# Patient Record
Sex: FEMALE | Race: WHITE | NOT HISPANIC OR LATINO | Employment: OTHER | URBAN - METROPOLITAN AREA
[De-identification: names, ages, dates, MRNs, and addresses within clinical notes are randomized per-mention and may not be internally consistent; named-entity substitution may affect disease eponyms.]

---

## 2019-08-30 ENCOUNTER — CLINICAL SUPPORT (OUTPATIENT)
Dept: FAMILY MEDICINE CLINIC | Facility: CLINIC | Age: 69
End: 2019-08-30

## 2019-08-30 DIAGNOSIS — E53.8 B12 DEFICIENCY: Primary | ICD-10-CM

## 2019-08-30 PROCEDURE — 96372 THER/PROPH/DIAG INJ SC/IM: CPT | Performed by: FAMILY MEDICINE

## 2019-08-30 RX ORDER — CYANOCOBALAMIN 1000 UG/ML
1000 INJECTION, SOLUTION INTRAMUSCULAR; SUBCUTANEOUS
Status: DISCONTINUED | OUTPATIENT
Start: 2019-08-30 | End: 2020-04-27

## 2019-08-30 RX ADMIN — CYANOCOBALAMIN 1000 MCG: 1000 INJECTION, SOLUTION INTRAMUSCULAR; SUBCUTANEOUS at 10:24

## 2019-09-03 ENCOUNTER — TELEPHONE (OUTPATIENT)
Dept: FAMILY MEDICINE CLINIC | Facility: CLINIC | Age: 69
End: 2019-09-03

## 2019-09-03 NOTE — TELEPHONE ENCOUNTER
"I wasn't sure how to go about this. Patient called stated she is ready to go do \"lung testing\" she wasn't sure of the exact name of the test but that she was ready for you to set it up for her. I didn't know if you wanted to wait until her appt on 10/7 to discuss with patient or not please advise.   "

## 2019-09-03 NOTE — TELEPHONE ENCOUNTER
I would be glad to do it but I do not have her records available.  If they do not come in before that visit we can discuss it then.  Please make sure she signed a release.

## 2019-09-09 ENCOUNTER — OFFICE VISIT (OUTPATIENT)
Dept: FAMILY MEDICINE CLINIC | Facility: CLINIC | Age: 69
End: 2019-09-09

## 2019-09-09 VITALS
HEIGHT: 61 IN | TEMPERATURE: 98.1 F | HEART RATE: 80 BPM | OXYGEN SATURATION: 96 % | DIASTOLIC BLOOD PRESSURE: 76 MMHG | BODY MASS INDEX: 37.19 KG/M2 | WEIGHT: 197 LBS | SYSTOLIC BLOOD PRESSURE: 112 MMHG

## 2019-09-09 DIAGNOSIS — R06.00 DYSPNEA, UNSPECIFIED TYPE: ICD-10-CM

## 2019-09-09 DIAGNOSIS — J20.9 ACUTE BRONCHITIS, UNSPECIFIED ORGANISM: Primary | ICD-10-CM

## 2019-09-09 PROCEDURE — 99214 OFFICE O/P EST MOD 30 MIN: CPT | Performed by: FAMILY MEDICINE

## 2019-09-09 RX ORDER — BENZONATATE 200 MG/1
200 CAPSULE ORAL 3 TIMES DAILY PRN
Qty: 30 CAPSULE | Refills: 0 | Status: SHIPPED | OUTPATIENT
Start: 2019-09-09 | End: 2019-12-19 | Stop reason: SDDI

## 2019-09-09 RX ORDER — PREDNISONE 20 MG/1
20 TABLET ORAL 2 TIMES DAILY
Qty: 10 TABLET | Refills: 0 | Status: SHIPPED | OUTPATIENT
Start: 2019-09-09 | End: 2019-09-14

## 2019-09-09 RX ORDER — IPRATROPIUM BROMIDE AND ALBUTEROL SULFATE 2.5; .5 MG/3ML; MG/3ML
3 SOLUTION RESPIRATORY (INHALATION) ONCE
Status: DISCONTINUED | OUTPATIENT
Start: 2019-09-09 | End: 2020-01-17

## 2019-09-09 RX ORDER — ALBUTEROL SULFATE 90 UG/1
2 AEROSOL, METERED RESPIRATORY (INHALATION) EVERY 4 HOURS PRN
COMMUNITY

## 2019-09-09 RX ORDER — AMOXICILLIN 500 MG/1
1000 CAPSULE ORAL 2 TIMES DAILY
Qty: 28 CAPSULE | Refills: 0 | Status: SHIPPED | OUTPATIENT
Start: 2019-09-09 | End: 2019-09-16

## 2019-09-09 NOTE — PATIENT INSTRUCTIONS
Go to ER if worsens.   Make sure to use inhaler every 4 hours while awake for the next 2 days.  Push fluids and rest.

## 2019-09-09 NOTE — PROGRESS NOTES
Subjective   Jemima Garcia is a 69 y.o. female.     Chief Complaint   Patient presents with   • Breathing Problem        Patient presents with a couple day history of shortness of breath.  She has had some cold symptoms with runny nose but is concerned because of her history of pulmonary embolus.  She denies any palpitations.  She denies any chest pain.  Her granddaughter was sick last week but she has not seen her in a bout a week.  She denies any fever or chills.  She does have a history of a pulmonary embolus in May 2018 after having a knee surgery.  She completed 6 months of Eliquis in November 2018.  I do not have her old records from my previous office available.  She denies any recent leg pain or swelling.           The following portions of the patient's history were reviewed and updated as appropriate: allergies, current medications, past family history, past medical history, past social history, past surgical history and problem list.    Past Medical History:   Diagnosis Date   • Depression    • GERD (gastroesophageal reflux disease)        History reviewed. No pertinent surgical history.    History reviewed. No pertinent family history.    Social History     Socioeconomic History   • Marital status: Single     Spouse name: Not on file   • Number of children: Not on file   • Years of education: Not on file   • Highest education level: Not on file   Tobacco Use   • Smoking status: Never Smoker         Current Outpatient Medications:   •  albuterol sulfate  (90 Base) MCG/ACT inhaler, Inhale 2 puffs Every 4 (Four) Hours As Needed., Disp: , Rfl:   •  Cholecalciferol (VITAMIN D3) 5000 UNITS capsule capsule, Take 5,000 Units by mouth Daily., Disp: , Rfl:   •  NABUMETONE PO, Take  by mouth., Disp: , Rfl:   •  omeprazole (PriLOSEC) 40 MG capsule, Take 40 mg by mouth Daily., Disp: , Rfl:   •  promethazine (PHENERGAN) 25 MG suppository, Insert 1 suppository into the rectum Every 4 (Four) Hours As Needed  "for nausea or vomiting., Disp: 12 suppository, Rfl: 0    Current Facility-Administered Medications:   •  cyanocobalamin injection 1,000 mcg, 1,000 mcg, Intramuscular, Q28 Days, Ajith Latricecrow Paz DO, 1,000 mcg at 08/30/19 1024  •  ipratropium-albuterol (DUO-NEB) nebulizer solution 3 mL, 3 mL, Nebulization, Once, Kehrer, Meredith Lea, MD    Review of Systems   Constitutional: Positive for fever. Negative for chills and fatigue.   HENT: Positive for congestion. Negative for rhinorrhea and sore throat.    Respiratory: Positive for chest tightness and wheezing. Negative for cough and shortness of breath.    Cardiovascular: Negative for chest pain and leg swelling.   Gastrointestinal: Negative for abdominal pain.   Endocrine: Negative for polydipsia and polyuria.   Genitourinary: Negative for dysuria.   Musculoskeletal: Negative for arthralgias and myalgias.   Skin: Negative for rash.   Neurological: Positive for headaches. Negative for dizziness.   Hematological: Does not bruise/bleed easily.   Psychiatric/Behavioral: Negative for sleep disturbance.     After nebulizer patient had increased breath sounds with few rhonchi and wheezes.  No crackles and no distress.    Objective   Vitals:    09/09/19 1606   BP: 112/76   Pulse: 80   Temp: 98.1 °F (36.7 °C)   SpO2: 96%   Weight: 89.4 kg (197 lb)   Height: 154.9 cm (61\")     Body mass index is 37.22 kg/m².  Physical Exam   Constitutional: She is oriented to person, place, and time. She appears well-developed and well-nourished.   HENT:   Head: Normocephalic and atraumatic.   Eyes: Conjunctivae and EOM are normal. Pupils are equal, round, and reactive to light.   Neck: Neck supple. No thyromegaly present.   Cardiovascular: Normal rate and regular rhythm.   No murmur heard.  Pulmonary/Chest: Effort normal. No tachypnea. She has decreased breath sounds. She has no wheezes. She has no rales. She exhibits no tenderness.   Abdominal: Soft.   Musculoskeletal: Normal range of motion. "   Neurological: She is alert and oriented to person, place, and time. No cranial nerve deficit.   Skin: Skin is warm and dry.   Psychiatric: She has a normal mood and affect.         Assessment/Plan   Jemima was seen today for breathing problem.    Diagnoses and all orders for this visit:    Dyspnea, unspecified type  -     ipratropium-albuterol (DUO-NEB) nebulizer solution 3 mL               There are no Patient Instructions on file for this visit.

## 2019-09-16 ENCOUNTER — OFFICE VISIT (OUTPATIENT)
Dept: FAMILY MEDICINE CLINIC | Facility: CLINIC | Age: 69
End: 2019-09-16

## 2019-09-16 VITALS
HEART RATE: 93 BPM | OXYGEN SATURATION: 98 % | WEIGHT: 197.2 LBS | TEMPERATURE: 98 F | SYSTOLIC BLOOD PRESSURE: 118 MMHG | BODY MASS INDEX: 37.23 KG/M2 | HEIGHT: 61 IN | DIASTOLIC BLOOD PRESSURE: 78 MMHG

## 2019-09-16 DIAGNOSIS — J20.9 ACUTE BRONCHITIS, UNSPECIFIED ORGANISM: ICD-10-CM

## 2019-09-16 DIAGNOSIS — M15.9 OSTEOARTHRITIS OF MULTIPLE JOINTS, UNSPECIFIED OSTEOARTHRITIS TYPE: ICD-10-CM

## 2019-09-16 DIAGNOSIS — R06.2 WHEEZING: Primary | ICD-10-CM

## 2019-09-16 PROBLEM — M19.90 OSTEOARTHRITIS: Status: ACTIVE | Noted: 2019-09-16

## 2019-09-16 PROCEDURE — 99213 OFFICE O/P EST LOW 20 MIN: CPT | Performed by: FAMILY MEDICINE

## 2019-09-16 RX ORDER — ACETAMINOPHEN 325 MG/1
650 TABLET ORAL
COMMUNITY

## 2019-09-16 RX ORDER — LEVOTHYROXINE SODIUM 88 UG/1
TABLET ORAL DAILY
Refills: 1 | COMMUNITY
Start: 2019-07-18 | End: 2020-02-06 | Stop reason: SDUPTHER

## 2019-09-16 RX ORDER — ONDANSETRON 4 MG/1
4 TABLET, FILM COATED ORAL
COMMUNITY
Start: 2018-03-23 | End: 2019-10-31

## 2019-09-16 RX ORDER — TRAMADOL HYDROCHLORIDE 50 MG/1
50-100 TABLET ORAL
COMMUNITY
Start: 2016-04-07 | End: 2020-04-07

## 2019-09-16 RX ORDER — FLUTICASONE PROPIONATE 50 MCG
SPRAY, SUSPENSION (ML) NASAL
COMMUNITY
Start: 2016-04-07 | End: 2021-10-14 | Stop reason: SDUPTHER

## 2019-09-16 RX ORDER — VENLAFAXINE HYDROCHLORIDE 75 MG/1
CAPSULE, EXTENDED RELEASE ORAL
Refills: 0 | COMMUNITY
Start: 2019-08-22 | End: 2019-11-18

## 2019-09-16 RX ORDER — MELOXICAM 15 MG/1
TABLET ORAL DAILY
Refills: 1 | COMMUNITY
Start: 2019-08-07 | End: 2019-09-16 | Stop reason: SDUPTHER

## 2019-09-16 RX ORDER — MELOXICAM 15 MG/1
15 TABLET ORAL DAILY
Qty: 90 TABLET | Refills: 1 | Status: SHIPPED | OUTPATIENT
Start: 2019-09-16 | End: 2020-04-27 | Stop reason: SDUPTHER

## 2019-09-16 RX ORDER — BUSPIRONE HYDROCHLORIDE 10 MG/1
10 TABLET ORAL 2 TIMES DAILY
Refills: 0 | COMMUNITY
Start: 2019-07-18 | End: 2019-12-09 | Stop reason: SDUPTHER

## 2019-09-16 NOTE — PROGRESS NOTES
Subjective   Jemima Garcia is a 69 y.o. female.     Chief Complaint   Patient presents with   • Breathing Problem     follow up from last week        Here to follow-up.  She states she is feeling much better after the treatment from last week.  She is using her inhaler still several times a day.  She still has a lot of wheezing and cough but denies any productive cough and denies any chest pain.  She is also not had any fever or chills.  She has routine follow-up scheduled for next month.           The following portions of the patient's history were reviewed and updated as appropriate: allergies, current medications, past family history, past medical history, past social history, past surgical history and problem list.    Past Medical History:   Diagnosis Date   • Acute bronchitis    • Acute sinusitis    • Allergic rhinitis    • Anxiety    • Arthritis    • Back pain    • BMI 34.0-34.9,adult    • Cervicalgia    • Chills    • Deep vein thrombosis (DVT) of lower extremity (CMS/HCC)    • Depression    • Dermatitis    • Dyspnea    • Dysuria    • Esophageal reflux    • Fatigue    • Gastric ulcer    • GERD (gastroesophageal reflux disease)    • Headache    • Hypothyroidism    • Irritable bowel syndrome    • Lumbago    • Migraine    • Nausea    • Neuritis    • Osteoarthritis    • Osteoarthritis of knee    • Plantar fasciitis    • Pulled muscle    • Pulmonary embolism (CMS/HCC)    • Pyelonephritis    • Rheumatic fever    • Sore throat    • Torticollis    • Trapezius strain    • Urinary incontinence    • Urinary pain    • Urinary tract infection    • UTI symptoms    • Vitamin B1 deficiency    • Vitamin B12 deficiency    • Vitamin D deficiency    • Weakness    • Wheezing        Past Surgical History:   Procedure Laterality Date   • APPENDECTOMY     • GALLBLADDER SURGERY     • HYSTERECTOMY     • INGUINAL HERNIA REPAIR     • KNEE SURGERY     • LAPAROTOMY OOPHERECTOMY     • TONSILLECTOMY     • TOTAL KNEE ARTHROPLASTY Left         Family History   Problem Relation Age of Onset   • Arthritis Mother    • Depression Mother    • Hyperlipidemia Mother    • Hypertension Mother    • Hypertension Father    • Alcohol abuse Maternal Grandfather    • Depression Maternal Grandfather    • Heart disease Other         IN FEMALES BEFORE AGE 65       Social History     Socioeconomic History   • Marital status: Single     Spouse name: Not on file   • Number of children: Not on file   • Years of education: Not on file   • Highest education level: Not on file   Tobacco Use   • Smoking status: Never Smoker         Current Outpatient Medications:   •  acetaminophen (TYLENOL) 325 MG tablet, Take 1,000 mg by mouth., Disp: , Rfl:   •  albuterol sulfate  (90 Base) MCG/ACT inhaler, Inhale 2 puffs Every 4 (Four) Hours As Needed., Disp: , Rfl:   •  benzonatate (TESSALON) 200 MG capsule, Take 1 capsule by mouth 3 (Three) Times a Day As Needed for Cough., Disp: 30 capsule, Rfl: 0  •  busPIRone (BUSPAR) 10 MG tablet, Take 10 mg by mouth 2 (Two) Times a Day., Disp: , Rfl: 0  •  Cholecalciferol (VITAMIN D3) 5000 UNITS capsule capsule, Take 5,000 Units by mouth Daily., Disp: , Rfl:   •  fluticasone (FLONASE) 50 MCG/ACT nasal spray, Use 2 Sprays in each nostril once daily. prn, Disp: , Rfl:   •  levothyroxine (SYNTHROID, LEVOTHROID) 88 MCG tablet, Take  by mouth Daily., Disp: , Rfl: 1  •  meloxicam (MOBIC) 15 MG tablet, Take 1 tablet by mouth Daily., Disp: 90 tablet, Rfl: 1  •  omeprazole (PriLOSEC) 40 MG capsule, Take 40 mg by mouth Daily., Disp: , Rfl:   •  ondansetron (ZOFRAN) 4 MG tablet, Take 4 mg by mouth., Disp: , Rfl:   •  promethazine (PHENERGAN) 25 MG suppository, Insert 1 suppository into the rectum Every 4 (Four) Hours As Needed for nausea or vomiting., Disp: 12 suppository, Rfl: 0  •  traMADol (ULTRAM) 50 MG tablet, Take  mg by mouth., Disp: , Rfl:   •  venlafaxine XR (EFFEXOR-XR) 75 MG 24 hr capsule, TK 1 C PO QAM, Disp: , Rfl: 0    Current  "Facility-Administered Medications:   •  cyanocobalamin injection 1,000 mcg, 1,000 mcg, Intramuscular, Q28 Days, Ajith Latricecrow Paz DO, 1,000 mcg at 08/30/19 1024  •  ipratropium-albuterol (DUO-NEB) nebulizer solution 3 mL, 3 mL, Nebulization, Once, Kehrer, Meredith Lea, MD    Review of Systems   Constitutional: Positive for fatigue. Negative for chills and fever.   HENT: Negative for congestion, rhinorrhea and sore throat.    Respiratory: Positive for cough and wheezing. Negative for shortness of breath.    Cardiovascular: Negative for chest pain and leg swelling.   Gastrointestinal: Negative for abdominal pain.   Endocrine: Negative for polydipsia and polyuria.   Genitourinary: Negative for dysuria.   Musculoskeletal: Negative for arthralgias and myalgias.   Skin: Negative for rash.   Neurological: Negative for dizziness.   Hematological: Does not bruise/bleed easily.   Psychiatric/Behavioral: Negative for sleep disturbance.       Objective   Vitals:    09/16/19 1420   BP: 118/78   Pulse: 93   Temp: 98 °F (36.7 °C)   SpO2: 98%   Weight: 89.4 kg (197 lb 3.2 oz)   Height: 154.9 cm (61\")     Body mass index is 37.26 kg/m².  Physical Exam   Constitutional: She is oriented to person, place, and time. She appears well-developed and well-nourished.   HENT:   Head: Normocephalic and atraumatic.   Eyes: Conjunctivae and EOM are normal. Pupils are equal, round, and reactive to light.   Neck: Neck supple. No thyromegaly present.   Cardiovascular: Normal rate and regular rhythm.   No murmur heard.  Pulmonary/Chest: Effort normal and breath sounds normal. She has no wheezes.   Abdominal: Soft.   Musculoskeletal: Normal range of motion.   Neurological: She is alert and oriented to person, place, and time. No cranial nerve deficit.   Skin: Skin is warm and dry.   Psychiatric: She has a normal mood and affect.         Assessment/Plan   Jemima was seen today for breathing problem.    Diagnoses and all orders for this " visit:    Wheezing    Acute bronchitis, unspecified organism    Osteoarthritis of multiple joints, unspecified osteoarthritis type  -     meloxicam (MOBIC) 15 MG tablet; Take 1 tablet by mouth Daily.               Patient Instructions   Keep routine follow up.

## 2019-10-07 ENCOUNTER — OFFICE VISIT (OUTPATIENT)
Dept: FAMILY MEDICINE CLINIC | Facility: CLINIC | Age: 69
End: 2019-10-07

## 2019-10-07 VITALS
TEMPERATURE: 97.8 F | OXYGEN SATURATION: 95 % | WEIGHT: 201.6 LBS | DIASTOLIC BLOOD PRESSURE: 80 MMHG | HEART RATE: 99 BPM | SYSTOLIC BLOOD PRESSURE: 122 MMHG | BODY MASS INDEX: 38.06 KG/M2 | HEIGHT: 61 IN

## 2019-10-07 DIAGNOSIS — R06.2 WHEEZING: ICD-10-CM

## 2019-10-07 DIAGNOSIS — E03.9 HYPOTHYROIDISM, UNSPECIFIED TYPE: ICD-10-CM

## 2019-10-07 DIAGNOSIS — R06.02 SHORTNESS OF BREATH: Primary | ICD-10-CM

## 2019-10-07 PROBLEM — R06.00 DYSPNEA: Status: ACTIVE | Noted: 2019-10-07

## 2019-10-07 PROCEDURE — G0439 PPPS, SUBSEQ VISIT: HCPCS | Performed by: FAMILY MEDICINE

## 2019-10-07 NOTE — PROGRESS NOTES
The ABCs of the Annual Wellness Visit  Subsequent Medicare Wellness Visit    Chief Complaint   Patient presents with   • Annual Exam       Subjective   History of Present Illness:  Jemima Garcia is a 69 y.o. female who presents for a Subsequent Medicare Wellness Visit.  Has been doing well in general.  She still continues to be short of breath.  The Brio really does help but she still taking the albuterol twice a day.    HEALTH RISK ASSESSMENT    Recent Hospitalizations:  No hospitalization(s) within the last year.    Current Medical Providers:  Patient Care Team:  Kehrer, Meredith Lea, MD as PCP - General (Family Medicine)    Smoking Status:  Social History     Tobacco Use   Smoking Status Never Smoker       Alcohol Consumption:  Social History     Substance and Sexual Activity   Alcohol Use Not on file       Depression Screen:   No flowsheet data found.    Fall Risk Screen:  PATTIADI Fall Risk Assessment has not been completed.    Health Habits and Functional and Cognitive Screening:  Functional & Cognitive Status 10/7/2019   Do you have difficulty preparing food and eating? No   Do you have difficulty bathing yourself, getting dressed or grooming yourself? No   Do you have difficulty using the toilet? No   Do you have difficulty moving around from place to place? No   Do you have trouble with steps or getting out of a bed or a chair? No   Current Diet Limited Junk Food   Dental Exam Up to date   Eye Exam Up to date   Exercise (times per week) 0 times per week   Current Exercise Activities Include None   Do you need help using the phone?  No   Are you deaf or do you have serious difficulty hearing?  No   Do you need help with transportation? No   Do you need help shopping? No   Do you need help preparing meals?  No   Do you need help with housework?  No   Do you need help with laundry? No   Do you need help taking your medications? No   Do you need help managing money? No   Do you ever drive or ride in a car  without wearing a seat belt? No   Have you felt unusual stress, anger or loneliness in the last month? No   Who do you live with? Spouse   If you need help, do you have trouble finding someone available to you? No   Have you been bothered in the last four weeks by sexual problems? No   Do you have difficulty concentrating, remembering or making decisions? No         Does the patient have evidence of cognitive impairment? No    Asprin use counseling:Does not need ASA (and currently is not on it)    Age-appropriate Screening Schedule:  Refer to the list below for future screening recommendations based on patient's age, sex and/or medical conditions. Orders for these recommended tests are listed in the plan section. The patient has been provided with a written plan.    Health Maintenance   Topic Date Due   • MAMMOGRAM  1950   • TDAP/TD VACCINES (1 - Tdap) 02/02/1968   • ZOSTER VACCINE (1 of 2) 02/02/1999   • PNEUMOCOCCAL VACCINES (65+ LOW/MEDIUM RISK) (1 of 2 - PCV13) 02/02/2014   • INFLUENZA VACCINE  08/01/2019   • COLONOSCOPY  08/30/2019          The following portions of the patient's history were reviewed and updated as appropriate: allergies, current medications, past family history, past medical history, past social history, past surgical history and problem list.    Outpatient Medications Prior to Visit   Medication Sig Dispense Refill   • acetaminophen (TYLENOL) 325 MG tablet Take 1,000 mg by mouth.     • albuterol sulfate  (90 Base) MCG/ACT inhaler Inhale 2 puffs Every 4 (Four) Hours As Needed.     • benzonatate (TESSALON) 200 MG capsule Take 1 capsule by mouth 3 (Three) Times a Day As Needed for Cough. 30 capsule 0   • busPIRone (BUSPAR) 10 MG tablet Take 10 mg by mouth 2 (Two) Times a Day.  0   • Cholecalciferol (VITAMIN D3) 5000 UNITS capsule capsule Take 5,000 Units by mouth Daily.     • fluticasone (FLONASE) 50 MCG/ACT nasal spray Use 2 Sprays in each nostril once daily. prn     • Fluticasone  Furoate-Vilanterol (BREO ELLIPTA) 100-25 MCG/INH inhaler Inhale 1 puff Daily. 1 each 0   • levothyroxine (SYNTHROID, LEVOTHROID) 88 MCG tablet Take  by mouth Daily.  1   • meloxicam (MOBIC) 15 MG tablet Take 1 tablet by mouth Daily. 90 tablet 1   • omeprazole (PriLOSEC) 40 MG capsule Take 40 mg by mouth Daily.     • ondansetron (ZOFRAN) 4 MG tablet Take 4 mg by mouth.     • promethazine (PHENERGAN) 25 MG suppository Insert 1 suppository into the rectum Every 4 (Four) Hours As Needed for nausea or vomiting. 12 suppository 0   • traMADol (ULTRAM) 50 MG tablet Take  mg by mouth.     • venlafaxine XR (EFFEXOR-XR) 75 MG 24 hr capsule TK 1 C PO QAM  0     Facility-Administered Medications Prior to Visit   Medication Dose Route Frequency Provider Last Rate Last Dose   • cyanocobalamin injection 1,000 mcg  1,000 mcg Intramuscular Q28 Days Latrice Canseco DO   1,000 mcg at 08/30/19 1024   • ipratropium-albuterol (DUO-NEB) nebulizer solution 3 mL  3 mL Nebulization Once Kehrer, Meredith Lea, MD           Patient Active Problem List   Diagnosis   • Wheezing   • Osteoarthritis   • Acute bronchitis       Advanced Care Planning:  Patient does not have an advance directive - information provided to the patient today    Review of Systems   Constitutional: Negative for chills, fatigue and fever.   HENT: Negative for congestion, rhinorrhea and sore throat.    Respiratory: Positive for shortness of breath. Negative for cough.    Cardiovascular: Negative for chest pain and leg swelling.   Gastrointestinal: Negative for abdominal pain.   Endocrine: Negative for polydipsia and polyuria.   Genitourinary: Negative for dysuria.   Musculoskeletal: Negative for arthralgias and myalgias.   Skin: Negative for rash.   Neurological: Negative for dizziness.   Hematological: Does not bruise/bleed easily.   Psychiatric/Behavioral: Negative for dysphoric mood and sleep disturbance. The patient is not nervous/anxious.        Compared to one  "year ago, the patient feels her physical health is better.  Compared to one year ago, the patient feels her mental health is better.    Reviewed chart for potential of high risk medication in the elderly: yes  Reviewed chart for potential of harmful drug interactions in the elderly:yes    Objective         Vitals:    10/07/19 1338   BP: 122/80   Pulse: 99   Temp: 97.8 °F (36.6 °C)   SpO2: 95%   Weight: 91.4 kg (201 lb 9.6 oz)   Height: 154.9 cm (61\")       Body mass index is 38.09 kg/m².  Discussed the patient's BMI with her. The BMI is above average; BMI management plan is completed.    Physical Exam   Constitutional: She is oriented to person, place, and time. She appears well-developed and well-nourished.   HENT:   Head: Normocephalic and atraumatic.   Mouth/Throat: Oropharynx is clear and moist.   Eyes: EOM are normal. Pupils are equal, round, and reactive to light.   Neck: Neck supple. No thyromegaly present.   Cardiovascular: Normal rate and regular rhythm.   No murmur heard.  Pulmonary/Chest: Effort normal and breath sounds normal. She has no wheezes.   Abdominal: Soft. Bowel sounds are normal. There is no tenderness.   Musculoskeletal: Normal range of motion. She exhibits no edema.   Neurological: She is alert and oriented to person, place, and time.   Skin: Skin is warm and dry.   Psychiatric: She has a normal mood and affect.   Nursing note and vitals reviewed.            Assessment/Plan   Medicare Risks and Personalized Health Plan  CMS Preventative Services Quick Reference  Inactivity/Sedentary    The above risks/problems have been discussed with the patient.  Pertinent information has been shared with the patient in the After Visit Summary.  Follow up plans and orders are seen below in the Assessment/Plan Section.    There are no diagnoses linked to this encounter.  Follow Up:  No Follow-up on file.     An After Visit Summary and PPPS were given to the patient.             "

## 2019-10-15 ENCOUNTER — HOSPITAL ENCOUNTER (OUTPATIENT)
Dept: RESPIRATORY THERAPY | Facility: HOSPITAL | Age: 69
Discharge: HOME | End: 2019-10-15
Admitting: FAMILY MEDICINE

## 2019-10-15 DIAGNOSIS — R06.02 SHORTNESS OF BREATH: ICD-10-CM

## 2019-10-15 DIAGNOSIS — R06.2 WHEEZING: ICD-10-CM

## 2019-10-15 LAB
BDY SITE: NORMAL
HGB BLDA-MCNC: 13 G/DL

## 2019-10-15 PROCEDURE — 94729 DIFFUSING CAPACITY: CPT

## 2019-10-15 PROCEDURE — 94726 PLETHYSMOGRAPHY LUNG VOLUMES: CPT

## 2019-10-15 PROCEDURE — 82820 HEMOGLOBIN-OXYGEN AFFINITY: CPT | Performed by: FAMILY MEDICINE

## 2019-10-15 PROCEDURE — 94640 AIRWAY INHALATION TREATMENT: CPT

## 2019-10-15 PROCEDURE — 94060 EVALUATION OF WHEEZING: CPT

## 2019-10-15 RX ORDER — ALBUTEROL SULFATE 2.5 MG/3ML
2.5 SOLUTION RESPIRATORY (INHALATION) ONCE
Status: COMPLETED | OUTPATIENT
Start: 2019-10-15 | End: 2019-10-15

## 2019-10-15 RX ADMIN — ALBUTEROL SULFATE 2.5 MG: 2.5 SOLUTION RESPIRATORY (INHALATION) at 10:00

## 2019-10-17 DIAGNOSIS — R06.02 SHORTNESS OF BREATH: Primary | ICD-10-CM

## 2019-10-17 RX ORDER — OMEPRAZOLE 40 MG/1
40 CAPSULE, DELAYED RELEASE ORAL DAILY
Qty: 90 CAPSULE | Refills: 3 | Status: SHIPPED | OUTPATIENT
Start: 2019-10-17 | End: 2020-11-04

## 2019-10-29 DIAGNOSIS — R06.02 SHORTNESS OF BREATH: ICD-10-CM

## 2019-10-30 DIAGNOSIS — R06.02 SHORTNESS OF BREATH: Primary | ICD-10-CM

## 2019-10-31 ENCOUNTER — OFFICE VISIT (OUTPATIENT)
Dept: FAMILY MEDICINE CLINIC | Facility: CLINIC | Age: 69
End: 2019-10-31

## 2019-10-31 VITALS
DIASTOLIC BLOOD PRESSURE: 68 MMHG | SYSTOLIC BLOOD PRESSURE: 112 MMHG | WEIGHT: 201.6 LBS | TEMPERATURE: 98.1 F | OXYGEN SATURATION: 96 % | HEART RATE: 74 BPM | HEIGHT: 61 IN | BODY MASS INDEX: 38.06 KG/M2

## 2019-10-31 DIAGNOSIS — I71.019 DISSECTION OF THORACIC AORTA (HCC): Primary | ICD-10-CM

## 2019-10-31 DIAGNOSIS — R01.1 HEART MURMUR: ICD-10-CM

## 2019-10-31 PROCEDURE — 99214 OFFICE O/P EST MOD 30 MIN: CPT | Performed by: FAMILY MEDICINE

## 2019-10-31 RX ORDER — ONDANSETRON 4 MG/1
4 TABLET, FILM COATED ORAL EVERY 8 HOURS PRN
COMMUNITY
End: 2021-01-14

## 2019-10-31 NOTE — PROGRESS NOTES
Subjective   Jemima Garcia is a 69 y.o. female.     Chief Complaint   Patient presents with   • Shortness of Breath        Patient here to discuss her shortness of breath.  She feels that she is getting worse and can hardly do anything.  She denies any chest pain.  Of note since I last saw her she had a pulmonary function test which revealed a diffusion abnormality that was likely due to vascular problem.  I then got a CT of the chest which showed where her old PEs were but nothing acute.  There was mention made of a stable focal aortic dissection that was stable from April 2018.  When I looked at the CT scan of April 2018 it said that the aorta was normal.            The following portions of the patient's history were reviewed and updated as appropriate: allergies, current medications, past family history, past medical history, past social history, past surgical history and problem list.    Past Medical History:   Diagnosis Date   • Acute bronchitis    • Acute sinusitis    • Allergic rhinitis    • Anxiety    • Arthritis    • Back pain    • BMI 34.0-34.9,adult    • Cervicalgia    • Chills    • Deep vein thrombosis (DVT) of lower extremity (CMS/HCC)    • Depression    • Dermatitis    • Dyspnea    • Dysuria    • Esophageal reflux    • Fatigue    • Gastric ulcer    • GERD (gastroesophageal reflux disease)    • Headache    • Hypothyroidism    • Irritable bowel syndrome    • Lumbago    • Migraine    • Nausea    • Neuritis    • Osteoarthritis    • Osteoarthritis of knee    • Plantar fasciitis    • Pulled muscle    • Pulmonary embolism (CMS/HCC)    • Pyelonephritis    • Rheumatic fever    • Sore throat    • Torticollis    • Trapezius strain    • Urinary incontinence    • Urinary pain    • Urinary tract infection    • UTI symptoms    • Vitamin B1 deficiency    • Vitamin B12 deficiency    • Vitamin D deficiency    • Weakness    • Wheezing        Past Surgical History:   Procedure Laterality Date   • APPENDECTOMY     •  GALLBLADDER SURGERY     • HYSTERECTOMY     • INGUINAL HERNIA REPAIR     • KNEE SURGERY     • LAPAROTOMY OOPHERECTOMY     • TONSILLECTOMY     • TOTAL KNEE ARTHROPLASTY Left        Family History   Problem Relation Age of Onset   • Arthritis Mother    • Depression Mother    • Hyperlipidemia Mother    • Hypertension Mother    • Hypertension Father    • Alcohol abuse Maternal Grandfather    • Depression Maternal Grandfather    • Heart disease Other         IN FEMALES BEFORE AGE 65       Social History     Socioeconomic History   • Marital status: Single     Spouse name: Not on file   • Number of children: Not on file   • Years of education: Not on file   • Highest education level: Not on file   Tobacco Use   • Smoking status: Never Smoker         Current Outpatient Medications:   •  acetaminophen (TYLENOL) 325 MG tablet, Take 1,000 mg by mouth., Disp: , Rfl:   •  albuterol sulfate  (90 Base) MCG/ACT inhaler, Inhale 2 puffs Every 4 (Four) Hours As Needed., Disp: , Rfl:   •  benzonatate (TESSALON) 200 MG capsule, Take 1 capsule by mouth 3 (Three) Times a Day As Needed for Cough., Disp: 30 capsule, Rfl: 0  •  busPIRone (BUSPAR) 10 MG tablet, Take 10 mg by mouth 2 (Two) Times a Day., Disp: , Rfl: 0  •  Cholecalciferol (VITAMIN D3) 5000 UNITS capsule capsule, Take 5,000 Units by mouth Daily., Disp: , Rfl:   •  fluticasone (FLONASE) 50 MCG/ACT nasal spray, Use 2 Sprays in each nostril once daily. prn, Disp: , Rfl:   •  Fluticasone Furoate-Vilanterol (BREO ELLIPTA) 100-25 MCG/INH inhaler, Inhale 1 puff Daily., Disp: 1 each, Rfl: 0  •  levothyroxine (SYNTHROID, LEVOTHROID) 88 MCG tablet, Take  by mouth Daily., Disp: , Rfl: 1  •  meloxicam (MOBIC) 15 MG tablet, Take 1 tablet by mouth Daily., Disp: 90 tablet, Rfl: 1  •  omeprazole (priLOSEC) 40 MG capsule, Take 1 capsule by mouth Daily., Disp: 90 capsule, Rfl: 3  •  ondansetron (ZOFRAN) 4 MG tablet, Take 4 mg by mouth Every 8 (Eight) Hours As Needed for Nausea or  "Vomiting., Disp: , Rfl:   •  promethazine (PHENERGAN) 25 MG suppository, Insert 1 suppository into the rectum Every 4 (Four) Hours As Needed for nausea or vomiting., Disp: 12 suppository, Rfl: 0  •  traMADol (ULTRAM) 50 MG tablet, Take  mg by mouth., Disp: , Rfl:   •  venlafaxine XR (EFFEXOR-XR) 75 MG 24 hr capsule, TK 1 C PO QAM, Disp: , Rfl: 0    Current Facility-Administered Medications:   •  cyanocobalamin injection 1,000 mcg, 1,000 mcg, Intramuscular, Q28 Days, Latrice Canseco DO, 1,000 mcg at 08/30/19 1024  •  ipratropium-albuterol (DUO-NEB) nebulizer solution 3 mL, 3 mL, Nebulization, Once, Kehrer, Meredith Lea, MD    Review of Systems   Constitutional: Negative for chills, fatigue and fever.   HENT: Negative for congestion, rhinorrhea and sore throat.    Respiratory: Positive for cough and shortness of breath.    Cardiovascular: Negative for leg swelling.   Gastrointestinal: Negative for abdominal pain.   Endocrine: Negative for polydipsia and polyuria.   Genitourinary: Negative for dysuria.   Musculoskeletal: Negative for arthralgias and myalgias.   Skin: Negative for rash.   Neurological: Negative for dizziness.   Hematological: Does not bruise/bleed easily.   Psychiatric/Behavioral: Negative for sleep disturbance.       Objective   Vitals:    10/31/19 1438   BP: 112/68   Pulse: 74   Temp: 98.1 °F (36.7 °C)   SpO2: 96%   Weight: 91.4 kg (201 lb 9.6 oz)   Height: 154.9 cm (61\")     Body mass index is 38.09 kg/m².  Physical Exam   Constitutional: She appears well-developed and well-nourished. No distress.   HENT:   Head: Normocephalic and atraumatic.   Mouth/Throat: Oropharynx is clear and moist.   Eyes: Pupils are equal, round, and reactive to light.   Neck: Normal range of motion. Neck supple.   Cardiovascular: Normal rate and regular rhythm.   Murmur heard.   Systolic murmur is present with a grade of 3/6.  Murmur at right upper sternal border   Pulmonary/Chest: Effort normal and breath sounds " normal.   Musculoskeletal: Normal range of motion. She exhibits no edema.   Skin: Skin is warm and dry. Capillary refill takes less than 2 seconds.         Assessment/Plan   Jemima was seen today for shortness of breath.    Diagnoses and all orders for this visit:    Dissection of thoracic aorta (CMS/HCC)  -     CT Angiogram Chest With Contrast  -     Adult Transthoracic Echo Complete W/ Cont if Necessary Per Protocol  -     Ambulatory Referral to Cardiothoracic Surgery    Heart murmur  -     CT Angiogram Chest With Contrast  -     Adult Transthoracic Echo Complete W/ Cont if Necessary Per Protocol  -     Ambulatory Referral to Cardiothoracic Surgery      I had a discussion with Dr. Thakur who agreed to see patient as an outpatient with further work-up.  He states these are often false positive because of the different contrast and technique.         Patient Instructions   Please go to ER with worsening symptoms.

## 2019-11-01 ENCOUNTER — HOSPITAL ENCOUNTER (OUTPATIENT)
Dept: CARDIOLOGY | Facility: HOSPITAL | Age: 69
Discharge: HOME | End: 2019-11-01

## 2019-11-01 ENCOUNTER — HOSPITAL ENCOUNTER (OUTPATIENT)
Dept: CT IMAGING | Facility: HOSPITAL | Age: 69
Discharge: HOME | End: 2019-11-01
Admitting: FAMILY MEDICINE

## 2019-11-01 VITALS
BODY MASS INDEX: 37.95 KG/M2 | HEART RATE: 65 BPM | HEIGHT: 61 IN | DIASTOLIC BLOOD PRESSURE: 76 MMHG | SYSTOLIC BLOOD PRESSURE: 120 MMHG | WEIGHT: 201 LBS

## 2019-11-01 LAB
AORTIC ARCH: 2.1 CM
AORTIC ROOT ANNULUS: 2 CM
ASCENDING AORTA: 3.2 CM
BH CV ECHO MEAS - ACS: 1.6 CM
BH CV ECHO MEAS - AO MAX PG (FULL): 8.7 MMHG
BH CV ECHO MEAS - AO MAX PG: 12.3 MMHG
BH CV ECHO MEAS - AO MEAN PG (FULL): 4.4 MMHG
BH CV ECHO MEAS - AO MEAN PG: 6.2 MMHG
BH CV ECHO MEAS - AO V2 MAX: 175.1 CM/SEC
BH CV ECHO MEAS - AO V2 MEAN: 117.8 CM/SEC
BH CV ECHO MEAS - AO V2 VTI: 33.2 CM
BH CV ECHO MEAS - ASC AORTA: 3.2 CM
BH CV ECHO MEAS - AVA(I,A): 1.9 CM^2
BH CV ECHO MEAS - AVA(I,D): 1.9 CM^2
BH CV ECHO MEAS - AVA(V,A): 1.7 CM^2
BH CV ECHO MEAS - AVA(V,D): 1.7 CM^2
BH CV ECHO MEAS - BSA(HAYCOCK): 2 M^2
BH CV ECHO MEAS - BSA: 1.9 M^2
BH CV ECHO MEAS - BZI_BMI: 38 KILOGRAMS/M^2
BH CV ECHO MEAS - BZI_METRIC_HEIGHT: 154.9 CM
BH CV ECHO MEAS - BZI_METRIC_WEIGHT: 91.2 KG
BH CV ECHO MEAS - EDV(MOD-SP2): 67 ML
BH CV ECHO MEAS - EDV(MOD-SP4): 63 ML
BH CV ECHO MEAS - EDV(TEICH): 59.1 ML
BH CV ECHO MEAS - EF(CUBED): 76.3 %
BH CV ECHO MEAS - EF(MOD-BP): 70 %
BH CV ECHO MEAS - EF(MOD-SP2): 67.2 %
BH CV ECHO MEAS - EF(MOD-SP4): 71.4 %
BH CV ECHO MEAS - EF(TEICH): 69.2 %
BH CV ECHO MEAS - ESV(MOD-SP2): 22 ML
BH CV ECHO MEAS - ESV(MOD-SP4): 18 ML
BH CV ECHO MEAS - ESV(TEICH): 18.2 ML
BH CV ECHO MEAS - FS: 38.1 %
BH CV ECHO MEAS - IVS/LVPW: 1
BH CV ECHO MEAS - IVSD: 1.1 CM
BH CV ECHO MEAS - LAT PEAK E' VEL: 8 CM/SEC
BH CV ECHO MEAS - LV DIASTOLIC VOL/BSA (35-75): 33.3 ML/M^2
BH CV ECHO MEAS - LV MASS(C)D: 133.2 GRAMS
BH CV ECHO MEAS - LV MASS(C)DI: 70.3 GRAMS/M^2
BH CV ECHO MEAS - LV MAX PG: 3.6 MMHG
BH CV ECHO MEAS - LV MEAN PG: 1.8 MMHG
BH CV ECHO MEAS - LV SYSTOLIC VOL/BSA (12-30): 9.5 ML/M^2
BH CV ECHO MEAS - LV V1 MAX: 94.6 CM/SEC
BH CV ECHO MEAS - LV V1 MEAN: 62.1 CM/SEC
BH CV ECHO MEAS - LV V1 VTI: 19.5 CM
BH CV ECHO MEAS - LVIDD: 3.7 CM
BH CV ECHO MEAS - LVIDS: 2.3 CM
BH CV ECHO MEAS - LVLD AP2: 7.2 CM
BH CV ECHO MEAS - LVLD AP4: 6.6 CM
BH CV ECHO MEAS - LVLS AP2: 6.1 CM
BH CV ECHO MEAS - LVLS AP4: 5.5 CM
BH CV ECHO MEAS - LVOT AREA (M): 3.1 CM^2
BH CV ECHO MEAS - LVOT AREA: 3.2 CM^2
BH CV ECHO MEAS - LVOT DIAM: 2 CM
BH CV ECHO MEAS - LVPWD: 1.1 CM
BH CV ECHO MEAS - MED PEAK E' VEL: 6 CM/SEC
BH CV ECHO MEAS - MV A DUR: 0.11 SEC
BH CV ECHO MEAS - MV A MAX VEL: 81 CM/SEC
BH CV ECHO MEAS - MV DEC SLOPE: 204.8 CM/SEC^2
BH CV ECHO MEAS - MV DEC TIME: 0.25 SEC
BH CV ECHO MEAS - MV E MAX VEL: 55.3 CM/SEC
BH CV ECHO MEAS - MV E/A: 0.68
BH CV ECHO MEAS - MV MAX PG: 4.3 MMHG
BH CV ECHO MEAS - MV MEAN PG: 1.2 MMHG
BH CV ECHO MEAS - MV P1/2T MAX VEL: 53.8 CM/SEC
BH CV ECHO MEAS - MV P1/2T: 77 MSEC
BH CV ECHO MEAS - MV V2 MAX: 103.2 CM/SEC
BH CV ECHO MEAS - MV V2 MEAN: 50.8 CM/SEC
BH CV ECHO MEAS - MV V2 VTI: 25.6 CM
BH CV ECHO MEAS - MVA P1/2T LCG: 4.1 CM^2
BH CV ECHO MEAS - MVA(P1/2T): 2.9 CM^2
BH CV ECHO MEAS - MVA(VTI): 2.4 CM^2
BH CV ECHO MEAS - PA ACC TIME: 0.13 SEC
BH CV ECHO MEAS - PA MAX PG (FULL): 4.5 MMHG
BH CV ECHO MEAS - PA MAX PG: 5.8 MMHG
BH CV ECHO MEAS - PA PR(ACCEL): 20.4 MMHG
BH CV ECHO MEAS - PA V2 MAX: 120.8 CM/SEC
BH CV ECHO MEAS - PULM A REVS DUR: 0.11 SEC
BH CV ECHO MEAS - PULM A REVS VEL: 19.3 CM/SEC
BH CV ECHO MEAS - PULM DIAS VEL: 40.5 CM/SEC
BH CV ECHO MEAS - PULM S/D: 1
BH CV ECHO MEAS - PULM SYS VEL: 41.5 CM/SEC
BH CV ECHO MEAS - PVA(V,A): 1.4 CM^2
BH CV ECHO MEAS - PVA(V,D): 1.4 CM^2
BH CV ECHO MEAS - QP/QS: 0.67
BH CV ECHO MEAS - RAP SYSTOLE: 3 MMHG
BH CV ECHO MEAS - RV MAX PG: 1.3 MMHG
BH CV ECHO MEAS - RV MEAN PG: 0.86 MMHG
BH CV ECHO MEAS - RV V1 MAX: 57.2 CM/SEC
BH CV ECHO MEAS - RV V1 MEAN: 44.2 CM/SEC
BH CV ECHO MEAS - RV V1 VTI: 14.2 CM
BH CV ECHO MEAS - RVOT AREA: 2.9 CM^2
BH CV ECHO MEAS - RVOT DIAM: 1.9 CM
BH CV ECHO MEAS - SI(CUBED): 20.9 ML/M^2
BH CV ECHO MEAS - SI(LVOT): 32.7 ML/M^2
BH CV ECHO MEAS - SI(MOD-SP2): 23.8 ML/M^2
BH CV ECHO MEAS - SI(MOD-SP4): 23.8 ML/M^2
BH CV ECHO MEAS - SI(TEICH): 21.6 ML/M^2
BH CV ECHO MEAS - SUP REN AO DIAM: 1.5 CM
BH CV ECHO MEAS - SV(CUBED): 39.5 ML
BH CV ECHO MEAS - SV(LVOT): 61.9 ML
BH CV ECHO MEAS - SV(MOD-SP2): 45 ML
BH CV ECHO MEAS - SV(MOD-SP4): 45 ML
BH CV ECHO MEAS - SV(RVOT): 41.4 ML
BH CV ECHO MEAS - SV(TEICH): 40.9 ML
BH CV ECHO MEAS - TAPSE (>1.6): 2.3 CM2
BH CV ECHO MEASUREMENTS AVERAGE E/E' RATIO: 7.9
BH CV XLRA - RV BASE: 2.4 CM
BH CV XLRA - RV LENGTH: 6.2 CM
BH CV XLRA - RV MID: 2.7 CM
BH CV XLRA - TDI S': 16 CM/SEC
CREAT BLDA-MCNC: 0.7 MG/DL (ref 0.6–1.3)
LEFT ATRIUM VOLUME INDEX: 12 ML/M2
LV EF 2D ECHO EST: 70 %
SINUS: 2.1 CM
STJ: 2.2 CM

## 2019-11-01 PROCEDURE — 93306 TTE W/DOPPLER COMPLETE: CPT

## 2019-11-01 PROCEDURE — 0 IOPAMIDOL PER 1 ML: Performed by: FAMILY MEDICINE

## 2019-11-01 PROCEDURE — 71275 CT ANGIOGRAPHY CHEST: CPT

## 2019-11-01 PROCEDURE — 93306 TTE W/DOPPLER COMPLETE: CPT | Performed by: INTERNAL MEDICINE

## 2019-11-01 PROCEDURE — 82565 ASSAY OF CREATININE: CPT

## 2019-11-01 RX ADMIN — IOPAMIDOL 100 ML: 755 INJECTION, SOLUTION INTRAVENOUS at 10:36

## 2019-11-01 NOTE — NURSING NOTE
Dr. Bird called and stated pts exam was negative.  Pt had a normal CTA, no dissection noted, no PE noted.  Results called to Dr. Meredith Kehrer

## 2019-11-01 NOTE — NURSING NOTE
Dr. Kehrer called patient on her cell phone and gave her the results. IV removed and ambulated to car with family.

## 2019-11-04 ENCOUNTER — TELEPHONE (OUTPATIENT)
Dept: FAMILY MEDICINE CLINIC | Facility: CLINIC | Age: 69
End: 2019-11-04

## 2019-11-04 NOTE — TELEPHONE ENCOUNTER
Ye Cope,     I wanted to follow up with you about this patient. I had spoke with you last week and Dr. Kehrer talked to Dr. Thakur last week about the patient. She did her CT angiogram and it was negative, she also had her do an ECHO done which showed mitral regrugitation and some calcification however the aortic arch was not well visualized. Dr. Kehrer question is does the patient need to still follow up with Dr. Thakur on 11/12, or wait until after her pulmonary consultation? Please advise.

## 2019-11-04 NOTE — TELEPHONE ENCOUNTER
You can call his office and see if they still want to see her or wait till after the pulmonary consultation.

## 2019-11-04 NOTE — TELEPHONE ENCOUNTER
Just wanted to be clear before I called Dr. Thakur office, since the ECHO and CT were negative we need to find out of the patient still needs to see Dr. Thakur is this correct?

## 2019-11-05 NOTE — TELEPHONE ENCOUNTER
I was told by Dr. Thakur's RN Tarun Cohen to schedule her to see him in the office at that time. He had discussed her with Dr. Thakur.

## 2019-11-06 ENCOUNTER — CLINICAL SUPPORT (OUTPATIENT)
Dept: FAMILY MEDICINE CLINIC | Facility: CLINIC | Age: 69
End: 2019-11-06

## 2019-11-06 DIAGNOSIS — E53.8 B12 DEFICIENCY: Primary | ICD-10-CM

## 2019-11-06 PROCEDURE — 96372 THER/PROPH/DIAG INJ SC/IM: CPT | Performed by: FAMILY MEDICINE

## 2019-11-06 RX ORDER — CYANOCOBALAMIN 1000 UG/ML
1000 INJECTION, SOLUTION INTRAMUSCULAR; SUBCUTANEOUS
Status: DISCONTINUED | OUTPATIENT
Start: 2019-11-06 | End: 2020-04-27

## 2019-11-06 RX ADMIN — CYANOCOBALAMIN 1000 MCG: 1000 INJECTION, SOLUTION INTRAMUSCULAR; SUBCUTANEOUS at 15:43

## 2019-11-12 ENCOUNTER — OFFICE VISIT (OUTPATIENT)
Dept: CARDIAC SURGERY | Facility: CLINIC | Age: 69
End: 2019-11-12

## 2019-11-12 VITALS
TEMPERATURE: 98.1 F | WEIGHT: 200 LBS | OXYGEN SATURATION: 95 % | DIASTOLIC BLOOD PRESSURE: 85 MMHG | HEIGHT: 62 IN | RESPIRATION RATE: 20 BRPM | HEART RATE: 67 BPM | BODY MASS INDEX: 36.8 KG/M2 | SYSTOLIC BLOOD PRESSURE: 132 MMHG

## 2019-11-12 DIAGNOSIS — R06.2 WHEEZING: ICD-10-CM

## 2019-11-12 DIAGNOSIS — J20.9 ACUTE BRONCHITIS, UNSPECIFIED ORGANISM: ICD-10-CM

## 2019-11-12 DIAGNOSIS — R06.02 SHORTNESS OF BREATH: Primary | ICD-10-CM

## 2019-11-12 PROCEDURE — 99203 OFFICE O/P NEW LOW 30 MIN: CPT | Performed by: THORACIC SURGERY (CARDIOTHORACIC VASCULAR SURGERY)

## 2019-11-13 ENCOUNTER — TELEPHONE (OUTPATIENT)
Dept: FAMILY MEDICINE CLINIC | Facility: CLINIC | Age: 69
End: 2019-11-13

## 2019-11-13 NOTE — TELEPHONE ENCOUNTER
Pt called stated she saw Dr. Thakur who told her there was nothing going on with her heart. And her current symptoms are not cardiac related. Patient wanted to know what she should do now whether it is wait for a pulmonary consult or come back in. Please adivse.

## 2019-11-18 ENCOUNTER — OFFICE VISIT (OUTPATIENT)
Dept: FAMILY MEDICINE CLINIC | Facility: CLINIC | Age: 69
End: 2019-11-18

## 2019-11-18 VITALS
HEART RATE: 89 BPM | BODY MASS INDEX: 38.18 KG/M2 | DIASTOLIC BLOOD PRESSURE: 76 MMHG | SYSTOLIC BLOOD PRESSURE: 130 MMHG | HEIGHT: 61 IN | TEMPERATURE: 99 F | WEIGHT: 202.2 LBS | OXYGEN SATURATION: 95 %

## 2019-11-18 DIAGNOSIS — F41.8 DEPRESSION WITH ANXIETY: Primary | ICD-10-CM

## 2019-11-18 PROCEDURE — 99213 OFFICE O/P EST LOW 20 MIN: CPT | Performed by: FAMILY MEDICINE

## 2019-11-18 RX ORDER — VENLAFAXINE HYDROCHLORIDE 75 MG/1
75 CAPSULE, EXTENDED RELEASE ORAL DAILY
Qty: 30 CAPSULE | Refills: 1 | Status: SHIPPED | OUTPATIENT
Start: 2019-11-18 | End: 2020-01-07 | Stop reason: SINTOL

## 2019-11-18 NOTE — PROGRESS NOTES
Subjective   Jemima Garcia is a 69 y.o. female.     Chief Complaint   Patient presents with   • Depression        Patient presents complaining of worsening depression.  She denies any suicidal or homicidal ideation has absolutely no motivation.  She is been taking her Effexor every day.  She is failed other medications including Trintellix in the past.  She did well with the Effexor at first but then was shaky at the higher dose so went back to 75.      Depression Patient is not experiencing: confusion, decreased concentration, nervousness/anxiety and suicidal ideas.           The following portions of the patient's history were reviewed and updated as appropriate: allergies, current medications, past family history, past medical history, past social history, past surgical history and problem list.    Past Medical History:   Diagnosis Date   • Acute bronchitis    • Acute sinusitis    • Allergic rhinitis    • Anxiety    • Arthritis    • Back pain    • BMI 34.0-34.9,adult    • Cervicalgia    • Chills    • Deep vein thrombosis (DVT) of lower extremity (CMS/HCC)    • Depression    • Dermatitis    • Dyspnea    • Dysuria    • Esophageal reflux    • Fatigue    • Gastric ulcer    • GERD (gastroesophageal reflux disease)    • Headache    • Hypothyroidism    • Irritable bowel syndrome    • Lumbago    • Migraine    • Nausea    • Neuritis    • Osteoarthritis    • Osteoarthritis of knee    • Plantar fasciitis    • Pulled muscle    • Pulmonary embolism (CMS/HCC)    • Pyelonephritis    • Rheumatic fever    • Sore throat    • Torticollis    • Trapezius strain    • Urinary incontinence    • Urinary pain    • Urinary tract infection    • UTI symptoms    • Vitamin B1 deficiency    • Vitamin B12 deficiency    • Vitamin D deficiency    • Weakness    • Wheezing        Past Surgical History:   Procedure Laterality Date   • APPENDECTOMY     • GALLBLADDER SURGERY     • HYSTERECTOMY     • INGUINAL HERNIA REPAIR     • KNEE SURGERY     •  LAPAROTOMY OOPHERECTOMY     • TONSILLECTOMY     • TOTAL KNEE ARTHROPLASTY Left        Family History   Problem Relation Age of Onset   • Arthritis Mother    • Depression Mother    • Hyperlipidemia Mother    • Hypertension Mother    • Hypertension Father    • Alcohol abuse Maternal Grandfather    • Depression Maternal Grandfather    • Heart disease Other         IN FEMALES BEFORE AGE 65       Social History     Socioeconomic History   • Marital status:      Spouse name: Not on file   • Number of children: Not on file   • Years of education: Not on file   • Highest education level: Not on file   Tobacco Use   • Smoking status: Never Smoker         Current Outpatient Medications:   •  acetaminophen (TYLENOL) 325 MG tablet, Take 1,000 mg by mouth., Disp: , Rfl:   •  albuterol sulfate  (90 Base) MCG/ACT inhaler, Inhale 2 puffs Every 4 (Four) Hours As Needed., Disp: , Rfl:   •  benzonatate (TESSALON) 200 MG capsule, Take 1 capsule by mouth 3 (Three) Times a Day As Needed for Cough., Disp: 30 capsule, Rfl: 0  •  busPIRone (BUSPAR) 10 MG tablet, Take 10 mg by mouth 2 (Two) Times a Day., Disp: , Rfl: 0  •  Cholecalciferol (VITAMIN D3) 5000 UNITS capsule capsule, Take 5,000 Units by mouth Daily., Disp: , Rfl:   •  fluticasone (FLONASE) 50 MCG/ACT nasal spray, Use 2 Sprays in each nostril once daily. prn, Disp: , Rfl:   •  Fluticasone Furoate-Vilanterol (BREO ELLIPTA) 100-25 MCG/INH inhaler, Inhale 1 puff Daily., Disp: 1 each, Rfl: 0  •  levothyroxine (SYNTHROID, LEVOTHROID) 88 MCG tablet, Take  by mouth Daily., Disp: , Rfl: 1  •  meloxicam (MOBIC) 15 MG tablet, Take 1 tablet by mouth Daily., Disp: 90 tablet, Rfl: 1  •  omeprazole (priLOSEC) 40 MG capsule, Take 1 capsule by mouth Daily., Disp: 90 capsule, Rfl: 3  •  ondansetron (ZOFRAN) 4 MG tablet, Take 4 mg by mouth Every 8 (Eight) Hours As Needed for Nausea or Vomiting., Disp: , Rfl:   •  promethazine (PHENERGAN) 25 MG suppository, Insert 1 suppository into  "the rectum Every 4 (Four) Hours As Needed for nausea or vomiting., Disp: 12 suppository, Rfl: 0  •  traMADol (ULTRAM) 50 MG tablet, Take  mg by mouth., Disp: , Rfl:   •  venlafaxine XR (EFFEXOR XR) 75 MG 24 hr capsule, Take 1 capsule by mouth Daily., Disp: 30 capsule, Rfl: 1    Current Facility-Administered Medications:   •  cyanocobalamin injection 1,000 mcg, 1,000 mcg, Intramuscular, Q28 Days, Latrice Canseco DO, 1,000 mcg at 08/30/19 1024  •  cyanocobalamin injection 1,000 mcg, 1,000 mcg, Intramuscular, Q28 Days, Kehrer, Meredith Lea, MD, 1,000 mcg at 11/06/19 1543  •  ipratropium-albuterol (DUO-NEB) nebulizer solution 3 mL, 3 mL, Nebulization, Once, Kehrer, Meredith Lea, MD    Review of Systems   Constitutional: Negative.    Neurological: Negative.    Psychiatric/Behavioral: Positive for dysphoric mood. Negative for agitation, behavioral problems, confusion, decreased concentration, hallucinations, self-injury, sleep disturbance and suicidal ideas. The patient is not nervous/anxious and is not hyperactive.        Objective   Vitals:    11/18/19 1404   BP: 130/76   Pulse: 89   Temp: 99 °F (37.2 °C)   SpO2: 95%   Weight: 91.7 kg (202 lb 3.2 oz)   Height: 154.9 cm (61\")     Body mass index is 38.21 kg/m².  Physical Exam   Constitutional: She is oriented to person, place, and time. She appears well-developed and well-nourished. No distress.   HENT:   Head: Normocephalic and atraumatic.   Mouth/Throat: Oropharynx is clear and moist.   Eyes: Pupils are equal, round, and reactive to light.   Neurological: She is alert and oriented to person, place, and time.   Psychiatric: She has a normal mood and affect. Her behavior is normal. Judgment and thought content normal.         Assessment/Plan   Jemima was seen today for depression.    Diagnoses and all orders for this visit:    Depression with anxiety  -     venlafaxine XR (EFFEXOR XR) 75 MG 24 hr capsule; Take 1 capsule by mouth Daily.               Patient " Instructions   Get high dose flu vaccine at pharmacy soon.  Look into seeing therapist.

## 2019-11-19 PROBLEM — R06.02 SHORTNESS OF BREATH: Status: ACTIVE | Noted: 2019-11-19

## 2019-11-19 NOTE — PROGRESS NOTES
11/19/2019 11/12/19    Chief Complaint     Evaluation for presumed aortic dissection    History of Present Illness:       Dear Dr. Kehrer, Nory Skinner MD and Colleagues,  It was nice to see Jemima Garcia in consultation at your request. She is a 69 y.o. female with a history of dyspnea, bronchitis, wheezing, and hypothyroidism who has developed progressive shortness of breath and had a chest CT as an outpatient that showed questionable dissection of the aorta. She denies chest pain, orthopnea, PND, pleuritic pain or lower extremity edema.  She complains of swelling in hands and significant fatigue.  The chest CT showed aorta diameter 3.2 cm without dissection or ulceration and show a normal PE but nothing acute.  It was mention in the report there was a stable focal aortic dissection stable from April 2018.  I reviewed the studies and I did not see any dissection, that was not my interpretation.  She had an echocardiogram that showed an ejection fraction of 70%, and trace mitral regurgitation and no other valve disease.  Mild diastolic dysfunction noticed.  She is here in my aortic clinic for further evaluation..      Patient Active Problem List   Diagnosis   • Wheezing   • Osteoarthritis   • Acute bronchitis   • Dyspnea   • Hypothyroidism   • Shortness of breath       Past Medical History:   Diagnosis Date   • Acute bronchitis    • Acute sinusitis    • Allergic rhinitis    • Anxiety    • Arthritis    • Back pain    • BMI 34.0-34.9,adult    • Cervicalgia    • Chills    • Deep vein thrombosis (DVT) of lower extremity (CMS/HCC)    • Depression    • Dermatitis    • Dyspnea    • Dysuria    • Esophageal reflux    • Fatigue    • Gastric ulcer    • GERD (gastroesophageal reflux disease)    • Headache    • Hypothyroidism    • Irritable bowel syndrome    • Lumbago    • Migraine    • Nausea    • Neuritis    • Osteoarthritis    • Osteoarthritis of knee    • Plantar fasciitis    • Pulled muscle    • Pulmonary  "embolism (CMS/HCC)    • Pyelonephritis    • Rheumatic fever    • Sore throat    • Torticollis    • Trapezius strain    • Urinary incontinence    • Urinary pain    • Urinary tract infection    • UTI symptoms    • Vitamin B1 deficiency    • Vitamin B12 deficiency    • Vitamin D deficiency    • Weakness    • Wheezing        Past Surgical History:   Procedure Laterality Date   • APPENDECTOMY     • GALLBLADDER SURGERY     • HYSTERECTOMY     • INGUINAL HERNIA REPAIR     • KNEE SURGERY     • LAPAROTOMY OOPHERECTOMY     • TONSILLECTOMY     • TOTAL KNEE ARTHROPLASTY Left        Allergies   Allergen Reactions   • Salicylates GI Intolerance     Other reaction(s): GI Upset, gi bleed      • Biaxin [Clarithromycin]    • Sulfa Antibiotics    • Adhesive Tape Rash   • Sulfamethoxazole-Trimethoprim GI Intolerance and Nausea And Vomiting     \"makes me sick as a dog\"           Current Outpatient Medications:   •  acetaminophen (TYLENOL) 325 MG tablet, Take 1,000 mg by mouth., Disp: , Rfl:   •  albuterol sulfate  (90 Base) MCG/ACT inhaler, Inhale 2 puffs Every 4 (Four) Hours As Needed., Disp: , Rfl:   •  benzonatate (TESSALON) 200 MG capsule, Take 1 capsule by mouth 3 (Three) Times a Day As Needed for Cough., Disp: 30 capsule, Rfl: 0  •  busPIRone (BUSPAR) 10 MG tablet, Take 10 mg by mouth 2 (Two) Times a Day., Disp: , Rfl: 0  •  Cholecalciferol (VITAMIN D3) 5000 UNITS capsule capsule, Take 5,000 Units by mouth Daily., Disp: , Rfl:   •  fluticasone (FLONASE) 50 MCG/ACT nasal spray, Use 2 Sprays in each nostril once daily. prn, Disp: , Rfl:   •  Fluticasone Furoate-Vilanterol (BREO ELLIPTA) 100-25 MCG/INH inhaler, Inhale 1 puff Daily., Disp: 1 each, Rfl: 0  •  levothyroxine (SYNTHROID, LEVOTHROID) 88 MCG tablet, Take  by mouth Daily., Disp: , Rfl: 1  •  meloxicam (MOBIC) 15 MG tablet, Take 1 tablet by mouth Daily., Disp: 90 tablet, Rfl: 1  •  omeprazole (priLOSEC) 40 MG capsule, Take 1 capsule by mouth Daily., Disp: 90 capsule, " Rfl: 3  •  ondansetron (ZOFRAN) 4 MG tablet, Take 4 mg by mouth Every 8 (Eight) Hours As Needed for Nausea or Vomiting., Disp: , Rfl:   •  promethazine (PHENERGAN) 25 MG suppository, Insert 1 suppository into the rectum Every 4 (Four) Hours As Needed for nausea or vomiting., Disp: 12 suppository, Rfl: 0  •  traMADol (ULTRAM) 50 MG tablet, Take  mg by mouth., Disp: , Rfl:   •  venlafaxine XR (EFFEXOR XR) 75 MG 24 hr capsule, Take 1 capsule by mouth Daily., Disp: 30 capsule, Rfl: 1    Current Facility-Administered Medications:   •  cyanocobalamin injection 1,000 mcg, 1,000 mcg, Intramuscular, Q28 Days, Latrice Canseco DO, 1,000 mcg at 08/30/19 1024  •  cyanocobalamin injection 1,000 mcg, 1,000 mcg, Intramuscular, Q28 Days, Kehrer, Meredith Lea, MD, 1,000 mcg at 11/06/19 1543  •  ipratropium-albuterol (DUO-NEB) nebulizer solution 3 mL, 3 mL, Nebulization, Once, Kehrer, Meredith Lea, MD    Social History     Socioeconomic History   • Marital status:      Spouse name: Not on file   • Number of children: Not on file   • Years of education: Not on file   • Highest education level: Not on file   Tobacco Use   • Smoking status: Never Smoker       Family History   Problem Relation Age of Onset   • Arthritis Mother    • Depression Mother    • Hyperlipidemia Mother    • Hypertension Mother    • Hypertension Father    • Alcohol abuse Maternal Grandfather    • Depression Maternal Grandfather    • Heart disease Other         IN FEMALES BEFORE AGE 65       Review of Systems   Constitutional: Positive for fatigue.   HENT: Positive for ear pain and rhinorrhea. Negative for sore throat.    Respiratory: Positive for shortness of breath and wheezing.    Cardiovascular: Positive for leg swelling.   Gastrointestinal: Positive for abdominal pain. Negative for vomiting.   Musculoskeletal: Negative for neck pain.   Skin: Negative for rash.   Neurological: Positive for headaches.   All other systems reviewed and are  negative.      Physical Exam:    Vital Signs:  Weight: 90.7 kg (200 lb)   Body mass index is 36.58 kg/m².  Temp: 98.1 °F (36.7 °C)   Heart Rate: 67   BP: 132/85     Physical Exam   Constitutional: She is oriented to person, place, and time. She appears well-developed and well-nourished.   HENT:   Head: Normocephalic and atraumatic.   Nose: Nose normal.   Mouth/Throat: Oropharynx is clear and moist.   Eyes: Conjunctivae are normal. Pupils are equal, round, and reactive to light.   Neck: Normal range of motion. Neck supple. No JVD present. No thyromegaly present.   Cardiovascular: Normal rate, regular rhythm, normal heart sounds and intact distal pulses. Exam reveals no gallop and no friction rub.   No murmur heard.  Pulses:       Radial pulses are 2+ on the right side, and 2+ on the left side.        Posterior tibial pulses are 2+ on the right side, and 2+ on the left side.   Pulmonary/Chest: Effort normal and breath sounds normal. She has no rales.   Abdominal: Soft. Bowel sounds are normal. She exhibits no distension and no mass. There is no tenderness.   Musculoskeletal: Normal range of motion. She exhibits no tenderness or deformity.   Lymphadenopathy:     She has no cervical adenopathy.   Neurological: She is alert and oriented to person, place, and time. She has normal reflexes.   Skin: Skin is warm and dry. No rash noted. No erythema.   Psychiatric: She has a normal mood and affect. Her behavior is normal.   No groin or neck organomegaly     Assessment:     Problem List Items Addressed This Visit        Respiratory    Wheezing    Acute bronchitis    Dyspnea - Primary            1.  She has dyspnea with exertion and he had an episode most likely of acute bronchitis.  He is recovering from that  2.  Presumed aortic dissection, I do not see significant changes in the CT scan.  Doubt there is one  3.  Pulmonary embolism.  There is evidence of an old one but no new one.      Recommendation/Plan:     I do not see  any aortic or cardiac pathology.  I think she will benefit from a pulmonary evaluation and also recovery from the recent bronchitis.  We will see her prn as needed if needed    Thank you for allowing me to participate in her care.    Regards,    Patrick Thakur M.D.  Answers for HPI/ROS submitted by the patient on 11/5/2019   Shortness of breath  Chronicity: recurrent  Onset: more than 1 month ago  Frequency: every few minutes  Progression since onset: waxing and waning  claudication: Yes  coryza: Yes  hemoptysis: No  leg pain: Yes  orthopnea: Yes  PND: Yes  sputum production: Yes  swollen glands: No  syncope: No  Aggravating factors: exercise, any activity, lying flat

## 2019-12-09 RX ORDER — BUSPIRONE HYDROCHLORIDE 10 MG/1
10 TABLET ORAL 2 TIMES DAILY
Qty: 60 TABLET | Refills: 1 | Status: SHIPPED | OUTPATIENT
Start: 2019-12-09 | End: 2020-04-07 | Stop reason: SDUPTHER

## 2019-12-19 ENCOUNTER — OFFICE VISIT (OUTPATIENT)
Dept: FAMILY MEDICINE CLINIC | Facility: CLINIC | Age: 69
End: 2019-12-19

## 2019-12-19 VITALS
BODY MASS INDEX: 38.33 KG/M2 | SYSTOLIC BLOOD PRESSURE: 110 MMHG | WEIGHT: 203 LBS | HEART RATE: 72 BPM | OXYGEN SATURATION: 96 % | TEMPERATURE: 98.4 F | DIASTOLIC BLOOD PRESSURE: 80 MMHG | HEIGHT: 61 IN

## 2019-12-19 DIAGNOSIS — H10.533 CONTACT BLEPHAROCONJUNCTIVITIS OF BOTH EYES: Primary | ICD-10-CM

## 2019-12-19 PROCEDURE — 99213 OFFICE O/P EST LOW 20 MIN: CPT | Performed by: FAMILY MEDICINE

## 2019-12-19 RX ORDER — TOBRAMYCIN AND DEXAMETHASONE 3; 1 MG/ML; MG/ML
1 SUSPENSION/ DROPS OPHTHALMIC
Qty: 10 ML | Refills: 0 | Status: SHIPPED | OUTPATIENT
Start: 2019-12-19 | End: 2019-12-20 | Stop reason: SDUPTHER

## 2019-12-19 NOTE — PATIENT INSTRUCTIONS
Get some lid washes over the counter.      Blepharitis  Blepharitis is inflammation of the eyelids. Blepharitis may happen with:  · Reddish, scaly skin around the scalp and eyebrows.  · Burning or itching of the eyelids.  · Eye discharge at night that causes the eyelashes to stick together in the morning.  · Eyelashes that fall out.  · Sensitivity to light.  Follow these instructions at home:  Pay attention to any changes in how your eyes look or feel. Tell your health care provider about any changes. Follow these instructions to help with your condition.  Keeping Clean    · Wash your hands often.  · Wash your eyelids with warm water or with warm water that is mixed with a small amount of baby shampoo. Do this two times per day or as often as needed.  · Wash your face and eyebrows at least once a day.  · Use a clean towel each time you dry your eyelids. Do not use this towel to clean or dry other areas of your body. Do not share your towel with anyone.  General instructions  · Avoid wearing makeup until you get better. Do not share makeup with anyone.  · Avoid rubbing your eyes.  · Apply warm compresses to your eyes 2 times per day for 10 minutes at a time, or as told by your health care provider.  · If you were prescribed an antibiotic ointment or steroid drops, apply or use the medicine as told by your health care provider. Do not stop using the medicine even if you feel better.  · Keep all follow-up visits as told by your health care provider. This is important.  Contact a health care provider if:  · Your eyelids feel hot.  · You have blisters or a rash on your eyelids.  · The condition does not go away in 2-4 days.  · The inflammation gets worse.  Get help right away if:  · You have pain or redness that gets worse or spreads to other parts of your face.  · Your vision changes.  · You have pain when looking at lights or moving objects.  · You have a fever.  Summary  · Blepharitis is inflammation of the  eyelids.  · Pay attention to any changes in how your eyes look or feel. Tell your health care provider about any changes.  · Follow home care instructions as told by your health care provider. Wash your hands often. Avoid wearing makeup. Do not rub your eyes.  · To treat this condition, use warm compresses and prescription ointments or eye drops.  · Let your health care provider know if you have vision changes, blisters or rash on eyelids, pain that spreads to your face, or warmth on your eyelids.  This information is not intended to replace advice given to you by your health care provider. Make sure you discuss any questions you have with your health care provider.  Document Released: 12/15/2001 Document Revised: 06/10/2019 Document Reviewed: 06/10/2019  [employer] Interactive Patient Education © 2019 [employer]

## 2019-12-19 NOTE — PROGRESS NOTES
Subjective   Jemima Garcia is a 69 y.o. female.     Chief Complaint   Patient presents with   • Eye Problem     both are swollen due to new makeup irritant        Patient presents with complaints of itchy red eyes and eyelids for the past couple days.  She has a little bit of blurry vision as well.  She tried a new eye cream that was for wrinkles and noticed that it was bothering her.         The following portions of the patient's history were reviewed and updated as appropriate: allergies, current medications, past family history, past medical history, past social history, past surgical history and problem list.    Past Medical History:   Diagnosis Date   • Acute bronchitis    • Acute sinusitis    • Allergic rhinitis    • Anxiety    • Arthritis    • Back pain    • BMI 34.0-34.9,adult    • Cervicalgia    • Chills    • Deep vein thrombosis (DVT) of lower extremity (CMS/HCC)    • Depression    • Dermatitis    • Dyspnea    • Dysuria    • Esophageal reflux    • Fatigue    • Gastric ulcer    • GERD (gastroesophageal reflux disease)    • Headache    • Hypothyroidism    • Irritable bowel syndrome    • Lumbago    • Migraine    • Nausea    • Neuritis    • Osteoarthritis    • Osteoarthritis of knee    • Plantar fasciitis    • Pulled muscle    • Pulmonary embolism (CMS/HCC)    • Pyelonephritis    • Rheumatic fever    • Sore throat    • Torticollis    • Trapezius strain    • Urinary incontinence    • Urinary pain    • Urinary tract infection    • UTI symptoms    • Vitamin B1 deficiency    • Vitamin B12 deficiency    • Vitamin D deficiency    • Weakness    • Wheezing        Past Surgical History:   Procedure Laterality Date   • APPENDECTOMY     • GALLBLADDER SURGERY     • HYSTERECTOMY     • INGUINAL HERNIA REPAIR     • KNEE SURGERY     • LAPAROTOMY OOPHERECTOMY     • TONSILLECTOMY     • TOTAL KNEE ARTHROPLASTY Left        Family History   Problem Relation Age of Onset   • Arthritis Mother    • Depression Mother    •  Hyperlipidemia Mother    • Hypertension Mother    • Hypertension Father    • Alcohol abuse Maternal Grandfather    • Depression Maternal Grandfather    • Heart disease Other         IN FEMALES BEFORE AGE 65       Social History     Socioeconomic History   • Marital status:      Spouse name: Not on file   • Number of children: Not on file   • Years of education: Not on file   • Highest education level: Not on file   Tobacco Use   • Smoking status: Never Smoker         Current Outpatient Medications:   •  acetaminophen (TYLENOL) 325 MG tablet, Take 1,000 mg by mouth., Disp: , Rfl:   •  albuterol sulfate  (90 Base) MCG/ACT inhaler, Inhale 2 puffs Every 4 (Four) Hours As Needed., Disp: , Rfl:   •  busPIRone (BUSPAR) 10 MG tablet, Take 1 tablet by mouth 2 (Two) Times a Day., Disp: 60 tablet, Rfl: 1  •  Cholecalciferol (VITAMIN D3) 5000 UNITS capsule capsule, Take 5,000 Units by mouth Daily., Disp: , Rfl:   •  fluticasone (FLONASE) 50 MCG/ACT nasal spray, Use 2 Sprays in each nostril once daily. prn, Disp: , Rfl:   •  Fluticasone Furoate-Vilanterol (BREO ELLIPTA) 100-25 MCG/INH inhaler, Inhale 1 puff Daily., Disp: 1 each, Rfl: 0  •  levothyroxine (SYNTHROID, LEVOTHROID) 88 MCG tablet, Take  by mouth Daily., Disp: , Rfl: 1  •  meloxicam (MOBIC) 15 MG tablet, Take 1 tablet by mouth Daily., Disp: 90 tablet, Rfl: 1  •  omeprazole (priLOSEC) 40 MG capsule, Take 1 capsule by mouth Daily., Disp: 90 capsule, Rfl: 3  •  ondansetron (ZOFRAN) 4 MG tablet, Take 4 mg by mouth Every 8 (Eight) Hours As Needed for Nausea or Vomiting., Disp: , Rfl:   •  promethazine (PHENERGAN) 25 MG suppository, Insert 1 suppository into the rectum Every 4 (Four) Hours As Needed for nausea or vomiting., Disp: 12 suppository, Rfl: 0  •  traMADol (ULTRAM) 50 MG tablet, Take  mg by mouth., Disp: , Rfl:   •  venlafaxine XR (EFFEXOR XR) 75 MG 24 hr capsule, Take 1 capsule by mouth Daily., Disp: 30 capsule, Rfl: 1  •   "tobramycin-dexamethasone (TOBRADEX) 0.3-0.1 % ophthalmic suspension, Administer 1 drop to both eyes Every 4 (Four) Hours While Awake for 5 days., Disp: 10 mL, Rfl: 0    Current Facility-Administered Medications:   •  cyanocobalamin injection 1,000 mcg, 1,000 mcg, Intramuscular, Q28 Days, Latrice Canseco DO, 1,000 mcg at 08/30/19 1024  •  cyanocobalamin injection 1,000 mcg, 1,000 mcg, Intramuscular, Q28 Days, Kehrer, Meredith Lea, MD, 1,000 mcg at 11/06/19 1543  •  ipratropium-albuterol (DUO-NEB) nebulizer solution 3 mL, 3 mL, Nebulization, Once, Kehrer, Meredith Lea, MD    Review of Systems   Constitutional: Negative.    HENT: Negative.    Eyes: Positive for redness, itching and visual disturbance. Negative for pain.   Psychiatric/Behavioral: Negative.        Objective   Vitals:    12/19/19 1345   BP: 110/80   Pulse: 72   Temp: 98.4 °F (36.9 °C)   SpO2: 96%   Weight: 92.1 kg (203 lb)   Height: 154.9 cm (61\")     Body mass index is 38.36 kg/m².  Physical Exam   Constitutional: She appears well-developed and well-nourished. No distress.   HENT:   Head: Normocephalic and atraumatic.   Eyes: Pupils are equal, round, and reactive to light. EOM are normal. Right conjunctiva is injected. Right conjunctiva has no hemorrhage. Left conjunctiva is injected. Left conjunctiva has no hemorrhage.       Nursing note and vitals reviewed.        Assessment/Plan   Jemima was seen today for eye problem.    Diagnoses and all orders for this visit:    Contact blepharoconjunctivitis of both eyes  -     tobramycin-dexamethasone (TOBRADEX) 0.3-0.1 % ophthalmic suspension; Administer 1 drop to both eyes Every 4 (Four) Hours While Awake for 5 days.               Patient Instructions   Get some lid washes over the counter.      Blepharitis  Blepharitis is inflammation of the eyelids. Blepharitis may happen with:  · Reddish, scaly skin around the scalp and eyebrows.  · Burning or itching of the eyelids.  · Eye discharge at night that " causes the eyelashes to stick together in the morning.  · Eyelashes that fall out.  · Sensitivity to light.  Follow these instructions at home:  Pay attention to any changes in how your eyes look or feel. Tell your health care provider about any changes. Follow these instructions to help with your condition.  Keeping Clean    · Wash your hands often.  · Wash your eyelids with warm water or with warm water that is mixed with a small amount of baby shampoo. Do this two times per day or as often as needed.  · Wash your face and eyebrows at least once a day.  · Use a clean towel each time you dry your eyelids. Do not use this towel to clean or dry other areas of your body. Do not share your towel with anyone.  General instructions  · Avoid wearing makeup until you get better. Do not share makeup with anyone.  · Avoid rubbing your eyes.  · Apply warm compresses to your eyes 2 times per day for 10 minutes at a time, or as told by your health care provider.  · If you were prescribed an antibiotic ointment or steroid drops, apply or use the medicine as told by your health care provider. Do not stop using the medicine even if you feel better.  · Keep all follow-up visits as told by your health care provider. This is important.  Contact a health care provider if:  · Your eyelids feel hot.  · You have blisters or a rash on your eyelids.  · The condition does not go away in 2-4 days.  · The inflammation gets worse.  Get help right away if:  · You have pain or redness that gets worse or spreads to other parts of your face.  · Your vision changes.  · You have pain when looking at lights or moving objects.  · You have a fever.  Summary  · Blepharitis is inflammation of the eyelids.  · Pay attention to any changes in how your eyes look or feel. Tell your health care provider about any changes.  · Follow home care instructions as told by your health care provider. Wash your hands often. Avoid wearing makeup. Do not rub your  eyes.  · To treat this condition, use warm compresses and prescription ointments or eye drops.  · Let your health care provider know if you have vision changes, blisters or rash on eyelids, pain that spreads to your face, or warmth on your eyelids.  This information is not intended to replace advice given to you by your health care provider. Make sure you discuss any questions you have with your health care provider.  Document Released: 12/15/2001 Document Revised: 06/10/2019 Document Reviewed: 06/10/2019  [employer] Interactive Patient Education © 2019 [employer] Inc.

## 2019-12-20 ENCOUNTER — TELEPHONE (OUTPATIENT)
Dept: FAMILY MEDICINE CLINIC | Facility: CLINIC | Age: 69
End: 2019-12-20

## 2019-12-20 DIAGNOSIS — H10.533 CONTACT BLEPHAROCONJUNCTIVITIS OF BOTH EYES: ICD-10-CM

## 2019-12-20 RX ORDER — TOBRAMYCIN AND DEXAMETHASONE 3; 1 MG/ML; MG/ML
1 SUSPENSION/ DROPS OPHTHALMIC
Qty: 10 ML | Refills: 0 | Status: SHIPPED | OUTPATIENT
Start: 2019-12-20 | End: 2019-12-25

## 2019-12-20 NOTE — TELEPHONE ENCOUNTER
----- Message from Georgiana Danielson sent at 12/20/2019 11:09 AM EST -----  Regarding: RX  RAZA TOLD MS MATOS THE TOBRADEX WAS NOT THERE.  PLEASE SEND AGAIN.

## 2019-12-30 ENCOUNTER — CLINICAL SUPPORT (OUTPATIENT)
Dept: FAMILY MEDICINE CLINIC | Facility: CLINIC | Age: 69
End: 2019-12-30

## 2019-12-30 PROCEDURE — 96372 THER/PROPH/DIAG INJ SC/IM: CPT | Performed by: FAMILY MEDICINE

## 2019-12-30 RX ADMIN — CYANOCOBALAMIN 1000 MCG: 1000 INJECTION, SOLUTION INTRAMUSCULAR; SUBCUTANEOUS at 13:11

## 2020-01-07 ENCOUNTER — OFFICE VISIT (OUTPATIENT)
Dept: FAMILY MEDICINE CLINIC | Facility: CLINIC | Age: 70
End: 2020-01-07

## 2020-01-07 VITALS
OXYGEN SATURATION: 96 % | BODY MASS INDEX: 38.67 KG/M2 | SYSTOLIC BLOOD PRESSURE: 118 MMHG | HEIGHT: 61 IN | TEMPERATURE: 98 F | HEART RATE: 76 BPM | WEIGHT: 204.8 LBS | DIASTOLIC BLOOD PRESSURE: 80 MMHG

## 2020-01-07 DIAGNOSIS — F41.8 DEPRESSION WITH ANXIETY: ICD-10-CM

## 2020-01-07 DIAGNOSIS — E03.9 HYPOTHYROIDISM, UNSPECIFIED TYPE: ICD-10-CM

## 2020-01-07 DIAGNOSIS — R06.2 WHEEZING: ICD-10-CM

## 2020-01-07 DIAGNOSIS — R25.2 MUSCLE CRAMP: ICD-10-CM

## 2020-01-07 DIAGNOSIS — J45.909 MILD ASTHMA, UNSPECIFIED WHETHER COMPLICATED, UNSPECIFIED WHETHER PERSISTENT: ICD-10-CM

## 2020-01-07 DIAGNOSIS — E55.9 VITAMIN D DEFICIENCY: Primary | ICD-10-CM

## 2020-01-07 PROCEDURE — 99214 OFFICE O/P EST MOD 30 MIN: CPT | Performed by: FAMILY MEDICINE

## 2020-01-07 RX ORDER — CITALOPRAM 10 MG/1
10 TABLET ORAL DAILY
Qty: 90 TABLET | Refills: 0 | Status: SHIPPED | OUTPATIENT
Start: 2020-01-07 | End: 2020-03-03

## 2020-01-07 NOTE — PROGRESS NOTES
Subjective   Jemima Bell is a 69 y.o. female.     Chief Complaint   Patient presents with   • Anxiety   • Depression        Patient here for routine follow-up on her depression anxiety and hypothyroidism.  She has been doing well in general.  She just got  a couple months ago.  She is compliant with her medications as listed.  She has had some increased muscle cramps lately.  She also has doing better with her breathing.  She saw the pulmonologist and was just started on a inhaler for mild asthma.         The following portions of the patient's history were reviewed and updated as appropriate: allergies, current medications, past family history, past medical history, past social history, past surgical history and problem list.    Past Medical History:   Diagnosis Date   • Acute bronchitis    • Acute sinusitis    • Allergic rhinitis    • Anxiety    • Arthritis    • Back pain    • BMI 34.0-34.9,adult    • Cervicalgia    • Chills    • Deep vein thrombosis (DVT) of lower extremity (CMS/HCC)    • Depression    • Dermatitis    • Dyspnea    • Dysuria    • Esophageal reflux    • Fatigue    • Gastric ulcer    • GERD (gastroesophageal reflux disease)    • Headache    • Hypothyroidism    • Irritable bowel syndrome    • Lumbago    • Migraine    • Nausea    • Neuritis    • Osteoarthritis    • Osteoarthritis of knee    • Plantar fasciitis    • Pulled muscle    • Pulmonary embolism (CMS/HCC)    • Pyelonephritis    • Rheumatic fever    • Sore throat    • Torticollis    • Trapezius strain    • Urinary incontinence    • Urinary pain    • Urinary tract infection    • UTI symptoms    • Vitamin B1 deficiency    • Vitamin B12 deficiency    • Vitamin D deficiency    • Weakness    • Wheezing        Past Surgical History:   Procedure Laterality Date   • APPENDECTOMY     • GALLBLADDER SURGERY     • HYSTERECTOMY     • INGUINAL HERNIA REPAIR     • KNEE SURGERY     • LAPAROTOMY OOPHERECTOMY     • TONSILLECTOMY     • TOTAL KNEE  ARTHROPLASTY Left        Family History   Problem Relation Age of Onset   • Arthritis Mother    • Depression Mother    • Hyperlipidemia Mother    • Hypertension Mother    • Hypertension Father    • Alcohol abuse Maternal Grandfather    • Depression Maternal Grandfather    • Heart disease Other         IN FEMALES BEFORE AGE 65       Social History     Socioeconomic History   • Marital status:      Spouse name: Not on file   • Number of children: Not on file   • Years of education: Not on file   • Highest education level: Not on file   Tobacco Use   • Smoking status: Never Smoker         Current Outpatient Medications:   •  acetaminophen (TYLENOL) 325 MG tablet, Take 1,000 mg by mouth., Disp: , Rfl:   •  albuterol sulfate  (90 Base) MCG/ACT inhaler, Inhale 2 puffs Every 4 (Four) Hours As Needed., Disp: , Rfl:   •  busPIRone (BUSPAR) 10 MG tablet, Take 1 tablet by mouth 2 (Two) Times a Day., Disp: 60 tablet, Rfl: 1  •  Cholecalciferol (VITAMIN D3) 5000 UNITS capsule capsule, Take 5,000 Units by mouth Daily., Disp: , Rfl:   •  fluticasone (FLONASE) 50 MCG/ACT nasal spray, Use 2 Sprays in each nostril once daily. prn, Disp: , Rfl:   •  Fluticasone Furoate-Vilanterol (BREO ELLIPTA) 100-25 MCG/INH inhaler, Inhale 1 puff Daily., Disp: 1 each, Rfl: 2  •  levothyroxine (SYNTHROID, LEVOTHROID) 88 MCG tablet, Take  by mouth Daily., Disp: , Rfl: 1  •  meloxicam (MOBIC) 15 MG tablet, Take 1 tablet by mouth Daily., Disp: 90 tablet, Rfl: 1  •  omeprazole (priLOSEC) 40 MG capsule, Take 1 capsule by mouth Daily., Disp: 90 capsule, Rfl: 3  •  ondansetron (ZOFRAN) 4 MG tablet, Take 4 mg by mouth Every 8 (Eight) Hours As Needed for Nausea or Vomiting., Disp: , Rfl:   •  promethazine (PHENERGAN) 25 MG suppository, Insert 1 suppository into the rectum Every 4 (Four) Hours As Needed for nausea or vomiting., Disp: 12 suppository, Rfl: 0  •  traMADol (ULTRAM) 50 MG tablet, Take  mg by mouth., Disp: , Rfl:   •  citalopram  "(CELEXA) 10 MG tablet, Take 1 tablet by mouth Daily., Disp: 90 tablet, Rfl: 0    Current Facility-Administered Medications:   •  cyanocobalamin injection 1,000 mcg, 1,000 mcg, Intramuscular, Q28 Days, Latrice Canseco DO, 1,000 mcg at 12/30/19 1311  •  cyanocobalamin injection 1,000 mcg, 1,000 mcg, Intramuscular, Q28 Days, Kehrer, Meredith Lea, MD, 1,000 mcg at 11/06/19 1543  •  ipratropium-albuterol (DUO-NEB) nebulizer solution 3 mL, 3 mL, Nebulization, Once, Kehrer, Meredith Lea, MD    Review of Systems   Constitutional: Negative for chills, fatigue and fever.   HENT: Negative for congestion, rhinorrhea and sore throat.    Respiratory: Negative for cough and shortness of breath.    Cardiovascular: Negative for chest pain and leg swelling.   Gastrointestinal: Negative for abdominal pain.   Endocrine: Negative for polydipsia and polyuria.   Genitourinary: Negative for dysuria.   Musculoskeletal: Positive for myalgias. Negative for arthralgias.   Skin: Negative for rash.   Neurological: Negative for dizziness.   Hematological: Does not bruise/bleed easily.   Psychiatric/Behavioral: Negative for sleep disturbance.       Objective   Vitals:    01/07/20 1344   BP: 118/80   Pulse: 76   Temp: 98 °F (36.7 °C)   SpO2: 96%   Weight: 92.9 kg (204 lb 12.8 oz)   Height: 154.9 cm (61\")     Body mass index is 38.7 kg/m².  Physical Exam   Constitutional: She is oriented to person, place, and time. She appears well-developed and well-nourished.   HENT:   Head: Normocephalic and atraumatic.   Eyes: Pupils are equal, round, and reactive to light. Conjunctivae and EOM are normal.   Neck: Neck supple. No thyromegaly present.   Cardiovascular: Normal rate and regular rhythm.   No murmur heard.  Pulmonary/Chest: Effort normal and breath sounds normal. She has no wheezes.   Abdominal: Soft.   Musculoskeletal: Normal range of motion.   Neurological: She is alert and oriented to person, place, and time. No cranial nerve deficit.   Skin: " Skin is warm and dry.   Psychiatric: She has a normal mood and affect.   Nursing note and vitals reviewed.        Assessment/Plan   Jemima was seen today for anxiety and depression.    Diagnoses and all orders for this visit:    Vitamin D deficiency  -     Vitamin D 25 hydroxy    Hypothyroidism, unspecified type  -     CBC & Differential  -     Comprehensive Metabolic Panel  -     TSH    Mild asthma, unspecified whether complicated, unspecified whether persistent    Wheezing  -     Fluticasone Furoate-Vilanterol (BREO ELLIPTA) 100-25 MCG/INH inhaler; Inhale 1 puff Daily.    Depression with anxiety  -     citalopram (CELEXA) 10 MG tablet; Take 1 tablet by mouth Daily.    Muscle cramp               There are no Patient Instructions on file for this visit.

## 2020-01-08 DIAGNOSIS — E55.9 VITAMIN D DEFICIENCY: Primary | ICD-10-CM

## 2020-01-08 LAB
25(OH)D3+25(OH)D2 SERPL-MCNC: 14.4 NG/ML (ref 30–100)
ALBUMIN SERPL-MCNC: 4.6 G/DL (ref 3.5–4.8)
ALBUMIN/GLOB SERPL: 1.8 {RATIO} (ref 1.2–2.2)
ALP SERPL-CCNC: 78 IU/L (ref 39–117)
ALT SERPL-CCNC: 19 IU/L (ref 0–32)
AST SERPL-CCNC: 17 IU/L (ref 0–40)
BASOPHILS # BLD AUTO: 0.1 X10E3/UL (ref 0–0.2)
BASOPHILS NFR BLD AUTO: 2 %
BILIRUB SERPL-MCNC: 0.3 MG/DL (ref 0–1.2)
BUN SERPL-MCNC: 17 MG/DL (ref 8–27)
BUN/CREAT SERPL: 20 (ref 12–28)
CALCIUM SERPL-MCNC: 9.8 MG/DL (ref 8.7–10.3)
CHLORIDE SERPL-SCNC: 103 MMOL/L (ref 96–106)
CO2 SERPL-SCNC: 23 MMOL/L (ref 20–29)
CREAT SERPL-MCNC: 0.85 MG/DL (ref 0.57–1)
EOSINOPHIL # BLD AUTO: 0.3 X10E3/UL (ref 0–0.4)
EOSINOPHIL NFR BLD AUTO: 6 %
ERYTHROCYTE [DISTWIDTH] IN BLOOD BY AUTOMATED COUNT: 13.1 % (ref 11.7–15.4)
GLOBULIN SER CALC-MCNC: 2.6 G/DL (ref 1.5–4.5)
GLUCOSE SERPL-MCNC: 111 MG/DL (ref 65–99)
HCT VFR BLD AUTO: 41 % (ref 34–46.6)
HGB BLD-MCNC: 13.5 G/DL (ref 11.1–15.9)
IMM GRANULOCYTES # BLD AUTO: 0 X10E3/UL (ref 0–0.1)
IMM GRANULOCYTES NFR BLD AUTO: 0 %
LYMPHOCYTES # BLD AUTO: 1.6 X10E3/UL (ref 0.7–3.1)
LYMPHOCYTES NFR BLD AUTO: 29 %
MCH RBC QN AUTO: 30.3 PG (ref 26.6–33)
MCHC RBC AUTO-ENTMCNC: 32.9 G/DL (ref 31.5–35.7)
MCV RBC AUTO: 92 FL (ref 79–97)
MONOCYTES # BLD AUTO: 0.5 X10E3/UL (ref 0.1–0.9)
MONOCYTES NFR BLD AUTO: 9 %
NEUTROPHILS # BLD AUTO: 3 X10E3/UL (ref 1.4–7)
NEUTROPHILS NFR BLD AUTO: 54 %
PLATELET # BLD AUTO: 355 X10E3/UL (ref 150–450)
POTASSIUM SERPL-SCNC: 4.8 MMOL/L (ref 3.5–5.2)
PROT SERPL-MCNC: 7.2 G/DL (ref 6–8.5)
RBC # BLD AUTO: 4.45 X10E6/UL (ref 3.77–5.28)
SODIUM SERPL-SCNC: 142 MMOL/L (ref 134–144)
TSH SERPL DL<=0.005 MIU/L-ACNC: 2.4 UIU/ML (ref 0.45–4.5)
WBC # BLD AUTO: 5.5 X10E3/UL (ref 3.4–10.8)

## 2020-01-08 RX ORDER — ERGOCALCIFEROL 1.25 MG/1
50000 CAPSULE ORAL WEEKLY
Qty: 12 CAPSULE | Refills: 0 | Status: SHIPPED | OUTPATIENT
Start: 2020-01-08 | End: 2020-09-17 | Stop reason: SDUPTHER

## 2020-01-17 ENCOUNTER — OFFICE VISIT (OUTPATIENT)
Dept: FAMILY MEDICINE CLINIC | Facility: CLINIC | Age: 70
End: 2020-01-17

## 2020-01-17 VITALS
WEIGHT: 204 LBS | SYSTOLIC BLOOD PRESSURE: 102 MMHG | BODY MASS INDEX: 38.51 KG/M2 | OXYGEN SATURATION: 98 % | DIASTOLIC BLOOD PRESSURE: 64 MMHG | HEIGHT: 61 IN | HEART RATE: 64 BPM

## 2020-01-17 DIAGNOSIS — M54.50 CHRONIC RIGHT-SIDED LOW BACK PAIN WITHOUT SCIATICA: Primary | ICD-10-CM

## 2020-01-17 DIAGNOSIS — G89.29 CHRONIC RIGHT-SIDED LOW BACK PAIN WITHOUT SCIATICA: Primary | ICD-10-CM

## 2020-01-17 PROBLEM — M54.9 BACK PAIN: Status: ACTIVE | Noted: 2020-01-17

## 2020-01-17 PROCEDURE — 96372 THER/PROPH/DIAG INJ SC/IM: CPT | Performed by: FAMILY MEDICINE

## 2020-01-17 PROCEDURE — 99213 OFFICE O/P EST LOW 20 MIN: CPT | Performed by: FAMILY MEDICINE

## 2020-01-17 RX ORDER — KETOROLAC TROMETHAMINE 30 MG/ML
60 INJECTION, SOLUTION INTRAMUSCULAR; INTRAVENOUS ONCE
Status: COMPLETED | OUTPATIENT
Start: 2020-01-17 | End: 2020-01-17

## 2020-01-17 RX ADMIN — KETOROLAC TROMETHAMINE 60 MG: 30 INJECTION, SOLUTION INTRAMUSCULAR; INTRAVENOUS at 16:19

## 2020-01-17 NOTE — PATIENT INSTRUCTIONS
Chronic Back Pain  When back pain lasts longer than 3 months, it is called chronic back pain. The cause of your back pain may not be known. Some common causes include:  · Wear and tear (degenerative disease) of the bones, ligaments, or disks in your back.  · Inflammation and stiffness in your back (arthritis).  People who have chronic back pain often go through certain periods in which the pain is more intense (flare-ups). Many people can learn to manage the pain with home care.  Follow these instructions at home:  Pay attention to any changes in your symptoms. Take these actions to help with your pain:  Activity    · Avoid bending and other activities that make the problem worse.  · Maintain a proper position when standing or sitting:  ? When standing, keep your upper back and neck straight, with your shoulders pulled back. Avoid slouching.  ? When sitting, keep your back straight and relax your shoulders. Do not round your shoulders or pull them backward.  · Do not sit or  one place for long periods of time.  · Take brief periods of rest throughout the day. This will reduce your pain. Resting in a lying or standing position is usually better than sitting to rest.  · When you are resting for longer periods, mix in some mild activity or stretching between periods of rest. This will help to prevent stiffness and pain.  · Get regular exercise. Ask your health care provider what activities are safe for you.  · Do not lift anything that is heavier than 10 lb (4.5 kg). Always use proper lifting technique, which includes:  ? Bending your knees.  ? Keeping the load close to your body.  ? Avoiding twisting.  · Sleep on a firm mattress in a comfortable position. Try lying on your side with your knees slightly bent. If you lie on your back, put a pillow under your knees.  Managing pain  · If directed, apply ice to the painful area. Your health care provider may recommend applying ice during the first 24-48 hours after  a flare-up begins.  ? Put ice in a plastic bag.  ? Place a towel between your skin and the bag.  ? Leave the ice on for 20 minutes, 2-3 times per day.  · If directed, apply heat to the affected area as often as told by your health care provider. Use the heat source that your health care provider recommends, such as a moist heat pack or a heating pad.  ? Place a towel between your skin and the heat source.  ? Leave the heat on for 20-30 minutes.  ? Remove the heat if your skin turns bright red. This is especially important if you are unable to feel pain, heat, or cold. You may have a greater risk of getting burned.  · Try soaking in a warm tub.  · Take over-the-counter and prescription medicines only as told by your health care provider.  · Keep all follow-up visits as told by your health care provider. This is important.  Contact a health care provider if:  · You have pain that is not relieved with rest or medicine.  Get help right away if:  · You have weakness or numbness in one or both of your legs or feet.  · You have trouble controlling your bladder or your bowels.  · You have nausea or vomiting.  · You have pain in your abdomen.  · You have shortness of breath or you faint.  This information is not intended to replace advice given to you by your health care provider. Make sure you discuss any questions you have with your health care provider.  Document Released: 01/25/2006 Document Revised: 07/25/2019 Document Reviewed: 06/27/2018  [employer] Interactive Patient Education © 2019 [employer]

## 2020-01-17 NOTE — PROGRESS NOTES
Subjective   Jemima Bell is a 69 y.o. female.     Chief Complaint   Patient presents with   • Back Pain     Muscle spasm on the right side. States she is fine if she lays still, but is unable to raise her hand so high       Patient is here with a new problem today.  She is a patient of Dr. Kehrer's.  She started with her typical right-sided back pain about 2 weeks ago.  She has suffered with that intermittently since that time and is here today because she has had a Toradol shot in the past which is really helped her.  She denies any new or different problems about this pain.  She denies any incontinence, or urinary symptoms.  She did not take meloxicam today.       Review of Systems   Constitutional: Negative for activity change, chills, fatigue and fever.   HENT: Negative for hearing loss, swollen glands, tinnitus and trouble swallowing.    Eyes: Negative for pain and visual disturbance.   Respiratory: Negative for cough and shortness of breath.    Cardiovascular: Negative for chest pain, palpitations and leg swelling.   Gastrointestinal: Negative for diarrhea and nausea.   Endocrine: Negative for polydipsia and polyuria.   Genitourinary: Negative for difficulty urinating and urinary incontinence.   Musculoskeletal: Positive for back pain. Negative for arthralgias, gait problem and joint swelling.   Skin: Negative for rash.   Allergic/Immunologic: Negative for immunocompromised state.   Neurological: Negative for dizziness, light-headedness and headache.   Hematological: Negative for adenopathy. Does not bruise/bleed easily.   Psychiatric/Behavioral: Negative for dysphoric mood and sleep disturbance.       The following portions of the patient's history were reviewed and updated as appropriate: allergies, current medications, past family history, past medical history, past social history, past surgical history and problem list.    Past Medical History:   Diagnosis Date   • Acute bronchitis    • Acute  sinusitis    • Allergic rhinitis    • Anxiety    • Arthritis    • Back pain    • BMI 34.0-34.9,adult    • Cervicalgia    • Chills    • Deep vein thrombosis (DVT) of lower extremity (CMS/HCC)    • Depression    • Dermatitis    • Dyspnea    • Dysuria    • Esophageal reflux    • Fatigue    • Gastric ulcer    • GERD (gastroesophageal reflux disease)    • Headache    • Hypothyroidism    • Irritable bowel syndrome    • Lumbago    • Migraine    • Nausea    • Neuritis    • Osteoarthritis    • Osteoarthritis of knee    • Plantar fasciitis    • Pulled muscle    • Pulmonary embolism (CMS/HCC)    • Pyelonephritis    • Rheumatic fever    • Sore throat    • Torticollis    • Trapezius strain    • Urinary incontinence    • Urinary pain    • Urinary tract infection    • UTI symptoms    • Vitamin B1 deficiency    • Vitamin B12 deficiency    • Vitamin D deficiency    • Weakness    • Wheezing        Past Surgical History:   Procedure Laterality Date   • APPENDECTOMY     • GALLBLADDER SURGERY     • HYSTERECTOMY     • INGUINAL HERNIA REPAIR     • KNEE SURGERY     • LAPAROTOMY OOPHERECTOMY     • TONSILLECTOMY     • TOTAL KNEE ARTHROPLASTY Left        Family History   Problem Relation Age of Onset   • Arthritis Mother    • Depression Mother    • Hyperlipidemia Mother    • Hypertension Mother    • Hypertension Father    • Alcohol abuse Maternal Grandfather    • Depression Maternal Grandfather    • Heart disease Other         IN FEMALES BEFORE AGE 65       Social History     Socioeconomic History   • Marital status:      Spouse name: Not on file   • Number of children: Not on file   • Years of education: Not on file   • Highest education level: Not on file   Tobacco Use   • Smoking status: Never Smoker         Current Outpatient Medications:   •  acetaminophen (TYLENOL) 325 MG tablet, Take 1,000 mg by mouth., Disp: , Rfl:   •  albuterol sulfate  (90 Base) MCG/ACT inhaler, Inhale 2 puffs Every 4 (Four) Hours As Needed., Disp: ,  "Rfl:   •  busPIRone (BUSPAR) 10 MG tablet, Take 1 tablet by mouth 2 (Two) Times a Day., Disp: 60 tablet, Rfl: 1  •  Cholecalciferol (VITAMIN D3) 5000 UNITS capsule capsule, Take 5,000 Units by mouth Daily., Disp: , Rfl:   •  citalopram (CELEXA) 10 MG tablet, Take 1 tablet by mouth Daily., Disp: 90 tablet, Rfl: 0  •  fluticasone (FLONASE) 50 MCG/ACT nasal spray, Use 2 Sprays in each nostril once daily. prn, Disp: , Rfl:   •  Fluticasone Furoate-Vilanterol (BREO ELLIPTA) 100-25 MCG/INH inhaler, Inhale 1 puff Daily., Disp: 1 each, Rfl: 2  •  levothyroxine (SYNTHROID, LEVOTHROID) 88 MCG tablet, Take  by mouth Daily., Disp: , Rfl: 1  •  meloxicam (MOBIC) 15 MG tablet, Take 1 tablet by mouth Daily., Disp: 90 tablet, Rfl: 1  •  omeprazole (priLOSEC) 40 MG capsule, Take 1 capsule by mouth Daily., Disp: 90 capsule, Rfl: 3  •  ondansetron (ZOFRAN) 4 MG tablet, Take 4 mg by mouth Every 8 (Eight) Hours As Needed for Nausea or Vomiting., Disp: , Rfl:   •  promethazine (PHENERGAN) 25 MG suppository, Insert 1 suppository into the rectum Every 4 (Four) Hours As Needed for nausea or vomiting., Disp: 12 suppository, Rfl: 0  •  traMADol (ULTRAM) 50 MG tablet, Take  mg by mouth., Disp: , Rfl:   •  vitamin D (ERGOCALCIFEROL) 1.25 MG (42121 UT) capsule capsule, Take 1 capsule by mouth 1 (One) Time Per Week., Disp: 12 capsule, Rfl: 0    Current Facility-Administered Medications:   •  cyanocobalamin injection 1,000 mcg, 1,000 mcg, Intramuscular, Q28 Days, Latrice Canseco DO, 1,000 mcg at 12/30/19 1311  •  cyanocobalamin injection 1,000 mcg, 1,000 mcg, Intramuscular, Q28 Days, Kehrer, Meredith Lea, MD, 1,000 mcg at 11/06/19 1543  •  ketorolac (TORADOL) injection 60 mg, 60 mg, Intramuscular, Once, Latrice Canseco,     Objective     Vitals:    01/17/20 1543   BP: 102/64   Pulse: 64   SpO2: 98%   Weight: 92.5 kg (204 lb)   Height: 154.9 cm (61\")       Body mass index is 38.55 kg/m².    No components found for: 2D    Physical Exam "   Constitutional: She is oriented to person, place, and time. She appears well-developed and well-nourished.   HENT:   Head: Normocephalic and atraumatic.   Eyes: Conjunctivae are normal.   Neck: Normal range of motion. Neck supple.   Cardiovascular: Normal rate, regular rhythm, normal heart sounds and intact distal pulses.   Pulmonary/Chest: Effort normal and breath sounds normal.   Abdominal: Soft. Bowel sounds are normal.   Musculoskeletal: Normal range of motion. She exhibits tenderness (Right-sided low back). She exhibits no edema.   Neurological: She is alert and oriented to person, place, and time. She displays normal reflexes. No cranial nerve deficit or sensory deficit. She exhibits normal muscle tone. Coordination normal.   Skin: Skin is warm and dry. Capillary refill takes less than 2 seconds. No rash noted.   Psychiatric: She has a normal mood and affect. Her behavior is normal. Judgment and thought content normal.   Nursing note and vitals reviewed.      Procedures    Assessment/Plan   Jemima was seen today for back pain.    Diagnoses and all orders for this visit:    Chronic right-sided low back pain without sciatica  -     ketorolac (TORADOL) injection 60 mg        Patient Instructions   Chronic Back Pain  When back pain lasts longer than 3 months, it is called chronic back pain. The cause of your back pain may not be known. Some common causes include:  · Wear and tear (degenerative disease) of the bones, ligaments, or disks in your back.  · Inflammation and stiffness in your back (arthritis).  People who have chronic back pain often go through certain periods in which the pain is more intense (flare-ups). Many people can learn to manage the pain with home care.  Follow these instructions at home:  Pay attention to any changes in your symptoms. Take these actions to help with your pain:  Activity    · Avoid bending and other activities that make the problem worse.  · Maintain a proper position  when standing or sitting:  ? When standing, keep your upper back and neck straight, with your shoulders pulled back. Avoid slouching.  ? When sitting, keep your back straight and relax your shoulders. Do not round your shoulders or pull them backward.  · Do not sit or  one place for long periods of time.  · Take brief periods of rest throughout the day. This will reduce your pain. Resting in a lying or standing position is usually better than sitting to rest.  · When you are resting for longer periods, mix in some mild activity or stretching between periods of rest. This will help to prevent stiffness and pain.  · Get regular exercise. Ask your health care provider what activities are safe for you.  · Do not lift anything that is heavier than 10 lb (4.5 kg). Always use proper lifting technique, which includes:  ? Bending your knees.  ? Keeping the load close to your body.  ? Avoiding twisting.  · Sleep on a firm mattress in a comfortable position. Try lying on your side with your knees slightly bent. If you lie on your back, put a pillow under your knees.  Managing pain  · If directed, apply ice to the painful area. Your health care provider may recommend applying ice during the first 24-48 hours after a flare-up begins.  ? Put ice in a plastic bag.  ? Place a towel between your skin and the bag.  ? Leave the ice on for 20 minutes, 2-3 times per day.  · If directed, apply heat to the affected area as often as told by your health care provider. Use the heat source that your health care provider recommends, such as a moist heat pack or a heating pad.  ? Place a towel between your skin and the heat source.  ? Leave the heat on for 20-30 minutes.  ? Remove the heat if your skin turns bright red. This is especially important if you are unable to feel pain, heat, or cold. You may have a greater risk of getting burned.  · Try soaking in a warm tub.  · Take over-the-counter and prescription medicines only as told by  your health care provider.  · Keep all follow-up visits as told by your health care provider. This is important.  Contact a health care provider if:  · You have pain that is not relieved with rest or medicine.  Get help right away if:  · You have weakness or numbness in one or both of your legs or feet.  · You have trouble controlling your bladder or your bowels.  · You have nausea or vomiting.  · You have pain in your abdomen.  · You have shortness of breath or you faint.  This information is not intended to replace advice given to you by your health care provider. Make sure you discuss any questions you have with your health care provider.  Document Released: 01/25/2006 Document Revised: 07/25/2019 Document Reviewed: 06/27/2018  [employer] Interactive Patient Education © 2019 [employer]

## 2020-01-28 ENCOUNTER — OFFICE VISIT (OUTPATIENT)
Dept: FAMILY MEDICINE CLINIC | Facility: CLINIC | Age: 70
End: 2020-01-28

## 2020-01-28 VITALS
HEIGHT: 61 IN | BODY MASS INDEX: 38.33 KG/M2 | TEMPERATURE: 98.8 F | WEIGHT: 203 LBS | DIASTOLIC BLOOD PRESSURE: 56 MMHG | OXYGEN SATURATION: 94 % | SYSTOLIC BLOOD PRESSURE: 106 MMHG | HEART RATE: 112 BPM

## 2020-01-28 DIAGNOSIS — A08.4 VIRAL GASTROENTERITIS: Primary | ICD-10-CM

## 2020-01-28 DIAGNOSIS — R11.2 NAUSEA AND VOMITING, INTRACTABILITY OF VOMITING NOT SPECIFIED, UNSPECIFIED VOMITING TYPE: ICD-10-CM

## 2020-01-28 LAB
EXPIRATION DATE: NORMAL
FLUAV AG NPH QL: NEGATIVE
FLUBV AG NPH QL: NEGATIVE
INTERNAL CONTROL: NORMAL
Lab: NORMAL

## 2020-01-28 PROCEDURE — 96372 THER/PROPH/DIAG INJ SC/IM: CPT | Performed by: FAMILY MEDICINE

## 2020-01-28 PROCEDURE — 87804 INFLUENZA ASSAY W/OPTIC: CPT | Performed by: FAMILY MEDICINE

## 2020-01-28 PROCEDURE — 99213 OFFICE O/P EST LOW 20 MIN: CPT | Performed by: FAMILY MEDICINE

## 2020-01-28 RX ORDER — PROMETHAZINE HYDROCHLORIDE 25 MG/1
25 TABLET ORAL EVERY 6 HOURS PRN
COMMUNITY
End: 2021-01-14

## 2020-01-28 RX ORDER — PROMETHAZINE HYDROCHLORIDE 25 MG/ML
25 INJECTION, SOLUTION INTRAMUSCULAR; INTRAVENOUS ONCE
Status: COMPLETED | OUTPATIENT
Start: 2020-01-28 | End: 2020-01-28

## 2020-01-28 RX ADMIN — PROMETHAZINE HYDROCHLORIDE 25 MG: 25 INJECTION, SOLUTION INTRAMUSCULAR; INTRAVENOUS at 16:19

## 2020-01-28 NOTE — PROGRESS NOTES
Subjective   Jemima Bell is a 69 y.o. female.     Chief Complaint   Patient presents with   • Vomiting   • Nausea        Patient presents with sudden onset of vomiting in the middle the night which happened about 4 times.  She is only been sipping liquids and has no appetite.  She right after that started with watery stools which have happened 8 times now.  She hurts all over and is exhausted.  She has not had any fever or ill contacts that she is aware of.  Her  feels healthy.  She has not had any cold or congestion symptoms but has a bad headache.         The following portions of the patient's history were reviewed and updated as appropriate: allergies, current medications, past family history, past medical history, past social history, past surgical history and problem list.    Past Medical History:   Diagnosis Date   • Acute bronchitis    • Acute sinusitis    • Allergic rhinitis    • Anxiety    • Arthritis    • Back pain    • BMI 34.0-34.9,adult    • Cervicalgia    • Chills    • Deep vein thrombosis (DVT) of lower extremity (CMS/HCC)    • Depression    • Dermatitis    • Dyspnea    • Dysuria    • Esophageal reflux    • Fatigue    • Gastric ulcer    • GERD (gastroesophageal reflux disease)    • Headache    • Hypothyroidism    • Irritable bowel syndrome    • Lumbago    • Migraine    • Nausea    • Neuritis    • Osteoarthritis    • Osteoarthritis of knee    • Plantar fasciitis    • Pulled muscle    • Pulmonary embolism (CMS/HCC)    • Pyelonephritis    • Rheumatic fever    • Sore throat    • Torticollis    • Trapezius strain    • Urinary incontinence    • Urinary pain    • Urinary tract infection    • UTI symptoms    • Vitamin B1 deficiency    • Vitamin B12 deficiency    • Vitamin D deficiency    • Weakness    • Wheezing        Past Surgical History:   Procedure Laterality Date   • APPENDECTOMY     • GALLBLADDER SURGERY     • HYSTERECTOMY     • INGUINAL HERNIA REPAIR     • KNEE SURGERY     •  LAPAROTOMY OOPHERECTOMY     • TONSILLECTOMY     • TOTAL KNEE ARTHROPLASTY Left        Family History   Problem Relation Age of Onset   • Arthritis Mother    • Depression Mother    • Hyperlipidemia Mother    • Hypertension Mother    • Hypertension Father    • Alcohol abuse Maternal Grandfather    • Depression Maternal Grandfather    • Heart disease Other         IN FEMALES BEFORE AGE 65       Social History     Socioeconomic History   • Marital status:      Spouse name: Not on file   • Number of children: Not on file   • Years of education: Not on file   • Highest education level: Not on file   Tobacco Use   • Smoking status: Never Smoker         Current Outpatient Medications:   •  acetaminophen (TYLENOL) 325 MG tablet, Take 1,000 mg by mouth., Disp: , Rfl:   •  albuterol sulfate  (90 Base) MCG/ACT inhaler, Inhale 2 puffs Every 4 (Four) Hours As Needed., Disp: , Rfl:   •  busPIRone (BUSPAR) 10 MG tablet, Take 1 tablet by mouth 2 (Two) Times a Day., Disp: 60 tablet, Rfl: 1  •  Cholecalciferol (VITAMIN D3) 5000 UNITS capsule capsule, Take 5,000 Units by mouth Daily., Disp: , Rfl:   •  citalopram (CELEXA) 10 MG tablet, Take 1 tablet by mouth Daily., Disp: 90 tablet, Rfl: 0  •  fluticasone (FLONASE) 50 MCG/ACT nasal spray, Use 2 Sprays in each nostril once daily. prn, Disp: , Rfl:   •  Fluticasone Furoate-Vilanterol (BREO ELLIPTA) 100-25 MCG/INH inhaler, Inhale 1 puff Daily., Disp: 1 each, Rfl: 2  •  levothyroxine (SYNTHROID, LEVOTHROID) 88 MCG tablet, Take  by mouth Daily., Disp: , Rfl: 1  •  meloxicam (MOBIC) 15 MG tablet, Take 1 tablet by mouth Daily., Disp: 90 tablet, Rfl: 1  •  omeprazole (priLOSEC) 40 MG capsule, Take 1 capsule by mouth Daily., Disp: 90 capsule, Rfl: 3  •  ondansetron (ZOFRAN) 4 MG tablet, Take 4 mg by mouth Every 8 (Eight) Hours As Needed for Nausea or Vomiting., Disp: , Rfl:   •  promethazine (PHENERGAN) 25 MG tablet, Take 25 mg by mouth Every 6 (Six) Hours As Needed for Nausea or  "Vomiting., Disp: , Rfl:   •  traMADol (ULTRAM) 50 MG tablet, Take  mg by mouth., Disp: , Rfl:   •  vitamin D (ERGOCALCIFEROL) 1.25 MG (05536 UT) capsule capsule, Take 1 capsule by mouth 1 (One) Time Per Week., Disp: 12 capsule, Rfl: 0    Current Facility-Administered Medications:   •  cyanocobalamin injection 1,000 mcg, 1,000 mcg, Intramuscular, Q28 Days, Latrice Canseco DO, 1,000 mcg at 12/30/19 1311  •  cyanocobalamin injection 1,000 mcg, 1,000 mcg, Intramuscular, Q28 Days, Kehrer, Meredith Lea, MD, 1,000 mcg at 11/06/19 1543  •  promethazine (PHENERGAN) injection 25 mg, 25 mg, Intramuscular, Once, Kehrer, Meredith Lea, MD    Review of Systems   Constitutional: Positive for chills and fever. Negative for fatigue.   HENT: Negative for congestion, ear discharge, ear pain, postnasal drip, rhinorrhea and sore throat.    Respiratory: Positive for cough.    Gastrointestinal: Positive for diarrhea and vomiting.   Musculoskeletal: Positive for arthralgias and myalgias.   Neurological: Positive for headaches.       Objective   Vitals:    01/28/20 1540   BP: 106/56   Pulse: 112   Temp: 98.8 °F (37.1 °C)   SpO2: 94%   Weight: 92.1 kg (203 lb)   Height: 154.9 cm (61\")     Body mass index is 38.36 kg/m².  Physical Exam   Constitutional: She is oriented to person, place, and time. She appears well-developed and well-nourished.   Disheveled in her [facility]   HENT:   Head: Normocephalic and atraumatic.   Nose: Nose normal.   Mouth/Throat:       Eyes: Pupils are equal, round, and reactive to light. Conjunctivae and EOM are normal.   Neck: Neck supple. No thyromegaly present.   Cardiovascular: Normal rate and regular rhythm.   No murmur heard.  Pulmonary/Chest: Effort normal and breath sounds normal. She has no wheezes.   Abdominal: Soft. Bowel sounds are increased. There is no hepatosplenomegaly. There is no tenderness.   Musculoskeletal: Normal range of motion.   Neurological: She is alert and oriented to person, place, " and time. No cranial nerve deficit.   Skin: Skin is warm and dry. Capillary refill takes less than 2 seconds.   Psychiatric: She has a normal mood and affect.   Nursing note and vitals reviewed.      Office Visit on 01/28/2020   Component Date Value Ref Range Status   • Rapid Influenza A Ag 01/28/2020 Negative  Negative Final   • Rapid Influenza B Ag 01/28/2020 Negative  Negative Final   • Internal Control 01/28/2020 Passed  Passed Final   • Lot Number 01/28/2020 [id number]   Final   • Expiration Date 01/28/2020 [id number]   Final          Assessment/Plan   Jemima was seen today for vomiting and nausea.    Diagnoses and all orders for this visit:    Viral gastroenteritis  -     promethazine (PHENERGAN) injection 25 mg    Nausea and vomiting, intractability of vomiting not specified, unspecified vomiting type  -     POC Influenza A / B               Patient Instructions   Push fluids and rest.       Viral Gastroenteritis, Adult    Viral gastroenteritis is also known as the stomach flu. This condition is caused by various viruses. These viruses can be passed from person to person very easily (are very contagious). This condition may affect your stomach, small intestine, and large intestine. It can cause sudden watery diarrhea, fever, and vomiting.  Diarrhea and vomiting can make you feel weak and cause you to become dehydrated. You may not be able to keep fluids down. Dehydration can make you tired and thirsty, cause you to have a dry mouth, and decrease how often you urinate. Older adults and people with other diseases or a weak immune system are at higher risk for dehydration.  It is important to replace the fluids that you lose from diarrhea and vomiting. If you become severely dehydrated, you may need to get fluids through an IV tube.  What are the causes?  Gastroenteritis is caused by various viruses, including rotavirus and norovirus. Norovirus is the most common cause in adults.  You can get sick by eating  food, drinking water, or touching a surface contaminated with one of these viruses. You can also get sick from sharing utensils or other personal items with an infected person.  What increases the risk?  This condition is more likely to develop in people:  · Who have a weak defense system (immune system).  · Who live with one or more children who are younger than 2 years old.  · Who live in a nursing home.  · Who go on cruise ships.  What are the signs or symptoms?  Symptoms of this condition start suddenly 1-2 days after exposure to a virus. Symptoms may last a few days or as long as a week. The most common symptoms are watery diarrhea and vomiting. Other symptoms include:  · Fever.  · Headache.  · Fatigue.  · Pain in the abdomen.  · Chills.  · Weakness.  · Nausea.  · Muscle aches.  · Loss of appetite.  How is this diagnosed?  This condition is diagnosed with a medical history and physical exam. You may also have a stool test to check for viruses or other infections.  How is this treated?  This condition typically goes away on its own. The focus of treatment is to restore lost fluids (rehydration). Your health care provider may recommend that you take an oral rehydration solution (ORS) to replace important salts and minerals (electrolytes) in your body. Severe cases of this condition may require giving fluids through an IV tube.  Treatment may also include medicine to help with your symptoms.  Follow these instructions at home:    Follow instructions from your health care provider about how to care for yourself at home.  Follow these recommendations as told by your health care provider:  · Take an ORS. This is a drink that is sold at pharmacies and retail stores.  · Drink clear fluids in small amounts as you are able. Clear fluids include water, ice chips, diluted fruit juice, and low-calorie sports drinks.  · Eat bland, easy-to-digest foods in small amounts as you are able. These foods include bananas, applesauce,  rice, lean meats, toast, and crackers.  · Avoid fluids that contain a lot of sugar or caffeine, such as energy drinks, sports drinks, and soda.  · Avoid alcohol.  · Avoid spicy or fatty foods.  General instructions  · Drink enough fluid to keep your urine clear or pale yellow.  · Wash your hands often. If soap and water are not available, use hand .  · Make sure that all people in your household wash their hands well and often.  · Take over-the-counter and prescription medicines only as told by your health care provider.  · Rest at home while you recover.  · Watch your condition for any changes.  · Take a warm bath to relieve any burning or pain from frequent diarrhea episodes.  · Keep all follow-up visits as told by your health care provider. This is important.  Contact a health care provider if:  · You cannot keep fluids down.  · Your symptoms get worse.  · You have new symptoms.  · You feel light-headed or dizzy.  · You have muscle cramps.  Get help right away if:  · You have chest pain.  · You feel extremely weak or you faint.  · You see blood in your vomit.  · Your vomit looks like coffee grounds.  · You have bloody or black stools or stools that look like tar.  · You have a severe headache, a stiff neck, or both.  · You have a rash.  · You have severe pain, cramping, or bloating in your abdomen.  · You have trouble breathing or you are breathing very quickly.  · Your heart is beating very quickly.  · Your skin feels cold and clammy.  · You feel confused.  · You have pain when you urinate.  · You have signs of dehydration, such as:  ? Dark urine, very little urine, or no urine.  ? Cracked lips.  ? Dry mouth.  ? Sunken eyes.  ? Sleepiness.  ? Weakness.  This information is not intended to replace advice given to you by your health care provider. Make sure you discuss any questions you have with your health care provider.  Document Released: 12/18/2006 Document Revised: 08/02/2018 Document Reviewed:  08/23/2016  [employer] Interactive Patient Education © 2019 [employer]

## 2020-01-28 NOTE — PATIENT INSTRUCTIONS
Push fluids and rest.       Viral Gastroenteritis, Adult    Viral gastroenteritis is also known as the stomach flu. This condition is caused by various viruses. These viruses can be passed from person to person very easily (are very contagious). This condition may affect your stomach, small intestine, and large intestine. It can cause sudden watery diarrhea, fever, and vomiting.  Diarrhea and vomiting can make you feel weak and cause you to become dehydrated. You may not be able to keep fluids down. Dehydration can make you tired and thirsty, cause you to have a dry mouth, and decrease how often you urinate. Older adults and people with other diseases or a weak immune system are at higher risk for dehydration.  It is important to replace the fluids that you lose from diarrhea and vomiting. If you become severely dehydrated, you may need to get fluids through an IV tube.  What are the causes?  Gastroenteritis is caused by various viruses, including rotavirus and norovirus. Norovirus is the most common cause in adults.  You can get sick by eating food, drinking water, or touching a surface contaminated with one of these viruses. You can also get sick from sharing utensils or other personal items with an infected person.  What increases the risk?  This condition is more likely to develop in people:  · Who have a weak defense system (immune system).  · Who live with one or more children who are younger than 2 years old.  · Who live in a nursing home.  · Who go on cruise ships.  What are the signs or symptoms?  Symptoms of this condition start suddenly 1-2 days after exposure to a virus. Symptoms may last a few days or as long as a week. The most common symptoms are watery diarrhea and vomiting. Other symptoms include:  · Fever.  · Headache.  · Fatigue.  · Pain in the abdomen.  · Chills.  · Weakness.  · Nausea.  · Muscle aches.  · Loss of appetite.  How is this diagnosed?  This condition is diagnosed with a medical  history and physical exam. You may also have a stool test to check for viruses or other infections.  How is this treated?  This condition typically goes away on its own. The focus of treatment is to restore lost fluids (rehydration). Your health care provider may recommend that you take an oral rehydration solution (ORS) to replace important salts and minerals (electrolytes) in your body. Severe cases of this condition may require giving fluids through an IV tube.  Treatment may also include medicine to help with your symptoms.  Follow these instructions at home:    Follow instructions from your health care provider about how to care for yourself at home.  Follow these recommendations as told by your health care provider:  · Take an ORS. This is a drink that is sold at pharmacies and retail stores.  · Drink clear fluids in small amounts as you are able. Clear fluids include water, ice chips, diluted fruit juice, and low-calorie sports drinks.  · Eat bland, easy-to-digest foods in small amounts as you are able. These foods include bananas, applesauce, rice, lean meats, toast, and crackers.  · Avoid fluids that contain a lot of sugar or caffeine, such as energy drinks, sports drinks, and soda.  · Avoid alcohol.  · Avoid spicy or fatty foods.  General instructions  · Drink enough fluid to keep your urine clear or pale yellow.  · Wash your hands often. If soap and water are not available, use hand .  · Make sure that all people in your household wash their hands well and often.  · Take over-the-counter and prescription medicines only as told by your health care provider.  · Rest at home while you recover.  · Watch your condition for any changes.  · Take a warm bath to relieve any burning or pain from frequent diarrhea episodes.  · Keep all follow-up visits as told by your health care provider. This is important.  Contact a health care provider if:  · You cannot keep fluids down.  · Your symptoms get  worse.  · You have new symptoms.  · You feel light-headed or dizzy.  · You have muscle cramps.  Get help right away if:  · You have chest pain.  · You feel extremely weak or you faint.  · You see blood in your vomit.  · Your vomit looks like coffee grounds.  · You have bloody or black stools or stools that look like tar.  · You have a severe headache, a stiff neck, or both.  · You have a rash.  · You have severe pain, cramping, or bloating in your abdomen.  · You have trouble breathing or you are breathing very quickly.  · Your heart is beating very quickly.  · Your skin feels cold and clammy.  · You feel confused.  · You have pain when you urinate.  · You have signs of dehydration, such as:  ? Dark urine, very little urine, or no urine.  ? Cracked lips.  ? Dry mouth.  ? Sunken eyes.  ? Sleepiness.  ? Weakness.  This information is not intended to replace advice given to you by your health care provider. Make sure you discuss any questions you have with your health care provider.  Document Released: 12/18/2006 Document Revised: 08/02/2018 Document Reviewed: 08/23/2016  [employer] Patient Education © 2019 [employer]

## 2020-02-05 ENCOUNTER — OFFICE VISIT (OUTPATIENT)
Dept: FAMILY MEDICINE CLINIC | Facility: CLINIC | Age: 70
End: 2020-02-05

## 2020-02-05 VITALS
DIASTOLIC BLOOD PRESSURE: 66 MMHG | OXYGEN SATURATION: 96 % | SYSTOLIC BLOOD PRESSURE: 108 MMHG | HEART RATE: 77 BPM | WEIGHT: 203.2 LBS | HEIGHT: 61 IN | TEMPERATURE: 97.9 F | BODY MASS INDEX: 38.36 KG/M2

## 2020-02-05 DIAGNOSIS — R82.2 BILIRUBINURIA: ICD-10-CM

## 2020-02-05 DIAGNOSIS — R11.0 NAUSEA: Primary | ICD-10-CM

## 2020-02-05 DIAGNOSIS — R10.84 GENERALIZED ABDOMINAL PAIN: ICD-10-CM

## 2020-02-05 DIAGNOSIS — R82.998 LEUKOCYTES IN URINE: ICD-10-CM

## 2020-02-05 LAB
BILIRUB BLD-MCNC: ABNORMAL MG/DL
GLUCOSE UR STRIP-MCNC: NEGATIVE MG/DL
KETONES UR QL: NEGATIVE
LEUKOCYTE EST, POC: ABNORMAL
NITRITE UR-MCNC: NEGATIVE MG/ML
PH UR: 6 [PH] (ref 5–8)
PROT UR STRIP-MCNC: NEGATIVE MG/DL
RBC # UR STRIP: NEGATIVE /UL
SP GR UR: 1.02 (ref 1–1.03)
UROBILINOGEN UR QL: NORMAL

## 2020-02-05 PROCEDURE — 99213 OFFICE O/P EST LOW 20 MIN: CPT | Performed by: FAMILY MEDICINE

## 2020-02-05 PROCEDURE — 81003 URINALYSIS AUTO W/O SCOPE: CPT | Performed by: FAMILY MEDICINE

## 2020-02-05 PROCEDURE — 96372 THER/PROPH/DIAG INJ SC/IM: CPT | Performed by: FAMILY MEDICINE

## 2020-02-05 RX ADMIN — CYANOCOBALAMIN 1000 MCG: 1000 INJECTION, SOLUTION INTRAMUSCULAR; SUBCUTANEOUS at 12:15

## 2020-02-05 NOTE — PROGRESS NOTES
Subjective   Jemima Bell is a 70 y.o. female.     Chief Complaint   Patient presents with   • Nausea   • Diarrhea        Patient presents to follow-up.  After her last severe viral gastroenteritis she continues to have nausea.  She has mild diffuse abdominal pain.  She has chronic diarrhea but the acute diarrhea has resolved.  She feels that her bowels are back to baseline and denies any black or tarry stools or blood in her stool.  She has not had any more fever or chills.  She has no appetite.  She is wondering if her new medication could have caused this but it started a couple weeks after she started the medication.  She has not vomited for 2 full days now which is the longest she is gone.         The following portions of the patient's history were reviewed and updated as appropriate: allergies, current medications, past family history, past medical history, past social history, past surgical history and problem list.    Past Medical History:   Diagnosis Date   • Acute bronchitis    • Acute sinusitis    • Allergic rhinitis    • Anxiety    • Arthritis    • Back pain    • BMI 34.0-34.9,adult    • Cervicalgia    • Chills    • Deep vein thrombosis (DVT) of lower extremity (CMS/HCC)    • Depression    • Dermatitis    • Dyspnea    • Dysuria    • Esophageal reflux    • Fatigue    • Gastric ulcer    • GERD (gastroesophageal reflux disease)    • Headache    • Hypothyroidism    • Irritable bowel syndrome    • Lumbago    • Migraine    • Nausea    • Neuritis    • Osteoarthritis    • Osteoarthritis of knee    • Plantar fasciitis    • Pulled muscle    • Pulmonary embolism (CMS/HCC)    • Pyelonephritis    • Rheumatic fever    • Sore throat    • Torticollis    • Trapezius strain    • Urinary incontinence    • Urinary pain    • Urinary tract infection    • UTI symptoms    • Vitamin B1 deficiency    • Vitamin B12 deficiency    • Vitamin D deficiency    • Weakness    • Wheezing        Past Surgical History:   Procedure  Laterality Date   • APPENDECTOMY     • GALLBLADDER SURGERY     • HYSTERECTOMY     • INGUINAL HERNIA REPAIR     • KNEE SURGERY     • LAPAROTOMY OOPHERECTOMY     • TONSILLECTOMY     • TOTAL KNEE ARTHROPLASTY Left        Family History   Problem Relation Age of Onset   • Arthritis Mother    • Depression Mother    • Hyperlipidemia Mother    • Hypertension Mother    • Hypertension Father    • Alcohol abuse Maternal Grandfather    • Depression Maternal Grandfather    • Heart disease Other         IN FEMALES BEFORE AGE 65       Social History     Socioeconomic History   • Marital status:      Spouse name: Not on file   • Number of children: Not on file   • Years of education: Not on file   • Highest education level: Not on file   Tobacco Use   • Smoking status: Never Smoker         Current Outpatient Medications:   •  acetaminophen (TYLENOL) 325 MG tablet, Take 1,000 mg by mouth., Disp: , Rfl:   •  albuterol sulfate  (90 Base) MCG/ACT inhaler, Inhale 2 puffs Every 4 (Four) Hours As Needed., Disp: , Rfl:   •  busPIRone (BUSPAR) 10 MG tablet, Take 1 tablet by mouth 2 (Two) Times a Day., Disp: 60 tablet, Rfl: 1  •  Cholecalciferol (VITAMIN D3) 5000 UNITS capsule capsule, Take 5,000 Units by mouth Daily., Disp: , Rfl:   •  citalopram (CELEXA) 10 MG tablet, Take 1 tablet by mouth Daily., Disp: 90 tablet, Rfl: 0  •  fluticasone (FLONASE) 50 MCG/ACT nasal spray, Use 2 Sprays in each nostril once daily. prn, Disp: , Rfl:   •  Fluticasone Furoate-Vilanterol (BREO ELLIPTA) 100-25 MCG/INH inhaler, Inhale 1 puff Daily., Disp: 1 each, Rfl: 2  •  levothyroxine (SYNTHROID, LEVOTHROID) 88 MCG tablet, Take  by mouth Daily., Disp: , Rfl: 1  •  meloxicam (MOBIC) 15 MG tablet, Take 1 tablet by mouth Daily., Disp: 90 tablet, Rfl: 1  •  omeprazole (priLOSEC) 40 MG capsule, Take 1 capsule by mouth Daily., Disp: 90 capsule, Rfl: 3  •  ondansetron (ZOFRAN) 4 MG tablet, Take 4 mg by mouth Every 8 (Eight) Hours As Needed for Nausea or  "Vomiting., Disp: , Rfl:   •  promethazine (PHENERGAN) 25 MG tablet, Take 25 mg by mouth Every 6 (Six) Hours As Needed for Nausea or Vomiting., Disp: , Rfl:   •  traMADol (ULTRAM) 50 MG tablet, Take  mg by mouth., Disp: , Rfl:   •  vitamin D (ERGOCALCIFEROL) 1.25 MG (77552 UT) capsule capsule, Take 1 capsule by mouth 1 (One) Time Per Week., Disp: 12 capsule, Rfl: 0    Current Facility-Administered Medications:   •  cyanocobalamin injection 1,000 mcg, 1,000 mcg, Intramuscular, Q28 Days, Latrice Canseco DO, 1,000 mcg at 12/30/19 1311  •  cyanocobalamin injection 1,000 mcg, 1,000 mcg, Intramuscular, Q28 Days, Kehrer, Meredith Lea, MD, 1,000 mcg at 11/06/19 1543    Review of Systems   Constitutional: Negative for chills, fatigue and fever.   HENT: Negative for congestion, rhinorrhea and sore throat.    Respiratory: Negative for cough and shortness of breath.    Cardiovascular: Negative for chest pain and leg swelling.   Gastrointestinal: Positive for abdominal pain, diarrhea and nausea. Negative for blood in stool and constipation.   Endocrine: Negative for polydipsia and polyuria.   Genitourinary: Negative for dysuria.   Musculoskeletal: Negative for arthralgias and myalgias.   Skin: Negative for rash.   Neurological: Positive for weakness. Negative for dizziness.   Hematological: Does not bruise/bleed easily.   Psychiatric/Behavioral: Negative for sleep disturbance.       Objective   Vitals:    02/05/20 1103   BP: 108/66   Pulse: 77   Temp: 97.9 °F (36.6 °C)   SpO2: 96%   Weight: 92.2 kg (203 lb 3.2 oz)   Height: 154.9 cm (61\")     Body mass index is 38.39 kg/m².  Physical Exam   Constitutional: She is oriented to person, place, and time. She appears well-developed and well-nourished.   HENT:   Head: Normocephalic and atraumatic.   Eyes: Pupils are equal, round, and reactive to light. Conjunctivae and EOM are normal.   Neck: Neck supple. No thyromegaly present.   Cardiovascular: Normal rate and regular rhythm. "   No murmur heard.  Pulmonary/Chest: Effort normal and breath sounds normal. She has no wheezes.   Abdominal: Soft. Bowel sounds are normal. There is tenderness in the epigastric area. There is no rebound and no guarding.   Musculoskeletal: Normal range of motion.   Neurological: She is alert and oriented to person, place, and time. No cranial nerve deficit.   Skin: Skin is warm and dry.   Psychiatric: She has a normal mood and affect.   Nursing note and vitals reviewed.        Assessment/Plan   Jemima was seen today for nausea and diarrhea.    Diagnoses and all orders for this visit:    Nausea  -     POC Urinalysis Dipstick, Automated  -     Cancel: Urine Culture - Urine, Urine, Clean Catch  -     CBC & Differential  -     Comprehensive Metabolic Panel  -     Amylase  -     Lipase    Generalized abdominal pain  -     Cancel: Urine Culture - Urine, Urine, Clean Catch  -     CBC & Differential  -     Comprehensive Metabolic Panel  -     Amylase  -     Lipase    Bilirubinuria  -     Comprehensive Metabolic Panel    Leukocytes in urine  -     Urine Culture - Urine, Urine, Clean Catch               There are no Patient Instructions on file for this visit.

## 2020-02-06 LAB
ALBUMIN SERPL-MCNC: 4.5 G/DL (ref 3.7–4.7)
ALBUMIN/GLOB SERPL: 2 {RATIO} (ref 1.2–2.2)
ALP SERPL-CCNC: 65 IU/L (ref 39–117)
ALT SERPL-CCNC: 42 IU/L (ref 0–32)
AMYLASE SERPL-CCNC: 45 U/L (ref 31–110)
AST SERPL-CCNC: 27 IU/L (ref 0–40)
BASOPHILS # BLD AUTO: 0.1 X10E3/UL (ref 0–0.2)
BASOPHILS NFR BLD AUTO: 1 %
BILIRUB SERPL-MCNC: 0.3 MG/DL (ref 0–1.2)
BUN SERPL-MCNC: 14 MG/DL (ref 8–27)
BUN/CREAT SERPL: 17 (ref 12–28)
CALCIUM SERPL-MCNC: 9.2 MG/DL (ref 8.7–10.3)
CHLORIDE SERPL-SCNC: 102 MMOL/L (ref 96–106)
CO2 SERPL-SCNC: 24 MMOL/L (ref 20–29)
CREAT SERPL-MCNC: 0.82 MG/DL (ref 0.57–1)
EOSINOPHIL # BLD AUTO: 0.4 X10E3/UL (ref 0–0.4)
EOSINOPHIL NFR BLD AUTO: 7 %
ERYTHROCYTE [DISTWIDTH] IN BLOOD BY AUTOMATED COUNT: 12.1 % (ref 11.7–15.4)
GLOBULIN SER CALC-MCNC: 2.3 G/DL (ref 1.5–4.5)
GLUCOSE SERPL-MCNC: 115 MG/DL (ref 65–99)
HCT VFR BLD AUTO: 42 % (ref 34–46.6)
HGB BLD-MCNC: 13.3 G/DL (ref 11.1–15.9)
IMM GRANULOCYTES # BLD AUTO: 0 X10E3/UL (ref 0–0.1)
IMM GRANULOCYTES NFR BLD AUTO: 0 %
LIPASE SERPL-CCNC: 23 U/L (ref 14–85)
LYMPHOCYTES # BLD AUTO: 1.9 X10E3/UL (ref 0.7–3.1)
LYMPHOCYTES NFR BLD AUTO: 30 %
MCH RBC QN AUTO: 29.5 PG (ref 26.6–33)
MCHC RBC AUTO-ENTMCNC: 31.7 G/DL (ref 31.5–35.7)
MCV RBC AUTO: 93 FL (ref 79–97)
MONOCYTES # BLD AUTO: 0.5 X10E3/UL (ref 0.1–0.9)
MONOCYTES NFR BLD AUTO: 8 %
NEUTROPHILS # BLD AUTO: 3.4 X10E3/UL (ref 1.4–7)
NEUTROPHILS NFR BLD AUTO: 54 %
PLATELET # BLD AUTO: 382 X10E3/UL (ref 150–450)
POTASSIUM SERPL-SCNC: 4.7 MMOL/L (ref 3.5–5.2)
PROT SERPL-MCNC: 6.8 G/DL (ref 6–8.5)
RBC # BLD AUTO: 4.51 X10E6/UL (ref 3.77–5.28)
SODIUM SERPL-SCNC: 141 MMOL/L (ref 134–144)
WBC # BLD AUTO: 6.3 X10E3/UL (ref 3.4–10.8)

## 2020-02-06 RX ORDER — LEVOTHYROXINE SODIUM 88 UG/1
88 TABLET ORAL DAILY
Qty: 90 TABLET | Refills: 0 | Status: SHIPPED | OUTPATIENT
Start: 2020-02-06 | End: 2020-05-05

## 2020-02-07 LAB
BACTERIA UR CULT: NO GROWTH
BACTERIA UR CULT: NORMAL

## 2020-02-11 ENCOUNTER — TELEPHONE (OUTPATIENT)
Dept: FAMILY MEDICINE CLINIC | Facility: CLINIC | Age: 70
End: 2020-02-11

## 2020-02-11 NOTE — TELEPHONE ENCOUNTER
Pt states she had stopped the citalopram on 2/6 as instructed, then on Friday night the nausea went away. She hasn't taken the citalopram anymore, and would prefer not to take it since the nausea has went away. She stated she is feeling better.

## 2020-03-02 ENCOUNTER — TELEPHONE (OUTPATIENT)
Dept: FAMILY MEDICINE CLINIC | Facility: CLINIC | Age: 70
End: 2020-03-02

## 2020-03-02 DIAGNOSIS — F41.8 DEPRESSION WITH ANXIETY: ICD-10-CM

## 2020-03-02 NOTE — TELEPHONE ENCOUNTER
If she has some left at home.  Have her start at half a tab daily of what she was taking.  Add that to her medicine list and follow-up with me soon.

## 2020-03-02 NOTE — TELEPHONE ENCOUNTER
Patient called stated that she has was wanting to know if she should go back to the celexa or maybe try something else for her mood. She is still taking the Buspar but feels like she needs something else for her mood. Your next opening isn't until Friday for a regular appointment. We had stopped the celexa 2/6 because we was thinking the celexa was causing her severe nausea and abdominal pain however the patient thinks that the celexa wasn't causing that now and it was a virus.

## 2020-03-03 RX ORDER — CITALOPRAM 10 MG/1
5 TABLET ORAL DAILY
Qty: 90 TABLET | Refills: 0
Start: 2020-03-03 | End: 2020-04-02

## 2020-03-20 ENCOUNTER — TELEPHONE (OUTPATIENT)
Dept: FAMILY MEDICINE CLINIC | Facility: CLINIC | Age: 70
End: 2020-03-20

## 2020-03-20 NOTE — TELEPHONE ENCOUNTER
Have her stop the citalopram again.  We stopped it because of that previously.  I have her call us back on Monday with an update in her symptoms.  We can discuss options on possible medication then.

## 2020-03-20 NOTE — TELEPHONE ENCOUNTER
You had pt start citalapram 5 mg, and is having upset stomach, diarrhea, leteragic, she said it is controlling her mood but she just doesn't feel physically good. She didn't take todays  dose. Pt has appt 03/24/20. Please advise

## 2020-03-23 NOTE — TELEPHONE ENCOUNTER
SPOKE WITH PATIENT. SHE STOPPED THE MEDICATION Friday. STARTED ON Friday WITH FEVER .6F ACROSS FOREHEAD. STATES SHE DIDN'T HAVE A FEVER YESTERDAY OR THIS MORNING. STATES SHE JUST FEELS WEAK NOW. SHE HAS AN APPT TOMORROW. STATES SHE HAD ALL OF THE SYMPTOMS OF COVID 19 EXCEPT FOR THE DYSPNEA. PLEASE ADVISE.

## 2020-03-23 NOTE — TELEPHONE ENCOUNTER
She didn't know if it was part of the medication or not since she had some similar symptoms with it before.

## 2020-03-23 NOTE — TELEPHONE ENCOUNTER
Patient states the bad part has passed , and she hasn't had a fever since Saturday. We have r/s her appt.

## 2020-03-23 NOTE — TELEPHONE ENCOUNTER
Do you want her to just stay off of the citalopram until she is seen? But to call us if she needs something else sent in before her appt.

## 2020-04-02 ENCOUNTER — OFFICE VISIT (OUTPATIENT)
Dept: FAMILY MEDICINE CLINIC | Facility: CLINIC | Age: 70
End: 2020-04-02

## 2020-04-02 DIAGNOSIS — F41.8 DEPRESSION WITH ANXIETY: Primary | ICD-10-CM

## 2020-04-02 PROCEDURE — 99213 OFFICE O/P EST LOW 20 MIN: CPT | Performed by: FAMILY MEDICINE

## 2020-04-02 RX ORDER — BUPROPION HYDROCHLORIDE 150 MG/1
150 TABLET ORAL DAILY
Qty: 30 TABLET | Refills: 0 | Status: SHIPPED | OUTPATIENT
Start: 2020-04-02 | End: 2020-04-27 | Stop reason: DRUGHIGH

## 2020-04-02 NOTE — PROGRESS NOTES
You have chosen to receive care through a telephone visit today. Do you consent to use a telephone visit for your medical care today? Yes  This visit has been rescheduled as a phone visit to comply with patient safety concerns in accordance with CDC recommendations. Total time of discussion was 20 minutes.    Subjective   Jemima Bell is a 70 y.o. female.     Chief Complaint   Patient presents with   • Anxiety     pt  in the hospital with COVID-19 since 03/27/2020        Patient requested telephone visit during the global pandemic.  She is very tearful on the phone.  She has been sick for couple weeks herself with a high fever some congestion and diarrhea.  Her fever has broken and she is starting to feel better but her  was admitted to the hospital with Covid- 19 pneumonia last week.  He has not been intubated.  She is just depressed tearful and worried.  She denies suicidal or homicidal ideation.  The buspirone does help her anxiety some.  The Celexa just made her stomach worse and gave her diarrhea.  She is tried other medications in the past but feels that Wellbutrin helped her the most previously.  She would like to try that again and contracts for safety.           The following portions of the patient's history were reviewed and updated as appropriate: allergies, current medications, past family history, past medical history, past social history, past surgical history and problem list.    Past Medical History:   Diagnosis Date   • Acute bronchitis    • Acute sinusitis    • Allergic rhinitis    • Anxiety    • Arthritis    • Back pain    • BMI 34.0-34.9,adult    • Cervicalgia    • Chills    • Deep vein thrombosis (DVT) of lower extremity (CMS/[facility])    • Depression    • Dermatitis    • Dyspnea    • Dysuria    • Esophageal reflux    • Fatigue    • Gastric ulcer    • GERD (gastroesophageal reflux disease)    • Headache    • Hypothyroidism    • Irritable bowel syndrome    • Lumbago    •  Migraine    • Nausea    • Neuritis    • Osteoarthritis    • Osteoarthritis of knee    • Plantar fasciitis    • Pulled muscle    • Pulmonary embolism (CMS/HCC)    • Pyelonephritis    • Rheumatic fever    • Sore throat    • Torticollis    • Trapezius strain    • Urinary incontinence    • Urinary pain    • Urinary tract infection    • UTI symptoms    • Vitamin B1 deficiency    • Vitamin B12 deficiency    • Vitamin D deficiency    • Weakness    • Wheezing        Past Surgical History:   Procedure Laterality Date   • APPENDECTOMY     • GALLBLADDER SURGERY     • HYSTERECTOMY     • INGUINAL HERNIA REPAIR     • KNEE SURGERY     • LAPAROTOMY OOPHERECTOMY     • TONSILLECTOMY     • TOTAL KNEE ARTHROPLASTY Left        Family History   Problem Relation Age of Onset   • Arthritis Mother    • Depression Mother    • Hyperlipidemia Mother    • Hypertension Mother    • Hypertension Father    • Alcohol abuse Maternal Grandfather    • Depression Maternal Grandfather    • Heart disease Other         IN FEMALES BEFORE AGE 65       Social History     Socioeconomic History   • Marital status:      Spouse name: Not on file   • Number of children: Not on file   • Years of education: Not on file   • Highest education level: Not on file   Tobacco Use   • Smoking status: Never Smoker         Current Outpatient Medications:   •  acetaminophen (TYLENOL) 325 MG tablet, Take 650 mg by mouth., Disp: , Rfl:   •  busPIRone (BUSPAR) 10 MG tablet, Take 1 tablet by mouth 2 (Two) Times a Day., Disp: 60 tablet, Rfl: 1  •  Cholecalciferol (VITAMIN D3) 5000 UNITS capsule capsule, Take 5,000 Units by mouth Daily., Disp: , Rfl:   •  doxycycline (MONODOX) 100 MG capsule, Take 1 capsule by mouth 2 (Two) Times a Day., Disp: 20 capsule, Rfl: 0  •  fluticasone (FLONASE) 50 MCG/ACT nasal spray, Use 2 Sprays in each nostril once daily. prn, Disp: , Rfl:   •  levothyroxine (SYNTHROID, LEVOTHROID) 88 MCG tablet, Take 1 tablet by mouth Daily., Disp: 90 tablet,  Rfl: 0  •  omeprazole (priLOSEC) 40 MG capsule, Take 1 capsule by mouth Daily., Disp: 90 capsule, Rfl: 3  •  ondansetron (ZOFRAN) 4 MG tablet, Take 4 mg by mouth Every 8 (Eight) Hours As Needed for Nausea or Vomiting., Disp: , Rfl:   •  promethazine (PHENERGAN) 25 MG tablet, Take 25 mg by mouth Every 6 (Six) Hours As Needed for Nausea or Vomiting., Disp: , Rfl:   •  vitamin D (ERGOCALCIFEROL) 1.25 MG (66185 UT) capsule capsule, Take 1 capsule by mouth 1 (One) Time Per Week., Disp: 12 capsule, Rfl: 0  •  albuterol sulfate  (90 Base) MCG/ACT inhaler, Inhale 2 puffs Every 4 (Four) Hours As Needed., Disp: , Rfl:   •  benzonatate (TESSALON) 200 MG capsule, Take 1 capsule by mouth 3 (Three) Times a Day As Needed for Cough., Disp: 30 capsule, Rfl: 0  •  Fluticasone Furoate-Vilanterol (BREO ELLIPTA) 100-25 MCG/INH inhaler, Inhale 1 puff Daily., Disp: 1 each, Rfl: 2  •  meloxicam (MOBIC) 15 MG tablet, Take 1 tablet by mouth Daily., Disp: 90 tablet, Rfl: 1  •  traMADol (ULTRAM) 50 MG tablet, Take  mg by mouth., Disp: , Rfl:     Current Facility-Administered Medications:   •  cyanocobalamin injection 1,000 mcg, 1,000 mcg, Intramuscular, Q28 Days, Latrice Canseco DO, 1,000 mcg at 02/05/20 1215  •  cyanocobalamin injection 1,000 mcg, 1,000 mcg, Intramuscular, Q28 Days, Kehrer, Meredith Lea, MD, 1,000 mcg at 11/06/19 1543    Review of Systems   Constitutional: Positive for fatigue.   Gastrointestinal: Positive for diarrhea.   Neurological: Negative.    Psychiatric/Behavioral: Positive for dysphoric mood and sleep disturbance. Negative for agitation, behavioral problems, confusion, decreased concentration, hallucinations, self-injury and suicidal ideas. The patient is nervous/anxious. The patient is not hyperactive.        Objective   There were no vitals filed for this visit.  There is no height or weight on file to calculate BMI.  Physical Exam   televisit      Assessment/Plan   Jemima was seen today for  anxiety.    Diagnoses and all orders for this visit:    Depression with anxiety               Patient Instructions   Video visit follow in 4 weeks.  Call with an update on mood in a week or so.

## 2020-04-07 ENCOUNTER — OFFICE VISIT (OUTPATIENT)
Dept: FAMILY MEDICINE CLINIC | Facility: CLINIC | Age: 70
End: 2020-04-07

## 2020-04-07 ENCOUNTER — TELEPHONE (OUTPATIENT)
Dept: FAMILY MEDICINE CLINIC | Facility: CLINIC | Age: 70
End: 2020-04-07

## 2020-04-07 DIAGNOSIS — F41.8 DEPRESSION WITH ANXIETY: ICD-10-CM

## 2020-04-07 DIAGNOSIS — M54.41 ACUTE RIGHT-SIDED LOW BACK PAIN WITH RIGHT-SIDED SCIATICA: Primary | ICD-10-CM

## 2020-04-07 PROCEDURE — 99441 PR PHYS/QHP TELEPHONE EVALUATION 5-10 MIN: CPT | Performed by: FAMILY MEDICINE

## 2020-04-07 RX ORDER — BUSPIRONE HYDROCHLORIDE 10 MG/1
10 TABLET ORAL 2 TIMES DAILY
Qty: 180 TABLET | Refills: 1 | Status: SHIPPED | OUTPATIENT
Start: 2020-04-07 | End: 2020-06-04 | Stop reason: SDUPTHER

## 2020-04-07 RX ORDER — TIZANIDINE 4 MG/1
4 TABLET ORAL EVERY 8 HOURS PRN
Qty: 30 TABLET | Refills: 0 | Status: SHIPPED | OUTPATIENT
Start: 2020-04-07 | End: 2021-01-14

## 2020-04-07 RX ORDER — ACETAMINOPHEN AND CODEINE PHOSPHATE 300; 30 MG/1; MG/1
1-2 TABLET ORAL EVERY 6 HOURS PRN
Qty: 20 TABLET | Refills: 0 | Status: SHIPPED | OUTPATIENT
Start: 2020-04-07 | End: 2021-01-14

## 2020-04-07 NOTE — TELEPHONE ENCOUNTER
Pt called stated that she has pulled a muscle in her back lifting dog food bag. Pt stated she would like a Toradol shot, if possible. Stated she has a mask.  Please advise

## 2020-04-07 NOTE — PATIENT INSTRUCTIONS
Acute Back Pain, Adult  Acute back pain is sudden and usually short-lived. It is often caused by an injury to the muscles and tissues in the back. The injury may result from:  · A muscle or ligament getting overstretched or torn (strained). Ligaments are tissues that connect bones to each other. Lifting something improperly can cause a back strain.  · Wear and tear (degeneration) of the spinal disks. Spinal disks are circular tissue that provides cushioning between the bones of the spine (vertebrae).  · Twisting motions, such as while playing sports or doing yard work.  · A hit to the back.  · Arthritis.  You may have a physical exam, lab tests, and imaging tests to find the cause of your pain. Acute back pain usually goes away with rest and home care.  Follow these instructions at home:  Managing pain, stiffness, and swelling  · Take over-the-counter and prescription medicines only as told by your health care provider.  · Your health care provider may recommend applying ice during the first 24-48 hours after your pain starts. To do this:  ? Put ice in a plastic bag.  ? Place a towel between your skin and the bag.  ? Leave the ice on for 20 minutes, 2-3 times a day.  · If directed, apply heat to the affected area as often as told by your health care provider. Use the heat source that your health care provider recommends, such as a moist heat pack or a heating pad.  ? Place a towel between your skin and the heat source.  ? Leave the heat on for 20-30 minutes.  ? Remove the heat if your skin turns bright red. This is especially important if you are unable to feel pain, heat, or cold. You have a greater risk of getting burned.  Activity    · Do not stay in bed. Staying in bed for more than 1-2 days can delay your recovery.  · Sit up and stand up straight. Avoid leaning forward when you sit, or hunching over when you stand.  ? If you work at a desk, sit close to it so you do not need to lean over. Keep your chin tucked  "in. Keep your neck drawn back, and keep your elbows bent at a right angle. Your arms should look like the letter \"L.\"  ? Sit high and close to the steering wheel when you drive. Add lower back (lumbar) support to your car seat, if needed.  · Take short walks on even surfaces as soon as you are able. Try to increase the length of time you walk each day.  · Do not sit, drive, or  one place for more than 30 minutes at a time. Sitting or standing for long periods of time can put stress on your back.  · Do not drive or use heavy machinery while taking prescription pain medicine.  · Use proper lifting techniques. When you bend and lift, use positions that put less stress on your back:  ? Bend your knees.  ? Keep the load close to your body.  ? Avoid twisting.  · Exercise regularly as told by your health care provider. Exercising helps your back heal faster and helps prevent back injuries by keeping muscles strong and flexible.  · Work with a physical therapist to make a safe exercise program, as recommended by your health care provider. Do any exercises as told by your physical therapist.  Lifestyle  · Maintain a healthy weight. Extra weight puts stress on your back and makes it difficult to have good posture.  · Avoid activities or situations that make you feel anxious or stressed. Stress and anxiety increase muscle tension and can make back pain worse. Learn ways to manage anxiety and stress, such as through exercise.  General instructions  · Sleep on a firm mattress in a comfortable position. Try lying on your side with your knees slightly bent. If you lie on your back, put a pillow under your knees.  · Follow your treatment plan as told by your health care provider. This may include:  ? Cognitive or behavioral therapy.  ? Acupuncture or massage therapy.  ? Meditation or yoga.  Contact a health care provider if:  · You have pain that is not relieved with rest or medicine.  · You have increasing pain going down " into your legs or buttocks.  · Your pain does not improve after 2 weeks.  · You have pain at night.  · You lose weight without trying.  · You have a fever or chills.  Get help right away if:  · You develop new bowel or bladder control problems.  · You have unusual weakness or numbness in your arms or legs.  · You develop nausea or vomiting.  · You develop abdominal pain.  · You feel faint.  Summary  · Acute back pain is sudden and usually short-lived.  · Use proper lifting techniques. When you bend and lift, use positions that put less stress on your back.  · Take over-the-counter and prescription medicines and apply heat or ice as directed by your health care provider.  This information is not intended to replace advice given to you by your health care provider. Make sure you discuss any questions you have with your health care provider.  Document Released: 12/18/2006 Document Revised: 07/25/2019 Document Reviewed: 08/01/2018  [employer] Interactive Patient Education © 2020 [employer]

## 2020-04-07 NOTE — TELEPHONE ENCOUNTER
Pt aware, stated her mychart is active but she doesn't know how to do the video visit.  Forwarded her call up front to have them walk her through it.

## 2020-04-07 NOTE — PROGRESS NOTES
You have chosen to receive care through a telephone visit today. Do you consent to use a telephone visit for your medical care today? Yes  This visit has been rescheduled as a phone visit to comply with patient safety concerns in accordance with CDC recommendations. Total time of discussion was 12 minutes.    Subjective   Jemima Bell is a 70 y.o. female.     Chief Complaint   Patient presents with   • Back Pain     pulled muscle lifing dog food bag        Patient request a telephone visit for acute right-sided low back pain.  She was lifting a bag of dog food to refill a container 2 days ago and her back hurt a little bit the time but woke up with severe pain on that right side going down his buttock and leg the next day.  She has not had any numbness or tingling going down the leg.  She has not had any loss of control of her bowel or bladder.  She has had this happen before.  She has tried ice and stretches.  She has not been able to sleep well at night because of the pain.  Patient sounds like her stress is getting a whole lot better.  Her  has been improving from his pneumonia and she is been able to talk to him on the phone now.         The following portions of the patient's history were reviewed and updated as appropriate: allergies, current medications, past family history, past medical history, past social history, past surgical history and problem list.    Past Medical History:   Diagnosis Date   • Acute bronchitis    • Acute sinusitis    • Allergic rhinitis    • Anxiety    • Arthritis    • Back pain    • BMI 34.0-34.9,adult    • Cervicalgia    • Chills    • Deep vein thrombosis (DVT) of lower extremity (CMS/[facility])    • Depression    • Dermatitis    • Dyspnea    • Dysuria    • Esophageal reflux    • Fatigue    • Gastric ulcer    • GERD (gastroesophageal reflux disease)    • Headache    • Hypothyroidism    • Irritable bowel syndrome    • Lumbago    • Migraine    • Nausea    • Neuritis    •  Osteoarthritis    • Osteoarthritis of knee    • Plantar fasciitis    • Pulled muscle    • Pulmonary embolism (CMS/HCC)    • Pyelonephritis    • Rheumatic fever    • Sore throat    • Torticollis    • Trapezius strain    • Urinary incontinence    • Urinary pain    • Urinary tract infection    • UTI symptoms    • Vitamin B1 deficiency    • Vitamin B12 deficiency    • Vitamin D deficiency    • Weakness    • Wheezing        Past Surgical History:   Procedure Laterality Date   • APPENDECTOMY     • GALLBLADDER SURGERY     • HYSTERECTOMY     • INGUINAL HERNIA REPAIR     • KNEE SURGERY     • LAPAROTOMY OOPHERECTOMY     • TONSILLECTOMY     • TOTAL KNEE ARTHROPLASTY Left        Family History   Problem Relation Age of Onset   • Arthritis Mother    • Depression Mother    • Hyperlipidemia Mother    • Hypertension Mother    • Hypertension Father    • Alcohol abuse Maternal Grandfather    • Depression Maternal Grandfather    • Heart disease Other         IN FEMALES BEFORE AGE 65       Social History     Socioeconomic History   • Marital status:      Spouse name: Not on file   • Number of children: Not on file   • Years of education: Not on file   • Highest education level: Not on file   Tobacco Use   • Smoking status: Never Smoker         Current Outpatient Medications:   •  acetaminophen (TYLENOL) 325 MG tablet, Take 650 mg by mouth., Disp: , Rfl:   •  buPROPion XL (Wellbutrin XL) 150 MG 24 hr tablet, Take 1 tablet by mouth Daily., Disp: 30 tablet, Rfl: 0  •  busPIRone (BUSPAR) 10 MG tablet, Take 1 tablet by mouth 2 (Two) Times a Day., Disp: 180 tablet, Rfl: 1  •  Cholecalciferol (VITAMIN D3) 5000 UNITS capsule capsule, Take 5,000 Units by mouth Daily., Disp: , Rfl:   •  doxycycline (MONODOX) 100 MG capsule, Take 1 capsule by mouth 2 (Two) Times a Day., Disp: 20 capsule, Rfl: 0  •  fluticasone (FLONASE) 50 MCG/ACT nasal spray, Use 2 Sprays in each nostril once daily. prn, Disp: , Rfl:   •  levothyroxine (SYNTHROID,  LEVOTHROID) 88 MCG tablet, Take 1 tablet by mouth Daily., Disp: 90 tablet, Rfl: 0  •  omeprazole (priLOSEC) 40 MG capsule, Take 1 capsule by mouth Daily., Disp: 90 capsule, Rfl: 3  •  vitamin D (ERGOCALCIFEROL) 1.25 MG (69123 UT) capsule capsule, Take 1 capsule by mouth 1 (One) Time Per Week., Disp: 12 capsule, Rfl: 0  •  acetaminophen-codeine (TYLENOL #3) 300-30 MG per tablet, Take 1-2 tablets by mouth Every 6 (Six) Hours As Needed for Moderate Pain ., Disp: 20 tablet, Rfl: 0  •  albuterol sulfate  (90 Base) MCG/ACT inhaler, Inhale 2 puffs Every 4 (Four) Hours As Needed., Disp: , Rfl:   •  benzonatate (TESSALON) 200 MG capsule, Take 1 capsule by mouth 3 (Three) Times a Day As Needed for Cough., Disp: 30 capsule, Rfl: 0  •  Fluticasone Furoate-Vilanterol (BREO ELLIPTA) 100-25 MCG/INH inhaler, Inhale 1 puff Daily., Disp: 1 each, Rfl: 2  •  meloxicam (MOBIC) 15 MG tablet, Take 1 tablet by mouth Daily., Disp: 90 tablet, Rfl: 1  •  ondansetron (ZOFRAN) 4 MG tablet, Take 4 mg by mouth Every 8 (Eight) Hours As Needed for Nausea or Vomiting., Disp: , Rfl:   •  promethazine (PHENERGAN) 25 MG tablet, Take 25 mg by mouth Every 6 (Six) Hours As Needed for Nausea or Vomiting., Disp: , Rfl:   •  tiZANidine (ZANAFLEX) 4 MG tablet, Take 1 tablet by mouth Every 8 (Eight) Hours As Needed for Muscle Spasms., Disp: 30 tablet, Rfl: 0    Current Facility-Administered Medications:   •  cyanocobalamin injection 1,000 mcg, 1,000 mcg, Intramuscular, Q28 Days, Latrice Canseco DO, 1,000 mcg at 02/05/20 1215  •  cyanocobalamin injection 1,000 mcg, 1,000 mcg, Intramuscular, Q28 Days, Kehrer, Meredith Lea, MD, 1,000 mcg at 11/06/19 1543    Review of Systems   Constitutional: Negative.    Respiratory: Negative.    Cardiovascular: Negative.    Gastrointestinal: Negative.    Genitourinary: Negative.    Musculoskeletal: Positive for back pain and myalgias. Negative for gait problem.   Neurological: Negative for weakness and numbness.        Objective   There were no vitals filed for this visit.  There is no height or weight on file to calculate BMI.  Physical Exam   Telephone visit      Assessment/Plan   Jemima was seen today for back pain.    Diagnoses and all orders for this visit:    Acute right-sided low back pain with right-sided sciatica  -     tiZANidine (ZANAFLEX) 4 MG tablet; Take 1 tablet by mouth Every 8 (Eight) Hours As Needed for Muscle Spasms.  -     acetaminophen-codeine (TYLENOL #3) 300-30 MG per tablet; Take 1-2 tablets by mouth Every 6 (Six) Hours As Needed for Moderate Pain .    Depression with anxiety  -     busPIRone (BUSPAR) 10 MG tablet; Take 1 tablet by mouth 2 (Two) Times a Day.               Patient Instructions   Acute Back Pain, Adult  Acute back pain is sudden and usually short-lived. It is often caused by an injury to the muscles and tissues in the back. The injury may result from:  · A muscle or ligament getting overstretched or torn (strained). Ligaments are tissues that connect bones to each other. Lifting something improperly can cause a back strain.  · Wear and tear (degeneration) of the spinal disks. Spinal disks are circular tissue that provides cushioning between the bones of the spine (vertebrae).  · Twisting motions, such as while playing sports or doing yard work.  · A hit to the back.  · Arthritis.  You may have a physical exam, lab tests, and imaging tests to find the cause of your pain. Acute back pain usually goes away with rest and home care.  Follow these instructions at home:  Managing pain, stiffness, and swelling  · Take over-the-counter and prescription medicines only as told by your health care provider.  · Your health care provider may recommend applying ice during the first 24-48 hours after your pain starts. To do this:  ? Put ice in a plastic bag.  ? Place a towel between your skin and the bag.  ? Leave the ice on for 20 minutes, 2-3 times a day.  · If directed, apply heat to the  "affected area as often as told by your health care provider. Use the heat source that your health care provider recommends, such as a moist heat pack or a heating pad.  ? Place a towel between your skin and the heat source.  ? Leave the heat on for 20-30 minutes.  ? Remove the heat if your skin turns bright red. This is especially important if you are unable to feel pain, heat, or cold. You have a greater risk of getting burned.  Activity    · Do not stay in bed. Staying in bed for more than 1-2 days can delay your recovery.  · Sit up and stand up straight. Avoid leaning forward when you sit, or hunching over when you stand.  ? If you work at a desk, sit close to it so you do not need to lean over. Keep your chin tucked in. Keep your neck drawn back, and keep your elbows bent at a right angle. Your arms should look like the letter \"L.\"  ? Sit high and close to the steering wheel when you drive. Add lower back (lumbar) support to your car seat, if needed.  · Take short walks on even surfaces as soon as you are able. Try to increase the length of time you walk each day.  · Do not sit, drive, or  one place for more than 30 minutes at a time. Sitting or standing for long periods of time can put stress on your back.  · Do not drive or use heavy machinery while taking prescription pain medicine.  · Use proper lifting techniques. When you bend and lift, use positions that put less stress on your back:  ? Bend your knees.  ? Keep the load close to your body.  ? Avoid twisting.  · Exercise regularly as told by your health care provider. Exercising helps your back heal faster and helps prevent back injuries by keeping muscles strong and flexible.  · Work with a physical therapist to make a safe exercise program, as recommended by your health care provider. Do any exercises as told by your physical therapist.  Lifestyle  · Maintain a healthy weight. Extra weight puts stress on your back and makes it difficult to have " good posture.  · Avoid activities or situations that make you feel anxious or stressed. Stress and anxiety increase muscle tension and can make back pain worse. Learn ways to manage anxiety and stress, such as through exercise.  General instructions  · Sleep on a firm mattress in a comfortable position. Try lying on your side with your knees slightly bent. If you lie on your back, put a pillow under your knees.  · Follow your treatment plan as told by your health care provider. This may include:  ? Cognitive or behavioral therapy.  ? Acupuncture or massage therapy.  ? Meditation or yoga.  Contact a health care provider if:  · You have pain that is not relieved with rest or medicine.  · You have increasing pain going down into your legs or buttocks.  · Your pain does not improve after 2 weeks.  · You have pain at night.  · You lose weight without trying.  · You have a fever or chills.  Get help right away if:  · You develop new bowel or bladder control problems.  · You have unusual weakness or numbness in your arms or legs.  · You develop nausea or vomiting.  · You develop abdominal pain.  · You feel faint.  Summary  · Acute back pain is sudden and usually short-lived.  · Use proper lifting techniques. When you bend and lift, use positions that put less stress on your back.  · Take over-the-counter and prescription medicines and apply heat or ice as directed by your health care provider.  This information is not intended to replace advice given to you by your health care provider. Make sure you discuss any questions you have with your health care provider.  Document Released: 12/18/2006 Document Revised: 07/25/2019 Document Reviewed: 08/01/2018  [employer] Interactive Patient Education © 2020 [employer]

## 2020-04-07 NOTE — TELEPHONE ENCOUNTER
I usually do not use those in people over 65 in general.  I would be glad to consider a prescription for short-term muscle relaxer with some pain medicine if appropriate.  She really should do a video visit for that.  I know the other day we did a phone visit but her MyChart is active, so we should be able to do a video visit.

## 2020-04-24 ENCOUNTER — TELEPHONE (OUTPATIENT)
Dept: FAMILY MEDICINE CLINIC | Facility: CLINIC | Age: 70
End: 2020-04-24

## 2020-04-24 NOTE — TELEPHONE ENCOUNTER
Offer her an appointment on Monday. Advise her to go to ER over the weekend if needed for mental health emergency since she would not see one of the other providers today.

## 2020-04-24 NOTE — TELEPHONE ENCOUNTER
I called and spoke with the patient and explained it was your Friday off but she could do a video or telephone visit with one of the other providers. She stated no thank you they don't know what I am going through. She still wanted me to send the message to you.

## 2020-04-24 NOTE — TELEPHONE ENCOUNTER
"Patient called in for Dr.Kehrer,  Patients  is home from the hospital,,  She is \"coming unglued\"  Said Dr.Kehrer would know what she is talking About   "

## 2020-04-27 ENCOUNTER — OFFICE VISIT (OUTPATIENT)
Dept: FAMILY MEDICINE CLINIC | Facility: CLINIC | Age: 70
End: 2020-04-27

## 2020-04-27 DIAGNOSIS — F41.8 DEPRESSION WITH ANXIETY: Primary | ICD-10-CM

## 2020-04-27 DIAGNOSIS — M15.9 OSTEOARTHRITIS OF MULTIPLE JOINTS, UNSPECIFIED OSTEOARTHRITIS TYPE: ICD-10-CM

## 2020-04-27 PROCEDURE — 99442 PR PHYS/QHP TELEPHONE EVALUATION 11-20 MIN: CPT | Performed by: FAMILY MEDICINE

## 2020-04-27 RX ORDER — BUPROPION HYDROCHLORIDE 300 MG/1
300 TABLET ORAL DAILY
Qty: 30 TABLET | Refills: 1 | Status: SHIPPED | OUTPATIENT
Start: 2020-04-27 | End: 2020-06-30 | Stop reason: DRUGHIGH

## 2020-04-27 RX ORDER — MELOXICAM 15 MG/1
15 TABLET ORAL DAILY
Qty: 90 TABLET | Refills: 1 | Status: SHIPPED | OUTPATIENT
Start: 2020-04-27 | End: 2021-02-16

## 2020-04-27 NOTE — PROGRESS NOTES
You have chosen to receive care through a telephone visit. Do you consent to use a telephone visit for your medical care today? YES  This visit has been rescheduled as a phone visit to comply with patient safety concerns in accordance with CDC recommendations. Total time of discussion was 20 minutes.    Subjective   Jemima Bell is a 70 y.o. female.     Chief Complaint   Patient presents with   • Anxiety     PT HAS BEEN HAVING A LOT GOING ON LATELY WITH HER         Patient requests a telephone visit during the global pandemic for her depression.  She is tearful and exhausted on the phone.  She got over COVID infection without ever getting tested while her  was in the hospital for 2 weeks for COVID pneumonia.  He came home barely even able to take a step.  That was 2 weeks ago and now he can finally use a walker to help transfer.  She has been very frustrated try to get any help.  She finally did get home health out to come and help her learn how to do his Accu-Cheks and insulin.  She has had multiple different depression medicines in the past and feels like the Wellbutrin has not given her any side effect so she is willing to go up on it since we started a few weeks ago.  Patient seemed to sound better while I was talking with her so I recommended she get a hold of a therapist for some video or phone counseling.         The following portions of the patient's history were reviewed and updated as appropriate: allergies, current medications, past family history, past medical history, past social history, past surgical history and problem list.    Past Medical History:   Diagnosis Date   • Acute bronchitis    • Acute sinusitis    • Allergic rhinitis    • Anxiety    • Arthritis    • Back pain    • BMI 34.0-34.9,adult    • Cervicalgia    • Chills    • Deep vein thrombosis (DVT) of lower extremity (CMS/[facility])    • Depression    • Dermatitis    • Dyspnea    • Dysuria    • Esophageal reflux    •  Fatigue    • Gastric ulcer    • GERD (gastroesophageal reflux disease)    • Headache    • Hypothyroidism    • Irritable bowel syndrome    • Lumbago    • Migraine    • Nausea    • Neuritis    • Osteoarthritis    • Osteoarthritis of knee    • Plantar fasciitis    • Pulled muscle    • Pulmonary embolism (CMS/HCC)    • Pyelonephritis    • Rheumatic fever    • Sore throat    • Torticollis    • Trapezius strain    • Urinary incontinence    • Urinary pain    • Urinary tract infection    • UTI symptoms    • Vitamin B1 deficiency    • Vitamin B12 deficiency    • Vitamin D deficiency    • Weakness    • Wheezing        Past Surgical History:   Procedure Laterality Date   • APPENDECTOMY     • GALLBLADDER SURGERY     • HYSTERECTOMY     • INGUINAL HERNIA REPAIR     • KNEE SURGERY     • LAPAROTOMY OOPHERECTOMY     • TONSILLECTOMY     • TOTAL KNEE ARTHROPLASTY Left        Family History   Problem Relation Age of Onset   • Arthritis Mother    • Depression Mother    • Hyperlipidemia Mother    • Hypertension Mother    • Hypertension Father    • Alcohol abuse Maternal Grandfather    • Depression Maternal Grandfather    • Heart disease Other         IN FEMALES BEFORE AGE 65       Social History     Socioeconomic History   • Marital status:      Spouse name: Not on file   • Number of children: Not on file   • Years of education: Not on file   • Highest education level: Not on file   Tobacco Use   • Smoking status: Never Smoker         Current Outpatient Medications:   •  acetaminophen (TYLENOL) 325 MG tablet, Take 650 mg by mouth., Disp: , Rfl:   •  acetaminophen-codeine (TYLENOL #3) 300-30 MG per tablet, Take 1-2 tablets by mouth Every 6 (Six) Hours As Needed for Moderate Pain ., Disp: 20 tablet, Rfl: 0  •  albuterol sulfate  (90 Base) MCG/ACT inhaler, Inhale 2 puffs Every 4 (Four) Hours As Needed., Disp: , Rfl:   •  benzonatate (TESSALON) 200 MG capsule, Take 1 capsule by mouth 3 (Three) Times a Day As Needed for Cough.,  Disp: 30 capsule, Rfl: 0  •  busPIRone (BUSPAR) 10 MG tablet, Take 1 tablet by mouth 2 (Two) Times a Day., Disp: 180 tablet, Rfl: 1  •  Cholecalciferol (VITAMIN D3) 5000 UNITS capsule capsule, Take 5,000 Units by mouth Daily., Disp: , Rfl:   •  fluticasone (FLONASE) 50 MCG/ACT nasal spray, Use 2 Sprays in each nostril once daily. prn, Disp: , Rfl:   •  levothyroxine (SYNTHROID, LEVOTHROID) 88 MCG tablet, Take 1 tablet by mouth Daily., Disp: 90 tablet, Rfl: 0  •  meloxicam (MOBIC) 15 MG tablet, Take 1 tablet by mouth Daily., Disp: 90 tablet, Rfl: 1  •  omeprazole (priLOSEC) 40 MG capsule, Take 1 capsule by mouth Daily., Disp: 90 capsule, Rfl: 3  •  ondansetron (ZOFRAN) 4 MG tablet, Take 4 mg by mouth Every 8 (Eight) Hours As Needed for Nausea or Vomiting., Disp: , Rfl:   •  promethazine (PHENERGAN) 25 MG tablet, Take 25 mg by mouth Every 6 (Six) Hours As Needed for Nausea or Vomiting., Disp: , Rfl:   •  tiZANidine (ZANAFLEX) 4 MG tablet, Take 1 tablet by mouth Every 8 (Eight) Hours As Needed for Muscle Spasms., Disp: 30 tablet, Rfl: 0  •  buPROPion XL (Wellbutrin XL) 300 MG 24 hr tablet, Take 1 tablet by mouth Daily., Disp: 30 tablet, Rfl: 1  •  Fluticasone Furoate-Vilanterol (BREO ELLIPTA) 100-25 MCG/INH inhaler, Inhale 1 puff Daily., Disp: 1 each, Rfl: 2  •  vitamin D (ERGOCALCIFEROL) 1.25 MG (69553 UT) capsule capsule, Take 1 capsule by mouth 1 (One) Time Per Week., Disp: 12 capsule, Rfl: 0  No current facility-administered medications for this visit.     Review of Systems   Constitutional: Negative.    Neurological: Negative.    Psychiatric/Behavioral: Positive for dysphoric mood. Negative for agitation, behavioral problems, confusion, decreased concentration, hallucinations, self-injury, sleep disturbance and suicidal ideas. The patient is nervous/anxious. The patient is not hyperactive.        Objective   There were no vitals filed for this visit.  There is no height or weight on file to calculate  BMI.  Physical Exam  Telephone encounter, tearful     Assessment/Plan   Jemima was seen today for anxiety.    Diagnoses and all orders for this visit:    Depression with anxiety    Osteoarthritis of multiple joints, unspecified osteoarthritis type  -     meloxicam (MOBIC) 15 MG tablet; Take 1 tablet by mouth Daily.    Other orders  -     buPROPion XL (Wellbutrin XL) 300 MG 24 hr tablet; Take 1 tablet by mouth Daily.               Patient Instructions   Follow-up with a phone visit in 1 week.  Make sure to call me if mood getting worse.

## 2020-05-04 ENCOUNTER — OFFICE VISIT (OUTPATIENT)
Dept: FAMILY MEDICINE CLINIC | Facility: CLINIC | Age: 70
End: 2020-05-04

## 2020-05-04 ENCOUNTER — CLINICAL SUPPORT (OUTPATIENT)
Dept: FAMILY MEDICINE CLINIC | Facility: CLINIC | Age: 70
End: 2020-05-04

## 2020-05-04 VITALS — TEMPERATURE: 98.2 F

## 2020-05-04 DIAGNOSIS — F41.8 DEPRESSION WITH ANXIETY: Primary | ICD-10-CM

## 2020-05-04 DIAGNOSIS — E53.8 VITAMIN B12 DEFICIENCY: Primary | ICD-10-CM

## 2020-05-04 DIAGNOSIS — E53.8 VITAMIN B12 DEFICIENCY: ICD-10-CM

## 2020-05-04 PROCEDURE — 96372 THER/PROPH/DIAG INJ SC/IM: CPT | Performed by: FAMILY MEDICINE

## 2020-05-04 PROCEDURE — 99442 PR PHYS/QHP TELEPHONE EVALUATION 11-20 MIN: CPT | Performed by: FAMILY MEDICINE

## 2020-05-04 RX ORDER — CYANOCOBALAMIN 1000 UG/ML
1000 INJECTION, SOLUTION INTRAMUSCULAR; SUBCUTANEOUS
Status: DISCONTINUED | OUTPATIENT
Start: 2020-05-04 | End: 2020-10-13

## 2020-05-04 RX ADMIN — CYANOCOBALAMIN 1000 MCG: 1000 INJECTION, SOLUTION INTRAMUSCULAR; SUBCUTANEOUS at 13:08

## 2020-05-04 NOTE — PROGRESS NOTES
You have chosen to receive care through a telephone visit. Do you consent to use a telephone visit for your medical care today? YES  Length of call 15 minutes.    Subjective   Jemima Bell is a 70 y.o. female.     Chief Complaint   Patient presents with   • Depression     FOLLOW UP FROM INCREASING HER WELLBUTRIN, STATES SHE DOES FEEL BETTER        Telephone visit for follow up during pandemic.   Patient presents for follow-up feeling much better.  She still is a little tearful but nothing like last week.  She denies any suicidal or homicidal ideation.  She denies hopelessness.  She states her  is getting little better.    She wonders if some of the way she is feeling is the fact that she has not had her B12 shot over the past few months.  Last time she tried to go without undue oral she did drop below 400 quickly.           The following portions of the patient's history were reviewed and updated as appropriate: allergies, current medications, past family history, past medical history, past social history, past surgical history and problem list.    Past Medical History:   Diagnosis Date   • Acute bronchitis    • Acute sinusitis    • Allergic rhinitis    • Anxiety    • Arthritis    • Back pain    • BMI 34.0-34.9,adult    • Cervicalgia    • Chills    • Deep vein thrombosis (DVT) of lower extremity (CMS/HCC)    • Depression    • Dermatitis    • Dyspnea    • Dysuria    • Esophageal reflux    • Fatigue    • Gastric ulcer    • GERD (gastroesophageal reflux disease)    • Headache    • Hypothyroidism    • Irritable bowel syndrome    • Lumbago    • Migraine    • Nausea    • Neuritis    • Osteoarthritis    • Osteoarthritis of knee    • Plantar fasciitis    • Pulled muscle    • Pulmonary embolism (CMS/HCC)    • Pyelonephritis    • Rheumatic fever    • Sore throat    • Torticollis    • Trapezius strain    • Urinary incontinence    • Urinary pain    • Urinary tract infection    • UTI symptoms    • Vitamin B1  deficiency    • Vitamin B12 deficiency    • Vitamin D deficiency    • Weakness    • Wheezing        Past Surgical History:   Procedure Laterality Date   • APPENDECTOMY     • GALLBLADDER SURGERY     • HYSTERECTOMY     • INGUINAL HERNIA REPAIR     • KNEE SURGERY     • LAPAROTOMY OOPHERECTOMY     • TONSILLECTOMY     • TOTAL KNEE ARTHROPLASTY Left        Family History   Problem Relation Age of Onset   • Arthritis Mother    • Depression Mother    • Hyperlipidemia Mother    • Hypertension Mother    • Hypertension Father    • Alcohol abuse Maternal Grandfather    • Depression Maternal Grandfather    • Heart disease Other         IN FEMALES BEFORE AGE 65       Social History     Socioeconomic History   • Marital status:      Spouse name: Not on file   • Number of children: Not on file   • Years of education: Not on file   • Highest education level: Not on file   Tobacco Use   • Smoking status: Never Smoker         Current Outpatient Medications:   •  acetaminophen (TYLENOL) 325 MG tablet, Take 650 mg by mouth., Disp: , Rfl:   •  acetaminophen-codeine (TYLENOL #3) 300-30 MG per tablet, Take 1-2 tablets by mouth Every 6 (Six) Hours As Needed for Moderate Pain ., Disp: 20 tablet, Rfl: 0  •  albuterol sulfate  (90 Base) MCG/ACT inhaler, Inhale 2 puffs Every 4 (Four) Hours As Needed., Disp: , Rfl:   •  benzonatate (TESSALON) 200 MG capsule, Take 1 capsule by mouth 3 (Three) Times a Day As Needed for Cough., Disp: 30 capsule, Rfl: 0  •  buPROPion XL (Wellbutrin XL) 300 MG 24 hr tablet, Take 1 tablet by mouth Daily., Disp: 30 tablet, Rfl: 1  •  busPIRone (BUSPAR) 10 MG tablet, Take 1 tablet by mouth 2 (Two) Times a Day., Disp: 180 tablet, Rfl: 1  •  Cholecalciferol (VITAMIN D3) 5000 UNITS capsule capsule, Take 5,000 Units by mouth Daily., Disp: , Rfl:   •  fluticasone (FLONASE) 50 MCG/ACT nasal spray, Use 2 Sprays in each nostril once daily. prn, Disp: , Rfl:   •  Fluticasone Furoate-Vilanterol (BREO ELLIPTA) 100-25  MCG/INH inhaler, Inhale 1 puff Daily., Disp: 1 each, Rfl: 2  •  levothyroxine (SYNTHROID, LEVOTHROID) 88 MCG tablet, Take 1 tablet by mouth Daily., Disp: 90 tablet, Rfl: 0  •  meloxicam (MOBIC) 15 MG tablet, Take 1 tablet by mouth Daily., Disp: 90 tablet, Rfl: 1  •  omeprazole (priLOSEC) 40 MG capsule, Take 1 capsule by mouth Daily., Disp: 90 capsule, Rfl: 3  •  ondansetron (ZOFRAN) 4 MG tablet, Take 4 mg by mouth Every 8 (Eight) Hours As Needed for Nausea or Vomiting., Disp: , Rfl:   •  promethazine (PHENERGAN) 25 MG tablet, Take 25 mg by mouth Every 6 (Six) Hours As Needed for Nausea or Vomiting., Disp: , Rfl:   •  tiZANidine (ZANAFLEX) 4 MG tablet, Take 1 tablet by mouth Every 8 (Eight) Hours As Needed for Muscle Spasms., Disp: 30 tablet, Rfl: 0  •  vitamin D (ERGOCALCIFEROL) 1.25 MG (03928 UT) capsule capsule, Take 1 capsule by mouth 1 (One) Time Per Week., Disp: 12 capsule, Rfl: 0    Review of Systems   Constitutional: Negative.    Neurological: Negative.    Psychiatric/Behavioral: Positive for dysphoric mood. Negative for agitation, behavioral problems, confusion, decreased concentration, hallucinations, self-injury, sleep disturbance and suicidal ideas. The patient is not nervous/anxious and is not hyperactive.        Objective   There were no vitals filed for this visit.  There is no height or weight on file to calculate BMI.  Physical Exam   Telephone visit  Much less tearful      Assessment/Plan   Jemima was seen today for depression.    Diagnoses and all orders for this visit:    Depression with anxiety    Vitamin B12 deficiency               Patient Instructions   Make sure to call if mood gets worse before follow up.

## 2020-05-05 RX ORDER — LEVOTHYROXINE SODIUM 88 UG/1
TABLET ORAL
Qty: 90 TABLET | Refills: 0 | Status: SHIPPED | OUTPATIENT
Start: 2020-05-05 | End: 2020-08-03

## 2020-06-04 ENCOUNTER — OFFICE VISIT (OUTPATIENT)
Dept: FAMILY MEDICINE CLINIC | Facility: CLINIC | Age: 70
End: 2020-06-04

## 2020-06-04 VITALS
DIASTOLIC BLOOD PRESSURE: 88 MMHG | TEMPERATURE: 97.1 F | OXYGEN SATURATION: 97 % | HEIGHT: 61 IN | WEIGHT: 190.2 LBS | SYSTOLIC BLOOD PRESSURE: 132 MMHG | BODY MASS INDEX: 35.91 KG/M2 | HEART RATE: 72 BPM

## 2020-06-04 DIAGNOSIS — F41.8 DEPRESSION WITH ANXIETY: Primary | ICD-10-CM

## 2020-06-04 DIAGNOSIS — R53.83 FATIGUE, UNSPECIFIED TYPE: ICD-10-CM

## 2020-06-04 DIAGNOSIS — E55.9 VITAMIN D DEFICIENCY: ICD-10-CM

## 2020-06-04 DIAGNOSIS — E03.9 HYPOTHYROIDISM, UNSPECIFIED TYPE: ICD-10-CM

## 2020-06-04 DIAGNOSIS — E53.8 VITAMIN B12 DEFICIENCY: ICD-10-CM

## 2020-06-04 PROCEDURE — 96372 THER/PROPH/DIAG INJ SC/IM: CPT | Performed by: FAMILY MEDICINE

## 2020-06-04 PROCEDURE — 99213 OFFICE O/P EST LOW 20 MIN: CPT | Performed by: FAMILY MEDICINE

## 2020-06-04 RX ORDER — BUSPIRONE HYDROCHLORIDE 10 MG/1
5 TABLET ORAL 2 TIMES DAILY
Qty: 180 TABLET | Refills: 1
Start: 2020-06-04 | End: 2021-01-14

## 2020-06-04 RX ORDER — KETOROLAC TROMETHAMINE 4 MG/ML
SOLUTION/ DROPS OPHTHALMIC
COMMUNITY
Start: 2020-05-21 | End: 2020-07-20

## 2020-06-04 RX ORDER — OFLOXACIN 3 MG/ML
SOLUTION/ DROPS OPHTHALMIC
COMMUNITY
Start: 2020-05-21 | End: 2020-07-20

## 2020-06-04 RX ORDER — PREDNISOLONE ACETATE 10 MG/ML
SUSPENSION/ DROPS OPHTHALMIC
COMMUNITY
Start: 2020-05-21 | End: 2020-07-20

## 2020-06-04 RX ADMIN — CYANOCOBALAMIN 1000 MCG: 1000 INJECTION, SOLUTION INTRAMUSCULAR; SUBCUTANEOUS at 14:17

## 2020-06-04 NOTE — PROGRESS NOTES
Subjective   Jemima Bell is a 70 y.o. female.     Chief Complaint   Patient presents with   • Hypothyroidism        Patient presents for routine follow-up on hypothyroidism.  She has been feeling a little more fatigued and is due for recheck on her B12 as well.  In the past couple months of been referred the patient as her  was admitted with pneumonia.  He was never intubated but he had a prolonged hospitalization and his recovery is been very slow.  Her test was negative but she was sick for couple of weeks during that time as well.  She did not get nearly as sick as he did.  She had a lot of trouble with her depression anxiety during that time and we had to change her medications around a couple times per telephone visits.  She is doing fairly well on the Wellbutrin and buspirone combination for her depression and anxiety but noticed she has been more irritable and eating lately.  She does have a lot of problems with diarrhea only due to the medications that work more on the serotonin.  She denies any suicidal or homicidal ideation or hopelessness.    Hypothyroidism   Associated symptoms include fatigue. Pertinent negatives include no abdominal pain, arthralgias, chest pain, chills, congestion, coughing, fever, myalgias, rash or sore throat.          The following portions of the patient's history were reviewed and updated as appropriate: allergies, current medications, past family history, past medical history, past social history, past surgical history and problem list.    Past Medical History:   Diagnosis Date   • Acute bronchitis    • Acute sinusitis    • Allergic rhinitis    • Anxiety    • Arthritis    • Back pain    • BMI 34.0-34.9,adult    • Cervicalgia    • Chills    • Deep vein thrombosis (DVT) of lower extremity (CMS/[facility])    • Depression    • Dermatitis    • Dyspnea    • Dysuria    • Esophageal reflux    • Fatigue    • Gastric ulcer    • GERD (gastroesophageal reflux disease)    •  Headache    • Hypothyroidism    • Irritable bowel syndrome    • Lumbago    • Migraine    • Nausea    • Neuritis    • Osteoarthritis    • Osteoarthritis of knee    • Plantar fasciitis    • Pulled muscle    • Pulmonary embolism (CMS/HCC)    • Pyelonephritis    • Rheumatic fever    • Sore throat    • Torticollis    • Trapezius strain    • Urinary incontinence    • Urinary pain    • Urinary tract infection    • UTI symptoms    • Vitamin B1 deficiency    • Vitamin B12 deficiency    • Vitamin D deficiency    • Weakness    • Wheezing        Past Surgical History:   Procedure Laterality Date   • APPENDECTOMY     • CATARACT EXTRACTION     • GALLBLADDER SURGERY     • HYSTERECTOMY     • INGUINAL HERNIA REPAIR     • KNEE SURGERY     • LAPAROTOMY OOPHERECTOMY     • TONSILLECTOMY     • TOTAL KNEE ARTHROPLASTY Left        Family History   Problem Relation Age of Onset   • Arthritis Mother    • Depression Mother    • Hyperlipidemia Mother    • Hypertension Mother    • Hypertension Father    • Alcohol abuse Maternal Grandfather    • Depression Maternal Grandfather    • Heart disease Other         IN FEMALES BEFORE AGE 65       Social History     Socioeconomic History   • Marital status:      Spouse name: Not on file   • Number of children: Not on file   • Years of education: Not on file   • Highest education level: Not on file   Tobacco Use   • Smoking status: Never Smoker         Current Outpatient Medications:   •  acetaminophen (TYLENOL) 325 MG tablet, Take 650 mg by mouth., Disp: , Rfl:   •  buPROPion XL (Wellbutrin XL) 300 MG 24 hr tablet, Take 1 tablet by mouth Daily., Disp: 30 tablet, Rfl: 1  •  busPIRone (BUSPAR) 10 MG tablet, Take 0.5 tablets by mouth 2 (Two) Times a Day., Disp: 180 tablet, Rfl: 1  •  fluticasone (FLONASE) 50 MCG/ACT nasal spray, Use 2 Sprays in each nostril once daily. prn, Disp: , Rfl:   •  ketorolac (ACULAR) 0.4 % solution, , Disp: , Rfl:   •  levothyroxine (SYNTHROID, LEVOTHROID) 88 MCG tablet,  TAKE ONE TABLET BY MOUTH DAILY, Disp: 90 tablet, Rfl: 0  •  meloxicam (MOBIC) 15 MG tablet, Take 1 tablet by mouth Daily., Disp: 90 tablet, Rfl: 1  •  ofloxacin (OCUFLOX) 0.3 % ophthalmic solution, , Disp: , Rfl:   •  omeprazole (priLOSEC) 40 MG capsule, Take 1 capsule by mouth Daily., Disp: 90 capsule, Rfl: 3  •  ondansetron (ZOFRAN) 4 MG tablet, Take 4 mg by mouth Every 8 (Eight) Hours As Needed for Nausea or Vomiting., Disp: , Rfl:   •  prednisoLONE acetate (PRED FORTE) 1 % ophthalmic suspension, , Disp: , Rfl:   •  promethazine (PHENERGAN) 25 MG tablet, Take 25 mg by mouth Every 6 (Six) Hours As Needed for Nausea or Vomiting., Disp: , Rfl:   •  acetaminophen-codeine (TYLENOL #3) 300-30 MG per tablet, Take 1-2 tablets by mouth Every 6 (Six) Hours As Needed for Moderate Pain ., Disp: 20 tablet, Rfl: 0  •  albuterol sulfate  (90 Base) MCG/ACT inhaler, Inhale 2 puffs Every 4 (Four) Hours As Needed., Disp: , Rfl:   •  benzonatate (TESSALON) 200 MG capsule, Take 1 capsule by mouth 3 (Three) Times a Day As Needed for Cough., Disp: 30 capsule, Rfl: 0  •  Cholecalciferol (VITAMIN D3) 5000 UNITS capsule capsule, Take 5,000 Units by mouth Daily., Disp: , Rfl:   •  Fluticasone Furoate-Vilanterol (BREO ELLIPTA) 100-25 MCG/INH inhaler, Inhale 1 puff Daily., Disp: 1 each, Rfl: 2  •  tiZANidine (ZANAFLEX) 4 MG tablet, Take 1 tablet by mouth Every 8 (Eight) Hours As Needed for Muscle Spasms., Disp: 30 tablet, Rfl: 0  •  vitamin D (ERGOCALCIFEROL) 1.25 MG (61057 UT) capsule capsule, Take 1 capsule by mouth 1 (One) Time Per Week., Disp: 12 capsule, Rfl: 0    Current Facility-Administered Medications:   •  cyanocobalamin injection 1,000 mcg, 1,000 mcg, Intramuscular, Q28 Days, Kehrer, Meredith Lea, MD, 1,000 mcg at 05/04/20 1308    Review of Systems   Constitutional: Positive for fatigue. Negative for chills and fever.   HENT: Negative for congestion, rhinorrhea and sore throat.    Respiratory: Negative for cough and  "shortness of breath.    Cardiovascular: Negative for chest pain and leg swelling.   Gastrointestinal: Negative for abdominal pain.   Endocrine: Negative for polydipsia and polyuria.   Genitourinary: Negative for dysuria.   Musculoskeletal: Negative for arthralgias and myalgias.   Skin: Negative for rash.   Neurological: Negative for dizziness.   Hematological: Does not bruise/bleed easily.   Psychiatric/Behavioral: Positive for agitation and dysphoric mood. Negative for confusion, self-injury, sleep disturbance and suicidal ideas. The patient is nervous/anxious.        Objective   Vitals:    06/04/20 1317   BP: 132/88   Pulse: 72   Temp: 97.1 °F (36.2 °C)   SpO2: 97%   Weight: 86.3 kg (190 lb 3.2 oz)   Height: 154.9 cm (61\")     Body mass index is 35.94 kg/m².  Physical Exam   Constitutional: She is oriented to person, place, and time. She appears well-developed and well-nourished.   HENT:   Head: Normocephalic and atraumatic.   Eyes: Pupils are equal, round, and reactive to light. Conjunctivae and EOM are normal.   Neck: Neck supple. No thyromegaly present.   Cardiovascular: Normal rate and regular rhythm.   No murmur heard.  Pulmonary/Chest: Effort normal and breath sounds normal. She has no wheezes.   Abdominal: Soft.   Musculoskeletal: Normal range of motion.   Neurological: She is alert and oriented to person, place, and time. No cranial nerve deficit.   Skin: Skin is warm and dry.   Psychiatric: She has a normal mood and affect.         Assessment/Plan   Jemima was seen today for hypothyroidism.    Diagnoses and all orders for this visit:    Depression with anxiety  -     busPIRone (BUSPAR) 10 MG tablet; Take 0.5 tablets by mouth 2 (Two) Times a Day.    Hypothyroidism, unspecified type  -     TSH    Vitamin D deficiency  -     Vitamin D 25 Hydroxy    Vitamin B12 deficiency  -     Vitamin B12    Fatigue, unspecified type  -     CBC & Differential  -     Comprehensive Metabolic Panel               Patient " Instructions   Let us know in a couple weeks if mood is better with lower dose buspirone.

## 2020-06-05 ENCOUNTER — TELEPHONE (OUTPATIENT)
Dept: FAMILY MEDICINE CLINIC | Facility: CLINIC | Age: 70
End: 2020-06-05

## 2020-06-05 LAB
25(OH)D3+25(OH)D2 SERPL-MCNC: 31.2 NG/ML (ref 30–100)
ALBUMIN SERPL-MCNC: 4.7 G/DL (ref 3.7–4.7)
ALBUMIN/GLOB SERPL: 1.9 {RATIO} (ref 1.2–2.2)
ALP SERPL-CCNC: 72 IU/L (ref 39–117)
ALT SERPL-CCNC: 28 IU/L (ref 0–32)
AST SERPL-CCNC: 23 IU/L (ref 0–40)
BASOPHILS # BLD AUTO: 0.1 X10E3/UL (ref 0–0.2)
BASOPHILS NFR BLD AUTO: 1 %
BILIRUB SERPL-MCNC: 0.5 MG/DL (ref 0–1.2)
BUN SERPL-MCNC: 13 MG/DL (ref 8–27)
BUN/CREAT SERPL: 13 (ref 12–28)
CALCIUM SERPL-MCNC: 9.6 MG/DL (ref 8.7–10.3)
CHLORIDE SERPL-SCNC: 104 MMOL/L (ref 96–106)
CO2 SERPL-SCNC: 23 MMOL/L (ref 20–29)
CREAT SERPL-MCNC: 1.03 MG/DL (ref 0.57–1)
EOSINOPHIL # BLD AUTO: 0.3 X10E3/UL (ref 0–0.4)
EOSINOPHIL NFR BLD AUTO: 5 %
ERYTHROCYTE [DISTWIDTH] IN BLOOD BY AUTOMATED COUNT: 12.5 % (ref 11.7–15.4)
GLOBULIN SER CALC-MCNC: 2.5 G/DL (ref 1.5–4.5)
GLUCOSE SERPL-MCNC: 107 MG/DL (ref 65–99)
HCT VFR BLD AUTO: 40.4 % (ref 34–46.6)
HGB BLD-MCNC: 13.7 G/DL (ref 11.1–15.9)
IMM GRANULOCYTES # BLD AUTO: 0 X10E3/UL (ref 0–0.1)
IMM GRANULOCYTES NFR BLD AUTO: 0 %
LYMPHOCYTES # BLD AUTO: 1.5 X10E3/UL (ref 0.7–3.1)
LYMPHOCYTES NFR BLD AUTO: 26 %
MCH RBC QN AUTO: 32 PG (ref 26.6–33)
MCHC RBC AUTO-ENTMCNC: 33.9 G/DL (ref 31.5–35.7)
MCV RBC AUTO: 94 FL (ref 79–97)
MONOCYTES # BLD AUTO: 0.5 X10E3/UL (ref 0.1–0.9)
MONOCYTES NFR BLD AUTO: 9 %
NEUTROPHILS # BLD AUTO: 3.3 X10E3/UL (ref 1.4–7)
NEUTROPHILS NFR BLD AUTO: 59 %
PLATELET # BLD AUTO: 331 X10E3/UL (ref 150–450)
POTASSIUM SERPL-SCNC: 4.6 MMOL/L (ref 3.5–5.2)
PROT SERPL-MCNC: 7.2 G/DL (ref 6–8.5)
RBC # BLD AUTO: 4.28 X10E6/UL (ref 3.77–5.28)
SODIUM SERPL-SCNC: 143 MMOL/L (ref 134–144)
TSH SERPL DL<=0.005 MIU/L-ACNC: 1.08 UIU/ML (ref 0.45–4.5)
VIT B12 SERPL-MCNC: 429 PG/ML (ref 232–1245)
WBC # BLD AUTO: 5.6 X10E3/UL (ref 3.4–10.8)

## 2020-06-05 NOTE — TELEPHONE ENCOUNTER
Patient had an appointment with the doctor yesterday and wanted to clarify what the suggested. The patient couldn't remember if she was told to drink more water or fluids in general.     Please call back to mile @ [phone]

## 2020-06-29 ENCOUNTER — TELEPHONE (OUTPATIENT)
Dept: FAMILY MEDICINE CLINIC | Facility: CLINIC | Age: 70
End: 2020-06-29

## 2020-06-29 NOTE — TELEPHONE ENCOUNTER
PT ADVISED THAT SHE WAS HAVING SIDE EFFECTS FROM buPROPion XL (Wellbutrin XL) 300 MG 24 hr tablet    PT IS EXPERIENCING UTI AND SEVERE SWEATING.     PLEASE ADVISE.

## 2020-06-29 NOTE — TELEPHONE ENCOUNTER
Spoke with pt, she stated that she has been having side effects from the Wellbutrin 300 mg. She said she has a UTI and sweating to the point she's ready to pass out.  Pt stated she started taking some left over Cephalexin 500 mg that she had at home. The Cephalexin has eased the UTI symptoms.  Please advise

## 2020-06-29 NOTE — TELEPHONE ENCOUNTER
It sounds like she needs to be seen.  If she has some of the lower dose ones she can switch back to those to taper down.

## 2020-06-30 ENCOUNTER — TELEPHONE (OUTPATIENT)
Dept: FAMILY MEDICINE CLINIC | Facility: CLINIC | Age: 70
End: 2020-06-30

## 2020-06-30 DIAGNOSIS — F41.8 DEPRESSION WITH ANXIETY: Primary | ICD-10-CM

## 2020-06-30 RX ORDER — BUPROPION HYDROCHLORIDE 150 MG/1
150 TABLET ORAL DAILY
Qty: 30 TABLET | Refills: 0 | Status: SHIPPED | OUTPATIENT
Start: 2020-06-30 | End: 2020-07-11

## 2020-06-30 NOTE — TELEPHONE ENCOUNTER
Patient called and stated that if the result is for her to take a lower dosage of the buPROPion XL (Wellbutrin XL) 300 MG 24 hr tablet then she needs for the new prescription to be called into the pharmacy. Please advise     Send to [facility] [address] [place] [address] - [address] AT [facility] - [phone] [facility] [phone] FX     Please advise at [phone]

## 2020-07-02 ENCOUNTER — OFFICE VISIT (OUTPATIENT)
Dept: FAMILY MEDICINE CLINIC | Facility: CLINIC | Age: 70
End: 2020-07-02

## 2020-07-02 VITALS
HEART RATE: 66 BPM | OXYGEN SATURATION: 97 % | HEIGHT: 61 IN | DIASTOLIC BLOOD PRESSURE: 68 MMHG | SYSTOLIC BLOOD PRESSURE: 126 MMHG | TEMPERATURE: 97.7 F | WEIGHT: 187 LBS | BODY MASS INDEX: 35.3 KG/M2

## 2020-07-02 DIAGNOSIS — N30.01 ACUTE CYSTITIS WITH HEMATURIA: Primary | ICD-10-CM

## 2020-07-02 LAB
BILIRUB BLD-MCNC: ABNORMAL MG/DL
GLUCOSE UR STRIP-MCNC: NEGATIVE MG/DL
KETONES UR QL: NEGATIVE
LEUKOCYTE EST, POC: ABNORMAL
NITRITE UR-MCNC: NEGATIVE MG/ML
PH UR: 6 [PH] (ref 5–8)
PROT UR STRIP-MCNC: ABNORMAL MG/DL
RBC # UR STRIP: ABNORMAL /UL
SP GR UR: 1.03 (ref 1–1.03)
UROBILINOGEN UR QL: NORMAL

## 2020-07-02 PROCEDURE — 99213 OFFICE O/P EST LOW 20 MIN: CPT | Performed by: FAMILY MEDICINE

## 2020-07-02 PROCEDURE — 96372 THER/PROPH/DIAG INJ SC/IM: CPT | Performed by: FAMILY MEDICINE

## 2020-07-02 PROCEDURE — 81003 URINALYSIS AUTO W/O SCOPE: CPT | Performed by: FAMILY MEDICINE

## 2020-07-02 RX ORDER — CEPHALEXIN 500 MG/1
500 CAPSULE ORAL 2 TIMES DAILY
Qty: 10 CAPSULE | Refills: 0 | Status: SHIPPED | OUTPATIENT
Start: 2020-07-02 | End: 2020-07-11

## 2020-07-02 RX ADMIN — CYANOCOBALAMIN 1000 MCG: 1000 INJECTION, SOLUTION INTRAMUSCULAR; SUBCUTANEOUS at 12:47

## 2020-07-02 NOTE — PROGRESS NOTES
Subjective   Jemima Bell is a 70 y.o. female.     Chief Complaint   Patient presents with   • Difficulty Urinating     STATES IT STARTED A COUPLE DAYS AGO WITH BURNING WITH URINATION, URINARY FREQUENCY.        Patient presents with burning with urination since yesterday.  She denies any fever chills or vomiting but does have a little nausea.  She denies any vaginal discharge.  She has not has noticed any blood in her urine.         The following portions of the patient's history were reviewed and updated as appropriate: allergies, current medications, past family history, past medical history, past social history, past surgical history and problem list.    Past Medical History:   Diagnosis Date   • Acute bronchitis    • Acute sinusitis    • Allergic rhinitis    • Anxiety    • Arthritis    • Back pain    • BMI 34.0-34.9,adult    • Cervicalgia    • Chills    • Deep vein thrombosis (DVT) of lower extremity (CMS/HCC)    • Depression    • Dermatitis    • Dyspnea    • Dysuria    • Esophageal reflux    • Fatigue    • Gastric ulcer    • GERD (gastroesophageal reflux disease)    • Headache    • Hypothyroidism    • Irritable bowel syndrome    • Lumbago    • Migraine    • Nausea    • Neuritis    • Osteoarthritis    • Osteoarthritis of knee    • Plantar fasciitis    • Pulled muscle    • Pulmonary embolism (CMS/HCC)    • Pyelonephritis    • Rheumatic fever    • Sore throat    • Torticollis    • Trapezius strain    • Urinary incontinence    • Urinary pain    • Urinary tract infection    • UTI symptoms    • Vitamin B1 deficiency    • Vitamin B12 deficiency    • Vitamin D deficiency    • Weakness    • Wheezing        Past Surgical History:   Procedure Laterality Date   • APPENDECTOMY     • CATARACT EXTRACTION     • CATARACT EXTRACTION      bilateral eyes   • GALLBLADDER SURGERY     • HYSTERECTOMY     • INGUINAL HERNIA REPAIR     • KNEE SURGERY     • LAPAROTOMY OOPHERECTOMY     • TONSILLECTOMY     • TOTAL KNEE  ARTHROPLASTY Left        Family History   Problem Relation Age of Onset   • Arthritis Mother    • Depression Mother    • Hyperlipidemia Mother    • Hypertension Mother    • Hypertension Father    • Alcohol abuse Maternal Grandfather    • Depression Maternal Grandfather    • Heart disease Other         IN FEMALES BEFORE AGE 65       Social History     Socioeconomic History   • Marital status:      Spouse name: Not on file   • Number of children: Not on file   • Years of education: Not on file   • Highest education level: Not on file   Tobacco Use   • Smoking status: Never Smoker         Current Outpatient Medications:   •  acetaminophen (TYLENOL) 325 MG tablet, Take 650 mg by mouth., Disp: , Rfl:   •  buPROPion XL (Wellbutrin XL) 150 MG 24 hr tablet, Take 1 tablet by mouth Daily., Disp: 30 tablet, Rfl: 0  •  busPIRone (BUSPAR) 10 MG tablet, Take 0.5 tablets by mouth 2 (Two) Times a Day., Disp: 180 tablet, Rfl: 1  •  fluticasone (FLONASE) 50 MCG/ACT nasal spray, Use 2 Sprays in each nostril once daily. prn, Disp: , Rfl:   •  Fluticasone Furoate-Vilanterol (BREO ELLIPTA) 100-25 MCG/INH inhaler, Inhale 1 puff Daily., Disp: 1 each, Rfl: 2  •  ketorolac (ACULAR) 0.4 % solution, , Disp: , Rfl:   •  levothyroxine (SYNTHROID, LEVOTHROID) 88 MCG tablet, TAKE ONE TABLET BY MOUTH DAILY, Disp: 90 tablet, Rfl: 0  •  meloxicam (MOBIC) 15 MG tablet, Take 1 tablet by mouth Daily., Disp: 90 tablet, Rfl: 1  •  ofloxacin (OCUFLOX) 0.3 % ophthalmic solution, , Disp: , Rfl:   •  omeprazole (priLOSEC) 40 MG capsule, Take 1 capsule by mouth Daily., Disp: 90 capsule, Rfl: 3  •  ondansetron (ZOFRAN) 4 MG tablet, Take 4 mg by mouth Every 8 (Eight) Hours As Needed for Nausea or Vomiting., Disp: , Rfl:   •  promethazine (PHENERGAN) 25 MG tablet, Take 25 mg by mouth Every 6 (Six) Hours As Needed for Nausea or Vomiting., Disp: , Rfl:   •  tiZANidine (ZANAFLEX) 4 MG tablet, Take 1 tablet by mouth Every 8 (Eight) Hours As Needed for Muscle  "Spasms., Disp: 30 tablet, Rfl: 0  •  vitamin D (ERGOCALCIFEROL) 1.25 MG (13905 UT) capsule capsule, Take 1 capsule by mouth 1 (One) Time Per Week., Disp: 12 capsule, Rfl: 0  •  acetaminophen-codeine (TYLENOL #3) 300-30 MG per tablet, Take 1-2 tablets by mouth Every 6 (Six) Hours As Needed for Moderate Pain ., Disp: 20 tablet, Rfl: 0  •  albuterol sulfate  (90 Base) MCG/ACT inhaler, Inhale 2 puffs Every 4 (Four) Hours As Needed., Disp: , Rfl:   •  benzonatate (TESSALON) 200 MG capsule, Take 1 capsule by mouth 3 (Three) Times a Day As Needed for Cough., Disp: 30 capsule, Rfl: 0  •  cephalexin (Keflex) 500 MG capsule, Take 1 capsule by mouth 2 (Two) Times a Day., Disp: 10 capsule, Rfl: 0  •  Cholecalciferol (VITAMIN D3) 5000 UNITS capsule capsule, Take 5,000 Units by mouth Daily., Disp: , Rfl:   •  prednisoLONE acetate (PRED FORTE) 1 % ophthalmic suspension, , Disp: , Rfl:     Current Facility-Administered Medications:   •  cyanocobalamin injection 1,000 mcg, 1,000 mcg, Intramuscular, Q28 Days, Kehrer, Meredith Lea, MD, 1,000 mcg at 06/04/20 1417    Review of Systems   Constitutional: Negative for chills, fatigue and fever.   HENT: Negative for congestion, rhinorrhea and sore throat.    Respiratory: Negative for cough and shortness of breath.    Cardiovascular: Negative for chest pain and leg swelling.   Gastrointestinal: Positive for nausea. Negative for abdominal pain.   Endocrine: Negative for polydipsia and polyuria.   Genitourinary: Positive for dysuria, frequency and urgency. Negative for hematuria and vaginal discharge.   Musculoskeletal: Negative for arthralgias and myalgias.   Skin: Negative for rash.   Neurological: Negative for dizziness.   Hematological: Does not bruise/bleed easily.   Psychiatric/Behavioral: Negative for sleep disturbance.       Objective   Vitals:    07/02/20 1136   BP: 126/68   Pulse: 66   Temp: 97.7 °F (36.5 °C)   SpO2: 97%   Weight: 84.8 kg (187 lb)   Height: 154.9 cm (61\") "     Body mass index is 35.33 kg/m².  Physical Exam   Constitutional: She is oriented to person, place, and time. She appears well-developed and well-nourished.   HENT:   Head: Normocephalic and atraumatic.   Eyes: Pupils are equal, round, and reactive to light. Conjunctivae and EOM are normal.   Neck: Neck supple. No thyromegaly present.   Cardiovascular: Normal rate and regular rhythm.   No murmur heard.  Pulmonary/Chest: Effort normal and breath sounds normal. She has no wheezes.   Abdominal: Soft.   Musculoskeletal: Normal range of motion.   Neurological: She is alert and oriented to person, place, and time. No cranial nerve deficit.   Skin: Skin is warm and dry.   Psychiatric: She has a normal mood and affect.       Office Visit on 07/02/2020   Component Date Value Ref Range Status   • Specific Gravity  07/02/2020 1.030  1.005 - 1.030 Final   • pH, Urine 07/02/2020 6.0  5.0 - 8.0 Final   • Leukocytes 07/02/2020 Large (3+)* Negative Final   • Nitrite, UA 07/02/2020 Negative  Negative Final   • Protein, POC 07/02/2020 1+* Negative mg/dL Final   • Glucose, UA 07/02/2020 Negative  Negative, 1000 mg/dL (3+) mg/dL Final   • Ketones, UA 07/02/2020 Negative  Negative Final   • Urobilinogen, UA 07/02/2020 Normal  Normal Final   • Bilirubin 07/02/2020 Small (1+)* Negative Final   • Blood, UA 07/02/2020 3+* Negative Final          Assessment/Plan   Jemima was seen today for difficulty urinating.    Diagnoses and all orders for this visit:    Acute cystitis with hematuria  -     POC Urinalysis Dipstick, Automated  -     Urine Culture - Urine, Urine, Clean Catch  -     cephalexin (Keflex) 500 MG capsule; Take 1 capsule by mouth 2 (Two) Times a Day.               Patient Instructions   Recheck UA in 2-3 weeks only to check for blood in urine.

## 2020-07-04 LAB
BACTERIA UR CULT: ABNORMAL
BACTERIA UR CULT: ABNORMAL
OTHER ANTIBIOTIC SUSC ISLT: ABNORMAL

## 2020-07-10 ENCOUNTER — TELEPHONE (OUTPATIENT)
Dept: FAMILY MEDICINE CLINIC | Facility: CLINIC | Age: 70
End: 2020-07-10

## 2020-07-10 NOTE — TELEPHONE ENCOUNTER
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  Perhaps Ric could talk to her or I could call her back sometime this evening.  If she can give you some more information to clarify I could advise you over the cell later.  If she is okay with me calling her back later I can do so because I will logon when I get home to do my notes.

## 2020-07-10 NOTE — TELEPHONE ENCOUNTER
PATIENT STATES SHE WAS WONDERING IF THE COMBO OF THE WELLBUTRIN AND BUSPIRONE. SHE STATED SHE CAN HARDLY MOVE FROM THE NAUSEA. SHE IS OK WITH A CALL THIS EVENING.

## 2020-07-10 NOTE — TELEPHONE ENCOUNTER
PATIENT IS REQUESTING A CALL BACK FROM YOU. SHE WOULD LIKE TO TALK TO YOU ABOUT HER MEDICATION. SHE STATED SHE IS STILL VERY NAUSEA AND SWEATY. SHE IS REALLY WANTING TO TALK TO YOU ABOUT HER MEDICATIONS. CALL BACK NUMBER [phone].

## 2020-07-11 DIAGNOSIS — N30.01 ACUTE CYSTITIS WITH HEMATURIA: Primary | ICD-10-CM

## 2020-07-11 RX ORDER — CEFDINIR 300 MG/1
300 CAPSULE ORAL 2 TIMES DAILY
Qty: 14 CAPSULE | Refills: 0 | Status: SHIPPED | OUTPATIENT
Start: 2020-07-11 | End: 2020-07-18

## 2020-07-11 NOTE — TELEPHONE ENCOUNTER
I called patient to discuss her symptoms.  Last evening she began burning with urination again and states that she believes that her nausea is worsened by incomplete resolution of her urinary tract infection.  I sent in Omnicef as an update from Keflex based on her last urine culture and her allergies.  Please call the patient on Monday and reschedule her follow-up with me about a little bit until she is done with Omnicef.  I also had her stop the Wellbutrin until she follows up in case that is worsening her urinary symptoms.

## 2020-07-13 NOTE — TELEPHONE ENCOUNTER
Left message for patient to call and reschedule appointment with Dr. Kehrer for when the patient finishes the Omnicef.

## 2020-07-20 ENCOUNTER — OFFICE VISIT (OUTPATIENT)
Dept: FAMILY MEDICINE CLINIC | Facility: CLINIC | Age: 70
End: 2020-07-20

## 2020-07-20 VITALS
WEIGHT: 189 LBS | DIASTOLIC BLOOD PRESSURE: 62 MMHG | HEART RATE: 71 BPM | TEMPERATURE: 98.2 F | OXYGEN SATURATION: 94 % | HEIGHT: 61 IN | SYSTOLIC BLOOD PRESSURE: 108 MMHG | BODY MASS INDEX: 35.68 KG/M2

## 2020-07-20 DIAGNOSIS — R30.0 DYSURIA: Primary | ICD-10-CM

## 2020-07-20 DIAGNOSIS — F41.8 DEPRESSION WITH ANXIETY: ICD-10-CM

## 2020-07-20 DIAGNOSIS — R79.89 ELEVATED SERUM CREATININE: ICD-10-CM

## 2020-07-20 LAB
BILIRUB BLD-MCNC: ABNORMAL MG/DL
GLUCOSE UR STRIP-MCNC: NEGATIVE MG/DL
KETONES UR QL: NEGATIVE
LEUKOCYTE EST, POC: NEGATIVE
NITRITE UR-MCNC: NEGATIVE MG/ML
PH UR: 5.5 [PH] (ref 5–8)
PROT UR STRIP-MCNC: NEGATIVE MG/DL
RBC # UR STRIP: NEGATIVE /UL
SP GR UR: 1.03 (ref 1–1.03)
UROBILINOGEN UR QL: NORMAL

## 2020-07-20 PROCEDURE — 99213 OFFICE O/P EST LOW 20 MIN: CPT | Performed by: FAMILY MEDICINE

## 2020-07-20 PROCEDURE — 81003 URINALYSIS AUTO W/O SCOPE: CPT | Performed by: FAMILY MEDICINE

## 2020-07-20 RX ORDER — PHENAZOPYRIDINE HYDROCHLORIDE 200 MG/1
200 TABLET, FILM COATED ORAL 3 TIMES DAILY PRN
Qty: 6 TABLET | Refills: 0 | Status: SHIPPED | OUTPATIENT
Start: 2020-07-20 | End: 2020-08-20

## 2020-07-20 RX ORDER — MIRTAZAPINE 15 MG/1
15 TABLET, FILM COATED ORAL NIGHTLY
Qty: 30 TABLET | Refills: 1 | Status: SHIPPED | OUTPATIENT
Start: 2020-07-20 | End: 2020-08-20 | Stop reason: SINTOL

## 2020-07-20 NOTE — PATIENT INSTRUCTIONS
If urine culture does not come back with growth proving infection, we will consider doing a trial of topical vaginal hormones to see if that can resolve symptoms.

## 2020-07-20 NOTE — PROGRESS NOTES
Subjective   Jemima Bell is a 70 y.o. female.     Chief Complaint   Patient presents with   • Depression   • Urinary Tract Infection     odor and burning with urination, finish abt on friday        Patient presents to follow-up her depression.  She does need something for her mood.  The Wellbutrin helped but she thinks going off of it might have helped the urinary symptoms.  She does continue with odor and burning in urination.  She finished her last round of antibiotics on Friday.  She just keeps having trouble.  As far as her depression her anxiety is under good control with the buspirone.  She denies any suicidal homicidal ideation.         The following portions of the patient's history were reviewed and updated as appropriate: allergies, current medications, past family history, past medical history, past social history, past surgical history and problem list.    Past Medical History:   Diagnosis Date   • Acute bronchitis    • Acute sinusitis    • Allergic rhinitis    • Anxiety    • Arthritis    • Back pain    • BMI 34.0-34.9,adult    • Cervicalgia    • Chills    • Deep vein thrombosis (DVT) of lower extremity (CMS/HCC)    • Depression    • Dermatitis    • Dyspnea    • Dysuria    • Esophageal reflux    • Fatigue    • Gastric ulcer    • GERD (gastroesophageal reflux disease)    • Headache    • Hypothyroidism    • Irritable bowel syndrome    • Lumbago    • Migraine    • Nausea    • Neuritis    • Osteoarthritis    • Osteoarthritis of knee    • Plantar fasciitis    • Pulled muscle    • Pulmonary embolism (CMS/HCC)    • Pyelonephritis    • Rheumatic fever    • Sore throat    • Torticollis    • Trapezius strain    • Urinary incontinence    • Urinary pain    • Urinary tract infection    • UTI symptoms    • Vitamin B1 deficiency    • Vitamin B12 deficiency    • Vitamin D deficiency    • Weakness    • Wheezing        Past Surgical History:   Procedure Laterality Date   • APPENDECTOMY     • CATARACT EXTRACTION      • CATARACT EXTRACTION      bilateral eyes   • GALLBLADDER SURGERY     • HYSTERECTOMY     • INGUINAL HERNIA REPAIR     • KNEE SURGERY     • LAPAROTOMY OOPHERECTOMY     • TONSILLECTOMY     • TOTAL KNEE ARTHROPLASTY Left        Family History   Problem Relation Age of Onset   • Arthritis Mother    • Depression Mother    • Hyperlipidemia Mother    • Hypertension Mother    • Hypertension Father    • Alcohol abuse Maternal Grandfather    • Depression Maternal Grandfather    • Heart disease Other         IN FEMALES BEFORE AGE 65       Social History     Socioeconomic History   • Marital status:      Spouse name: Not on file   • Number of children: Not on file   • Years of education: Not on file   • Highest education level: Not on file   Tobacco Use   • Smoking status: Never Smoker         Current Outpatient Medications:   •  acetaminophen (TYLENOL) 325 MG tablet, Take 650 mg by mouth., Disp: , Rfl:   •  acetaminophen-codeine (TYLENOL #3) 300-30 MG per tablet, Take 1-2 tablets by mouth Every 6 (Six) Hours As Needed for Moderate Pain ., Disp: 20 tablet, Rfl: 0  •  albuterol sulfate  (90 Base) MCG/ACT inhaler, Inhale 2 puffs Every 4 (Four) Hours As Needed., Disp: , Rfl:   •  benzonatate (TESSALON) 200 MG capsule, Take 1 capsule by mouth 3 (Three) Times a Day As Needed for Cough., Disp: 30 capsule, Rfl: 0  •  busPIRone (BUSPAR) 10 MG tablet, Take 0.5 tablets by mouth 2 (Two) Times a Day., Disp: 180 tablet, Rfl: 1  •  fluticasone (FLONASE) 50 MCG/ACT nasal spray, Use 2 Sprays in each nostril once daily. prn, Disp: , Rfl:   •  Fluticasone Furoate-Vilanterol (BREO ELLIPTA) 100-25 MCG/INH inhaler, Inhale 1 puff Daily., Disp: 1 each, Rfl: 2  •  levothyroxine (SYNTHROID, LEVOTHROID) 88 MCG tablet, TAKE ONE TABLET BY MOUTH DAILY, Disp: 90 tablet, Rfl: 0  •  meloxicam (MOBIC) 15 MG tablet, Take 1 tablet by mouth Daily., Disp: 90 tablet, Rfl: 1  •  omeprazole (priLOSEC) 40 MG capsule, Take 1 capsule by mouth Daily.,  "Disp: 90 capsule, Rfl: 3  •  ondansetron (ZOFRAN) 4 MG tablet, Take 4 mg by mouth Every 8 (Eight) Hours As Needed for Nausea or Vomiting., Disp: , Rfl:   •  promethazine (PHENERGAN) 25 MG tablet, Take 25 mg by mouth Every 6 (Six) Hours As Needed for Nausea or Vomiting., Disp: , Rfl:   •  tiZANidine (ZANAFLEX) 4 MG tablet, Take 1 tablet by mouth Every 8 (Eight) Hours As Needed for Muscle Spasms., Disp: 30 tablet, Rfl: 0  •  vitamin D (ERGOCALCIFEROL) 1.25 MG (47777 UT) capsule capsule, Take 1 capsule by mouth 1 (One) Time Per Week., Disp: 12 capsule, Rfl: 0  •  Cholecalciferol (VITAMIN D3) 5000 UNITS capsule capsule, Take 5,000 Units by mouth Daily., Disp: , Rfl:   •  mirtazapine (Remeron) 15 MG tablet, Take 1 tablet by mouth Every Night., Disp: 30 tablet, Rfl: 1  •  phenazopyridine (Pyridium) 200 MG tablet, Take 1 tablet by mouth 3 (Three) Times a Day As Needed for Bladder Spasms., Disp: 6 tablet, Rfl: 0    Current Facility-Administered Medications:   •  cyanocobalamin injection 1,000 mcg, 1,000 mcg, Intramuscular, Q28 Days, Kehrer, Meredith Lea, MD, 1,000 mcg at 07/02/20 1247    Review of Systems   Constitutional: Negative for chills, fatigue and fever.   HENT: Negative for congestion, rhinorrhea and sore throat.    Respiratory: Negative for cough and shortness of breath.    Cardiovascular: Negative for chest pain and leg swelling.   Gastrointestinal: Negative for abdominal pain.   Endocrine: Negative for polydipsia and polyuria.   Genitourinary: Negative for dysuria.   Musculoskeletal: Negative for arthralgias and myalgias.   Skin: Negative for rash.   Neurological: Negative for dizziness.   Hematological: Does not bruise/bleed easily.   Psychiatric/Behavioral: Negative for sleep disturbance.       Objective   Vitals:    07/20/20 1428   BP: 108/62   Pulse: 71   Temp: 98.2 °F (36.8 °C)   SpO2: 94%   Weight: 85.7 kg (189 lb)   Height: 154.9 cm (61\")     Body mass index is 35.71 kg/m².  Physical Exam "   Constitutional: She is oriented to person, place, and time. She appears well-developed and well-nourished.   HENT:   Head: Normocephalic and atraumatic.   Eyes: Pupils are equal, round, and reactive to light. Conjunctivae and EOM are normal.   Neck: Neck supple. No thyromegaly present.   Cardiovascular: Normal rate and regular rhythm.   No murmur heard.  Pulmonary/Chest: Effort normal and breath sounds normal. She has no wheezes.   Abdominal: Soft.   Musculoskeletal: Normal range of motion.   Neurological: She is alert and oriented to person, place, and time. No cranial nerve deficit.   Skin: Skin is warm and dry.   Psychiatric: She has a normal mood and affect.       Office Visit on 07/20/2020   Component Date Value Ref Range Status   • Specific Gravity  07/20/2020 1.030  1.005 - 1.030 Final   • pH, Urine 07/20/2020 5.5  5.0 - 8.0 Final   • Leukocytes 07/20/2020 Negative  Negative Final   • Nitrite, UA 07/20/2020 Negative  Negative Final   • Protein, POC 07/20/2020 Negative  Negative mg/dL Final   • Glucose, UA 07/20/2020 Negative  Negative, 1000 mg/dL (3+) mg/dL Final   • Ketones, UA 07/20/2020 Negative  Negative Final   • Urobilinogen, UA 07/20/2020 Normal  Normal Final   • Bilirubin 07/20/2020 1 mg/dL* Negative Final   • Blood, UA 07/20/2020 Negative  Negative Final          Assessment/Plan   Jemima was seen today for depression and urinary tract infection.    Diagnoses and all orders for this visit:    Dysuria  -     POC Urinalysis Dipstick, Automated  -     Urine Culture - Urine, Urine, Clean Catch  -     phenazopyridine (Pyridium) 200 MG tablet; Take 1 tablet by mouth 3 (Three) Times a Day As Needed for Bladder Spasms.    Depression with anxiety  -     mirtazapine (Remeron) 15 MG tablet; Take 1 tablet by mouth Every Night.    Elevated serum creatinine  -     Basic Metabolic Panel               Patient Instructions   If urine culture does not come back with growth proving infection, we will consider  doing a trial of topical vaginal hormones to see if that can resolve symptoms.

## 2020-07-21 LAB
BUN SERPL-MCNC: 16 MG/DL (ref 8–27)
BUN/CREAT SERPL: 19 (ref 12–28)
CALCIUM SERPL-MCNC: 9.3 MG/DL (ref 8.7–10.3)
CHLORIDE SERPL-SCNC: 110 MMOL/L (ref 96–106)
CO2 SERPL-SCNC: 24 MMOL/L (ref 20–29)
CREAT SERPL-MCNC: 0.85 MG/DL (ref 0.57–1)
GLUCOSE SERPL-MCNC: 90 MG/DL (ref 65–99)
POTASSIUM SERPL-SCNC: 4.1 MMOL/L (ref 3.5–5.2)
SODIUM SERPL-SCNC: 144 MMOL/L (ref 134–144)

## 2020-07-23 ENCOUNTER — TELEPHONE (OUTPATIENT)
Dept: FAMILY MEDICINE CLINIC | Facility: CLINIC | Age: 70
End: 2020-07-23

## 2020-07-23 DIAGNOSIS — N30.01 ACUTE CYSTITIS WITH HEMATURIA: Primary | ICD-10-CM

## 2020-07-23 LAB
BACTERIA UR CULT: ABNORMAL
BACTERIA UR CULT: ABNORMAL
OTHER ANTIBIOTIC SUSC ISLT: ABNORMAL

## 2020-07-23 RX ORDER — CEFDINIR 300 MG/1
300 CAPSULE ORAL 2 TIMES DAILY
Qty: 10 CAPSULE | Refills: 0 | Status: SHIPPED | OUTPATIENT
Start: 2020-07-23 | End: 2020-08-20

## 2020-07-23 NOTE — TELEPHONE ENCOUNTER
The 2 medications can be used together.  The combination increases the serotonin effect of the Remeron and we just use the lower doses.  She can try giving a half a tablet a few nights to see how she does with that.  After being too tired with a whole pill the first night, trying half tablet this for 1 night is not a very long trial.  It will take her a few days to not feel the side effects as much.  There is an adjustment time.  Unfortunately we have gone through a lot of other options for her.  Most mood medications work on the serotonin system so you will usually see contraindications for them to be used together but it is done all the time and lower doses.  See urine culture results.

## 2020-07-23 NOTE — TELEPHONE ENCOUNTER
"PATIENT STATES SHE IS STILL HAVING UTI SYMPTOMS, HER URINE CULTURE JUST CAME BACK. PLEASE ADVISE.     ALSO PATIENT STATED SHE TOOK A WHOLE TABLET THE OTHER NIGHT OF THE REMERON AND IT \"WIPED HER OUT\", SO SHE TOOK HALF A TABLET LAST NIGHT AND THEN WAS UP OFF AND ON THROUGH THE NIGHT, AND THEN THIS MORNING SHE IS BACK TO BEING A \"ZOMBIE\" FEELING. SHE READ THE INTERACTIONS OF HE REMERON AND ONE OF THEM STATED FOR HER TO NOT TAKE IT WITH BUSPAR WHICH IS ONE OF HER MEDICATIONS. PLEASE ADVISE.   "

## 2020-07-28 ENCOUNTER — TELEPHONE (OUTPATIENT)
Dept: FAMILY MEDICINE CLINIC | Facility: CLINIC | Age: 70
End: 2020-07-28

## 2020-07-28 NOTE — TELEPHONE ENCOUNTER
PATIENT CALLED STATED THAT SHE JUST FINISHED HER ABT FOR HER UTI, BUT IS STILL HAVING SOME SYMPTOMS. INSTRUCTED PATIENT TO GIVE IT TILL Thursday, BECAUSE IT STILL TAKES TIME AFTER COMPLETING AN ABT TO NOTICE A DIFFERENCE. INSTRUCTED HER TO INCREASE HER WATER INTAKE AND LET US KNOW ON Thursday IF SHE IS STILL HAVING SYMPTOMS AND THEN WE WOULD HAVE HER SEE SOMEONE.  PATIENT STATED UNDERSTANDING.

## 2020-08-03 RX ORDER — LEVOTHYROXINE SODIUM 88 UG/1
TABLET ORAL
Qty: 90 TABLET | Refills: 1 | Status: SHIPPED | OUTPATIENT
Start: 2020-08-03 | End: 2021-02-11

## 2020-08-11 ENCOUNTER — CLINICAL SUPPORT (OUTPATIENT)
Dept: FAMILY MEDICINE CLINIC | Facility: CLINIC | Age: 70
End: 2020-08-11

## 2020-08-11 VITALS — TEMPERATURE: 97.1 F

## 2020-08-11 PROCEDURE — 96372 THER/PROPH/DIAG INJ SC/IM: CPT | Performed by: FAMILY MEDICINE

## 2020-08-11 RX ADMIN — CYANOCOBALAMIN 1000 MCG: 1000 INJECTION, SOLUTION INTRAMUSCULAR; SUBCUTANEOUS at 13:00

## 2020-08-12 ENCOUNTER — TELEPHONE (OUTPATIENT)
Dept: FAMILY MEDICINE CLINIC | Facility: CLINIC | Age: 70
End: 2020-08-12

## 2020-08-12 NOTE — TELEPHONE ENCOUNTER
"Patient called stated that it has been three weeks since she has been on the new depression medication. It feels like her \"brain is in a fog, and her brain just won't go.\" She didn't know if she needed to give the medication another week or what to do. It seems like she has had a lot of reactions with previous depression/anxiety medication so I didn't know if you have ordered Genesight for her before or if that's even an option.   "

## 2020-08-13 NOTE — TELEPHONE ENCOUNTER
She would like to speak with you regarding the medication and if it has any side effects on the kidneys.

## 2020-08-13 NOTE — TELEPHONE ENCOUNTER
It can cause some urinary frequency but have not seen renal damage listed.  If she wants to talk to me about this medication I can maybe call her sometime next week.

## 2020-08-13 NOTE — TELEPHONE ENCOUNTER
Spoke with patient she did the [employer] test in 2017, I am going to attempt to get a copy of that report for you to review. For now should the patient give the medication another week or so?

## 2020-08-18 NOTE — TELEPHONE ENCOUNTER
Upon reviewing her gene site testing from a few years ago, it shows that she may actually need higher doses of Remeron to see benefit.  We can do a trial of sending in the 30 mg dose and see how she handles it.

## 2020-08-20 ENCOUNTER — OFFICE VISIT (OUTPATIENT)
Dept: FAMILY MEDICINE CLINIC | Facility: CLINIC | Age: 70
End: 2020-08-20

## 2020-08-20 VITALS
OXYGEN SATURATION: 98 % | HEIGHT: 61 IN | DIASTOLIC BLOOD PRESSURE: 74 MMHG | HEART RATE: 84 BPM | BODY MASS INDEX: 35.53 KG/M2 | WEIGHT: 188.2 LBS | TEMPERATURE: 97.3 F | SYSTOLIC BLOOD PRESSURE: 122 MMHG

## 2020-08-20 DIAGNOSIS — F41.8 DEPRESSION WITH ANXIETY: Primary | ICD-10-CM

## 2020-08-20 PROCEDURE — 99213 OFFICE O/P EST LOW 20 MIN: CPT | Performed by: FAMILY MEDICINE

## 2020-08-20 RX ORDER — FLUOXETINE HYDROCHLORIDE 20 MG/1
20 CAPSULE ORAL DAILY
Qty: 30 CAPSULE | Refills: 1 | Status: SHIPPED | OUTPATIENT
Start: 2020-08-20 | End: 2020-09-16 | Stop reason: SINTOL

## 2020-08-20 NOTE — PROGRESS NOTES
Subjective   Jemima Bell is a 70 y.o. female.     Chief Complaint   Patient presents with   • Medication Problem     has stopped taking remeron        Patient presents to discuss her depression anxiety.  She became so sedated the Remeron that she had to stop it.  She has done well the buspirone so wants to keep it for anxiety.  She is tried many antidepressants in the past.  She did not do well recently on Wellbutrin.  She was willing to try fluoxetine since her daughter responds well to that.  We reviewed her gene site testing today.  This was from just a few years ago.         The following portions of the patient's history were reviewed and updated as appropriate: allergies, current medications, past family history, past medical history, past social history, past surgical history and problem list.    Past Medical History:   Diagnosis Date   • Acute bronchitis    • Acute sinusitis    • Allergic rhinitis    • Anxiety    • Arthritis    • Back pain    • BMI 34.0-34.9,adult    • Cervicalgia    • Chills    • Deep vein thrombosis (DVT) of lower extremity (CMS/HCC)    • Depression    • Dermatitis    • Dyspnea    • Dysuria    • Esophageal reflux    • Fatigue    • Gastric ulcer    • GERD (gastroesophageal reflux disease)    • Headache    • Hypothyroidism    • Irritable bowel syndrome    • Lumbago    • Migraine    • Nausea    • Neuritis    • Osteoarthritis    • Osteoarthritis of knee    • Plantar fasciitis    • Pulled muscle    • Pulmonary embolism (CMS/HCC)    • Pyelonephritis    • Rheumatic fever    • Sore throat    • Torticollis    • Trapezius strain    • Urinary incontinence    • Urinary pain    • Urinary tract infection    • UTI symptoms    • Vitamin B1 deficiency    • Vitamin B12 deficiency    • Vitamin D deficiency    • Weakness    • Wheezing        Past Surgical History:   Procedure Laterality Date   • APPENDECTOMY     • CATARACT EXTRACTION     • CATARACT EXTRACTION      bilateral eyes   • CATARACT  EXTRACTION     • GALLBLADDER SURGERY     • HYSTERECTOMY     • INGUINAL HERNIA REPAIR     • KNEE SURGERY     • LAPAROTOMY OOPHERECTOMY     • TONSILLECTOMY     • TOTAL KNEE ARTHROPLASTY Left        Family History   Problem Relation Age of Onset   • Arthritis Mother    • Depression Mother    • Hyperlipidemia Mother    • Hypertension Mother    • Hypertension Father    • Alcohol abuse Maternal Grandfather    • Depression Maternal Grandfather    • Heart disease Other         IN FEMALES BEFORE AGE 65       Social History     Socioeconomic History   • Marital status:      Spouse name: Not on file   • Number of children: Not on file   • Years of education: Not on file   • Highest education level: Not on file   Tobacco Use   • Smoking status: Never Smoker   • Smokeless tobacco: Never Used         Current Outpatient Medications:   •  acetaminophen-codeine (TYLENOL #3) 300-30 MG per tablet, Take 1-2 tablets by mouth Every 6 (Six) Hours As Needed for Moderate Pain ., Disp: 20 tablet, Rfl: 0  •  albuterol sulfate  (90 Base) MCG/ACT inhaler, Inhale 2 puffs Every 4 (Four) Hours As Needed., Disp: , Rfl:   •  busPIRone (BUSPAR) 10 MG tablet, Take 0.5 tablets by mouth 2 (Two) Times a Day., Disp: 180 tablet, Rfl: 1  •  Fluticasone Furoate-Vilanterol (BREO ELLIPTA) 100-25 MCG/INH inhaler, Inhale 1 puff Daily., Disp: 1 each, Rfl: 2  •  levothyroxine (SYNTHROID, LEVOTHROID) 88 MCG tablet, TAKE ONE TABLET BY MOUTH DAILY, Disp: 90 tablet, Rfl: 1  •  meloxicam (MOBIC) 15 MG tablet, Take 1 tablet by mouth Daily., Disp: 90 tablet, Rfl: 1  •  omeprazole (priLOSEC) 40 MG capsule, Take 1 capsule by mouth Daily., Disp: 90 capsule, Rfl: 3  •  ondansetron (ZOFRAN) 4 MG tablet, Take 4 mg by mouth Every 8 (Eight) Hours As Needed for Nausea or Vomiting., Disp: , Rfl:   •  promethazine (PHENERGAN) 25 MG tablet, Take 25 mg by mouth Every 6 (Six) Hours As Needed for Nausea or Vomiting., Disp: , Rfl:   •  acetaminophen (TYLENOL) 325 MG  "tablet, Take 650 mg by mouth., Disp: , Rfl:   •  Cholecalciferol (VITAMIN D3) 5000 UNITS capsule capsule, Take 5,000 Units by mouth Daily., Disp: , Rfl:   •  FLUoxetine (PROzac) 20 MG capsule, Take 1 capsule by mouth Daily., Disp: 30 capsule, Rfl: 1  •  fluticasone (FLONASE) 50 MCG/ACT nasal spray, Use 2 Sprays in each nostril once daily. prn, Disp: , Rfl:   •  tiZANidine (ZANAFLEX) 4 MG tablet, Take 1 tablet by mouth Every 8 (Eight) Hours As Needed for Muscle Spasms., Disp: 30 tablet, Rfl: 0  •  vitamin D (ERGOCALCIFEROL) 1.25 MG (52024 UT) capsule capsule, Take 1 capsule by mouth 1 (One) Time Per Week., Disp: 12 capsule, Rfl: 0    Current Facility-Administered Medications:   •  cyanocobalamin injection 1,000 mcg, 1,000 mcg, Intramuscular, Q28 Days, Kehrer, Meredith Lea, MD, 1,000 mcg at 08/11/20 1300    Review of Systems   Constitutional: Positive for fatigue.   Neurological: Negative.    Psychiatric/Behavioral: Positive for dysphoric mood. Negative for agitation, behavioral problems, confusion, decreased concentration, hallucinations, self-injury, sleep disturbance and suicidal ideas. The patient is not nervous/anxious and is not hyperactive.        Objective   Vitals:    08/20/20 1350   BP: 122/74   Pulse: 84   Temp: 97.3 °F (36.3 °C)   SpO2: 98%   Weight: 85.4 kg (188 lb 3.2 oz)   Height: 154.9 cm (61\")     Body mass index is 35.56 kg/m².  Physical Exam   Constitutional: She is oriented to person, place, and time. She appears well-developed and well-nourished. No distress.   HENT:   Head: Normocephalic and atraumatic.   Eyes: Pupils are equal, round, and reactive to light. Conjunctivae are normal.   Neurological: She is alert and oriented to person, place, and time.   Psychiatric: She has a normal mood and affect.   Nursing note and vitals reviewed.        Assessment/Plan   Jemima was seen today for medication problem.    Diagnoses and all orders for this visit:    Depression with anxiety  -     FLUoxetine " (PROzac) 20 MG capsule; Take 1 capsule by mouth Daily.               Patient Instructions   Call me if you do not tolerate medication.

## 2020-09-09 ENCOUNTER — CLINICAL SUPPORT (OUTPATIENT)
Dept: FAMILY MEDICINE CLINIC | Facility: CLINIC | Age: 70
End: 2020-09-09

## 2020-09-09 DIAGNOSIS — E53.8 VITAMIN B12 DEFICIENCY: Primary | ICD-10-CM

## 2020-09-09 PROCEDURE — 96372 THER/PROPH/DIAG INJ SC/IM: CPT | Performed by: FAMILY MEDICINE

## 2020-09-09 RX ORDER — CYANOCOBALAMIN 1000 UG/ML
1000 INJECTION, SOLUTION INTRAMUSCULAR; SUBCUTANEOUS
Status: DISCONTINUED | OUTPATIENT
Start: 2020-09-09 | End: 2022-09-13

## 2020-09-09 RX ADMIN — CYANOCOBALAMIN 1000 MCG: 1000 INJECTION, SOLUTION INTRAMUSCULAR; SUBCUTANEOUS at 15:27

## 2020-09-16 ENCOUNTER — OFFICE VISIT (OUTPATIENT)
Dept: FAMILY MEDICINE CLINIC | Facility: CLINIC | Age: 70
End: 2020-09-16

## 2020-09-16 ENCOUNTER — TELEPHONE (OUTPATIENT)
Dept: FAMILY MEDICINE CLINIC | Facility: CLINIC | Age: 70
End: 2020-09-16

## 2020-09-16 VITALS
WEIGHT: 184.8 LBS | BODY MASS INDEX: 34.89 KG/M2 | TEMPERATURE: 97.1 F | DIASTOLIC BLOOD PRESSURE: 80 MMHG | HEIGHT: 61 IN | HEART RATE: 61 BPM | OXYGEN SATURATION: 99 % | SYSTOLIC BLOOD PRESSURE: 148 MMHG

## 2020-09-16 DIAGNOSIS — Z23 NEED FOR INFLUENZA VACCINATION: ICD-10-CM

## 2020-09-16 DIAGNOSIS — E53.8 VITAMIN B12 DEFICIENCY: ICD-10-CM

## 2020-09-16 DIAGNOSIS — E03.9 HYPOTHYROIDISM, UNSPECIFIED TYPE: ICD-10-CM

## 2020-09-16 DIAGNOSIS — R53.83 FATIGUE, UNSPECIFIED TYPE: ICD-10-CM

## 2020-09-16 DIAGNOSIS — E55.9 VITAMIN D DEFICIENCY: ICD-10-CM

## 2020-09-16 DIAGNOSIS — F41.8 DEPRESSION WITH ANXIETY: Primary | ICD-10-CM

## 2020-09-16 PROCEDURE — 99213 OFFICE O/P EST LOW 20 MIN: CPT | Performed by: FAMILY MEDICINE

## 2020-09-16 PROCEDURE — 90694 VACC AIIV4 NO PRSRV 0.5ML IM: CPT | Performed by: FAMILY MEDICINE

## 2020-09-16 PROCEDURE — G0008 ADMIN INFLUENZA VIRUS VAC: HCPCS | Performed by: FAMILY MEDICINE

## 2020-09-16 RX ORDER — HYDROXYZINE HYDROCHLORIDE 25 MG/1
25 TABLET, FILM COATED ORAL EVERY 8 HOURS PRN
Qty: 30 TABLET | Refills: 0 | Status: SHIPPED | OUTPATIENT
Start: 2020-09-16 | End: 2022-02-22 | Stop reason: DRUGHIGH

## 2020-09-16 NOTE — TELEPHONE ENCOUNTER
Pt called and stated she is having panic attack from prozac.  she is crying, shaking, stated she calmed some since this morning.
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
## 2020-09-16 NOTE — PATIENT INSTRUCTIONS
Stop fluoxetine.  No need to taper.   Use the hydroxyzine if needed.  Make sure to get into see a therapist or counselor.  Call if any worsening.    We will follow-up pending labs and keep appointment in October for wellness exam.  Other medications to consider would be duloxetine since it is close to the Effexor or an atypical antipsychotic like a low-dose of Abilify.      Managing Anxiety, Adult  After being diagnosed with an anxiety disorder, you may be relieved to know why you have felt or behaved a certain way. You may also feel overwhelmed about the treatment ahead and what it will mean for your life. With care and support, you can manage this condition and recover from it.  How to manage lifestyle changes  Managing stress and anxiety    Stress is your body's reaction to life changes and events, both good and bad. Most stress will last just a few hours, but stress can be ongoing and can lead to more than just stress. Although stress can play a major role in anxiety, it is not the same as anxiety. Stress is usually caused by something external, such as a deadline, test, or competition. Stress normally passes after the triggering event has ended.   Anxiety is caused by something internal, such as imagining a terrible outcome or worrying that something will go wrong that will devastate you. Anxiety often does not go away even after the triggering event is over, and it can become long-term (chronic) worry. It is important to understand the differences between stress and anxiety and to manage your stress effectively so that it does not lead to an anxious response.  Talk with your health care provider or a counselor to learn more about reducing anxiety and stress. He or she may suggest tension reduction techniques, such as:  · Music therapy. This can include creating or listening to music that you enjoy and that inspires you.  · Mindfulness-based meditation. This involves being aware of your normal breaths while not  trying to control your breathing. It can be done while sitting or walking.  · Centering prayer. This involves focusing on a word, phrase, or sacred image that means something to you and brings you peace.  · Deep breathing. To do this, expand your stomach and inhale slowly through your nose. Hold your breath for 3-5 seconds. Then exhale slowly, letting your stomach muscles relax.  · Self-talk. This involves identifying thought patterns that lead to anxiety reactions and changing those patterns.  · Muscle relaxation. This involves tensing muscles and then relaxing them.  Choose a tension reduction technique that suits your lifestyle and personality. These techniques take time and practice. Set aside 5-15 minutes a day to do them. Therapists can offer counseling and training in these techniques. The training to help with anxiety may be covered by some insurance plans. Other things you can do to manage stress and anxiety include:  · Keeping a stress/anxiety diary. This can help you learn what triggers your reaction and then learn ways to manage your response.  · Thinking about how you react to certain situations. You may not be able to control everything, but you can control your response.  · Making time for activities that help you relax and not feeling guilty about spending your time in this way.  · Visual imagery and yoga can help you stay calm and relax.    Medicines  Medicines can help ease symptoms. Medicines for anxiety include:  · Anti-anxiety drugs.  · Antidepressants.  Medicines are often used as a primary treatment for anxiety disorder. Medicines will be prescribed by a health care provider. When used together, medicines, psychotherapy, and tension reduction techniques may be the most effective treatment.  Relationships  Relationships can play a big part in helping you recover. Try to spend more time connecting with trusted friends and family members. Consider going to couples counseling, taking family  education classes, or going to family therapy. Therapy can help you and others better understand your condition.  How to recognize changes in your anxiety  Everyone responds differently to treatment for anxiety. Recovery from anxiety happens when symptoms decrease and stop interfering with your daily activities at home or work. This may mean that you will start to:  · Have better concentration and focus. Worry will interfere less in your daily thinking.  · Sleep better.  · Be less irritable.  · Have more energy.  · Have improved memory.  It is important to recognize when your condition is getting worse. Contact your health care provider if your symptoms interfere with home or work and you feel like your condition is not improving.  Follow these instructions at home:  Activity  · Exercise. Most adults should do the following:  ? Exercise for at least 150 minutes each week. The exercise should increase your heart rate and make you sweat (moderate-intensity exercise).  ? Strengthening exercises at least twice a week.  · Get the right amount and quality of sleep. Most adults need 7-9 hours of sleep each night.  Lifestyle    · Eat a healthy diet that includes plenty of vegetables, fruits, whole grains, low-fat dairy products, and lean protein. Do not eat a lot of foods that are high in solid fats, added sugars, or salt.  · Make choices that simplify your life.  · Do not use any products that contain nicotine or tobacco, such as cigarettes, e-cigarettes, and chewing tobacco. If you need help quitting, ask your health care provider.  · Avoid caffeine, alcohol, and certain over-the-counter cold medicines. These may make you feel worse. Ask your pharmacist which medicines to avoid.  General instructions  · Take over-the-counter and prescription medicines only as told by your health care provider.  · Keep all follow-up visits as told by your health care provider. This is important.  Where to find support  You can get help and  support from these sources:  · Self-help groups.  · Online and community organizations.  · A trusted spiritual leader.  · Couples counseling.  · Family education classes.  · Family therapy.  Where to find more information  You may find that joining a support group helps you deal with your anxiety. The following sources can help you locate counselors or support groups near you:  · Mental Health [place]: www.mentalhealth[place].net  · Anxiety and Depression Association of [place] (ADAA): www.adaa.org  · National [place] on Mental Illness (ACOSTA): www.acosta.org  Contact a health care provider if you:  · Have a hard time staying focused or finishing daily tasks.  · Spend many hours a day feeling worried about everyday life.  · Become exhausted by worry.  · Start to have headaches, feel tense, or have nausea.  · Urinate more than normal.  · Have diarrhea.  Get help right away if you have:  · A racing heart and shortness of breath.  · Thoughts of hurting yourself or others.  If you ever feel like you may hurt yourself or others, or have thoughts about taking your own life, get help right away. You can go to your nearest emergency department or call:  · Your local emergency services (911 in the [place]  · A suicide crisis helpline, such as the National Suicide Prevention Lifeline at 1-782.264.3101. This is open 24 hours a day.  Summary  · Taking steps to learn and use tension reduction techniques can help calm you and help prevent triggering an anxiety reaction.  · When used together, medicines, psychotherapy, and tension reduction techniques may be the most effective treatment.  · Family, friends, and partners can play a big part in helping you recover from an anxiety disorder.  This information is not intended to replace advice given to you by your health care provider. Make sure you discuss any questions you have with your health care provider.  Document Released: 12/12/2017 Document Revised: 05/19/2020 Document Reviewed:  05/19/2020  Elsevier Patient Education © 2020 [employer]

## 2020-09-17 DIAGNOSIS — E55.9 VITAMIN D DEFICIENCY: ICD-10-CM

## 2020-09-17 LAB
25(OH)D3+25(OH)D2 SERPL-MCNC: 20 NG/ML (ref 30–100)
ALBUMIN SERPL-MCNC: 4.3 G/DL (ref 3.7–4.7)
ALBUMIN/GLOB SERPL: 1.6 {RATIO} (ref 1.2–2.2)
ALP SERPL-CCNC: 59 IU/L (ref 39–117)
ALT SERPL-CCNC: 13 IU/L (ref 0–32)
AST SERPL-CCNC: 14 IU/L (ref 0–40)
BASOPHILS # BLD AUTO: 0.1 X10E3/UL (ref 0–0.2)
BASOPHILS NFR BLD AUTO: 1 %
BILIRUB SERPL-MCNC: 0.3 MG/DL (ref 0–1.2)
BUN SERPL-MCNC: 17 MG/DL (ref 8–27)
BUN/CREAT SERPL: 20 (ref 12–28)
CALCIUM SERPL-MCNC: 9.7 MG/DL (ref 8.7–10.3)
CHLORIDE SERPL-SCNC: 103 MMOL/L (ref 96–106)
CO2 SERPL-SCNC: 25 MMOL/L (ref 20–29)
CREAT SERPL-MCNC: 0.86 MG/DL (ref 0.57–1)
EOSINOPHIL # BLD AUTO: 0.2 X10E3/UL (ref 0–0.4)
EOSINOPHIL NFR BLD AUTO: 4 %
ERYTHROCYTE [DISTWIDTH] IN BLOOD BY AUTOMATED COUNT: 12.1 % (ref 11.7–15.4)
GLOBULIN SER CALC-MCNC: 2.7 G/DL (ref 1.5–4.5)
GLUCOSE SERPL-MCNC: 108 MG/DL (ref 65–99)
HCT VFR BLD AUTO: 43.1 % (ref 34–46.6)
HGB BLD-MCNC: 14.1 G/DL (ref 11.1–15.9)
IMM GRANULOCYTES # BLD AUTO: 0 X10E3/UL (ref 0–0.1)
IMM GRANULOCYTES NFR BLD AUTO: 0 %
LYMPHOCYTES # BLD AUTO: 1.2 X10E3/UL (ref 0.7–3.1)
LYMPHOCYTES NFR BLD AUTO: 21 %
MCH RBC QN AUTO: 30.9 PG (ref 26.6–33)
MCHC RBC AUTO-ENTMCNC: 32.7 G/DL (ref 31.5–35.7)
MCV RBC AUTO: 95 FL (ref 79–97)
MONOCYTES # BLD AUTO: 0.4 X10E3/UL (ref 0.1–0.9)
MONOCYTES NFR BLD AUTO: 7 %
NEUTROPHILS # BLD AUTO: 3.7 X10E3/UL (ref 1.4–7)
NEUTROPHILS NFR BLD AUTO: 67 %
PLATELET # BLD AUTO: 342 X10E3/UL (ref 150–450)
POTASSIUM SERPL-SCNC: 4.6 MMOL/L (ref 3.5–5.2)
PROT SERPL-MCNC: 7 G/DL (ref 6–8.5)
RBC # BLD AUTO: 4.56 X10E6/UL (ref 3.77–5.28)
SODIUM SERPL-SCNC: 143 MMOL/L (ref 134–144)
TSH SERPL DL<=0.005 MIU/L-ACNC: 0.35 UIU/ML (ref 0.45–4.5)
VIT B12 SERPL-MCNC: 568 PG/ML (ref 232–1245)
WBC # BLD AUTO: 5.5 X10E3/UL (ref 3.4–10.8)

## 2020-09-17 RX ORDER — ERGOCALCIFEROL 1.25 MG/1
50000 CAPSULE ORAL WEEKLY
Qty: 12 CAPSULE | Refills: 0 | Status: SHIPPED | OUTPATIENT
Start: 2020-09-17 | End: 2021-01-14

## 2020-09-21 ENCOUNTER — PATIENT MESSAGE (OUTPATIENT)
Dept: FAMILY MEDICINE CLINIC | Facility: CLINIC | Age: 70
End: 2020-09-21

## 2020-09-21 NOTE — TELEPHONE ENCOUNTER
From: Jemima Bell  To: Meredith Lea Kehrer, MD  Sent: 9/21/2020 4:25 PM EDT  Subject: Test Results Question    Reading MyChart. I see my TSH is .349 and that .450 to 4.5 is normal range. Is this differentiation enough to be concerned? Thank you!

## 2020-09-25 DIAGNOSIS — F41.8 DEPRESSION WITH ANXIETY: Primary | ICD-10-CM

## 2020-09-25 RX ORDER — DULOXETIN HYDROCHLORIDE 20 MG/1
20 CAPSULE, DELAYED RELEASE ORAL DAILY
Qty: 30 CAPSULE | Refills: 1 | Status: SHIPPED | OUTPATIENT
Start: 2020-09-25 | End: 2020-11-02

## 2020-10-13 ENCOUNTER — OFFICE VISIT (OUTPATIENT)
Dept: FAMILY MEDICINE CLINIC | Facility: CLINIC | Age: 70
End: 2020-10-13

## 2020-10-13 VITALS
BODY MASS INDEX: 34.44 KG/M2 | OXYGEN SATURATION: 98 % | TEMPERATURE: 97.1 F | HEART RATE: 76 BPM | WEIGHT: 182.4 LBS | DIASTOLIC BLOOD PRESSURE: 78 MMHG | SYSTOLIC BLOOD PRESSURE: 126 MMHG | HEIGHT: 61 IN

## 2020-10-13 DIAGNOSIS — Z23 NEED FOR PNEUMOCOCCAL VACCINE: Primary | ICD-10-CM

## 2020-10-13 DIAGNOSIS — F41.8 DEPRESSION WITH ANXIETY: ICD-10-CM

## 2020-10-13 DIAGNOSIS — E53.8 B12 DEFICIENCY: ICD-10-CM

## 2020-10-13 DIAGNOSIS — E03.9 HYPOTHYROIDISM, UNSPECIFIED TYPE: ICD-10-CM

## 2020-10-13 PROCEDURE — 90732 PPSV23 VACC 2 YRS+ SUBQ/IM: CPT | Performed by: FAMILY MEDICINE

## 2020-10-13 PROCEDURE — G0439 PPPS, SUBSEQ VISIT: HCPCS | Performed by: FAMILY MEDICINE

## 2020-10-13 PROCEDURE — G0009 ADMIN PNEUMOCOCCAL VACCINE: HCPCS | Performed by: FAMILY MEDICINE

## 2020-10-13 RX ADMIN — CYANOCOBALAMIN 1000 MCG: 1000 INJECTION, SOLUTION INTRAMUSCULAR; SUBCUTANEOUS at 14:01

## 2020-10-13 NOTE — PATIENT INSTRUCTIONS
Look into Shingrix coverage at the pharmacy.  Let me know if you ever get any injuries to do a tetanus booster.

## 2020-10-13 NOTE — PROGRESS NOTES
The ABCs of the Annual Wellness Visit  Subsequent Medicare Wellness Visit    Chief Complaint   Patient presents with   • Medicare Wellness-subsequent       Subjective   History of Present Illness:  Jemima Bell is a 70 y.o. female who presents for a Subsequent Medicare Wellness Visit.  Still depressed.  No worse on the Cymbalta.  She is ready to see a psychiatrist.  She denies hopelessness or suicidal or homicidal ideation.    HEALTH RISK ASSESSMENT    Recent Hospitalizations:  No hospitalization(s) within the last year.    Current Medical Providers:  Patient Care Team:  Kehrer, Meredith Lea, MD as PCP - General (Family Medicine)    Smoking Status:  Social History     Tobacco Use   Smoking Status Never Smoker   Smokeless Tobacco Never Used       Alcohol Consumption:  Social History     Substance and Sexual Activity   Alcohol Use None       Depression Screen:   PHQ-2/PHQ-9 Depression Screening 10/13/2020   Little interest or pleasure in doing things 3   Feeling down, depressed, or hopeless 3   Trouble falling or staying asleep, or sleeping too much 3   Feeling tired or having little energy 3   Poor appetite or overeating 0   Feeling bad about yourself - or that you are a failure or have let yourself or your family down 3   Trouble concentrating on things, such as reading the newspaper or watching television 3   Moving or speaking so slowly that other people could have noticed. Or the opposite - being so fidgety or restless that you have been moving around a lot more than usual 0   Thoughts that you would be better off dead, or of hurting yourself in some way 0   Total Score 18   If you checked off any problems, how difficult have these problems made it for you to do your work, take care of things at home, or get along with other people? Somewhat difficult   Little interest or pleasure in doing things -   Feeling down, depressed, or hopeless -   Trouble falling or staying asleep, or sleeping too much -    Feeling tired or having little energy -   Poor appetite or overeating -   Feeling bad about yourself - or that you are a failure or have let yourself or your family down -   Trouble concentrating on things, such as reading the newspaper or watching television -   Moving or speaking so slowly that other people could have noticed. Or the opposite - being so fidgety or restless that you have been moving around a lot more than usual -   Thoughts that you would be better off dead, or of hurting yourself in some way -   How difficult have these problems made it for you to do your work, take care of things at home, or get along with other people? -       Fall Risk Screen:  SAMIRA Fall Risk Assessment was completed, and patient is at LOW risk for falls.Assessment completed on:10/13/2020    Health Habits and Functional and Cognitive Screening:  Functional & Cognitive Status 10/13/2020   Do you have difficulty preparing food and eating? No   Do you have difficulty bathing yourself, getting dressed or grooming yourself? No   Do you have difficulty using the toilet? No   Do you have difficulty moving around from place to place? No   Do you have trouble with steps or getting out of a bed or a chair? No   Current Diet Well Balanced Diet   Dental Exam Up to date   Eye Exam Up to date   Exercise (times per week) 0 times per week   Current Exercise Activities Include None   Do you need help using the phone?  No   Are you deaf or do you have serious difficulty hearing?  No   Do you need help with transportation? No   Do you need help shopping? No   Do you need help preparing meals?  No   Do you need help with housework?  No   Do you need help with laundry? No   Do you need help taking your medications? No   Do you need help managing money? No   Do you ever drive or ride in a car without wearing a seat belt? No   Have you felt unusual stress, anger or loneliness in the last month? Yes   Who do you live with? Spouse   If you need  help, do you have trouble finding someone available to you? No   Have you been bothered in the last four weeks by sexual problems? No   Do you have difficulty concentrating, remembering or making decisions? No         Does the patient have evidence of cognitive impairment? No    Asprin use counseling:Does not need ASA (and currently is not on it)    Age-appropriate Screening Schedule:  Refer to the list below for future screening recommendations based on patient's age, sex and/or medical conditions. Orders for these recommended tests are listed in the plan section. The patient has been provided with a written plan.    Health Maintenance   Topic Date Due   • TDAP/TD VACCINES (1 - Tdap) 10/30/2020 (Originally 2/2/1969)   • ZOSTER VACCINE (1 of 2) 10/13/2021 (Originally [birth year]/2/1999)   • COLONOSCOPY  02/26/2025   • INFLUENZA VACCINE  Completed          The following portions of the patient's history were reviewed and updated as appropriate: allergies, current medications, past family history, past medical history, past social history, past surgical history and problem list.    Outpatient Medications Prior to Visit   Medication Sig Dispense Refill   • acetaminophen (TYLENOL) 325 MG tablet Take 650 mg by mouth.     • acetaminophen-codeine (TYLENOL #3) 300-30 MG per tablet Take 1-2 tablets by mouth Every 6 (Six) Hours As Needed for Moderate Pain . 20 tablet 0   • albuterol sulfate  (90 Base) MCG/ACT inhaler Inhale 2 puffs Every 4 (Four) Hours As Needed.     • busPIRone (BUSPAR) 10 MG tablet Take 0.5 tablets by mouth 2 (Two) Times a Day. 180 tablet 1   • Cholecalciferol (VITAMIN D3) 5000 UNITS capsule capsule Take 5,000 Units by mouth Daily.     • DULoxetine (Cymbalta) 20 MG capsule Take 1 capsule by mouth Daily. 30 capsule 1   • fluticasone (FLONASE) 50 MCG/ACT nasal spray Use 2 Sprays in each nostril once daily. prn     • Fluticasone Furoate-Vilanterol (BREO ELLIPTA) 100-25 MCG/INH inhaler Inhale 1 puff Daily. 1 each  2   • hydrOXYzine (ATARAX) 25 MG tablet Take 1 tablet by mouth Every 8 (Eight) Hours As Needed for Anxiety. 30 tablet 0   • levothyroxine (SYNTHROID, LEVOTHROID) 88 MCG tablet TAKE ONE TABLET BY MOUTH DAILY 90 tablet 1   • meloxicam (MOBIC) 15 MG tablet Take 1 tablet by mouth Daily. 90 tablet 1   • omeprazole (priLOSEC) 40 MG capsule Take 1 capsule by mouth Daily. 90 capsule 3   • ondansetron (ZOFRAN) 4 MG tablet Take 4 mg by mouth Every 8 (Eight) Hours As Needed for Nausea or Vomiting.     • promethazine (PHENERGAN) 25 MG tablet Take 25 mg by mouth Every 6 (Six) Hours As Needed for Nausea or Vomiting.     • tiZANidine (ZANAFLEX) 4 MG tablet Take 1 tablet by mouth Every 8 (Eight) Hours As Needed for Muscle Spasms. 30 tablet 0   • vitamin D (ERGOCALCIFEROL) 1.25 MG (46778 UT) capsule capsule Take 1 capsule by mouth 1 (One) Time Per Week. 12 capsule 0     Facility-Administered Medications Prior to Visit   Medication Dose Route Frequency Provider Last Rate Last Dose   • cyanocobalamin injection 1,000 mcg  1,000 mcg Intramuscular Q28 Days Kehrer, Meredith Lea, MD   1,000 mcg at 10/13/20 1401   • cyanocobalamin injection 1,000 mcg  1,000 mcg Intramuscular Q28 Days Kehrer, Meredith Lea, MD   1,000 mcg at 08/11/20 1300       Patient Active Problem List   Diagnosis   • Wheezing   • Osteoarthritis   • Acute bronchitis   • Dyspnea   • Hypothyroidism   • Shortness of breath   • Vitamin D deficiency   • Depression with anxiety   • Muscle cramp   • Back pain   • Vitamin B12 deficiency       Advanced Care Planning:  ACP discussion was held with the patient during this visit. Patient does not have an advance directive, information provided.    Review of Systems   Constitutional: Positive for fatigue. Negative for chills and fever.   HENT: Negative for congestion, ear pain, rhinorrhea and sore throat.    Eyes: Negative for pain and redness.   Respiratory: Negative for cough, chest tightness and wheezing.    Cardiovascular: Negative  "for chest pain and leg swelling.   Gastrointestinal: Negative for abdominal pain, constipation, diarrhea, nausea and vomiting.   Endocrine: Negative for polydipsia and polyphagia.   Genitourinary: Negative for dysuria and hematuria.   Musculoskeletal: Negative for arthralgias and myalgias.   Skin: Negative for color change and rash.   Allergic/Immunologic: Negative.    Neurological: Negative for dizziness, weakness, light-headedness and headaches.   Hematological: Negative.    Psychiatric/Behavioral: Positive for dysphoric mood. Negative for confusion, decreased concentration, hallucinations, self-injury, sleep disturbance and suicidal ideas. The patient is nervous/anxious.        Compared to one year ago, the patient feels her physical health is the same.  Compared to one year ago, the patient feels her mental health is worse.    Reviewed chart for potential of high risk medication in the elderly: yes  Reviewed chart for potential of harmful drug interactions in the elderly:yes    Objective         Vitals:    10/13/20 1309   BP: 126/78   Pulse: 76   Temp: 97.1 °F (36.2 °C)   SpO2: 98%   Weight: 82.7 kg (182 lb 6.4 oz)   Height: 154.9 cm (61\")       Body mass index is 34.46 kg/m².  Discussed the patient's BMI with her. The BMI is above average; BMI management plan is completed.    Physical Exam  Constitutional:       General: She is not in acute distress.     Appearance: Normal appearance. She is well-developed.   HENT:      Head: Normocephalic and atraumatic.      Right Ear: Tympanic membrane, ear canal and external ear normal.      Left Ear: Tympanic membrane, ear canal and external ear normal.      Mouth/Throat:      Mouth: Mucous membranes are moist.      Pharynx: Oropharynx is clear.   Eyes:      Conjunctiva/sclera: Conjunctivae normal.      Pupils: Pupils are equal, round, and reactive to light.   Neck:      Musculoskeletal: Neck supple.      Thyroid: No thyromegaly.   Cardiovascular:      Rate and Rhythm: " Normal rate and regular rhythm.      Heart sounds: No murmur.   Pulmonary:      Effort: Pulmonary effort is normal.      Breath sounds: Normal breath sounds. No wheezing.   Abdominal:      General: Bowel sounds are normal.      Palpations: Abdomen is soft.      Tenderness: There is no abdominal tenderness.   Musculoskeletal: Normal range of motion.   Lymphadenopathy:      Cervical: No cervical adenopathy.   Skin:     General: Skin is warm and dry.   Neurological:      Mental Status: She is alert and oriented to person, place, and time.   Psychiatric:         Mood and Affect: Mood normal.         Behavior: Behavior normal.         Lab Results   Component Value Date     (H) 09/16/2020        Assessment/Plan   Medicare Risks and Personalized Health Plan  CMS Preventative Services Quick Reference  Depression/Dysphoria  Immunizations Discussed/Encouraged (specific immunizations; adacel Tdap, Pneumococcal 23 and Shingrix )    The above risks/problems have been discussed with the patient.  Pertinent information has been shared with the patient in the After Visit Summary.  Follow up plans and orders are seen below in the Assessment/Plan Section.    Diagnoses and all orders for this visit:    1. Need for pneumococcal vaccine (Primary)  -     Pneumococcal Polysaccharide Vaccine 23-Valent Greater Than or Equal To 1yo Subcutaneous / IM    2. B12 deficiency    3. Hypothyroidism, unspecified type    4. Depression with anxiety  -     Ambulatory Referral to Psychiatry      Follow Up:  Return in about 3 months (around 1/13/2021) for Recheck thyroid and vit D.     An After Visit Summary and PPPS were given to the patient.

## 2020-10-30 ENCOUNTER — TRANSCRIBE ORDERS (OUTPATIENT)
Dept: ADMINISTRATIVE | Facility: HOSPITAL | Age: 70
End: 2020-10-30

## 2020-10-30 DIAGNOSIS — N39.0 URINARY TRACT INFECTION WITHOUT HEMATURIA, SITE UNSPECIFIED: Primary | ICD-10-CM

## 2020-10-30 RX ORDER — CEFTRIAXONE SODIUM 1 G/50ML
1 INJECTION, SOLUTION INTRAVENOUS EVERY 24 HOURS
Status: CANCELLED | OUTPATIENT
Start: 2020-11-02

## 2020-11-02 ENCOUNTER — TELEPHONE (OUTPATIENT)
Dept: FAMILY MEDICINE CLINIC | Facility: CLINIC | Age: 70
End: 2020-11-02

## 2020-11-02 ENCOUNTER — HOSPITAL ENCOUNTER (OUTPATIENT)
Dept: INFUSION THERAPY | Facility: HOSPITAL | Age: 70
Discharge: HOME | End: 2020-11-02
Admitting: NURSE PRACTITIONER

## 2020-11-02 VITALS
SYSTOLIC BLOOD PRESSURE: 157 MMHG | DIASTOLIC BLOOD PRESSURE: 85 MMHG | OXYGEN SATURATION: 96 % | HEART RATE: 73 BPM | TEMPERATURE: 98.2 F | RESPIRATION RATE: 18 BRPM

## 2020-11-02 DIAGNOSIS — N39.0 URINARY TRACT INFECTION WITHOUT HEMATURIA, SITE UNSPECIFIED: Primary | ICD-10-CM

## 2020-11-02 PROCEDURE — 25010000002 CEFTRIAXONE PER 250 MG: Performed by: NURSE PRACTITIONER

## 2020-11-02 PROCEDURE — 96372 THER/PROPH/DIAG INJ SC/IM: CPT

## 2020-11-02 PROCEDURE — 25010000003 LIDOCAINE 1 % SOLUTION: Performed by: NURSE PRACTITIONER

## 2020-11-02 RX ORDER — LIDOCAINE HYDROCHLORIDE 10 MG/ML
2.1 INJECTION, SOLUTION INFILTRATION; PERINEURAL ONCE
Status: COMPLETED | OUTPATIENT
Start: 2020-11-02 | End: 2020-11-02

## 2020-11-02 RX ORDER — LIDOCAINE HYDROCHLORIDE 10 MG/ML
2.1 INJECTION, SOLUTION INFILTRATION; PERINEURAL ONCE
Status: CANCELLED
Start: 2020-11-02

## 2020-11-02 RX ORDER — LIDOCAINE HYDROCHLORIDE 10 MG/ML
2.1 INJECTION, SOLUTION INFILTRATION; PERINEURAL ONCE
Status: CANCELLED
Start: 2020-11-03

## 2020-11-02 RX ORDER — CEFTRIAXONE 1 G/1
1 INJECTION, POWDER, FOR SOLUTION INTRAMUSCULAR; INTRAVENOUS ONCE
Status: COMPLETED | OUTPATIENT
Start: 2020-11-02 | End: 2020-11-02

## 2020-11-02 RX ORDER — CEFTRIAXONE 1 G/1
1 INJECTION, POWDER, FOR SOLUTION INTRAMUSCULAR; INTRAVENOUS ONCE
Status: CANCELLED | OUTPATIENT
Start: 2020-11-03

## 2020-11-02 RX ORDER — CEFTRIAXONE 1 G/1
1 INJECTION, POWDER, FOR SOLUTION INTRAMUSCULAR; INTRAVENOUS ONCE
Status: CANCELLED | OUTPATIENT
Start: 2020-11-02

## 2020-11-02 RX ADMIN — CEFTRIAXONE SODIUM 1 G: 1 INJECTION, POWDER, FOR SOLUTION INTRAMUSCULAR; INTRAVENOUS at 10:55

## 2020-11-02 RX ADMIN — LIDOCAINE HYDROCHLORIDE 2.1 ML: 10 INJECTION, SOLUTION INFILTRATION; PERINEURAL at 10:55

## 2020-11-02 NOTE — PROGRESS NOTES
Patient tolerated injection without complaints. Patient observed x 30 minutes post injection. D/C'd from [facility] ambulatory after observation period per ED Soto RN.    5970-Message left for MACHO Terrell regarding patient question of if she should continue PO Keflex daily that she is taking at home while she's getting Rocephin IM injections.      AVS declined.

## 2020-11-03 ENCOUNTER — HOSPITAL ENCOUNTER (OUTPATIENT)
Dept: INFUSION THERAPY | Facility: HOSPITAL | Age: 70
Setting detail: INFUSION SERIES
Discharge: HOME | End: 2020-11-03

## 2020-11-03 VITALS
TEMPERATURE: 98.3 F | RESPIRATION RATE: 20 BRPM | OXYGEN SATURATION: 94 % | SYSTOLIC BLOOD PRESSURE: 148 MMHG | DIASTOLIC BLOOD PRESSURE: 81 MMHG | HEART RATE: 73 BPM

## 2020-11-03 DIAGNOSIS — N39.0 URINARY TRACT INFECTION WITHOUT HEMATURIA, SITE UNSPECIFIED: Primary | ICD-10-CM

## 2020-11-03 PROCEDURE — 96372 THER/PROPH/DIAG INJ SC/IM: CPT

## 2020-11-03 PROCEDURE — 25010000002 CEFTRIAXONE PER 250 MG: Performed by: NURSE PRACTITIONER

## 2020-11-03 PROCEDURE — 25010000003 LIDOCAINE 1 % SOLUTION: Performed by: NURSE PRACTITIONER

## 2020-11-03 RX ORDER — CEFTRIAXONE 1 G/1
1 INJECTION, POWDER, FOR SOLUTION INTRAMUSCULAR; INTRAVENOUS ONCE
Status: COMPLETED | OUTPATIENT
Start: 2020-11-03 | End: 2020-11-03

## 2020-11-03 RX ORDER — LIDOCAINE HYDROCHLORIDE 10 MG/ML
2.1 INJECTION, SOLUTION INFILTRATION; PERINEURAL ONCE
Status: COMPLETED | OUTPATIENT
Start: 2020-11-03 | End: 2020-11-03

## 2020-11-03 RX ORDER — LIDOCAINE HYDROCHLORIDE 10 MG/ML
2.1 INJECTION, SOLUTION INFILTRATION; PERINEURAL ONCE
Status: CANCELLED
Start: 2020-11-04

## 2020-11-03 RX ORDER — CEFTRIAXONE 1 G/1
1 INJECTION, POWDER, FOR SOLUTION INTRAMUSCULAR; INTRAVENOUS ONCE
Status: CANCELLED | OUTPATIENT
Start: 2020-11-04

## 2020-11-03 RX ADMIN — CEFTRIAXONE SODIUM 1 G: 1 INJECTION, POWDER, FOR SOLUTION INTRAMUSCULAR; INTRAVENOUS at 11:41

## 2020-11-03 RX ADMIN — LIDOCAINE HYDROCHLORIDE 2.1 ML: 10 INJECTION, SOLUTION INFILTRATION; PERINEURAL at 11:42

## 2020-11-04 ENCOUNTER — HOSPITAL ENCOUNTER (OUTPATIENT)
Dept: INFUSION THERAPY | Facility: HOSPITAL | Age: 70
Discharge: HOME | End: 2020-11-04
Admitting: NURSE PRACTITIONER

## 2020-11-04 VITALS
TEMPERATURE: 97.8 F | DIASTOLIC BLOOD PRESSURE: 94 MMHG | OXYGEN SATURATION: 96 % | SYSTOLIC BLOOD PRESSURE: 142 MMHG | RESPIRATION RATE: 16 BRPM | HEART RATE: 84 BPM

## 2020-11-04 DIAGNOSIS — N39.0 URINARY TRACT INFECTION WITHOUT HEMATURIA, SITE UNSPECIFIED: Primary | ICD-10-CM

## 2020-11-04 PROCEDURE — 25010000002 CEFTRIAXONE PER 250 MG: Performed by: NURSE PRACTITIONER

## 2020-11-04 PROCEDURE — 96372 THER/PROPH/DIAG INJ SC/IM: CPT

## 2020-11-04 PROCEDURE — 25010000003 LIDOCAINE 1 % SOLUTION: Performed by: NURSE PRACTITIONER

## 2020-11-04 RX ORDER — CEFTRIAXONE 1 G/1
1 INJECTION, POWDER, FOR SOLUTION INTRAMUSCULAR; INTRAVENOUS ONCE
Status: COMPLETED | OUTPATIENT
Start: 2020-11-04 | End: 2020-11-04

## 2020-11-04 RX ORDER — LIDOCAINE HYDROCHLORIDE 10 MG/ML
2.1 INJECTION, SOLUTION INFILTRATION; PERINEURAL ONCE
Status: CANCELLED
Start: 2020-11-05

## 2020-11-04 RX ORDER — CEFTRIAXONE 1 G/1
1 INJECTION, POWDER, FOR SOLUTION INTRAMUSCULAR; INTRAVENOUS ONCE
Status: CANCELLED | OUTPATIENT
Start: 2020-11-05

## 2020-11-04 RX ORDER — OMEPRAZOLE 40 MG/1
CAPSULE, DELAYED RELEASE ORAL
Qty: 90 CAPSULE | Refills: 0 | Status: SHIPPED | OUTPATIENT
Start: 2020-11-04 | End: 2021-02-03

## 2020-11-04 RX ORDER — LIDOCAINE HYDROCHLORIDE 10 MG/ML
2.1 INJECTION, SOLUTION INFILTRATION; PERINEURAL ONCE
Status: COMPLETED | OUTPATIENT
Start: 2020-11-04 | End: 2020-11-04

## 2020-11-04 RX ADMIN — CEFTRIAXONE SODIUM 1 G: 1 INJECTION, POWDER, FOR SOLUTION INTRAMUSCULAR; INTRAVENOUS at 15:51

## 2020-11-04 RX ADMIN — LIDOCAINE HYDROCHLORIDE 2.1 ML: 10 INJECTION, SOLUTION INFILTRATION; PERINEURAL at 15:52

## 2020-11-05 ENCOUNTER — HOSPITAL ENCOUNTER (OUTPATIENT)
Dept: INFUSION THERAPY | Facility: HOSPITAL | Age: 70
Discharge: HOME | End: 2020-11-05
Admitting: NURSE PRACTITIONER

## 2020-11-05 VITALS
OXYGEN SATURATION: 98 % | HEART RATE: 78 BPM | SYSTOLIC BLOOD PRESSURE: 135 MMHG | TEMPERATURE: 97.6 F | RESPIRATION RATE: 18 BRPM | DIASTOLIC BLOOD PRESSURE: 78 MMHG

## 2020-11-05 DIAGNOSIS — N39.0 URINARY TRACT INFECTION WITHOUT HEMATURIA, SITE UNSPECIFIED: Primary | ICD-10-CM

## 2020-11-05 PROCEDURE — 25010000002 CEFTRIAXONE PER 250 MG: Performed by: NURSE PRACTITIONER

## 2020-11-05 PROCEDURE — 96372 THER/PROPH/DIAG INJ SC/IM: CPT

## 2020-11-05 PROCEDURE — 25010000003 LIDOCAINE 1 % SOLUTION: Performed by: NURSE PRACTITIONER

## 2020-11-05 RX ORDER — LIDOCAINE HYDROCHLORIDE 10 MG/ML
2.1 INJECTION, SOLUTION INFILTRATION; PERINEURAL ONCE
Status: COMPLETED | OUTPATIENT
Start: 2020-11-05 | End: 2020-11-05

## 2020-11-05 RX ORDER — CEFTRIAXONE 1 G/1
1 INJECTION, POWDER, FOR SOLUTION INTRAMUSCULAR; INTRAVENOUS ONCE
Status: CANCELLED | OUTPATIENT
Start: 2020-11-06

## 2020-11-05 RX ORDER — CEFTRIAXONE 1 G/1
1 INJECTION, POWDER, FOR SOLUTION INTRAMUSCULAR; INTRAVENOUS ONCE
Status: COMPLETED | OUTPATIENT
Start: 2020-11-05 | End: 2020-11-05

## 2020-11-05 RX ORDER — LIDOCAINE HYDROCHLORIDE 10 MG/ML
2.1 INJECTION, SOLUTION INFILTRATION; PERINEURAL ONCE
Status: CANCELLED
Start: 2020-11-06

## 2020-11-05 RX ADMIN — LIDOCAINE HYDROCHLORIDE 2.1 ML: 10 INJECTION, SOLUTION INFILTRATION; PERINEURAL at 14:41

## 2020-11-05 RX ADMIN — CEFTRIAXONE SODIUM 1 G: 1 INJECTION, POWDER, FOR SOLUTION INTRAMUSCULAR; INTRAVENOUS at 14:41

## 2020-11-06 ENCOUNTER — HOSPITAL ENCOUNTER (OUTPATIENT)
Dept: INFUSION THERAPY | Facility: HOSPITAL | Age: 70
Discharge: HOME | End: 2020-11-06
Admitting: NURSE PRACTITIONER

## 2020-11-06 VITALS
HEART RATE: 80 BPM | TEMPERATURE: 97.9 F | RESPIRATION RATE: 20 BRPM | OXYGEN SATURATION: 96 % | SYSTOLIC BLOOD PRESSURE: 130 MMHG | DIASTOLIC BLOOD PRESSURE: 77 MMHG

## 2020-11-06 DIAGNOSIS — N39.0 URINARY TRACT INFECTION WITHOUT HEMATURIA, SITE UNSPECIFIED: Primary | ICD-10-CM

## 2020-11-06 PROCEDURE — 25010000003 LIDOCAINE 1 % SOLUTION: Performed by: NURSE PRACTITIONER

## 2020-11-06 PROCEDURE — 25010000002 CEFTRIAXONE PER 250 MG: Performed by: NURSE PRACTITIONER

## 2020-11-06 PROCEDURE — 96372 THER/PROPH/DIAG INJ SC/IM: CPT

## 2020-11-06 RX ORDER — LIDOCAINE HYDROCHLORIDE 10 MG/ML
2.1 INJECTION, SOLUTION INFILTRATION; PERINEURAL ONCE
Status: CANCELLED
Start: 2020-11-06

## 2020-11-06 RX ORDER — CEFTRIAXONE 1 G/1
1 INJECTION, POWDER, FOR SOLUTION INTRAMUSCULAR; INTRAVENOUS ONCE
Status: COMPLETED | OUTPATIENT
Start: 2020-11-06 | End: 2020-11-06

## 2020-11-06 RX ORDER — CEFTRIAXONE 1 G/1
1 INJECTION, POWDER, FOR SOLUTION INTRAMUSCULAR; INTRAVENOUS ONCE
Status: CANCELLED | OUTPATIENT
Start: 2020-11-06

## 2020-11-06 RX ORDER — LIDOCAINE HYDROCHLORIDE 10 MG/ML
2.1 INJECTION, SOLUTION INFILTRATION; PERINEURAL ONCE
Status: COMPLETED | OUTPATIENT
Start: 2020-11-06 | End: 2020-11-06

## 2020-11-06 RX ADMIN — LIDOCAINE HYDROCHLORIDE 2.1 ML: 10 INJECTION, SOLUTION INFILTRATION; PERINEURAL at 14:04

## 2020-11-06 RX ADMIN — CEFTRIAXONE SODIUM 1 G: 1 INJECTION, POWDER, FOR SOLUTION INTRAMUSCULAR; INTRAVENOUS at 14:03

## 2020-11-20 ENCOUNTER — CLINICAL SUPPORT (OUTPATIENT)
Dept: FAMILY MEDICINE CLINIC | Facility: CLINIC | Age: 70
End: 2020-11-20

## 2020-11-20 VITALS — TEMPERATURE: 98.2 F

## 2020-11-20 DIAGNOSIS — E53.8 VITAMIN B12 DEFICIENCY: Primary | ICD-10-CM

## 2020-11-20 PROCEDURE — 96372 THER/PROPH/DIAG INJ SC/IM: CPT | Performed by: FAMILY MEDICINE

## 2020-11-20 RX ADMIN — CYANOCOBALAMIN 1000 MCG: 1000 INJECTION, SOLUTION INTRAMUSCULAR; SUBCUTANEOUS at 13:04

## 2020-11-25 DIAGNOSIS — F41.8 DEPRESSION WITH ANXIETY: ICD-10-CM

## 2020-11-25 RX ORDER — DULOXETIN HYDROCHLORIDE 20 MG/1
CAPSULE, DELAYED RELEASE ORAL
Qty: 30 CAPSULE | Refills: 0 | OUTPATIENT
Start: 2020-11-25

## 2020-12-01 ENCOUNTER — PATIENT MESSAGE (OUTPATIENT)
Dept: FAMILY MEDICINE CLINIC | Facility: CLINIC | Age: 70
End: 2020-12-01

## 2020-12-01 DIAGNOSIS — F41.8 DEPRESSION WITH ANXIETY: ICD-10-CM

## 2020-12-01 RX ORDER — DULOXETIN HYDROCHLORIDE 20 MG/1
CAPSULE, DELAYED RELEASE ORAL
Qty: 30 CAPSULE | Refills: 0 | OUTPATIENT
Start: 2020-12-01

## 2020-12-02 RX ORDER — DULOXETIN HYDROCHLORIDE 20 MG/1
20 CAPSULE, DELAYED RELEASE ORAL DAILY
Qty: 90 CAPSULE | Refills: 0 | Status: SHIPPED | OUTPATIENT
Start: 2020-12-02 | End: 2021-01-14

## 2020-12-02 NOTE — TELEPHONE ENCOUNTER
PATIENT IS STILL DOING DULOXETINE 20MG DAILY, WHEN SHE WENT TO ANOTHER [facility] FACILITY IT WAS DC'D BY ANOTHER NURSE. SHE IS REQUESTING A REFILL.

## 2020-12-02 NOTE — TELEPHONE ENCOUNTER
From: Jemima Bell  To: Meredith Lea Kehrer, MD  Sent: 12/1/2020 4:50 PM EST  Subject: Prescription Question    I see duloxetine is denied. Please advise.

## 2020-12-28 ENCOUNTER — TELEPHONE (OUTPATIENT)
Dept: FAMILY MEDICINE CLINIC | Facility: CLINIC | Age: 70
End: 2020-12-28

## 2020-12-28 NOTE — TELEPHONE ENCOUNTER
Please advise patient that Dr. Kehrer is out of the office, but I reviewed her chart.  She may trial some Biofreeze or diclofenac gel over-the-counter.  She would like to talk about further options she needs to make an appointment with myself or Dr. Canseco as Dr. Kehrer will be out of the office a week.

## 2020-12-28 NOTE — TELEPHONE ENCOUNTER
Regarding: Non-Urgent Medical Question  Contact: [phone]  ----- Message from Caitlyn Horan MA sent at 12/28/2020 11:18 AM EST -----  kehrers pt, please advise     ----- Message from Jemima Bell to Kehrer, Meredith Lea, MD sent at 12/28/2020  8:49 AM -----   My arthritis is really bothering me, especially hands and also hips. I. need some direction. Tylenol is of very little if any help. Please advise.

## 2021-01-14 ENCOUNTER — TELEPHONE (OUTPATIENT)
Dept: FAMILY MEDICINE CLINIC | Facility: CLINIC | Age: 71
End: 2021-01-14

## 2021-01-14 ENCOUNTER — OFFICE VISIT (OUTPATIENT)
Dept: FAMILY MEDICINE CLINIC | Facility: CLINIC | Age: 71
End: 2021-01-14

## 2021-01-14 VITALS
WEIGHT: 193.6 LBS | BODY MASS INDEX: 35.63 KG/M2 | RESPIRATION RATE: 18 BRPM | OXYGEN SATURATION: 95 % | DIASTOLIC BLOOD PRESSURE: 76 MMHG | HEART RATE: 80 BPM | SYSTOLIC BLOOD PRESSURE: 112 MMHG | TEMPERATURE: 97.7 F | HEIGHT: 62 IN

## 2021-01-14 DIAGNOSIS — I45.4 INCOMPLETE BUNDLE BRANCH BLOCK: ICD-10-CM

## 2021-01-14 DIAGNOSIS — H53.8 BLURRY VISION: ICD-10-CM

## 2021-01-14 DIAGNOSIS — R53.83 OTHER FATIGUE: ICD-10-CM

## 2021-01-14 DIAGNOSIS — E53.8 B12 DEFICIENCY: Chronic | ICD-10-CM

## 2021-01-14 DIAGNOSIS — M79.2 NEURITIS: ICD-10-CM

## 2021-01-14 DIAGNOSIS — E03.9 HYPOTHYROIDISM, UNSPECIFIED TYPE: Chronic | ICD-10-CM

## 2021-01-14 DIAGNOSIS — E55.9 VITAMIN D DEFICIENCY: Chronic | ICD-10-CM

## 2021-01-14 DIAGNOSIS — R07.89 BURNING IN THE CHEST: ICD-10-CM

## 2021-01-14 DIAGNOSIS — R53.1 WEAKNESS: Primary | ICD-10-CM

## 2021-01-14 PROCEDURE — 93000 ELECTROCARDIOGRAM COMPLETE: CPT | Performed by: FAMILY MEDICINE

## 2021-01-14 PROCEDURE — 99214 OFFICE O/P EST MOD 30 MIN: CPT | Performed by: FAMILY MEDICINE

## 2021-01-14 RX ORDER — PAROXETINE 10 MG/1
10 TABLET, FILM COATED ORAL DAILY
COMMUNITY
End: 2021-10-14

## 2021-01-14 NOTE — TELEPHONE ENCOUNTER
Patient wants to let Dr. Kehrer know that she has been extremely weak. Last week the patient spent in bed with migraines. Patients legs are so heavy she feels like she cannot lift them. Patients blood pressure has been running very low, 92/74.

## 2021-01-14 NOTE — PROGRESS NOTES
"Chief Complaint  Fatigue, Migraine, and Extremity Weakness    Subjective          Jemima Bell presents to [facility] for   Patient complains of being extremely fatigued with headaches and muscle weakness over the past several weeks.  She is not resting well.  Her psychiatrist did start her on Paxil couple weeks ago.  She is denied any side effects.  This all started before.  She has had some burning chest pain that goes to her right shoulder.    There is no reflux or heartburn otherwise.  She states that her vision gets blurry later in the day when she is tired.  She is not had any palpitations.  She does have some numbness and tingling but she is due to have some labs rechecked feeling this way.      Objective   Vital Signs:   /76 (BP Location: Left arm, Patient Position: Sitting, Cuff Size: Adult)   Pulse 80   Temp 97.7 °F (36.5 °C) (Temporal)   Resp 18   Ht 157.5 cm (62\")   Wt 87.8 kg (193 lb 9.6 oz)   SpO2 95%   BMI 35.41 kg/m²     Physical Exam  Constitutional:       General: She is not in acute distress.     Appearance: Normal appearance. She is well-developed.   HENT:      Head: Normocephalic and atraumatic.      Right Ear: Tympanic membrane, ear canal and external ear normal.      Left Ear: Tympanic membrane, ear canal and external ear normal.      Mouth/Throat:      Mouth: Mucous membranes are moist.      Pharynx: Oropharynx is clear.   Eyes:      Conjunctiva/sclera: Conjunctivae normal.      Pupils: Pupils are equal, round, and reactive to light.   Neck:      Musculoskeletal: Neck supple.      Thyroid: No thyromegaly.   Cardiovascular:      Rate and Rhythm: Normal rate and regular rhythm.      Heart sounds: No murmur.   Pulmonary:      Effort: Pulmonary effort is normal.      Breath sounds: Normal breath sounds. No wheezing.   Abdominal:      General: Bowel sounds are normal.      Palpations: Abdomen is soft.      Tenderness: There is no abdominal " tenderness.   Musculoskeletal: Normal range of motion.   Lymphadenopathy:      Cervical: No cervical adenopathy.   Skin:     General: Skin is warm and dry.   Neurological:      General: No focal deficit present.      Mental Status: She is alert and oriented to person, place, and time.      Cranial Nerves: No cranial nerve deficit.      Sensory: No sensory deficit.      Motor: Weakness present.      Coordination: Coordination normal.      Gait: Gait normal.      Deep Tendon Reflexes: Reflexes normal.   Psychiatric:         Mood and Affect: Mood normal.         Behavior: Behavior normal.        Result Review :            ECG 12 Lead    Date/Time: 1/14/2021 5:13 PM  Performed by: Kehrer, Meredith Lea, MD  Authorized by: Kehrer, Meredith Lea, MD   Comparison: not compared with previous ECG   Previous ECG: no previous ECG available  Rhythm: sinus rhythm  Rate: normal  BPM: 65  Conduction: incomplete right bundle branch block  ST Segments: ST segments normal  QRS axis: normal  Other: no other findings    Clinical impression: abnormal EKG              Assessment and Plan    Problem List Items Addressed This Visit        Endocrine and Metabolic    Hypothyroidism    Relevant Orders    TSH    Vitamin D deficiency    Relevant Orders    Vitamin D 25 Hydroxy      Other Visit Diagnoses     Weakness    -  Primary    Relevant Orders    CBC & Differential    Comprehensive Metabolic Panel    Myasthenia Gravis Full Panel    Blurry vision        Other fatigue        Burning in the chest        Neuritis        B12 deficiency  (Chronic)       Relevant Orders    Vitamin B12    Incomplete bundle branch block              Follow Up   No follow-ups on file.  Patient was given instructions and counseling regarding her condition or for health maintenance advice. Please see specific information pulled into the AVS if appropriate.

## 2021-01-18 ENCOUNTER — TELEPHONE (OUTPATIENT)
Dept: FAMILY MEDICINE CLINIC | Facility: CLINIC | Age: 71
End: 2021-01-18

## 2021-01-18 NOTE — TELEPHONE ENCOUNTER
.    Caller: Jemima Bell    Relationship: Self    Best call back number:[phone]    Caller requesting test results: Patient    What test was performed: Labs    When was the test performed: 01/14/21    Where was the test performed: [facility]    Please advise.

## 2021-01-18 NOTE — TELEPHONE ENCOUNTER
Checked labs and they are not finished yet. Spoke to pt and let her know and she was fine with that

## 2021-01-20 ENCOUNTER — TELEPHONE (OUTPATIENT)
Dept: FAMILY MEDICINE CLINIC | Facility: CLINIC | Age: 71
End: 2021-01-20

## 2021-01-20 NOTE — TELEPHONE ENCOUNTER
"Message from after hours\"      Call Type: Non Urgent          Reg Dr: Kehrer  Name/Facility: [facility]         Pt Name: Same           Phone:  [phone];             : 1950         Pt Preg: NO             Msg: Returning A. Missed  Call From [facility]    --------------------------------------  Message History  Account: [id number]  Taken:  2021  4:54p   Serial#: 1    "

## 2021-01-21 ENCOUNTER — TELEPHONE (OUTPATIENT)
Dept: FAMILY MEDICINE CLINIC | Facility: CLINIC | Age: 71
End: 2021-01-21

## 2021-01-28 LAB
25(OH)D3+25(OH)D2 SERPL-MCNC: 28.6 NG/ML (ref 30–100)
ACHR BIND AB SER-SCNC: 0.07 NMOL/L (ref 0–0.24)
ACHR BLOCK AB SER-ACNC: 17 % (ref 0–25)
ACHR MOD AB/ACHR TOTAL SFR SER: <12 % (ref 0–20)
ALBUMIN SERPL-MCNC: 4.5 G/DL (ref 3.7–4.7)
ALBUMIN/GLOB SERPL: 1.6 {RATIO} (ref 1.2–2.2)
ALP SERPL-CCNC: 75 IU/L (ref 39–117)
ALT SERPL-CCNC: 21 IU/L (ref 0–32)
AST SERPL-CCNC: 19 IU/L (ref 0–40)
BASOPHILS # BLD AUTO: 0.1 X10E3/UL (ref 0–0.2)
BASOPHILS NFR BLD AUTO: 1 %
BILIRUB SERPL-MCNC: 0.5 MG/DL (ref 0–1.2)
BUN SERPL-MCNC: 22 MG/DL (ref 8–27)
BUN/CREAT SERPL: 25 (ref 12–28)
CALCIUM SERPL-MCNC: 9.8 MG/DL (ref 8.7–10.3)
CHLORIDE SERPL-SCNC: 105 MMOL/L (ref 96–106)
CO2 SERPL-SCNC: 23 MMOL/L (ref 20–29)
CREAT SERPL-MCNC: 0.88 MG/DL (ref 0.57–1)
EOSINOPHIL # BLD AUTO: 0.3 X10E3/UL (ref 0–0.4)
EOSINOPHIL NFR BLD AUTO: 5 %
ERYTHROCYTE [DISTWIDTH] IN BLOOD BY AUTOMATED COUNT: 12.1 % (ref 11.7–15.4)
GLOBULIN SER CALC-MCNC: 2.8 G/DL (ref 1.5–4.5)
GLUCOSE SERPL-MCNC: 105 MG/DL (ref 65–99)
HCT VFR BLD AUTO: 39.5 % (ref 34–46.6)
HGB BLD-MCNC: 13.4 G/DL (ref 11.1–15.9)
IMM GRANULOCYTES # BLD AUTO: 0 X10E3/UL (ref 0–0.1)
IMM GRANULOCYTES NFR BLD AUTO: 0 %
LYMPHOCYTES # BLD AUTO: 1.8 X10E3/UL (ref 0.7–3.1)
LYMPHOCYTES NFR BLD AUTO: 28 %
MCH RBC QN AUTO: 31 PG (ref 26.6–33)
MCHC RBC AUTO-ENTMCNC: 33.9 G/DL (ref 31.5–35.7)
MCV RBC AUTO: 91 FL (ref 79–97)
MONOCYTES # BLD AUTO: 0.6 X10E3/UL (ref 0.1–0.9)
MONOCYTES NFR BLD AUTO: 9 %
NEUTROPHILS # BLD AUTO: 3.7 X10E3/UL (ref 1.4–7)
NEUTROPHILS NFR BLD AUTO: 57 %
PLATELET # BLD AUTO: 324 X10E3/UL (ref 150–450)
POTASSIUM SERPL-SCNC: 4.3 MMOL/L (ref 3.5–5.2)
PROT SERPL-MCNC: 7.3 G/DL (ref 6–8.5)
RBC # BLD AUTO: 4.32 X10E6/UL (ref 3.77–5.28)
SODIUM SERPL-SCNC: 142 MMOL/L (ref 134–144)
STRIA MUS AB TITR SER IF: NEGATIVE {TITER}
TSH SERPL DL<=0.005 MIU/L-ACNC: 0.47 UIU/ML (ref 0.45–4.5)
VIT B12 SERPL-MCNC: 535 PG/ML (ref 232–1245)
WBC # BLD AUTO: 6.4 X10E3/UL (ref 3.4–10.8)

## 2021-02-01 DIAGNOSIS — I45.4 INCOMPLETE BUNDLE BRANCH BLOCK: ICD-10-CM

## 2021-02-01 DIAGNOSIS — R53.83 OTHER FATIGUE: Primary | ICD-10-CM

## 2021-02-01 DIAGNOSIS — R07.89 BURNING IN THE CHEST: ICD-10-CM

## 2021-02-03 RX ORDER — OMEPRAZOLE 40 MG/1
CAPSULE, DELAYED RELEASE ORAL
Qty: 90 CAPSULE | Refills: 0 | Status: SHIPPED | OUTPATIENT
Start: 2021-02-03 | End: 2021-05-13

## 2021-02-10 ENCOUNTER — CLINICAL SUPPORT (OUTPATIENT)
Dept: FAMILY MEDICINE CLINIC | Facility: CLINIC | Age: 71
End: 2021-02-10

## 2021-02-10 PROCEDURE — 96372 THER/PROPH/DIAG INJ SC/IM: CPT | Performed by: FAMILY MEDICINE

## 2021-02-10 RX ADMIN — CYANOCOBALAMIN 1000 MCG: 1000 INJECTION, SOLUTION INTRAMUSCULAR; SUBCUTANEOUS at 16:01

## 2021-02-11 RX ORDER — LEVOTHYROXINE SODIUM 88 UG/1
TABLET ORAL
Qty: 90 TABLET | Refills: 0 | Status: SHIPPED | OUTPATIENT
Start: 2021-02-11 | End: 2021-05-18

## 2021-02-16 NOTE — PROGRESS NOTES
New patient     Abnormal EKG     Self Ref.   Subjective:        Jemima Bell is a 71 y.o. female who here for follow up    CC  Abnormal; ekg  Fatigue  Sob    Leg swelling and cold  HPI  71-year-old female with precordial chest pain as well as borderline hypertension here for the cardiac evaluation as well as establishment of the care as the patient has been complaining of shortness of breath    Jemima Bell has been complaining of the shortness of breath mild-to-moderate in intensity with mild-to-moderate usually relieved with rest associated with anxiety and fatigue    Patient also complaining of the bilateral leg swelling as well as feeling cold and gradually increasing fatigue     Problems Addressed this Visit        Cardiac and Vasculature    Abnormal EKG - Primary    Relevant Orders    Stress Test With Myocardial Perfusion (1 Day)    Adult Transthoracic Echo Complete W/ Cont if Necessary Per Protocol    Lipid Panel    Precordial pain    Relevant Orders    Stress Test With Myocardial Perfusion (1 Day)    Adult Transthoracic Echo Complete W/ Cont if Necessary Per Protocol    Lipid Panel    Borderline systolic HTN    Relevant Orders    Stress Test With Myocardial Perfusion (1 Day)    Adult Transthoracic Echo Complete W/ Cont if Necessary Per Protocol    Lipid Panel       Endocrine and Metabolic    Morbid obesity with BMI of 40.0-44.9, adult (CMS/[facility])    Relevant Orders    Stress Test With Myocardial Perfusion (1 Day)    Adult Transthoracic Echo Complete W/ Cont if Necessary Per Protocol    Lipid Panel       Mental Health    Anxiety    Relevant Orders    Stress Test With Myocardial Perfusion (1 Day)    Adult Transthoracic Echo Complete W/ Cont if Necessary Per Protocol    Lipid Panel      Diagnoses       Codes Comments    Abnormal EKG    -  Primary ICD-10-CM: R94.31  ICD-9-CM: 794.31     Precordial pain     ICD-10-CM: R07.2  ICD-9-CM: 786.51     Morbid obesity with BMI of 40.0-44.9, adult  (CMS/[facility])     ICD-10-CM: E66.01, Z68.41  ICD-9-CM: 278.01, V85.41     Anxiety     ICD-10-CM: F41.9  ICD-9-CM: 300.00     Borderline systolic HTN     ICD-10-CM: R03.0  ICD-9-CM: 796.2         .    The following portions of the patient's history were reviewed and updated as appropriate: allergies, current medications, past family history, past medical history, past social history, past surgical history and problem list.    Past Medical History:   Diagnosis Date   • Acute bronchitis    • Acute sinusitis    • Allergic rhinitis    • Anxiety    • Arthritis    • Back pain    • BMI 34.0-34.9,adult    • Cervicalgia    • Chills    • Deep vein thrombosis (DVT) of lower extremity (CMS/[facility])    • Depression    • Dermatitis    • Dyspnea    • Dysuria    • Esophageal reflux    • Fatigue    • Gastric ulcer    • GERD (gastroesophageal reflux disease)    • Headache    • Hypothyroidism    • Irritable bowel syndrome    • Lumbago    • Migraine    • Nausea    • Neuritis    • Osteoarthritis    • Osteoarthritis of knee    • Plantar fasciitis    • Pulled muscle    • Pulmonary embolism (CMS/[facility])    • Pyelonephritis    • Rheumatic fever    • Sore throat    • Torticollis    • Trapezius strain    • Urinary incontinence    • Urinary pain    • Urinary tract infection    • UTI symptoms    • Vitamin B1 deficiency    • Vitamin B12 deficiency    • Vitamin D deficiency    • Weakness    • Wheezing      reports that she has never smoked. She has never used smokeless tobacco. She reports that she does not drink alcohol or use drugs.   Family History   Problem Relation Age of Onset   • Arthritis Mother    • Depression Mother    • Hyperlipidemia Mother    • Hypertension Mother    • Hypertension Father    • Alcohol abuse Maternal Grandfather    • Depression Maternal Grandfather    • Heart disease Other         IN FEMALES BEFORE AGE 65       Review of Systems  Constitutional: fatigue  Gastrointestinal: No nausea, abdominal pain  Behavioral/Psych: No insomnia  "or anxiety   Cardiovascular cp, all other system reviewed and are neg  Objective:       Physical Exam           Physical Exam  /82   Pulse 64   Ht 157.5 cm (62.01\")   Wt 88.5 kg (195 lb)   LMP  (LMP Unknown)   BMI 35.65 kg/m²     General appearance: No acute changes   Eyes: Sclera conjunctiva normal, pupils reactive   HENT: Atraumatic; oropharynx clear with moist mucous membranes and no mucosal ulcerations;  Neck: Trachea midline; NECK, supple, no thyromegaly or lymphadenopathy   Lungs: Normal size and shape, normal breath sounds, equal distribution of air, no rales and rhonchi   CV: S1-S2 regular, no murmurs, no rub, no gallop   Abdomen: Soft, non-tender; no masses , no abnormal abdominal sounds   Extremities: No deformity , normal color , no peripheral edema   Skin: Normal temperature, turgor and texture; no rash, ulcers  Psych: Appropriate affect, alert and oriented to person, place and time             ECG 12 Lead    Date/Time: 2/17/2021 3:00 PM  Performed by: Tom Littlejohn MD  Authorized by: Tom Littlejohn MD   Comparison: compared with previous ECG   Similar to previous ECG  Rhythm: sinus rhythm  ST Flattening: all    Clinical impression: non-specific ECG              Echocardiogram:        Current Outpatient Medications:   •  acetaminophen (TYLENOL) 325 MG tablet, Take 650 mg by mouth., Disp: , Rfl:   •  albuterol sulfate  (90 Base) MCG/ACT inhaler, Inhale 2 puffs Every 4 (Four) Hours As Needed., Disp: , Rfl:   •  cefdinir (OMNICEF) 300 MG capsule, Take 1 capsule by mouth 2 (Two) Times a Day for 5 days., Disp: 10 capsule, Rfl: 0  •  fluticasone (FLONASE) 50 MCG/ACT nasal spray, Use 2 Sprays in each nostril once daily. prn, Disp: , Rfl:   •  hydrOXYzine (ATARAX) 25 MG tablet, Take 1 tablet by mouth Every 8 (Eight) Hours As Needed for Anxiety., Disp: 30 tablet, Rfl: 0  •  levothyroxine (SYNTHROID, LEVOTHROID) 88 MCG tablet, TAKE ONE TABLET BY MOUTH DAILY, Disp: 90 tablet, Rfl: " 0  •  omeprazole (priLOSEC) 40 MG capsule, TAKE ONE CAPSULE BY MOUTH DAILY, Disp: 90 capsule, Rfl: 0  •  PARoxetine (PAXIL) 10 MG tablet, Take 10 mg by mouth Daily., Disp: , Rfl:     Current Facility-Administered Medications:   •  cyanocobalamin injection 1,000 mcg, 1,000 mcg, Intramuscular, Q28 Days, Kehrer, Meredith Lea, MD, 1,000 mcg at 02/10/21 1601   Assessment:        Patient Active Problem List   Diagnosis   • Wheezing   • Osteoarthritis   • Acute bronchitis   • Dyspnea   • Hypothyroidism   • Shortness of breath   • Vitamin D deficiency   • Depression with anxiety   • Muscle cramp   • Back pain   • Vitamin B12 deficiency   • UTI (urinary tract infection)               Plan:            ICD-10-CM ICD-9-CM   1. Abnormal EKG  R94.31 794.31   2. Precordial pain  R07.2 786.51   3. Morbid obesity with BMI of 40.0-44.9, adult (CMS/[facility])  E66.01 278.01    Z68.41 V85.41   4. Anxiety  F41.9 300.00   5. Borderline systolic HTN  R03.0 796.2     1. Abnormal EKG  Considering the patient's symptoms as well as clinical situation and  EKG findings, along with cardiac risk factors, ischemic workup is necessary to rule out ischemic cardiomyopathy, stress induced arrhythmias, and functional capacity for diagnosis as well as prognostic consideration    - Stress Test With Myocardial Perfusion (1 Day); Future  - Adult Transthoracic Echo Complete W/ Cont if Necessary Per Protocol  - Lipid Panel; Future    2. Precordial pain  Considering patient's medical condition as well as the risk factors, patient will require echocardiogram for further evaluation for the LV function, four-chamber evaluation, including the pressures, valvular function and  pericardial disease and pericardial effusion    - Stress Test With Myocardial Perfusion (1 Day); Future  - Adult Transthoracic Echo Complete W/ Cont if Necessary Per Protocol  - Lipid Panel; Future    3. Morbid obesity with BMI of 40.0-44.9, adult (CMS/[facility])  Significant risk of obesity to CAD,  HTN has been explained  Advantages of wt reduction has been explained    - Stress Test With Myocardial Perfusion (1 Day); Future  - Adult Transthoracic Echo Complete W/ Cont if Necessary Per Protocol  - Lipid Panel; Future    4. Anxiety  Counseling has been done  - Stress Test With Myocardial Perfusion (1 Day); Future  - Adult Transthoracic Echo Complete W/ Cont if Necessary Per Protocol  - Lipid Panel; Future    5. Borderline systolic HTN  Continue to watch  - Stress Test With Myocardial Perfusion (1 Day); Future  - Adult Transthoracic Echo Complete W/ Cont if Necessary Per Protocol  - Lipid Panel; Future       Flp, walking carolann, echo    See in 2-3 wks  COUNSELING:    Jemima Spiveyounseling was given to patient for the following topics: diagnostic results, risk factor reductions, impressions, risks and benefits of treatment options and importance of treatment compliance .       SMOKING COUNSELING:    [unfilled]    Dictated using Dragon dictation

## 2021-02-17 ENCOUNTER — OFFICE VISIT (OUTPATIENT)
Dept: CARDIOLOGY | Age: 71
End: 2021-02-17

## 2021-02-17 ENCOUNTER — OFFICE VISIT (OUTPATIENT)
Dept: FAMILY MEDICINE CLINIC | Facility: CLINIC | Age: 71
End: 2021-02-17

## 2021-02-17 VITALS
HEART RATE: 64 BPM | HEIGHT: 62 IN | SYSTOLIC BLOOD PRESSURE: 144 MMHG | BODY MASS INDEX: 35.88 KG/M2 | DIASTOLIC BLOOD PRESSURE: 82 MMHG | WEIGHT: 195 LBS

## 2021-02-17 VITALS — TEMPERATURE: 97.6 F

## 2021-02-17 VITALS
OXYGEN SATURATION: 96 % | BODY MASS INDEX: 36.14 KG/M2 | TEMPERATURE: 97.1 F | HEIGHT: 62 IN | SYSTOLIC BLOOD PRESSURE: 122 MMHG | DIASTOLIC BLOOD PRESSURE: 72 MMHG | WEIGHT: 196.4 LBS | HEART RATE: 78 BPM

## 2021-02-17 DIAGNOSIS — R07.2 PRECORDIAL PAIN: ICD-10-CM

## 2021-02-17 DIAGNOSIS — Z87.440 HISTORY OF RECURRENT UTIS: Chronic | ICD-10-CM

## 2021-02-17 DIAGNOSIS — R03.0 BORDERLINE SYSTOLIC HTN: ICD-10-CM

## 2021-02-17 DIAGNOSIS — R94.31 ABNORMAL EKG: Primary | ICD-10-CM

## 2021-02-17 DIAGNOSIS — N30.01 ACUTE CYSTITIS WITH HEMATURIA: Primary | ICD-10-CM

## 2021-02-17 DIAGNOSIS — E66.01 MORBID OBESITY WITH BMI OF 40.0-44.9, ADULT (HCC): ICD-10-CM

## 2021-02-17 DIAGNOSIS — F41.9 ANXIETY: ICD-10-CM

## 2021-02-17 DIAGNOSIS — N39.46 MIXED STRESS AND URGE URINARY INCONTINENCE: Chronic | ICD-10-CM

## 2021-02-17 DIAGNOSIS — R35.0 FREQUENCY OF URINATION: Chronic | ICD-10-CM

## 2021-02-17 LAB
BILIRUB BLD-MCNC: ABNORMAL MG/DL
CLARITY, POC: ABNORMAL
COLOR UR: YELLOW
GLUCOSE UR STRIP-MCNC: NEGATIVE MG/DL
KETONES UR QL: NEGATIVE
LEUKOCYTE EST, POC: ABNORMAL
NITRITE UR-MCNC: NEGATIVE MG/ML
PH UR: 6 [PH] (ref 5–8)
PROT UR STRIP-MCNC: ABNORMAL MG/DL
RBC # UR STRIP: NEGATIVE /UL
SP GR UR: 1.03 (ref 1–1.03)
UROBILINOGEN UR QL: NORMAL

## 2021-02-17 PROCEDURE — 81003 URINALYSIS AUTO W/O SCOPE: CPT | Performed by: FAMILY MEDICINE

## 2021-02-17 PROCEDURE — 93000 ELECTROCARDIOGRAM COMPLETE: CPT | Performed by: INTERNAL MEDICINE

## 2021-02-17 PROCEDURE — 99214 OFFICE O/P EST MOD 30 MIN: CPT | Performed by: FAMILY MEDICINE

## 2021-02-17 PROCEDURE — 99203 OFFICE O/P NEW LOW 30 MIN: CPT | Performed by: INTERNAL MEDICINE

## 2021-02-17 RX ORDER — CEFDINIR 300 MG/1
300 CAPSULE ORAL 2 TIMES DAILY
Qty: 10 CAPSULE | Refills: 0 | Status: SHIPPED | OUTPATIENT
Start: 2021-02-17 | End: 2021-02-22

## 2021-02-17 NOTE — PATIENT INSTRUCTIONS
We will treat the acute infection and follow-up pending culture results.  After this acute infection, we will likely resume the cephalexin for prophylaxis.

## 2021-02-17 NOTE — PROGRESS NOTES
"Chief Complaint  Urinary Tract Infection    Subjective          Jemima Bell presents to [facility]  Started with back pain over the past few days.  Has increased frequency and incontinence.   Took keflex for 3 months after seeing [facility] and did very well.   When she took the prophylactic Keflex before, her mixed incontinence went away completely.  She denies any fever chills nausea or vomiting.  She denies any injury to her back.  She has had similar back pain with UTIs in the past.           Objective   Vital Signs:   /72   Pulse 78   Temp 97.1 °F (36.2 °C)   Ht 157.5 cm (62.01\")   Wt 89.1 kg (196 lb 6.4 oz)   SpO2 96%   BMI 35.91 kg/m²     Physical Exam  Constitutional:       General: She is not in acute distress.     Appearance: Normal appearance. She is well-developed.   HENT:      Head: Normocephalic and atraumatic.      Right Ear: Tympanic membrane, ear canal and external ear normal.      Left Ear: Tympanic membrane, ear canal and external ear normal.      Mouth/Throat:      Mouth: Mucous membranes are moist.      Pharynx: Oropharynx is clear.   Eyes:      Conjunctiva/sclera: Conjunctivae normal.      Pupils: Pupils are equal, round, and reactive to light.   Neck:      Musculoskeletal: Neck supple.      Thyroid: No thyromegaly.   Cardiovascular:      Rate and Rhythm: Normal rate and regular rhythm.      Heart sounds: No murmur.   Pulmonary:      Effort: Pulmonary effort is normal.      Breath sounds: Normal breath sounds. No wheezing.   Abdominal:      General: Bowel sounds are normal.      Palpations: Abdomen is soft.      Tenderness: There is abdominal tenderness in the suprapubic area. There is right CVA tenderness. There is no left CVA tenderness, guarding or rebound.   Musculoskeletal: Normal range of motion.         General: No swelling, tenderness or deformity.   Lymphadenopathy:      Cervical: No cervical adenopathy.   Skin:     General: Skin " is warm and dry.   Neurological:      Mental Status: She is alert and oriented to person, place, and time.      Gait: Gait normal.   Psychiatric:         Mood and Affect: Mood normal.         Behavior: Behavior normal.        Result Review :   The following data was reviewed by: Meredith Lea Kehrer, MD on 02/17/2021:  UA    Urinalysis 7/2/20 7/20/20 2/17/21   Ketones, UA Negative Negative Negative   Leukocytes, UA Large (3+) (A) Negative Moderate (2+) (A)   (A) Abnormal value            Data reviewed: Consultant notes Reviewed last urology referral.         HISTORY AND PHYSICAL - SCAN - 10/21/20 [facility] (11/02/2020)    Assessment and Plan    Diagnoses and all orders for this visit:    1. Acute cystitis with hematuria (Primary)  -     cefdinir (OMNICEF) 300 MG capsule; Take 1 capsule by mouth 2 (Two) Times a Day for 5 days.  Dispense: 10 capsule; Refill: 0  -     Urine Culture - Urine, Urine, Clean Catch    2. Frequency of urination  -     POCT urinalysis dipstick, automated    3. Mixed stress and urge urinary incontinence    4. History of recurrent UTIs        Follow Up   No follow-ups on file.  Patient was given instructions and counseling regarding her condition or for health maintenance advice. Please see specific information pulled into the AVS if appropriate.

## 2021-02-19 ENCOUNTER — TRANSCRIBE ORDERS (OUTPATIENT)
Dept: ADMINISTRATIVE | Facility: HOSPITAL | Age: 71
End: 2021-02-19

## 2021-02-19 DIAGNOSIS — Z01.818 OTHER SPECIFIED PRE-OPERATIVE EXAMINATION: Primary | ICD-10-CM

## 2021-02-21 LAB
BACTERIA UR CULT: ABNORMAL
BACTERIA UR CULT: ABNORMAL
OTHER ANTIBIOTIC SUSC ISLT: ABNORMAL

## 2021-02-24 ENCOUNTER — TELEPHONE (OUTPATIENT)
Dept: CARDIOLOGY | Facility: CLINIC | Age: 71
End: 2021-02-24

## 2021-02-24 NOTE — TELEPHONE ENCOUNTER
----- Message from Prisca Frank sent at 2/22/2021  1:01 PM EST -----  Regarding: PLEASE CALL PATIENT  Contact: [phone]  RE: TEST RESULTS   RETURN CALL [facility]

## 2021-03-01 ENCOUNTER — LAB (OUTPATIENT)
Dept: LAB | Facility: HOSPITAL | Age: 71
End: 2021-03-01

## 2021-03-01 DIAGNOSIS — Z01.818 OTHER SPECIFIED PRE-OPERATIVE EXAMINATION: ICD-10-CM

## 2021-03-01 PROCEDURE — C9803 HOPD COVID-19 SPEC COLLECT: HCPCS

## 2021-03-01 PROCEDURE — U0004 COV-19 TEST NON-CDC HGH THRU: HCPCS

## 2021-03-02 LAB — SARS-COV-2 RNA RESP QL NAA+PROBE: NOT DETECTED

## 2021-03-03 ENCOUNTER — HOSPITAL ENCOUNTER (OUTPATIENT)
Dept: CARDIOLOGY | Facility: HOSPITAL | Age: 71
Discharge: HOME | End: 2021-03-03

## 2021-03-03 VITALS — BODY MASS INDEX: 35.88 KG/M2 | HEIGHT: 62 IN | WEIGHT: 195 LBS

## 2021-03-03 VITALS — DIASTOLIC BLOOD PRESSURE: 69 MMHG | HEART RATE: 58 BPM | SYSTOLIC BLOOD PRESSURE: 143 MMHG

## 2021-03-03 DIAGNOSIS — R94.31 ABNORMAL EKG: ICD-10-CM

## 2021-03-03 DIAGNOSIS — E66.01 MORBID OBESITY WITH BMI OF 40.0-44.9, ADULT (HCC): ICD-10-CM

## 2021-03-03 DIAGNOSIS — R07.2 PRECORDIAL PAIN: ICD-10-CM

## 2021-03-03 DIAGNOSIS — R03.0 BORDERLINE SYSTOLIC HTN: ICD-10-CM

## 2021-03-03 DIAGNOSIS — F41.9 ANXIETY: ICD-10-CM

## 2021-03-03 LAB
CHOLEST SERPL-MCNC: 194 MG/DL (ref 0–200)
HDLC SERPL-MCNC: 60 MG/DL (ref 40–60)
LDLC SERPL CALC-MCNC: 104 MG/DL (ref 0–100)
LDLC/HDLC SERPL: 1.66 {RATIO}
TRIGL SERPL-MCNC: 173 MG/DL (ref 0–150)
VLDLC SERPL-MCNC: 30 MG/DL (ref 5–40)

## 2021-03-03 PROCEDURE — 36415 COLL VENOUS BLD VENIPUNCTURE: CPT | Performed by: INTERNAL MEDICINE

## 2021-03-03 PROCEDURE — 0 TECHNETIUM SESTAMIBI: Performed by: INTERNAL MEDICINE

## 2021-03-03 PROCEDURE — 78452 HT MUSCLE IMAGE SPECT MULT: CPT | Performed by: INTERNAL MEDICINE

## 2021-03-03 PROCEDURE — 93016 CV STRESS TEST SUPVJ ONLY: CPT | Performed by: NURSE PRACTITIONER

## 2021-03-03 PROCEDURE — 25010000002 REGADENOSON 0.4 MG/5ML SOLUTION: Performed by: INTERNAL MEDICINE

## 2021-03-03 PROCEDURE — 80061 LIPID PANEL: CPT | Performed by: INTERNAL MEDICINE

## 2021-03-03 PROCEDURE — A9500 TC99M SESTAMIBI: HCPCS | Performed by: INTERNAL MEDICINE

## 2021-03-03 PROCEDURE — 93306 TTE W/DOPPLER COMPLETE: CPT | Performed by: INTERNAL MEDICINE

## 2021-03-03 PROCEDURE — 93018 CV STRESS TEST I&R ONLY: CPT | Performed by: INTERNAL MEDICINE

## 2021-03-03 PROCEDURE — 78452 HT MUSCLE IMAGE SPECT MULT: CPT

## 2021-03-03 PROCEDURE — 93017 CV STRESS TEST TRACING ONLY: CPT

## 2021-03-03 PROCEDURE — 93306 TTE W/DOPPLER COMPLETE: CPT

## 2021-03-03 RX ADMIN — TECHNETIUM TC 99M SESTAMIBI 1 DOSE: 1 INJECTION INTRAVENOUS at 08:05

## 2021-03-03 RX ADMIN — REGADENOSON 0.4 MG: 0.08 INJECTION, SOLUTION INTRAVENOUS at 10:19

## 2021-03-03 RX ADMIN — TECHNETIUM TC 99M SESTAMIBI 1 DOSE: 1 INJECTION INTRAVENOUS at 10:19

## 2021-03-04 LAB
AORTIC ARCH: 2.9 CM
ASCENDING AORTA: 1.8 CM
BH CV ECHO MEAS - ACS: 1.7 CM
BH CV ECHO MEAS - AO ARCH DIAM (PROXIMAL TRANS.): 2.9 CM
BH CV ECHO MEAS - AO MAX PG (FULL): 6.9 MMHG
BH CV ECHO MEAS - AO MAX PG: 9.4 MMHG
BH CV ECHO MEAS - AO MEAN PG (FULL): 3.5 MMHG
BH CV ECHO MEAS - AO MEAN PG: 5 MMHG
BH CV ECHO MEAS - AO ROOT AREA (BSA CORRECTED): 1.5
BH CV ECHO MEAS - AO ROOT AREA: 6.1 CM^2
BH CV ECHO MEAS - AO ROOT DIAM: 2.8 CM
BH CV ECHO MEAS - AO V2 MAX: 153.4 CM/SEC
BH CV ECHO MEAS - AO V2 MEAN: 105.7 CM/SEC
BH CV ECHO MEAS - AO V2 VTI: 37.1 CM
BH CV ECHO MEAS - ASC AORTA: 2.8 CM
BH CV ECHO MEAS - AVA(I,A): 2.2 CM^2
BH CV ECHO MEAS - AVA(I,D): 2.2 CM^2
BH CV ECHO MEAS - AVA(V,A): 1.9 CM^2
BH CV ECHO MEAS - AVA(V,D): 1.9 CM^2
BH CV ECHO MEAS - BSA(HAYCOCK): 2 M^2
BH CV ECHO MEAS - BSA: 1.9 M^2
BH CV ECHO MEAS - BZI_BMI: 35.7 KILOGRAMS/M^2
BH CV ECHO MEAS - BZI_METRIC_HEIGHT: 157.5 CM
BH CV ECHO MEAS - BZI_METRIC_WEIGHT: 88.5 KG
BH CV ECHO MEAS - EDV(CUBED): 66.2 ML
BH CV ECHO MEAS - EDV(MOD-SP2): 53 ML
BH CV ECHO MEAS - EDV(MOD-SP4): 68 ML
BH CV ECHO MEAS - EDV(TEICH): 71.9 ML
BH CV ECHO MEAS - EF(CUBED): 69.4 %
BH CV ECHO MEAS - EF(MOD-BP): 69.4 %
BH CV ECHO MEAS - EF(MOD-SP2): 67.9 %
BH CV ECHO MEAS - EF(MOD-SP4): 69.1 %
BH CV ECHO MEAS - EF(TEICH): 61.5 %
BH CV ECHO MEAS - ESV(CUBED): 20.3 ML
BH CV ECHO MEAS - ESV(MOD-SP2): 17 ML
BH CV ECHO MEAS - ESV(MOD-SP4): 21 ML
BH CV ECHO MEAS - ESV(TEICH): 27.7 ML
BH CV ECHO MEAS - FS: 32.6 %
BH CV ECHO MEAS - IVS/LVPW: 0.98
BH CV ECHO MEAS - IVSD: 1 CM
BH CV ECHO MEAS - LA DIMENSION: 3.6 CM
BH CV ECHO MEAS - LA/AO: 1.3
BH CV ECHO MEAS - LAT PEAK E' VEL: 15.4 CM/SEC
BH CV ECHO MEAS - LV DIASTOLIC VOL/BSA (35-75): 36 ML/M^2
BH CV ECHO MEAS - LV MASS(C)D: 135.8 GRAMS
BH CV ECHO MEAS - LV MASS(C)DI: 71.8 GRAMS/M^2
BH CV ECHO MEAS - LV MAX PG: 2.5 MMHG
BH CV ECHO MEAS - LV MEAN PG: 1.5 MMHG
BH CV ECHO MEAS - LV SYSTOLIC VOL/BSA (12-30): 11.1 ML/M^2
BH CV ECHO MEAS - LV V1 MAX: 78.6 CM/SEC
BH CV ECHO MEAS - LV V1 MEAN: 59.3 CM/SEC
BH CV ECHO MEAS - LV V1 VTI: 21.6 CM
BH CV ECHO MEAS - LVIDD: 4 CM
BH CV ECHO MEAS - LVIDS: 2.7 CM
BH CV ECHO MEAS - LVLD AP2: 5.8 CM
BH CV ECHO MEAS - LVLD AP4: 6.3 CM
BH CV ECHO MEAS - LVLS AP2: 5 CM
BH CV ECHO MEAS - LVLS AP4: 5 CM
BH CV ECHO MEAS - LVOT AREA (M): 3.8 CM^2
BH CV ECHO MEAS - LVOT AREA: 3.8 CM^2
BH CV ECHO MEAS - LVOT DIAM: 2.2 CM
BH CV ECHO MEAS - LVPWD: 1 CM
BH CV ECHO MEAS - MED PEAK E' VEL: 13.7 CM/SEC
BH CV ECHO MEAS - MV A DUR: 0.18 SEC
BH CV ECHO MEAS - MV A MAX VEL: 102.3 CM/SEC
BH CV ECHO MEAS - MV DEC SLOPE: 258.9 CM/SEC^2
BH CV ECHO MEAS - MV DEC TIME: 0.19 SEC
BH CV ECHO MEAS - MV E MAX VEL: 90.2 CM/SEC
BH CV ECHO MEAS - MV E/A: 0.88
BH CV ECHO MEAS - MV MAX PG: 3.7 MMHG
BH CV ECHO MEAS - MV MEAN PG: 1.4 MMHG
BH CV ECHO MEAS - MV P1/2T MAX VEL: 99.1 CM/SEC
BH CV ECHO MEAS - MV P1/2T: 112.1 MSEC
BH CV ECHO MEAS - MV V2 MAX: 96.4 CM/SEC
BH CV ECHO MEAS - MV V2 MEAN: 54.5 CM/SEC
BH CV ECHO MEAS - MV V2 VTI: 40.1 CM
BH CV ECHO MEAS - MVA P1/2T LCG: 2.2 CM^2
BH CV ECHO MEAS - MVA(P1/2T): 2 CM^2
BH CV ECHO MEAS - MVA(VTI): 2 CM^2
BH CV ECHO MEAS - PA ACC TIME: 0.1 SEC
BH CV ECHO MEAS - PA MAX PG (FULL): 2.5 MMHG
BH CV ECHO MEAS - PA MAX PG: 5.2 MMHG
BH CV ECHO MEAS - PA PR(ACCEL): 36.2 MMHG
BH CV ECHO MEAS - PA V2 MAX: 114 CM/SEC
BH CV ECHO MEAS - PULM A REVS DUR: 0.15 SEC
BH CV ECHO MEAS - PULM A REVS VEL: 28.2 CM/SEC
BH CV ECHO MEAS - PULM DIAS VEL: 34.6 CM/SEC
BH CV ECHO MEAS - PULM S/D: 0.84
BH CV ECHO MEAS - PULM SYS VEL: 29.2 CM/SEC
BH CV ECHO MEAS - PVA(V,A): 2.7 CM^2
BH CV ECHO MEAS - PVA(V,D): 2.7 CM^2
BH CV ECHO MEAS - QP/QS: 0.92
BH CV ECHO MEAS - RAP SYSTOLE: 3 MMHG
BH CV ECHO MEAS - RV MAX PG: 2.7 MMHG
BH CV ECHO MEAS - RV MEAN PG: 1.3 MMHG
BH CV ECHO MEAS - RV V1 MAX: 82.5 CM/SEC
BH CV ECHO MEAS - RV V1 MEAN: 51.2 CM/SEC
BH CV ECHO MEAS - RV V1 VTI: 20 CM
BH CV ECHO MEAS - RVOT AREA: 3.7 CM^2
BH CV ECHO MEAS - RVOT DIAM: 2.2 CM
BH CV ECHO MEAS - RVSP: 33.7 MMHG
BH CV ECHO MEAS - SI(AO): 119.2 ML/M^2
BH CV ECHO MEAS - SI(CUBED): 24.3 ML/M^2
BH CV ECHO MEAS - SI(LVOT): 43.1 ML/M^2
BH CV ECHO MEAS - SI(MOD-SP2): 19 ML/M^2
BH CV ECHO MEAS - SI(MOD-SP4): 24.9 ML/M^2
BH CV ECHO MEAS - SI(TEICH): 23.4 ML/M^2
BH CV ECHO MEAS - SUP REN AO DIAM: 1.9 CM
BH CV ECHO MEAS - SV(AO): 225.3 ML
BH CV ECHO MEAS - SV(CUBED): 46 ML
BH CV ECHO MEAS - SV(LVOT): 81.4 ML
BH CV ECHO MEAS - SV(MOD-SP2): 36 ML
BH CV ECHO MEAS - SV(MOD-SP4): 47 ML
BH CV ECHO MEAS - SV(RVOT): 75 ML
BH CV ECHO MEAS - SV(TEICH): 44.2 ML
BH CV ECHO MEAS - TAPSE (>1.6): 1.8 CM
BH CV ECHO MEAS - TR MAX VEL: 276.9 CM/SEC
BH CV ECHO MEASUREMENTS AVERAGE E/E' RATIO: 6.2
BH CV STRESS BP STAGE 1: NORMAL
BH CV STRESS COMMENTS STAGE 1: NORMAL
BH CV STRESS DOSE REGADENOSON STAGE 1: 0.4
BH CV STRESS DURATION MIN STAGE 1: 2
BH CV STRESS DURATION SEC STAGE 1: 30
BH CV STRESS GRADE STAGE 1: 5
BH CV STRESS HR STAGE 1: 100
BH CV STRESS METS STAGE 1: 3.5
BH CV STRESS PROTOCOL 1: NORMAL
BH CV STRESS PROTOCOL 2 BP STAGE 1: NORMAL
BH CV STRESS PROTOCOL 2 COMMENTS STAGE 1: NORMAL
BH CV STRESS PROTOCOL 2 DOSE REGADENOSON STAGE 1: 0.4
BH CV STRESS PROTOCOL 2 DURATION MIN STAGE 1: 1
BH CV STRESS PROTOCOL 2 DURATION SEC STAGE 1: 31
BH CV STRESS PROTOCOL 2 HR STAGE 1: 113
BH CV STRESS PROTOCOL 2 STAGE 1: 1
BH CV STRESS PROTOCOL 2: NORMAL
BH CV STRESS RECOVERY BP: NORMAL MMHG
BH CV STRESS RECOVERY HR: 81 BPM
BH CV STRESS RECOVERY O2: 96 %
BH CV STRESS SPEED STAGE 1: 1.7
BH CV STRESS STAGE 1: NORMAL
BH CV XLRA - RV BASE: 3.1 CM
BH CV XLRA - RV LENGTH: 6.7 CM
BH CV XLRA - RV MID: 3 CM
BH CV XLRA - TDI S': 13.2 CM/SEC
LEFT ATRIUM VOLUME INDEX: 20 ML/M2
LV EF NUC BP: 60 %
MAXIMAL PREDICTED HEART RATE: 148 BPM
MAXIMAL PREDICTED HEART RATE: 148 BPM
PERCENT MAX PREDICTED HR: 76.35 %
SINUS: 2.3 CM
STJ: 2.1 CM
STRESS BASELINE BP: NORMAL MMHG
STRESS BASELINE HR: 61 BPM
STRESS O2 SAT REST: 96 %
STRESS PERCENT HR: 90 %
STRESS POST ESTIMATED WORKLOAD: 3.5 METS
STRESS POST EXERCISE DUR MIN: 4 MIN
STRESS POST EXERCISE DUR SEC: 1 SEC
STRESS POST PEAK BP: NORMAL MMHG
STRESS POST PEAK HR: 113 BPM
STRESS TARGET HR: 126 BPM
STRESS TARGET HR: 126 BPM

## 2021-03-09 DIAGNOSIS — Z23 IMMUNIZATION DUE: ICD-10-CM

## 2021-03-10 ENCOUNTER — OFFICE VISIT (OUTPATIENT)
Dept: CARDIOLOGY | Age: 71
End: 2021-03-10

## 2021-03-10 VITALS
HEIGHT: 62 IN | HEART RATE: 65 BPM | BODY MASS INDEX: 36.44 KG/M2 | WEIGHT: 198 LBS | DIASTOLIC BLOOD PRESSURE: 85 MMHG | SYSTOLIC BLOOD PRESSURE: 131 MMHG

## 2021-03-10 DIAGNOSIS — R07.2 PRECORDIAL PAIN: ICD-10-CM

## 2021-03-10 DIAGNOSIS — E66.01 MORBID OBESITY WITH BMI OF 40.0-44.9, ADULT (HCC): Primary | ICD-10-CM

## 2021-03-10 DIAGNOSIS — R06.02 SHORTNESS OF BREATH: ICD-10-CM

## 2021-03-10 PROCEDURE — 99213 OFFICE O/P EST LOW 20 MIN: CPT | Performed by: INTERNAL MEDICINE

## 2021-03-10 NOTE — PROGRESS NOTES
RESULTS   Subjective:        Jemima Bell is a 71 y.o. female who here for follow up    CC  results  HPI  71-year-old female with shortness of breath precordial chest pains obesity here for the results which are normal     Problems Addressed this Visit        Other    Shortness of breath    Precordial pain    Morbid obesity with BMI of 40.0-44.9, adult (CMS/[facility]) - Primary      Diagnoses       Codes Comments    Morbid obesity with BMI of 40.0-44.9, adult (CMS/[facility])    -  Primary ICD-10-CM: E66.01, Z68.41  ICD-9-CM: 278.01, V85.41     Shortness of breath     ICD-10-CM: R06.02  ICD-9-CM: 786.05     Precordial pain     ICD-10-CM: R07.2  ICD-9-CM: 786.51         .Interpretation Summary       · Findings consistent with an equivocal ECG stress test.  · Left ventricular ejection fraction is normal. (Calculated EF = 60%).  · Myocardial perfusion imaging indicates a normal myocardial perfusion study with no evidence of ischemia.  · Impressions are consistent with a low risk study.     Interpretation Summary    · Calculated left ventricular EF = 69.4% Estimated left ventricular EF was in agreement with the calculated left ventricular EF.  · Left ventricular diastolic function was normal.  · Calculated left ventricular EF = 69.4%  · There is no evidence of pericardial effusion. .            The following portions of the patient's history were reviewed and updated as appropriate: allergies, current medications, past family history, past medical history, past social history, past surgical history and problem list.    Past Medical History:   Diagnosis Date   • Acute bronchitis    • Acute sinusitis    • Allergic rhinitis    • Anxiety    • Arthritis    • Back pain    • BMI 34.0-34.9,adult    • Cervicalgia    • Chills    • Deep vein thrombosis (DVT) of lower extremity (CMS/[facility])    • Depression    • Dermatitis    • Dyspnea    • Dysuria    • Esophageal reflux    • Fatigue    • Gastric ulcer    • GERD (gastroesophageal reflux  "disease)    • Headache    • Hypothyroidism    • Irritable bowel syndrome    • Lumbago    • Migraine    • Nausea    • Neuritis    • Osteoarthritis    • Osteoarthritis of knee    • Plantar fasciitis    • Pulled muscle    • Pulmonary embolism (CMS/HCC)    • Pyelonephritis    • Rheumatic fever    • Sore throat    • Torticollis    • Trapezius strain    • Urinary incontinence    • Urinary pain    • Urinary tract infection    • UTI symptoms    • Vitamin B1 deficiency    • Vitamin B12 deficiency    • Vitamin D deficiency    • Weakness    • Wheezing      reports that she has never smoked. She has never used smokeless tobacco. She reports that she does not drink alcohol and does not use drugs.   Family History   Problem Relation Age of Onset   • Arthritis Mother    • Depression Mother    • Hyperlipidemia Mother    • Hypertension Mother    • Hypertension Father    • Alcohol abuse Maternal Grandfather    • Depression Maternal Grandfather    • Heart disease Other         IN FEMALES BEFORE AGE 65       Review of Systems  Constitutional: No wt loss, fever, fatigue  Gastrointestinal: No nausea, abdominal pain  Behavioral/Psych: No insomnia or anxiety   Cardiovascular no chest pains or tightness in the chest  Objective:       Physical Exam  /85 (BP Location: Left arm, Patient Position: Sitting)   Pulse 65   Ht 157.5 cm (62\")   Wt 89.8 kg (198 lb)   LMP  (LMP Unknown)   BMI 36.21 kg/m²   General appearance: No acute changes   Neck: Trachea midline; NECK, supple, no thyromegaly or lymphadenopathy   Lungs: Normal size and shape, normal breath sounds, equal distribution of air, no rales and rhonchi   CV: S1-S2 regular, no murmurs, no rub, no gallop   Abdomen: Soft, non-tender; no masses , no abnormal abdominal sounds   Extremities: No deformity , normal color , no peripheral edema   Skin: Normal temperature, turgor and texture; no rash, ulcers          Procedures      Echocardiogram:        Current Outpatient Medications:   • "  acetaminophen (TYLENOL) 325 MG tablet, Take 650 mg by mouth., Disp: , Rfl:   •  albuterol sulfate  (90 Base) MCG/ACT inhaler, Inhale 2 puffs Every 4 (Four) Hours As Needed., Disp: , Rfl:   •  fluticasone (FLONASE) 50 MCG/ACT nasal spray, Use 2 Sprays in each nostril once daily. prn, Disp: , Rfl:   •  hydrOXYzine (ATARAX) 25 MG tablet, Take 1 tablet by mouth Every 8 (Eight) Hours As Needed for Anxiety., Disp: 30 tablet, Rfl: 0  •  levothyroxine (SYNTHROID, LEVOTHROID) 88 MCG tablet, TAKE ONE TABLET BY MOUTH DAILY, Disp: 90 tablet, Rfl: 0  •  omeprazole (priLOSEC) 40 MG capsule, TAKE ONE CAPSULE BY MOUTH DAILY, Disp: 90 capsule, Rfl: 0  •  PARoxetine (PAXIL) 10 MG tablet, Take 10 mg by mouth Daily., Disp: , Rfl:     Current Facility-Administered Medications:   •  cyanocobalamin injection 1,000 mcg, 1,000 mcg, Intramuscular, Q28 Days, Kehrer, Meredith Lea, MD, 1,000 mcg at 02/10/21 1601   Assessment:        Patient Active Problem List   Diagnosis   • Wheezing   • Osteoarthritis   • Acute bronchitis   • Dyspnea   • Hypothyroidism   • Shortness of breath   • Vitamin D deficiency   • Depression with anxiety   • Muscle cramp   • Back pain   • Vitamin B12 deficiency   • UTI (urinary tract infection)   • Abnormal EKG   • Precordial pain   • Morbid obesity with BMI of 40.0-44.9, adult (CMS/[facility])   • Anxiety   • Borderline systolic HTN     Component   Ref Range & Units 7 d ago   Total Cholesterol   0 - 200 mg/dL 194    Triglycerides   0 - 150 mg/dL 173High     HDL Cholesterol   40 - 60 mg/dL 60    LDL Cholesterol    0 - 100 mg/dL 104High     VLDL Cholesterol   5 - 40 mg/dL 30                Plan:            ICD-10-CM ICD-9-CM   1. Morbid obesity with BMI of 40.0-44.9, adult (CMS/[facility])  E66.01 278.01    Z68.41 V85.41   2. Shortness of breath  R06.02 786.05   3. Precordial pain  R07.2 786.51     1. Morbid obesity with BMI of 40.0-44.9, adult (CMS/HCC)  Counseling has been done    2. Shortness of  breath  Multifactorial    3. Precordial pain  Atypical       Specificity and sensitivity of the stress test/ cardiac workup has been explained. Pt has been explained if  Symptoms continue please go to ER, and further w/p will be required.    Also explained this does not rule out coronary artery disease or the future events, continue to emphasize on risk reductions for coronary artery disease    Pt also advised to contact PCP for other causes of symptoms    See in 1 yr    COUNSELING:    Jemima Pipereling was given to patient for the following topics: diagnostic results, risk factor reductions, impressions, risks and benefits of treatment options and importance of treatment compliance .       SMOKING COUNSELING:    [unfilled]    Dictated using Dragon dictation

## 2021-03-11 ENCOUNTER — IMMUNIZATION (OUTPATIENT)
Dept: VACCINE CLINIC | Facility: HOSPITAL | Age: 71
End: 2021-03-11

## 2021-03-11 PROCEDURE — 91300 HC SARSCOV02 VAC 30MCG/0.3ML IM: CPT | Performed by: OBSTETRICS & GYNECOLOGY

## 2021-03-11 PROCEDURE — 0001A: CPT | Performed by: OBSTETRICS & GYNECOLOGY

## 2021-03-30 ENCOUNTER — TELEPHONE (OUTPATIENT)
Dept: FAMILY MEDICINE CLINIC | Facility: CLINIC | Age: 71
End: 2021-03-30

## 2021-03-30 ENCOUNTER — CLINICAL SUPPORT (OUTPATIENT)
Dept: FAMILY MEDICINE CLINIC | Facility: CLINIC | Age: 71
End: 2021-03-30

## 2021-03-30 DIAGNOSIS — E53.8 VITAMIN B12 DEFICIENCY: ICD-10-CM

## 2021-03-30 PROCEDURE — 96372 THER/PROPH/DIAG INJ SC/IM: CPT | Performed by: FAMILY MEDICINE

## 2021-03-30 RX ADMIN — CYANOCOBALAMIN 1000 MCG: 1000 INJECTION, SOLUTION INTRAMUSCULAR; SUBCUTANEOUS at 15:54

## 2021-03-30 NOTE — TELEPHONE ENCOUNTER
When patient came into to get her dose of b12 shot, she asked if we had anymore Breo Elliptas available. I told her no and told her which ones we did have on hand which are, Anoro and Trelegy, She wanted to know if there was any possible way that she could switch over to one of these since we never have the breo in. Please advise..

## 2021-04-05 ENCOUNTER — IMMUNIZATION (OUTPATIENT)
Dept: VACCINE CLINIC | Facility: HOSPITAL | Age: 71
End: 2021-04-05

## 2021-04-05 PROCEDURE — 0002A: CPT | Performed by: OBSTETRICS & GYNECOLOGY

## 2021-04-05 PROCEDURE — 91300 HC SARSCOV02 VAC 30MCG/0.3ML IM: CPT | Performed by: OBSTETRICS & GYNECOLOGY

## 2021-05-03 ENCOUNTER — CLINICAL SUPPORT (OUTPATIENT)
Dept: FAMILY MEDICINE CLINIC | Facility: CLINIC | Age: 71
End: 2021-05-03

## 2021-05-03 DIAGNOSIS — E53.8 VITAMIN B12 DEFICIENCY: ICD-10-CM

## 2021-05-03 PROCEDURE — 96372 THER/PROPH/DIAG INJ SC/IM: CPT | Performed by: FAMILY MEDICINE

## 2021-05-03 RX ADMIN — CYANOCOBALAMIN 1000 MCG: 1000 INJECTION, SOLUTION INTRAMUSCULAR; SUBCUTANEOUS at 15:20

## 2021-05-10 ENCOUNTER — OFFICE VISIT (OUTPATIENT)
Dept: ORTHOPEDIC SURGERY | Facility: CLINIC | Age: 71
End: 2021-05-10

## 2021-05-10 VITALS — WEIGHT: 184 LBS | HEIGHT: 62 IN | BODY MASS INDEX: 33.86 KG/M2 | TEMPERATURE: 96.2 F

## 2021-05-10 DIAGNOSIS — M17.11 PRIMARY OSTEOARTHRITIS OF RIGHT KNEE: Primary | ICD-10-CM

## 2021-05-10 PROCEDURE — 99203 OFFICE O/P NEW LOW 30 MIN: CPT | Performed by: NURSE PRACTITIONER

## 2021-05-10 PROCEDURE — 73562 X-RAY EXAM OF KNEE 3: CPT | Performed by: NURSE PRACTITIONER

## 2021-05-10 PROCEDURE — 20610 DRAIN/INJ JOINT/BURSA W/O US: CPT | Performed by: NURSE PRACTITIONER

## 2021-05-10 RX ORDER — LIDOCAINE HYDROCHLORIDE 20 MG/ML
2 INJECTION, SOLUTION EPIDURAL; INFILTRATION; INTRACAUDAL; PERINEURAL
Status: COMPLETED | OUTPATIENT
Start: 2021-05-10 | End: 2021-05-10

## 2021-05-10 RX ORDER — METHYLPREDNISOLONE ACETATE 80 MG/ML
80 INJECTION, SUSPENSION INTRA-ARTICULAR; INTRALESIONAL; INTRAMUSCULAR; SOFT TISSUE
Status: COMPLETED | OUTPATIENT
Start: 2021-05-10 | End: 2021-05-10

## 2021-05-10 RX ADMIN — METHYLPREDNISOLONE ACETATE 80 MG: 80 INJECTION, SUSPENSION INTRA-ARTICULAR; INTRALESIONAL; INTRAMUSCULAR; SOFT TISSUE at 11:46

## 2021-05-10 RX ADMIN — LIDOCAINE HYDROCHLORIDE 2 ML: 20 INJECTION, SOLUTION EPIDURAL; INFILTRATION; INTRACAUDAL; PERINEURAL at 11:46

## 2021-05-10 NOTE — PROGRESS NOTES
"Patient: Jemima Bell    YOB: 1950    Medical Record Number: [id number]    Chief Complaints:  Right knee pain    History of Present Illness:     71 y.o. female patient who presents for evaluation of right knee pain.  She reports that the symptoms first began 6 months ago.  Reports her pain has increased significantly over the last month.  Current pain is described as moderate, constant, aching with shooting, throbbing, and grinding.  The pain is predominantly along the medial side of the knee.  Denies any clicking, popping or catching.  Symptoms are worse with walking, standing, and at night.  Symptoms are somewhat better with Tylenol, rest, ice, and heat.   Reports she cannot take anti-inflammatories due to \"stomach related issues\".  Denies any shooting pain down the legs, weakness, numbness or paresthesias.    Allergies:   Allergies   Allergen Reactions   • Salicylates GI Intolerance     Other reaction(s): GI Upset, gi bleed      • Biaxin [Clarithromycin]    • Sulfa Antibiotics    • Adhesive Tape Rash   • Augmentin [Amoxicillin-Pot Clavulanate] Diarrhea   • Sulfamethoxazole-Trimethoprim GI Intolerance and Nausea And Vomiting     \"makes me sick as a dog\"         Home Medications    Current Outpatient Medications:   •  acetaminophen (TYLENOL) 325 MG tablet, Take 650 mg by mouth., Disp: , Rfl:   •  albuterol sulfate  (90 Base) MCG/ACT inhaler, Inhale 2 puffs Every 4 (Four) Hours As Needed., Disp: , Rfl:   •  fluticasone (FLONASE) 50 MCG/ACT nasal spray, Use 2 Sprays in each nostril once daily. prn, Disp: , Rfl:   •  hydrOXYzine (ATARAX) 25 MG tablet, Take 1 tablet by mouth Every 8 (Eight) Hours As Needed for Anxiety., Disp: 30 tablet, Rfl: 0  •  levothyroxine (SYNTHROID, LEVOTHROID) 88 MCG tablet, TAKE ONE TABLET BY MOUTH DAILY, Disp: 90 tablet, Rfl: 0  •  omeprazole (priLOSEC) 40 MG capsule, TAKE ONE CAPSULE BY MOUTH DAILY, Disp: 90 capsule, Rfl: 0  •  PARoxetine (PAXIL) 10 MG tablet, " Take 10 mg by mouth Daily., Disp: , Rfl:     Current Facility-Administered Medications:   •  cyanocobalamin injection 1,000 mcg, 1,000 mcg, Intramuscular, Q28 Days, Kehrer, Meredith Lea, MD, 1,000 mcg at 05/03/21 1520    Past Medical History:   Diagnosis Date   • Acute bronchitis    • Acute sinusitis    • Allergic rhinitis    • Anxiety    • Arthritis    • Back pain    • BMI 34.0-34.9,adult    • Cervicalgia    • Chills    • Deep vein thrombosis (DVT) of lower extremity (CMS/HCC)    • Depression    • Dermatitis    • Dyspnea    • Dysuria    • Esophageal reflux    • Fatigue    • Gastric ulcer    • GERD (gastroesophageal reflux disease)    • Headache    • Hypothyroidism    • Irritable bowel syndrome    • Lumbago    • Migraine    • Nausea    • Neuritis    • Osteoarthritis    • Osteoarthritis of knee    • Plantar fasciitis    • Pulled muscle    • Pulmonary embolism (CMS/HCC)    • Pyelonephritis    • Rheumatic fever    • Sore throat    • Torticollis    • Trapezius strain    • Urinary incontinence    • Urinary pain    • Urinary tract infection    • UTI symptoms    • Vitamin B1 deficiency    • Vitamin B12 deficiency    • Vitamin D deficiency    • Weakness    • Wheezing        Past Surgical History:   Procedure Laterality Date   • APPENDECTOMY     • CATARACT EXTRACTION     • CATARACT EXTRACTION      bilateral eyes   • CATARACT EXTRACTION     • GALLBLADDER SURGERY     • HYSTERECTOMY     • INGUINAL HERNIA REPAIR     • KNEE SURGERY     • LAPAROSCOPIC COLON RESECTION  2005   • LAPAROTOMY OOPHERECTOMY     • NISSEN FUNDOPLICATION LAPAROSCOPIC      x2   • TONSILLECTOMY     • TOTAL KNEE ARTHROPLASTY Left        Social History     Occupational History   • Not on file   Tobacco Use   • Smoking status: Never Smoker   • Smokeless tobacco: Never Used   Vaping Use   • Vaping Use: Never used   Substance and Sexual Activity   • Alcohol use: Never   • Drug use: Never   • Sexual activity: Not Currently     Birth control/protection: Surgical     "  Social History     Social History Narrative   • Not on file       Family History   Problem Relation Age of Onset   • Arthritis Mother    • Depression Mother    • Hyperlipidemia Mother    • Hypertension Mother    • Hypertension Father    • Alcohol abuse Maternal Grandfather    • Depression Maternal Grandfather    • Heart disease Other         IN FEMALES BEFORE AGE 65       Review of Systems:      Constitutional: Denies fever, shaking or chills   Eyes: Denies change in visual acuity   HEENT: Denies nasal congestion or sore throat   Respiratory: Denies cough or shortness of breath   Cardiovascular: Denies chest pain or edema  Endocrine: Denies tremors, palpitations, intolerance of heat or cold, polyuria, polydipsia.  GI: Denies abdominal pain, nausea, vomiting, bloody stools or diarrhea  : Denies frequency, urgency, incontinence, retention, or nocturia.  Musculoskeletal: Denies numbness, tingling or loss of motor function except as above  Integument: Denies rash, lesion or ulceration   Neurologic: Denies headache or focal weakness, deficits  Heme: Denies spontaneous or excessive bleeding, epistaxis, hematuria, melena, fatigue, enlarged or tender lymph nodes.      All other pertinent positives and negatives as noted above in HPI.    Physical Exam:   71 y.o. female  Vitals:    05/10/21 1127   Temp: 96.2 °F (35.7 °C)   TempSrc: Temporal   Weight: 83.5 kg (184 lb)   Height: 157.5 cm (62\")     General:  Patient is awake and alert.  Appears in no acute distress or discomfort.    Psych:  Affect and demeanor are appropriate.    Eyes:  Conjunctiva and sclera appear grossly normal.  Eyes track well and EOM seem to be intact.    Ears:  No gross abnormalities.  Hearing adequate for the exam.    Cardiovascular:  Regular rate and rhythm.    Lungs:  Good chest expansion.  Breathing unlabored.    Spine:  Back appears grossly normal.  No palpable masses or adenopathy.  Good motion.  Straight leg raise and crossed straight leg raise " maneuver are both negative for lower leg and/or knee pain.    Extremities:  Right knee is examined.  Skin is benign.  No obvious gross abnormalities.  No palpable masses or adenopathy.  Moderate tenderness noted over medial joint line.  Motion is to 120° of flexion, full extension.  No instability.  Strength is well preserved including hip flexion, knee extension, ankle and toe plantarflexion, ankle inversion and eversion.  Good sensory function throughout the leg and foot.  Palpable pulses.  Brisk capillary refill.  Good skin turgor.         Radiology:   Bilateral standing AP views, bilateral merchants views and a lateral view of the right knee are ordered by myself and reviewed to evaluate the patient's complaint.  No comparison films are immediately available.  The x-rays show moderate medial compartment degenerative arthritis including joint space narrowing, osteophyte formation, and subchondral sclerosis.     Assessment/Plan:  Right knee osteoarthritis    We discussed treatment options in detail including the risks, benefits, and alternatives of conservative treatment versus surgical options.  Regarding conservative treatment, we discussed appropriate activity modifications, anti-inflammatories, injections (including both corticosteroids and visco supplementation), and physical therapy.  We also discussed the option of an arthroplasty and all that would entail.  I have recommended that we start with a conservative approach and the patient agrees.    The patient has acknowledged understanding of the information and elected for an injection.  The risk, benefits and alternatives were discussed.  The patient consented and the injection was performed as described below.  I offered to refer her to physical therapy, but she declined for now.  I offered to give her a prescription for a transdermal pain/anti-inflammatory cream through [facility], but she wants to see if the injection helps first.  She will  call me if she wants to try this medication.  Going forward, she we will follow-up as needed.    MACHO Bennett    05/10/2021    CC to Kehrer, Meredith Lea, MD      .Large Joint Arthrocentesis: R knee  Date/Time: 5/10/2021 11:46 AM  Consent given by: patient  Site marked: site marked  Timeout: Immediately prior to procedure a time out was called to verify the correct patient, procedure, equipment, support staff and site/side marked as required   Supporting Documentation  Indications: pain   Procedure Details  Location: knee - R knee  Preparation: Patient was prepped and draped in the usual sterile fashion  Needle gauge: 21.  Approach: anterolateral  Medications administered: 2 mL lidocaine PF 2% 2 %; 80 mg methylPREDNISolone acetate 80 MG/ML  Patient tolerance: patient tolerated the procedure well with no immediate complications

## 2021-05-13 RX ORDER — OMEPRAZOLE 40 MG/1
CAPSULE, DELAYED RELEASE ORAL
Qty: 90 CAPSULE | Refills: 0 | Status: SHIPPED | OUTPATIENT
Start: 2021-05-13 | End: 2021-08-16

## 2021-05-18 RX ORDER — LEVOTHYROXINE SODIUM 88 UG/1
TABLET ORAL
Qty: 90 TABLET | Refills: 0 | Status: SHIPPED | OUTPATIENT
Start: 2021-05-18 | End: 2021-08-20

## 2021-06-02 ENCOUNTER — CLINICAL SUPPORT (OUTPATIENT)
Dept: FAMILY MEDICINE CLINIC | Facility: CLINIC | Age: 71
End: 2021-06-02

## 2021-06-02 VITALS — TEMPERATURE: 97.6 F

## 2021-06-02 DIAGNOSIS — E53.8 VITAMIN B12 DEFICIENCY: ICD-10-CM

## 2021-06-02 PROCEDURE — 96372 THER/PROPH/DIAG INJ SC/IM: CPT | Performed by: FAMILY MEDICINE

## 2021-06-02 RX ADMIN — CYANOCOBALAMIN 1000 MCG: 1000 INJECTION, SOLUTION INTRAMUSCULAR; SUBCUTANEOUS at 15:00

## 2021-06-30 ENCOUNTER — OFFICE VISIT (OUTPATIENT)
Dept: FAMILY MEDICINE CLINIC | Facility: CLINIC | Age: 71
End: 2021-06-30

## 2021-06-30 VITALS
OXYGEN SATURATION: 97 % | BODY MASS INDEX: 36.55 KG/M2 | TEMPERATURE: 99.3 F | WEIGHT: 198.6 LBS | SYSTOLIC BLOOD PRESSURE: 126 MMHG | HEART RATE: 80 BPM | DIASTOLIC BLOOD PRESSURE: 62 MMHG | HEIGHT: 62 IN

## 2021-06-30 DIAGNOSIS — M54.14 THORACIC NEURITIS: Primary | ICD-10-CM

## 2021-06-30 PROCEDURE — 96372 THER/PROPH/DIAG INJ SC/IM: CPT | Performed by: FAMILY MEDICINE

## 2021-06-30 PROCEDURE — 99213 OFFICE O/P EST LOW 20 MIN: CPT | Performed by: FAMILY MEDICINE

## 2021-06-30 RX ORDER — METHYLPREDNISOLONE 4 MG/1
TABLET ORAL
Qty: 21 TABLET | Refills: 0 | Status: SHIPPED | OUTPATIENT
Start: 2021-06-30 | End: 2021-10-14

## 2021-06-30 RX ORDER — LIDOCAINE 50 MG/G
2 PATCH TOPICAL EVERY 24 HOURS
Qty: 60 PATCH | Refills: 1 | Status: SHIPPED | OUTPATIENT
Start: 2021-06-30 | End: 2021-10-14

## 2021-06-30 RX ORDER — TIZANIDINE 4 MG/1
2-4 TABLET ORAL EVERY 8 HOURS PRN
Qty: 30 TABLET | Refills: 0 | Status: SHIPPED | OUTPATIENT
Start: 2021-06-30 | End: 2021-09-09

## 2021-06-30 RX ORDER — BUSPIRONE HYDROCHLORIDE 10 MG/1
TABLET ORAL
COMMUNITY
Start: 2021-06-29 | End: 2021-10-14

## 2021-06-30 RX ADMIN — CYANOCOBALAMIN 1000 MCG: 1000 INJECTION, SOLUTION INTRAMUSCULAR; SUBCUTANEOUS at 16:47

## 2021-06-30 NOTE — PROGRESS NOTES
"Chief Complaint  Spasms (in back )    Subjective          Jemima Bell presents to [facility]  Has been getting spasm in her back for the past couple weeks.  Was really bad last night so got worried.  Had really bad pain in left ribs under breast.  Stabs like a knife.  Denies any cough.  Denies any injury.  She has not had a rash.        Objective   Vital Signs:   /62   Pulse 80   Temp 99.3 °F (37.4 °C)   Ht 157.5 cm (62.01\")   Wt 90.1 kg (198 lb 9.6 oz)   SpO2 97%   BMI 36.32 kg/m²     Physical Exam  Constitutional:       General: She is not in acute distress.     Appearance: Normal appearance. She is well-developed.   HENT:      Head: Normocephalic and atraumatic.      Right Ear: External ear normal.      Left Ear: External ear normal.   Eyes:      Conjunctiva/sclera: Conjunctivae normal.      Pupils: Pupils are equal, round, and reactive to light.   Neck:      Thyroid: No thyromegaly.   Cardiovascular:      Rate and Rhythm: Normal rate and regular rhythm.      Heart sounds: No murmur heard.     Pulmonary:      Effort: Pulmonary effort is normal.      Breath sounds: Normal breath sounds.   Musculoskeletal:         General: Tenderness present. Normal range of motion.      Comments: Tender in mid thoracic paraspinous muscles to the left medial to the shoulder blade and around to left costochondral margin, there is no rashes or redness, spine nontender   Neurological:      Mental Status: She is alert and oriented to person, place, and time.   Psychiatric:         Mood and Affect: Mood normal.         Behavior: Behavior normal.        Result Review :                 Assessment and Plan    Diagnoses and all orders for this visit:    1. Thoracic neuritis (Primary)  -     methylPREDNISolone (MEDROL) 4 MG dose pack; Take as directed on package instructions.  Dispense: 21 tablet; Refill: 0  -     tiZANidine (Zanaflex) 4 MG tablet; Take 0.5-1 tablets by mouth Every 8 " (Eight) Hours As Needed for Muscle Spasms.  Dispense: 30 tablet; Refill: 0  -     lidocaine (LIDODERM) 5 %; Place 2 patches on the skin as directed by provider Daily. Remove & Discard patch within 12 hours or as directed by MD  Dispense: 60 patch; Refill: 1        Follow Up   No follow-ups on file.  Patient was given instructions and counseling regarding her condition or for health maintenance advice. Please see specific information pulled into the AVS if appropriate.

## 2021-08-05 ENCOUNTER — CLINICAL SUPPORT (OUTPATIENT)
Dept: FAMILY MEDICINE CLINIC | Facility: CLINIC | Age: 71
End: 2021-08-05

## 2021-08-05 DIAGNOSIS — E53.8 B12 DEFICIENCY: ICD-10-CM

## 2021-08-05 PROCEDURE — 96372 THER/PROPH/DIAG INJ SC/IM: CPT | Performed by: FAMILY MEDICINE

## 2021-08-05 RX ADMIN — CYANOCOBALAMIN 1000 MCG: 1000 INJECTION, SOLUTION INTRAMUSCULAR; SUBCUTANEOUS at 14:06

## 2021-08-12 ENCOUNTER — LAB (OUTPATIENT)
Dept: FAMILY MEDICINE CLINIC | Facility: CLINIC | Age: 71
End: 2021-08-12

## 2021-08-12 VITALS — TEMPERATURE: 98.6 F

## 2021-08-12 DIAGNOSIS — E03.9 HYPOTHYROIDISM, UNSPECIFIED TYPE: Primary | ICD-10-CM

## 2021-08-13 LAB — TSH SERPL DL<=0.005 MIU/L-ACNC: 1.58 UIU/ML (ref 0.45–4.5)

## 2021-08-16 RX ORDER — OMEPRAZOLE 40 MG/1
CAPSULE, DELAYED RELEASE ORAL
Qty: 90 CAPSULE | Refills: 0 | Status: SHIPPED | OUTPATIENT
Start: 2021-08-16 | End: 2021-11-18

## 2021-08-20 RX ORDER — LEVOTHYROXINE SODIUM 88 UG/1
TABLET ORAL
Qty: 90 TABLET | Refills: 0 | Status: SHIPPED | OUTPATIENT
Start: 2021-08-20 | End: 2021-12-07 | Stop reason: SDUPTHER

## 2021-08-24 ENCOUNTER — PATIENT MESSAGE (OUTPATIENT)
Dept: FAMILY MEDICINE CLINIC | Facility: CLINIC | Age: 71
End: 2021-08-24

## 2021-08-24 RX ORDER — ONDANSETRON 4 MG/1
4 TABLET, FILM COATED ORAL EVERY 8 HOURS PRN
Qty: 15 TABLET | Refills: 0 | Status: SHIPPED | OUTPATIENT
Start: 2021-08-24 | End: 2022-01-13 | Stop reason: SDUPTHER

## 2021-09-08 ENCOUNTER — CLINICAL SUPPORT (OUTPATIENT)
Dept: FAMILY MEDICINE CLINIC | Facility: CLINIC | Age: 71
End: 2021-09-08

## 2021-09-08 VITALS — TEMPERATURE: 98.1 F

## 2021-09-08 DIAGNOSIS — E55.9 VITAMIN D DEFICIENCY: Primary | ICD-10-CM

## 2021-09-08 PROCEDURE — 96372 THER/PROPH/DIAG INJ SC/IM: CPT | Performed by: FAMILY MEDICINE

## 2021-09-08 RX ADMIN — CYANOCOBALAMIN 1000 MCG: 1000 INJECTION, SOLUTION INTRAMUSCULAR; SUBCUTANEOUS at 14:23

## 2021-09-09 DIAGNOSIS — M54.14 THORACIC NEURITIS: ICD-10-CM

## 2021-09-09 RX ORDER — TIZANIDINE 4 MG/1
TABLET ORAL
Qty: 30 TABLET | Refills: 0 | Status: SHIPPED | OUTPATIENT
Start: 2021-09-09 | End: 2021-11-22 | Stop reason: SDUPTHER

## 2021-10-11 ENCOUNTER — TELEPHONE (OUTPATIENT)
Dept: FAMILY MEDICINE CLINIC | Facility: CLINIC | Age: 71
End: 2021-10-11

## 2021-10-11 NOTE — TELEPHONE ENCOUNTER
----- Message from Jemima Bell sent at 10/11/2021 10:25 AM EDT -----  Regarding: Sinus infection   Have bad headache, face burning and sensitive, stuffy, going to ears and throat with hurting eye sockets  My hair even hurts!  I have an appt with you on Thursday for Medicare well check  Not sure it’s wise to wait until then. Please advise. Thank you.

## 2021-10-13 ENCOUNTER — PATIENT MESSAGE (OUTPATIENT)
Dept: FAMILY MEDICINE CLINIC | Facility: CLINIC | Age: 71
End: 2021-10-13

## 2021-10-13 NOTE — TELEPHONE ENCOUNTER
From: Jemima Bell  To: Meredith Lea Kehrer, MD  Sent: 10/13/2021 11:13 AM EDT  Subject: Appointment schedule     I know Dr Kehrer is booked and overbooked. I am trying to be helpful to both of us. I have an appointment tomorrow afternoon for well check Medicare. My  Junaid has an appointment the next day, Friday. Would it be advantageous for both of us to come tomorrow afternoon? Thank you for looking into this.   Kim

## 2021-10-14 ENCOUNTER — OFFICE VISIT (OUTPATIENT)
Dept: FAMILY MEDICINE CLINIC | Facility: CLINIC | Age: 71
End: 2021-10-14

## 2021-10-14 VITALS
WEIGHT: 198.8 LBS | BODY MASS INDEX: 36.58 KG/M2 | SYSTOLIC BLOOD PRESSURE: 132 MMHG | HEIGHT: 62 IN | DIASTOLIC BLOOD PRESSURE: 82 MMHG | OXYGEN SATURATION: 98 % | TEMPERATURE: 98.7 F | HEART RATE: 86 BPM

## 2021-10-14 DIAGNOSIS — Z23 FLU VACCINE NEED: ICD-10-CM

## 2021-10-14 DIAGNOSIS — Z23 INFLUENZA VACCINE NEEDED: ICD-10-CM

## 2021-10-14 DIAGNOSIS — K21.9 GASTROESOPHAGEAL REFLUX DISEASE WITHOUT ESOPHAGITIS: ICD-10-CM

## 2021-10-14 DIAGNOSIS — E53.8 B12 DEFICIENCY: ICD-10-CM

## 2021-10-14 DIAGNOSIS — E03.9 HYPOTHYROIDISM, UNSPECIFIED TYPE: ICD-10-CM

## 2021-10-14 DIAGNOSIS — Z78.0 MENOPAUSE: ICD-10-CM

## 2021-10-14 DIAGNOSIS — J30.1 SEASONAL ALLERGIC RHINITIS DUE TO POLLEN: ICD-10-CM

## 2021-10-14 DIAGNOSIS — R53.83 OTHER FATIGUE: Primary | ICD-10-CM

## 2021-10-14 PROCEDURE — 1160F RVW MEDS BY RX/DR IN RCRD: CPT | Performed by: FAMILY MEDICINE

## 2021-10-14 PROCEDURE — 1170F FXNL STATUS ASSESSED: CPT | Performed by: FAMILY MEDICINE

## 2021-10-14 PROCEDURE — G0008 ADMIN INFLUENZA VIRUS VAC: HCPCS | Performed by: FAMILY MEDICINE

## 2021-10-14 PROCEDURE — 96372 THER/PROPH/DIAG INJ SC/IM: CPT | Performed by: FAMILY MEDICINE

## 2021-10-14 PROCEDURE — G0439 PPPS, SUBSEQ VISIT: HCPCS | Performed by: FAMILY MEDICINE

## 2021-10-14 PROCEDURE — 90662 IIV NO PRSV INCREASED AG IM: CPT | Performed by: FAMILY MEDICINE

## 2021-10-14 RX ORDER — FLUTICASONE PROPIONATE 50 MCG
2 SPRAY, SUSPENSION (ML) NASAL DAILY
Qty: 48 G | Refills: 3 | Status: SHIPPED | OUTPATIENT
Start: 2021-10-14

## 2021-10-14 RX ORDER — METHYLPREDNISOLONE 4 MG/1
TABLET ORAL
Qty: 21 TABLET | Refills: 0 | Status: SHIPPED | OUTPATIENT
Start: 2021-10-14 | End: 2021-11-22

## 2021-10-14 RX ADMIN — CYANOCOBALAMIN 1000 MCG: 1000 INJECTION, SOLUTION INTRAMUSCULAR; SUBCUTANEOUS at 15:53

## 2021-10-14 NOTE — PROGRESS NOTES
The ABCs of the Annual Wellness Visit  Subsequent Medicare Wellness Visit    Chief Complaint   Patient presents with   • Medicare Wellness-subsequent   • Nasal Congestion      Subjective    History of Present Illness:  Jemima Bell is a 71 y.o. female who presents for a Subsequent Medicare Wellness Visit.  Has had a lot of trouble with her allergies recently.  She stopped taking all the medicine the psychiatrist gave her and has just been taking the Atarax as needed.  She feels she has things under good control presently.      The following portions of the patient's history were reviewed and   updated as appropriate: allergies, current medications, past family history, past medical history, past social history, past surgical history and problem list.    Compared to one year ago, the patient feels her physical   health is the same.    Compared to one year ago, the patient feels her mental   health is better.    Recent Hospitalizations:  This patient has had a [facility] [place] admission record on file within the last 365 days.    Current Medical Providers:  Patient Care Team:  Kehrer, Meredith Lea, MD as PCP - General (Family Medicine)    Outpatient Medications Prior to Visit   Medication Sig Dispense Refill   • acetaminophen (TYLENOL) 325 MG tablet Take 650 mg by mouth.     • albuterol sulfate  (90 Base) MCG/ACT inhaler Inhale 2 puffs Every 4 (Four) Hours As Needed.     • hydrOXYzine (ATARAX) 25 MG tablet Take 1 tablet by mouth Every 8 (Eight) Hours As Needed for Anxiety. 30 tablet 0   • levothyroxine (SYNTHROID, LEVOTHROID) 88 MCG tablet TAKE ONE TABLET BY MOUTH DAILY 90 tablet 0   • omeprazole (priLOSEC) 40 MG capsule TAKE ONE CAPSULE BY MOUTH DAILY 90 capsule 0   • ondansetron (Zofran) 4 MG tablet Take 1 tablet by mouth Every 8 (Eight) Hours As Needed for Nausea or Vomiting. 15 tablet 0   • tiZANidine (ZANAFLEX) 4 MG tablet TAKE ONE-HALF TO ONE TABLET BY MOUTH EVERY 8 HOURS AS NEEDED FOR MUSCLE  SPASMS 30 tablet 0   • fluticasone (FLONASE) 50 MCG/ACT nasal spray Use 2 Sprays in each nostril once daily. prn     • busPIRone (BUSPAR) 10 MG tablet      • lidocaine (LIDODERM) 5 % Place 2 patches on the skin as directed by provider Daily. Remove & Discard patch within 12 hours or as directed by MD 60 patch 1   • methylPREDNISolone (MEDROL) 4 MG dose pack Take as directed on package instructions. 21 tablet 0   • PARoxetine (PAXIL) 10 MG tablet Take 10 mg by mouth Daily.       Facility-Administered Medications Prior to Visit   Medication Dose Route Frequency Provider Last Rate Last Admin   • cyanocobalamin injection 1,000 mcg  1,000 mcg Intramuscular Q28 Days Kehrer, Meredith Lea, MD   1,000 mcg at 09/08/21 1423       No opioid medication identified on active medication list. I have reviewed chart for other potential  high risk medication/s and harmful drug interactions in the elderly.          Aspirin is not on active medication list.  Aspirin use is not indicated based on review of current medical condition/s. Risk of harm outweighs potential benefits.  .    Patient Active Problem List   Diagnosis   • Wheezing   • Osteoarthritis   • Acute bronchitis   • Dyspnea   • Hypothyroidism   • Shortness of breath   • Vitamin D deficiency   • Depression with anxiety   • Muscle cramp   • Back pain   • Vitamin B12 deficiency   • UTI (urinary tract infection)   • Abnormal EKG   • Precordial pain   • Morbid obesity with BMI of 40.0-44.9, adult (HCC)   • Anxiety   • Borderline systolic HTN     Advance Care Planning  Advance Directive is not on file.  ACP discussion was held with the patient during this visit. Patient has an advance directive (not in EMR), copy requested.    Review of Systems   Constitutional: Negative for chills, fatigue and fever.   HENT: Negative for congestion, ear pain and sore throat.    Eyes: Negative for visual disturbance.   Respiratory: Negative for cough and shortness of breath.    Cardiovascular:  "Negative for chest pain, palpitations and leg swelling.   Gastrointestinal: Negative for diarrhea, nausea and vomiting.   Endocrine: Negative.    Genitourinary: Negative for dysuria and frequency.   Skin: Negative.    Psychiatric/Behavioral: Negative for dysphoric mood and sleep disturbance. The patient is not nervous/anxious.         Objective    Vitals:    10/14/21 1500   BP: 132/82   Pulse: 86   Temp: 98.7 °F (37.1 °C)   SpO2: 98%   Weight: 90.2 kg (198 lb 12.8 oz)   Height: 157.5 cm (62\")     BMI Readings from Last 1 Encounters:   10/14/21 36.36 kg/m²   BMI is above normal parameters. Recommendations include: exercise counseling    Does the patient have evidence of cognitive impairment? No    Physical Exam  Vitals and nursing note reviewed.   Constitutional:       General: She is not in acute distress.     Appearance: Normal appearance. She is well-developed.   HENT:      Head: Normocephalic and atraumatic.      Right Ear: Tympanic membrane, ear canal and external ear normal.      Left Ear: Tympanic membrane, ear canal and external ear normal.      Nose: Nose normal.      Mouth/Throat:      Mouth: Mucous membranes are moist.      Pharynx: Oropharynx is clear. No oropharyngeal exudate or posterior oropharyngeal erythema.   Eyes:      Conjunctiva/sclera: Conjunctivae normal.      Pupils: Pupils are equal, round, and reactive to light.   Neck:      Thyroid: No thyromegaly.   Cardiovascular:      Rate and Rhythm: Normal rate and regular rhythm.      Heart sounds: No murmur heard.      Pulmonary:      Effort: Pulmonary effort is normal.      Breath sounds: Normal breath sounds. No wheezing.   Abdominal:      General: Abdomen is flat. Bowel sounds are normal. There is no distension.      Palpations: Abdomen is soft. There is no mass.      Tenderness: There is no abdominal tenderness.      Hernia: No hernia is present.   Musculoskeletal:         General: No swelling. Normal range of motion.      Cervical back: Normal " range of motion and neck supple.      Right lower leg: No edema.      Left lower leg: No edema.   Lymphadenopathy:      Cervical: No cervical adenopathy.   Skin:     General: Skin is warm and dry.      Capillary Refill: Capillary refill takes less than 2 seconds.      Findings: No rash.   Neurological:      General: No focal deficit present.      Mental Status: She is alert and oriented to person, place, and time.      Cranial Nerves: No cranial nerve deficit.   Psychiatric:         Mood and Affect: Mood normal.         Behavior: Behavior normal.                 HEALTH RISK ASSESSMENT    Smoking Status:  Social History     Tobacco Use   Smoking Status Never Smoker   Smokeless Tobacco Never Used     Alcohol Consumption:  Social History     Substance and Sexual Activity   Alcohol Use Never     Fall Risk Screen:    [facility] Fall Risk Assessment was completed, and patient is at LOW risk for falls.Assessment completed on:10/14/2021    Depression Screening:  PHQ-2/PHQ-9 Depression Screening 10/14/2021   Little interest or pleasure in doing things 0   Feeling down, depressed, or hopeless 0   Trouble falling or staying asleep, or sleeping too much -   Feeling tired or having little energy -   Poor appetite or overeating -   Feeling bad about yourself - or that you are a failure or have let yourself or your family down -   Trouble concentrating on things, such as reading the newspaper or watching television -   Moving or speaking so slowly that other people could have noticed. Or the opposite - being so fidgety or restless that you have been moving around a lot more than usual -   Thoughts that you would be better off dead, or of hurting yourself in some way -   Total Score 0   If you checked off any problems, how difficult have these problems made it for you to do your work, take care of things at home, or get along with other people? -   Little interest or pleasure in doing things -   Feeling down, depressed, or hopeless -    Trouble falling or staying asleep, or sleeping too much -   Feeling tired or having little energy -   Poor appetite or overeating -   Feeling bad about yourself - or that you are a failure or have let yourself or your family down -   Trouble concentrating on things, such as reading the newspaper or watching television -   Moving or speaking so slowly that other people could have noticed. Or the opposite - being so fidgety or restless that you have been moving around a lot more than usual -   Thoughts that you would be better off dead, or of hurting yourself in some way -   How difficult have these problems made it for you to do your work, take care of things at home, or get along with other people? -       Health Habits and Functional and Cognitive Screening:  Functional & Cognitive Status 10/14/2021   Do you have difficulty preparing food and eating? No   Do you have difficulty bathing yourself, getting dressed or grooming yourself? No   Do you have difficulty using the toilet? No   Do you have difficulty moving around from place to place? No   Do you have trouble with steps or getting out of a bed or a chair? No   Current Diet Well Balanced Diet   Dental Exam Not up to date   Eye Exam Not up to date   Exercise (times per week) 0 times per week   Current Exercises Include Walking   Current Exercise Activities Include -   Do you need help using the phone?  No   Are you deaf or do you have serious difficulty hearing?  No   Do you need help with transportation? No   Do you need help shopping? No   Do you need help preparing meals?  No   Do you need help with housework?  No   Do you need help with laundry? No   Do you need help taking your medications? No   Do you need help managing money? No   Do you ever drive or ride in a car without wearing a seat belt? No   Have you felt unusual stress, anger or loneliness in the last month? -   Who do you live with? -   If you need help, do you have trouble finding someone  available to you? -   Have you been bothered in the last four weeks by sexual problems? -   Do you have difficulty concentrating, remembering or making decisions? -       Age-appropriate Screening Schedule:  Refer to the list below for future screening recommendations based on patient's age, sex and/or medical conditions. Orders for these recommended tests are listed in the plan section. The patient has been provided with a written plan.    Health Maintenance   Topic Date Due   • DXA SCAN  Never done   • TDAP/TD VACCINES (1 - Tdap) Never done   • ZOSTER VACCINE (1 of 2) Never done   • INFLUENZA VACCINE  08/01/2021              Assessment/Plan   CMS Preventative Services Quick Reference  Risk Factors Identified During Encounter  Immunizations Discussed/Encouraged (specific Immunizations; Influenza  The above risks/problems have been discussed with the patient.  Follow up actions/plans if indicated are seen below in the Assessment/Plan Section.  Pertinent information has been shared with the patient in the After Visit Summary.    Diagnoses and all orders for this visit:    1. Other fatigue (Primary)  -     CBC & Differential  -     Comprehensive Metabolic Panel  -     TSH  -     Vitamin B12    2. Hypothyroidism, unspecified type    3. B12 deficiency  -     Vitamin B12    4. Influenza vaccine needed  -     Fluzone High-Dose 65+yrs ([id number])    5. Menopause  -     DEXA Bone Density Axial; Future    6. Seasonal allergic rhinitis due to pollen  -     fluticasone (FLONASE) 50 MCG/ACT nasal spray; 2 sprays into the nostril(s) as directed by provider Daily.  Dispense: 48 g; Refill: 3  -     methylPREDNISolone (MEDROL) 4 MG dose pack; Take as directed on package instructions.  Dispense: 21 tablet; Refill: 0    7. Gastroesophageal reflux disease without esophagitis        Follow Up:   No follow-ups on file.     An After Visit Summary and PPPS were made available to the patient.

## 2021-10-15 LAB
ALBUMIN SERPL-MCNC: 4.7 G/DL (ref 3.7–4.7)
ALBUMIN/GLOB SERPL: 1.7 {RATIO} (ref 1.2–2.2)
ALP SERPL-CCNC: 81 IU/L (ref 44–121)
ALT SERPL-CCNC: 22 IU/L (ref 0–32)
AST SERPL-CCNC: 19 IU/L (ref 0–40)
BASOPHILS # BLD AUTO: 0.1 X10E3/UL (ref 0–0.2)
BASOPHILS NFR BLD AUTO: 1 %
BILIRUB SERPL-MCNC: 0.4 MG/DL (ref 0–1.2)
BUN SERPL-MCNC: 17 MG/DL (ref 8–27)
BUN/CREAT SERPL: 20 (ref 12–28)
CALCIUM SERPL-MCNC: 9.7 MG/DL (ref 8.7–10.3)
CHLORIDE SERPL-SCNC: 106 MMOL/L (ref 96–106)
CO2 SERPL-SCNC: 21 MMOL/L (ref 20–29)
CREAT SERPL-MCNC: 0.85 MG/DL (ref 0.57–1)
EOSINOPHIL # BLD AUTO: 0.4 X10E3/UL (ref 0–0.4)
EOSINOPHIL NFR BLD AUTO: 5 %
ERYTHROCYTE [DISTWIDTH] IN BLOOD BY AUTOMATED COUNT: 12.1 % (ref 11.7–15.4)
GLOBULIN SER CALC-MCNC: 2.7 G/DL (ref 1.5–4.5)
GLUCOSE SERPL-MCNC: 98 MG/DL (ref 65–99)
HCT VFR BLD AUTO: 42.2 % (ref 34–46.6)
HGB BLD-MCNC: 14.3 G/DL (ref 11.1–15.9)
IMM GRANULOCYTES # BLD AUTO: 0 X10E3/UL (ref 0–0.1)
IMM GRANULOCYTES NFR BLD AUTO: 0 %
LYMPHOCYTES # BLD AUTO: 2 X10E3/UL (ref 0.7–3.1)
LYMPHOCYTES NFR BLD AUTO: 27 %
MCH RBC QN AUTO: 31.6 PG (ref 26.6–33)
MCHC RBC AUTO-ENTMCNC: 33.9 G/DL (ref 31.5–35.7)
MCV RBC AUTO: 93 FL (ref 79–97)
MONOCYTES # BLD AUTO: 0.8 X10E3/UL (ref 0.1–0.9)
MONOCYTES NFR BLD AUTO: 11 %
NEUTROPHILS # BLD AUTO: 4.2 X10E3/UL (ref 1.4–7)
NEUTROPHILS NFR BLD AUTO: 56 %
PLATELET # BLD AUTO: 323 X10E3/UL (ref 150–450)
POTASSIUM SERPL-SCNC: 4.4 MMOL/L (ref 3.5–5.2)
PROT SERPL-MCNC: 7.4 G/DL (ref 6–8.5)
RBC # BLD AUTO: 4.52 X10E6/UL (ref 3.77–5.28)
SODIUM SERPL-SCNC: 142 MMOL/L (ref 134–144)
TSH SERPL DL<=0.005 MIU/L-ACNC: 1.09 UIU/ML (ref 0.45–4.5)
VIT B12 SERPL-MCNC: >2000 PG/ML (ref 232–1245)
WBC # BLD AUTO: 7.5 X10E3/UL (ref 3.4–10.8)

## 2021-11-18 RX ORDER — OMEPRAZOLE 40 MG/1
CAPSULE, DELAYED RELEASE ORAL
Qty: 90 CAPSULE | Refills: 3 | Status: SHIPPED | OUTPATIENT
Start: 2021-11-18 | End: 2022-10-25

## 2021-11-22 ENCOUNTER — OFFICE VISIT (OUTPATIENT)
Dept: FAMILY MEDICINE CLINIC | Facility: CLINIC | Age: 71
End: 2021-11-22

## 2021-11-22 VITALS
WEIGHT: 194.8 LBS | BODY MASS INDEX: 35.85 KG/M2 | HEART RATE: 78 BPM | TEMPERATURE: 98.6 F | SYSTOLIC BLOOD PRESSURE: 136 MMHG | OXYGEN SATURATION: 97 % | DIASTOLIC BLOOD PRESSURE: 78 MMHG | HEIGHT: 62 IN

## 2021-11-22 DIAGNOSIS — M54.50 ACUTE MIDLINE LOW BACK PAIN WITHOUT SCIATICA: Primary | ICD-10-CM

## 2021-11-22 PROCEDURE — 99213 OFFICE O/P EST LOW 20 MIN: CPT | Performed by: FAMILY MEDICINE

## 2021-11-22 RX ORDER — TIZANIDINE 4 MG/1
4 TABLET ORAL EVERY 8 HOURS PRN
Qty: 30 TABLET | Refills: 0 | Status: SHIPPED | OUTPATIENT
Start: 2021-11-22 | End: 2022-02-07 | Stop reason: SDUPTHER

## 2021-11-22 RX ORDER — ACETAMINOPHEN AND CODEINE PHOSPHATE 300; 30 MG/1; MG/1
1 TABLET ORAL EVERY 6 HOURS PRN
Qty: 15 TABLET | Refills: 0 | Status: SHIPPED | OUTPATIENT
Start: 2021-11-22 | End: 2022-08-04 | Stop reason: SDUPTHER

## 2021-11-22 NOTE — PATIENT INSTRUCTIONS
Use ice for a couple of days before going to heat.  Try the lidocaine patches that you have at home.  Keep doing your exercises as given you by physical therapy.  Try the tizanidine and can take up to 1 pill 3 times a day.  If that makes you too tired do a half a tablet.  Let me know if you are not better after having the codeine for a few days and will set up physical therapy.

## 2021-11-22 NOTE — PROGRESS NOTES
"Chief Complaint  Back Pain (no urinary symptoms)    Subjective          Jemima Bell presents to [facility]  Here with back pain over the past week.  Sudden onset of pain while turning to get up.  Pain more on the right than the left.  Tried some tizanidine.  It did help some.  Would take 1 at night and 1/2 tab during the day.   Icy Hot helps.  Tried ice and heat.  They do help.  No radiation down the legs.   Last imaging of back is unknown.  No h/o DDD.  Last PT was a year ago.   No loss of control of bowel or bladder.  No foot drop.          Objective   Vital Signs:   /78   Pulse 78   Temp 98.6 °F (37 °C)   Ht 157.5 cm (62\")   Wt 88.4 kg (194 lb 12.8 oz)   SpO2 97%   BMI 35.63 kg/m²     Physical Exam  Constitutional:       General: She is not in acute distress.     Appearance: Normal appearance. She is well-developed.   HENT:      Head: Normocephalic and atraumatic.      Right Ear: External ear normal.      Left Ear: External ear normal.   Eyes:      Conjunctiva/sclera: Conjunctivae normal.      Pupils: Pupils are equal, round, and reactive to light.   Neck:      Thyroid: No thyromegaly.   Pulmonary:      Effort: Pulmonary effort is normal.   Musculoskeletal:         General: Tenderness present. No swelling. Normal range of motion.      Comments: Tender to palpation right upper lumbar and lower thoracic paraspinous muscles.   Neurological:      Mental Status: She is alert and oriented to person, place, and time.      Sensory: No sensory deficit.      Motor: No weakness.      Coordination: Coordination normal.      Gait: Gait normal.      Deep Tendon Reflexes: Reflexes normal.   Psychiatric:         Mood and Affect: Mood normal.         Behavior: Behavior normal.        Result Review :                 Assessment and Plan    Diagnoses and all orders for this visit:    1. Acute midline low back pain without sciatica (Primary)  -     acetaminophen-codeine (TYLENOL " #3) 300-30 MG per tablet; Take 1 tablet by mouth Every 6 (Six) Hours As Needed for Moderate Pain .  Dispense: 15 tablet; Refill: 0  -     tiZANidine (ZANAFLEX) 4 MG tablet; Take 1 tablet by mouth Every 8 (Eight) Hours As Needed for Muscle Spasms.  Dispense: 30 tablet; Refill: 0    Use ice for a couple of days before going to heat.  Try the lidocaine patches that you have at home.  Keep doing your exercises as given you by physical therapy.  Try the tizanidine and can take up to 1 pill 3 times a day.  If that makes you too tired do a half a tablet.  Let me know if you are not better after having the codeine for a few days and will set up physical therapy.    Follow Up   No follow-ups on file.  Patient was given instructions and counseling regarding her condition or for health maintenance advice. Please see specific information pulled into the AVS if appropriate.

## 2021-12-07 ENCOUNTER — PATIENT MESSAGE (OUTPATIENT)
Dept: FAMILY MEDICINE CLINIC | Facility: CLINIC | Age: 71
End: 2021-12-07

## 2021-12-07 RX ORDER — LEVOTHYROXINE SODIUM 88 UG/1
88 TABLET ORAL DAILY
Qty: 90 TABLET | Refills: 0 | Status: SHIPPED | OUTPATIENT
Start: 2021-12-07 | End: 2022-03-11

## 2021-12-07 NOTE — TELEPHONE ENCOUNTER
From: Jemima Bell  To: Meredith Lea Kehrer, MD  Sent: 12/7/2021 12:27 PM EST  Subject: Levothyroxine refill    My pharmacist tells me he is waiting for a fax from your office staff to refill my prescription for Levothyroxine 88 mcg. The pharmacy is [facility] ([phone] phone) I do not have the fax number. I requested the refill last Thursday. Please advise. Thank you!

## 2021-12-29 ENCOUNTER — IMMUNIZATION (OUTPATIENT)
Dept: VACCINE CLINIC | Facility: HOSPITAL | Age: 71
End: 2021-12-29

## 2021-12-29 PROCEDURE — 0004A HC ADM SARSCOV2 30MCG/0.3ML BOOSTER: CPT | Performed by: INTERNAL MEDICINE

## 2021-12-29 PROCEDURE — 91300 HC SARSCOV02 VAC 30MCG/0.3ML IM: CPT | Performed by: INTERNAL MEDICINE

## 2022-01-13 ENCOUNTER — PATIENT MESSAGE (OUTPATIENT)
Dept: FAMILY MEDICINE CLINIC | Facility: CLINIC | Age: 72
End: 2022-01-13

## 2022-01-13 RX ORDER — ONDANSETRON 4 MG/1
4 TABLET, FILM COATED ORAL EVERY 8 HOURS PRN
Qty: 15 TABLET | Refills: 0 | Status: SHIPPED | OUTPATIENT
Start: 2022-01-13

## 2022-01-13 NOTE — TELEPHONE ENCOUNTER
From: Jemima Bell  To: Meredith Lea Kehrer, MD  Sent: 1/13/2022 11:34 AM EST  Subject: Constant nausea    I have terrible nausea nearly 24/7. I have an appointment with Dr Kehrer on January 24. I have some zofran but not enough to get me through that date. Please advise.

## 2022-01-24 ENCOUNTER — OFFICE VISIT (OUTPATIENT)
Dept: FAMILY MEDICINE CLINIC | Facility: CLINIC | Age: 72
End: 2022-01-24

## 2022-01-24 VITALS
TEMPERATURE: 97.9 F | OXYGEN SATURATION: 98 % | DIASTOLIC BLOOD PRESSURE: 80 MMHG | BODY MASS INDEX: 34.12 KG/M2 | HEIGHT: 62 IN | SYSTOLIC BLOOD PRESSURE: 124 MMHG | WEIGHT: 185.4 LBS | HEART RATE: 78 BPM

## 2022-01-24 DIAGNOSIS — R11.0 NAUSEA: Primary | ICD-10-CM

## 2022-01-24 DIAGNOSIS — R30.0 DYSURIA: ICD-10-CM

## 2022-01-24 DIAGNOSIS — F41.8 DEPRESSION WITH ANXIETY: ICD-10-CM

## 2022-01-24 DIAGNOSIS — R63.4 WEIGHT LOSS: ICD-10-CM

## 2022-01-24 LAB
BILIRUB BLD-MCNC: ABNORMAL MG/DL
CLARITY, POC: CLEAR
COLOR UR: YELLOW
EXPIRATION DATE: ABNORMAL
GLUCOSE UR STRIP-MCNC: NEGATIVE MG/DL
KETONES UR QL: NEGATIVE
LEUKOCYTE EST, POC: ABNORMAL
Lab: ABNORMAL
NITRITE UR-MCNC: NEGATIVE MG/ML
PH UR: 6 [PH] (ref 5–8)
PROT UR STRIP-MCNC: ABNORMAL MG/DL
RBC # UR STRIP: ABNORMAL /UL
SP GR UR: 1.03 (ref 1–1.03)
UROBILINOGEN UR QL: NORMAL

## 2022-01-24 PROCEDURE — 99214 OFFICE O/P EST MOD 30 MIN: CPT | Performed by: FAMILY MEDICINE

## 2022-01-24 PROCEDURE — 81003 URINALYSIS AUTO W/O SCOPE: CPT | Performed by: FAMILY MEDICINE

## 2022-01-24 RX ORDER — ARIPIPRAZOLE 5 MG/1
5 TABLET ORAL NIGHTLY
Qty: 30 TABLET | Refills: 0 | Status: SHIPPED | OUTPATIENT
Start: 2022-01-24 | End: 2022-02-22 | Stop reason: SINTOL

## 2022-01-24 NOTE — PROGRESS NOTES
"Chief Complaint  Nausea, Anxiety, and Depression    Subjective          Jemima Bell presents to [facility]  Still having a lot of problems with nausea.   Has been going on for a month.  She has been wondering if it is hypoglycemia.  Was diagnosed with it in the past.   Has been nauseated all the time.  Not eating, irritable.  Some lower abdominal discomfort.  No dysuria.  Has not vomited.  Stools are watery but that hs been chronic.   Had a GI work up in [place].  Diarrhea started before the Omeprazole.   She has been very depressed and tearful.  She is not seeing the psychiatrist anymore because none of the medications helped.  She is worried about being like her mother was when her mother got older.    Here   Objective   Vital Signs:   /80   Pulse 78   Temp 97.9 °F (36.6 °C)   Ht 157.5 cm (62\")   Wt 84.1 kg (185 lb 6.4 oz)   SpO2 98%   BMI 33.91 kg/m²     Physical Exam  Constitutional:       General: She is not in acute distress.     Appearance: Normal appearance. She is well-developed.   HENT:      Head: Normocephalic and atraumatic.      Right Ear: Tympanic membrane, ear canal and external ear normal.      Left Ear: Tympanic membrane, ear canal and external ear normal.      Mouth/Throat:      Mouth: Mucous membranes are moist.      Pharynx: Oropharynx is clear.   Eyes:      Conjunctiva/sclera: Conjunctivae normal.      Pupils: Pupils are equal, round, and reactive to light.   Neck:      Thyroid: No thyromegaly.   Cardiovascular:      Rate and Rhythm: Normal rate and regular rhythm.      Heart sounds: No murmur heard.      Pulmonary:      Effort: Pulmonary effort is normal.      Breath sounds: Normal breath sounds. No wheezing.   Abdominal:      General: Bowel sounds are normal.      Palpations: Abdomen is soft.      Tenderness: There is no abdominal tenderness.   Musculoskeletal:         General: Normal range of motion.      Cervical back: Neck supple. "   Lymphadenopathy:      Cervical: No cervical adenopathy.   Skin:     General: Skin is warm and dry.   Neurological:      Mental Status: She is alert and oriented to person, place, and time.   Psychiatric:         Mood and Affect: Mood normal.         Behavior: Behavior normal.        Result Review :                 Assessment and Plan    Diagnoses and all orders for this visit:    1. Nausea (Primary)  -     Lipase  -     Amylase  -     CBC & Differential  -     Comprehensive Metabolic Panel  -     POC Urinalysis Dipstick, Automated    2. Weight loss  -     Lipase  -     Amylase  -     CBC & Differential  -     Comprehensive Metabolic Panel  -     POC Urinalysis Dipstick, Automated    3. Depression with anxiety  -     ARIPiprazole (Abilify) 5 MG tablet; Take 1 tablet by mouth Every Night.  Dispense: 30 tablet; Refill: 0    Nausea weight loss-we will check labs today, may consider getting her back into see GI with a history of IBS  Depression with anxiety-has tried multiple antidepressants in the past so we will try a low-dose of an antipsychotic with Abilify and have close follow-up, patient contracts for safety    Follow Up   Return in about 2 weeks (around 2/7/2022) for Recheck mood.  Patient was given instructions and counseling regarding her condition or for health maintenance advice. Please see specific information pulled into the AVS if appropriate.

## 2022-01-25 LAB
ALBUMIN SERPL-MCNC: 4.3 G/DL (ref 3.7–4.7)
ALBUMIN/GLOB SERPL: 1.5 {RATIO} (ref 1.2–2.2)
ALP SERPL-CCNC: 74 IU/L (ref 44–121)
ALT SERPL-CCNC: 12 IU/L (ref 0–32)
AMYLASE SERPL-CCNC: 42 U/L (ref 31–110)
AST SERPL-CCNC: 12 IU/L (ref 0–40)
BASOPHILS # BLD AUTO: 0.1 X10E3/UL (ref 0–0.2)
BASOPHILS NFR BLD AUTO: 1 %
BILIRUB SERPL-MCNC: 0.7 MG/DL (ref 0–1.2)
BUN SERPL-MCNC: 12 MG/DL (ref 8–27)
BUN/CREAT SERPL: 13 (ref 12–28)
CALCIUM SERPL-MCNC: 9.6 MG/DL (ref 8.7–10.3)
CHLORIDE SERPL-SCNC: 104 MMOL/L (ref 96–106)
CO2 SERPL-SCNC: 24 MMOL/L (ref 20–29)
CREAT SERPL-MCNC: 0.95 MG/DL (ref 0.57–1)
EOSINOPHIL # BLD AUTO: 0.2 X10E3/UL (ref 0–0.4)
EOSINOPHIL NFR BLD AUTO: 3 %
ERYTHROCYTE [DISTWIDTH] IN BLOOD BY AUTOMATED COUNT: 11.6 % (ref 11.7–15.4)
GLOBULIN SER CALC-MCNC: 2.9 G/DL (ref 1.5–4.5)
GLUCOSE SERPL-MCNC: 111 MG/DL (ref 65–99)
HCT VFR BLD AUTO: 45.4 % (ref 34–46.6)
HGB BLD-MCNC: 15 G/DL (ref 11.1–15.9)
IMM GRANULOCYTES # BLD AUTO: 0 X10E3/UL (ref 0–0.1)
IMM GRANULOCYTES NFR BLD AUTO: 0 %
LIPASE SERPL-CCNC: 27 U/L (ref 14–85)
LYMPHOCYTES # BLD AUTO: 1.9 X10E3/UL (ref 0.7–3.1)
LYMPHOCYTES NFR BLD AUTO: 26 %
MCH RBC QN AUTO: 30.5 PG (ref 26.6–33)
MCHC RBC AUTO-ENTMCNC: 33 G/DL (ref 31.5–35.7)
MCV RBC AUTO: 93 FL (ref 79–97)
MONOCYTES # BLD AUTO: 0.5 X10E3/UL (ref 0.1–0.9)
MONOCYTES NFR BLD AUTO: 6 %
NEUTROPHILS # BLD AUTO: 4.8 X10E3/UL (ref 1.4–7)
NEUTROPHILS NFR BLD AUTO: 64 %
PLATELET # BLD AUTO: 308 X10E3/UL (ref 150–450)
POTASSIUM SERPL-SCNC: 4.6 MMOL/L (ref 3.5–5.2)
PROT SERPL-MCNC: 7.2 G/DL (ref 6–8.5)
RBC # BLD AUTO: 4.91 X10E6/UL (ref 3.77–5.28)
SODIUM SERPL-SCNC: 142 MMOL/L (ref 134–144)
WBC # BLD AUTO: 7.5 X10E3/UL (ref 3.4–10.8)

## 2022-01-26 LAB
HBA1C MFR BLD: 5.8 % (ref 4.8–5.6)
WRITTEN AUTHORIZATION: NORMAL

## 2022-01-27 LAB
BACTERIA UR CULT: ABNORMAL
OTHER ANTIBIOTIC SUSC ISLT: ABNORMAL

## 2022-01-28 RX ORDER — CEFDINIR 300 MG/1
300 CAPSULE ORAL 2 TIMES DAILY
Qty: 10 CAPSULE | Refills: 0 | Status: SHIPPED | OUTPATIENT
Start: 2022-01-28 | End: 2022-02-02

## 2022-02-07 ENCOUNTER — OFFICE VISIT (OUTPATIENT)
Dept: FAMILY MEDICINE CLINIC | Facility: CLINIC | Age: 72
End: 2022-02-07

## 2022-02-07 VITALS
OXYGEN SATURATION: 100 % | DIASTOLIC BLOOD PRESSURE: 70 MMHG | SYSTOLIC BLOOD PRESSURE: 124 MMHG | TEMPERATURE: 97.2 F | HEIGHT: 62 IN | HEART RATE: 88 BPM | BODY MASS INDEX: 33.97 KG/M2 | WEIGHT: 184.6 LBS

## 2022-02-07 DIAGNOSIS — F41.8 DEPRESSION WITH ANXIETY: Primary | ICD-10-CM

## 2022-02-07 DIAGNOSIS — M54.50 ACUTE MIDLINE LOW BACK PAIN WITHOUT SCIATICA: ICD-10-CM

## 2022-02-07 PROCEDURE — 99213 OFFICE O/P EST LOW 20 MIN: CPT | Performed by: FAMILY MEDICINE

## 2022-02-07 RX ORDER — TIZANIDINE 4 MG/1
4 TABLET ORAL EVERY 8 HOURS PRN
Qty: 30 TABLET | Refills: 1 | Status: SHIPPED | OUTPATIENT
Start: 2022-02-07 | End: 2022-08-04 | Stop reason: SDUPTHER

## 2022-02-07 NOTE — PROGRESS NOTES
"Chief Complaint  Anxiety, Depression, and Med Refill    Subjective          Jemima Bell presents to [facility]  History of Present Illness     The patient has consented to being recorded using DELIA.    Jemima is a 72-year-old female who presents today for follow-up evaluation for depression medication adjustments. The patient has been on a trial for Abilify 5 mg, she confirms this is the first time trying this. She states she takes the pill at night right before getting in bed. She does notice improvement in her mood, she is not as mean but is still pretty \"rotten\". She states nobody around her has noticed an improvement, or at least have not mentioned it to her. For the last year or maybe longer, she has struggled with falling asleep. She confirms that the only ways she is able to sleep if she takes the Abilify with tizanidine, if she had a crazy day and if she takes anxiety medications. She has had to be at work at 7:00 AM for the last 40 years and complains she is tired of that. Her nausea has improved and she does relate her nausea to her stress because she has always had both.   Objective   Vital Signs:   /70   Pulse 88   Temp 97.2 °F (36.2 °C)   Ht 157.5 cm (62\")   Wt 83.7 kg (184 lb 9.6 oz)   SpO2 100%   BMI 33.76 kg/m²     Physical Exam  Constitutional:       General: She is not in acute distress.     Appearance: Normal appearance. She is well-developed.   HENT:      Head: Normocephalic and atraumatic.      Right Ear: External ear normal.      Left Ear: External ear normal.   Eyes:      Conjunctiva/sclera: Conjunctivae normal.      Pupils: Pupils are equal, round, and reactive to light.   Neck:      Thyroid: No thyromegaly.   Pulmonary:      Effort: Pulmonary effort is normal.   Neurological:      Mental Status: She is alert and oriented to person, place, and time.   Psychiatric:         Mood and Affect: Mood normal.         Behavior: Behavior normal. "        Result Review :                 Assessment and Plan    Diagnoses and all orders for this visit:    1. Depression with anxiety (Primary)    2. Acute midline low back pain without sciatica  -     tiZANidine (ZANAFLEX) 4 MG tablet; Take 1 tablet by mouth Every 8 (Eight) Hours As Needed for Muscle Spasms.  Dispense: 30 tablet; Refill: 1    1. Depression.  Patient will continue taking Abilify as she has been. She will try to take it a little bit earlier to see if it will improve her sleep schedule. We will follow-up with this in 2 months.    2. Back pain.  We will refill the tizanidine for her back pain. The patient confirms she will not drive while taking the medication and will only use it at home for pain relief. She was also advised to not take more than prescribed, and she confirmed she usually takes half of the prescription.     3. Thyroid.  The patient is due to check her TSH levels in 04/2022 and will schedule the follow-up for that.         Follow Up   Return in about 2 months (around 4/7/2022) for Recheck mood and thyroid.  Patient was given instructions and counseling regarding her condition or for health maintenance advice. Please see specific information pulled into the AVS if appropriate.     Transcribed from ambient dictation for Meredith Lea Kehrer, MD by Michelle Hunter.  02/08/22   16:10 EST    Patient verbalized consent to the visit recording.  I have personally performed the services described in this document as transcribed by the above individual, and it is both accurate and complete.  Meredith Lea Kehrer, MD  2/8/2022  16:53 EST

## 2022-02-18 ENCOUNTER — HOSPITAL ENCOUNTER (OUTPATIENT)
Dept: BONE DENSITY | Facility: HOSPITAL | Age: 72
Discharge: HOME | End: 2022-02-18
Admitting: FAMILY MEDICINE

## 2022-02-18 DIAGNOSIS — Z78.0 MENOPAUSE: ICD-10-CM

## 2022-02-18 PROCEDURE — 77080 DXA BONE DENSITY AXIAL: CPT

## 2022-02-22 ENCOUNTER — TELEPHONE (OUTPATIENT)
Dept: NEUROLOGY | Facility: OTHER | Age: 72
End: 2022-02-22

## 2022-02-22 ENCOUNTER — OFFICE VISIT (OUTPATIENT)
Dept: FAMILY MEDICINE CLINIC | Facility: CLINIC | Age: 72
End: 2022-02-22

## 2022-02-22 VITALS
BODY MASS INDEX: 34.12 KG/M2 | SYSTOLIC BLOOD PRESSURE: 126 MMHG | HEART RATE: 127 BPM | TEMPERATURE: 97.9 F | WEIGHT: 185.4 LBS | DIASTOLIC BLOOD PRESSURE: 72 MMHG | HEIGHT: 62 IN | OXYGEN SATURATION: 100 %

## 2022-02-22 DIAGNOSIS — F41.8 DEPRESSION WITH ANXIETY: ICD-10-CM

## 2022-02-22 DIAGNOSIS — T50.905A ADVERSE EFFECT OF DRUG, INITIAL ENCOUNTER: ICD-10-CM

## 2022-02-22 DIAGNOSIS — R29.818 EXTRAPYRAMIDAL SYMPTOM: Primary | ICD-10-CM

## 2022-02-22 PROCEDURE — 99214 OFFICE O/P EST MOD 30 MIN: CPT | Performed by: FAMILY MEDICINE

## 2022-02-22 RX ORDER — HYDROXYZINE HYDROCHLORIDE 10 MG/1
10 TABLET, FILM COATED ORAL EVERY 4 HOURS PRN
Qty: 120 TABLET | Refills: 2 | Status: SHIPPED | OUTPATIENT
Start: 2022-02-22 | End: 2022-06-16 | Stop reason: SDUPTHER

## 2022-02-22 NOTE — PROGRESS NOTES
"Chief Complaint  Medication Problem    Subjective          Jemima Bell presents to [facility]  History of Present Illness    Depression  At the previous visit, the patient had noted an improvement in her mood after starting Abilify less than 2 week prior to the appointment. However, following the visit she started to experience intermittent quivering of her chin so she discontinued the medication approximately 1 week ago. She is often grinding her teeth to try to control the quivering. The patient does not believe she is experiencing other neurologic symptoms, but has become hyperaware of all of her movements due to worry. The quivering will disrupt her sleep at night if she does not take hydroxyzine which resolves her symptoms. Hydroxyzine does cause extreme drowsiness for the patient. The patient did also attend a Botox party and received Botox injections into upper lip earlier this month which she fears may also be contributing. Her lip was very hard following the injections. She has received Botox in the past. The patient's mood has declined since discontinuing the Abilify and with the stress of receiving the Botox.      Review of Systems     A review of systems was performed, and the pertinent positives are noted in the HPI.     Objective   Vital Signs:   /72   Pulse (!) 127   Temp 97.9 °F (36.6 °C)   Ht 157.5 cm (62\")   Wt 84.1 kg (185 lb 6.4 oz)   SpO2 100%   BMI 33.91 kg/m²     Physical Exam  Constitutional:       General: She is not in acute distress.     Appearance: Normal appearance. She is well-developed.   HENT:      Head: Normocephalic and atraumatic.      Right Ear: Tympanic membrane, ear canal and external ear normal.      Left Ear: Tympanic membrane, ear canal and external ear normal.      Mouth/Throat:      Mouth: Mucous membranes are moist.      Pharynx: Oropharynx is clear.   Eyes:      Conjunctiva/sclera: Conjunctivae normal.      Pupils: " Pupils are equal, round, and reactive to light.   Neck:      Thyroid: No thyromegaly.   Cardiovascular:      Rate and Rhythm: Normal rate and regular rhythm.      Heart sounds: No murmur heard.       Comments: Noted patient is no longer tachycardic during exam  Pulmonary:      Effort: Pulmonary effort is normal.      Breath sounds: Normal breath sounds. No wheezing.   Abdominal:      General: Bowel sounds are normal.      Palpations: Abdomen is soft.      Tenderness: There is no abdominal tenderness.   Musculoskeletal:         General: Normal range of motion.      Cervical back: Neck supple.   Lymphadenopathy:      Cervical: No cervical adenopathy.   Skin:     General: Skin is warm and dry.   Neurological:      Mental Status: She is alert and oriented to person, place, and time.      Comments: Mild quivering of chin noted. Neurologic exam is otherwise intact.    Psychiatric:         Mood and Affect: Mood normal.         Behavior: Behavior normal.        Result Review :                 Assessment and Plan    Diagnoses and all orders for this visit:    1. Extrapyramidal symptom (Primary)  -     Ambulatory Referral to Neurology        -     hydrOXYzine (ATARAX) 10 MG tablet; Take 1 tablet by mouth Every 4 (Four) Hours As Needed for Anxiety (twitching).  Dispense: 120 tablet; Refill: 2        - Abilify will be discontinued.        - Hydroxyzine will be continued.         - A referral to neurology will be placed for further evaluation.    2. Depression with anxiety        -  Medication management can be discussed following a neurology consult.         -  Advised her to work on self love and actives to improve her depression in the meantime.     3. Adverse effect of drug, initial encounter        Follow Up   No follow-ups on file.  Patient was given instructions and counseling regarding her condition or for health maintenance advice. Please see specific information pulled into the AVS if appropriate.       Transcribed  from ambient dictation for Meredith Lea Kehrer, MD by Anabel Heard.  02/22/22   17:48 EST    Patient verbalized consent to the visit recording.  I have personally performed the services described in this document as transcribed by the above individual, and it is both accurate and complete.  Meredith Lea Kehrer, MD  2/24/2022  07:57 EST

## 2022-02-22 NOTE — TELEPHONE ENCOUNTER
CALLING IN REQ NEW PT APPT WITH DR GARRIDO  WAS ADVISED WE REQUIRE A REFERRAL SHE STATES SHE HAS ALREADY SPOKE TO HER PCP AND SHE IS WILLING TO SEND ONE IN .. I ADVISED PER HER DX OF [facility] THE REFERRAL WILL REQUIRE A REVIEW BY PROVIDER . PT STATED SHE UNDERSTOOD

## 2022-03-07 DIAGNOSIS — R29.818 EXTRAPYRAMIDAL SYMPTOM: Primary | ICD-10-CM

## 2022-03-08 ENCOUNTER — PATIENT MESSAGE (OUTPATIENT)
Dept: FAMILY MEDICINE CLINIC | Facility: CLINIC | Age: 72
End: 2022-03-08

## 2022-03-08 DIAGNOSIS — F41.8 DEPRESSION WITH ANXIETY: Primary | ICD-10-CM

## 2022-03-09 NOTE — TELEPHONE ENCOUNTER
From: Jemima Bell  To: Meredith Lea Kehrer, MD  Sent: 3/8/2022 6:40 PM EST  Subject: Visit with Neurologist    Yesterday I received a call from your staff that you were referring me to [facility]. My concern is that I have heard and read nothing positive about that place. When we last spoke I thought you were referring me to Dr Hernandez. At that time his closest appt would be mid April. It’s likely farther out by now. My concern with all of the time frame is that I am off meds while I wait this thing out. You know I trust your judgment but I also realize I am Not your only patient of concern. As always, Thank you!

## 2022-03-11 RX ORDER — LEVOTHYROXINE SODIUM 88 UG/1
TABLET ORAL
Qty: 90 TABLET | Refills: 0 | Status: SHIPPED | OUTPATIENT
Start: 2022-03-11 | End: 2022-07-01 | Stop reason: SDUPTHER

## 2022-03-24 RX ORDER — MIRTAZAPINE 15 MG/1
15 TABLET, FILM COATED ORAL NIGHTLY
Qty: 30 TABLET | Refills: 0 | Status: SHIPPED | OUTPATIENT
Start: 2022-03-24 | End: 2022-04-07 | Stop reason: DRUGHIGH

## 2022-04-07 ENCOUNTER — OFFICE VISIT (OUTPATIENT)
Dept: FAMILY MEDICINE CLINIC | Facility: CLINIC | Age: 72
End: 2022-04-07

## 2022-04-07 VITALS
OXYGEN SATURATION: 99 % | HEART RATE: 76 BPM | DIASTOLIC BLOOD PRESSURE: 70 MMHG | HEIGHT: 62 IN | SYSTOLIC BLOOD PRESSURE: 132 MMHG | TEMPERATURE: 97.3 F | WEIGHT: 182.2 LBS | BODY MASS INDEX: 33.53 KG/M2

## 2022-04-07 DIAGNOSIS — F41.8 DEPRESSION WITH ANXIETY: Primary | ICD-10-CM

## 2022-04-07 DIAGNOSIS — E03.9 HYPOTHYROIDISM, UNSPECIFIED TYPE: ICD-10-CM

## 2022-04-07 PROCEDURE — 99213 OFFICE O/P EST LOW 20 MIN: CPT | Performed by: FAMILY MEDICINE

## 2022-04-07 RX ORDER — MIRTAZAPINE 30 MG/1
30 TABLET, FILM COATED ORAL NIGHTLY
Qty: 30 TABLET | Refills: 1 | Status: SHIPPED | OUTPATIENT
Start: 2022-04-07 | End: 2022-04-25 | Stop reason: DRUGHIGH

## 2022-04-07 NOTE — PROGRESS NOTES
"Chief Complaint  Med Refill, Depression, Anxiety, and Hypothyroidism    Subjective          Jemima Bell presents to [facility]  History of Present Illness     Depression  She reports moderate feelings of depression lately that is causing her often to not want to get out of bed.  With use of mirtazapine she indicates severe fatigue and notes use of medication at night. She denies any thoughts of hopelessness or giving up. After use of Abilify she notes her mouth movements dissipated after 2 to 3 weeks.     Hypothyroidism  The patient confirms taking medication daily as instructed.     Objective   Vital Signs:   /70   Pulse 76   Temp 97.3 °F (36.3 °C)   Ht 157.5 cm (62\")   Wt 82.6 kg (182 lb 3.2 oz)   SpO2 99%   BMI 33.32 kg/m²            Physical Exam  Constitutional:       General: She is not in acute distress.     Appearance: Normal appearance. She is well-developed.   HENT:      Head: Normocephalic and atraumatic.      Right Ear: Tympanic membrane, ear canal and external ear normal.      Left Ear: Tympanic membrane, ear canal and external ear normal.      Mouth/Throat:      Mouth: Mucous membranes are moist.      Pharynx: Oropharynx is clear.   Eyes:      Conjunctiva/sclera: Conjunctivae normal.      Pupils: Pupils are equal, round, and reactive to light.   Neck:      Thyroid: No thyromegaly.   Cardiovascular:      Rate and Rhythm: Normal rate and regular rhythm.      Heart sounds: No murmur heard.  Pulmonary:      Effort: Pulmonary effort is normal.      Breath sounds: Normal breath sounds. No wheezing.   Abdominal:      General: Bowel sounds are normal.      Palpations: Abdomen is soft.      Tenderness: There is no abdominal tenderness.   Musculoskeletal:         General: Normal range of motion.      Cervical back: Neck supple.   Lymphadenopathy:      Cervical: No cervical adenopathy.   Skin:     General: Skin is warm and dry.   Neurological:      Mental " Status: She is alert and oriented to person, place, and time.   Psychiatric:         Mood and Affect: Mood normal.         Behavior: Behavior normal.        Result Review :                 Assessment and Plan    Diagnoses and all orders for this visit:    1. Depression with anxiety (Primary)  -     mirtazapine (Remeron) 30 MG tablet; Take 1 tablet by mouth Every Night.  Dispense: 30 tablet; Refill: 1    2. Hypothyroidism, unspecified type  -     TSH        Follow Up   Return in about 1 month (around 5/7/2022) for Recheck depression.  Patient was given instructions and counseling regarding her condition or for health maintenance advice. Please see specific information pulled into the AVS if appropriate.     Transcribed from ambient dictation for Meredith Lea Kehrer, MD by Swetha Mcguire.  04/07/22   14:59 EDT    Patient verbalized consent to the visit recording.  I have personally performed the services described in this document as transcribed by the above individual, and it is both accurate and complete.  Meredith Lea Kehrer, MD  4/12/2022  07:53 EDT

## 2022-04-08 LAB — TSH SERPL DL<=0.005 MIU/L-ACNC: 1.51 UIU/ML (ref 0.45–4.5)

## 2022-04-25 ENCOUNTER — PATIENT MESSAGE (OUTPATIENT)
Dept: FAMILY MEDICINE CLINIC | Facility: CLINIC | Age: 72
End: 2022-04-25

## 2022-04-25 DIAGNOSIS — F41.8 DEPRESSION WITH ANXIETY: Primary | ICD-10-CM

## 2022-04-25 RX ORDER — MIRTAZAPINE 45 MG/1
45 TABLET, ORALLY DISINTEGRATING ORAL NIGHTLY
Qty: 30 TABLET | Refills: 1 | Status: SHIPPED | OUTPATIENT
Start: 2022-04-25 | End: 2022-04-28

## 2022-04-28 RX ORDER — MIRTAZAPINE 45 MG/1
45 TABLET, FILM COATED ORAL NIGHTLY
Qty: 30 TABLET | Refills: 1 | Status: SHIPPED | OUTPATIENT
Start: 2022-04-28 | End: 2022-05-11 | Stop reason: DRUGHIGH

## 2022-05-11 ENCOUNTER — OFFICE VISIT (OUTPATIENT)
Dept: FAMILY MEDICINE CLINIC | Facility: CLINIC | Age: 72
End: 2022-05-11

## 2022-05-11 VITALS
HEART RATE: 76 BPM | DIASTOLIC BLOOD PRESSURE: 70 MMHG | HEIGHT: 62 IN | BODY MASS INDEX: 33.82 KG/M2 | OXYGEN SATURATION: 98 % | WEIGHT: 183.8 LBS | SYSTOLIC BLOOD PRESSURE: 138 MMHG | TEMPERATURE: 98.2 F

## 2022-05-11 DIAGNOSIS — F41.8 DEPRESSION WITH ANXIETY: Primary | ICD-10-CM

## 2022-05-11 PROCEDURE — 99213 OFFICE O/P EST LOW 20 MIN: CPT | Performed by: FAMILY MEDICINE

## 2022-05-11 RX ORDER — MIRTAZAPINE 30 MG/1
30 TABLET, FILM COATED ORAL NIGHTLY
Qty: 90 TABLET | Refills: 1 | Status: SHIPPED | OUTPATIENT
Start: 2022-05-11 | End: 2022-10-17 | Stop reason: SDUPTHER

## 2022-05-11 NOTE — PROGRESS NOTES
"Chief Complaint  Med Refill, Depression, and Anxiety    Subjective          Jemima Bell presents to [facility]  History of Present Illness    Jemima Bell is a 72-year-old female who presents today for follow-up on anxiety and depression.     Mood   The patient reports being on 45 mg of Remeron for the last couple of weeks. She reports she is not responding well to the 45 mg, and prefers to go back down to 30 mg. [place], nauseous, diarrhea and excessive sweats. She was responding well when she was on 30 mg, but felt like she could still lay in bed all day and had no motivation.     The following portions of the patient's history were reviewed and updated as appropriate: allergies, current medications, past social history and problem list.     Objective      Review of Systems  A review of systems was performed, and the pertinent positives are noted in the HPI.    Vital Signs:  /70   Pulse 76   Temp 98.2 °F (36.8 °C)   Ht 157.5 cm (62\")   Wt 83.4 kg (183 lb 12.8 oz)   SpO2 98%   BMI 33.62 kg/m²           Physical Exam  Constitutional:       General: She is not in acute distress.     Appearance: Normal appearance. She is well-developed.   HENT:      Head: Normocephalic and atraumatic.      Right Ear: Tympanic membrane, ear canal and external ear normal.      Left Ear: Tympanic membrane, ear canal and external ear normal.      Mouth/Throat:      Mouth: Mucous membranes are moist.      Pharynx: Oropharynx is clear.   Eyes:      Conjunctiva/sclera: Conjunctivae normal.      Pupils: Pupils are equal, round, and reactive to light.   Neck:      Thyroid: No thyromegaly.   Cardiovascular:      Rate and Rhythm: Normal rate and regular rhythm.      Heart sounds: No murmur heard.  Pulmonary:      Effort: Pulmonary effort is normal.      Breath sounds: Normal breath sounds. No wheezing.   Abdominal:      General: Bowel " sounds are normal.      Palpations: Abdomen is soft.      Tenderness: There is no abdominal tenderness.   Musculoskeletal:         General: Normal range of motion.      Cervical back: Neck supple.   Lymphadenopathy:      Cervical: No cervical adenopathy.   Skin:     General: Skin is warm and dry.   Neurological:      Mental Status: She is alert and oriented to person, place, and time.   Psychiatric:         Mood and Affect: Mood normal.         Behavior: Behavior normal.          Result Review :                 Assessment and Plan    Diagnoses and all orders for this visit:    1. Depression with anxiety (Primary)  -     mirtazapine (Remeron) 30 MG tablet; Take 1 tablet by mouth Every Night.  Dispense: 90 tablet; Refill: 1  - Patient is recommended to find a hobby or activity she enjoys practicing to motivate her to get out of bed and keep herself busy.             Follow Up   Return in about 5 months (around 10/11/2022) for Medicare Wellness.  Patient was given instructions and counseling regarding her condition or for health maintenance advice. Please see specific information pulled into the AVS if appropriate.     Transcribed from ambient dictation for Meredith Lea Kehrer, MD by Michelle Hunter.  05/11/22   17:25 EDT    Patient verbalized consent to the visit recording.

## 2022-06-15 ENCOUNTER — PATIENT MESSAGE (OUTPATIENT)
Dept: FAMILY MEDICINE CLINIC | Facility: CLINIC | Age: 72
End: 2022-06-15

## 2022-06-15 DIAGNOSIS — R29.818 EXTRAPYRAMIDAL SYMPTOM: ICD-10-CM

## 2022-06-15 NOTE — TELEPHONE ENCOUNTER
From: Jemima Bell  To: Meredith Lea Kehrer, MD  Sent: 6/15/2022 10:29 AM EDT  Subject: Anxiety??    I am having difficulty with what I think is anxiety. Having concerns with difficult situations and unable to think of anything at all except trying to solve problems. I think that’s anxiety?? Do you think a daily anxiety Med would help? Please advise. I’m struggling here. Thank you

## 2022-06-16 RX ORDER — HYDROXYZINE HYDROCHLORIDE 10 MG/1
10 TABLET, FILM COATED ORAL EVERY 4 HOURS PRN
Qty: 120 TABLET | Refills: 2 | Status: SHIPPED | OUTPATIENT
Start: 2022-06-16 | End: 2022-08-22 | Stop reason: SDUPTHER

## 2022-07-01 RX ORDER — LEVOTHYROXINE SODIUM 88 UG/1
88 TABLET ORAL DAILY
Qty: 90 TABLET | Refills: 0 | Status: SHIPPED | OUTPATIENT
Start: 2022-07-01 | End: 2022-10-10

## 2022-07-01 NOTE — TELEPHONE ENCOUNTER
Caller: Jemima Bell    Relationship: Self    Best call back number: [phone]    Requested Prescriptions:   Requested Prescriptions     Pending Prescriptions Disp Refills   • levothyroxine (SYNTHROID, LEVOTHROID) 88 MCG tablet 90 tablet 0     Sig: Take 1 tablet by mouth Daily.        Pharmacy where request should be sent: [facility] [address] [place] [address] RD AT [facility] [phone] [facility] [phone] FX     Additional details provided by patient: PATIENT IS OUT OF THIS MEDICATION.    Does the patient have less than a 3 day supply:  [x] Yes  [] No    Faraz Huitorn Rep   07/01/22 14:39 EDT

## 2022-08-04 ENCOUNTER — OFFICE VISIT (OUTPATIENT)
Dept: FAMILY MEDICINE CLINIC | Facility: CLINIC | Age: 72
End: 2022-08-04

## 2022-08-04 VITALS
HEART RATE: 68 BPM | DIASTOLIC BLOOD PRESSURE: 72 MMHG | WEIGHT: 185.4 LBS | TEMPERATURE: 97.7 F | SYSTOLIC BLOOD PRESSURE: 128 MMHG | BODY MASS INDEX: 34.12 KG/M2 | OXYGEN SATURATION: 98 % | HEIGHT: 62 IN

## 2022-08-04 DIAGNOSIS — M54.50 ACUTE MIDLINE LOW BACK PAIN WITHOUT SCIATICA: Primary | ICD-10-CM

## 2022-08-04 PROCEDURE — 99213 OFFICE O/P EST LOW 20 MIN: CPT | Performed by: FAMILY MEDICINE

## 2022-08-04 PROCEDURE — 96372 THER/PROPH/DIAG INJ SC/IM: CPT | Performed by: FAMILY MEDICINE

## 2022-08-04 RX ORDER — KETOROLAC TROMETHAMINE 30 MG/ML
30 INJECTION, SOLUTION INTRAMUSCULAR; INTRAVENOUS ONCE
Status: COMPLETED | OUTPATIENT
Start: 2022-08-04 | End: 2022-08-04

## 2022-08-04 RX ORDER — TIZANIDINE 4 MG/1
4 TABLET ORAL EVERY 8 HOURS PRN
Qty: 30 TABLET | Refills: 1 | Status: SHIPPED | OUTPATIENT
Start: 2022-08-04 | End: 2022-09-13 | Stop reason: SDUPTHER

## 2022-08-04 RX ORDER — ACETAMINOPHEN AND CODEINE PHOSPHATE 300; 30 MG/1; MG/1
1 TABLET ORAL EVERY 6 HOURS PRN
Qty: 15 TABLET | Refills: 0 | Status: SHIPPED | OUTPATIENT
Start: 2022-08-04 | End: 2022-09-13 | Stop reason: SDUPTHER

## 2022-08-04 RX ADMIN — KETOROLAC TROMETHAMINE 30 MG: 30 INJECTION, SOLUTION INTRAMUSCULAR; INTRAVENOUS at 10:56

## 2022-08-04 NOTE — PROGRESS NOTES
"Chief Complaint  Back Pain and Leg Pain    Subjective        Jemima Bell presents to [facility]  History of Present Illness    The patient presents today for evaluation of left sided back pain.    Left sided back pain  The patient states she has been experiencing increased left side back pain for approximately 1 week. She denies any known injury or trauma. The patient states she wakes up with stiffness in her back to the point that she can not move and it gets worse when she goes to the restroom. She states she has been taking tizanidine, diclofenac gel, and Tylenol with no relief. The patient states her last episode of back pain was approximately 6 months ago. She states she has tried ice with some relief.     Leg pain   The patient denies any radiation of pain down her legs or loss of control of her bowel or bladder. She states she thinks her legs are stressed from the pain she is having in her back. The patient states she is able to sleep if she takes the tizanidine, but if she does not, she is in bad shape.    Objective   Vital Signs:  /72   Pulse 68   Temp 97.7 °F (36.5 °C)   Ht 157.5 cm (62\")   Wt 84.1 kg (185 lb 6.4 oz)   SpO2 98%   BMI 33.91 kg/m²   Estimated body mass index is 33.91 kg/m² as calculated from the following:    Height as of this encounter: 157.5 cm (62\").    Weight as of this encounter: 84.1 kg (185 lb 6.4 oz).          Physical Exam  Constitutional:       General: She is not in acute distress.     Appearance: Normal appearance. She is well-developed.   HENT:      Head: Normocephalic and atraumatic.      Right Ear: External ear normal.      Left Ear: External ear normal.   Eyes:      Conjunctiva/sclera: Conjunctivae normal.      Pupils: Pupils are equal, round, and reactive to light.   Neck:      Thyroid: No thyromegaly.   Pulmonary:      Effort: Pulmonary effort is normal.   Musculoskeletal:      Comments: Tender left lower lumbar " paraspinous muscles. Little tender on the right as well. Pain with rotation to the left. Decreased strength with hip flexion on the left. Sensation intact.   Neurological:      Mental Status: She is alert and oriented to person, place, and time.   Psychiatric:         Mood and Affect: Mood normal.         Behavior: Behavior normal.        Result Review :                Assessment and Plan   Diagnoses and all orders for this visit:    1. Acute midline low back pain without sciatica (Primary)  -     acetaminophen-codeine (TYLENOL #3) 300-30 MG per tablet; Take 1 tablet by mouth Every 6 (Six) Hours As Needed for Moderate Pain .  Dispense: 15 tablet; Refill: 0  -     tiZANidine (ZANAFLEX) 4 MG tablet; Take 1 tablet by mouth Every 8 (Eight) Hours As Needed for Muscle Spasms.  Dispense: 30 tablet; Refill: 1  -     ketorolac (TORADOL) injection 30 mg    Other orders  -     Diclofenac Sodium (VOLTAREN) 1 % gel gel; Apply 4 g topically to the appropriate area as directed 4 (Four) Times a Day As Needed (pain).  Dispense: 150 g; Refill: 2             Follow Up   No follow-ups on file.  Patient was given instructions and counseling regarding her condition or for health maintenance advice. Please see specific information pulled into the AVS if appropriate.     Transcribed from ambient dictation for Meredith Lea Kehrer, MD by Kerry Lane.  08/04/22   11:45 EDT    Patient verbalized consent to the visit recording.

## 2022-08-15 ENCOUNTER — OFFICE VISIT (OUTPATIENT)
Dept: FAMILY MEDICINE CLINIC | Facility: CLINIC | Age: 72
End: 2022-08-15

## 2022-08-15 VITALS
OXYGEN SATURATION: 97 % | TEMPERATURE: 97.5 F | WEIGHT: 187.8 LBS | DIASTOLIC BLOOD PRESSURE: 68 MMHG | SYSTOLIC BLOOD PRESSURE: 130 MMHG | HEART RATE: 68 BPM | HEIGHT: 62 IN | BODY MASS INDEX: 34.56 KG/M2

## 2022-08-15 DIAGNOSIS — L30.9 DERMATITIS: Primary | ICD-10-CM

## 2022-08-15 PROCEDURE — 99213 OFFICE O/P EST LOW 20 MIN: CPT | Performed by: FAMILY MEDICINE

## 2022-08-15 NOTE — PROGRESS NOTES
"Dermatitis  Chief Complaint  Rash    Subjective        Jemima Bell presents to [facility]  History of Present Illness     The patient presents today for evaluation of a rash.    Rash  The patient states the rash started on her right hand on Friday night, 08/13/2022, and then spread to her left hand. She states last night she was itching. She has been taking Benadryl and cortisone cream. She denies any new exposures to anything. The patient states she stopped by her daughter's house and the boys came in that evening and tested positive for COVID. She denies any respiratory symptoms. 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    Objective   Vital Signs:  /68   Pulse 68   Temp 97.5 °F (36.4 °C)   Ht 157.5 cm (62\")   Wt 85.2 kg (187 lb 12.8 oz)   SpO2 97%   BMI 34.35 kg/m²   Estimated body mass index is 34.35 kg/m² as calculated from the following:    Height as of this encounter: 157.5 cm (62\").    Weight as of this encounter: 85.2 kg (187 lb 12.8 oz).          Physical Exam   Excoriated erythematous 2 to 3 mm papules on right hand, wrist, on the dorsum, not on the palm, and on the back of the left hand and palmar surface of left wrist.  Left neck is a blister.    Result Review :                Assessment and Plan   Diagnoses and all orders for this visit:    1. Dermatitis (Primary)  -     triamcinolone (KENALOG) 0.1 % ointment; Apply 1 application topically to the appropriate area as directed 3 (Three) Times a Day As Needed for Irritation or Rash.  Dispense: 80 g; Refill: 0      Follow Up   No follow-ups on file.  Patient was given instructions and counseling regarding her condition or for health maintenance advice. Please see specific information pulled into the AVS if appropriate.     Transcribed from ambient dictation for Meredith Lea Kehrer, MD by Daisy Moreland.  08/15/22   12:04 EDT    Patient verbalized consent to the visit recording.  I have personally " performed the services described in this document as transcribed by the above individual, and it is both accurate and complete.  Meredith Lea Kehrer, MD  8/15/2022  13:45 EDT

## 2022-08-16 ENCOUNTER — PATIENT MESSAGE (OUTPATIENT)
Dept: FAMILY MEDICINE CLINIC | Facility: CLINIC | Age: 72
End: 2022-08-16

## 2022-08-16 DIAGNOSIS — L30.9 DERMATITIS: Primary | ICD-10-CM

## 2022-08-16 RX ORDER — METHYLPREDNISOLONE 4 MG/1
TABLET ORAL
Qty: 21 TABLET | Refills: 0 | Status: SHIPPED | OUTPATIENT
Start: 2022-08-16 | End: 2022-09-13

## 2022-08-16 NOTE — TELEPHONE ENCOUNTER
From: Jemima Bell  To: Meredith Lea Kehrer, MD  Sent: 8/16/2022 3:05 AM EDT  Subject: Itching Hands    It is now 3 am, my third all nighter with itching. Burning and painful hands. I took Zertec as soon as I left your office, am using the ointment and have been taking the Benadryl. I’ve also been using ice. Just wondering if you think a steroid injection would give me some peace. Please advise. Thank you.

## 2022-08-22 ENCOUNTER — OFFICE VISIT (OUTPATIENT)
Dept: FAMILY MEDICINE CLINIC | Facility: CLINIC | Age: 72
End: 2022-08-22

## 2022-08-22 VITALS
TEMPERATURE: 97.3 F | SYSTOLIC BLOOD PRESSURE: 124 MMHG | WEIGHT: 186 LBS | BODY MASS INDEX: 34.23 KG/M2 | HEART RATE: 69 BPM | DIASTOLIC BLOOD PRESSURE: 70 MMHG | OXYGEN SATURATION: 96 % | HEIGHT: 62 IN

## 2022-08-22 DIAGNOSIS — R29.818 EXTRAPYRAMIDAL SYMPTOM: ICD-10-CM

## 2022-08-22 DIAGNOSIS — L23.9 ALLERGIC DERMATITIS: Primary | ICD-10-CM

## 2022-08-22 PROCEDURE — 99213 OFFICE O/P EST LOW 20 MIN: CPT | Performed by: FAMILY MEDICINE

## 2022-08-22 RX ORDER — HYDROXYZINE HYDROCHLORIDE 10 MG/1
10 TABLET, FILM COATED ORAL EVERY 4 HOURS PRN
Qty: 120 TABLET | Refills: 0 | Status: SHIPPED | OUTPATIENT
Start: 2022-08-22

## 2022-08-22 NOTE — PROGRESS NOTES
"Chief Complaint  Rash    Subjective        Jemima Bell presents to [facility]  History of Present Illness  The patient complains of an itchy rash that started about 2 weeks ago. She states that she seen Dr. Meredith Kehrer for it about 1 week ago; she prescribed a Medrol dose pack, in which the patient completed the prescription. The patient states that the Medrol did not improve her rash. The patient states that she was also given a topical steroid to use. She states that she has been apply the topical steroid and some of the rash is drying up, but some new rashes are appearing but smaller. She states that she is also taking Zyrtec 10 mg daily and Benadryl at bedtime. She states that she takes 2 Benadryl at night, then again when she wakes up at 4:00 AM.  She states that her last Benadryl was 4:00 AM this morning. She is taking Zyrtec throughout the day. She states that she is using ice packs. The patient states that she has pets that are outside; she states that she has minimal contact with them.    The patient is currently taking hydroxyzine as needed for her anxiety. She states that her last dose was yesterday evening.    Objective   Vital Signs:  /70   Pulse 69   Temp 97.3 °F (36.3 °C)   Ht 157.5 cm (62\")   Wt 84.4 kg (186 lb)   SpO2 96%   BMI 34.02 kg/m²   Estimated body mass index is 34.02 kg/m² as calculated from the following:    Height as of this encounter: 157.5 cm (62\").    Weight as of this encounter: 84.4 kg (186 lb).          Physical Exam  Vitals and nursing note reviewed.   Constitutional:       Appearance: Normal appearance. She is well-developed. She is obese.   HENT:      Head: Normocephalic and atraumatic.      Right Ear: External ear normal.      Left Ear: External ear normal.      Nose: Nose normal.   Eyes:      General: No scleral icterus.     Conjunctiva/sclera: Conjunctivae normal.   Cardiovascular:      Rate and Rhythm: Normal rate and " regular rhythm.      Heart sounds: Normal heart sounds.   Pulmonary:      Effort: Pulmonary effort is normal.      Breath sounds: Normal breath sounds.   Musculoskeletal:      Cervical back: Normal range of motion and neck supple.      Right lower leg: No edema.      Left lower leg: No edema.   Lymphadenopathy:      Cervical: No cervical adenopathy.   Skin:     General: Skin is warm and dry.      Findings: Rash (Erythematous raised patches over the trunk and extremities.) present.   Neurological:      Mental Status: She is alert and oriented to person, place, and time.   Psychiatric:         Mood and Affect: Mood normal.         Behavior: Behavior normal.         Thought Content: Thought content normal.         Judgment: Judgment normal.        Result Review :  The following data was reviewed by: Latrice Canseco DO on 08/22/2022:  Common labs    Common Labsle 10/14/21 10/14/21 1/24/22 1/24/22 1/24/22    1550 1550 1454 1454 1454   Glucose  98  111 (A)    BUN  17  12    Creatinine  0.85  0.95    eGFR Non  Am  69  60    eGFR African Am  79  69    Sodium  142  142    Potassium  4.4  4.6    Chloride  106  104    Calcium  9.7  9.6    Total Protein  7.4  7.2    Albumin  4.7  4.3    Total Bilirubin  0.4  0.7    Alkaline Phosphatase  81  74    AST (SGOT)  19  12    ALT (SGPT)  22  12    WBC 7.5  7.5     Hemoglobin 14.3  15.0     Hematocrit 42.2  45.4     Platelets 323  308     Hemoglobin A1C     5.8 (A)   (A) Abnormal value       Comments are available for some flowsheets but are not being displayed.           TSH    TSH 10/14/21 4/7/22   TSH 1.090 1.510                     Assessment and Plan   Diagnoses and all orders for this visit:    1. Allergic dermatitis (Primary)    2. Extrapyramidal symptom  -     hydrOXYzine (ATARAX) 10 MG tablet; Take 1 tablet by mouth Every 4 (Four) Hours As Needed for Anxiety (twitching).  Dispense: 120 tablet; Refill: 0    Allergic dermatitis  - Take 2 pills of hydroxyzine 10 mg every 6  hours throughout the day. Take 3 at bedtime.  - Continue applying topical steroids 3 times a day. Advised patient to keep in refrigerator.  - Wear clothing with cool natural fibers.  - Do not drive while on hydroxyzine.         Follow Up   Return if symptoms worsen or fail to improve.  Patient was given instructions and counseling regarding her condition or for health maintenance advice. Please see specific information pulled into the AVS if appropriate.       Transcribed from ambient dictation for Latrice Canseco DO by Brenna Ortez.  08/22/22   14:51 EDT    Patient verbalized consent to the visit recording.

## 2022-09-13 ENCOUNTER — OFFICE VISIT (OUTPATIENT)
Dept: FAMILY MEDICINE CLINIC | Facility: CLINIC | Age: 72
End: 2022-09-13

## 2022-09-13 VITALS
BODY MASS INDEX: 33.42 KG/M2 | WEIGHT: 181.6 LBS | DIASTOLIC BLOOD PRESSURE: 78 MMHG | SYSTOLIC BLOOD PRESSURE: 128 MMHG | HEART RATE: 83 BPM | TEMPERATURE: 97.4 F | OXYGEN SATURATION: 98 % | HEIGHT: 62 IN

## 2022-09-13 DIAGNOSIS — M54.50 ACUTE LEFT-SIDED LOW BACK PAIN WITHOUT SCIATICA: Primary | ICD-10-CM

## 2022-09-13 PROCEDURE — 99214 OFFICE O/P EST MOD 30 MIN: CPT | Performed by: FAMILY MEDICINE

## 2022-09-13 PROCEDURE — 96372 THER/PROPH/DIAG INJ SC/IM: CPT | Performed by: FAMILY MEDICINE

## 2022-09-13 RX ORDER — TIZANIDINE 4 MG/1
4 TABLET ORAL EVERY 8 HOURS PRN
Qty: 60 TABLET | Refills: 0 | Status: SHIPPED | OUTPATIENT
Start: 2022-09-13 | End: 2023-02-14

## 2022-09-13 RX ORDER — KETOROLAC TROMETHAMINE 30 MG/ML
60 INJECTION, SOLUTION INTRAMUSCULAR; INTRAVENOUS ONCE
Status: COMPLETED | OUTPATIENT
Start: 2022-09-13 | End: 2022-09-13

## 2022-09-13 RX ORDER — ACETAMINOPHEN AND CODEINE PHOSPHATE 300; 30 MG/1; MG/1
1 TABLET ORAL EVERY 6 HOURS PRN
Qty: 15 TABLET | Refills: 0 | Status: SHIPPED | OUTPATIENT
Start: 2022-09-13 | End: 2022-09-16 | Stop reason: SDUPTHER

## 2022-09-13 RX ADMIN — KETOROLAC TROMETHAMINE 60 MG: 30 INJECTION, SOLUTION INTRAMUSCULAR; INTRAVENOUS at 12:35

## 2022-09-13 NOTE — PROGRESS NOTES
"Chief Complaint  Back Pain (Moved couch with recliners in it )    Subjective        Jemima Bell presents to [facility]  History of Present Illness    The patient presents today for a new problem of back pain. She is accompanied by her  who contributes to her history..    The patient states she tried to lift the couch and felt immediate pain in her low back. Her  states this occurred on 09/11/2022. She states she has a history of muscular back problems.  Her pain is worse on the left side.  The patient states she usually gets Toradol, codeine, and tizanidine for pain relief when it gets really bad. She states last time she hurt her back was about 4 to 6 weeks ago. The patient states taking anti-inflammatory medication upsets her stomach. She denies using the Voltaren gel topically due to her being in severe pain. The patient states when she feels pain when she lifts her right foot. She states twisting and stretching helps her and she does it at home. The patient states she has been using ice. She denies any tingling or weakness in her legs and feet.  No numbness.  The patient denies noticing any urinary incontinence. She denies any changes in her bowel incontinence. The patient states the left side of her back is hurting more than the right side.     The patient states she received the methylprednisolone steroid when she had a rash. She states the rash has healed and faded out for about 1 month now.      Objective   Vital Signs:  /78   Pulse 83   Temp 97.4 °F (36.3 °C)   Ht 157.5 cm (62\")   Wt 82.4 kg (181 lb 9.6 oz)   SpO2 98%   BMI 33.22 kg/m²   Estimated body mass index is 33.22 kg/m² as calculated from the following:    Height as of this encounter: 157.5 cm (62\").    Weight as of this encounter: 82.4 kg (181 lb 9.6 oz).          Physical Exam  Vitals and nursing note reviewed.   Constitutional:       Appearance: Normal appearance. She is " well-developed. She is obese.   HENT:      Head: Normocephalic and atraumatic.      Right Ear: External ear normal.      Left Ear: External ear normal.      Nose: Nose normal.   Eyes:      General: No scleral icterus.     Conjunctiva/sclera: Conjunctivae normal.   Pulmonary:      Effort: Pulmonary effort is normal.   Musculoskeletal:         General: Tenderness (Left paraspinal muscles in the lumbar area) present.      Cervical back: Normal range of motion and neck supple.      Right lower leg: No edema.      Left lower leg: No edema.      Comments: Decreased range of motion in flexion, extension, sidebending, and rotation of the lumbar spine.  Negative straight leg testing   Lymphadenopathy:      Cervical: No cervical adenopathy.   Skin:     General: Skin is warm and dry.      Findings: No rash.   Neurological:      General: No focal deficit present.      Mental Status: She is alert and oriented to person, place, and time.      Sensory: No sensory deficit.      Motor: No weakness.      Coordination: Coordination normal.      Gait: Gait normal.      Deep Tendon Reflexes: Reflexes normal.   Psychiatric:         Mood and Affect: Mood normal.         Behavior: Behavior normal.         Thought Content: Thought content normal.         Judgment: Judgment normal.        Result Review :  The following data was reviewed by: Latrice Canseco DO on 09/13/2022:  Common labs    Common Labsle 10/14/21 10/14/21 1/24/22 1/24/22 1/24/22    1550 1550 1454 1454 1454   Glucose  98  111 (A)    BUN  17  12    Creatinine  0.85  0.95    eGFR Non  Am  69  60    eGFR African Am  79  69    Sodium  142  142    Potassium  4.4  4.6    Chloride  106  104    Calcium  9.7  9.6    Total Protein  7.4  7.2    Albumin  4.7  4.3    Total Bilirubin  0.4  0.7    Alkaline Phosphatase  81  74    AST (SGOT)  19  12    ALT (SGPT)  22  12    WBC 7.5  7.5     Hemoglobin 14.3  15.0     Hematocrit 42.2  45.4     Platelets 323  308     Hemoglobin A1C      5.8 (A)   (A) Abnormal value       Comments are available for some flowsheets but are not being displayed.           TSH    TSH 10/14/21 4/7/22   TSH 1.090 1.510                     Assessment and Plan   Diagnoses and all orders for this visit:    1. Acute left-sided low back pain without sciatica (Primary)  -     ketorolac (TORADOL) injection 60 mg    2. Acute midline low back pain without sciatica  -     tiZANidine (ZANAFLEX) 4 MG tablet; Take 1 tablet by mouth Every 8 (Eight) Hours As Needed for Muscle Spasms.  Dispense: 60 tablet; Refill: 0  -     acetaminophen-codeine (TYLENOL #3) 300-30 MG per tablet; Take 1 tablet by mouth Every 6 (Six) Hours As Needed for Moderate Pain.  Dispense: 15 tablet; Refill: 0    Patient is seen today for worsening acute on chronic low back pain.  She was given a shot of Toradol in the office and prescriptions for Zanaflex and Tylenol 3 as above.  The patient was advised to use the lowest effective doses and to watch for any oversedation.  Patient was encouraged to continue to use ice applications but avoid heat.  Patient was also advised to keep mobile but do not bend stoop or lift anything until her symptoms are improved.  If symptoms are not improving she should follow-up.           Follow Up   Return if symptoms worsen or fail to improve.  Patient was given instructions and counseling regarding her condition or for health maintenance advice. Please see specific information pulled into the AVS if appropriate.     Transcribed from ambient dictation for Latrice Canseco DO by Kerry Lane.  09/13/22   13:29 EDT    Patient verbalized consent to the visit recording.

## 2022-09-16 ENCOUNTER — PATIENT MESSAGE (OUTPATIENT)
Dept: FAMILY MEDICINE CLINIC | Facility: CLINIC | Age: 72
End: 2022-09-16

## 2022-09-16 DIAGNOSIS — M54.50 ACUTE LEFT-SIDED LOW BACK PAIN WITHOUT SCIATICA: ICD-10-CM

## 2022-09-16 RX ORDER — ACETAMINOPHEN AND CODEINE PHOSPHATE 300; 30 MG/1; MG/1
1 TABLET ORAL EVERY 6 HOURS PRN
Qty: 15 TABLET | Refills: 0 | Status: SHIPPED | OUTPATIENT
Start: 2022-09-16 | End: 2023-01-16

## 2022-09-16 NOTE — TELEPHONE ENCOUNTER
From: Jemima Bell  To: Latrice Canseco DO  Sent: 9/16/2022 9:11 AM EDT  Subject: Back pain    I saw you on Tuesday for back pain. With the weekend here I thought I should try to avoid extra problems. I am still in a lot of pain, can hardly move. Any advice for me? Thank you, Kim

## 2022-09-21 RX ORDER — PREDNISONE 20 MG/1
40 TABLET ORAL EVERY MORNING
Qty: 10 TABLET | Refills: 0 | Status: SHIPPED | OUTPATIENT
Start: 2022-09-21 | End: 2022-10-17

## 2022-10-06 ENCOUNTER — FLU SHOT (OUTPATIENT)
Dept: FAMILY MEDICINE CLINIC | Facility: CLINIC | Age: 72
End: 2022-10-06

## 2022-10-06 PROCEDURE — 90662 IIV NO PRSV INCREASED AG IM: CPT | Performed by: FAMILY MEDICINE

## 2022-10-06 PROCEDURE — G0008 ADMIN INFLUENZA VIRUS VAC: HCPCS | Performed by: FAMILY MEDICINE

## 2022-10-10 RX ORDER — LEVOTHYROXINE SODIUM 88 UG/1
TABLET ORAL
Qty: 90 TABLET | Refills: 0 | Status: SHIPPED | OUTPATIENT
Start: 2022-10-10 | End: 2022-10-17 | Stop reason: SDUPTHER

## 2022-10-17 ENCOUNTER — OFFICE VISIT (OUTPATIENT)
Dept: FAMILY MEDICINE CLINIC | Facility: CLINIC | Age: 72
End: 2022-10-17

## 2022-10-17 VITALS
HEART RATE: 92 BPM | SYSTOLIC BLOOD PRESSURE: 132 MMHG | WEIGHT: 177.8 LBS | DIASTOLIC BLOOD PRESSURE: 78 MMHG | OXYGEN SATURATION: 98 % | BODY MASS INDEX: 32.72 KG/M2 | HEIGHT: 62 IN | TEMPERATURE: 98.3 F

## 2022-10-17 DIAGNOSIS — R63.4 WEIGHT LOSS: ICD-10-CM

## 2022-10-17 DIAGNOSIS — Z00.00 MEDICARE ANNUAL WELLNESS VISIT, SUBSEQUENT: Primary | ICD-10-CM

## 2022-10-17 DIAGNOSIS — R19.7 DIARRHEA, UNSPECIFIED TYPE: ICD-10-CM

## 2022-10-17 DIAGNOSIS — K21.9 GASTROESOPHAGEAL REFLUX DISEASE WITHOUT ESOPHAGITIS: ICD-10-CM

## 2022-10-17 DIAGNOSIS — E03.9 HYPOTHYROIDISM, UNSPECIFIED TYPE: ICD-10-CM

## 2022-10-17 DIAGNOSIS — Z11.59 NEED FOR HEPATITIS C SCREENING TEST: ICD-10-CM

## 2022-10-17 DIAGNOSIS — F41.8 DEPRESSION WITH ANXIETY: ICD-10-CM

## 2022-10-17 PROBLEM — E66.01 MORBID OBESITY WITH BMI OF 40.0-44.9, ADULT: Status: RESOLVED | Noted: 2021-02-17 | Resolved: 2022-10-17

## 2022-10-17 PROCEDURE — 1160F RVW MEDS BY RX/DR IN RCRD: CPT | Performed by: FAMILY MEDICINE

## 2022-10-17 PROCEDURE — 1170F FXNL STATUS ASSESSED: CPT | Performed by: FAMILY MEDICINE

## 2022-10-17 PROCEDURE — G0439 PPPS, SUBSEQ VISIT: HCPCS | Performed by: FAMILY MEDICINE

## 2022-10-17 RX ORDER — LEVOTHYROXINE SODIUM 88 UG/1
88 TABLET ORAL DAILY
Qty: 90 TABLET | Refills: 1 | Status: SHIPPED | OUTPATIENT
Start: 2022-10-17

## 2022-10-17 RX ORDER — MIRTAZAPINE 30 MG/1
30 TABLET, FILM COATED ORAL NIGHTLY
Qty: 90 TABLET | Refills: 1 | Status: SHIPPED | OUTPATIENT
Start: 2022-10-17 | End: 2023-01-16

## 2022-10-17 NOTE — PROGRESS NOTES
The ABCs of the Annual Wellness Visit  Subsequent Medicare Wellness Visit    Chief Complaint   Patient presents with   • Medicare Wellness-subsequent   • Diarrhea   • Depression   • Anxiety   • Hypothyroidism      Subjective    History of Present Illness:  Jemima Bell is a 72 y.o. female who presents for a Subsequent Medicare Wellness Visit.    Back has been better finally.  Doing well in general.  Taking her thyroid medicine daily.  Worried about her .  They have seen the neurologist again recently.      The following portions of the patient's history were reviewed and   updated as appropriate: allergies, current medications, past family history, past medical history, past social history, past surgical history and problem list.    Compared to one year ago, the patient feels her physical   health is worse.    Compared to one year ago, the patient feels her mental   health is the same.    Recent Hospitalizations:  She was not admitted to the hospital during the last year.       Current Medical Providers:  Patient Care Team:  Kehrer, Meredith Lea, MD as PCP - General (Family Medicine)    Outpatient Medications Prior to Visit   Medication Sig Dispense Refill   • acetaminophen (TYLENOL) 325 MG tablet Take 650 mg by mouth.     • acetaminophen-codeine (TYLENOL #3) 300-30 MG per tablet Take 1 tablet by mouth Every 6 (Six) Hours As Needed for Moderate Pain. 15 tablet 0   • albuterol sulfate  (90 Base) MCG/ACT inhaler Inhale 2 puffs Every 4 (Four) Hours As Needed.     • Diclofenac Sodium (VOLTAREN) 1 % gel gel Apply 4 g topically to the appropriate area as directed 4 (Four) Times a Day As Needed (pain). 150 g 2   • fluticasone (FLONASE) 50 MCG/ACT nasal spray 2 sprays into the nostril(s) as directed by provider Daily. 48 g 3   • hydrOXYzine (ATARAX) 10 MG tablet Take 1 tablet by mouth Every 4 (Four) Hours As Needed for Anxiety (twitching). 120 tablet 0   • ondansetron (Zofran) 4 MG tablet Take 1  tablet by mouth Every 8 (Eight) Hours As Needed for Nausea or Vomiting. 15 tablet 0   • tiZANidine (ZANAFLEX) 4 MG tablet Take 1 tablet by mouth Every 8 (Eight) Hours As Needed for Muscle Spasms. 60 tablet 0   • triamcinolone (KENALOG) 0.1 % ointment Apply 1 application topically to the appropriate area as directed 3 (Three) Times a Day As Needed for Irritation or Rash. 80 g 0   • levothyroxine (SYNTHROID, LEVOTHROID) 88 MCG tablet TAKE ONE TABLET BY MOUTH DAILY 90 tablet 0   • mirtazapine (Remeron) 30 MG tablet Take 1 tablet by mouth Every Night. 90 tablet 1   • omeprazole (priLOSEC) 40 MG capsule TAKE ONE CAPSULE BY MOUTH DAILY 90 capsule 3   • predniSONE (DELTASONE) 20 MG tablet Take 2 tablets by mouth Every Morning. With food 10 tablet 0     No facility-administered medications prior to visit.       Opioid medication/s are on active medication list.  and I have evaluated her active treatment plan and pain score trends (see table).  There were no vitals filed for this visit.  I have reviewed the chart for potential of high risk medication and harmful drug interactions in the elderly.            Aspirin is not on active medication list.  Aspirin use is not indicated based on review of current medical condition/s. Risk of harm outweighs potential benefits.  .    Patient Active Problem List   Diagnosis   • Wheezing   • Osteoarthritis   • Acute bronchitis   • Dyspnea   • Hypothyroidism   • Shortness of breath   • Vitamin D deficiency   • Depression with anxiety   • Muscle cramp   • Back pain   • Vitamin B12 deficiency   • UTI (urinary tract infection)   • Abnormal EKG   • Precordial pain   • Anxiety   • Borderline systolic HTN     Advance Care Planning  Advance Directive is not on file.  ACP discussion was held with the patient during this visit. Patient has an advance directive (not in EMR), copy requested.    Review of Systems   Constitutional: Negative for chills, fatigue and fever.   HENT: Negative for  "congestion, ear pain and sore throat.    Eyes: Negative for visual disturbance.   Respiratory: Negative for cough and shortness of breath.    Cardiovascular: Negative for chest pain, palpitations and leg swelling.   Gastrointestinal: Negative for diarrhea, nausea and vomiting.   Endocrine: Negative.    Genitourinary: Negative for dysuria and frequency.   Skin: Negative.    Psychiatric/Behavioral: Negative for dysphoric mood and sleep disturbance. The patient is not nervous/anxious.         Objective    Vitals:    10/17/22 1445   BP: 132/78   Pulse: 92   Temp: 98.3 °F (36.8 °C)   SpO2: 98%   Weight: 80.6 kg (177 lb 12.8 oz)   Height: 157.5 cm (62\")     Estimated body mass index is 32.52 kg/m² as calculated from the following:    Height as of this encounter: 157.5 cm (62\").    Weight as of this encounter: 80.6 kg (177 lb 12.8 oz).    BMI is >= 30 and <35. (Class 1 Obesity). The following options were offered after discussion;: nutrition counseling/recommendations      Does the patient have evidence of cognitive impairment? No    Physical Exam  Vitals and nursing note reviewed.   Constitutional:       General: She is not in acute distress.     Appearance: Normal appearance. She is well-developed.   HENT:      Head: Normocephalic and atraumatic.      Right Ear: Tympanic membrane, ear canal and external ear normal.      Left Ear: Tympanic membrane, ear canal and external ear normal.      Nose: Nose normal.      Mouth/Throat:      Mouth: Mucous membranes are moist.      Pharynx: Oropharynx is clear. No oropharyngeal exudate or posterior oropharyngeal erythema.   Eyes:      Conjunctiva/sclera: Conjunctivae normal.      Pupils: Pupils are equal, round, and reactive to light.   Neck:      Thyroid: No thyromegaly.   Cardiovascular:      Rate and Rhythm: Normal rate and regular rhythm.      Heart sounds: No murmur heard.  Pulmonary:      Effort: Pulmonary effort is normal.      Breath sounds: Normal breath sounds. No wheezing. "   Abdominal:      General: Abdomen is flat. Bowel sounds are normal. There is no distension.      Palpations: Abdomen is soft. There is no mass.      Tenderness: There is no abdominal tenderness.      Hernia: No hernia is present.   Musculoskeletal:         General: No swelling. Normal range of motion.      Cervical back: Normal range of motion and neck supple.      Right lower leg: No edema.      Left lower leg: No edema.   Lymphadenopathy:      Cervical: No cervical adenopathy.   Skin:     General: Skin is warm and dry.      Capillary Refill: Capillary refill takes less than 2 seconds.      Findings: No rash.   Neurological:      General: No focal deficit present.      Mental Status: She is alert and oriented to person, place, and time.      Cranial Nerves: No cranial nerve deficit.   Psychiatric:         Mood and Affect: Mood normal.         Behavior: Behavior normal.                 HEALTH RISK ASSESSMENT    Smoking Status:  Social History     Tobacco Use   Smoking Status Never   Smokeless Tobacco Never     Alcohol Consumption:  Social History     Substance and Sexual Activity   Alcohol Use No     Fall Risk Screen:    [facility] Fall Risk Assessment was completed, and patient is at LOW risk for falls.Assessment completed on:10/17/2022    Depression Screening:  PHQ-2/PHQ-9 Depression Screening 10/17/2022   Retired PHQ-9 Total Score -   Retired Total Score -   Little Interest or Pleasure in Doing Things 1-->several days   Feeling Down, Depressed or Hopeless 1-->several days   Trouble Falling or Staying Asleep, or Sleeping Too Much 1-->several days   Feeling Tired or Having Little Energy 1-->several days   Poor Appetite or Overeating 1-->several days   Feeling Bad about Yourself - or that You are a Failure or Have Let Yourself or Your Family Down 0-->not at all   Trouble Concentrating on Things, Such as Reading the Newspaper or Watching Television 0-->not at all   Moving or Speaking So Slowly that Other People Could  Have Noticed? Or the Opposite - Being So Fidgety 0-->not at all   Thoughts that You Would be Better Off Dead or of Hurting Yourself in Some Way 0-->not at all   PHQ-9: Brief Depression Severity Measure Score 5   Little interest or pleasure in doing things -   Feeling down, depressed, or hopeless -   Trouble falling or staying asleep, or sleeping too much -   Feeling tired or having little energy -   Poor appetite or overeating -   Feeling bad about yourself - or that you are a failure or have let yourself or your family down -   Trouble concentrating on things, such as reading the newspaper or watching television -   Moving or speaking so slowly that other people could have noticed. Or the opposite - being so fidgety or restless that you have been moving around a lot more than usual -   Thoughts that you would be better off dead, or of hurting yourself in some way -   How difficult have these problems made it for you to do your work, take care of things at home, or get along with other people? -       Health Habits and Functional and Cognitive Screening:  Functional & Cognitive Status 10/17/2022   Do you have difficulty preparing food and eating? No   Do you have difficulty bathing yourself, getting dressed or grooming yourself? No   Do you have difficulty using the toilet? No   Do you have difficulty moving around from place to place? No   Do you have trouble with steps or getting out of a bed or a chair? No   Current Diet Well Balanced Diet   Dental Exam Not up to date   Eye Exam Up to date   Exercise (times per week) 0 times per week   Current Exercises Include No Regular Exercise   Current Exercise Activities Include -   Do you need help using the phone?  No   Are you deaf or do you have serious difficulty hearing?  No   Do you need help with transportation? No   Do you need help shopping? No   Do you need help preparing meals?  No   Do you need help with housework?  No   Do you need help with laundry? No   Do  you need help taking your medications? No   Do you need help managing money? No   Do you ever drive or ride in a car without wearing a seat belt? No   Have you felt unusual stress, anger or loneliness in the last month? -   Who do you live with? -   If you need help, do you have trouble finding someone available to you? -   Have you been bothered in the last four weeks by sexual problems? -   Do you have difficulty concentrating, remembering or making decisions? -       Age-appropriate Screening Schedule:  Refer to the list below for future screening recommendations based on patient's age, sex and/or medical conditions. Orders for these recommended tests are listed in the plan section. The patient has been provided with a written plan.    Health Maintenance   Topic Date Due   • TDAP/TD VACCINES (1 - Tdap) Never done   • ZOSTER VACCINE (1 of 2) Never done   • DXA SCAN  02/18/2024   • INFLUENZA VACCINE  Completed              Assessment & Plan   CMS Preventative Services Quick Reference  Risk Factors Identified During Encounter  None Identified  The above risks/problems have been discussed with the patient.  Follow up actions/plans if indicated are seen below in the Assessment/Plan Section.  Pertinent information has been shared with the patient in the After Visit Summary.    Diagnoses and all orders for this visit:    1. Medicare annual wellness visit, subsequent (Primary)    2. Hypothyroidism, unspecified type  -     TSH    3. Diarrhea, unspecified type  -     Ambulatory Referral to Gastroenterology  -     Clostridioides difficile Toxin, PCR - Stool, Per Rectum; Future  -     Ova & Parasite Examination - Stool, Per Rectum; Future  -     Stool Culture (Reference Lab) - Stool, Per Rectum; Future  -     Occult Blood, Fecal By Immunoassay - Stool, Per Rectum; Future  -     Amylase  -     Lipase  -     CBC & Differential  -     Comprehensive Metabolic Panel  -     Occult Blood, Fecal By Immunoassay - Stool, Per Rectum  -      Stool Culture (Reference Lab) - Stool, Per Rectum  -     Ova & Parasite Examination - Stool, Per Rectum  -     Clostridioides difficile Toxin, PCR - Stool, Per Rectum    4. Weight loss  -     Ambulatory Referral to Gastroenterology  -     CBC & Differential  -     Comprehensive Metabolic Panel  -     TSH  -     Vitamin B12    5. Gastroesophageal reflux disease without esophagitis  -     Ambulatory Referral to Gastroenterology    6. Need for hepatitis C screening test  -     Hepatitis C Antibody    7. Depression with anxiety  -     mirtazapine (Remeron) 30 MG tablet; Take 1 tablet by mouth Every Night.  Dispense: 90 tablet; Refill: 1    Other orders  -     levothyroxine (SYNTHROID, LEVOTHROID) 88 MCG tablet; Take 1 tablet by mouth Daily.  Dispense: 90 tablet; Refill: 1    Follow-up pending labs and stool studies.  Await GI consultation.      Follow Up:   Return in about 6 months (around 4/17/2023) for Recheck TSH.     An After Visit Summary and PPPS were made available to the patient.

## 2022-10-18 ENCOUNTER — TELEPHONE (OUTPATIENT)
Dept: GASTROENTEROLOGY | Facility: CLINIC | Age: 72
End: 2022-10-18

## 2022-10-18 ENCOUNTER — PATIENT MESSAGE (OUTPATIENT)
Dept: FAMILY MEDICINE CLINIC | Facility: CLINIC | Age: 72
End: 2022-10-18

## 2022-10-18 ENCOUNTER — TELEPHONE (OUTPATIENT)
Dept: FAMILY MEDICINE CLINIC | Facility: CLINIC | Age: 72
End: 2022-10-18

## 2022-10-18 LAB
ALBUMIN SERPL-MCNC: 4.5 G/DL (ref 3.7–4.7)
ALBUMIN/GLOB SERPL: 1.7 {RATIO} (ref 1.2–2.2)
ALP SERPL-CCNC: 80 IU/L (ref 44–121)
ALT SERPL-CCNC: 17 IU/L (ref 0–32)
AMYLASE SERPL-CCNC: 49 U/L (ref 31–110)
AST SERPL-CCNC: 19 IU/L (ref 0–40)
BASOPHILS # BLD AUTO: 0.1 X10E3/UL (ref 0–0.2)
BASOPHILS NFR BLD AUTO: 1 %
BILIRUB SERPL-MCNC: 0.4 MG/DL (ref 0–1.2)
BUN SERPL-MCNC: 16 MG/DL (ref 8–27)
BUN/CREAT SERPL: 16 (ref 12–28)
CALCIUM SERPL-MCNC: 9.8 MG/DL (ref 8.7–10.3)
CHLORIDE SERPL-SCNC: 106 MMOL/L (ref 96–106)
CO2 SERPL-SCNC: 24 MMOL/L (ref 20–29)
CREAT SERPL-MCNC: 1 MG/DL (ref 0.57–1)
EGFRCR SERPLBLD CKD-EPI 2021: 59 ML/MIN/1.73
EOSINOPHIL # BLD AUTO: 0.3 X10E3/UL (ref 0–0.4)
EOSINOPHIL NFR BLD AUTO: 4 %
ERYTHROCYTE [DISTWIDTH] IN BLOOD BY AUTOMATED COUNT: 12.1 % (ref 11.7–15.4)
GLOBULIN SER CALC-MCNC: 2.6 G/DL (ref 1.5–4.5)
GLUCOSE SERPL-MCNC: 102 MG/DL (ref 70–99)
HCT VFR BLD AUTO: 43.8 % (ref 34–46.6)
HCV AB S/CO SERPL IA: <0.1 S/CO RATIO (ref 0–0.9)
HGB BLD-MCNC: 14.2 G/DL (ref 11.1–15.9)
IMM GRANULOCYTES # BLD AUTO: 0 X10E3/UL (ref 0–0.1)
IMM GRANULOCYTES NFR BLD AUTO: 0 %
LIPASE SERPL-CCNC: 26 U/L (ref 14–85)
LYMPHOCYTES # BLD AUTO: 2.5 X10E3/UL (ref 0.7–3.1)
LYMPHOCYTES NFR BLD AUTO: 34 %
MCH RBC QN AUTO: 30 PG (ref 26.6–33)
MCHC RBC AUTO-ENTMCNC: 32.4 G/DL (ref 31.5–35.7)
MCV RBC AUTO: 93 FL (ref 79–97)
MONOCYTES # BLD AUTO: 0.6 X10E3/UL (ref 0.1–0.9)
MONOCYTES NFR BLD AUTO: 8 %
NEUTROPHILS # BLD AUTO: 3.9 X10E3/UL (ref 1.4–7)
NEUTROPHILS NFR BLD AUTO: 53 %
PLATELET # BLD AUTO: 346 X10E3/UL (ref 150–450)
POTASSIUM SERPL-SCNC: 4.4 MMOL/L (ref 3.5–5.2)
PROT SERPL-MCNC: 7.1 G/DL (ref 6–8.5)
RBC # BLD AUTO: 4.73 X10E6/UL (ref 3.77–5.28)
SODIUM SERPL-SCNC: 145 MMOL/L (ref 134–144)
TSH SERPL DL<=0.005 MIU/L-ACNC: 3.71 UIU/ML (ref 0.45–4.5)
VIT B12 SERPL-MCNC: 302 PG/ML (ref 232–1245)
WBC # BLD AUTO: 7.3 X10E3/UL (ref 3.4–10.8)

## 2022-10-18 NOTE — TELEPHONE ENCOUNTER
Caller: Jemima Bell    Relationship to patient: Self    Best call back number: [phone]    Chief complaint: WANTS TO SEE DR. FELIX EXCLUSIVELY     Type of visit: CONSULTATION     Requested date: NEXT AVAILABLE APPOINTMENT    Additional notes:PATIENT REQUESTED TO BE SEEN BY DR. FELIX ONLY AND DOES NOT WANT ANPTHER PROVIDER. ADVISED THAT THERE ARE NO NEW APPOINTMENTS AVAILABLE CURRENTLY. PATIENT REQUESTED SCHEDULING TO CALL BACK TO SEE IF WE COULD FIT HER IN THIS YEAR OR NEXT YEAR FOR AN APPOINTMENT.

## 2022-10-18 NOTE — TELEPHONE ENCOUNTER
From: Jemima Bell  To: Meredith Lea Kehrer, MD  Sent: 10/18/2022 1:14 PM EDT  Subject: Vitamin D    Is it to late to have my by Vit D checked from yesterdays blood draw? I’m betting it’s low. Thank you!

## 2022-10-18 NOTE — TELEPHONE ENCOUNTER
Regarding: FW: Vitamin D  Contact: [phone]  Can we add vitamin D?  She does have hypothyroidism and I would use that as a diagnosis.  ----- Message -----  From: Caitlyn Horan MA  Sent: 10/18/2022   1:19 PM EDT  To: Meredith Lea Kehrer, MD  Subject: Vitamin D                                        ----- Message from Caitlyn Horan MA sent at 10/18/2022  1:19 PM EDT -----  Please advise if you want me to call [facility] and add this on     ----- Message from Jemima Bell to Kehrer, Meredith Lea, MD sent at 10/18/2022  1:14 PM -----   Is it to late to have my  by Vit D checked from yesterdays blood draw?    I’m betting it’s low.  Thank you!

## 2022-10-19 LAB
25(OH)D3+25(OH)D2 SERPL-MCNC: 19.8 NG/ML (ref 30–100)
WRITTEN AUTHORIZATION: NORMAL

## 2022-10-19 NOTE — TELEPHONE ENCOUNTER
vm left for pt that Dr Zhang is limiting the amount of new pts appointments and advised she schedule with another provider.    Ok for the hub to schedule  Thanks

## 2022-10-24 LAB
BACTERIA SPEC CULT: NORMAL
BACTERIA SPEC CULT: NORMAL
C DIFF TOX GENS STL QL NAA+PROBE: NEGATIVE
CAMPYLOBACTER STL CULT: NORMAL
E COLI SXT STL QL IA: NEGATIVE
O+P SPEC MICRO: NORMAL
O+P STL CONC: NORMAL
SALM + SHIG STL CULT: NORMAL

## 2022-10-25 ENCOUNTER — TELEPHONE (OUTPATIENT)
Dept: GASTROENTEROLOGY | Facility: CLINIC | Age: 72
End: 2022-10-25

## 2022-10-25 ENCOUNTER — OFFICE VISIT (OUTPATIENT)
Dept: GASTROENTEROLOGY | Facility: CLINIC | Age: 72
End: 2022-10-25

## 2022-10-25 VITALS
HEIGHT: 62 IN | SYSTOLIC BLOOD PRESSURE: 128 MMHG | DIASTOLIC BLOOD PRESSURE: 84 MMHG | BODY MASS INDEX: 32.28 KG/M2 | WEIGHT: 175.4 LBS | TEMPERATURE: 96.2 F

## 2022-10-25 DIAGNOSIS — Z98.890 HISTORY OF NISSEN FUNDOPLICATION: ICD-10-CM

## 2022-10-25 DIAGNOSIS — R15.2 INCONTINENCE OF FECES WITH FECAL URGENCY: ICD-10-CM

## 2022-10-25 DIAGNOSIS — K21.9 GASTROESOPHAGEAL REFLUX DISEASE, UNSPECIFIED WHETHER ESOPHAGITIS PRESENT: ICD-10-CM

## 2022-10-25 DIAGNOSIS — R19.7 DIARRHEA, UNSPECIFIED TYPE: ICD-10-CM

## 2022-10-25 DIAGNOSIS — R10.12 LEFT UPPER QUADRANT ABDOMINAL PAIN: Primary | ICD-10-CM

## 2022-10-25 DIAGNOSIS — R15.9 INCONTINENCE OF FECES WITH FECAL URGENCY: ICD-10-CM

## 2022-10-25 DIAGNOSIS — Z86.010 HISTORY OF COLON POLYPS: ICD-10-CM

## 2022-10-25 PROCEDURE — 99214 OFFICE O/P EST MOD 30 MIN: CPT | Performed by: NURSE PRACTITIONER

## 2022-10-25 RX ORDER — LANSOPRAZOLE 30 MG/1
30 CAPSULE, DELAYED RELEASE ORAL 2 TIMES DAILY
Qty: 60 CAPSULE | Refills: 5 | Status: SHIPPED | OUTPATIENT
Start: 2022-10-25 | End: 2022-12-30

## 2022-10-25 RX ORDER — MONTELUKAST SODIUM 4 MG/1
TABLET, CHEWABLE ORAL
Qty: 60 TABLET | Refills: 11 | Status: SHIPPED | OUTPATIENT
Start: 2022-10-25 | End: 2022-11-10

## 2022-10-25 NOTE — PROGRESS NOTES
Chief Complaint   Patient presents with   • Diarrhea   • gastroesophageal reflux disease       Jemima Bell is a 72 y.o. female who presents with abdominal pain.    HPI  72-year-old female presents today as a new patient to our clinic with abdominal pain and diarrhea.  She will be establishing with myself and Dr. Aguilar.  She has a history of 2 Terell fundoplication's done at the [facility].  She underwent an EGD and colonoscopy at the [facility] in [place] in 2015.  She tells me it was a horrible experience.  She does have a history of colon polyps.  She underwent a cholecystectomy.  She has a history of lactose intolerance.  She tells me for a long long long time she has been suffering with left upper quadrant abdominal pain and diarrhea.  She will have urgency and fecal incontinence.  She also complains of upper abdominal pain with lots of heartburn.  She has had a diaphragmatic hernia repair in the past.  She has a history of gastric ulcers.  She tells me omeprazole seems to help with her reflux symptoms but it gives her such bad diarrhea she can take it for very long.  She had stool studies just recently at her PCP which were negative.  She has been on colestipol in the past per the [facility] notes but she cannot remember if it helped or did not.  She tell me she just recently had labs done at her PCP office and she was told they were normal.  She denies any dysphagia, vomiting, constipation, rectal bleeding or melena.  She admits her appetite is okay and her weight is down 11 pounds since August of this year.  She denies any significant GI family history at this time.  Past Medical History:   Diagnosis Date   • Acute bronchitis    • Acute sinusitis    • Allergic rhinitis    • Anemia Childhood   • Anxiety    • Arthritis    • Asthma    • Back pain    • BMI 34.0-34.9,adult    • Cervicalgia    • Chills    • Cholelithiasis 2004?    Remival   • Deep vein thrombosis (DVT) of lower  extremity (HCC)    • Depression    • Dermatitis    • Dyspnea    • Dysuria    • Esophageal reflux    • Fatigue    • Gastric ulcer    • GERD (gastroesophageal reflux disease)    • GI (gastrointestinal bleed) 2004?   • Headache    • Heart murmur 1973   • Hernia    • Hypothyroidism    • Irritable bowel syndrome    • Lumbago    • Migraine    • Nausea    • Neuritis    • Osteoarthritis    • Osteoarthritis of knee    • Plantar fasciitis    • Pneumonia 301y   • Pulled muscle    • Pulmonary embolism (HCC)    • Pyelonephritis    • Rheumatic fever    • Sore throat    • Torticollis    • Trapezius strain    • Urinary incontinence    • Urinary pain    • Urinary tract infection    • UTI symptoms    • Vitamin B1 deficiency    • Vitamin B12 deficiency    • Vitamin D deficiency    • Weakness    • Wheezing        Past Surgical History:   Procedure Laterality Date   • ABDOMINAL SURGERY     • APPENDECTOMY     • CATARACT EXTRACTION     • CATARACT EXTRACTION      bilateral eyes   • CATARACT EXTRACTION     • CHOLECYSTECTOMY  2004?   • COLONOSCOPY     • GALLBLADDER SURGERY     • HEMORRHOIDECTOMY  1974 & 77?   • HERNIA REPAIR  2008?   • HYSTERECTOMY     • INGUINAL HERNIA REPAIR     • KNEE SURGERY     • LAPAROSCOPIC COLON RESECTION  2005   • LAPAROTOMY OOPHERECTOMY     • NISSEN FUNDOPLICATION LAPAROSCOPIC      x2   • TONSILLECTOMY     • TONSILLECTOMY  1954?   • TOTAL KNEE ARTHROPLASTY Left    • UPPER GASTROINTESTINAL ENDOSCOPY           Current Outpatient Medications:   •  acetaminophen (TYLENOL) 325 MG tablet, Take 650 mg by mouth., Disp: , Rfl:   •  acetaminophen-codeine (TYLENOL #3) 300-30 MG per tablet, Take 1 tablet by mouth Every 6 (Six) Hours As Needed for Moderate Pain., Disp: 15 tablet, Rfl: 0  •  albuterol sulfate  (90 Base) MCG/ACT inhaler, Inhale 2 puffs Every 4 (Four) Hours As Needed., Disp: , Rfl:   •  Diclofenac Sodium (VOLTAREN) 1 % gel gel, Apply 4 g topically to the appropriate area as directed 4 (Four) Times a Day As  "Needed (pain)., Disp: 150 g, Rfl: 2  •  fluticasone (FLONASE) 50 MCG/ACT nasal spray, 2 sprays into the nostril(s) as directed by provider Daily., Disp: 48 g, Rfl: 3  •  hydrOXYzine (ATARAX) 10 MG tablet, Take 1 tablet by mouth Every 4 (Four) Hours As Needed for Anxiety (twitching)., Disp: 120 tablet, Rfl: 0  •  levothyroxine (SYNTHROID, LEVOTHROID) 88 MCG tablet, Take 1 tablet by mouth Daily., Disp: 90 tablet, Rfl: 1  •  mirtazapine (Remeron) 30 MG tablet, Take 1 tablet by mouth Every Night., Disp: 90 tablet, Rfl: 1  •  ondansetron (Zofran) 4 MG tablet, Take 1 tablet by mouth Every 8 (Eight) Hours As Needed for Nausea or Vomiting., Disp: 15 tablet, Rfl: 0  •  tiZANidine (ZANAFLEX) 4 MG tablet, Take 1 tablet by mouth Every 8 (Eight) Hours As Needed for Muscle Spasms., Disp: 60 tablet, Rfl: 0  •  colestipol (COLESTID) 1 g tablet, Start with one pill daily and increase to 2 daily if no improvement after 2 weeks, Disp: 60 tablet, Rfl: 11  •  lansoprazole (PREVACID) 30 MG capsule, Take 1 capsule by mouth 2 (Two) Times a Day., Disp: 60 capsule, Rfl: 5    Allergies   Allergen Reactions   • Salicylates GI Intolerance     Other reaction(s): GI Upset, gi bleed      • Biaxin [Clarithromycin]    • Sulfa Antibiotics    • Adhesive Tape Rash   • Augmentin [Amoxicillin-Pot Clavulanate] Diarrhea   • Sulfamethoxazole-Trimethoprim GI Intolerance and Nausea And Vomiting     \"makes me sick as a dog\"         Social History     Socioeconomic History   • Marital status:    Tobacco Use   • Smoking status: Never   • Smokeless tobacco: Never   Vaping Use   • Vaping Use: Never used   Substance and Sexual Activity   • Alcohol use: No   • Drug use: No   • Sexual activity: Yes     Partners: Male     Birth control/protection: None       Family History   Problem Relation Age of Onset   • Arthritis Mother    • Depression Mother    • Hyperlipidemia Mother    • Hypertension Mother    • Irritable bowel syndrome Mother    • Hypertension Father  "   • Arthritis Father    • Stroke Father    • Alcohol abuse Maternal Grandfather    • Depression Maternal Grandfather    • Heart disease Other         IN FEMALES BEFORE AGE 65   • Asthma Maternal Grandmother        Review of Systems   Constitutional: Negative for appetite change, chills, diaphoresis, fatigue, fever and unexpected weight change.   HENT: Negative for nosebleeds, postnasal drip, sore throat, trouble swallowing and voice change.    Respiratory: Negative for cough, choking, chest tightness, shortness of breath, wheezing and stridor.    Cardiovascular: Negative for chest pain, palpitations and leg swelling.   Gastrointestinal: Positive for abdominal distention, abdominal pain, diarrhea and nausea. Negative for anal bleeding, blood in stool, constipation, rectal pain and vomiting.   Endocrine: Negative for polydipsia, polyphagia and polyuria.   Musculoskeletal: Negative for gait problem.   Skin: Negative for rash and wound.   Allergic/Immunologic: Negative for food allergies.   Neurological: Negative for dizziness, speech difficulty and light-headedness.   Psychiatric/Behavioral: Negative for confusion, self-injury, sleep disturbance and suicidal ideas.       Vitals:    10/25/22 0942   BP: 128/84   Temp: 96.2 °F (35.7 °C)       Physical Exam  Constitutional:       General: She is not in acute distress.     Appearance: She is well-developed. She is not ill-appearing.   HENT:      Head: Normocephalic.   Eyes:      Pupils: Pupils are equal, round, and reactive to light.   Cardiovascular:      Rate and Rhythm: Normal rate and regular rhythm.      Heart sounds: Normal heart sounds.   Pulmonary:      Effort: Pulmonary effort is normal.      Breath sounds: Normal breath sounds.   Abdominal:      General: Bowel sounds are normal. There is distension.      Palpations: Abdomen is soft. There is no mass.      Tenderness: There is abdominal tenderness. There is no guarding or rebound.      Hernia: No hernia is present.    Musculoskeletal:         General: Normal range of motion.   Skin:     General: Skin is warm and dry.   Neurological:      Mental Status: She is alert and oriented to person, place, and time.   Psychiatric:         Speech: Speech normal.         Behavior: Behavior normal.         Judgment: Judgment normal.         No radiology results for the last 7 days       Assessment and plan    1. Left upper quadrant abdominal pain  - Case Request; Standing  - Case Request    2. Diarrhea, unspecified type  - Case Request; Standing  - Case Request  - Calprotectin, Fecal - Stool, Per Rectum  - Pancreatic Elastase, Fecal - Stool, Per Rectum    3. Gastroesophageal reflux disease, unspecified whether esophagitis present  - Case Request; Standing  - Case Request    4. History of Nissen fundoplication  - Case Request; Standing  - Case Request    5. Incontinence of feces with fecal urgency  - Case Request; Standing  - Case Request    6. History of colon polyps      Reviewed medical history with her today.  Stool studies were negative including a C. difficile, stool culture and ova and parasite.  Fecal occult blood was negative.  We will go ahead and check stool studies for fecal calprotectin and a pancreatic elastase.  We will restart colestipol for her diarrhea.  We will start Prevacid 30 mg twice a day for her reflux symptoms.  Given her history and current symptoms recommend EGD and colonoscopy for further evaluation.  Patient is agreeable to the scopes.  Patient to call the office next week with an update.  Patient to follow-up with me in 2 weeks.  Patient is agreeable to the plan.

## 2022-10-25 NOTE — TELEPHONE ENCOUNTER
PETER patient via telephone for. Scheduled 12/23/2022 with arrival time of 0200PM. Prep paperwork mailed to verified address on file. Patient advised arrival time may change based on [facility] guidelines. PETER SANDERS

## 2022-10-27 ENCOUNTER — TELEPHONE (OUTPATIENT)
Dept: GASTROENTEROLOGY | Facility: CLINIC | Age: 72
End: 2022-10-27

## 2022-10-27 LAB — CALPROTECTIN STL-MCNT: <16 UG/G (ref 0–120)

## 2022-11-02 ENCOUNTER — TELEPHONE (OUTPATIENT)
Dept: GASTROENTEROLOGY | Facility: CLINIC | Age: 72
End: 2022-11-02

## 2022-11-02 NOTE — TELEPHONE ENCOUNTER
Caller: Jemima Bell    Relationship: Self    Best call back number: [phone]    What is the best time to reach you: ANYTIME    Who are you requesting to speak with (clinical staff, provider,  specific staff member): CLINICAL STAFF    What was the call regarding: PATIENT IS STATING THAT SHE IS HAVING SIDE EFFECT FROM MEDICATION COLESTIPOL. WOULD LIKE TO SPEAK WITH SOMEONE IN OFFICE REGARDING THIS.    Do you require a callback: YES

## 2022-11-07 ENCOUNTER — TELEPHONE (OUTPATIENT)
Dept: GASTROENTEROLOGY | Facility: CLINIC | Age: 72
End: 2022-11-07

## 2022-11-07 NOTE — TELEPHONE ENCOUNTER
Regarding: Stomach Discomfort  Contact: [phone]  ----- Message from MACHO Varghese sent at 11/4/2022 12:07 PM EDT -----  Please call the patient and tell her I recommend she try some Pepto-Bismol for her stomach pain.  She might want to just stop the new medications and see if they might be causing her pain.  And then start the one by one back to see if one of them was a trigger for the pain.  Maybe she should move up her appointment to earlier next week?  Thanks     ----- Message from Jemima Bell to Perri Barahona APRN sent at 11/4/2022 11:35 AM -----   It doesn’t matter what I do. It has been bad all week.       ----- Message -----       From:MACHO Varghese       Sent:11/4/2022 11:27 AM EDT         To:Jemima Bell    Subject:Stomach Discomfort    How are you doing today?  Still having the new stomach pain?  Is it worse on an empty stomach or worse after eating?      ----- Message -----       From:Jemima Bell       Sent:11/1/2022  6:26 PM EDT         To:Patient Medical Advice Request Message List    Subject:Stomach Discomfort    Sorry, I left off the most important part:  nausea. It’s bad.     Thank you       ----- Message -----       From:Jemima Bell       Sent:11/1/2022  3:11 PM EDT         To:MACHO Varghese    Subject:Stomach Discomfort    The good news is I am better on the new meds. I am, however, experiencing discomfort and sometimes pain in my stomach. That’s new to me.  It is not excruciating but at times more than a discomfort.  I do not know if that is something that can wait until my Nov 10 appointment, if it has to do with the new meds, something that needs to be adjusted with meds or if I should ignore until I see you.  Please advise.  Thank you!  Kim

## 2022-11-07 NOTE — TELEPHONE ENCOUNTER
"Please call the patient and tell her I recommend she try some Pepto-Bismol for her stomach pain.  She might want to just stop the new medications and see if they might be causing her pain.  And then start the one by one back to see if one of them was a trigger for the pain.  Maybe she should move up her appointment to earlier next week?  Thanks Perri MIRZA.    Called pt and advised of the above.  Verb understanding. Pt reports that she has tried pepto in the past and it has not helped. Pt stopped all the new meds due to they were \"eating her stomach\".  Pt states she is totally discouraged.  Pt states she will see Perri MIRZA at her appt on Thurs 11/10.  "

## 2022-11-10 ENCOUNTER — OFFICE VISIT (OUTPATIENT)
Dept: GASTROENTEROLOGY | Facility: CLINIC | Age: 72
End: 2022-11-10

## 2022-11-10 VITALS
TEMPERATURE: 97.3 F | BODY MASS INDEX: 32.65 KG/M2 | WEIGHT: 177.4 LBS | SYSTOLIC BLOOD PRESSURE: 115 MMHG | HEART RATE: 73 BPM | OXYGEN SATURATION: 96 % | HEIGHT: 62 IN | DIASTOLIC BLOOD PRESSURE: 81 MMHG

## 2022-11-10 DIAGNOSIS — R10.10 PAIN OF UPPER ABDOMEN: Primary | ICD-10-CM

## 2022-11-10 DIAGNOSIS — Z87.11 HISTORY OF GASTRIC ULCER: ICD-10-CM

## 2022-11-10 DIAGNOSIS — K21.9 GASTROESOPHAGEAL REFLUX DISEASE, UNSPECIFIED WHETHER ESOPHAGITIS PRESENT: ICD-10-CM

## 2022-11-10 DIAGNOSIS — R19.7 DIARRHEA, UNSPECIFIED TYPE: ICD-10-CM

## 2022-11-10 PROCEDURE — 99214 OFFICE O/P EST MOD 30 MIN: CPT | Performed by: NURSE PRACTITIONER

## 2022-11-10 RX ORDER — SUCRALFATE ORAL 1 G/10ML
1 SUSPENSION ORAL
Qty: 280 ML | Refills: 0 | Status: SHIPPED | OUTPATIENT
Start: 2022-11-10 | End: 2022-11-11

## 2022-11-10 NOTE — PROGRESS NOTES
Chief Complaint   Patient presents with   • Diarrhea   • Abdominal Pain       Jemima Bell is a  72 y.o. female here for a follow up visit for abdominal pain.    HPI  72-year-old female presents today for follow-up visit for upper abdominal pain.  She is a patient of Dr. Aguilar.  She was last seen in the office by me on 10/25/2022.  She continues to have upper abdominal pain worse in the left upper quadrant and epigastric area.  She does report that the colestipol did stop the diarrhea however seem to make her abdominal pain much worse.  To the point that she had to stop the medication.  She was not sure if it was colestipol or Prevacid that was causing that so she also stopped the Prevacid.  She does feel like the Prevacid twice a day really did help with the burning in her stomach.  But she admits was not 100% effective.  She is on the scope scheduled for an EGD and colonoscopy this December with Dr. Aguilar.  She really would like to move her scopes up if possible.  She tells me she just feels horrible.  Stool studies have been negative.  She does have a history of cholecystectomy. She tells me her diarrhea can be all day every day.  Can wake her up at night.  Can be just completely watery.  She has even had fecal incontinence episodes.  She tells me the only medicine that really seems to help the diarrhea is Imodium over-the-counter.  She takes 2 every morning and then may take more later in the day depending on how the diarrhea is.  She denies any dysphagia, vomiting, constipation, rectal bleeding or melena.  She is having a lot of nausea and feels like she could vomit but has not vomited.  Last EGD and colonoscopy was in 2015 in [place].  She has history of laparoscopic colon resection in 2005.  She has history of nissen fundoplication x2.  Past Medical History:   Diagnosis Date   • Acute bronchitis    • Acute sinusitis    • Allergic rhinitis    • Anemia Childhood   • Anxiety    • Arthritis    • Asthma     • Back pain    • BMI 34.0-34.9,adult    • Cervicalgia    • Chills    • Cholelithiasis 2004?    Remival   • Deep vein thrombosis (DVT) of lower extremity (HCC)    • Depression    • Dermatitis    • Dyspnea    • Dysuria    • Esophageal reflux    • Fatigue    • Gastric ulcer    • GERD (gastroesophageal reflux disease)    • GI (gastrointestinal bleed) 2004?   • Headache    • Heart murmur 1973   • Hernia    • Hypothyroidism    • Irritable bowel syndrome    • Lumbago    • Migraine    • Nausea    • Neuritis    • Osteoarthritis    • Osteoarthritis of knee    • Plantar fasciitis    • Pneumonia 89y   • Pulled muscle    • Pulmonary embolism (HCC)    • Pyelonephritis    • Rheumatic fever    • Sore throat    • Torticollis    • Trapezius strain    • Urinary incontinence    • Urinary pain    • Urinary tract infection    • UTI symptoms    • Vitamin B1 deficiency    • Vitamin B12 deficiency    • Vitamin D deficiency    • Weakness    • Wheezing        Past Surgical History:   Procedure Laterality Date   • ABDOMINAL SURGERY     • APPENDECTOMY     • CATARACT EXTRACTION     • CATARACT EXTRACTION      bilateral eyes   • CATARACT EXTRACTION     • CHOLECYSTECTOMY  2004?   • COLONOSCOPY     • GALLBLADDER SURGERY     • HEMORRHOIDECTOMY  1974 & 77?   • HERNIA REPAIR  2008?   • HYSTERECTOMY     • INGUINAL HERNIA REPAIR     • KNEE SURGERY     • LAPAROSCOPIC COLON RESECTION  2005   • LAPAROTOMY OOPHERECTOMY     • NISSEN FUNDOPLICATION LAPAROSCOPIC      x2   • TONSILLECTOMY     • TONSILLECTOMY  1954?   • TOTAL KNEE ARTHROPLASTY Left    • UPPER GASTROINTESTINAL ENDOSCOPY         Scheduled Meds:    Continuous Infusions:No current facility-administered medications for this visit.      PRN Meds:.    Allergies   Allergen Reactions   • Salicylates GI Intolerance     Other reaction(s): GI Upset, gi bleed      • Colestipol GI Intolerance   • Biaxin [Clarithromycin]    • Sulfa Antibiotics    • Adhesive Tape Rash   • Augmentin [Amoxicillin-Pot  "Clavulanate] Diarrhea   • Sulfamethoxazole-Trimethoprim GI Intolerance and Nausea And Vomiting     \"makes me sick as a dog\"         Social History     Socioeconomic History   • Marital status:    Tobacco Use   • Smoking status: Never   • Smokeless tobacco: Never   Vaping Use   • Vaping Use: Never used   Substance and Sexual Activity   • Alcohol use: No   • Drug use: No   • Sexual activity: Yes     Partners: Male     Birth control/protection: None       Family History   Problem Relation Age of Onset   • Arthritis Mother    • Depression Mother    • Hyperlipidemia Mother    • Hypertension Mother    • Irritable bowel syndrome Mother    • Hypertension Father    • Arthritis Father    • Stroke Father    • Alcohol abuse Maternal Grandfather    • Depression Maternal Grandfather    • Heart disease Other         IN FEMALES BEFORE AGE 65   • Asthma Maternal Grandmother        Review of Systems   Constitutional: Positive for appetite change and fatigue. Negative for chills, diaphoresis, fever and unexpected weight change.   HENT: Negative for nosebleeds, postnasal drip, sore throat, trouble swallowing and voice change.    Respiratory: Negative for cough, choking, chest tightness, shortness of breath, wheezing and stridor.    Cardiovascular: Negative for chest pain, palpitations and leg swelling.   Gastrointestinal: Positive for abdominal distention, abdominal pain, diarrhea and nausea. Negative for anal bleeding, blood in stool, constipation, rectal pain and vomiting.   Endocrine: Negative for polydipsia, polyphagia and polyuria.   Musculoskeletal: Negative for gait problem.   Skin: Negative for rash and wound.   Allergic/Immunologic: Negative for food allergies.   Neurological: Negative for dizziness, speech difficulty and light-headedness.   Psychiatric/Behavioral: Negative for confusion, self-injury, sleep disturbance and suicidal ideas.       Vitals:    11/10/22 1055   BP: 115/81   Pulse: 73   Temp: 97.3 °F (36.3 °C) "   SpO2: 96%       Physical Exam  Constitutional:       General: She is not in acute distress.     Appearance: She is well-developed. She is not ill-appearing.   HENT:      Head: Normocephalic.   Eyes:      Pupils: Pupils are equal, round, and reactive to light.   Cardiovascular:      Rate and Rhythm: Normal rate and regular rhythm.      Heart sounds: Normal heart sounds.   Pulmonary:      Effort: Pulmonary effort is normal.      Breath sounds: Normal breath sounds.   Abdominal:      General: Bowel sounds are normal. There is distension.      Palpations: Abdomen is soft. There is no mass.      Tenderness: There is abdominal tenderness. There is no guarding or rebound.      Hernia: No hernia is present.   Musculoskeletal:         General: Normal range of motion.   Skin:     General: Skin is warm and dry.   Neurological:      Mental Status: She is alert and oriented to person, place, and time.   Psychiatric:         Speech: Speech normal.         Behavior: Behavior normal.         Judgment: Judgment normal.         No radiology results for the last 7 days     Diagnoses and all orders for this visit:    1. Pain of upper abdomen (Primary)  -     CT Abdomen Pelvis With Contrast; Future    2. Gastroesophageal reflux disease, unspecified whether esophagitis present  Overview:  Added automatically from request for surgery [id number]    Orders:  -     CT Abdomen Pelvis With Contrast; Future    3. History of gastric ulcer  -     CT Abdomen Pelvis With Contrast; Future    4. Diarrhea, unspecified type  Overview:  Added automatically from request for surgery [id number]    Orders:  -     CT Abdomen Pelvis With Contrast; Future    Other orders  -     sucralfate (Carafate) 1 GM/10ML suspension; Take 10 mL by mouth 2 (Two) Times a Day Before Meals for 14 days.  Dispense: 280 mL; Refill: 0       Stool studies came back normal.  Labs were normal.  Colestipol unfortunately worked great to stop the diarrhea but unfortunately gave her unwanted  side effects of upper abdominal pain.  So she stopped taking it.  At this time recommend she just use Imodium OTC as needed for the diarrhea.  I do suspect she might have a gastric ulcer given her upper GI symptoms and history.  At this point I would like her to restart the Prevacid 30 mg twice daily and we will add Carafate to it.  I would like to get a CT scan of the abdomen/pelvis done for further evaluation.  Patient is agreeable to the scan.  Patient is scheduled for EGD and colonoscopy in late December with Dr. Aguilar for further evaluation.  I will work on moving the scopes up if possible.  Patient to call the office tomorrow with an update.  Patient to follow-up with me in 2 weeks.  Patient is agreeable to the plan.

## 2022-11-11 RX ORDER — SUCRALFATE 1 G/1
1 TABLET ORAL
Qty: 60 TABLET | Refills: 2 | Status: SHIPPED | OUTPATIENT
Start: 2022-11-11 | End: 2023-01-16

## 2022-11-15 ENCOUNTER — TELEPHONE (OUTPATIENT)
Dept: GASTROENTEROLOGY | Facility: CLINIC | Age: 72
End: 2022-11-15

## 2022-11-15 NOTE — TELEPHONE ENCOUNTER
Caller: Jemima Bell    Relationship to patient: Self    Best call back number: [phone]    Patient is needing: PT RETURNED CALL ABOUT AN EARLIER APPOINTMENT FOR THE COLONOSCOPY AND A CT SCAN WAS TO BE SCHEDULE.     PLEASE REACH OUT TO PT AGAIN.    MESSAGE FOR INGA VIERA: PT IS NOT FEELING ANY BETTER WHILE TAKING SUCRALFATE.

## 2022-12-02 ENCOUNTER — HOSPITAL ENCOUNTER (OUTPATIENT)
Dept: CT IMAGING | Facility: HOSPITAL | Age: 72
Discharge: HOME | End: 2022-12-02
Admitting: NURSE PRACTITIONER

## 2022-12-02 DIAGNOSIS — Z87.11 HISTORY OF GASTRIC ULCER: ICD-10-CM

## 2022-12-02 DIAGNOSIS — R10.10 PAIN OF UPPER ABDOMEN: ICD-10-CM

## 2022-12-02 DIAGNOSIS — R19.7 DIARRHEA, UNSPECIFIED TYPE: ICD-10-CM

## 2022-12-02 DIAGNOSIS — K21.9 GASTROESOPHAGEAL REFLUX DISEASE, UNSPECIFIED WHETHER ESOPHAGITIS PRESENT: ICD-10-CM

## 2022-12-02 PROCEDURE — 25010000002 IOPAMIDOL 61 % SOLUTION: Performed by: NURSE PRACTITIONER

## 2022-12-02 PROCEDURE — 74177 CT ABD & PELVIS W/CONTRAST: CPT

## 2022-12-02 PROCEDURE — 82565 ASSAY OF CREATININE: CPT

## 2022-12-02 PROCEDURE — 0 DIATRIZOATE MEGLUMINE & SODIUM PER 1 ML: Performed by: NURSE PRACTITIONER

## 2022-12-02 RX ADMIN — IOPAMIDOL 100 ML: 612 INJECTION, SOLUTION INTRAVENOUS at 13:15

## 2022-12-02 RX ADMIN — DIATRIZOATE MEGLUMINE AND DIATRIZOATE SODIUM 30 ML: 660; 100 LIQUID ORAL; RECTAL at 14:05

## 2022-12-05 LAB — CREAT BLDA-MCNC: 0.7 MG/DL (ref 0.6–1.3)

## 2022-12-06 ENCOUNTER — TELEPHONE (OUTPATIENT)
Dept: GASTROENTEROLOGY | Facility: CLINIC | Age: 72
End: 2022-12-06

## 2022-12-06 NOTE — TELEPHONE ENCOUNTER
----- Message from MACHO Varghese sent at 12/6/2022  9:13 AM EST -----  Please call the patient and let her know her CT scan did show diffuse hepatic steatosis, renal cysts and thickening of the distal esophagus with suggestion of a small hiatal hernia.  This would be indicative of esophagitis.  Otherwise no acute abdominal process noted.  How is she doing?

## 2022-12-06 NOTE — TELEPHONE ENCOUNTER
Called pt and advised of Perri MIRZA's note. Verb understanding.     Pt reports she is doing a little better.  She is taking the medication but is still having some stomach pain.  Advised will update Perri MIRZA.

## 2022-12-21 ENCOUNTER — TELEPHONE (OUTPATIENT)
Dept: GASTROENTEROLOGY | Facility: CLINIC | Age: 72
End: 2022-12-21

## 2022-12-21 NOTE — TELEPHONE ENCOUNTER
Caller: Jemima Bell    Relationship to patient: Self    Best call back number: [phone]    Chief complaint: POSSIBLE RESCHEDULE    Type of visit: COLONOSCOPY     Requested date: ANY     If rescheduling, when is the original appointment: 12/23/2022    Additional notes: PATIENT IS WANTING TO POSSIBLY RESCHEDULE AS SHE IS WORRIED THAT HER PROCEDURE WILL BE CANCELLED.  REQUESTING SOMEONE REACH OUT ASAP.  THANK YOU

## 2022-12-23 ENCOUNTER — ANESTHESIA EVENT (OUTPATIENT)
Dept: GASTROENTEROLOGY | Facility: HOSPITAL | Age: 72
End: 2022-12-23

## 2022-12-23 ENCOUNTER — HOSPITAL ENCOUNTER (OUTPATIENT)
Facility: HOSPITAL | Age: 72
Setting detail: HOSPITAL OUTPATIENT SURGERY
Discharge: HOME | End: 2022-12-23
Attending: INTERNAL MEDICINE | Admitting: INTERNAL MEDICINE

## 2022-12-23 ENCOUNTER — ANESTHESIA (OUTPATIENT)
Dept: GASTROENTEROLOGY | Facility: HOSPITAL | Age: 72
End: 2022-12-23

## 2022-12-23 VITALS
HEART RATE: 72 BPM | OXYGEN SATURATION: 95 % | HEIGHT: 62 IN | WEIGHT: 172.5 LBS | DIASTOLIC BLOOD PRESSURE: 77 MMHG | BODY MASS INDEX: 31.74 KG/M2 | RESPIRATION RATE: 16 BRPM | SYSTOLIC BLOOD PRESSURE: 121 MMHG

## 2022-12-23 DIAGNOSIS — R19.7 DIARRHEA, UNSPECIFIED TYPE: ICD-10-CM

## 2022-12-23 DIAGNOSIS — R15.9 INCONTINENCE OF FECES WITH FECAL URGENCY: ICD-10-CM

## 2022-12-23 DIAGNOSIS — R15.2 INCONTINENCE OF FECES WITH FECAL URGENCY: ICD-10-CM

## 2022-12-23 DIAGNOSIS — K21.9 GASTROESOPHAGEAL REFLUX DISEASE, UNSPECIFIED WHETHER ESOPHAGITIS PRESENT: ICD-10-CM

## 2022-12-23 DIAGNOSIS — Z98.890 HISTORY OF NISSEN FUNDOPLICATION: ICD-10-CM

## 2022-12-23 DIAGNOSIS — R10.12 LEFT UPPER QUADRANT ABDOMINAL PAIN: ICD-10-CM

## 2022-12-23 PROCEDURE — 86364 TISS TRNSGLTMNASE EA IG CLAS: CPT | Performed by: INTERNAL MEDICINE

## 2022-12-23 PROCEDURE — 0 LIDOCAINE 1 % SOLUTION: Performed by: REGISTERED NURSE

## 2022-12-23 PROCEDURE — 45385 COLONOSCOPY W/LESION REMOVAL: CPT | Performed by: INTERNAL MEDICINE

## 2022-12-23 PROCEDURE — 86231 EMA EACH IG CLASS: CPT | Performed by: INTERNAL MEDICINE

## 2022-12-23 PROCEDURE — 82784 ASSAY IGA/IGD/IGG/IGM EACH: CPT | Performed by: INTERNAL MEDICINE

## 2022-12-23 PROCEDURE — 25010000002 PROPOFOL 10 MG/ML EMULSION: Performed by: REGISTERED NURSE

## 2022-12-23 PROCEDURE — 86258 DGP ANTIBODY EACH IG CLASS: CPT | Performed by: INTERNAL MEDICINE

## 2022-12-23 PROCEDURE — 88305 TISSUE EXAM BY PATHOLOGIST: CPT | Performed by: INTERNAL MEDICINE

## 2022-12-23 PROCEDURE — 43239 EGD BIOPSY SINGLE/MULTIPLE: CPT | Performed by: INTERNAL MEDICINE

## 2022-12-23 RX ORDER — SODIUM CHLORIDE, SODIUM LACTATE, POTASSIUM CHLORIDE, CALCIUM CHLORIDE 600; 310; 30; 20 MG/100ML; MG/100ML; MG/100ML; MG/100ML
30 INJECTION, SOLUTION INTRAVENOUS CONTINUOUS
Status: DISCONTINUED | OUTPATIENT
Start: 2022-12-23 | End: 2022-12-23 | Stop reason: HOSPADM

## 2022-12-23 RX ORDER — ONDANSETRON 2 MG/ML
4 INJECTION INTRAMUSCULAR; INTRAVENOUS ONCE AS NEEDED
Status: DISCONTINUED | OUTPATIENT
Start: 2022-12-23 | End: 2022-12-23 | Stop reason: HOSPADM

## 2022-12-23 RX ORDER — PROPOFOL 10 MG/ML
VIAL (ML) INTRAVENOUS AS NEEDED
Status: DISCONTINUED | OUTPATIENT
Start: 2022-12-23 | End: 2022-12-23 | Stop reason: SURG

## 2022-12-23 RX ORDER — LIDOCAINE HYDROCHLORIDE 10 MG/ML
INJECTION, SOLUTION INFILTRATION; PERINEURAL AS NEEDED
Status: DISCONTINUED | OUTPATIENT
Start: 2022-12-23 | End: 2022-12-23 | Stop reason: SURG

## 2022-12-23 RX ADMIN — PROPOFOL 100 MG: 10 INJECTION, EMULSION INTRAVENOUS at 13:31

## 2022-12-23 RX ADMIN — SODIUM CHLORIDE, POTASSIUM CHLORIDE, SODIUM LACTATE AND CALCIUM CHLORIDE 30 ML/HR: 600; 310; 30; 20 INJECTION, SOLUTION INTRAVENOUS at 13:10

## 2022-12-23 RX ADMIN — PROPOFOL 140 MCG/KG/MIN: 10 INJECTION, EMULSION INTRAVENOUS at 13:31

## 2022-12-23 RX ADMIN — LIDOCAINE HYDROCHLORIDE 50 MG: 10 INJECTION, SOLUTION INFILTRATION; PERINEURAL at 13:31

## 2022-12-23 NOTE — ANESTHESIA PREPROCEDURE EVALUATION
Anesthesia Evaluation     Patient summary reviewed and Nursing notes reviewed   NPO Solid Status: > 8 hours  NPO Liquid Status: > 4 hours           Airway   Mallampati: II  Neck ROM: full  No difficulty expected  Dental      Comment: veneers    Pulmonary     breath sounds clear to auscultation  (+) pneumonia , pulmonary embolism, asthma,shortness of breath,   Cardiovascular     Rhythm: regular    (+) valvular problems/murmurs, DVT,       Neuro/Psych  (+) headaches, weakness, psychiatric history Anxiety and Depression,    GI/Hepatic/Renal/Endo    (+)  GERD, PUD, GI bleeding , thyroid problem hypothyroidism    Musculoskeletal     (+) back pain, neck pain,   Abdominal   (+) obese,    Substance History      OB/GYN          Other   arthritis,                      Anesthesia Plan    ASA 3     MAC     intravenous induction     Anesthetic plan, risks, benefits, and alternatives have been provided, discussed and informed consent has been obtained with: patient.        CODE STATUS:

## 2022-12-23 NOTE — ANESTHESIA POSTPROCEDURE EVALUATION
"Patient: Jemima Bell    Procedure Summary     Date: 12/23/22 Room / Location:  [facility]    Anesthesia Start: 1329 Anesthesia Stop: 1411    Procedures:       ESOPHAGOGASTRODUODENOSCOPY with biopsies (Esophagus)      COLONOSCOPY to cecum and TI:  with biopsies, cold snare polyp, Diagnosis:       Left upper quadrant abdominal pain      Diarrhea, unspecified type      Gastroesophageal reflux disease, unspecified whether esophagitis present      History of Nissen fundoplication      Incontinence of feces with fecal urgency      (Left upper quadrant abdominal pain [R10.12])      (Diarrhea, unspecified type [R19.7])      (Gastroesophageal reflux disease, unspecified whether esophagitis present [K21.9])      (History of Nissen fundoplication [Z98.890])      (Incontinence of feces with fecal urgency [R15.9, R15.2])    Surgeons: Reji Aguilar MD Provider: Juan Pablo Falk MD    Anesthesia Type: MAC ASA Status: 3          Anesthesia Type: MAC    Vitals  Vitals Value Taken Time   /77 12/23/22 1430   Temp     Pulse 72 12/23/22 1430   Resp 16 12/23/22 1430   SpO2 95 % 12/23/22 1430           Post Anesthesia Care and Evaluation    Patient location during evaluation: bedside  Patient participation: complete - patient participated  Level of consciousness: awake and alert  Pain management: adequate    Airway patency: patent  Anesthetic complications: No anesthetic complications    Cardiovascular status: acceptable  Respiratory status: acceptable  Hydration status: acceptable    Comments: /77 (BP Location: Left arm, Patient Position: Lying)   Pulse 72   Resp 16   Ht 157.5 cm (62\")   Wt 78.2 kg (172 lb 8 oz)   LMP  (LMP Unknown)   SpO2 95%   BMI 31.55 kg/m²       "

## 2022-12-23 NOTE — H&P
Chief Complaint   Patient presents with   • Diarrhea   • gastroesophageal reflux disease         Jemima Bell is a 72 y.o. female who presents with abdominal pain.     HPI  72-year-old female presents today as a new patient to our clinic with abdominal pain and diarrhea.  She will be establishing with myself and Dr. Aguilar.  She has a history of 2 Terell fundoplication's done at the [facility].  She underwent an EGD and colonoscopy at the [facility] in [place] in 2015.  She tells me it was a horrible experience.  She does have a history of colon polyps.  She underwent a cholecystectomy.  She has a history of lactose intolerance.  She tells me for a long long long time she has been suffering with left upper quadrant abdominal pain and diarrhea.  She will have urgency and fecal incontinence.  She also complains of upper abdominal pain with lots of heartburn.  She has had a diaphragmatic hernia repair in the past.  She has a history of gastric ulcers.  She tells me omeprazole seems to help with her reflux symptoms but it gives her such bad diarrhea she can take it for very long.  She had stool studies just recently at her PCP which were negative.  She has been on colestipol in the past per the [facility] notes but she cannot remember if it helped or did not.  She tell me she just recently had labs done at her PCP office and she was told they were normal.  She denies any dysphagia, vomiting, constipation, rectal bleeding or melena.  She admits her appetite is okay and her weight is down 11 pounds since August of this year.  She denies any significant GI family history at this time.  Medical History        Past Medical History:   Diagnosis Date   • Acute bronchitis     • Acute sinusitis     • Allergic rhinitis     • Anemia Childhood   • Anxiety     • Arthritis     • Asthma     • Back pain     • BMI 34.0-34.9,adult     • Cervicalgia     • Chills     • Cholelithiasis 2004?     Remival   • Deep  vein thrombosis (DVT) of lower extremity (HCC)     • Depression     • Dermatitis     • Dyspnea     • Dysuria     • Esophageal reflux     • Fatigue     • Gastric ulcer     • GERD (gastroesophageal reflux disease)     • GI (gastrointestinal bleed) 2004?   • Headache     • Heart murmur 1973   • Hernia     • Hypothyroidism     • Irritable bowel syndrome     • Lumbago     • Migraine     • Nausea     • Neuritis     • Osteoarthritis     • Osteoarthritis of knee     • Plantar fasciitis     • Pneumonia 89y   • Pulled muscle     • Pulmonary embolism (HCC)     • Pyelonephritis     • Rheumatic fever     • Sore throat     • Torticollis     • Trapezius strain     • Urinary incontinence     • Urinary pain     • Urinary tract infection     • UTI symptoms     • Vitamin B1 deficiency     • Vitamin B12 deficiency     • Vitamin D deficiency     • Weakness     • Wheezing              Surgical History         Past Surgical History:   Procedure Laterality Date   • ABDOMINAL SURGERY       • APPENDECTOMY       • CATARACT EXTRACTION       • CATARACT EXTRACTION         bilateral eyes   • CATARACT EXTRACTION       • CHOLECYSTECTOMY   2004?   • COLONOSCOPY       • GALLBLADDER SURGERY       • HEMORRHOIDECTOMY   1974 & 77?   • HERNIA REPAIR   2008?   • HYSTERECTOMY       • INGUINAL HERNIA REPAIR       • KNEE SURGERY       • LAPAROSCOPIC COLON RESECTION   2005   • LAPAROTOMY OOPHERECTOMY       • NISSEN FUNDOPLICATION LAPAROSCOPIC         x2   • TONSILLECTOMY       • TONSILLECTOMY   1954?   • TOTAL KNEE ARTHROPLASTY Left     • UPPER GASTROINTESTINAL ENDOSCOPY                   Current Outpatient Medications:   •  acetaminophen (TYLENOL) 325 MG tablet, Take 650 mg by mouth., Disp: , Rfl:   •  acetaminophen-codeine (TYLENOL #3) 300-30 MG per tablet, Take 1 tablet by mouth Every 6 (Six) Hours As Needed for Moderate Pain., Disp: 15 tablet, Rfl: 0  •  albuterol sulfate  (90 Base) MCG/ACT inhaler, Inhale 2 puffs Every 4 (Four) Hours As Needed.,  "Disp: , Rfl:   •  Diclofenac Sodium (VOLTAREN) 1 % gel gel, Apply 4 g topically to the appropriate area as directed 4 (Four) Times a Day As Needed (pain)., Disp: 150 g, Rfl: 2  •  fluticasone (FLONASE) 50 MCG/ACT nasal spray, 2 sprays into the nostril(s) as directed by provider Daily., Disp: 48 g, Rfl: 3  •  hydrOXYzine (ATARAX) 10 MG tablet, Take 1 tablet by mouth Every 4 (Four) Hours As Needed for Anxiety (twitching)., Disp: 120 tablet, Rfl: 0  •  levothyroxine (SYNTHROID, LEVOTHROID) 88 MCG tablet, Take 1 tablet by mouth Daily., Disp: 90 tablet, Rfl: 1  •  mirtazapine (Remeron) 30 MG tablet, Take 1 tablet by mouth Every Night., Disp: 90 tablet, Rfl: 1  •  ondansetron (Zofran) 4 MG tablet, Take 1 tablet by mouth Every 8 (Eight) Hours As Needed for Nausea or Vomiting., Disp: 15 tablet, Rfl: 0  •  tiZANidine (ZANAFLEX) 4 MG tablet, Take 1 tablet by mouth Every 8 (Eight) Hours As Needed for Muscle Spasms., Disp: 60 tablet, Rfl: 0  •  colestipol (COLESTID) 1 g tablet, Start with one pill daily and increase to 2 daily if no improvement after 2 weeks, Disp: 60 tablet, Rfl: 11  •  lansoprazole (PREVACID) 30 MG capsule, Take 1 capsule by mouth 2 (Two) Times a Day., Disp: 60 capsule, Rfl: 5           Allergies   Allergen Reactions   • Salicylates GI Intolerance       Other reaction(s): GI Upset, gi bleed       • Biaxin [Clarithromycin]     • Sulfa Antibiotics     • Adhesive Tape Rash   • Augmentin [Amoxicillin-Pot Clavulanate] Diarrhea   • Sulfamethoxazole-Trimethoprim GI Intolerance and Nausea And Vomiting       \"makes me sick as a dog\"            Social History   Social History            Socioeconomic History   • Marital status:    Tobacco Use   • Smoking status: Never   • Smokeless tobacco: Never   Vaping Use   • Vaping Use: Never used   Substance and Sexual Activity   • Alcohol use: No   • Drug use: No   • Sexual activity: Yes       Partners: Male       Birth control/protection: None                  Family " History   Problem Relation Age of Onset   • Arthritis Mother     • Depression Mother     • Hyperlipidemia Mother     • Hypertension Mother     • Irritable bowel syndrome Mother     • Hypertension Father     • Arthritis Father     • Stroke Father     • Alcohol abuse Maternal Grandfather     • Depression Maternal Grandfather     • Heart disease Other           IN FEMALES BEFORE AGE 65   • Asthma Maternal Grandmother           Review of Systems   Constitutional: Negative for appetite change, chills, diaphoresis, fatigue, fever and unexpected weight change.   HENT: Negative for nosebleeds, postnasal drip, sore throat, trouble swallowing and voice change.    Respiratory: Negative for cough, choking, chest tightness, shortness of breath, wheezing and stridor.    Cardiovascular: Negative for chest pain, palpitations and leg swelling.   Gastrointestinal: Positive for abdominal distention, abdominal pain, diarrhea and nausea. Negative for anal bleeding, blood in stool, constipation, rectal pain and vomiting.   Endocrine: Negative for polydipsia, polyphagia and polyuria.   Musculoskeletal: Negative for gait problem.   Skin: Negative for rash and wound.   Allergic/Immunologic: Negative for food allergies.   Neurological: Negative for dizziness, speech difficulty and light-headedness.   Psychiatric/Behavioral: Negative for confusion, self-injury, sleep disturbance and suicidal ideas.             Vitals:     10/25/22 0942   BP: 128/84   Temp: 96.2 °F (35.7 °C)         Physical Exam  Constitutional:       General: She is not in acute distress.     Appearance: She is well-developed. She is not ill-appearing.   HENT:      Head: Normocephalic.   Eyes:      Pupils: Pupils are equal, round, and reactive to light.   Cardiovascular:      Rate and Rhythm: Normal rate and regular rhythm.      Heart sounds: Normal heart sounds.   Pulmonary:      Effort: Pulmonary effort is normal.      Breath sounds: Normal breath sounds.   Abdominal:       General: Bowel sounds are normal. There is distension.      Palpations: Abdomen is soft. There is no mass.      Tenderness: There is abdominal tenderness. There is no guarding or rebound.      Hernia: No hernia is present.   Musculoskeletal:         General: Normal range of motion.   Skin:     General: Skin is warm and dry.   Neurological:      Mental Status: She is alert and oriented to person, place, and time.   Psychiatric:         Speech: Speech normal.         Behavior: Behavior normal.         Judgment: Judgment normal.            No radiology results for the last 7 days     Assessment and plan     1. Left upper quadrant abdominal pain  - Case Request; Standing  - Case Request     2. Diarrhea, unspecified type  - Case Request; Standing  - Case Request  - Calprotectin, Fecal - Stool, Per Rectum  - Pancreatic Elastase, Fecal - Stool, Per Rectum     3. Gastroesophageal reflux disease, unspecified whether esophagitis present  - Case Request; Standing  - Case Request     4. History of Nissen fundoplication  - Case Request; Standing  - Case Request     5. Incontinence of feces with fecal urgency  - Case Request; Standing  - Case Request     6. History of colon polyps        Reviewed medical history with her today.  Stool studies were negative including a C. difficile, stool culture and ova and parasite.  Fecal occult blood was negative.  We will go ahead and check stool studies for fecal calprotectin and a pancreatic elastase.  We will restart colestipol for her diarrhea.  We will start Prevacid 30 mg twice a day for her reflux symptoms.  Given her history and current symptoms recommend EGD and colonoscopy for further evaluation.  Patient is agreeable to the scopes.  Patient to call the office next week with an update.  Patient to follow-up with me in 2 weeks.  Patient is agreeable to the plan.              12/23/22 - No change from the above Melissa Aguilar M.D.

## 2022-12-23 NOTE — DISCHARGE INSTRUCTIONS
For the next 24 hours patient needs to be with a responsible adult.    For 24 hours DO NOT drive, operate machinery, appliances, drink alcohol, make important decisions or sign legal documents.    Start with a light or bland diet if you are feeling sick to your stomach otherwise advance to regular diet as tolerated.    Follow recommendations on procedure report if provided by your doctor.    Call Dr Aguilar for problems [phone].    Problems may include but not limited to: large amounts of bleeding, trouble breathing, repeated vomiting, severe unrelieved pain, fever or chills.

## 2022-12-26 LAB
LAB AP CASE REPORT: NORMAL
PATH REPORT.FINAL DX SPEC: NORMAL
PATH REPORT.GROSS SPEC: NORMAL

## 2022-12-27 LAB
ENDOMYSIUM IGA SER QL: NEGATIVE
GLIADIN PEPTIDE IGA SER-ACNC: 11 UNITS (ref 0–19)
GLIADIN PEPTIDE IGG SER-ACNC: 5 UNITS (ref 0–19)
IGA SERPL-MCNC: 298 MG/DL (ref 64–422)
TTG IGA SER-ACNC: <2 U/ML (ref 0–3)
TTG IGG SER-ACNC: 4 U/ML (ref 0–5)

## 2022-12-30 ENCOUNTER — TELEPHONE (OUTPATIENT)
Dept: GASTROENTEROLOGY | Facility: CLINIC | Age: 72
End: 2022-12-30
Payer: MEDICARE

## 2022-12-30 RX ORDER — PROMETHAZINE HYDROCHLORIDE 25 MG/1
25 TABLET ORAL EVERY 6 HOURS PRN
Qty: 20 TABLET | Refills: 1 | Status: SHIPPED | OUTPATIENT
Start: 2022-12-30

## 2022-12-30 RX ORDER — PANTOPRAZOLE SODIUM 40 MG/1
40 TABLET, DELAYED RELEASE ORAL DAILY
Qty: 30 TABLET | Refills: 5 | Status: SHIPPED | OUTPATIENT
Start: 2022-12-30

## 2022-12-30 NOTE — TELEPHONE ENCOUNTER
Call to pt.  Advise that protonix and nexium are both PPI's.  Pt states has not tried either one.     Update to ARCHIE Barahona.

## 2022-12-30 NOTE — TELEPHONE ENCOUNTER
----- Message from Jemima Bell sent at 12/30/2022 11:47 AM EST -----  Regarding: “Heart burn”  Contact: [phone]  I do not know what Protonics is. Is it the same as otc Pearls, for example?  If so, I have tried it with horrible outcome.     Jemima [facility]  [facility]supporting Perri Barahona APRN)          11:45 AM  Is omeprazole generic Nexiim?  I thought it was. I may have taken it a lot of years ago. I do not remember.  I have Colestipol. You prescribed it at the same time you prescribed the Lansoprazole with which I have allergic reaction.  I had stomach pain with the colestipol and you told me to stop it. Yes I have diarrhea daily, some days worse than others.  I watch what I eat. Everything that’s good for me, salads and vegetables to offer fiber gives me trouble. I have lots of nausea, every day. I need help with that. Perhaps you could call in Phenergan ?  I am thinking any prescription you have called should be in my chart.   I honestly do not know where to go from here.  My primary referred me to you gumagi.  What about the hernia?  Is that the culprit for this?  I do not know what else to do. Yes I still have stomach pain.       Perri Barahona APRN  to Jemima Bell          11:10 AM  Have you tried Protonix or Nexium?  They are both similar to omeprazole but stronger.  Are you still having diarrhea every day?  Have we tried colestipol for your diarrhea?  Thanks

## 2023-01-03 ENCOUNTER — TELEPHONE (OUTPATIENT)
Dept: GASTROENTEROLOGY | Facility: CLINIC | Age: 73
End: 2023-01-03
Payer: MEDICARE

## 2023-01-03 NOTE — TELEPHONE ENCOUNTER
----- Message from Reji Aguilar MD sent at 1/2/2023  1:40 PM EST -----  01/02/23       Tell her that her celiac sprue antibody testing came back normal.        Pathology from her EGD showed inflammation of the stomach but no evidence of helicobacter pylori lining the stomach.       The rectal polyp that was removed was not cancerous but was precancerous.        The cecal biopsies showed minimal inflammation but the rectal biopsies came back normal. This could be from resolving infection of the colon, from medicines that you were on, or developing microscopic colitis. I recommend that she have a repeat colonsocopy in 5 yrs.       Have her come see me in the office and we can discuss the above.        Send a copy of this report to her PCP.   Maria A youssef

## 2023-01-03 NOTE — TELEPHONE ENCOUNTER
Called pt and advised of Dr Aguilar's note. Verb understanding.     F/u appt made for 01/05/2023 at 845a with Dr Aguilar.     C/s placed in recall and hm for 12/23/2027.    Results sent to Dr Kehrer thru [facility].

## 2023-01-04 NOTE — TELEPHONE ENCOUNTER
Perri Barahona APRN  to Me        3:49 PM   I have sent her in a prescription for Protonix 40 mg once a day.  It is about double the strength of omeprazole.  Hopefully will not give her any allergic reactions like lansoprazole did.  Yes if the colestipol upset her stomach then I would not take it.  I would say at this point she could take Imodium over-the-counter as needed to help with the diarrhea.  She can take up to 4 Imodium a day.  Yes her medium size hiatal hernia could definitely be contributing to all of her reflux symptoms.  Unfortunately we do not do anything for it unless it gets so large it takes up her whole chest cavity and moves her stomach about other way.  So at this point we control it with medication.  If Phenergan has helped her before I will call her in a refill of it.  Have her let us know next week how she is doing.  Thanks    **VM to pt with request to contact office.      It is noted that pt has appt with Dr Aguilar 1/5.  Georgiana Heath RN.

## 2023-01-05 ENCOUNTER — OFFICE VISIT (OUTPATIENT)
Dept: GASTROENTEROLOGY | Facility: CLINIC | Age: 73
End: 2023-01-05
Payer: MEDICARE

## 2023-01-05 VITALS
HEIGHT: 62 IN | DIASTOLIC BLOOD PRESSURE: 82 MMHG | HEART RATE: 70 BPM | SYSTOLIC BLOOD PRESSURE: 134 MMHG | WEIGHT: 172.6 LBS | OXYGEN SATURATION: 97 % | TEMPERATURE: 95 F | BODY MASS INDEX: 31.76 KG/M2

## 2023-01-05 DIAGNOSIS — R10.12 LEFT UPPER QUADRANT ABDOMINAL PAIN: ICD-10-CM

## 2023-01-05 DIAGNOSIS — R19.7 DIARRHEA, UNSPECIFIED TYPE: Primary | ICD-10-CM

## 2023-01-05 DIAGNOSIS — Z86.010 HISTORY OF COLON POLYPS: ICD-10-CM

## 2023-01-05 DIAGNOSIS — Z98.890 HISTORY OF NISSEN FUNDOPLICATION: ICD-10-CM

## 2023-01-05 PROCEDURE — 99213 OFFICE O/P EST LOW 20 MIN: CPT | Performed by: INTERNAL MEDICINE

## 2023-01-05 NOTE — PROGRESS NOTES
Chief Complaint   Patient presents with   • Abdominal Pain   • Nausea       History of Present Illness:   72 y.o. female with diarrhea and stool urgency, a h/o colon polyps, LUQ abd discomfort, h/o gastric ulcers, h/o fundoplication surgeries x 2 (@[facility]) and diarrhea. She had stool studies done in 10/22 that were unrevealing. She had an EGD and colonoscopy last month.  After reviewing pathology I had said:  01/02/23       Tell her that her celiac sprue antibody testing came back normal.        Pathology from her EGD showed inflammation of the stomach but no evidence of helicobacter pylori lining the stomach.       The rectal polyp that was removed was not cancerous but was precancerous.        The cecal biopsies showed minimal inflammation but the rectal biopsies came back normal. This could be from resolving infection of the colon, from medicines that you were on, or developing microscopic colitis. I recommend that she have a repeat colonsocopy in 5 yrs.       Have her come see me in the office and we can discuss the above.        NO diarrhea now. No constipation. NO rectal bleeeding or melena. No NSAIDs use. Still with LUQ abdominal discomfort that is worse after eating. She doesn't know what makes this better. Some nausea but no vomiting. NO dysphagia. She has lost weight: 30 pounds over 6 mos. Poor appetite. \"I think it is all stress from my 's poor health\".         Doesn't drink much lactose.     Past Medical History:   Diagnosis Date   • Acute bronchitis    • Acute sinusitis    • Allergic rhinitis    • Anemia Childhood   • Anxiety    • Arthritis    • Asthma    • Back pain    • BMI 34.0-34.9,adult    • Cervicalgia    • Chills    • Cholelithiasis 2004?    Remival   • Deep vein thrombosis (DVT) of lower extremity (HCC)    • Depression    • Dermatitis    • Dyspnea    • Dysuria    • Esophageal reflux    • Fatigue    • Gastric ulcer    • GERD (gastroesophageal reflux disease)    • GI  (gastrointestinal bleed) 2004?   • Headache    • Heart murmur 1973   • Hernia    • Hypothyroidism    • Irritable bowel syndrome    • Lumbago    • Migraine    • Nausea    • Neuritis    • Osteoarthritis    • Osteoarthritis of knee    • Plantar fasciitis    • Pneumonia 89y   • Pulled muscle    • Pulmonary embolism (HCC)    • Pyelonephritis    • Rheumatic fever    • Sore throat    • Torticollis    • Trapezius strain    • Urinary incontinence    • Urinary pain    • Urinary tract infection    • UTI symptoms    • Vitamin B1 deficiency    • Vitamin B12 deficiency    • Vitamin D deficiency    • Weakness    • Wheezing        Past Surgical History:   Procedure Laterality Date   • ABDOMINAL SURGERY     • APPENDECTOMY     • CATARACT EXTRACTION     • CATARACT EXTRACTION      bilateral eyes   • CATARACT EXTRACTION     • CHOLECYSTECTOMY  2004?   • COLONOSCOPY     • COLONOSCOPY N/A 12/23/2022    Procedure: COLONOSCOPY to cecum and TI:  with biopsies, cold snare polyp,;  Surgeon: Reji Aguilar MD;  Location: [facility] ENDOSCOPY;  Service: Gastroenterology;  Laterality: N/A;  PREOP/ HX COLON POLYPS  POSTOP/  diverticulosis, polyp, hemorrhoids   • ENDOSCOPY N/A 12/23/2022    Procedure: ESOPHAGOGASTRODUODENOSCOPY with biopsies;  Surgeon: Reji Aguilar MD;  Location: [facility] ENDOSCOPY;  Service: Gastroenterology;  Laterality: N/A;  PREOP/ HX GASTRIC ULCER, DYSPEPSIA, UPPER ABDOMINAL PAIN  POSTOP/:  HH, gastritis, gastric polyps   • GALLBLADDER SURGERY     • HEMORRHOIDECTOMY  1974 & 77?   • HERNIA REPAIR  2008?   • HYSTERECTOMY     • INGUINAL HERNIA REPAIR     • KNEE SURGERY     • LAPAROSCOPIC COLON RESECTION  2005   • LAPAROTOMY OOPHERECTOMY     • NISSEN FUNDOPLICATION LAPAROSCOPIC      x2   • TONSILLECTOMY     • TONSILLECTOMY  1954?   • TOTAL KNEE ARTHROPLASTY Left    • UPPER GASTROINTESTINAL ENDOSCOPY           Current Outpatient Medications:   •  acetaminophen (TYLENOL) 325 MG tablet, Take 650 mg by mouth., Disp: , Rfl:   •   acetaminophen-codeine (TYLENOL #3) 300-30 MG per tablet, Take 1 tablet by mouth Every 6 (Six) Hours As Needed for Moderate Pain., Disp: 15 tablet, Rfl: 0  •  albuterol sulfate  (90 Base) MCG/ACT inhaler, Inhale 2 puffs Every 4 (Four) Hours As Needed., Disp: , Rfl:   •  Diclofenac Sodium (VOLTAREN) 1 % gel gel, Apply 4 g topically to the appropriate area as directed 4 (Four) Times a Day As Needed (pain)., Disp: 150 g, Rfl: 2  •  fluticasone (FLONASE) 50 MCG/ACT nasal spray, 2 sprays into the nostril(s) as directed by provider Daily., Disp: 48 g, Rfl: 3  •  hydrOXYzine (ATARAX) 10 MG tablet, Take 1 tablet by mouth Every 4 (Four) Hours As Needed for Anxiety (twitching)., Disp: 120 tablet, Rfl: 0  •  levothyroxine (SYNTHROID, LEVOTHROID) 88 MCG tablet, Take 1 tablet by mouth Daily., Disp: 90 tablet, Rfl: 1  •  mirtazapine (Remeron) 30 MG tablet, Take 1 tablet by mouth Every Night., Disp: 90 tablet, Rfl: 1  •  ondansetron (Zofran) 4 MG tablet, Take 1 tablet by mouth Every 8 (Eight) Hours As Needed for Nausea or Vomiting., Disp: 15 tablet, Rfl: 0  •  pantoprazole (PROTONIX) 40 MG EC tablet, Take 1 tablet by mouth Daily., Disp: 30 tablet, Rfl: 5  •  promethazine (PHENERGAN) 25 MG tablet, Take 1 tablet by mouth Every 6 (Six) Hours As Needed for Nausea or Vomiting., Disp: 20 tablet, Rfl: 1  •  sucralfate (Carafate) 1 g tablet, Take 1 tablet by mouth 2 (Two) Times a Day Before Meals., Disp: 60 tablet, Rfl: 2  •  tiZANidine (ZANAFLEX) 4 MG tablet, Take 1 tablet by mouth Every 8 (Eight) Hours As Needed for Muscle Spasms., Disp: 60 tablet, Rfl: 0    Allergies   Allergen Reactions   • Salicylates GI Intolerance     History of gi bleed     • Colestipol GI Intolerance   • Biaxin [Clarithromycin]    • Adhesive Tape Rash     Can use plastic tape, paper tape causes rash   • Augmentin [Amoxicillin-Pot Clavulanate] Diarrhea   • Prevacid [Lansoprazole] Itching and Swelling     Eyes only   • Sulfa Antibiotics GI Intolerance   •  Sulfamethoxazole-Trimethoprim Nausea And Vomiting and GI Intolerance     \"makes me sick as a dog\"         Family History   Problem Relation Age of Onset   • Arthritis Mother    • Depression Mother    • Hyperlipidemia Mother    • Hypertension Mother    • Irritable bowel syndrome Mother    • Hypertension Father    • Arthritis Father    • Stroke Father    • Alcohol abuse Maternal Grandfather    • Depression Maternal Grandfather    • Heart disease Other         IN FEMALES BEFORE AGE 65   • Asthma Maternal Grandmother        Social History     Socioeconomic History   • Marital status:    Tobacco Use   • Smoking status: Never   • Smokeless tobacco: Never   Vaping Use   • Vaping Use: Never used   Substance and Sexual Activity   • Alcohol use: No   • Drug use: No   • Sexual activity: Yes     Partners: Male     Birth control/protection: None       Review of Systems   Gastrointestinal: Positive for abdominal pain.   All other systems reviewed and are negative.    Pertinent positives and negatives documented in the HPI and all other systems reviewed and were found to be negative.  Vitals:    01/05/23 0903   BP: 134/82   Pulse: 70   Temp: 95 °F (35 °C)   SpO2: 97%       Physical Exam  Vitals reviewed.   Constitutional:       General: She is not in acute distress.     Appearance: Normal appearance. She is well-developed. She is not diaphoretic.   HENT:      Head: Normocephalic and atraumatic. Hair is normal.      Right Ear: Hearing, tympanic membrane, ear canal and external ear normal. No decreased hearing noted. No drainage.      Left Ear: Hearing, tympanic membrane, ear canal and external ear normal. No decreased hearing noted.      Nose: Nose normal. No nasal deformity.      Mouth/Throat:      Mouth: Mucous membranes are moist.   Eyes:      General: Lids are normal.         Right eye: No discharge.         Left eye: No discharge.      Extraocular Movements: Extraocular movements intact.      Conjunctiva/sclera:  Conjunctivae normal.      Pupils: Pupils are equal, round, and reactive to light.   Neck:      Thyroid: No thyromegaly.      Vascular: No JVD.      Trachea: No tracheal deviation.   Cardiovascular:      Rate and Rhythm: Normal rate and regular rhythm.      Pulses: Normal pulses.      Heart sounds: Normal heart sounds. No murmur heard.    No friction rub. No gallop.   Pulmonary:      Effort: Pulmonary effort is normal. No respiratory distress.      Breath sounds: Normal breath sounds. No wheezing or rales.   Chest:      Chest wall: No tenderness.   Abdominal:      General: Bowel sounds are normal. There is no distension.      Palpations: Abdomen is soft. There is no mass.      Tenderness: There is no abdominal tenderness. There is no guarding or rebound.      Hernia: No hernia is present.   Musculoskeletal:         General: No tenderness or deformity. Normal range of motion.      Cervical back: Normal range of motion and neck supple.   Lymphadenopathy:      Cervical: No cervical adenopathy.   Skin:     General: Skin is warm and dry.      Findings: No erythema or rash.   Neurological:      Mental Status: She is alert and oriented to person, place, and time.      Cranial Nerves: No cranial nerve deficit.      Motor: No abnormal muscle tone.      Coordination: Coordination normal.      Deep Tendon Reflexes: Reflexes are normal and symmetric. Reflexes normal.   Psychiatric:         Mood and Affect: Mood normal.         Behavior: Behavior normal.         Thought Content: Thought content normal.         Judgment: Judgment normal.         Diagnoses and all orders for this visit:    1. Diarrhea, unspecified type (Primary)    2. Left upper quadrant abdominal pain    3. History of Nissen fundoplication    4. History of colon polyps      Assessment:  1.  History of colon polyps.  Her last colonoscopy was in 12 of 2022.  I had recommended a repeat colonoscopy in 5 years.  2.  LUQ abdominal pain.  3. Nausea  4.  H/o diarrhea -  resolved at this time. One of two colon biopsy sights showed mild inflammation - developing microscopic colitis  5. Fatty liver.  6.     Recommendations:  1.  Repeat colonoscopy in 5 years.  2.  Lose weight.  3. Take fiber daily.  4. F/u 6 mos.     Return in about 6 months (around 7/5/2023).    Reji Aguilar MD  1/5/2023

## 2023-01-12 ENCOUNTER — TELEPHONE (OUTPATIENT)
Dept: GASTROENTEROLOGY | Facility: CLINIC | Age: 73
End: 2023-01-12
Payer: MEDICARE

## 2023-01-12 DIAGNOSIS — K52.838 OTHER MICROSCOPIC COLITIS: ICD-10-CM

## 2023-01-12 DIAGNOSIS — R19.7 DIARRHEA, UNSPECIFIED TYPE: Primary | ICD-10-CM

## 2023-01-12 RX ORDER — BUDESONIDE 3 MG/1
CAPSULE, COATED PELLETS ORAL
Qty: 90 CAPSULE | Refills: 3 | Status: SHIPPED | OUTPATIENT
Start: 2023-01-12 | End: 2023-01-16

## 2023-01-12 NOTE — TELEPHONE ENCOUNTER
----- Message from Jemima Bell sent at 1/11/2023  3:40 PM EST -----  Regarding: Drainage  Contact: [phone]  Since my last visit with you the only gastro Med I am taking is Pantoprazole 500 DR 40 mg daily. For a week I have been having constant, without ceasing, drainage from the rectum.  As you can imagine, I am chafed, burning and more than uncomfortable.  I must be leaving out some instruction because this is not working. Please advise. Thank you.

## 2023-01-12 NOTE — TELEPHONE ENCOUNTER
She is complaining of nonbloody diarrhea but states this is been going on for many years.  I had talked about the possibility of taking her off the pantoprazole to see if the diarrhea would get better but she complains that her heartburn will get worse.  Her heartburn does not respond to Pepto-Bismol or other over-the-counter medicines.       I recommended that we try Pepto-Bismol chewable tablets 2 p.o. twice daily in case this is microscopic colitis but she complained that Pepto-Bismol is intolerable to her she can barely get it down.       I sent in a prescription for budesonide 3 mg 3 pills daily in case this is microscopic colitis.  We discussed the potential complications with this medicine.       I would like her to come see me in the office in about 3 weeks to see how she is tolerating the budesonide and to see if it is working?       SA/MT - please work her in to see Ms. Barahona or me in the office in 3ish weeks. [email or web address]

## 2023-01-13 NOTE — TELEPHONE ENCOUNTER
1/25 at 10:30 is as close as I can get.  Its more at a two week but he is on vacation  .     Called pt and pt can make the above appt.  Update sent to manager.

## 2023-01-13 NOTE — TELEPHONE ENCOUNTER
Called pt to make appt for aprprox 3 wks  And pt does not want appt with NP . Pt only wants appt with Dr Aguilar.  None available. ADvised will send message to manager.

## 2023-01-16 ENCOUNTER — OFFICE VISIT (OUTPATIENT)
Dept: FAMILY MEDICINE CLINIC | Facility: CLINIC | Age: 73
End: 2023-01-16
Payer: MEDICARE

## 2023-01-16 VITALS
SYSTOLIC BLOOD PRESSURE: 144 MMHG | HEART RATE: 74 BPM | WEIGHT: 176.2 LBS | TEMPERATURE: 98.2 F | BODY MASS INDEX: 32.42 KG/M2 | HEIGHT: 62 IN | OXYGEN SATURATION: 99 % | DIASTOLIC BLOOD PRESSURE: 70 MMHG

## 2023-01-16 DIAGNOSIS — R19.7 DIARRHEA, UNSPECIFIED TYPE: ICD-10-CM

## 2023-01-16 DIAGNOSIS — K52.838 OTHER MICROSCOPIC COLITIS: ICD-10-CM

## 2023-01-16 DIAGNOSIS — F41.8 DEPRESSION WITH ANXIETY: Primary | ICD-10-CM

## 2023-01-16 PROCEDURE — 99213 OFFICE O/P EST LOW 20 MIN: CPT | Performed by: FAMILY MEDICINE

## 2023-01-16 RX ORDER — BUDESONIDE 3 MG/1
CAPSULE, COATED PELLETS ORAL
Qty: 90 CAPSULE | Refills: 3
Start: 2023-01-16 | End: 2023-03-24

## 2023-01-16 RX ORDER — SERTRALINE HYDROCHLORIDE 25 MG/1
25 TABLET, FILM COATED ORAL DAILY
Qty: 30 TABLET | Refills: 1 | Status: SHIPPED | OUTPATIENT
Start: 2023-01-16 | End: 2023-03-16

## 2023-01-16 NOTE — PROGRESS NOTES
"Answers for HPI/ROS submitted by the patient on 1/14/2023  Please describe your symptoms.: Depression  Have you had these symptoms before?: Yes  How long have you been having these symptoms?: Greater than 2 weeks  Please describe any probable cause for these symptoms. : N/A  What is the primary reason for your visit?: Other    Chief Complaint  Depression    Subjective        Jemima Bell presents to [facility]  History of Present Illness      The patient presents today to discuss her depression.    Depression.  The patient discontinued use of Remeron approximately 2 weeks ago due to no improvement in her symptoms. She continues on hydroxyzine as needed, thyroid medication, Protonix, and tizanidine as needed. The patient states she do not want to change herself. In the past, she has taken BuSpar for anxiety, which helped her mood. She has also tried fluoxetine, which she did not tolerate well. The patient has also tried Zoloft in the past, but do not recall any negative side effects. She denies any recent thoughts of harming herself, but states she has feelings of \"what is the use why I even get out of this bed.\"    GI issues.  The patient denies any recurrence of her GI issues.      Objective   Vital Signs:  /70   Pulse 74   Temp 98.2 °F (36.8 °C)   Ht 157.5 cm (62\")   Wt 79.9 kg (176 lb 3.2 oz)   SpO2 99%   BMI 32.23 kg/m²   Estimated body mass index is 32.23 kg/m² as calculated from the following:    Height as of this encounter: 157.5 cm (62\").    Weight as of this encounter: 79.9 kg (176 lb 3.2 oz).             Physical Exam  Constitutional:       General: She is not in acute distress.     Appearance: Normal appearance. She is well-developed.   HENT:      Head: Normocephalic and atraumatic.      Right Ear: External ear normal.      Left Ear: External ear normal.   Eyes:      Conjunctiva/sclera: Conjunctivae normal.      Pupils: Pupils are equal, round, and " reactive to light.   Neck:      Thyroid: No thyromegaly.   Pulmonary:      Effort: Pulmonary effort is normal.   Neurological:      Mental Status: She is alert and oriented to person, place, and time.   Psychiatric:         Mood and Affect: Mood normal.         Behavior: Behavior normal.        Result Review :                   Assessment and Plan   Diagnoses and all orders for this visit:    1. Depression with anxiety (Primary)  -     sertraline (Zoloft) 25 MG tablet; Take 1 tablet by mouth Daily.  Dispense: 30 tablet; Refill: 1    2. Diarrhea, unspecified type  -     Budesonide (ENTOCORT EC) 3 MG 24 hr capsule; Take 3 pills once daily.  Dispense: 90 capsule; Refill: 3    3. Other microscopic colitis  -     Budesonide (ENTOCORT EC) 3 MG 24 hr capsule; Take 3 pills once daily.  Dispense: 90 capsule; Refill: 3      1. Depression.  -The patient will take Zoloft as discussed.     -The patient will follow up in 4 weeks.     As far as colitis-please contact the GI office and talk to them about alternatives to the Entocort.  Patient requested to schedule this because she could not afford it but I added it back in so they can see it.      Added budesonide back into list.         Follow Up   Return in about 4 weeks (around 2/13/2023) for Recheck mood.  Patient was given instructions and counseling regarding her condition or for health maintenance advice. Please see specific information pulled into the AVS if appropriate.     Transcribed from ambient dictation for Meredith Lea Kehrer, MD by Re Almanzar.  01/16/23   16:04 EST    Patient or patient representative verbalized consent to the visit recording.

## 2023-01-17 ENCOUNTER — TELEPHONE (OUTPATIENT)
Dept: GASTROENTEROLOGY | Facility: CLINIC | Age: 73
End: 2023-01-17
Payer: MEDICARE

## 2023-01-17 NOTE — TELEPHONE ENCOUNTER
----- Message from Jemima Bell sent at 1/17/2023 12:42 AM EST -----  Regarding: Budesonide Dr 3 mg  Contact: [phone]  I went to  the Budesonide DR you called in to my pharmacy.  The cost was over $620 for a 30 day supply.  Seems it is just one more medication for which Medicare shows no mercy, the insurance those of us over 65 must carry. I am wondering if there is a generic or similar medication you would consider for my medical problem.  I certainly make no claim to know medicine and have no idea if Budesonide 2 mg is in the same family. If so, it is an affordable medication and could perhaps be adjusted in strength to meet my needs???  I could be well off base in my question. If so, please excuse. It’s just that I have dealt with this “plague” for so many years I am making every effort to find a solution. In advance, thank you for your consideration and patience with me.     Kim

## 2023-01-18 NOTE — TELEPHONE ENCOUNTER
"Called pt.  She said she has been \"taking imodium for years\"  explained that I would let Dr Aguilar know.  She declined me asking Dr Aguilar for something else.  She is scheduled to see him on 1/25 and will discuss further at that time.  "

## 2023-01-25 ENCOUNTER — OFFICE VISIT (OUTPATIENT)
Dept: GASTROENTEROLOGY | Facility: CLINIC | Age: 73
End: 2023-01-25
Payer: MEDICARE

## 2023-01-25 VITALS
OXYGEN SATURATION: 95 % | WEIGHT: 174.4 LBS | SYSTOLIC BLOOD PRESSURE: 108 MMHG | HEART RATE: 69 BPM | DIASTOLIC BLOOD PRESSURE: 73 MMHG | BODY MASS INDEX: 32.09 KG/M2 | TEMPERATURE: 96 F | HEIGHT: 62 IN

## 2023-01-25 DIAGNOSIS — Z98.890 HISTORY OF NISSEN FUNDOPLICATION: ICD-10-CM

## 2023-01-25 DIAGNOSIS — K52.838 OTHER MICROSCOPIC COLITIS: ICD-10-CM

## 2023-01-25 DIAGNOSIS — R19.7 DIARRHEA, UNSPECIFIED TYPE: Primary | ICD-10-CM

## 2023-01-25 DIAGNOSIS — Z86.010 HISTORY OF COLON POLYPS: ICD-10-CM

## 2023-01-25 DIAGNOSIS — R10.12 LEFT UPPER QUADRANT ABDOMINAL PAIN: ICD-10-CM

## 2023-01-25 PROCEDURE — 99214 OFFICE O/P EST MOD 30 MIN: CPT | Performed by: INTERNAL MEDICINE

## 2023-01-25 NOTE — PROGRESS NOTES
Chief Complaint   Patient presents with   • EGD       History of Present Illness:   72 y.o. female with left upper quadrant abdominal discomfort, Dyspepsia, h/o gastric ulcers. She has had  two fundoplication surgeries at [facility] in the past. She is on Prevacid 30 mg po BID and  Carafate. She has diarrhea.  She had an EGD and colonoscopy by me in 12 of 2023.  After reviewing pathology I had put a note in the chart that said:  01/02/23       Tell her that her celiac sprue antibody testing came back normal.        Pathology from her EGD showed inflammation of the stomach but no evidence of helicobacter pylori lining the stomach.       The rectal polyp that was removed was not cancerous but was precancerous.        The cecal biopsies showed minimal inflammation but the rectal biopsies came back normal. This could be from resolving infection of the colon, from medicines that you were on, or developing microscopic colitis. I recommend that she have a repeat colonsocopy in 5 yrs.       Have her come see me in the office and we can discuss the above.          I last saw her about 3 weeks ago and said at that time:  Assessment:  1.  History of colon polyps.  Her last colonoscopy was in 12 of 2022.  I had recommended a repeat colonoscopy in 5 years.  2.  LUQ abdominal pain.  3. Nausea  4.  H/o diarrhea - resolved at this time. One of two colon biopsy sights showed mild inflammation - developing microscopic colitis  5. Fatty liver.  6.      Recommendations:  1.  Repeat colonoscopy in 5 years.  2.  Lose weight.  3. Take fiber daily.  4. F/u 6 mos.        She is on Protonix 40 mg/day and it helps her GERD. The diarrhea has recurred. She has 0-5 BM/day. She cannot afford Budesonide ($600+ per month). No rectal bleeding or melena.        She has epigastric burning discomfort. She doesn't' know what makes this pain worse or better. +nausea but no vomiting. No fevers, chills.     Past Medical History:   Diagnosis Date   •  Acute bronchitis    • Acute sinusitis    • Allergic 11/2022    Shrimp   • Allergic rhinitis    • Anemia Childhood   • Anxiety    • Arthritis    • Asthma    • Back pain    • BMI 34.0-34.9,adult    • Cervicalgia    • Chills    • Cholelithiasis 2004?    Remival   • Colon polyp 12/2022   • Deep vein thrombosis (DVT) of lower extremity (HCC)    • Depression    • Dermatitis    • Dyspnea    • Dysuria    • Esophageal reflux    • Fatigue    • Gastric ulcer    • GERD (gastroesophageal reflux disease)    • GI (gastrointestinal bleed) 2004?   • Headache    • Heart murmur 1973   • Hernia    • Hypothyroidism    • Irritable bowel syndrome    • Lumbago    • Migraine    • Nausea    • Neuritis    • Osteoarthritis    • Osteoarthritis of knee    • Plantar fasciitis    • Pneumonia 89y   • Pulled muscle    • Pulmonary embolism (HCC)    • Pyelonephritis    • Rheumatic fever    • Sore throat    • Torticollis    • Trapezius strain    • Urinary incontinence    • Urinary pain    • Urinary tract infection    • UTI symptoms    • Vitamin B1 deficiency    • Vitamin B12 deficiency    • Vitamin D deficiency    • Weakness    • Wheezing        Past Surgical History:   Procedure Laterality Date   • ABDOMINAL SURGERY     • APPENDECTOMY     • CATARACT EXTRACTION     • CATARACT EXTRACTION      bilateral eyes   • CATARACT EXTRACTION     • CHOLECYSTECTOMY  2004?   • COLONOSCOPY     • COLONOSCOPY N/A 12/23/2022    Procedure: COLONOSCOPY to cecum and TI:  with biopsies, cold snare polyp,;  Surgeon: Reji Aguilar MD;  Location: [facility] ENDOSCOPY;  Service: Gastroenterology;  Laterality: N/A;  PREOP/ HX COLON POLYPS  POSTOP/  diverticulosis, polyp, hemorrhoids   • ENDOSCOPY N/A 12/23/2022    Procedure: ESOPHAGOGASTRODUODENOSCOPY with biopsies;  Surgeon: Reji Aguilar MD;  Location: [facility] ENDOSCOPY;  Service: Gastroenterology;  Laterality: N/A;  PREOP/ HX GASTRIC ULCER, DYSPEPSIA, UPPER ABDOMINAL PAIN  POSTOP/:  HH, gastritis, gastric polyps   • GALLBLADDER  SURGERY     • HEMORRHOIDECTOMY  1974 & 77?   • HERNIA REPAIR  2008?   • HYSTERECTOMY     • INGUINAL HERNIA REPAIR     • KNEE SURGERY     • LAPAROSCOPIC COLON RESECTION  2005   • LAPAROTOMY OOPHERECTOMY     • NISSEN FUNDOPLICATION LAPAROSCOPIC      x2   • TONSILLECTOMY     • TONSILLECTOMY  1954?   • TOTAL KNEE ARTHROPLASTY Left    • UPPER GASTROINTESTINAL ENDOSCOPY           Current Outpatient Medications:   •  acetaminophen (TYLENOL) 325 MG tablet, Take 650 mg by mouth., Disp: , Rfl:   •  albuterol sulfate  (90 Base) MCG/ACT inhaler, Inhale 2 puffs Every 4 (Four) Hours As Needed., Disp: , Rfl:   •  Budesonide (ENTOCORT EC) 3 MG 24 hr capsule, Take 3 pills once daily., Disp: 90 capsule, Rfl: 3  •  Diclofenac Sodium (VOLTAREN) 1 % gel gel, Apply 4 g topically to the appropriate area as directed 4 (Four) Times a Day As Needed (pain)., Disp: 150 g, Rfl: 2  •  fluticasone (FLONASE) 50 MCG/ACT nasal spray, 2 sprays into the nostril(s) as directed by provider Daily., Disp: 48 g, Rfl: 3  •  hydrOXYzine (ATARAX) 10 MG tablet, Take 1 tablet by mouth Every 4 (Four) Hours As Needed for Anxiety (twitching)., Disp: 120 tablet, Rfl: 0  •  levothyroxine (SYNTHROID, LEVOTHROID) 88 MCG tablet, Take 1 tablet by mouth Daily., Disp: 90 tablet, Rfl: 1  •  ondansetron (Zofran) 4 MG tablet, Take 1 tablet by mouth Every 8 (Eight) Hours As Needed for Nausea or Vomiting., Disp: 15 tablet, Rfl: 0  •  pantoprazole (PROTONIX) 40 MG EC tablet, Take 1 tablet by mouth Daily., Disp: 30 tablet, Rfl: 5  •  promethazine (PHENERGAN) 25 MG tablet, Take 1 tablet by mouth Every 6 (Six) Hours As Needed for Nausea or Vomiting., Disp: 20 tablet, Rfl: 1  •  sertraline (Zoloft) 25 MG tablet, Take 1 tablet by mouth Daily., Disp: 30 tablet, Rfl: 1  •  tiZANidine (ZANAFLEX) 4 MG tablet, Take 1 tablet by mouth Every 8 (Eight) Hours As Needed for Muscle Spasms., Disp: 60 tablet, Rfl: 0    Allergies   Allergen Reactions   • Salicylates GI Intolerance      "History of gi bleed     • Colestipol GI Intolerance   • Biaxin [Clarithromycin]    • Adhesive Tape Rash     Can use plastic tape, paper tape causes rash   • Augmentin [Amoxicillin-Pot Clavulanate] Diarrhea   • Prevacid [Lansoprazole] Itching and Swelling     Eyes only   • Sulfa Antibiotics GI Intolerance   • Sulfamethoxazole-Trimethoprim Nausea And Vomiting and GI Intolerance     \"makes me sick as a dog\"         Family History   Problem Relation Age of Onset   • Arthritis Mother    • Depression Mother    • Hyperlipidemia Mother    • Hypertension Mother    • Irritable bowel syndrome Mother    • Hypertension Father    • Arthritis Father    • Stroke Father    • Alcohol abuse Maternal Grandfather    • Depression Maternal Grandfather    • Heart disease Other         IN FEMALES BEFORE AGE 65   • Asthma Maternal Grandmother        Social History     Socioeconomic History   • Marital status:    Tobacco Use   • Smoking status: Never   • Smokeless tobacco: Never   Vaping Use   • Vaping Use: Never used   Substance and Sexual Activity   • Alcohol use: No   • Drug use: No   • Sexual activity: Yes     Partners: Male     Birth control/protection: None       Review of Systems   Gastrointestinal: Positive for abdominal pain and diarrhea.   All other systems reviewed and are negative.    Pertinent positives and negatives documented in the HPI and all other systems reviewed and were found to be negative.  Vitals:    01/25/23 1055   BP: 108/73   Pulse: 69   Temp: 96 °F (35.6 °C)   SpO2: 95%       Physical Exam  Vitals reviewed.   Constitutional:       General: She is not in acute distress.     Appearance: Normal appearance. She is well-developed. She is obese. She is not diaphoretic.   HENT:      Head: Normocephalic and atraumatic. Hair is normal.      Right Ear: Hearing, tympanic membrane, ear canal and external ear normal. No decreased hearing noted. No drainage.      Left Ear: Hearing, tympanic membrane, ear canal and " external ear normal. No decreased hearing noted.      Nose: Nose normal. No nasal deformity.      Mouth/Throat:      Mouth: Mucous membranes are moist.   Eyes:      General: Lids are normal.         Right eye: No discharge.         Left eye: No discharge.      Extraocular Movements: Extraocular movements intact.      Conjunctiva/sclera: Conjunctivae normal.      Pupils: Pupils are equal, round, and reactive to light.   Neck:      Thyroid: No thyromegaly.      Vascular: No JVD.      Trachea: No tracheal deviation.   Cardiovascular:      Rate and Rhythm: Normal rate and regular rhythm.      Pulses: Normal pulses.      Heart sounds: Normal heart sounds. No murmur heard.    No friction rub. No gallop.   Pulmonary:      Effort: Pulmonary effort is normal. No respiratory distress.      Breath sounds: Normal breath sounds. No wheezing or rales.   Chest:      Chest wall: No tenderness.   Abdominal:      General: Bowel sounds are normal. There is no distension.      Palpations: Abdomen is soft. There is no mass.      Tenderness: There is no abdominal tenderness. There is no guarding or rebound.      Hernia: No hernia is present.   Musculoskeletal:         General: No tenderness or deformity. Normal range of motion.      Cervical back: Normal range of motion and neck supple.   Lymphadenopathy:      Cervical: No cervical adenopathy.   Skin:     General: Skin is warm and dry.      Findings: No erythema or rash.   Neurological:      Mental Status: She is alert and oriented to person, place, and time.      Cranial Nerves: No cranial nerve deficit.      Motor: No abnormal muscle tone.      Coordination: Coordination normal.      Deep Tendon Reflexes: Reflexes are normal and symmetric. Reflexes normal.   Psychiatric:         Mood and Affect: Mood normal.         Behavior: Behavior normal.         Thought Content: Thought content normal.         Judgment: Judgment normal.         Diagnoses and all orders for this visit:    1.  Diarrhea, unspecified type (Primary)    2. Other microscopic colitis    3. Left upper quadrant abdominal pain    4. History of Nissen fundoplication    5. History of colon polyps      Assessment:  1. History of colon polyps.  Her last colonoscopy was in 12 of 2022.  I had recommended a repeat colonoscopy in 5 years.  2.  LUQ abdominal pain.  3. Nausea  4.  H/o diarrhea - resolved at this time. One of two colon biopsy sights showed mild inflammation - developing microscopic colitis  5. Fatty liver.  6. GERD    Recommendations:  1. Trial of Pepto Bismol chewable tabs 2 BID  2.  SBFT - she doesn't want.  3. F/u 3 mos.     Return in about 3 months (around 4/25/2023).    Reji Aguilar MD  1/25/2023

## 2023-02-02 ENCOUNTER — PATIENT MESSAGE (OUTPATIENT)
Dept: FAMILY MEDICINE CLINIC | Facility: CLINIC | Age: 73
End: 2023-02-02
Payer: MEDICARE

## 2023-02-02 DIAGNOSIS — M25.552 LEFT HIP PAIN: Primary | ICD-10-CM

## 2023-02-02 NOTE — TELEPHONE ENCOUNTER
From: Jemima Bell  To: Meredith Lea Kehrer  Sent: 2/2/2023 10:09 AM EST  Subject: Hip pain    I am having a lot of pain in my left hip. Presently taking Tylenol and using Volterin cream. The pain is constant, unable to sleep or lay on the side. I have an appointment with you on 2/14 to address depression and new Med. Am wondering if I should have an X-ray or scan of my hip prior to them so you can more effectively treat. At best I have a constant full ache but mostly pain. I know there is arthritis there. Your thoughts? Thank you

## 2023-02-06 ENCOUNTER — HOSPITAL ENCOUNTER (OUTPATIENT)
Dept: GENERAL RADIOLOGY | Facility: HOSPITAL | Age: 73
Discharge: HOME | End: 2023-02-06
Admitting: FAMILY MEDICINE
Payer: MEDICARE

## 2023-02-06 DIAGNOSIS — M25.552 LEFT HIP PAIN: ICD-10-CM

## 2023-02-06 PROCEDURE — 73502 X-RAY EXAM HIP UNI 2-3 VIEWS: CPT

## 2023-02-14 ENCOUNTER — OFFICE VISIT (OUTPATIENT)
Dept: FAMILY MEDICINE CLINIC | Facility: CLINIC | Age: 73
End: 2023-02-14
Payer: MEDICARE

## 2023-02-14 VITALS
OXYGEN SATURATION: 99 % | WEIGHT: 181 LBS | SYSTOLIC BLOOD PRESSURE: 116 MMHG | HEIGHT: 62 IN | HEART RATE: 70 BPM | DIASTOLIC BLOOD PRESSURE: 70 MMHG | BODY MASS INDEX: 33.31 KG/M2 | TEMPERATURE: 98.9 F

## 2023-02-14 DIAGNOSIS — M54.16 LUMBAR RADICULITIS: ICD-10-CM

## 2023-02-14 DIAGNOSIS — M51.36 DEGENERATION OF LUMBAR INTERVERTEBRAL DISC: ICD-10-CM

## 2023-02-14 DIAGNOSIS — F41.8 DEPRESSION WITH ANXIETY: Primary | ICD-10-CM

## 2023-02-14 DIAGNOSIS — M25.552 LEFT HIP PAIN: ICD-10-CM

## 2023-02-14 PROCEDURE — 99213 OFFICE O/P EST LOW 20 MIN: CPT | Performed by: FAMILY MEDICINE

## 2023-02-14 RX ORDER — MELOXICAM 15 MG/1
15 TABLET ORAL DAILY
Qty: 30 TABLET | Refills: 1 | Status: SHIPPED | OUTPATIENT
Start: 2023-02-14 | End: 2023-03-14

## 2023-02-14 RX ORDER — BACLOFEN 10 MG/1
10 TABLET ORAL 3 TIMES DAILY PRN
Qty: 60 TABLET | Refills: 1 | Status: SHIPPED | OUTPATIENT
Start: 2023-02-14

## 2023-02-14 RX ORDER — TRAMADOL HYDROCHLORIDE 50 MG/1
50 TABLET ORAL EVERY 6 HOURS PRN
Qty: 20 TABLET | Refills: 0 | Status: SHIPPED | OUTPATIENT
Start: 2023-02-14 | End: 2023-03-01 | Stop reason: SDUPTHER

## 2023-02-14 NOTE — PROGRESS NOTES
"Answers for HPI/ROS submitted by the patient on 2/7/2023  Please describe your symptoms.: Follow for depression and hip pain  Have you had these symptoms before?: Yes  How long have you been having these symptoms?: Greater than 2 weeks  Please list any medications you are currently taking for this condition.: As prescribed  Please describe any probable cause for these symptoms. : Life  What is the primary reason for your visit?: Other    Chief Complaint  Med Refill, Anxiety, Depression, and Hip Pain (Had x-ray )    Subjective        Jemima Bell presents to [facility]  History of Present Illness  The patient presents today for follow-up on mood and hip pain.    Mood  The patient states her mood has improved since her last visit. She is no longer crying. She is currently taking a small dose of Zoloft. She is able to focus and handle life stressors better.  Denies SI, HI and hopelessness.    Hip pain  The patient states her hips are \"killing\" her at times. She states the left hip is worse than the right. She states it has been going on for a while now, but the severe pain has been  almost 1 month. She denies ever having back surgery. She states years ago she had a papain injection, which was beneficial. She states she is not sleeping at night. She states she has an old prescription of baclofen and it helped. She states she is out of it now. She states she does well with tramadol. She states she has taken meloxicam in the past. She is unsure if she had severe side effects on meloxicam. She states she has not taken tizanidine because it makes her legs tingle. She states that she has had a bursa removed.  Objective   Vital Signs:  /70   Pulse 70   Temp 98.9 °F (37.2 °C)   Ht 157.5 cm (62\")   Wt 82.1 kg (181 lb)   SpO2 99%   BMI 33.11 kg/m²   Estimated body mass index is 33.11 kg/m² as calculated from the following:    Height as of this encounter: 157.5 cm (62\").    " Weight as of this encounter: 82.1 kg (181 lb).             Physical Exam  Constitutional:       General: She is not in acute distress.     Appearance: Normal appearance. She is well-developed.   HENT:      Head: Normocephalic and atraumatic.      Right Ear: External ear normal.      Left Ear: External ear normal.   Eyes:      Conjunctiva/sclera: Conjunctivae normal.      Pupils: Pupils are equal, round, and reactive to light.   Neck:      Thyroid: No thyromegaly.   Pulmonary:      Effort: Pulmonary effort is normal.   Musculoskeletal:         General: Tenderness present.      Comments: Tender in the left lower back towards the left hip, full range of motion, hip strength 4 out of 5 with flexion bilaterally   Neurological:      Mental Status: She is alert and oriented to person, place, and time.      Gait: Gait normal.   Psychiatric:         Mood and Affect: Mood normal.         Behavior: Behavior normal.        Result Review :        XR Hip With or Without Pelvis 2 - 3 View Left (02/06/2023 17:43)             Assessment and Plan   Diagnoses and all orders for this visit:    1. Depression with anxiety (Primary)    2. Left hip pain    3. Degeneration of lumbar intervertebral disc  -     MRI Lumbar Spine Without Contrast; Future  -     baclofen (LIORESAL) 10 MG tablet; Take 1 tablet by mouth 3 (Three) Times a Day As Needed for Muscle Spasms.  Dispense: 60 tablet; Refill: 1  -     meloxicam (MOBIC) 15 MG tablet; Take 1 tablet by mouth Daily.  Dispense: 30 tablet; Refill: 1  -     traMADol (ULTRAM) 50 MG tablet; Take 1 tablet by mouth Every 6 (Six) Hours As Needed for Moderate Pain or Severe Pain.  Dispense: 20 tablet; Refill: 0    4. Lumbar radiculitis  -     MRI Lumbar Spine Without Contrast; Future  -     baclofen (LIORESAL) 10 MG tablet; Take 1 tablet by mouth 3 (Three) Times a Day As Needed for Muscle Spasms.  Dispense: 60 tablet; Refill: 1  -     meloxicam (MOBIC) 15 MG tablet; Take 1 tablet by mouth Daily.   Dispense: 30 tablet; Refill: 1  -     traMADol (ULTRAM) 50 MG tablet; Take 1 tablet by mouth Every 6 (Six) Hours As Needed for Moderate Pain or Severe Pain.  Dispense: 20 tablet; Refill: 0    Depression with anxiety-improved, continue sertraline  Lumbar radiculitis-we will do a few days of tramadol and get her on meloxicam.  We will switch to baclofen and stop the tizanidine.  Get an MRI of the spine and follow-up pending results  The patient will follow-up in 2 months.      Transcribed from ambient dictation for Meredith Lea Kehrer, MD by Patti Siu.  02/14/23   16:05 EST    Patient or patient representative verbalized consent to the visit recording.  I have personally performed the services described in this document as transcribed by the above individual, and it is both accurate and complete.    Follow Up   No follow-ups on file.  Patient was given instructions and counseling regarding her condition or for health maintenance advice. Please see specific information pulled into the AVS if appropriate.

## 2023-02-15 ENCOUNTER — PATIENT MESSAGE (OUTPATIENT)
Dept: FAMILY MEDICINE CLINIC | Facility: CLINIC | Age: 73
End: 2023-02-15
Payer: MEDICARE

## 2023-02-15 DIAGNOSIS — M51.36 DEGENERATION OF LUMBAR INTERVERTEBRAL DISC: Primary | ICD-10-CM

## 2023-02-16 NOTE — TELEPHONE ENCOUNTER
From: Jemima Bell  To: Meredith Lea Kehrer  Sent: 2/15/2023 2:26 PM EST  Subject: MRI     I just spoke with [facility], [place] . She informed me that because I have an older model Medtronic for bladder control I cannot have an MRI. I called [facility] to verify. I was told that more recent Medtronic’s are MRI eligible but mine is not. She suggested a Catscan instead. Juan Jer! Please advise. Thank you!  Kim

## 2023-03-01 ENCOUNTER — PATIENT MESSAGE (OUTPATIENT)
Dept: FAMILY MEDICINE CLINIC | Facility: CLINIC | Age: 73
End: 2023-03-01
Payer: MEDICARE

## 2023-03-01 ENCOUNTER — TELEPHONE (OUTPATIENT)
Dept: FAMILY MEDICINE CLINIC | Facility: CLINIC | Age: 73
End: 2023-03-01
Payer: MEDICARE

## 2023-03-01 DIAGNOSIS — M54.16 LUMBAR RADICULITIS: ICD-10-CM

## 2023-03-01 DIAGNOSIS — M51.36 DEGENERATION OF LUMBAR INTERVERTEBRAL DISC: ICD-10-CM

## 2023-03-01 DIAGNOSIS — M54.16 LUMBAR RADICULITIS: Primary | ICD-10-CM

## 2023-03-01 RX ORDER — TRAMADOL HYDROCHLORIDE 50 MG/1
50 TABLET ORAL EVERY 6 HOURS PRN
Qty: 20 TABLET | Refills: 0 | Status: SHIPPED | OUTPATIENT
Start: 2023-03-01 | End: 2023-03-14 | Stop reason: SDUPTHER

## 2023-03-01 NOTE — TELEPHONE ENCOUNTER
From: Jemima Bell  To: Nory Susannea Kehrer  Sent: 3/1/2023 11:47 AM EST  Subject: Back Meds    Concern: was unable to get Ct scan scheduled until tomorrow. I have 3 Tramadol remaining. At best, I have a constant low back pull without the meds. With the meds I get enough relief that I can lay down and if I am still, considerably lessen the pain. Left hip is still bad with agitation in the left leg. Both hips feel as though there is a band keeping them from freely walking. I realize that once the Ct scan is taken it will take time to get in to see a specialist. Please advise what I can do to get me through. I have never had this for so long a period of time. Thank you.

## 2023-03-02 ENCOUNTER — HOSPITAL ENCOUNTER (OUTPATIENT)
Dept: CT IMAGING | Facility: HOSPITAL | Age: 73
Discharge: HOME | End: 2023-03-02
Admitting: FAMILY MEDICINE
Payer: MEDICARE

## 2023-03-02 DIAGNOSIS — M51.36 DEGENERATION OF LUMBAR INTERVERTEBRAL DISC: ICD-10-CM

## 2023-03-02 DIAGNOSIS — M54.16 LUMBAR RADICULITIS: ICD-10-CM

## 2023-03-02 PROCEDURE — 72131 CT LUMBAR SPINE W/O DYE: CPT

## 2023-03-12 ENCOUNTER — HOSPITAL ENCOUNTER (EMERGENCY)
Facility: HOSPITAL | Age: 73
Discharge: HOME | End: 2023-03-12
Attending: EMERGENCY MEDICINE | Admitting: EMERGENCY MEDICINE
Payer: MEDICARE

## 2023-03-12 ENCOUNTER — TELEPHONE (OUTPATIENT)
Dept: FAMILY MEDICINE CLINIC | Facility: CLINIC | Age: 73
End: 2023-03-12
Payer: MEDICARE

## 2023-03-12 VITALS
BODY MASS INDEX: 32.02 KG/M2 | WEIGHT: 174 LBS | HEIGHT: 62 IN | HEART RATE: 48 BPM | TEMPERATURE: 96 F | RESPIRATION RATE: 16 BRPM | SYSTOLIC BLOOD PRESSURE: 137 MMHG | DIASTOLIC BLOOD PRESSURE: 69 MMHG | OXYGEN SATURATION: 94 %

## 2023-03-12 DIAGNOSIS — M54.16 LUMBAR RADICULOPATHY: ICD-10-CM

## 2023-03-12 DIAGNOSIS — M54.50 ACUTE EXACERBATION OF CHRONIC LOW BACK PAIN: Primary | ICD-10-CM

## 2023-03-12 DIAGNOSIS — G89.29 ACUTE EXACERBATION OF CHRONIC LOW BACK PAIN: Primary | ICD-10-CM

## 2023-03-12 LAB
BACTERIA UR QL AUTO: ABNORMAL /HPF
BILIRUB UR QL STRIP: NEGATIVE
CLARITY UR: CLEAR
COLOR UR: YELLOW
GLUCOSE UR STRIP-MCNC: NEGATIVE MG/DL
HGB UR QL STRIP.AUTO: NEGATIVE
HYALINE CASTS UR QL AUTO: ABNORMAL /LPF
KETONES UR QL STRIP: ABNORMAL
LEUKOCYTE ESTERASE UR QL STRIP.AUTO: ABNORMAL
NITRITE UR QL STRIP: NEGATIVE
PH UR STRIP.AUTO: 6.5 [PH] (ref 5–8)
PROT UR QL STRIP: ABNORMAL
RBC # UR STRIP: ABNORMAL /HPF
REF LAB TEST METHOD: ABNORMAL
SP GR UR STRIP: 1.02 (ref 1–1.03)
SQUAMOUS #/AREA URNS HPF: ABNORMAL /HPF
UROBILINOGEN UR QL STRIP: ABNORMAL
WBC # UR STRIP: ABNORMAL /HPF

## 2023-03-12 PROCEDURE — 81001 URINALYSIS AUTO W/SCOPE: CPT | Performed by: EMERGENCY MEDICINE

## 2023-03-12 PROCEDURE — 63710000001 PREDNISONE PER 1 MG: Performed by: EMERGENCY MEDICINE

## 2023-03-12 PROCEDURE — 25010000002 HYDROMORPHONE 1 MG/ML SOLUTION: Performed by: EMERGENCY MEDICINE

## 2023-03-12 PROCEDURE — 99283 EMERGENCY DEPT VISIT LOW MDM: CPT

## 2023-03-12 PROCEDURE — 96372 THER/PROPH/DIAG INJ SC/IM: CPT

## 2023-03-12 PROCEDURE — 63710000001 ONDANSETRON ODT 4 MG TABLET DISPERSIBLE: Performed by: EMERGENCY MEDICINE

## 2023-03-12 RX ORDER — NALOXONE HYDROCHLORIDE 4 MG/.1ML
1 SPRAY NASAL AS NEEDED
Qty: 1 EACH | Refills: 0 | Status: SHIPPED | OUTPATIENT
Start: 2023-03-12 | End: 2023-03-24 | Stop reason: ALTCHOICE

## 2023-03-12 RX ORDER — ONDANSETRON 4 MG/1
4 TABLET, ORALLY DISINTEGRATING ORAL ONCE
Status: COMPLETED | OUTPATIENT
Start: 2023-03-12 | End: 2023-03-12

## 2023-03-12 RX ORDER — HYDROCODONE BITARTRATE AND ACETAMINOPHEN 10; 325 MG/1; MG/1
0.5 TABLET ORAL EVERY 6 HOURS PRN
Qty: 6 TABLET | Refills: 0 | Status: SHIPPED | OUTPATIENT
Start: 2023-03-12 | End: 2023-03-24 | Stop reason: ALTCHOICE

## 2023-03-12 RX ORDER — PREDNISONE 20 MG/1
60 TABLET ORAL ONCE
Status: COMPLETED | OUTPATIENT
Start: 2023-03-12 | End: 2023-03-12

## 2023-03-12 RX ORDER — METHYLPREDNISOLONE 4 MG/1
TABLET ORAL
Qty: 21 EACH | Refills: 0 | Status: SHIPPED | OUTPATIENT
Start: 2023-03-12 | End: 2023-03-24

## 2023-03-12 RX ADMIN — PREDNISONE 60 MG: 20 TABLET ORAL at 11:59

## 2023-03-12 RX ADMIN — ONDANSETRON 4 MG: 4 TABLET, ORALLY DISINTEGRATING ORAL at 14:14

## 2023-03-12 RX ADMIN — HYDROMORPHONE HYDROCHLORIDE 1 MG: 1 INJECTION, SOLUTION INTRAMUSCULAR; INTRAVENOUS; SUBCUTANEOUS at 12:01

## 2023-03-12 RX ADMIN — ONDANSETRON 4 MG: 4 TABLET, ORALLY DISINTEGRATING ORAL at 11:58

## 2023-03-12 NOTE — ED PROVIDER NOTES
EMERGENCY DEPARTMENT ENCOUNTER    Room Number:  17/17  Date seen:  3/12/2023  PCP: Kehrer, Meredith Lea, MD  Historian: Patient, spouse      HPI:  Chief Complaint: Low back pain  A complete HPI/ROS/PMH/PSH/SH/FH are unobtainable due to: Nothing  Context: Jemima Bell is a 73 y.o. female, with a history of chronic low back pain, who presents to the ED by private vehicle from home c/o worsening low back pain for the past 1 month.  She denies recent injury.  Pain is located in the mid lower back and radiates into the left hip and left thigh.  Pain is worse with movement and bending.  She saw her PCP last month and was started on baclofen and tramadol.  She has taken these with minimal relief.  She had a CT of the lumbar spine done 10 days ago.  This showed multilevel degenerative changes.  Patient says her PCP has referred her for an injection in her lower back.  Patient denies prior back surgery.  Denies fever, chills, loss of bowel/bladder control, saddle anesthesia, or numbness/tingling/weakness in her legs.  She does report increased urinary frequency but denies dysuria or hematuria.  She called the on-call doctor today and was advised to come to the ED for further evaluation.            PAST MEDICAL HISTORY  Active Ambulatory Problems     Diagnosis Date Noted   • Wheezing 09/16/2019   • Osteoarthritis 09/16/2019   • Acute bronchitis 09/16/2019   • Dyspnea 10/07/2019   • Hypothyroidism 10/07/2019   • Shortness of breath 11/19/2019   • Vitamin D deficiency 01/07/2020   • Depression with anxiety 01/07/2020   • Muscle cramp 01/07/2020   • Back pain 01/17/2020   • Vitamin B12 deficiency 05/04/2020   • UTI (urinary tract infection) 10/30/2020   • Abnormal EKG 02/17/2021   • Precordial pain 02/17/2021   • Anxiety 02/17/2021   • Borderline systolic HTN 02/17/2021   • Left upper quadrant abdominal pain 10/25/2022   • Diarrhea 10/25/2022   • Gastroesophageal reflux disease 10/25/2022   • History of Nissen  fundoplication 10/25/2022   • Incontinence of feces with fecal urgency 10/25/2022     Resolved Ambulatory Problems     Diagnosis Date Noted   • Morbid obesity with BMI of 40.0-44.9, adult (Formerly [facility]) 02/17/2021     Past Medical History:   Diagnosis Date   • Acute sinusitis    • Allergic 11/2022   • Allergic rhinitis    • Anemia Childhood   • Arthritis    • Asthma    • BMI 34.0-34.9,adult    • Cervicalgia    • Chills    • Cholelithiasis 2004?   • Colon polyp 12/2022   • Deep vein thrombosis (DVT) of lower extremity ([facility])    • Depression    • Dermatitis    • Dysuria    • Esophageal reflux    • Fatigue    • Gastric ulcer    • GERD (gastroesophageal reflux disease)    • GI (gastrointestinal bleed) 2004?   • Headache    • Heart murmur 1973   • Hernia    • Irritable bowel syndrome    • Lumbago    • Migraine    • Nausea    • Neuritis    • Osteoarthritis of knee    • Plantar fasciitis    • Pneumonia 89y   • Pulled muscle    • Pulmonary embolism ([facility])    • Pyelonephritis    • Rheumatic fever    • Sore throat    • Torticollis    • Trapezius strain    • Urinary incontinence    • Urinary pain    • Urinary tract infection    • UTI symptoms    • Vitamin B1 deficiency    • Weakness          PAST SURGICAL HISTORY  Past Surgical History:   Procedure Laterality Date   • ABDOMINAL SURGERY     • APPENDECTOMY     • CATARACT EXTRACTION     • CATARACT EXTRACTION      bilateral eyes   • CATARACT EXTRACTION     • CHOLECYSTECTOMY  2004?   • COLONOSCOPY     • COLONOSCOPY N/A 12/23/2022    Procedure: COLONOSCOPY to cecum and TI:  with biopsies, cold snare polyp,;  Surgeon: Reji Aguilar MD;  Location: [facility] ENDOSCOPY;  Service: Gastroenterology;  Laterality: N/A;  PREOP/ HX COLON POLYPS  POSTOP/  diverticulosis, polyp, hemorrhoids   • ENDOSCOPY N/A 12/23/2022    Procedure: ESOPHAGOGASTRODUODENOSCOPY with biopsies;  Surgeon: Reji Aguilar MD;  Location: [facility] ENDOSCOPY;  Service: Gastroenterology;  Laterality: N/A;  PREOP/ HX GASTRIC ULCER,  DYSPEPSIA, UPPER ABDOMINAL PAIN  POSTOP/:  HH, gastritis, gastric polyps   • GALLBLADDER SURGERY     • HEMORRHOIDECTOMY  1974 & 77?   • HERNIA REPAIR  2008?   • HYSTERECTOMY     • INGUINAL HERNIA REPAIR     • KNEE SURGERY     • LAPAROSCOPIC COLON RESECTION  2005   • LAPAROTOMY OOPHERECTOMY     • NISSEN FUNDOPLICATION LAPAROSCOPIC      x2   • TONSILLECTOMY     • TONSILLECTOMY  1954?   • TOTAL KNEE ARTHROPLASTY Left    • UPPER GASTROINTESTINAL ENDOSCOPY           FAMILY HISTORY  Family History   Problem Relation Age of Onset   • Arthritis Mother    • Depression Mother    • Hyperlipidemia Mother    • Hypertension Mother    • Irritable bowel syndrome Mother    • Hypertension Father    • Arthritis Father    • Stroke Father    • Alcohol abuse Maternal Grandfather    • Depression Maternal Grandfather    • Heart disease Other         IN FEMALES BEFORE AGE 65   • Asthma Maternal Grandmother          SOCIAL HISTORY  Social History     Socioeconomic History   • Marital status:    Tobacco Use   • Smoking status: Never   • Smokeless tobacco: Never   Vaping Use   • Vaping Use: Never used   Substance and Sexual Activity   • Alcohol use: No   • Drug use: No   • Sexual activity: Yes     Partners: Male     Birth control/protection: None         ALLERGIES  Salicylates, Colestipol, Biaxin [clarithromycin], Adhesive tape, Augmentin [amoxicillin-pot clavulanate], Prevacid [lansoprazole], Sulfa antibiotics, and Sulfamethoxazole-trimethoprim        REVIEW OF SYSTEMS  Review of Systems     All systems have been reviewed and are negative except as as discussed in the HPI    PHYSICAL EXAM  ED Triage Vitals [03/12/23 1120]   Temp Heart Rate Resp BP SpO2   96 °F (35.6 °C) 83 22 -- 98 %      Temp src Heart Rate Source Patient Position BP Location FiO2 (%)   Tympanic -- -- -- --       Physical Exam      GENERAL: Awake, alert, oriented x3.  Well-developed, well-nourished elderly female.  Appears uncomfortable when moving  HENT: NCAT, nares  patent  EYES: no scleral icterus  CV: regular rhythm, normal rate  RESPIRATORY: normal effort, clear to auscultation bilaterally  ABDOMEN: soft, nontender  MUSCULOSKELETAL: There is tenderness over the lower lumbar spine and left SI joint.  Extremities are nontender with full range of motion  NEURO: There is normal equal strength bilateral hip flexion/extension, knee flexion/extension, ankle dorsiflexion/plantarflexion, and extension of the great toes.  There is normal light touch sensation both lower extremities.  No saddle anesthesia.  Straight leg raise is positive on the left at 45 degrees.  2+ DTRs bilaterally  PSYCH:  calm, cooperative  SKIN: warm, dry    Vital signs and nursing notes reviewed.          LAB RESULTS  Recent Results (from the past 24 hour(s))   Urinalysis With Microscopic If Indicated (No Culture) - Urine, Clean Catch    Collection Time: 03/12/23  2:01 PM    Specimen: Urine, Clean Catch   Result Value Ref Range    Color, UA Yellow Yellow, Straw    Appearance, UA Clear Clear    pH, UA 6.5 5.0 - 8.0    Specific Gravity, UA 1.022 1.005 - 1.030    Glucose, UA Negative Negative    Ketones, UA Trace (A) Negative    Bilirubin, UA Negative Negative    Blood, UA Negative Negative    Protein, UA Trace (A) Negative    Leuk Esterase, UA Moderate (2+) (A) Negative    Nitrite, UA Negative Negative    Urobilinogen, UA 1.0 E.U./dL 0.2 - 1.0 E.U./dL   Urinalysis, Microscopic Only - Urine, Clean Catch    Collection Time: 03/12/23  2:01 PM    Specimen: Urine, Clean Catch   Result Value Ref Range    RBC, UA 0-2 None Seen, 0-2 /HPF    WBC, UA 13-20 (A) None Seen, 0-2 /HPF    Bacteria, UA None Seen None Seen /HPF    Squamous Epithelial Cells, UA 7-12 (A) None Seen, 0-2 /HPF    Hyaline Casts, UA 7-12 None Seen /LPF    Methodology Automated Microscopy        Ordered the above labs and reviewed the results.        RADIOLOGY  No Radiology Exams Resulted Within Past 24 Hours    Ordered the above noted radiological  studies. Reviewed by me in PACS.            PROCEDURES  Procedures              MEDICATIONS GIVEN IN ER  Medications   ondansetron ODT (ZOFRAN-ODT) disintegrating tablet 4 mg (4 mg Oral Given 3/12/23 1158)   HYDROmorphone (DILAUDID) injection 1 mg (1 mg Intramuscular Given 3/12/23 1201)   predniSONE (DELTASONE) tablet 60 mg (60 mg Oral Given 3/12/23 1159)   ondansetron ODT (ZOFRAN-ODT) disintegrating tablet 4 mg (4 mg Oral Given 3/12/23 1414)                   MEDICAL DECISION MAKING, PROGRESS, and CONSULTS    All labs have been independently reviewed by me.  All radiology studies have been reviewed by me and I have also reviewed the radiology report.   EKG's independently viewed and interpreted by me.  Discussion below represents my analysis of pertinent findings related to patient's condition, differential diagnosis, treatment plan and final disposition.      Additional sources:  - Discussed/ obtained information from independent historians:  at bedside    - External (non-ED) record review: Patient saw her PCP on 2/14/2023 for lumbar radiculitis.  She was given prescriptions for baclofen and Mobic.  MRI of the lumbar spine was ordered.  However, because the patient has a bladder stimulator she cannot have an MRI.  CT of the lumbar spine was then ordered instead.  CT was then performed on 3/2/2023.  CT showed mild to moderate bilateral foraminal narrowing at L4/5 and left L5/S1.  There were disc osteophyte complexes from L1/L2 to L4/5 with mild degree of canal narrowing.  There were advanced degenerative disc changes at multilevels.  Patient called the on-call physician today to report worsening pain.  She was told she could increase her baclofen to every 4 hours and could take 2 extra strength Tylenol up to 4 times a day.    - Chronic or social conditions impacting care: N/A          Orders placed during this visit:  Orders Placed This Encounter   Procedures   • Urinalysis With Microscopic If Indicated (No  Culture) - Urine, Clean Catch   • Urinalysis, Microscopic Only - Urine, Clean Catch         Additional orders considered but not ordered:  N/A        Differential diagnosis:    Lumbar radiculopathy, lumbar strain, sciatica, cauda equina syndrome, degenerative disc disease      Independent interpretation of labs, radiology studies, and discussions with consultants:  ED Course as of 03/12/23 1818   Sun Mar 12, 2023   1154 Patient presents to the ED complaining of worsening low back pain.  Pain radiates into the left thigh.  Symptoms are suggestive of radiculopathy.  Recent CT scan showed moderate foraminal narrowing bilaterally at L4/5 and on the left at L5/S1.  This is consistent with the patient's symptoms.  Plan is for symptomatic treatment with IM Dilaudid and oral prednisone. [WH]   1419 WBC, UA(!): 13-20 [WH]   1419 Bacteria, UA: None Seen [WH]   1419 Leukocytes, UA(!): Moderate (2+) [WH]   1419 Nitrite, UA: Negative [WH]   1419 Squamous Epithelial Cells, UA(!): 7-12 [WH]   1419 Urine does not appear infected [WH]   1425 Patient is feeling better.  She has been able to ambulate to the bathroom.  Patient has been taking Ultram at home with minimal relief.  She does not have any red flag signs/symptoms.  I will give her prescriptions for hydrocodone and a Medrol Dosepak.  She was advised follow-up with her PCP.  Return precautions were discussed. [WH]   1458 SANJEEV query complete. Treatment plan to include limited course of prescribed  controlled substance. Risks including addiction, benefits, and alternatives presented to patient.    [WH]      ED Course User Index  [WH] Georgi Zaldivar MD               DIAGNOSIS  Final diagnoses:   Acute exacerbation of chronic low back pain   Lumbar radiculopathy         DISPOSITION  DISCHARGE    Patient discharged in stable condition.    Reviewed implications of results, diagnosis, meds, responsibility to follow up, warning signs and symptoms of possible worsening,  potential complications and reasons to return to ER, including worsening pain, loss of bowel/bladder control, saddle anesthesia, numbness/tingling/weakness in legs, or other concern.    Patient/Family voiced understanding of above instructions.    Discussed plan for discharge, as there is no emergent indication for admission. Patient referred to primary care provider for BP management due to today's BP. Pt/family is agreeable and understands need for follow up and repeat testing.  Pt is aware that discharge does not mean that nothing is wrong but it indicates no emergency is present that requires admission and they must continue care with follow-up as given below or physician of their choice.     FOLLOW-UP  Kehrer, Meredith Lea, MD  [address]  [place] [id number] [phone]    Schedule an appointment as soon as possible for a visit            Medication List      New Prescriptions    HYDROcodone-acetaminophen  MG per tablet  Commonly known as: NORCO  Take 0.5 tablets by mouth Every 6 (Six) Hours As Needed for Moderate Pain.     methylPREDNISolone 4 MG dose pack  Commonly known as: MEDROL  Take as directed on package instructions.     naloxone 4 MG/0.1ML nasal spray  Commonly known as: NARCAN  1 spray into the nostril(s) as directed by provider As Needed (Opioid overdose).           Where to Get Your Medications      These medications were sent to [facility] [id number] - [place], [place] - [address] AT [facility] - [phone]  - [phone]   [address] [facility], [facility] [address]    Phone: [phone]   · HYDROcodone-acetaminophen  MG per tablet  · methylPREDNISolone 4 MG dose pack  · naloxone 4 MG/0.1ML nasal spray                   Latest Documented Vital Signs:  As of 18:18 EDT  BP- 137/69 HR- (!) 48 Temp- 96 °F (35.6 °C) (Tympanic) O2 sat- 94%      SANJEEV reviewed by Georgi Zaldivar MD       --    Please note that portions of this  were completed with a voice recognition program.       Note Disclaimer: At [facility], we believe that sharing information builds trust and better relationships. You are receiving this note because you are receiving care at [facility] or recently visited. It is possible you will see health information before a provider has talked with you about it. This kind of information can be easy to misunderstand. To help you fully understand what it means for your health, we urge you to discuss this note with your provider.           Georgi Zaldivar MD  03/12/23 1469

## 2023-03-12 NOTE — ED NOTES
Pt ambulatory to triage with lower back pain that started over 2 weeks. Pt saw her primary doctor who started her on tramadol and baclofen 2 weeks ago. Pt states she has had no relief since then.     This RN in appropriate PPE while in pt room. Pt wearing mask

## 2023-03-12 NOTE — TELEPHONE ENCOUNTER
Patient called the [facility] urgent call line with severe, severe back pain.  She reports not being able to sleep for 48 hours.  She has been taking baclofen, 10 mg, 4 times a day and also tramadol, 50 mg without relief.  She was not able to tolerate the Mobic due to heartburn.  She reports having difficulty with Aleve and ibuprofen as well due to heartburn.  She reports Dr. Kehrer has her scheduled for a back injection.  She says she is hesitant to go to the ER because she thought that might delay her injection.    She denies any new injuries, just having pain.  No issues with urinary or bowel incontinence.  No new symptoms other than worsening pain.  I told patient her best option at this point would likely be the ER because they could give her injection for anti-inflammatory as well as further assess for acute, worsening condition.  I also told her she could continue to take the baclofen and tramadol, and she could increase the baclofen up to 6 doses a day.  Also told her she could take 2 extra strength Tylenol up to 4 doses a day as long as she is not getting Tylenol from other sources.  Also recommended supportive measures including heat/ice.  Patient was agreeable to options provided.

## 2023-03-12 NOTE — DISCHARGE INSTRUCTIONS
Take medications as prescribed.  Do not take Ultram/tramadol while taking hydrocodone.  Follow-up with your primary care doctor soon as possible.  Return to the emergency department for worsening pain, leg weakness, loss of bowel/bladder control, numbness in your pelvic area/groin, or other concern.

## 2023-03-13 DIAGNOSIS — M54.16 LUMBAR RADICULITIS: Primary | ICD-10-CM

## 2023-03-13 DIAGNOSIS — M51.36 DEGENERATION OF LUMBAR INTERVERTEBRAL DISC: ICD-10-CM

## 2023-03-14 ENCOUNTER — OFFICE VISIT (OUTPATIENT)
Dept: FAMILY MEDICINE CLINIC | Facility: CLINIC | Age: 73
End: 2023-03-14
Payer: MEDICARE

## 2023-03-14 VITALS
WEIGHT: 176.4 LBS | OXYGEN SATURATION: 97 % | TEMPERATURE: 98.4 F | HEIGHT: 62 IN | DIASTOLIC BLOOD PRESSURE: 68 MMHG | HEART RATE: 74 BPM | BODY MASS INDEX: 32.46 KG/M2 | SYSTOLIC BLOOD PRESSURE: 142 MMHG

## 2023-03-14 DIAGNOSIS — M54.16 LUMBAR RADICULITIS: ICD-10-CM

## 2023-03-14 DIAGNOSIS — M51.36 DEGENERATION OF LUMBAR INTERVERTEBRAL DISC: ICD-10-CM

## 2023-03-14 PROCEDURE — 99213 OFFICE O/P EST LOW 20 MIN: CPT | Performed by: FAMILY MEDICINE

## 2023-03-14 RX ORDER — TRAMADOL HYDROCHLORIDE 50 MG/1
50-100 TABLET ORAL EVERY 6 HOURS PRN
Qty: 60 TABLET | Refills: 0 | Status: SHIPPED | OUTPATIENT
Start: 2023-03-14

## 2023-03-14 NOTE — PROGRESS NOTES
"Chief Complaint  er follow up  and Back Pain    Subjective        Jemima Bell presents to [facility] PRIMARY [facility]  History of Present Illness  Patient felt so bad over the weekend that she ended up going to the ER with her back pain.  They gave her some hydrocodone which she has not wanted to take because it makes her feel bad.  She did have some Dilaudid at the ER.  The tramadol was not cutting the pain this weekend but she would like to keep taking that because she can tolerate it.  We got her CT scan back while I was on vacation and I set up pain management referral.  She cannot have an MRI because of an internal device from [employer] for bladder that is old and not compatible.  She has not heard back yet.  She denies any foot drop, or new loss of control of bowel or bladder.        Objective   Vital Signs:  /68   Pulse 74   Temp 98.4 °F (36.9 °C)   Ht 157.5 cm (62\")   Wt 80 kg (176 lb 6.4 oz)   SpO2 97%   BMI 32.26 kg/m²   Estimated body mass index is 32.26 kg/m² as calculated from the following:    Height as of this encounter: 157.5 cm (62\").    Weight as of this encounter: 80 kg (176 lb 6.4 oz).             Physical Exam  Constitutional:       General: She is not in acute distress.     Appearance: Normal appearance. She is well-developed.   HENT:      Head: Normocephalic and atraumatic.      Right Ear: External ear normal.      Left Ear: External ear normal.   Eyes:      Conjunctiva/sclera: Conjunctivae normal.      Pupils: Pupils are equal, round, and reactive to light.   Neck:      Thyroid: No thyromegaly.   Pulmonary:      Effort: Pulmonary effort is normal.   Musculoskeletal:         General: Tenderness present.      Comments: Tender through low back, pain with range of motion   Neurological:      Mental Status: She is alert and oriented to person, place, and time.      Sensory: No sensory deficit.      Motor: No weakness.      Coordination: Coordination normal.     "  Gait: Gait normal.      Deep Tendon Reflexes: Reflexes normal.   Psychiatric:         Mood and Affect: Mood normal.         Behavior: Behavior normal.        Result Review :          ED Provider Notes by Georgi Zaldivar MD (03/12/2023 11:41)  CT Lumbar Spine Without Contrast (03/02/2023 13:25)           Assessment and Plan   Diagnoses and all orders for this visit:    1. Degeneration of lumbar intervertebral disc  -     traMADol (ULTRAM) 50 MG tablet; Take 1-2 tablets by mouth Every 6 (Six) Hours As Needed for Moderate Pain or Severe Pain.  Dispense: 60 tablet; Refill: 0    2. Lumbar radiculitis  -     traMADol (ULTRAM) 50 MG tablet; Take 1-2 tablets by mouth Every 6 (Six) Hours As Needed for Moderate Pain or Severe Pain.  Dispense: 60 tablet; Refill: 0      Given some tramadol until referral.  Patient reassured.       Follow Up   No follow-ups on file.  Patient was given instructions and counseling regarding her condition or for health maintenance advice. Please see specific information pulled into the AVS if appropriate.

## 2023-03-16 DIAGNOSIS — F41.8 DEPRESSION WITH ANXIETY: ICD-10-CM

## 2023-03-16 RX ORDER — SERTRALINE HYDROCHLORIDE 25 MG/1
TABLET, FILM COATED ORAL
Qty: 30 TABLET | Refills: 1 | Status: SHIPPED | OUTPATIENT
Start: 2023-03-16

## 2023-03-24 ENCOUNTER — OFFICE VISIT (OUTPATIENT)
Dept: PAIN MEDICINE | Facility: CLINIC | Age: 73
End: 2023-03-24
Payer: MEDICARE

## 2023-03-24 ENCOUNTER — TRANSCRIBE ORDERS (OUTPATIENT)
Dept: SURGERY | Facility: SURGERY CENTER | Age: 73
End: 2023-03-24
Payer: MEDICARE

## 2023-03-24 ENCOUNTER — PREP FOR SURGERY (OUTPATIENT)
Dept: SURGERY | Facility: SURGERY CENTER | Age: 73
End: 2023-03-24
Payer: MEDICARE

## 2023-03-24 VITALS
HEART RATE: 73 BPM | SYSTOLIC BLOOD PRESSURE: 96 MMHG | OXYGEN SATURATION: 95 % | BODY MASS INDEX: 32.61 KG/M2 | WEIGHT: 177.2 LBS | DIASTOLIC BLOOD PRESSURE: 67 MMHG | TEMPERATURE: 97.1 F | HEIGHT: 62 IN | RESPIRATION RATE: 20 BRPM

## 2023-03-24 DIAGNOSIS — M54.16 LUMBAR RADICULOPATHY: Primary | ICD-10-CM

## 2023-03-24 DIAGNOSIS — M47.816 LUMBAR FACET ARTHROPATHY: ICD-10-CM

## 2023-03-24 DIAGNOSIS — M48.061 FORAMINAL STENOSIS OF LUMBAR REGION: Primary | ICD-10-CM

## 2023-03-24 DIAGNOSIS — M51.36 LUMBAR DEGENERATIVE DISC DISEASE: ICD-10-CM

## 2023-03-24 DIAGNOSIS — M48.061 FORAMINAL STENOSIS OF LUMBAR REGION: ICD-10-CM

## 2023-03-24 DIAGNOSIS — M54.16 LUMBAR RADICULOPATHY: ICD-10-CM

## 2023-03-24 DIAGNOSIS — Z41.9 SURGERY, ELECTIVE: Primary | ICD-10-CM

## 2023-03-24 PROBLEM — M51.369 LUMBAR DEGENERATIVE DISC DISEASE: Status: ACTIVE | Noted: 2023-03-24

## 2023-03-24 PROCEDURE — 1160F RVW MEDS BY RX/DR IN RCRD: CPT | Performed by: NURSE PRACTITIONER

## 2023-03-24 PROCEDURE — 99214 OFFICE O/P EST MOD 30 MIN: CPT | Performed by: NURSE PRACTITIONER

## 2023-03-24 PROCEDURE — 1159F MED LIST DOCD IN RCRD: CPT | Performed by: NURSE PRACTITIONER

## 2023-03-24 PROCEDURE — 1125F AMNT PAIN NOTED PAIN PRSNT: CPT | Performed by: NURSE PRACTITIONER

## 2023-03-24 RX ORDER — DIAZEPAM 5 MG/1
5 TABLET ORAL ONCE
Status: CANCELLED | OUTPATIENT
Start: 2023-03-24

## 2023-03-24 NOTE — PROGRESS NOTES
CHIEF COMPLAINT  New patient ref by Kehrer, Meredith Lea, MD.M54.16 (ICD-10-CM) - Lumbar radiculitis,M51.36 (ICD-10-CM) - Degeneration of lumbar intervertebral disc.  Patient in office to evaluate lumbar pain onset 6 weeks ago.     Subjective   Jemima Bell is a 73 y.o. female.   She presents to the office for evaluation of back pain. She was referred here by Dr. Kehrer.     She states that she has had back pain for years, her pain increased significantly 6 weeks ago without inciting event.     Today her pain is 6/10VAS in severity. She describes her pain as midline low back pain that is aching, burning, sharp, shooting, and throbbing pain that is continuous. Her pain does fluctuate some in severity. Her pain is radiating into the lateral aspect of her left thigh stopping above the knee, she describes this as aching and throbbing pain. Her pain is worsened by walking, standing. She tried to avoid bending, twisting, and lifting. Her pain is improved by use of heat and rest. Ice is helpful to numb the pain.     Not a candidate for NSAIDs d/t GI upset    MDP temporarily helpful    Past pain medications: Norco s/p knee replacement. She was on this for 6 weeks, she had trouble with weaning this and does not want to experience this again.      Current pain medications: Tramadol 50 mg 1-2 tablets every 6 hours (prescribed by Dr. Kehrer). This is helpful without side effect. Her goal is to avoid being on this medication long term.      Past therapies:  Physical Therapy: Yes, previously helpful  Chiropractor: None  Massage Therapy: None  TENS: States that she has one, has not used this.   Neck or back surgery: None  Past pain management: None     Previous Injections: Chymopapain injection in [place]. This did help her pain at the time. This treatment has now been taken off of the market.     Back Pain  This is a chronic problem. The current episode started more than 1 year ago (acutely worsened in the last 6  weeks). The problem occurs constantly. The problem has been gradually worsening since onset. The pain is present in the lumbar spine. The quality of the pain is described as aching, burning, cramping, shooting and stabbing. The pain radiates to the left thigh. The pain is at a severity of 6/10. The pain is the same all the time. The symptoms are aggravated by standing (walking). Associated symptoms include weakness. Pertinent negatives include no abdominal pain, chest pain, dysuria, fever or numbness. Risk factors include menopause. She has tried heat, ice and analgesics (PT previously) for the symptoms.      PEG Assessment   What number best describes your pain on average in the past week?8  What number best describes how, during the past week, pain has interfered with your enjoyment of life?8  What number best describes how, during the past week, pain has interfered with your general activity?  8      Current Outpatient Medications:   •  acetaminophen (TYLENOL) 325 MG tablet, Take 2 tablets by mouth., Disp: , Rfl:   •  albuterol sulfate  (90 Base) MCG/ACT inhaler, Inhale 2 puffs Every 4 (Four) Hours As Needed., Disp: , Rfl:   •  baclofen (LIORESAL) 10 MG tablet, Take 1 tablet by mouth 3 (Three) Times a Day As Needed for Muscle Spasms., Disp: 60 tablet, Rfl: 1  •  Budesonide (ENTOCORT EC) 3 MG 24 hr capsule, Take 3 pills once daily., Disp: 90 capsule, Rfl: 3  •  Diclofenac Sodium (VOLTAREN) 1 % gel gel, Apply 4 g topically to the appropriate area as directed 4 (Four) Times a Day As Needed (pain)., Disp: 150 g, Rfl: 2  •  fluticasone (FLONASE) 50 MCG/ACT nasal spray, 2 sprays into the nostril(s) as directed by provider Daily., Disp: 48 g, Rfl: 3  •  hydrOXYzine (ATARAX) 10 MG tablet, Take 1 tablet by mouth Every 4 (Four) Hours As Needed for Anxiety (twitching)., Disp: 120 tablet, Rfl: 0  •  levothyroxine (SYNTHROID, LEVOTHROID) 88 MCG tablet, Take 1 tablet by mouth Daily., Disp: 90 tablet, Rfl: 1  •   methylPREDNISolone (MEDROL) 4 MG dose pack, Take as directed on package instructions., Disp: 21 each, Rfl: 0  •  ondansetron (Zofran) 4 MG tablet, Take 1 tablet by mouth Every 8 (Eight) Hours As Needed for Nausea or Vomiting., Disp: 15 tablet, Rfl: 0  •  pantoprazole (PROTONIX) 40 MG EC tablet, Take 1 tablet by mouth Daily., Disp: 30 tablet, Rfl: 5  •  promethazine (PHENERGAN) 25 MG tablet, Take 1 tablet by mouth Every 6 (Six) Hours As Needed for Nausea or Vomiting., Disp: 20 tablet, Rfl: 1  •  sertraline (ZOLOFT) 25 MG tablet, TAKE ONE TABLET BY MOUTH DAILY, Disp: 30 tablet, Rfl: 1  •  traMADol (ULTRAM) 50 MG tablet, Take 1-2 tablets by mouth Every 6 (Six) Hours As Needed for Moderate Pain or Severe Pain., Disp: 60 tablet, Rfl: 0  •  HYDROcodone-acetaminophen (NORCO)  MG per tablet, Take 0.5 tablets by mouth Every 6 (Six) Hours As Needed for Moderate Pain. (Patient not taking: Reported on 3/24/2023), Disp: 6 tablet, Rfl: 0  •  naloxone (NARCAN) 4 MG/0.1ML nasal spray, 1 spray into the nostril(s) as directed by provider As Needed (Opioid overdose)., Disp: 1 each, Rfl: 0    The following portions of the patient's history were reviewed and updated as appropriate: allergies, current medications, past family history, past medical history, past social history, past surgical history and problem list.    REVIEW OF PERTINENT MEDICAL DATA    CT SCAN OF THE LUMBAR SPINE WITHOUT CONTRAST     CLINICAL HISTORY: Lumbar radiculopathy. Low back pain radiating into  right hip and into left lower extremity.     TECHNIQUE: CT scan of the lumbar spine was obtained with 3 mm axial bone  and soft tissue algorithm images. Sagittal and coronal reconstructed  images were obtained.     FINDINGS:     At T11-12 and T12-L1, there is no significant canal or foraminal  stenosis.     At L1-2, a disc osteophyte complex minimally indents the ventral  subarachnoid space. There is a minimal degree of left foraminal  narrowing secondary to a disc  osteophyte complex. Prominent degenerative  disc changes are seen at the L1-2 level.     At L2-3, a disc osteophyte complex and a small left central disc  protrusion mildly indent the ventral subarachnoid space. There is no  significant degree of foraminal compromise.     At L3-4, there are advanced degenerative disc changes with vacuum disc  phenomena. A disc osteophyte complex results in a mild degree of canal  and foraminal narrowing.     At L4-5, there are advanced degenerative disc changes. Vacuum disc  phenomena are identified. Degenerative endplate sclerotic changes are  most prominently seen along the right side of the L4-5 disc space. A  disc osteophyte complex and loss of foraminal height result in a  mild-to-moderate degree of bilateral foraminal narrowing. A disc  osteophyte complex mildly indents the ventral subarachnoid space.     At L5-S1, there is mild left foraminal narrowing secondary to a disc  osteophyte complex. There is mild left and moderate right facet  hypertrophic change. There is a mild-to-moderate degree of left and a  mild degree of right foraminal narrowing secondary to a combination of a  disc osteophyte complex and facet hypertrophy on the left and primarily  facet hypertrophy on the right.     There is a curvilinear radiopacity at the level of the GE junction that  is seen at the site of the patient's known fundoplasty with mesh. Please  correlate as to whether this curvilinear opacity is representative of  the mesh or whether this represents a retained radiopaque foreign body.  Of note, the findings are unchanged in appearance since prior  radiographic examinations dating back to 11/01/2019 and there is no  evidence to suggest an inflammatory process at this site.     IMPRESSION:     There is a mild-to-moderate degree of bilateral L4-5 and left L5-S1  foraminal narrowing. These are the levels of the most prominent degrees  of foraminal stenosis.     Disc osteophyte complexes are  noted from L1-2 down to L4-5 that result  in only up to mild degrees of canal narrowing.     There are advanced degenerative disc changes seen at the L4-5 level and  to a lesser extent prominent degenerative disc changes are noted at L1-2  and L3-4.     There is a curvilinear opacity at the level of the GE junction that is  seen at the site of the patient's known fundoplasty with mesh. Please  correlate as to whether this curvilinear opacity is representative of  the mesh or whether this represents a retained radiopaque foreign body.  Of note, the findings are unchanged in appearance since the prior  radiographic examinations dating back to 11/01/2019 and there is no  convincing evidence to suggest an inflammatory process at this site.     These findings and recommendations were discussed with Meredith Kehrer  on 03/02/2023 at approximately 11:10 AM.     Radiation dose reduction techniques were utilized, including automated  exposure control and exposure modulation based on body size.     This report was finalized on 3/3/2023 4:18 PM by Dr. Eddy Bates M.D.    Review of Systems   Constitutional: Positive for fatigue. Negative for activity change and fever.   HENT: Negative for congestion.    Eyes: Negative for visual disturbance.   Respiratory: Negative for cough and chest tightness.    Cardiovascular: Negative for chest pain.   Gastrointestinal: Positive for diarrhea. Negative for abdominal pain and constipation.   Genitourinary: Negative for difficulty urinating and dysuria.   Musculoskeletal: Positive for back pain.   Neurological: Positive for weakness. Negative for dizziness, light-headedness and numbness.   Psychiatric/Behavioral: Positive for sleep disturbance. Negative for agitation and suicidal ideas. The patient is not nervous/anxious.      Vitals:    03/24/23 0955   BP: 96/67   BP Location: Left arm   Patient Position: Sitting   Cuff Size: Large Adult   Pulse: 73   Resp: 20   Temp: 97.1 °F (36.2 °C)  "  TempSrc: Temporal   SpO2: 95%   Weight: 80.4 kg (177 lb 3.2 oz)   Height: 157.5 cm (62\")   PainSc:   6     Objective   Physical Exam  Vitals and nursing note reviewed.   Constitutional:       Appearance: Normal appearance. She is well-developed.   Eyes:      General: Lids are normal.   Cardiovascular:      Rate and Rhythm: Normal rate.   Pulmonary:      Effort: Pulmonary effort is normal.   Musculoskeletal:      Lumbar back: Tenderness present. No bony tenderness. Decreased range of motion. Negative right straight leg raise test and negative left straight leg raise test.      Comments:   Equivocal lumbar facet loading  - Justice SI Compression  - Justice Kip  - Justice Gaenslen's  - Justice Thigh Thrust   Neurological:      Mental Status: She is alert and oriented to person, place, and time.      Motor: No weakness.   Psychiatric:         Attention and Perception: Attention normal.         Mood and Affect: Mood normal.         Speech: Speech normal.         Behavior: Behavior normal.         Judgment: Judgment normal.       Assessment & Plan   Diagnoses and all orders for this visit:    1. Foraminal stenosis of lumbar region (Primary)    2. Lumbar radiculopathy    3. Lumbar degenerative disc disease    4. Lumbar facet arthropathy      --- LESI at L5/S1  Reviewed the procedure at length with the patient.  Included in the review was expectations, complications, risk and benefits.The procedure was described in detail and the risks, benefits and alternatives were discussed with the patient (including but not limited to: bleeding, infection, nerve damage, worsening of pain, inability to perform injection, paralysis, seizures, coma, no pain relief and death) who agreed to proceed.  Discussed the potential for sedation if warranted/wanted.  The procedure will plan to be performed at [facility] with fluoroscopic guidance(unless ultrasound is indicated) and could potentially have steroids and contrast dye used. " Questions were answered and in a way the patient could understand.  Patient verbalized understanding and wishes to proceed.  This intervention will be ordered.  Discussed with patient that all procedures are part of a multimodal plan of care and include either formal PT or a home exercise program.  Patient has no evidence of coagulopathy or current infection.    --- Once her acute pain is better controlled consider returning to PT, her pain is currently such that she would not be able to meaningfully participate in this treatment  --- May consider Lumbar MBB/RFA in the future.   --- Follow-up after procedure     SANJEEV REPORT    As the clinician, I personally reviewed the SANJEEV from 3/24/2023 while the patient was in the office today.    Dictated utilizing Dragon dictation.

## 2023-03-24 NOTE — H&P (VIEW-ONLY)
CHIEF COMPLAINT  New patient ref by Kehrer, Meredith Lea, MD.M54.16 (ICD-10-CM) - Lumbar radiculitis,M51.36 (ICD-10-CM) - Degeneration of lumbar intervertebral disc.  Patient in office to evaluate lumbar pain onset 6 weeks ago.     Subjective   Jemima Bell is a 73 y.o. female.   She presents to the office for evaluation of back pain. She was referred here by Dr. Kehrer.     She states that she has had back pain for years, her pain increased significantly 6 weeks ago without inciting event.     Today her pain is 6/10VAS in severity. She describes her pain as midline low back pain that is aching, burning, sharp, shooting, and throbbing pain that is continuous. Her pain does fluctuate some in severity. Her pain is radiating into the lateral aspect of her left thigh stopping above the knee, she describes this as aching and throbbing pain. Her pain is worsened by walking, standing. She tried to avoid bending, twisting, and lifting. Her pain is improved by use of heat and rest. Ice is helpful to numb the pain.     Not a candidate for NSAIDs d/t GI upset    MDP temporarily helpful    Past pain medications: Norco s/p knee replacement. She was on this for 6 weeks, she had trouble with weaning this and does not want to experience this again.      Current pain medications: Tramadol 50 mg 1-2 tablets every 6 hours (prescribed by Dr. Kehrer). This is helpful without side effect. Her goal is to avoid being on this medication long term.      Past therapies:  Physical Therapy: Yes, previously helpful  Chiropractor: None  Massage Therapy: None  TENS: States that she has one, has not used this.   Neck or back surgery: None  Past pain management: None     Previous Injections: Chymopapain injection in [place]. This did help her pain at the time. This treatment has now been taken off of the market.     Back Pain  This is a chronic problem. The current episode started more than 1 year ago (acutely worsened in the last 6  weeks). The problem occurs constantly. The problem has been gradually worsening since onset. The pain is present in the lumbar spine. The quality of the pain is described as aching, burning, cramping, shooting and stabbing. The pain radiates to the left thigh. The pain is at a severity of 6/10. The pain is the same all the time. The symptoms are aggravated by standing (walking). Associated symptoms include weakness. Pertinent negatives include no abdominal pain, chest pain, dysuria, fever or numbness. Risk factors include menopause. She has tried heat, ice and analgesics (PT previously) for the symptoms.      PEG Assessment   What number best describes your pain on average in the past week?8  What number best describes how, during the past week, pain has interfered with your enjoyment of life?8  What number best describes how, during the past week, pain has interfered with your general activity?  8      Current Outpatient Medications:   •  acetaminophen (TYLENOL) 325 MG tablet, Take 2 tablets by mouth., Disp: , Rfl:   •  albuterol sulfate  (90 Base) MCG/ACT inhaler, Inhale 2 puffs Every 4 (Four) Hours As Needed., Disp: , Rfl:   •  baclofen (LIORESAL) 10 MG tablet, Take 1 tablet by mouth 3 (Three) Times a Day As Needed for Muscle Spasms., Disp: 60 tablet, Rfl: 1  •  Budesonide (ENTOCORT EC) 3 MG 24 hr capsule, Take 3 pills once daily., Disp: 90 capsule, Rfl: 3  •  Diclofenac Sodium (VOLTAREN) 1 % gel gel, Apply 4 g topically to the appropriate area as directed 4 (Four) Times a Day As Needed (pain)., Disp: 150 g, Rfl: 2  •  fluticasone (FLONASE) 50 MCG/ACT nasal spray, 2 sprays into the nostril(s) as directed by provider Daily., Disp: 48 g, Rfl: 3  •  hydrOXYzine (ATARAX) 10 MG tablet, Take 1 tablet by mouth Every 4 (Four) Hours As Needed for Anxiety (twitching)., Disp: 120 tablet, Rfl: 0  •  levothyroxine (SYNTHROID, LEVOTHROID) 88 MCG tablet, Take 1 tablet by mouth Daily., Disp: 90 tablet, Rfl: 1  •   methylPREDNISolone (MEDROL) 4 MG dose pack, Take as directed on package instructions., Disp: 21 each, Rfl: 0  •  ondansetron (Zofran) 4 MG tablet, Take 1 tablet by mouth Every 8 (Eight) Hours As Needed for Nausea or Vomiting., Disp: 15 tablet, Rfl: 0  •  pantoprazole (PROTONIX) 40 MG EC tablet, Take 1 tablet by mouth Daily., Disp: 30 tablet, Rfl: 5  •  promethazine (PHENERGAN) 25 MG tablet, Take 1 tablet by mouth Every 6 (Six) Hours As Needed for Nausea or Vomiting., Disp: 20 tablet, Rfl: 1  •  sertraline (ZOLOFT) 25 MG tablet, TAKE ONE TABLET BY MOUTH DAILY, Disp: 30 tablet, Rfl: 1  •  traMADol (ULTRAM) 50 MG tablet, Take 1-2 tablets by mouth Every 6 (Six) Hours As Needed for Moderate Pain or Severe Pain., Disp: 60 tablet, Rfl: 0  •  HYDROcodone-acetaminophen (NORCO)  MG per tablet, Take 0.5 tablets by mouth Every 6 (Six) Hours As Needed for Moderate Pain. (Patient not taking: Reported on 3/24/2023), Disp: 6 tablet, Rfl: 0  •  naloxone (NARCAN) 4 MG/0.1ML nasal spray, 1 spray into the nostril(s) as directed by provider As Needed (Opioid overdose)., Disp: 1 each, Rfl: 0    The following portions of the patient's history were reviewed and updated as appropriate: allergies, current medications, past family history, past medical history, past social history, past surgical history and problem list.    REVIEW OF PERTINENT MEDICAL DATA    CT SCAN OF THE LUMBAR SPINE WITHOUT CONTRAST     CLINICAL HISTORY: Lumbar radiculopathy. Low back pain radiating into  right hip and into left lower extremity.     TECHNIQUE: CT scan of the lumbar spine was obtained with 3 mm axial bone  and soft tissue algorithm images. Sagittal and coronal reconstructed  images were obtained.     FINDINGS:     At T11-12 and T12-L1, there is no significant canal or foraminal  stenosis.     At L1-2, a disc osteophyte complex minimally indents the ventral  subarachnoid space. There is a minimal degree of left foraminal  narrowing secondary to a disc  osteophyte complex. Prominent degenerative  disc changes are seen at the L1-2 level.     At L2-3, a disc osteophyte complex and a small left central disc  protrusion mildly indent the ventral subarachnoid space. There is no  significant degree of foraminal compromise.     At L3-4, there are advanced degenerative disc changes with vacuum disc  phenomena. A disc osteophyte complex results in a mild degree of canal  and foraminal narrowing.     At L4-5, there are advanced degenerative disc changes. Vacuum disc  phenomena are identified. Degenerative endplate sclerotic changes are  most prominently seen along the right side of the L4-5 disc space. A  disc osteophyte complex and loss of foraminal height result in a  mild-to-moderate degree of bilateral foraminal narrowing. A disc  osteophyte complex mildly indents the ventral subarachnoid space.     At L5-S1, there is mild left foraminal narrowing secondary to a disc  osteophyte complex. There is mild left and moderate right facet  hypertrophic change. There is a mild-to-moderate degree of left and a  mild degree of right foraminal narrowing secondary to a combination of a  disc osteophyte complex and facet hypertrophy on the left and primarily  facet hypertrophy on the right.     There is a curvilinear radiopacity at the level of the GE junction that  is seen at the site of the patient's known fundoplasty with mesh. Please  correlate as to whether this curvilinear opacity is representative of  the mesh or whether this represents a retained radiopaque foreign body.  Of note, the findings are unchanged in appearance since prior  radiographic examinations dating back to 11/01/2019 and there is no  evidence to suggest an inflammatory process at this site.     IMPRESSION:     There is a mild-to-moderate degree of bilateral L4-5 and left L5-S1  foraminal narrowing. These are the levels of the most prominent degrees  of foraminal stenosis.     Disc osteophyte complexes are  noted from L1-2 down to L4-5 that result  in only up to mild degrees of canal narrowing.     There are advanced degenerative disc changes seen at the L4-5 level and  to a lesser extent prominent degenerative disc changes are noted at L1-2  and L3-4.     There is a curvilinear opacity at the level of the GE junction that is  seen at the site of the patient's known fundoplasty with mesh. Please  correlate as to whether this curvilinear opacity is representative of  the mesh or whether this represents a retained radiopaque foreign body.  Of note, the findings are unchanged in appearance since the prior  radiographic examinations dating back to 11/01/2019 and there is no  convincing evidence to suggest an inflammatory process at this site.     These findings and recommendations were discussed with Meredith Kehrer  on 03/02/2023 at approximately 11:10 AM.     Radiation dose reduction techniques were utilized, including automated  exposure control and exposure modulation based on body size.     This report was finalized on 3/3/2023 4:18 PM by Dr. Eddy Bates M.D.    Review of Systems   Constitutional: Positive for fatigue. Negative for activity change and fever.   HENT: Negative for congestion.    Eyes: Negative for visual disturbance.   Respiratory: Negative for cough and chest tightness.    Cardiovascular: Negative for chest pain.   Gastrointestinal: Positive for diarrhea. Negative for abdominal pain and constipation.   Genitourinary: Negative for difficulty urinating and dysuria.   Musculoskeletal: Positive for back pain.   Neurological: Positive for weakness. Negative for dizziness, light-headedness and numbness.   Psychiatric/Behavioral: Positive for sleep disturbance. Negative for agitation and suicidal ideas. The patient is not nervous/anxious.      Vitals:    03/24/23 0955   BP: 96/67   BP Location: Left arm   Patient Position: Sitting   Cuff Size: Large Adult   Pulse: 73   Resp: 20   Temp: 97.1 °F (36.2 °C)  "  TempSrc: Temporal   SpO2: 95%   Weight: 80.4 kg (177 lb 3.2 oz)   Height: 157.5 cm (62\")   PainSc:   6     Objective   Physical Exam  Vitals and nursing note reviewed.   Constitutional:       Appearance: Normal appearance. She is well-developed.   Eyes:      General: Lids are normal.   Cardiovascular:      Rate and Rhythm: Normal rate.   Pulmonary:      Effort: Pulmonary effort is normal.   Musculoskeletal:      Lumbar back: Tenderness present. No bony tenderness. Decreased range of motion. Negative right straight leg raise test and negative left straight leg raise test.      Comments:   Equivocal lumbar facet loading  - Justice SI Compression  - Justice Kip  - Justice Gaenslen's  - Justice Thigh Thrust   Neurological:      Mental Status: She is alert and oriented to person, place, and time.      Motor: No weakness.   Psychiatric:         Attention and Perception: Attention normal.         Mood and Affect: Mood normal.         Speech: Speech normal.         Behavior: Behavior normal.         Judgment: Judgment normal.       Assessment & Plan   Diagnoses and all orders for this visit:    1. Foraminal stenosis of lumbar region (Primary)    2. Lumbar radiculopathy    3. Lumbar degenerative disc disease    4. Lumbar facet arthropathy      --- LESI at L5/S1  Reviewed the procedure at length with the patient.  Included in the review was expectations, complications, risk and benefits.The procedure was described in detail and the risks, benefits and alternatives were discussed with the patient (including but not limited to: bleeding, infection, nerve damage, worsening of pain, inability to perform injection, paralysis, seizures, coma, no pain relief and death) who agreed to proceed.  Discussed the potential for sedation if warranted/wanted.  The procedure will plan to be performed at [facility] with fluoroscopic guidance(unless ultrasound is indicated) and could potentially have steroids and contrast dye used. " Questions were answered and in a way the patient could understand.  Patient verbalized understanding and wishes to proceed.  This intervention will be ordered.  Discussed with patient that all procedures are part of a multimodal plan of care and include either formal PT or a home exercise program.  Patient has no evidence of coagulopathy or current infection.    --- Once her acute pain is better controlled consider returning to PT, her pain is currently such that she would not be able to meaningfully participate in this treatment  --- May consider Lumbar MBB/RFA in the future.   --- Follow-up after procedure     SANJEEV REPORT    As the clinician, I personally reviewed the SANJEEV from 3/24/2023 while the patient was in the office today.    Dictated utilizing Dragon dictation.

## 2023-03-24 NOTE — DISCHARGE INSTRUCTIONS
[facility] - Post-procedure Instructions          --  While there are no absolute restrictions, it is recommended that you do not perform strenuous activity today. In the morning, you may resume your level of activity as before your block.    --  If you have a band-aid at your injection site, please remove it later today. Observe the area for any redness, swelling, pus-like drainage, or a temperature over 101°. If any of these symptoms occur, please call your doctor at [phone]. If after office hours, leave a message and the on-call provider will return your call.    --  Ice may be applied to your injection site. It is recommended you avoid direct heat (heating pad; hot tub) for 1-2 days.    --  Call [facility] at [phone] if you experience persistent headache, persistent bleeding from the injection site, or severe pain not relieved by heat or oral medication.    --  Do not make important decisions today.    --  Due to the effects of the block and/or the I.V. Sedation, DO NOT drive or operate hazardous machinery for 12 hours.  Local anesthetics may cause numbness after procedure and precautions must be taken with regards to operating equipment as well as with walking, even if ambulating with assistance of another person or with an assistive device.    --  Do not drink alcohol for 12 hours.    -- You may return to work tomorrow, or as directed by your referring doctor.    --  Occasionally you may notice a slight increase in your pain after the procedure. This should start to improve within the next 24-48 hours. Radiofrequency ablation procedure pain may last 3-4 weeks.    --  It may take as long as 3-4 days before you notice a gradual improvement in your pain and/or other symptoms.    -- You may continue to take your prescribed pain medication as needed.    --  Some normal possible side effects of steroid use could include fluid retention, increased blood sugar, dull headache,  increased sweating, increased appetite, mood swings and flushing.    --  Diabetics are recommended to watch their blood glucose level closely for 24-48 hours after the injection.    --  Must stay in PACU for 20 min upon arrival and prove no leg weakness before being discharged.    --  IN THE EVENT OF A LIFE THREATENING EMERGENCY, (CHEST PAIN, BREATHING DIFFICULTIES, PARALYSIS…) YOU SHOULD GO TO YOUR NEAREST EMERGENCY ROOM.    --  You should be contacted by our office within 2-3 days to schedule follow up or next appointment date.  If not contacted within 7 days, please call the office at [phone]

## 2023-03-27 ENCOUNTER — HOSPITAL ENCOUNTER (OUTPATIENT)
Dept: GENERAL RADIOLOGY | Facility: SURGERY CENTER | Age: 73
Setting detail: HOSPITAL OUTPATIENT SURGERY
End: 2023-03-27
Payer: MEDICARE

## 2023-03-27 ENCOUNTER — HOSPITAL ENCOUNTER (OUTPATIENT)
Facility: SURGERY CENTER | Age: 73
Setting detail: HOSPITAL OUTPATIENT SURGERY
Discharge: HOME | End: 2023-03-27
Attending: ANESTHESIOLOGY | Admitting: ANESTHESIOLOGY
Payer: MEDICARE

## 2023-03-27 VITALS
WEIGHT: 177 LBS | RESPIRATION RATE: 16 BRPM | DIASTOLIC BLOOD PRESSURE: 77 MMHG | TEMPERATURE: 98.4 F | HEIGHT: 62 IN | SYSTOLIC BLOOD PRESSURE: 127 MMHG | HEART RATE: 61 BPM | OXYGEN SATURATION: 95 % | BODY MASS INDEX: 32.57 KG/M2

## 2023-03-27 DIAGNOSIS — Z41.9 SURGERY, ELECTIVE: ICD-10-CM

## 2023-03-27 DIAGNOSIS — M48.061 FORAMINAL STENOSIS OF LUMBAR REGION: ICD-10-CM

## 2023-03-27 DIAGNOSIS — M54.16 LUMBAR RADICULOPATHY: ICD-10-CM

## 2023-03-27 PROCEDURE — 76000 FLUOROSCOPY <1 HR PHYS/QHP: CPT

## 2023-03-27 PROCEDURE — 25510000001 IOPAMIDOL 61 % SOLUTION 30 ML VIAL: Performed by: ANESTHESIOLOGY

## 2023-03-27 PROCEDURE — 62323 NJX INTERLAMINAR LMBR/SAC: CPT | Performed by: ANESTHESIOLOGY

## 2023-03-27 PROCEDURE — 25010000002 DEXAMETHASONE SODIUM PHOSPHATE 100 MG/10ML SOLUTION 10 ML VIAL: Performed by: ANESTHESIOLOGY

## 2023-03-27 PROCEDURE — 77002 NEEDLE LOCALIZATION BY XRAY: CPT

## 2023-03-27 RX ORDER — DIAZEPAM 5 MG/1
5 TABLET ORAL ONCE
Status: COMPLETED | OUTPATIENT
Start: 2023-03-27 | End: 2023-03-27

## 2023-03-27 RX ADMIN — DIAZEPAM 5 MG: 5 TABLET ORAL at 09:18

## 2023-03-27 NOTE — OP NOTE
L5/S1 Interlaminar Lumbar Epidural Steroid Injection   [facility]    PREOPERATIVE DIAGNOSIS:   Lumbar Radiculopathy and Lumbar foraminal stenosis  POSTOPERATIVE DIAGNOSIS:  Same as preop diagnosis    PROCEDURE:   Lumbar Epidural Steroid Injection, Therapeutic Interlaminar Injection, with epidurogram, at  L5/S1 level    PRE-PROCEDURE DISCUSSION WITH PATIENT:    Risks and complications were discussed with the patient prior to starting the procedure and informed consent was obtained.  We discussed various topics including but not limited to bleeding, infection, injury, paralysis, nerve injury, dural puncture, coma, death, worsening of clinical picture, lack of pain relief, and postprocedural soreness.    SURGEON:  Shey Aranda MD    REASON FOR PROCEDURE:    Diagnostic injection at this level is needed and Radiating pattern of pain is likely consistent with degenerative changes in the area.    SEDATION:  Light sedation was used for this procedure which included and PO Valium  ANESTHETIC:  Marcaine 0.5%  STEROID:   15mg dexamethasone    DESCRIPTON OF PROCEDURE:    After obtaining informed consent, I.V. was not started in the preop area.   The patient was taken to the operating room and placed in the prone position.  EKG, blood pressure, and pulse oximeter were monitored throughout, and sedation was provided as needed by the RN under my guidance. All pressure points were well padded.  The lumbar spine area was prepped with Chloraprep and draped in a sterile fashion.      AP fluoroscopic image was used to visualize the L5/S1 interspace.  The skin and subcutaneous tissue over the area was anesthetized with 1% Lidocaine.  An 18-Gauge Tuohy needle was then advanced through the anesthetized skin tract under fluoroscopic guidance in a coaxial view using a loss of resistance technique.  Lateral fluoroscopy was used to verify appropriate needle depth.  Once the needle tip was felt to be in the posterior  epidural space, aspiration was noted to be negative for blood or CSF.  A volume of 0.5mL of Isovue was then injected under live fluoroscopy in an AP view which produced good epidural spread with no evidence of loculation, vascular run-off or intrathecal spread.  Subsequently, a total volume of 5mL consisting of 15mg of dexamethasone, 0.5mL of 0.5% bupivcacaine and normal saline was injected without resistance.  The needle was removed intact.     ESTIMATED BLOOD LOSS:  <5 mL  SPECIMENS:  None    COMPLICATIONS:     No complications were noted., There was no indication of vascular uptake on live injection of contrast dye. and There was no indication of intrathecal uptake on live injection of contrast dye.    TOLERANCE & DISCHARGE CONDITION:    The patient tolerated the procedure well.  The patient was transported to the recovery area without difficulties.  The patient was discharged to home under the care of family in stable and satisfactory condition.    PLAN OF CARE:  1. The patient was given our standard instruction sheet.  2. The patient will Return to clinic 4-6 wks  3. The patient will resume all medications as per the medication reconciliation sheet.

## 2023-04-05 ENCOUNTER — OFFICE VISIT (OUTPATIENT)
Dept: FAMILY MEDICINE CLINIC | Facility: CLINIC | Age: 73
End: 2023-04-05
Payer: MEDICARE

## 2023-04-05 VITALS
BODY MASS INDEX: 32.72 KG/M2 | HEART RATE: 99 BPM | HEIGHT: 62 IN | OXYGEN SATURATION: 98 % | TEMPERATURE: 97.3 F | WEIGHT: 177.8 LBS | SYSTOLIC BLOOD PRESSURE: 128 MMHG | DIASTOLIC BLOOD PRESSURE: 70 MMHG

## 2023-04-05 DIAGNOSIS — R05.1 ACUTE COUGH: Primary | ICD-10-CM

## 2023-04-05 DIAGNOSIS — J10.1 INFLUENZA A: ICD-10-CM

## 2023-04-05 LAB
EXPIRATION DATE: ABNORMAL
FLUAV AG UPPER RESP QL IA.RAPID: DETECTED
FLUBV AG UPPER RESP QL IA.RAPID: NOT DETECTED
INTERNAL CONTROL: ABNORMAL
Lab: ABNORMAL
SARS-COV-2 AG UPPER RESP QL IA.RAPID: NOT DETECTED

## 2023-04-05 PROCEDURE — 1159F MED LIST DOCD IN RCRD: CPT | Performed by: FAMILY MEDICINE

## 2023-04-05 PROCEDURE — 87428 SARSCOV & INF VIR A&B AG IA: CPT | Performed by: FAMILY MEDICINE

## 2023-04-05 PROCEDURE — 99214 OFFICE O/P EST MOD 30 MIN: CPT | Performed by: FAMILY MEDICINE

## 2023-04-05 PROCEDURE — 1160F RVW MEDS BY RX/DR IN RCRD: CPT | Performed by: FAMILY MEDICINE

## 2023-04-05 RX ORDER — DEXTROMETHORPHAN HYDROBROMIDE AND PROMETHAZINE HYDROCHLORIDE 15; 6.25 MG/5ML; MG/5ML
5 SYRUP ORAL 4 TIMES DAILY PRN
Qty: 180 ML | Refills: 0 | Status: SHIPPED | OUTPATIENT
Start: 2023-04-05

## 2023-04-05 RX ORDER — IPRATROPIUM BROMIDE 42 UG/1
2 SPRAY, METERED NASAL 4 TIMES DAILY PRN
Qty: 15 ML | Refills: 0 | Status: SHIPPED | OUTPATIENT
Start: 2023-04-05

## 2023-04-05 NOTE — PROGRESS NOTES
"Chief Complaint  Fever, Cough, and Vomiting    Subjective        Jemima Bell presents to [facility]  History of Present Illness  Patient presents with fever cough and chills for the past day.  Hit her all of a sudden.  She denies any ill contacts.  She has not had any chest pain or shortness of breath.        Objective   Vital Signs:  /70   Pulse 99   Temp 97.3 °F (36.3 °C)   Ht 157.5 cm (62\")   Wt 80.6 kg (177 lb 12.8 oz)   SpO2 98%   BMI 32.52 kg/m²   Estimated body mass index is 32.52 kg/m² as calculated from the following:    Height as of this encounter: 157.5 cm (62\").    Weight as of this encounter: 80.6 kg (177 lb 12.8 oz).             Physical Exam  Constitutional:       General: She is not in acute distress.     Appearance: Normal appearance. She is well-developed.   HENT:      Head: Normocephalic and atraumatic.      Right Ear: Tympanic membrane, ear canal and external ear normal.      Left Ear: Tympanic membrane, ear canal and external ear normal.      Nose: Rhinorrhea present.      Mouth/Throat:      Mouth: Mucous membranes are moist.      Pharynx: Oropharynx is clear. Posterior oropharyngeal erythema present.   Eyes:      Conjunctiva/sclera: Conjunctivae normal.      Pupils: Pupils are equal, round, and reactive to light.   Neck:      Thyroid: No thyromegaly.   Cardiovascular:      Rate and Rhythm: Normal rate and regular rhythm.      Heart sounds: No murmur heard.  Pulmonary:      Effort: Pulmonary effort is normal.      Breath sounds: Normal breath sounds. No wheezing.   Abdominal:      Tenderness: There is no abdominal tenderness.   Musculoskeletal:         General: Normal range of motion.      Cervical back: Neck supple.   Lymphadenopathy:      Cervical: Cervical adenopathy present.   Skin:     General: Skin is warm and dry.   Neurological:      Mental Status: She is alert and oriented to person, place, and time.   Psychiatric:         Mood and " Affect: Mood normal.         Behavior: Behavior normal.        Result Review :  The following data was reviewed by: Meredith Lea Kehrer, MD on 04/05/2023:      Data reviewed: Influenza A+             Assessment and Plan   Diagnoses and all orders for this visit:    1. Acute cough (Primary)  -     POCT SARS-CoV-2 Antigen DEYSI + Flu    2. Influenza A  -     promethazine-dextromethorphan (PROMETHAZINE-DM) 6.25-15 MG/5ML syrup; Take 5 mL by mouth 4 (Four) Times a Day As Needed for Cough.  Dispense: 180 mL; Refill: 0  -     ipratropium (ATROVENT) 0.06 % nasal spray; 2 sprays into the nostril(s) as directed by provider 4 (Four) Times a Day As Needed for Rhinitis.  Dispense: 15 mL; Refill: 0    Influenza-push fluids and rest, symptomatic treatment, follow-up as needed, patient is nauseated so deferred Tamiflu         Follow Up   No follow-ups on file.  Patient was given instructions and counseling regarding her condition or for health maintenance advice. Please see specific information pulled into the AVS if appropriate.

## 2023-04-20 ENCOUNTER — OFFICE VISIT (OUTPATIENT)
Dept: FAMILY MEDICINE CLINIC | Facility: CLINIC | Age: 73
End: 2023-04-20
Payer: MEDICARE

## 2023-04-20 VITALS
TEMPERATURE: 98 F | HEART RATE: 72 BPM | OXYGEN SATURATION: 98 % | DIASTOLIC BLOOD PRESSURE: 70 MMHG | WEIGHT: 181.6 LBS | SYSTOLIC BLOOD PRESSURE: 122 MMHG | HEIGHT: 62 IN | BODY MASS INDEX: 33.42 KG/M2

## 2023-04-20 DIAGNOSIS — M51.36 DEGENERATION OF LUMBAR INTERVERTEBRAL DISC: ICD-10-CM

## 2023-04-20 DIAGNOSIS — E03.9 HYPOTHYROIDISM, UNSPECIFIED TYPE: Primary | ICD-10-CM

## 2023-04-20 DIAGNOSIS — E53.8 B12 DEFICIENCY: ICD-10-CM

## 2023-04-20 DIAGNOSIS — F41.8 DEPRESSION WITH ANXIETY: ICD-10-CM

## 2023-04-20 DIAGNOSIS — E55.9 VITAMIN D DEFICIENCY: ICD-10-CM

## 2023-04-20 PROCEDURE — 1160F RVW MEDS BY RX/DR IN RCRD: CPT | Performed by: FAMILY MEDICINE

## 2023-04-20 PROCEDURE — 99214 OFFICE O/P EST MOD 30 MIN: CPT | Performed by: FAMILY MEDICINE

## 2023-04-20 PROCEDURE — 1159F MED LIST DOCD IN RCRD: CPT | Performed by: FAMILY MEDICINE

## 2023-04-20 NOTE — PROGRESS NOTES
"Answers for HPI/ROS submitted by the patient on 4/18/2023  Please describe your symptoms.: Update  for prescribed medicines and bloodcwork  Have you had these symptoms before?: Yes  How long have you been having these symptoms?: Greater than 2 weeks  Please list any medications you are currently taking for this condition.: Levothyroxine, Sertralinr  Please describe any probable cause for these symptoms. : Time  What is the primary reason for your visit?: Other    Chief Complaint  Med Refill, Hypothyroidism, Anxiety, and Depression    Subjective        Jemima Bell presents to [facility]  History of Present Illness  Here for routine follow up.  Due for thyroid labs.  Also due to recheck her vitamin B12 and vitamin D.  Mood is better on the sertraline but not where she wants it to be.  She denies SI HI or hopelessness.  And has increased her appetite.  Back is doing better.  Sees pain management next week.  They want her to keep getting her tramadol through me.  She is almost out of it but does not need it very often.  The baclofen really helps as well.      Objective   Vital Signs:  /70   Pulse 72   Temp 98 °F (36.7 °C)   Ht 157.5 cm (62\")   Wt 82.4 kg (181 lb 9.6 oz)   SpO2 98%   BMI 33.22 kg/m²   Estimated body mass index is 33.22 kg/m² as calculated from the following:    Height as of this encounter: 157.5 cm (62\").    Weight as of this encounter: 82.4 kg (181 lb 9.6 oz).             Physical Exam  Constitutional:       General: She is not in acute distress.     Appearance: Normal appearance. She is well-developed.   HENT:      Head: Normocephalic and atraumatic.      Right Ear: Tympanic membrane, ear canal and external ear normal.      Left Ear: Tympanic membrane, ear canal and external ear normal.      Mouth/Throat:      Mouth: Mucous membranes are moist.      Pharynx: Oropharynx is clear.   Eyes:      Conjunctiva/sclera: Conjunctivae normal.      Pupils: " Pupils are equal, round, and reactive to light.   Neck:      Thyroid: No thyromegaly.   Cardiovascular:      Rate and Rhythm: Normal rate and regular rhythm.      Heart sounds: No murmur heard.  Pulmonary:      Effort: Pulmonary effort is normal.      Breath sounds: Normal breath sounds. No wheezing.   Abdominal:      General: Bowel sounds are normal.      Palpations: Abdomen is soft.      Tenderness: There is no abdominal tenderness.   Musculoskeletal:         General: Normal range of motion.      Cervical back: Neck supple.   Lymphadenopathy:      Cervical: No cervical adenopathy.   Skin:     General: Skin is warm and dry.   Neurological:      Mental Status: She is alert and oriented to person, place, and time.   Psychiatric:         Mood and Affect: Mood normal.         Behavior: Behavior normal.        Result Review :                   Assessment and Plan   Diagnoses and all orders for this visit:    1. Hypothyroidism, unspecified type (Primary)  -     TSH    2. Depression with anxiety  -     sertraline (Zoloft) 50 MG tablet; Take 1 tablet by mouth Daily.  Dispense: 90 tablet; Refill: 0    3. B12 deficiency  -     Vitamin B12    4. Vitamin D deficiency  -     Vitamin D,25-Hydroxy    5. Degeneration of lumbar intervertebral disc    Hypothyroidism-due for TSH  Depression with anxiety-increase Zoloft, follow-up in a few months to recheck back, let me know if any problems sooner  B12 and vitamin D deficiencies-check levels today  Degenerative disc disease of lumbar spine-keep follow-up with pain management, okay to stay on baclofen, hopefully will not need tramadol much longer       Follow Up   Return in about 3 months (around 7/20/2023) for Recheck mood.  Patient was given instructions and counseling regarding her condition or for health maintenance advice. Please see specific information pulled into the AVS if appropriate.

## 2023-04-21 LAB
25(OH)D3+25(OH)D2 SERPL-MCNC: 54.2 NG/ML (ref 30–100)
TSH SERPL DL<=0.005 MIU/L-ACNC: 3.3 UIU/ML (ref 0.45–4.5)
VIT B12 SERPL-MCNC: 710 PG/ML (ref 232–1245)

## 2023-04-25 ENCOUNTER — PREP FOR SURGERY (OUTPATIENT)
Dept: SURGERY | Facility: SURGERY CENTER | Age: 73
End: 2023-04-25
Payer: MEDICARE

## 2023-04-25 ENCOUNTER — OFFICE VISIT (OUTPATIENT)
Dept: PAIN MEDICINE | Facility: CLINIC | Age: 73
End: 2023-04-25
Payer: MEDICARE

## 2023-04-25 VITALS
OXYGEN SATURATION: 97 % | RESPIRATION RATE: 18 BRPM | HEART RATE: 60 BPM | HEIGHT: 62 IN | TEMPERATURE: 96.2 F | DIASTOLIC BLOOD PRESSURE: 75 MMHG | BODY MASS INDEX: 32.9 KG/M2 | WEIGHT: 178.8 LBS | SYSTOLIC BLOOD PRESSURE: 123 MMHG

## 2023-04-25 DIAGNOSIS — M53.3 SACROILIAC JOINT DYSFUNCTION OF LEFT SIDE: Primary | ICD-10-CM

## 2023-04-25 DIAGNOSIS — M47.816 LUMBAR FACET ARTHROPATHY: ICD-10-CM

## 2023-04-25 DIAGNOSIS — M51.36 LUMBAR DEGENERATIVE DISC DISEASE: ICD-10-CM

## 2023-04-25 PROCEDURE — 1160F RVW MEDS BY RX/DR IN RCRD: CPT | Performed by: NURSE PRACTITIONER

## 2023-04-25 PROCEDURE — 1125F AMNT PAIN NOTED PAIN PRSNT: CPT | Performed by: NURSE PRACTITIONER

## 2023-04-25 PROCEDURE — 99214 OFFICE O/P EST MOD 30 MIN: CPT | Performed by: NURSE PRACTITIONER

## 2023-04-25 PROCEDURE — 1159F MED LIST DOCD IN RCRD: CPT | Performed by: NURSE PRACTITIONER

## 2023-04-25 NOTE — H&P (VIEW-ONLY)
CHIEF COMPLAINT  Procedure follow up Lumbar epidural steroid injection at L5-S1  3-27-23  Patient reports 50% pain relief for 4 weeks .     Subjective   Jemima eBll is a 73 y.o. female  who presents to the office for follow-up of procedure.  She completed a LESI at L5/S1   on  3/27/2023 performed by Dr. Aranda for management of back pain. Patient reports 100% temporary relief (~ 1 week) to her back pain with her relief slowing waning. She reports 80% ONGOING relief to her left leg pain.     Today her pain is 7/10VAS in severity. Her primary pain complaint today is over her Left SI joint. This pain is aching and stabbing in nature and is worsened with standing and walking.     Back Pain  This is a chronic problem. The current episode started more than 1 year ago. The problem occurs constantly. Progression since onset: left radicular pain improved, left low back pain remains unstable. The pain is present in the sacro-iliac and lumbar spine. The quality of the pain is described as aching and stabbing. The pain radiates to the left thigh (left thigh pain has improved since LESI). The pain is at a severity of 7/10. The pain is the same all the time. The symptoms are aggravated by standing (walking). Pertinent negatives include no abdominal pain, chest pain, dysuria, fever, headaches, numbness or weakness. Risk factors include menopause. She has tried heat, ice and analgesics (PT previously) for the symptoms.      PEG Assessment   What number best describes your pain on average in the past week?7  What number best describes how, during the past week, pain has interfered with your enjoyment of life?7  What number best describes how, during the past week, pain has interfered with your general activity?  7    Review of Pertinent Medical Data ---  CT SCAN OF THE LUMBAR SPINE WITHOUT CONTRAST     CLINICAL HISTORY: Lumbar radiculopathy. Low back pain radiating into  right hip and into left lower extremity.      TECHNIQUE: CT scan of the lumbar spine was obtained with 3 mm axial bone  and soft tissue algorithm images. Sagittal and coronal reconstructed  images were obtained.     FINDINGS:     At T11-12 and T12-L1, there is no significant canal or foraminal  stenosis.     At L1-2, a disc osteophyte complex minimally indents the ventral  subarachnoid space. There is a minimal degree of left foraminal  narrowing secondary to a disc osteophyte complex. Prominent degenerative  disc changes are seen at the L1-2 level.     At L2-3, a disc osteophyte complex and a small left central disc  protrusion mildly indent the ventral subarachnoid space. There is no  significant degree of foraminal compromise.     At L3-4, there are advanced degenerative disc changes with vacuum disc  phenomena. A disc osteophyte complex results in a mild degree of canal  and foraminal narrowing.     At L4-5, there are advanced degenerative disc changes. Vacuum disc  phenomena are identified. Degenerative endplate sclerotic changes are  most prominently seen along the right side of the L4-5 disc space. A  disc osteophyte complex and loss of foraminal height result in a  mild-to-moderate degree of bilateral foraminal narrowing. A disc  osteophyte complex mildly indents the ventral subarachnoid space.     At L5-S1, there is mild left foraminal narrowing secondary to a disc  osteophyte complex. There is mild left and moderate right facet  hypertrophic change. There is a mild-to-moderate degree of left and a  mild degree of right foraminal narrowing secondary to a combination of a  disc osteophyte complex and facet hypertrophy on the left and primarily  facet hypertrophy on the right.     There is a curvilinear radiopacity at the level of the GE junction that  is seen at the site of the patient's known fundoplasty with mesh. Please  correlate as to whether this curvilinear opacity is representative of  the mesh or whether this represents a retained radiopaque  foreign body.  Of note, the findings are unchanged in appearance since prior  radiographic examinations dating back to 11/01/2019 and there is no  evidence to suggest an inflammatory process at this site.     IMPRESSION:     There is a mild-to-moderate degree of bilateral L4-5 and left L5-S1  foraminal narrowing. These are the levels of the most prominent degrees  of foraminal stenosis.     Disc osteophyte complexes are noted from L1-2 down to L4-5 that result  in only up to mild degrees of canal narrowing.     There are advanced degenerative disc changes seen at the L4-5 level and  to a lesser extent prominent degenerative disc changes are noted at L1-2  and L3-4.     There is a curvilinear opacity at the level of the GE junction that is  seen at the site of the patient's known fundoplasty with mesh. Please  correlate as to whether this curvilinear opacity is representative of  the mesh or whether this represents a retained radiopaque foreign body.  Of note, the findings are unchanged in appearance since the prior  radiographic examinations dating back to 11/01/2019 and there is no  convincing evidence to suggest an inflammatory process at this site.     These findings and recommendations were discussed with Meredith Kehrer  on 03/02/2023 at approximately 11:10 AM.     Radiation dose reduction techniques were utilized, including automated  exposure control and exposure modulation based on body size.     This report was finalized on 3/3/2023 4:18 PM by Dr. Eddy Bates M.D.     The following portions of the patient's history were reviewed and updated as appropriate: allergies, current medications, past family history, past medical history, past social history, past surgical history and problem list.    Review of Systems   Constitutional: Positive for fatigue. Negative for activity change (less) and fever.   HENT: Negative for congestion.    Eyes: Negative for visual disturbance.   Respiratory: Negative for cough  "and chest tightness.    Cardiovascular: Negative for chest pain.   Gastrointestinal: Negative for abdominal pain, constipation and diarrhea.   Genitourinary: Negative for difficulty urinating and dysuria.   Musculoskeletal: Positive for back pain.   Neurological: Negative for dizziness, weakness, light-headedness, numbness and headaches.   Psychiatric/Behavioral: Positive for sleep disturbance. Negative for agitation and suicidal ideas. The patient is nervous/anxious.      --  The aforementioned information the Chief Complaint section and above subjective data including any HPI data, and also the Review of Systems data, has been personally reviewed and affirmed.  --     Vitals:    04/25/23 1057   BP: 123/75   BP Location: Left arm   Patient Position: Sitting   Cuff Size: Large Adult   Pulse: 60   Resp: 18   Temp: 96.2 °F (35.7 °C)   TempSrc: Temporal   SpO2: 97%   Weight: 81.1 kg (178 lb 12.8 oz)   Height: 157.5 cm (62\")   PainSc:   7     Objective   Physical Exam  Vitals and nursing note reviewed.   Constitutional:       Appearance: Normal appearance. She is well-developed.   Eyes:      General: Lids are normal.   Cardiovascular:      Rate and Rhythm: Normal rate.   Pulmonary:      Effort: Pulmonary effort is normal.   Musculoskeletal:      Lumbar back: Tenderness and bony tenderness present. Decreased range of motion.      Comments:   +Left Kip  +Left Thigh Thrust  +Left Gaenslen's  +Left SI Compression   Neurological:      Mental Status: She is alert and oriented to person, place, and time.   Psychiatric:         Attention and Perception: Attention normal.         Mood and Affect: Mood normal.         Speech: Speech normal.         Behavior: Behavior normal.         Judgment: Judgment normal.       Assessment & Plan   Diagnoses and all orders for this visit:    1. Sacroiliac joint dysfunction of left side (Primary)    2. Lumbar facet arthropathy    3. Lumbar degenerative disc disease      -----    Sacroiliac " "Joint -  Diagnostic SIJ injections:  - Must meet the covered indications criteria as well as all the following....  - Must be performed under fluoroscopy or CT guidance with contrast  - exceptions to this imaging requirement could be ultrasound guidance when there is a documented contrast allergy or in the event of pregnancy  - not performed along with other musculoskeletal injection in the lumbrosacral spine  - must document \"direct causal benefit\" from the SIJ injection and not other musculoskeletal injection or treatment    Diagnostic SIJ Injection must demonstrate at least 75% relief  - Positive Diagnostic SIJ Injection is being defined as at least 75% relief sustained for the duration of the local anesthetic & at least 75% sustained and constant pain relief for the duration of the antiinflammatory steroid  - measured by same pain scale at baseline..... \"Measurements of pain\" must be taken pre-injection on the day of injection, post-intervention on the day of injection, and the days following the injection, to corroborate the pain relief for the duration of the local anesthetic and/or steroid used    Limitation: no more than 2 diagnostic joint injection sessions.  Unilateral or Bilateral procedure on one day counts as one session.      -----    --- Left SI injection   Reviewed the procedure at length with the patient.  Included in the review was expectations, complications, risk and benefits.The procedure was described in detail and the risks, benefits and alternatives were discussed with the patient (including but not limited to: bleeding, infection, nerve damage, worsening of pain, inability to perform injection, paralysis, seizures, coma, no pain relief and death) who agreed to proceed.  Discussed the potential for sedation if warranted/wanted.  The procedure will plan to be performed at [facility] with fluoroscopic guidance(unless ultrasound is indicated) and could potentially have " steroids and contrast dye used. Questions were answered and in a way the patient could understand.  Patient verbalized understanding and wishes to proceed.  This intervention will be ordered.  Discussed with patient that all procedures are part of a multimodal plan of care and include either formal PT or a home exercise program.  Patient has no evidence of coagulopathy or current infection.    --- May be a good candidate for Lumbar MBB and RFA in the future  --- If her radicular pain returns discussed that she can repeat her LESI at that time.   --- Follow-up after procedure     Dictated utilizing Dragon dictation.

## 2023-04-25 NOTE — PROGRESS NOTES
CHIEF COMPLAINT  Procedure follow up Lumbar epidural steroid injection at L5-S1  3-27-23  Patient reports 50% pain relief for 4 weeks .     Subjective   Jemima Bell is a 73 y.o. female  who presents to the office for follow-up of procedure.  She completed a LESI at L5/S1   on  3/27/2023 performed by Dr. Aranda for management of back pain. Patient reports 100% temporary relief (~ 1 week) to her back pain with her relief slowing waning. She reports 80% ONGOING relief to her left leg pain.     Today her pain is 7/10VAS in severity. Her primary pain complaint today is over her Left SI joint. This pain is aching and stabbing in nature and is worsened with standing and walking.     Back Pain  This is a chronic problem. The current episode started more than 1 year ago. The problem occurs constantly. Progression since onset: left radicular pain improved, left low back pain remains unstable. The pain is present in the sacro-iliac and lumbar spine. The quality of the pain is described as aching and stabbing. The pain radiates to the left thigh (left thigh pain has improved since LESI). The pain is at a severity of 7/10. The pain is the same all the time. The symptoms are aggravated by standing (walking). Pertinent negatives include no abdominal pain, chest pain, dysuria, fever, headaches, numbness or weakness. Risk factors include menopause. She has tried heat, ice and analgesics (PT previously) for the symptoms.      PEG Assessment   What number best describes your pain on average in the past week?7  What number best describes how, during the past week, pain has interfered with your enjoyment of life?7  What number best describes how, during the past week, pain has interfered with your general activity?  7    Review of Pertinent Medical Data ---  CT SCAN OF THE LUMBAR SPINE WITHOUT CONTRAST     CLINICAL HISTORY: Lumbar radiculopathy. Low back pain radiating into  right hip and into left lower extremity.      TECHNIQUE: CT scan of the lumbar spine was obtained with 3 mm axial bone  and soft tissue algorithm images. Sagittal and coronal reconstructed  images were obtained.     FINDINGS:     At T11-12 and T12-L1, there is no significant canal or foraminal  stenosis.     At L1-2, a disc osteophyte complex minimally indents the ventral  subarachnoid space. There is a minimal degree of left foraminal  narrowing secondary to a disc osteophyte complex. Prominent degenerative  disc changes are seen at the L1-2 level.     At L2-3, a disc osteophyte complex and a small left central disc  protrusion mildly indent the ventral subarachnoid space. There is no  significant degree of foraminal compromise.     At L3-4, there are advanced degenerative disc changes with vacuum disc  phenomena. A disc osteophyte complex results in a mild degree of canal  and foraminal narrowing.     At L4-5, there are advanced degenerative disc changes. Vacuum disc  phenomena are identified. Degenerative endplate sclerotic changes are  most prominently seen along the right side of the L4-5 disc space. A  disc osteophyte complex and loss of foraminal height result in a  mild-to-moderate degree of bilateral foraminal narrowing. A disc  osteophyte complex mildly indents the ventral subarachnoid space.     At L5-S1, there is mild left foraminal narrowing secondary to a disc  osteophyte complex. There is mild left and moderate right facet  hypertrophic change. There is a mild-to-moderate degree of left and a  mild degree of right foraminal narrowing secondary to a combination of a  disc osteophyte complex and facet hypertrophy on the left and primarily  facet hypertrophy on the right.     There is a curvilinear radiopacity at the level of the GE junction that  is seen at the site of the patient's known fundoplasty with mesh. Please  correlate as to whether this curvilinear opacity is representative of  the mesh or whether this represents a retained radiopaque  foreign body.  Of note, the findings are unchanged in appearance since prior  radiographic examinations dating back to 11/01/2019 and there is no  evidence to suggest an inflammatory process at this site.     IMPRESSION:     There is a mild-to-moderate degree of bilateral L4-5 and left L5-S1  foraminal narrowing. These are the levels of the most prominent degrees  of foraminal stenosis.     Disc osteophyte complexes are noted from L1-2 down to L4-5 that result  in only up to mild degrees of canal narrowing.     There are advanced degenerative disc changes seen at the L4-5 level and  to a lesser extent prominent degenerative disc changes are noted at L1-2  and L3-4.     There is a curvilinear opacity at the level of the GE junction that is  seen at the site of the patient's known fundoplasty with mesh. Please  correlate as to whether this curvilinear opacity is representative of  the mesh or whether this represents a retained radiopaque foreign body.  Of note, the findings are unchanged in appearance since the prior  radiographic examinations dating back to 11/01/2019 and there is no  convincing evidence to suggest an inflammatory process at this site.     These findings and recommendations were discussed with Meredith Kehrer  on 03/02/2023 at approximately 11:10 AM.     Radiation dose reduction techniques were utilized, including automated  exposure control and exposure modulation based on body size.     This report was finalized on 3/3/2023 4:18 PM by Dr. Eddy Bates M.D.     The following portions of the patient's history were reviewed and updated as appropriate: allergies, current medications, past family history, past medical history, past social history, past surgical history and problem list.    Review of Systems   Constitutional: Positive for fatigue. Negative for activity change (less) and fever.   HENT: Negative for congestion.    Eyes: Negative for visual disturbance.   Respiratory: Negative for cough  "and chest tightness.    Cardiovascular: Negative for chest pain.   Gastrointestinal: Negative for abdominal pain, constipation and diarrhea.   Genitourinary: Negative for difficulty urinating and dysuria.   Musculoskeletal: Positive for back pain.   Neurological: Negative for dizziness, weakness, light-headedness, numbness and headaches.   Psychiatric/Behavioral: Positive for sleep disturbance. Negative for agitation and suicidal ideas. The patient is nervous/anxious.      --  The aforementioned information the Chief Complaint section and above subjective data including any HPI data, and also the Review of Systems data, has been personally reviewed and affirmed.  --     Vitals:    04/25/23 1057   BP: 123/75   BP Location: Left arm   Patient Position: Sitting   Cuff Size: Large Adult   Pulse: 60   Resp: 18   Temp: 96.2 °F (35.7 °C)   TempSrc: Temporal   SpO2: 97%   Weight: 81.1 kg (178 lb 12.8 oz)   Height: 157.5 cm (62\")   PainSc:   7     Objective   Physical Exam  Vitals and nursing note reviewed.   Constitutional:       Appearance: Normal appearance. She is well-developed.   Eyes:      General: Lids are normal.   Cardiovascular:      Rate and Rhythm: Normal rate.   Pulmonary:      Effort: Pulmonary effort is normal.   Musculoskeletal:      Lumbar back: Tenderness and bony tenderness present. Decreased range of motion.      Comments:   +Left Kip  +Left Thigh Thrust  +Left Gaenslen's  +Left SI Compression   Neurological:      Mental Status: She is alert and oriented to person, place, and time.   Psychiatric:         Attention and Perception: Attention normal.         Mood and Affect: Mood normal.         Speech: Speech normal.         Behavior: Behavior normal.         Judgment: Judgment normal.       Assessment & Plan   Diagnoses and all orders for this visit:    1. Sacroiliac joint dysfunction of left side (Primary)    2. Lumbar facet arthropathy    3. Lumbar degenerative disc disease      -----    Sacroiliac " "Joint -  Diagnostic SIJ injections:  - Must meet the covered indications criteria as well as all the following....  - Must be performed under fluoroscopy or CT guidance with contrast  - exceptions to this imaging requirement could be ultrasound guidance when there is a documented contrast allergy or in the event of pregnancy  - not performed along with other musculoskeletal injection in the lumbrosacral spine  - must document \"direct causal benefit\" from the SIJ injection and not other musculoskeletal injection or treatment    Diagnostic SIJ Injection must demonstrate at least 75% relief  - Positive Diagnostic SIJ Injection is being defined as at least 75% relief sustained for the duration of the local anesthetic & at least 75% sustained and constant pain relief for the duration of the antiinflammatory steroid  - measured by same pain scale at baseline..... \"Measurements of pain\" must be taken pre-injection on the day of injection, post-intervention on the day of injection, and the days following the injection, to corroborate the pain relief for the duration of the local anesthetic and/or steroid used    Limitation: no more than 2 diagnostic joint injection sessions.  Unilateral or Bilateral procedure on one day counts as one session.      -----    --- Left SI injection   Reviewed the procedure at length with the patient.  Included in the review was expectations, complications, risk and benefits.The procedure was described in detail and the risks, benefits and alternatives were discussed with the patient (including but not limited to: bleeding, infection, nerve damage, worsening of pain, inability to perform injection, paralysis, seizures, coma, no pain relief and death) who agreed to proceed.  Discussed the potential for sedation if warranted/wanted.  The procedure will plan to be performed at [facility] with fluoroscopic guidance(unless ultrasound is indicated) and could potentially have " steroids and contrast dye used. Questions were answered and in a way the patient could understand.  Patient verbalized understanding and wishes to proceed.  This intervention will be ordered.  Discussed with patient that all procedures are part of a multimodal plan of care and include either formal PT or a home exercise program.  Patient has no evidence of coagulopathy or current infection.    --- May be a good candidate for Lumbar MBB and RFA in the future  --- If her radicular pain returns discussed that she can repeat her LESI at that time.   --- Follow-up after procedure     Dictated utilizing Dragon dictation.

## 2023-04-26 ENCOUNTER — OFFICE VISIT (OUTPATIENT)
Dept: GASTROENTEROLOGY | Facility: CLINIC | Age: 73
End: 2023-04-26
Payer: MEDICARE

## 2023-04-26 VITALS
WEIGHT: 179.4 LBS | HEART RATE: 77 BPM | OXYGEN SATURATION: 94 % | BODY MASS INDEX: 33.01 KG/M2 | SYSTOLIC BLOOD PRESSURE: 114 MMHG | HEIGHT: 62 IN | DIASTOLIC BLOOD PRESSURE: 80 MMHG | TEMPERATURE: 96.9 F

## 2023-04-26 DIAGNOSIS — K21.9 GASTROESOPHAGEAL REFLUX DISEASE, UNSPECIFIED WHETHER ESOPHAGITIS PRESENT: Primary | ICD-10-CM

## 2023-04-26 DIAGNOSIS — R11.0 NAUSEA: ICD-10-CM

## 2023-04-26 PROCEDURE — 99214 OFFICE O/P EST MOD 30 MIN: CPT | Performed by: INTERNAL MEDICINE

## 2023-04-26 PROCEDURE — 1160F RVW MEDS BY RX/DR IN RCRD: CPT | Performed by: INTERNAL MEDICINE

## 2023-04-26 PROCEDURE — 1159F MED LIST DOCD IN RCRD: CPT | Performed by: INTERNAL MEDICINE

## 2023-04-26 RX ORDER — DEXLANSOPRAZOLE 60 MG/1
60 CAPSULE, DELAYED RELEASE ORAL DAILY
Qty: 30 CAPSULE | Refills: 11 | Status: SHIPPED | OUTPATIENT
Start: 2023-04-26 | End: 2023-05-26

## 2023-04-26 NOTE — PROGRESS NOTES
Chief Complaint   Patient presents with   • Constipation       History of Present Illness:   73 y.o. female        She had an EGD and colonoscopy by me in 12 of 2022.       I last saw her in 1 of 2023:  Assessment:  1. History of colon polyps.  Her last colonoscopy was in 12 of 2022.  I had recommended a repeat colonoscopy in 5 years.  2.  LUQ abdominal pain.  3. Nausea  4.  H/o diarrhea - resolved at this time. One of two colon biopsy sights showed mild inflammation - developing microscopic colitis  5. Fatty liver.  6. GERD     Recommendations:  1. Trial of Pepto Bismol chewable tabs 2 BID  2.  SBFT - she doesn't want.  3. F/u 3 mos.        She is better. She has occasional diarrhea. She averages 2-3 BM/day. No recctal bleeding or melena. NO abdominal pain. She is taking pain meds for back pain. No constipation. Some nausea, no vomiting. No fevers, chills. She still has heartburn despite being on Protonix 40 mg/day. NO dysphagia. Weight stable. NO chest pain or SOA.    Past Medical History:   Diagnosis Date   • Acute bronchitis    • Acute sinusitis    • Allergic 11/2022    Shrimp   • Allergic rhinitis    • Anemia Childhood   • Anxiety    • Arthritis    • Asthma    • Back pain    • BMI 34.0-34.9,adult    • Cervicalgia    • Chills    • Cholelithiasis 2004?    Remival   • Colon polyp 12/2022   • Deep vein thrombosis (DVT) of lower extremity    • Depression    • Dermatitis    • Dyspnea    • Dysuria    • Esophageal reflux    • Fatigue    • Gastric ulcer    • GERD (gastroesophageal reflux disease)    • GI (gastrointestinal bleed) 2004?   • Headache    • Heart murmur 1973   • Hernia    • Hypothyroidism    • Irritable bowel syndrome    • Lumbago    • Migraine    • Nausea    • Neuritis    • Osteoarthritis    • Osteoarthritis of knee    • Plantar fasciitis    • Pneumonia 89y   • Pulled muscle    • Pulmonary embolism    • Pyelonephritis    • Rheumatic fever    • Sore throat    • Torticollis    • Trapezius strain    • Urinary  incontinence    • Urinary pain    • Urinary tract infection    • UTI symptoms    • Vitamin B1 deficiency    • Vitamin B12 deficiency    • Vitamin D deficiency    • Weakness    • Wheezing        Past Surgical History:   Procedure Laterality Date   • ABDOMINAL SURGERY     • APPENDECTOMY     • CATARACT EXTRACTION     • CATARACT EXTRACTION      bilateral eyes   • CATARACT EXTRACTION     • CHOLECYSTECTOMY  2004?   • COLONOSCOPY     • COLONOSCOPY N/A 12/23/2022    Procedure: COLONOSCOPY to cecum and TI:  with biopsies, cold snare polyp,;  Surgeon: Reji Aguilar MD;  Location: [facility] ENDOSCOPY;  Service: Gastroenterology;  Laterality: N/A;  PREOP/ HX COLON POLYPS  POSTOP/  diverticulosis, polyp, hemorrhoids   • ENDOSCOPY N/A 12/23/2022    Procedure: ESOPHAGOGASTRODUODENOSCOPY with biopsies;  Surgeon: Reji Aguilar MD;  Location: [facility] ENDOSCOPY;  Service: Gastroenterology;  Laterality: N/A;  PREOP/ HX GASTRIC ULCER, DYSPEPSIA, UPPER ABDOMINAL PAIN  POSTOP/:  HH, gastritis, gastric polyps   • GALLBLADDER SURGERY     • HEMORRHOIDECTOMY  1974 & 77?   • HERNIA REPAIR  2008?   • HYSTERECTOMY     • INGUINAL HERNIA REPAIR     • KNEE SURGERY     • LAPAROSCOPIC COLON RESECTION  2005   • LAPAROTOMY OOPHERECTOMY     • LUMBAR EPIDURAL INJECTION N/A 3/27/2023    Procedure: Lumbar epidural steroid injection at L5-S1 79825;  Surgeon: Shey Aranda MD;  Location: [facility] MAIN OR;  Service: Pain Management;  Laterality: N/A;   • NISSEN FUNDOPLICATION LAPAROSCOPIC      x2   • TONSILLECTOMY     • TONSILLECTOMY  1954?   • TOTAL KNEE ARTHROPLASTY Left    • UPPER GASTROINTESTINAL ENDOSCOPY           Current Outpatient Medications:   •  acetaminophen (TYLENOL) 325 MG tablet, Take 2 tablets by mouth., Disp: , Rfl:   •  albuterol sulfate  (90 Base) MCG/ACT inhaler, Inhale 2 puffs Every 4 (Four) Hours As Needed., Disp: , Rfl:   •  baclofen (LIORESAL) 10 MG tablet, Take 1 tablet by mouth 3 (Three) Times a Day As Needed for Muscle Spasms.,  Disp: 60 tablet, Rfl: 1  •  Diclofenac Sodium (VOLTAREN) 1 % gel gel, Apply 4 g topically to the appropriate area as directed 4 (Four) Times a Day As Needed (pain)., Disp: 150 g, Rfl: 2  •  fluticasone (FLONASE) 50 MCG/ACT nasal spray, 2 sprays into the nostril(s) as directed by provider Daily., Disp: 48 g, Rfl: 3  •  hydrOXYzine (ATARAX) 10 MG tablet, Take 1 tablet by mouth Every 4 (Four) Hours As Needed for Anxiety (twitching)., Disp: 120 tablet, Rfl: 0  •  ipratropium (ATROVENT) 0.06 % nasal spray, 2 sprays into the nostril(s) as directed by provider 4 (Four) Times a Day As Needed for Rhinitis., Disp: 15 mL, Rfl: 0  •  levothyroxine (SYNTHROID, LEVOTHROID) 88 MCG tablet, Take 1 tablet by mouth Daily., Disp: 90 tablet, Rfl: 1  •  ondansetron (Zofran) 4 MG tablet, Take 1 tablet by mouth Every 8 (Eight) Hours As Needed for Nausea or Vomiting., Disp: 15 tablet, Rfl: 0  •  promethazine (PHENERGAN) 25 MG tablet, Take 1 tablet by mouth Every 6 (Six) Hours As Needed for Nausea or Vomiting., Disp: 20 tablet, Rfl: 1  •  promethazine-dextromethorphan (PROMETHAZINE-DM) 6.25-15 MG/5ML syrup, Take 5 mL by mouth 4 (Four) Times a Day As Needed for Cough., Disp: 180 mL, Rfl: 0  •  sertraline (Zoloft) 50 MG tablet, Take 1 tablet by mouth Daily., Disp: 90 tablet, Rfl: 0  •  traMADol (ULTRAM) 50 MG tablet, Take 1-2 tablets by mouth Every 6 (Six) Hours As Needed for Moderate Pain or Severe Pain., Disp: 60 tablet, Rfl: 0  •  dexlansoprazole (Dexilant) 60 MG capsule, Take 1 capsule by mouth Daily for 30 days., Disp: 30 capsule, Rfl: 11    Allergies   Allergen Reactions   • Salicylates GI Intolerance     History of gi bleed     • Colestipol GI Intolerance   • Biaxin [Clarithromycin]    • Adhesive Tape Rash     Can use plastic tape, paper tape causes rash   • Augmentin [Amoxicillin-Pot Clavulanate] Diarrhea   • Prevacid [Lansoprazole] Itching and Swelling     Eyes only   • Sulfa Antibiotics GI Intolerance   •  "Sulfamethoxazole-Trimethoprim Nausea And Vomiting and GI Intolerance     \"makes me sick as a dog\"         Family History   Problem Relation Age of Onset   • Arthritis Mother    • Depression Mother    • Hyperlipidemia Mother    • Hypertension Mother    • Irritable bowel syndrome Mother    • Hypertension Father    • Arthritis Father    • Stroke Father    • Alcohol abuse Maternal Grandfather    • Depression Maternal Grandfather    • Heart disease Other         IN FEMALES BEFORE AGE 65   • Asthma Maternal Grandmother        Social History     Socioeconomic History   • Marital status:    Tobacco Use   • Smoking status: Never   • Smokeless tobacco: Never   Vaping Use   • Vaping Use: Never used   Substance and Sexual Activity   • Alcohol use: No   • Drug use: No   • Sexual activity: Yes     Partners: Male     Birth control/protection: None, Hysterectomy       Review of Systems   Gastrointestinal: Positive for nausea. Negative for abdominal pain.   All other systems reviewed and are negative.    Pertinent positives and negatives documented in the HPI and all other systems reviewed and were found to be negative.  Vitals:    04/26/23 1340   BP: 114/80   Pulse: 77   Temp: 96.9 °F (36.1 °C)   SpO2: 94%       Physical Exam  Vitals reviewed.   Constitutional:       General: She is not in acute distress.     Appearance: Normal appearance. She is well-developed. She is not diaphoretic.   HENT:      Head: Normocephalic and atraumatic. Hair is normal.      Right Ear: Hearing, tympanic membrane, ear canal and external ear normal. No decreased hearing noted. No drainage.      Left Ear: Hearing, tympanic membrane, ear canal and external ear normal. No decreased hearing noted.      Nose: Nose normal. No nasal deformity.      Mouth/Throat:      Mouth: Mucous membranes are moist.   Eyes:      General: Lids are normal.         Right eye: No discharge.         Left eye: No discharge.      Extraocular Movements: Extraocular movements " intact.      Conjunctiva/sclera: Conjunctivae normal.      Pupils: Pupils are equal, round, and reactive to light.   Neck:      Thyroid: No thyromegaly.      Vascular: No JVD.      Trachea: No tracheal deviation.   Cardiovascular:      Rate and Rhythm: Normal rate and regular rhythm.      Pulses: Normal pulses.      Heart sounds: Normal heart sounds. No murmur heard.    No friction rub. No gallop.   Pulmonary:      Effort: Pulmonary effort is normal. No respiratory distress.      Breath sounds: Normal breath sounds. No wheezing or rales.   Chest:      Chest wall: No tenderness.   Abdominal:      General: Bowel sounds are normal. There is no distension.      Palpations: Abdomen is soft. There is no mass.      Tenderness: There is no abdominal tenderness. There is no guarding or rebound.      Hernia: No hernia is present.   Musculoskeletal:         General: No tenderness or deformity. Normal range of motion.      Cervical back: Normal range of motion and neck supple.   Lymphadenopathy:      Cervical: No cervical adenopathy.   Skin:     General: Skin is warm and dry.      Findings: No erythema or rash.   Neurological:      Mental Status: She is alert and oriented to person, place, and time.      Cranial Nerves: No cranial nerve deficit.      Motor: No abnormal muscle tone.      Coordination: Coordination normal.      Deep Tendon Reflexes: Reflexes are normal and symmetric. Reflexes normal.   Psychiatric:         Mood and Affect: Mood normal.         Behavior: Behavior normal.         Thought Content: Thought content normal.         Judgment: Judgment normal.         Diagnoses and all orders for this visit:    1. Gastroesophageal reflux disease, unspecified whether esophagitis present (Primary)  -     dexlansoprazole (Dexilant) 60 MG capsule; Take 1 capsule by mouth Daily for 30 days.  Dispense: 30 capsule; Refill: 11  -     Amylase  -     CBC & Differential  -     Lipase  -     Comprehensive Metabolic Panel    2.  Nausea  -     dexlansoprazole (Dexilant) 60 MG capsule; Take 1 capsule by mouth Daily for 30 days.  Dispense: 30 capsule; Refill: 11  -     Amylase  -     CBC & Differential  -     Lipase  -     Comprehensive Metabolic Panel      Assessment:  1. GERD  2. Nausea  3.     Recommendations:  1. Dexilant 60 mg/day.   2. Labs  3. F/u 3 mos.    Return in about 3 months (around 7/26/2023).    Reji Aguilar MD  4/26/2023

## 2023-04-27 ENCOUNTER — TRANSCRIBE ORDERS (OUTPATIENT)
Dept: SURGERY | Facility: SURGERY CENTER | Age: 73
End: 2023-04-27
Payer: MEDICARE

## 2023-04-27 DIAGNOSIS — Z41.9 SURGERY, ELECTIVE: Primary | ICD-10-CM

## 2023-04-27 PROBLEM — M53.3 SACROILIAC JOINT DYSFUNCTION OF LEFT SIDE: Status: ACTIVE | Noted: 2023-04-27

## 2023-04-27 LAB
ALBUMIN SERPL-MCNC: 4.5 G/DL (ref 3.7–4.7)
ALBUMIN/GLOB SERPL: 1.9 {RATIO} (ref 1.2–2.2)
ALP SERPL-CCNC: 67 IU/L (ref 44–121)
ALT SERPL-CCNC: 31 IU/L (ref 0–32)
AMYLASE SERPL-CCNC: 45 U/L (ref 31–110)
AST SERPL-CCNC: 22 IU/L (ref 0–40)
BASOPHILS # BLD AUTO: 0.1 X10E3/UL (ref 0–0.2)
BASOPHILS NFR BLD AUTO: 1 %
BILIRUB SERPL-MCNC: 0.5 MG/DL (ref 0–1.2)
BUN SERPL-MCNC: 18 MG/DL (ref 8–27)
BUN/CREAT SERPL: 19 (ref 12–28)
CALCIUM SERPL-MCNC: 9.3 MG/DL (ref 8.7–10.3)
CHLORIDE SERPL-SCNC: 105 MMOL/L (ref 96–106)
CO2 SERPL-SCNC: 20 MMOL/L (ref 20–29)
CREAT SERPL-MCNC: 0.94 MG/DL (ref 0.57–1)
EGFRCR SERPLBLD CKD-EPI 2021: 64 ML/MIN/1.73
EOSINOPHIL # BLD AUTO: 0.3 X10E3/UL (ref 0–0.4)
EOSINOPHIL NFR BLD AUTO: 5 %
ERYTHROCYTE [DISTWIDTH] IN BLOOD BY AUTOMATED COUNT: 12.1 % (ref 11.7–15.4)
GLOBULIN SER CALC-MCNC: 2.4 G/DL (ref 1.5–4.5)
GLUCOSE SERPL-MCNC: NORMAL MG/DL
HCT VFR BLD AUTO: 39.5 % (ref 34–46.6)
HGB BLD-MCNC: 13.2 G/DL (ref 11.1–15.9)
IMM GRANULOCYTES # BLD AUTO: 0 X10E3/UL (ref 0–0.1)
IMM GRANULOCYTES NFR BLD AUTO: 0 %
LIPASE SERPL-CCNC: 22 U/L (ref 14–85)
LYMPHOCYTES # BLD AUTO: 2.3 X10E3/UL (ref 0.7–3.1)
LYMPHOCYTES NFR BLD AUTO: 34 %
MCH RBC QN AUTO: 30.3 PG (ref 26.6–33)
MCHC RBC AUTO-ENTMCNC: 33.4 G/DL (ref 31.5–35.7)
MCV RBC AUTO: 91 FL (ref 79–97)
MONOCYTES # BLD AUTO: 0.6 X10E3/UL (ref 0.1–0.9)
MONOCYTES NFR BLD AUTO: 9 %
NEUTROPHILS # BLD AUTO: 3.4 X10E3/UL (ref 1.4–7)
NEUTROPHILS NFR BLD AUTO: 51 %
PLATELET # BLD AUTO: 335 X10E3/UL (ref 150–450)
POTASSIUM SERPL-SCNC: NORMAL MMOL/L
PROT SERPL-MCNC: 6.9 G/DL (ref 6–8.5)
RBC # BLD AUTO: 4.35 X10E6/UL (ref 3.77–5.28)
SODIUM SERPL-SCNC: 142 MMOL/L (ref 134–144)
WBC # BLD AUTO: 6.6 X10E3/UL (ref 3.4–10.8)

## 2023-04-30 NOTE — PROGRESS NOTES
04/30/23       Tell her that her labs look good. Send a copy of this report to her PCP.   Maria A youssef

## 2023-05-01 ENCOUNTER — TELEPHONE (OUTPATIENT)
Dept: GASTROENTEROLOGY | Facility: CLINIC | Age: 73
End: 2023-05-01
Payer: MEDICARE

## 2023-05-01 NOTE — TELEPHONE ENCOUNTER
----- Message from Jemima Bell sent at 4/28/2023  1:37 PM EDT -----  Regarding: Medications  Contact: [phone]  You were right!  My insurance is not helping on the Med you prescribed for me on Wed.  I am continuing with the Pantoprazole 40 mg until I hear otherwise from you. I do not know if the Pantoprazole comes in a stronger dosage, just a thought. I await your advisement.  Thank you!  Kim

## 2023-05-02 DIAGNOSIS — M47.816 LUMBAR FACET ARTHROPATHY: Primary | ICD-10-CM

## 2023-05-02 DIAGNOSIS — M48.061 FORAMINAL STENOSIS OF LUMBAR REGION: ICD-10-CM

## 2023-05-02 RX ORDER — GABAPENTIN 300 MG/1
300 CAPSULE ORAL TAKE AS DIRECTED
Qty: 60 CAPSULE | Refills: 0 | Status: SHIPPED | OUTPATIENT
Start: 2023-05-02

## 2023-05-02 NOTE — TELEPHONE ENCOUNTER
"Adelita Madison, RN  [facility] Clinical 3 Pool Yesterday (7:57 AM)     LG  Can one of you look into this PA?      Adelita Madison, RAYRAY Bell \"Kim\" Yesterday (7:56 AM)     LG  Im going to have the medical assistance check to see if there is a prior authorization needed.  Stay on the pantoprazole until we hear back from your insurance company,  if they flat out will not pay, I will see what Dr Aguilar would like to do.  This process can take up to 5 days.  Have a great day  Adelita SEO/Dr Aguilar.    "

## 2023-05-02 NOTE — TELEPHONE ENCOUNTER
Called patient's pharmacy twice to get prescription coverage information. Phone rang several times and went to voicemail both times.     Called patient and she does not have that card on her. Will keep trying pharmacy.

## 2023-05-08 NOTE — TELEPHONE ENCOUNTER
CALLED PHARMACY AND RECEIVED INSURANCE INFORMATION    ID#: [id number]  RXBIN: [id number]  PCN: [id number]  GRP: [id number]    PA WAS SUBMITTED VIA CMM  Key: [id number] AND IT STATED The patient currently has access to the requested medication and a Prior Authorization is not needed for the patient/medication.

## 2023-05-11 NOTE — DISCHARGE INSTRUCTIONS
[facility] - Post-procedure Instructions          --  While there are no absolute restrictions, it is recommended that you do not perform strenuous activity today. In the morning, you may resume your level of activity as before your block.    --  If you have a band-aid at your injection site, please remove it later today. Observe the area for any redness, swelling, pus-like drainage, or a temperature over 101°. If any of these symptoms occur, please call your doctor at [phone]. If after office hours, leave a message and the on-call provider will return your call.    --  Ice may be applied to your injection site. It is recommended you avoid direct heat (heating pad; hot tub) for 1-2 days.    --  Call [facility] at [phone] if you experience persistent headache, persistent bleeding from the injection site, or severe pain not relieved by heat or oral medication.    --  Do not make important decisions today.    --  Due to the effects of the block and/or the I.V. Sedation, DO NOT drive or operate hazardous machinery for 12 hours.  Local anesthetics may cause numbness after procedure and precautions must be taken with regards to operating equipment as well as with walking, even if ambulating with assistance of another person or with an assistive device.    --  Do not drink alcohol for 12 hours.    -- You may return to work tomorrow, or as directed by your referring doctor.    --  Occasionally you may notice a slight increase in your pain after the procedure. This should start to improve within the next 24-48 hours. Radiofrequency ablation procedure pain may last 3-4 weeks.    --  It may take as long as 3-4 days before you notice a gradual improvement in your pain and/or other symptoms.    -- You may continue to take your prescribed pain medication as needed.    --  Some normal possible side effects of steroid use could include fluid retention, increased blood sugar, dull headache,  increased sweating, increased appetite, mood swings and flushing.    --  Diabetics are recommended to watch their blood glucose level closely for 24-48 hours after the injection.    --  Must stay in PACU for 20 min upon arrival and prove no leg weakness before being discharged.    --  IN THE EVENT OF A LIFE THREATENING EMERGENCY, (CHEST PAIN, BREATHING DIFFICULTIES, PARALYSIS…) YOU SHOULD GO TO YOUR NEAREST EMERGENCY ROOM.    --  You should be contacted by our office within 2-3 days to schedule follow up or next appointment date.  If not contacted within 7 days, please call the office at [phone]

## 2023-05-12 ENCOUNTER — HOSPITAL ENCOUNTER (OUTPATIENT)
Dept: GENERAL RADIOLOGY | Facility: SURGERY CENTER | Age: 73
Setting detail: HOSPITAL OUTPATIENT SURGERY
End: 2023-05-12
Payer: MEDICARE

## 2023-05-12 ENCOUNTER — HOSPITAL ENCOUNTER (OUTPATIENT)
Facility: SURGERY CENTER | Age: 73
Setting detail: HOSPITAL OUTPATIENT SURGERY
Discharge: HOME | End: 2023-05-12
Attending: ANESTHESIOLOGY | Admitting: ANESTHESIOLOGY
Payer: MEDICARE

## 2023-05-12 VITALS
HEART RATE: 61 BPM | SYSTOLIC BLOOD PRESSURE: 133 MMHG | DIASTOLIC BLOOD PRESSURE: 86 MMHG | OXYGEN SATURATION: 96 % | RESPIRATION RATE: 18 BRPM | TEMPERATURE: 98 F

## 2023-05-12 DIAGNOSIS — Z41.9 SURGERY, ELECTIVE: ICD-10-CM

## 2023-05-12 PROCEDURE — 27096 INJECT SACROILIAC JOINT: CPT | Performed by: ANESTHESIOLOGY

## 2023-05-12 PROCEDURE — 25010000002 DEXAMETHASONE SODIUM PHOSPHATE 100 MG/10ML SOLUTION 10 ML VIAL: Performed by: ANESTHESIOLOGY

## 2023-05-12 PROCEDURE — 77002 NEEDLE LOCALIZATION BY XRAY: CPT

## 2023-05-12 PROCEDURE — G0260 INJ FOR SACROILIAC JT ANESTH: HCPCS | Performed by: ANESTHESIOLOGY

## 2023-05-12 PROCEDURE — 25510000001 IOPAMIDOL 61 % SOLUTION 30 ML VIAL: Performed by: ANESTHESIOLOGY

## 2023-05-12 NOTE — OP NOTE
Left Sacroiliac Joint Injection  [facility]      PREOPERATIVE DIAGNOSIS:   Sacroiliac joint dysfunction    POSTOPERATIVE DIAGNOSIS:  Sacroiliac joint dysfunction    PROCEDURE:  Sacroiliac Joint Injection, with fluoroscopic guidance [facility] [id number] -LT    PRE-PROCEDURE DISCUSSION WITH PATIENT:    Risks and complications were discussed with the patient prior to starting the procedure and informed consent was obtained.  We discussed various topics including but not limited to bleeding, infection, injury, postprocedural site soreness, painful flareup, worsening of clinical picture, paralysis, coma, and death.     SURGEON:  Shey Aranda MD    REASON FOR PROCEDURE:    Patient has pain consistent with SI pathology on history and physical exam.    SEDATION:  No sedation was used for this procedure  ANESTHETIC AGENT:  Lidocaine 1%  STEROID AGENT:  15mg Dexamethasone    DESCRIPTON OF PROCEDURE:  After obtaining informed consent, IV access was not obtained in the preoperative area.  The patient was transported to the operative suite and placed in the prone position. EKG, blood pressure, and pulse oximeter were monitored. The lumbosacral area was prepped with Chloraprep and draped in a sterile fashion. Under fluoroscopic guidance the inferior most portion of the left SI joint was identified. The overlying skin and subcutaneous tissue was anesthetized with 1% lidocaine. A 22-gauge spinal needle was then advanced under fluoroscopic guidance in a coaxial view into the joint space.  After negative aspiration, 1 mL of Isovue 61% was injected, and after seeing appropriate spread into the joint a volume of 3ml of the above medication was injected.    Needle was removed intact.      Vital signs remained stable.  The onset of analgesia was noted.    Pre-procedure pain score: 5/10 at rest, 8/10 with activity  Post-procedure pain score: 0/10      ESTIMATED BLOOD LOSS:  minimal  SPECIMENS:  None  COMPLICATIONS:  No  complications were noted.    TOLERANCE & DISCHARGE CONDITION:    The patient tolerated the procedure well.  The patient was transported to the recovery area without difficulties.  The patient was discharged to home under the care of family in stable and satisfactory condition.    PLAN OF CARE:  1. The patient was given our standard instruction sheet and will resume all medications as per the medication reconciliation sheet.  2. The patient will Return to clinic 1-2 wks.  3. The patient is instructed to keep an account of pain relief after the procedure.

## 2023-05-16 ENCOUNTER — APPOINTMENT (OUTPATIENT)
Dept: GENERAL RADIOLOGY | Facility: SURGERY CENTER | Age: 73
End: 2023-05-16
Payer: MEDICARE

## 2023-05-17 RX ORDER — SERTRALINE HYDROCHLORIDE 25 MG/1
TABLET, FILM COATED ORAL
Qty: 30 TABLET | OUTPATIENT
Start: 2023-05-17

## 2023-05-19 ENCOUNTER — TELEPHONE (OUTPATIENT)
Dept: GASTROENTEROLOGY | Facility: CLINIC | Age: 73
End: 2023-05-19
Payer: MEDICARE

## 2023-05-19 NOTE — TELEPHONE ENCOUNTER
Per ford pt has viewed her results.     Results sne to Dr Kehrer thru [facility].

## 2023-05-19 NOTE — TELEPHONE ENCOUNTER
----- Message from Reji Aguilar MD sent at 4/30/2023  4:47 PM EDT -----  04/30/23       Tell her that her labs look good. Send a copy of this report to her PCP.   Maria A youssef

## 2023-05-22 RX ORDER — ONDANSETRON 4 MG/1
TABLET, FILM COATED ORAL
Qty: 15 TABLET | Refills: 0 | Status: SHIPPED | OUTPATIENT
Start: 2023-05-22 | End: 2023-05-22 | Stop reason: SDUPTHER

## 2023-05-22 RX ORDER — ONDANSETRON 4 MG/1
4 TABLET, FILM COATED ORAL EVERY 8 HOURS PRN
Qty: 15 TABLET | Refills: 0 | Status: SHIPPED | OUTPATIENT
Start: 2023-05-22

## 2023-05-22 RX ORDER — ONDANSETRON 4 MG/1
4 TABLET, FILM COATED ORAL EVERY 8 HOURS PRN
Qty: 15 TABLET | Refills: 0 | Status: SHIPPED | OUTPATIENT
Start: 2023-05-22 | End: 2023-05-22 | Stop reason: SDUPTHER

## 2023-05-23 ENCOUNTER — OFFICE VISIT (OUTPATIENT)
Dept: PAIN MEDICINE | Facility: CLINIC | Age: 73
End: 2023-05-23
Payer: MEDICARE

## 2023-05-23 VITALS
RESPIRATION RATE: 18 BRPM | DIASTOLIC BLOOD PRESSURE: 79 MMHG | BODY MASS INDEX: 33.2 KG/M2 | WEIGHT: 180.4 LBS | TEMPERATURE: 97.5 F | SYSTOLIC BLOOD PRESSURE: 120 MMHG | OXYGEN SATURATION: 95 % | HEART RATE: 75 BPM | HEIGHT: 62 IN

## 2023-05-23 DIAGNOSIS — M48.061 FORAMINAL STENOSIS OF LUMBAR REGION: ICD-10-CM

## 2023-05-23 DIAGNOSIS — M53.3 SACROILIAC JOINT DYSFUNCTION OF LEFT SIDE: Primary | ICD-10-CM

## 2023-05-23 DIAGNOSIS — M54.16 LUMBAR RADICULOPATHY: ICD-10-CM

## 2023-05-23 PROCEDURE — 99212 OFFICE O/P EST SF 10 MIN: CPT | Performed by: NURSE PRACTITIONER

## 2023-05-23 PROCEDURE — 1126F AMNT PAIN NOTED NONE PRSNT: CPT | Performed by: NURSE PRACTITIONER

## 2023-05-23 PROCEDURE — 1159F MED LIST DOCD IN RCRD: CPT | Performed by: NURSE PRACTITIONER

## 2023-05-23 PROCEDURE — 1160F RVW MEDS BY RX/DR IN RCRD: CPT | Performed by: NURSE PRACTITIONER

## 2023-05-23 RX ORDER — PANTOPRAZOLE SODIUM 40 MG/1
40 TABLET, DELAYED RELEASE ORAL DAILY
COMMUNITY
Start: 2023-04-28

## 2023-05-23 NOTE — PROGRESS NOTES
CHIEF COMPLAINT  Follow-up for back pain.    Subjective   Jemima Bell is a 73 y.o. female  who presents to the office for follow-up of procedure.  She completed a Left Sacroiliac Joint Injection   on  5/12/2023 performed by Dr. Aranda for management of back pain. Patient reports 90% ongoing relief from the procedure.       Preprocedure pain score: 5/10 VAS at rest, 8/10 VAS with activity   Postprocedure pain score: 0/10 VAS at rest, 0/10 VAS with activity   Pain score in office today: 0/10 VAS in severity.     Today her pain is 0/10VAS in severity. She states that she will intermittently have a catch in her pain. I did prescribe Gabapentin due to worsening pain, she did not start this.     Procedure list:  3/27/2023-LESI at L5/S1-100% relief to back pain x1 week, 80% ongoing relief to left leg pain.    Back Pain  This is a chronic problem. The current episode started more than 1 year ago. The problem occurs constantly. Progression since onset: Imporved since last office visit. The pain is present in the sacro-iliac and lumbar spine. The quality of the pain is described as aching and stabbing. The pain radiates to the left thigh (left thigh pain has improved since LESI). The pain is at a severity of 0/10. The pain is the same all the time. The symptoms are aggravated by standing (walking). Pertinent negatives include no abdominal pain, chest pain, dysuria, fever, headaches, numbness or weakness. Risk factors include menopause. She has tried heat, ice and analgesics (PT previously) for the symptoms.      PEG Assessment   What number best describes your pain on average in the past week?1  What number best describes how, during the past week, pain has interfered with your enjoyment of life?0  What number best describes how, during the past week, pain has interfered with your general activity?  0    The following portions of the patient's history were reviewed and updated as appropriate: allergies, current  "medications, past family history, past medical history, past social history, past surgical history and problem list.    Review of Systems   Constitutional: Negative for fever.   Cardiovascular: Negative for chest pain.   Gastrointestinal: Positive for diarrhea. Negative for abdominal pain and constipation.   Genitourinary: Negative for difficulty urinating and dysuria.   Musculoskeletal: Negative for back pain.   Neurological: Negative for weakness, numbness and headaches.   Psychiatric/Behavioral: Positive for sleep disturbance. Negative for suicidal ideas. The patient is nervous/anxious.      --  The aforementioned information the Chief Complaint section and above subjective data including any HPI data, and also the Review of Systems data, has been personally reviewed and affirmed.  --     Vitals:    05/23/23 1532   BP: 120/79   Pulse: 75   Resp: 18   Temp: 97.5 °F (36.4 °C)   SpO2: 95%   Weight: 81.8 kg (180 lb 6.4 oz)   Height: 157.5 cm (62\")   PainSc: 0-No pain     Objective   Physical Exam  Vitals and nursing note reviewed.   Constitutional:       Appearance: Normal appearance. She is well-developed.   Eyes:      General: Lids are normal.   Cardiovascular:      Rate and Rhythm: Normal rate.   Pulmonary:      Effort: Pulmonary effort is normal.   Musculoskeletal:      Lumbar back: No tenderness. Normal range of motion.   Neurological:      Mental Status: She is alert and oriented to person, place, and time.   Psychiatric:         Attention and Perception: Attention normal.         Mood and Affect: Mood normal.         Speech: Speech normal.         Behavior: Behavior normal.         Judgment: Judgment normal.       Assessment & Plan   Diagnoses and all orders for this visit:    1. Sacroiliac joint dysfunction of left side (Primary)    2. Lumbar radiculopathy    3. Foraminal stenosis of lumbar region      --- Reviewed that her Left SI injection at this time is diagnostically positive. If this provides 50% relief " x 3 months then it will be considered therapeutic. If her pain returns/worsens prior to that time we can repeat the diagnostic injection and consider a referral to Dr. Rice for evaluation for fusion.   --- May repeat LESI if radicular pain returns.   --- Follow-up if pain returns/worsens.      SANJEEV REPORT    As the clinician, I personally reviewed the SANJEEV from 5/23/2023 while the patient was in the office today.    History and physical exam exhibit continued safe and appropriate use of controlled substances.    Dictated utilizing Dragon dictation.

## 2023-05-29 DIAGNOSIS — M48.061 FORAMINAL STENOSIS OF LUMBAR REGION: ICD-10-CM

## 2023-05-30 RX ORDER — GABAPENTIN 300 MG/1
CAPSULE ORAL
Qty: 60 CAPSULE | OUTPATIENT
Start: 2023-05-30

## 2023-05-31 ENCOUNTER — OFFICE VISIT (OUTPATIENT)
Dept: FAMILY MEDICINE CLINIC | Facility: CLINIC | Age: 73
End: 2023-05-31

## 2023-05-31 VITALS
DIASTOLIC BLOOD PRESSURE: 70 MMHG | SYSTOLIC BLOOD PRESSURE: 118 MMHG | HEART RATE: 71 BPM | HEIGHT: 62 IN | OXYGEN SATURATION: 98 % | WEIGHT: 180.6 LBS | TEMPERATURE: 98 F | BODY MASS INDEX: 33.23 KG/M2

## 2023-05-31 DIAGNOSIS — F41.8 DEPRESSION WITH ANXIETY: ICD-10-CM

## 2023-05-31 DIAGNOSIS — E03.9 HYPOTHYROIDISM, UNSPECIFIED TYPE: ICD-10-CM

## 2023-05-31 DIAGNOSIS — R53.83 FATIGUE, UNSPECIFIED TYPE: Primary | ICD-10-CM

## 2023-05-31 DIAGNOSIS — J30.1 SEASONAL ALLERGIC RHINITIS DUE TO POLLEN: ICD-10-CM

## 2023-05-31 DIAGNOSIS — H10.13 ALLERGIC CONJUNCTIVITIS OF BOTH EYES: ICD-10-CM

## 2023-05-31 PROCEDURE — 99214 OFFICE O/P EST MOD 30 MIN: CPT | Performed by: FAMILY MEDICINE

## 2023-05-31 RX ORDER — FEXOFENADINE HCL 180 MG/1
180 TABLET ORAL DAILY
Start: 2023-05-31

## 2023-05-31 RX ORDER — TOBRAMYCIN AND DEXAMETHASONE 3; 1 MG/ML; MG/ML
1 SUSPENSION/ DROPS OPHTHALMIC
Qty: 2 ML | Refills: 0 | Status: SHIPPED | OUTPATIENT
Start: 2023-05-31 | End: 2023-06-01

## 2023-05-31 NOTE — PROGRESS NOTES
"Chief Complaint  Fatigue and Allergies    Subjective        Jemima Bell presents to [facility]  History of Present Illness  Has been having very bad allergies for a few weeks.  Takes zyrtec before bed since it makes her tired.  Using steroid nasal spray.   Eyes have been burning and stinging.     Has been getting worse fatigue for months.   Has not been able to get stuff done around the house.   Feels mood is OK.   He feels he may have gotten more fatigued after going up on the dose of sertraline.      Objective   Vital Signs:  /70   Pulse 71   Temp 98 °F (36.7 °C)   Ht 157.5 cm (62\")   Wt 81.9 kg (180 lb 9.6 oz)   SpO2 98%   BMI 33.03 kg/m²   Estimated body mass index is 33.03 kg/m² as calculated from the following:    Height as of this encounter: 157.5 cm (62\").    Weight as of this encounter: 81.9 kg (180 lb 9.6 oz).             Physical Exam  Constitutional:       General: She is not in acute distress.     Appearance: Normal appearance. She is well-developed.   HENT:      Head: Normocephalic and atraumatic.      Right Ear: Tympanic membrane, ear canal and external ear normal.      Left Ear: Tympanic membrane, ear canal and external ear normal.      Mouth/Throat:      Mouth: Mucous membranes are moist.      Pharynx: Oropharynx is clear.   Eyes:      Pupils: Pupils are equal, round, and reactive to light.      Comments: Bilateral conjunctive a with 2+ injection and erythema, no drainage   Neck:      Thyroid: No thyromegaly.   Cardiovascular:      Rate and Rhythm: Normal rate and regular rhythm.      Heart sounds: No murmur heard.  Pulmonary:      Effort: Pulmonary effort is normal.      Breath sounds: Normal breath sounds. No wheezing.   Abdominal:      General: Bowel sounds are normal.      Palpations: Abdomen is soft.      Tenderness: There is no abdominal tenderness.   Musculoskeletal:         General: Normal range of motion.      Cervical back: Neck supple. "   Lymphadenopathy:      Cervical: No cervical adenopathy.   Skin:     General: Skin is warm and dry.   Neurological:      Mental Status: She is alert and oriented to person, place, and time.   Psychiatric:         Mood and Affect: Mood normal.         Behavior: Behavior normal.        Result Review :                   Assessment and Plan   Diagnoses and all orders for this visit:    1. Fatigue, unspecified type (Primary)  -     Comprehensive Metabolic Panel  -     CBC & Differential  -     TSH    2. Seasonal allergic rhinitis due to pollen  -     fexofenadine (Allegra Allergy) 180 MG tablet; Take 1 tablet by mouth Daily.    3. Allergic conjunctivitis of both eyes  -     tobramycin-dexamethasone (TobraDex) 0.3-0.1 % ophthalmic suspension; Administer 1 drop to both eyes Every 4 (Four) Hours While Awake for 5 days.  Dispense: 2 mL; Refill: 0    4. Hypothyroidism, unspecified type    5. Depression with anxiety    Fatigue-multifactorial most likely, we will go ahead and rule out some issues with blood work  Seasonal allergies-try fexofenadine instead of cetirizine, Benadryl or hydroxyzine at night  Allergic conjunctivitis-TobraDex for 5 days, follow-up if needed  Depression- we may try a lower dose of circulated for improving her allergies does not help her fatigue and her labs are all normal         Follow Up   No follow-ups on file.  Patient was given instructions and counseling regarding her condition or for health maintenance advice. Please see specific information pulled into the AVS if appropriate.       Answers for HPI/ROS submitted by the patient on 5/31/2023  Please describe your symptoms.: Extreme fatigue, Allergies  Have you had these symptoms before?: Yes  How long have you been having these symptoms?: Greater than 2 weeks  Please list any medications you are currently taking for this condition.: N/A  Please describe any probable cause for these symptoms. : Unknown  What is the primary reason for your visit?:  Other

## 2023-06-01 ENCOUNTER — TELEPHONE (OUTPATIENT)
Dept: FAMILY MEDICINE CLINIC | Facility: CLINIC | Age: 73
End: 2023-06-01

## 2023-06-01 LAB
ALBUMIN SERPL-MCNC: 4.8 G/DL (ref 3.7–4.7)
ALBUMIN/GLOB SERPL: 1.8 {RATIO} (ref 1.2–2.2)
ALP SERPL-CCNC: 67 IU/L (ref 44–121)
ALT SERPL-CCNC: 18 IU/L (ref 0–32)
AST SERPL-CCNC: 17 IU/L (ref 0–40)
BASOPHILS # BLD AUTO: 0.1 X10E3/UL (ref 0–0.2)
BASOPHILS NFR BLD AUTO: 1 %
BILIRUB SERPL-MCNC: 0.4 MG/DL (ref 0–1.2)
BUN SERPL-MCNC: 14 MG/DL (ref 8–27)
BUN/CREAT SERPL: 15 (ref 12–28)
CALCIUM SERPL-MCNC: 9.7 MG/DL (ref 8.7–10.3)
CHLORIDE SERPL-SCNC: 105 MMOL/L (ref 96–106)
CO2 SERPL-SCNC: 24 MMOL/L (ref 20–29)
CREAT SERPL-MCNC: 0.91 MG/DL (ref 0.57–1)
EGFRCR SERPLBLD CKD-EPI 2021: 66 ML/MIN/1.73
EOSINOPHIL # BLD AUTO: 0.3 X10E3/UL (ref 0–0.4)
EOSINOPHIL NFR BLD AUTO: 4 %
ERYTHROCYTE [DISTWIDTH] IN BLOOD BY AUTOMATED COUNT: 12 % (ref 11.7–15.4)
GLOBULIN SER CALC-MCNC: 2.6 G/DL (ref 1.5–4.5)
GLUCOSE SERPL-MCNC: 95 MG/DL (ref 70–99)
HCT VFR BLD AUTO: 42.1 % (ref 34–46.6)
HGB BLD-MCNC: 14.2 G/DL (ref 11.1–15.9)
IMM GRANULOCYTES # BLD AUTO: 0 X10E3/UL (ref 0–0.1)
IMM GRANULOCYTES NFR BLD AUTO: 0 %
LYMPHOCYTES # BLD AUTO: 2.9 X10E3/UL (ref 0.7–3.1)
LYMPHOCYTES NFR BLD AUTO: 37 %
MCH RBC QN AUTO: 31.4 PG (ref 26.6–33)
MCHC RBC AUTO-ENTMCNC: 33.7 G/DL (ref 31.5–35.7)
MCV RBC AUTO: 93 FL (ref 79–97)
MONOCYTES # BLD AUTO: 0.7 X10E3/UL (ref 0.1–0.9)
MONOCYTES NFR BLD AUTO: 9 %
NEUTROPHILS # BLD AUTO: 3.8 X10E3/UL (ref 1.4–7)
NEUTROPHILS NFR BLD AUTO: 49 %
PLATELET # BLD AUTO: 344 X10E3/UL (ref 150–450)
POTASSIUM SERPL-SCNC: 4.2 MMOL/L (ref 3.5–5.2)
PROT SERPL-MCNC: 7.4 G/DL (ref 6–8.5)
RBC # BLD AUTO: 4.52 X10E6/UL (ref 3.77–5.28)
SODIUM SERPL-SCNC: 145 MMOL/L (ref 134–144)
TSH SERPL DL<=0.005 MIU/L-ACNC: 3.59 UIU/ML (ref 0.45–4.5)
WBC # BLD AUTO: 7.7 X10E3/UL (ref 3.4–10.8)

## 2023-06-01 RX ORDER — CIPROFLOXACIN HYDROCHLORIDE 3.5 MG/ML
1 SOLUTION/ DROPS TOPICAL
Qty: 5 ML | Refills: 0 | Status: SHIPPED | OUTPATIENT
Start: 2023-06-01

## 2023-06-01 NOTE — TELEPHONE ENCOUNTER
"----- Message from Rachel Graf MA sent at 6/1/2023  7:08 AM EDT -----  Regarding: FW: Tobramycin  Contact: [phone]  Please advise   ----- Message -----  From: Jemima Bell \"Kim\"  Sent: 5/31/2023   5:27 PM EDT  To: [facility]  Subject: Tobramycin                                       Is there a less expensive Med that can be called in in place of the Tobramycin?   Thanks for checking.      "

## 2023-07-10 NOTE — H&P (VIEW-ONLY)
CHIEF COMPLAINT  Follow-up for back pain.    Subjective   Jemima Bell is a 73 y.o. female  who presents for follow-up.  She has a history of back pain.  Today her pain is 7/10VAS in severity. Her pain began to worsen ~ 2 weeks ago. Her primary pain complaint today is located over her Left SI joint radiating into the left lateral hip, groin, and thigh. Her pain is worsened by standing and walking. It is improved by rest. She has previously trialed heat, ice, PT, and OTC analgesics.     Procedure list:  5/12/2023-left SI joint injection-90% relief x6 weeks (diagnostically positive).   3/27/2023-LESI at L5/S1-100% relief to back pain x1 week, 80% ongoing relief to left leg pain.    Back Pain  This is a chronic problem. The current episode started more than 1 year ago. The problem occurs constantly. The problem has been gradually worsening since onset. The pain is present in the sacro-iliac and lumbar spine. The quality of the pain is described as aching and stabbing. The pain radiates to the left thigh (left thigh pain has improved since LESI). The pain is at a severity of 7/10. The pain is The same all the time. The symptoms are aggravated by standing (walking). Associated symptoms include weakness (bilateral hips). Pertinent negatives include no abdominal pain, chest pain, dysuria, fever, headaches or numbness. Risk factors include menopause. She has tried heat, ice and analgesics (PT previously) for the symptoms.      PEG Assessment   What number best describes your pain on average in the past week?7  What number best describes how, during the past week, pain has interfered with your enjoyment of life?8  What number best describes how, during the past week, pain has interfered with your general activity?  6    The following portions of the patient's history were reviewed and updated as appropriate: allergies, current medications, past family history, past medical history, past social history, past  "surgical history, and problem list.    Review of Systems   Constitutional:  Negative for fever.   Cardiovascular:  Negative for chest pain.   Gastrointestinal:  Positive for diarrhea. Negative for abdominal pain and constipation.   Genitourinary:  Negative for difficulty urinating and dysuria.   Musculoskeletal:  Positive for back pain.   Neurological:  Positive for weakness (bilateral hips). Negative for numbness and headaches.   Psychiatric/Behavioral:  Positive for sleep disturbance. Negative for suicidal ideas. The patient is nervous/anxious.      --  The aforementioned information the Chief Complaint section and above subjective data including any HPI data, and also the Review of Systems data, has been personally reviewed and affirmed.  --    Vitals:    07/10/23 1327   BP: 120/70   Pulse: 70   Resp: 18   Temp: 97.1 °F (36.2 °C)   SpO2: 99%   Weight: 82.2 kg (181 lb 3.2 oz)   Height: 157.5 cm (62\")   PainSc:   7   PainLoc: Back     Objective   Physical Exam  Vitals and nursing note reviewed.   Constitutional:       Appearance: Normal appearance. She is well-developed.   Eyes:      General: Lids are normal.   Cardiovascular:      Rate and Rhythm: Normal rate.   Pulmonary:      Effort: Pulmonary effort is normal.   Musculoskeletal:      Lumbar back: Tenderness and bony tenderness present. Decreased range of motion. Negative right straight leg raise test and negative left straight leg raise test.      Left hip: Tenderness (over left GT Bursa) present.      Comments:   +Left SI compression  +Left Kip  +Left Gaenslen's  -Left Thigh Thrust   Neurological:      Mental Status: She is alert and oriented to person, place, and time.   Psychiatric:         Attention and Perception: Attention normal.         Mood and Affect: Mood normal.         Speech: Speech normal.         Behavior: Behavior normal.         Judgment: Judgment normal.       Assessment & Plan   Diagnoses and all orders for this visit:    1. Sacroiliac " joint dysfunction of left side (Primary)    2. Lumbar degenerative disc disease      --- Repeat Left SI injection. If diagnostically positive without therapeutic benefit then consider referral to Dr. Rice.   Reviewed the procedure at length with the patient.  Included in the review was expectations, complications, risk and benefits.The procedure was described in detail and the risks, benefits and alternatives were discussed with the patient (including but not limited to: bleeding, infection, nerve damage, worsening of pain, inability to perform injection, paralysis, seizures, coma, no pain relief and death) who agreed to proceed.  Discussed the potential for sedation if warranted/wanted.  The procedure will plan to be performed at [facility] with fluoroscopic guidance(unless ultrasound is indicated) and could potentially have steroids and contrast dye used. Questions were answered and in a way the patient could understand.  Patient verbalized understanding and wishes to proceed.  This intervention will be ordered.  Discussed with patient that all procedures are part of a multimodal plan of care and include either formal PT or a home exercise program.  Patient has no evidence of coagulopathy or current infection.\    --- Follow-up after procedure     Dictated utilizing Dragon dictation.

## 2023-07-11 NOTE — DISCHARGE INSTRUCTIONS
[facility] - Post-procedure Instructions          --  While there are no absolute restrictions, it is recommended that you do not perform strenuous activity today. In the morning, you may resume your level of activity as before your block.    --  If you have a band-aid at your injection site, please remove it later today. Observe the area for any redness, swelling, pus-like drainage, or a temperature over 101°. If any of these symptoms occur, please call your doctor at [phone]. If after office hours, leave a message and the on-call provider will return your call.    --  Ice may be applied to your injection site. It is recommended you avoid direct heat (heating pad; hot tub) for 1-2 days.    --  Call [facility] at [phone] if you experience persistent headache, persistent bleeding from the injection site, or severe pain not relieved by heat or oral medication.    --  Do not make important decisions today.    --  Due to the effects of the block and/or the I.V. Sedation, DO NOT drive or operate hazardous machinery for 12 hours.  Local anesthetics may cause numbness after procedure and precautions must be taken with regards to operating equipment as well as with walking, even if ambulating with assistance of another person or with an assistive device.    --  Do not drink alcohol for 12 hours.    -- You may return to work tomorrow, or as directed by your referring doctor.    --  Occasionally you may notice a slight increase in your pain after the procedure. This should start to improve within the next 24-48 hours. Radiofrequency ablation procedure pain may last 3-4 weeks.    --  It may take as long as 3-4 days before you notice a gradual improvement in your pain and/or other symptoms.    -- You may continue to take your prescribed pain medication as needed.    --  Some normal possible side effects of steroid use could include fluid retention, increased blood sugar, dull headache,  increased sweating, increased appetite, mood swings and flushing.    --  Diabetics are recommended to watch their blood glucose level closely for 24-48 hours after the injection.    --  Must stay in PACU for 20 min upon arrival and prove no leg weakness before being discharged.    --  IN THE EVENT OF A LIFE THREATENING EMERGENCY, (CHEST PAIN, BREATHING DIFFICULTIES, PARALYSIS…) YOU SHOULD GO TO YOUR NEAREST EMERGENCY ROOM.    --  You should be contacted by our office within 2-3 days to schedule follow up or next appointment date.  If not contacted within 7 days, please call the office at [phone]

## 2023-07-12 ENCOUNTER — HOSPITAL ENCOUNTER (OUTPATIENT)
Dept: GENERAL RADIOLOGY | Facility: SURGERY CENTER | Age: 73
Setting detail: HOSPITAL OUTPATIENT SURGERY
End: 2023-07-12
Payer: MEDICARE

## 2023-07-12 ENCOUNTER — HOSPITAL ENCOUNTER (OUTPATIENT)
Facility: SURGERY CENTER | Age: 73
Setting detail: HOSPITAL OUTPATIENT SURGERY
Discharge: HOME | End: 2023-07-12
Attending: ANESTHESIOLOGY | Admitting: ANESTHESIOLOGY
Payer: MEDICARE

## 2023-07-12 VITALS
TEMPERATURE: 97 F | HEART RATE: 65 BPM | RESPIRATION RATE: 16 BRPM | OXYGEN SATURATION: 95 % | DIASTOLIC BLOOD PRESSURE: 58 MMHG | BODY MASS INDEX: 33.31 KG/M2 | WEIGHT: 181 LBS | HEIGHT: 62 IN | SYSTOLIC BLOOD PRESSURE: 122 MMHG

## 2023-07-12 DIAGNOSIS — Z41.9 SURGERY, ELECTIVE: ICD-10-CM

## 2023-07-12 PROCEDURE — 77002 NEEDLE LOCALIZATION BY XRAY: CPT

## 2023-07-12 PROCEDURE — 25010000002 DEXAMETHASONE SODIUM PHOSPHATE 100 MG/10ML SOLUTION 10 ML VIAL: Performed by: ANESTHESIOLOGY

## 2023-07-12 PROCEDURE — 25510000001 IOPAMIDOL 61 % SOLUTION 30 ML VIAL: Performed by: ANESTHESIOLOGY

## 2023-07-12 PROCEDURE — 27096 INJECT SACROILIAC JOINT: CPT | Performed by: ANESTHESIOLOGY

## 2023-07-12 PROCEDURE — G0260 INJ FOR SACROILIAC JT ANESTH: HCPCS | Performed by: ANESTHESIOLOGY

## 2023-07-12 NOTE — OP NOTE
Left Sacroiliac Joint Injection  [facility]      PREOPERATIVE DIAGNOSIS:   Sacroiliac joint dysfunction    POSTOPERATIVE DIAGNOSIS:  Same    PROCEDURE:  Sacroiliac Joint Injection, with fluoroscopic guidance CPT 48218 -LT    PRE-PROCEDURE DISCUSSION WITH PATIENT:    Risks and complications were discussed with the patient prior to starting the procedure and informed consent was obtained.  We discussed various topics including but not limited to bleeding, infection, injury, postprocedural site soreness, painful flareup, worsening of clinical picture, paralysis, coma, and death.     SURGEON:  Shey Aranda MD    REASON FOR PROCEDURE:    Patient has pain consistent with SI pathology on history and physical exam. Previous clinically significant therapeutic effect of SI joint injection.      SEDATION:  No sedation was used for this procedure  ANESTHETIC AGENT:  Lidocaine 1%  STEROID AGENT:  15mg Dexamethasone    DESCRIPTON OF PROCEDURE:  After obtaining informed consent, IV access was not obtained in the preoperative area.  The patient was transported to the operative suite and placed in the prone position. EKG, blood pressure, and pulse oximeter were monitored. The lumbosacral area was prepped with Chloraprep and draped in a sterile fashion. Under fluoroscopic guidance the inferior most portion of the left SI joint was identified. The overlying skin and subcutaneous tissue was anesthetized with 1% lidocaine. A 22-gauge spinal needle was then advanced under fluoroscopic guidance in a coaxial view into the joint space.  After negative aspiration, 1 mL of Isovue 61% was injected, and after seeing appropriate spread into the joint a volume of 3ml of the above medication was injected.    Needle was removed intact.      Vital signs remained stable.  The onset of analgesia was noted.    Pre-procedure pain score: 8/10 at rest, 8/10 with activity  Post-procedure pain score: 0/10      ESTIMATED BLOOD LOSS:   minimal  SPECIMENS:  None  COMPLICATIONS:  No complications were noted. and There was no indication of intrathecal uptake on live injection of contrast dye.    TOLERANCE & DISCHARGE CONDITION:    The patient tolerated the procedure well.  The patient was transported to the recovery area without difficulties.  The patient was discharged to home under the care of family in stable and satisfactory condition.    PLAN OF CARE:  The patient was given our standard instruction sheet and will resume all medications as per the medication reconciliation sheet.  The patient will Return to clinic 1-2 wks.  The patient is instructed to keep an account of pain relief after the procedure.

## 2023-07-28 RX ORDER — LEVOTHYROXINE SODIUM 88 UG/1
TABLET ORAL
Qty: 90 TABLET | Refills: 0 | Status: SHIPPED | OUTPATIENT
Start: 2023-07-28

## 2023-08-02 ENCOUNTER — OFFICE VISIT (OUTPATIENT)
Dept: GASTROENTEROLOGY | Facility: CLINIC | Age: 73
End: 2023-08-02
Payer: MEDICARE

## 2023-08-02 VITALS
HEART RATE: 62 BPM | HEIGHT: 62 IN | DIASTOLIC BLOOD PRESSURE: 76 MMHG | OXYGEN SATURATION: 95 % | TEMPERATURE: 97.3 F | BODY MASS INDEX: 33.64 KG/M2 | WEIGHT: 182.8 LBS | SYSTOLIC BLOOD PRESSURE: 113 MMHG

## 2023-08-02 DIAGNOSIS — K21.9 GASTROESOPHAGEAL REFLUX DISEASE, UNSPECIFIED WHETHER ESOPHAGITIS PRESENT: Primary | ICD-10-CM

## 2023-08-02 DIAGNOSIS — R19.7 DIARRHEA, UNSPECIFIED TYPE: ICD-10-CM

## 2023-08-02 DIAGNOSIS — K52.838 OTHER MICROSCOPIC COLITIS: ICD-10-CM

## 2023-08-02 PROCEDURE — 99214 OFFICE O/P EST MOD 30 MIN: CPT | Performed by: INTERNAL MEDICINE

## 2023-08-02 RX ORDER — PANTOPRAZOLE SODIUM 40 MG/1
40 TABLET, DELAYED RELEASE ORAL 2 TIMES DAILY
Qty: 180 TABLET | Refills: 3 | Status: SHIPPED | OUTPATIENT
Start: 2023-08-02

## 2023-08-16 ENCOUNTER — OFFICE VISIT (OUTPATIENT)
Dept: FAMILY MEDICINE CLINIC | Facility: CLINIC | Age: 73
End: 2023-08-16
Payer: MEDICARE

## 2023-08-16 VITALS
TEMPERATURE: 98.9 F | WEIGHT: 183 LBS | OXYGEN SATURATION: 97 % | BODY MASS INDEX: 33.68 KG/M2 | DIASTOLIC BLOOD PRESSURE: 62 MMHG | SYSTOLIC BLOOD PRESSURE: 112 MMHG | HEART RATE: 94 BPM | HEIGHT: 62 IN

## 2023-08-16 DIAGNOSIS — U07.1 COVID-19 VIRUS INFECTION: Primary | ICD-10-CM

## 2023-08-16 DIAGNOSIS — R05.1 ACUTE COUGH: ICD-10-CM

## 2023-08-16 DIAGNOSIS — M79.10 MYALGIA: ICD-10-CM

## 2023-08-16 LAB
EXPIRATION DATE: ABNORMAL
EXPIRATION DATE: NORMAL
FLUAV AG NPH QL: NEGATIVE
FLUBV AG NPH QL: NEGATIVE
INTERNAL CONTROL: ABNORMAL
INTERNAL CONTROL: NORMAL
Lab: ABNORMAL
Lab: NORMAL
SARS-COV-2 AG UPPER RESP QL IA.RAPID: DETECTED

## 2023-08-16 RX ORDER — DEXTROMETHORPHAN HYDROBROMIDE AND PROMETHAZINE HYDROCHLORIDE 15; 6.25 MG/5ML; MG/5ML
5 SYRUP ORAL 4 TIMES DAILY PRN
Qty: 180 ML | Refills: 0 | Status: SHIPPED | OUTPATIENT
Start: 2023-08-16

## 2023-08-16 RX ORDER — HYDROCODONE BITARTRATE AND ACETAMINOPHEN 5; 325 MG/1; MG/1
1 TABLET ORAL EVERY 6 HOURS PRN
Qty: 12 TABLET | Refills: 0 | Status: SHIPPED | OUTPATIENT
Start: 2023-08-16

## 2023-08-16 RX ORDER — IPRATROPIUM BROMIDE 21 UG/1
2 SPRAY, METERED NASAL 4 TIMES DAILY PRN
Qty: 30 ML | Refills: 0 | Status: SHIPPED | OUTPATIENT
Start: 2023-08-16

## 2023-08-16 NOTE — PROGRESS NOTES
"Chief Complaint  Fever, Nasal Congestion, Vomiting, Sinusitis, Cough, and Fatigue    Subjective        Jemima Bell presents to [facility]  History of Present Illness  Patient presents with acute illness.  Started with headache, chills, sweats 3 days ago.  Runny nose and cough.  No ill contacts.   Very tired.  Tylenol has not helped.  She cannot take NSAIDs.  She feels awful and was not able to rest at all last night because of the pain.  Fever   Associated symptoms include coughing and vomiting.   Vomiting   Associated symptoms include coughing and a fever.   Sinusitis  Associated symptoms include coughing.   Cough  Associated symptoms include a fever.   Fatigue  Associated symptoms include coughing, fatigue, a fever and vomiting.     Objective   Vital Signs:  /62   Pulse 94   Temp 98.9 øF (37.2 øC)   Ht 157.5 cm (62\")   Wt 83 kg (183 lb)   SpO2 97%   BMI 33.47 kg/mý   Estimated body mass index is 33.47 kg/mý as calculated from the following:    Height as of this encounter: 157.5 cm (62\").    Weight as of this encounter: 83 kg (183 lb).             Physical Exam  Constitutional:       General: She is not in acute distress.     Appearance: Normal appearance. She is well-developed.   HENT:      Head: Normocephalic and atraumatic.      Right Ear: Tympanic membrane, ear canal and external ear normal.      Left Ear: Tympanic membrane, ear canal and external ear normal.      Nose: Congestion and rhinorrhea present.      Mouth/Throat:      Mouth: Mucous membranes are moist.      Pharynx: Oropharynx is clear. Posterior oropharyngeal erythema present.   Eyes:      Conjunctiva/sclera: Conjunctivae normal.      Pupils: Pupils are equal, round, and reactive to light.   Neck:      Thyroid: No thyromegaly.   Cardiovascular:      Rate and Rhythm: Normal rate and regular rhythm.      Heart sounds: No murmur heard.  Pulmonary:      Effort: Pulmonary effort is normal.      Breath " sounds: Normal breath sounds. No wheezing.   Abdominal:      General: Bowel sounds are normal.      Palpations: Abdomen is soft.      Tenderness: There is no abdominal tenderness.   Musculoskeletal:         General: Normal range of motion.      Cervical back: Neck supple.   Lymphadenopathy:      Cervical: Cervical adenopathy present.   Skin:     General: Skin is warm and dry.   Neurological:      Mental Status: She is alert and oriented to person, place, and time.   Psychiatric:         Mood and Affect: Mood normal.         Behavior: Behavior normal.      Result Review :    Rapid flu and strep negative, COVID-positive               Assessment and Plan   Diagnoses and all orders for this visit:    1. COVID-19 virus infection (Primary)  -     Nirmatrelvir&Ritonavir 300/100 (PAXLOVID) 20 x 150 MG & 10 x 100MG tablet therapy pack tablet; Take 3 tablets by mouth 2 (Two) Times a Day for 5 days.  Dispense: 30 tablet; Refill: 0  -     promethazine-dextromethorphan (PROMETHAZINE-DM) 6.25-15 MG/5ML syrup; Take 5 mL by mouth 4 (Four) Times a Day As Needed for Cough.  Dispense: 180 mL; Refill: 0  -     ipratropium (ATROVENT) 0.03 % nasal spray; 2 sprays into the nostril(s) as directed by provider 4 (Four) Times a Day As Needed for Rhinitis.  Dispense: 30 mL; Refill: 0    2. Acute cough  -     POCT Influenza A/B  -     POCT SARS-CoV-2 Antigen DEYSI    3. Myalgia  -     HYDROcodone-acetaminophen (NORCO) 5-325 MG per tablet; Take 1 tablet by mouth Every 6 (Six) Hours As Needed for Moderate Pain or Severe Pain.  Dispense: 12 tablet; Refill: 0      Acute URI from COVID-19-symptomatic treatment, patient given appropriate warnings, will do Paxlovid because of increased risk factors, will do a few hydrocodone's because of the myalgias       Follow Up   No follow-ups on file.  Patient was given instructions and counseling regarding her condition or for health maintenance advice. Please see specific information pulled into the AVS if  appropriate.

## 2023-08-17 RX ORDER — ONDANSETRON 4 MG/1
4 TABLET, FILM COATED ORAL EVERY 8 HOURS PRN
Qty: 15 TABLET | Refills: 0 | Status: SHIPPED | OUTPATIENT
Start: 2023-08-17 | End: 2023-08-17 | Stop reason: SDUPTHER

## 2023-08-17 RX ORDER — ONDANSETRON 4 MG/1
4 TABLET, FILM COATED ORAL EVERY 8 HOURS PRN
Qty: 15 TABLET | Refills: 0 | Status: SHIPPED | OUTPATIENT
Start: 2023-08-17

## 2023-09-28 ENCOUNTER — OFFICE VISIT (OUTPATIENT)
Dept: GASTROENTEROLOGY | Facility: CLINIC | Age: 73
End: 2023-09-28
Payer: MEDICARE

## 2023-09-28 VITALS
HEART RATE: 87 BPM | HEIGHT: 62 IN | DIASTOLIC BLOOD PRESSURE: 66 MMHG | TEMPERATURE: 96.8 F | SYSTOLIC BLOOD PRESSURE: 119 MMHG | WEIGHT: 187.2 LBS | OXYGEN SATURATION: 95 % | BODY MASS INDEX: 34.45 KG/M2

## 2023-09-28 DIAGNOSIS — K58.0 IRRITABLE BOWEL SYNDROME WITH DIARRHEA: ICD-10-CM

## 2023-09-28 DIAGNOSIS — K21.9 GASTROESOPHAGEAL REFLUX DISEASE WITHOUT ESOPHAGITIS: Primary | ICD-10-CM

## 2023-09-28 NOTE — PROGRESS NOTES
"Chief Complaint  Heartburn and Diarrhea    Subjective          History Of Present Illness:    Jemima Bell is a  73 y.o. female patient of Dr. Srinivasan with a history of GERD and nausea.  Patient was previously on Dexilant for her GERD but secondary to cost with switch to pantoprazole twice daily.    Patient reports her GERD is better controlled with twice daily dosing of pantoprazole.  She reports she will occasionally have episodes of reflux.  No dysphagia.    Still has some diarrhea but it is better controlled with daily Imodium. Usually takes once daily and occasionally twice daily.  No melena or hematochezia.  Her appetite is good and her weight is stable.    Additional data reviewed:  C-scope 12/23/2022 -normal TI, diverticulosis of the sigmoid colon, 1 mixed hyperplastic tubular adenomatous polyp in the rectum, internal hemorrhoids.  Random colon biopsy of the cecum with minimally active inflammation.  EGD 12/23/2022 -medium sized hiatal hernia, erythematous mucosa in the stomach, multiple gastric polyps, normal duodenum.  Gastric biopsies with mild chronic patient, no H. pylori.  Fundic gland polyps.    Objective   Vital Signs:   /66   Pulse 87   Temp 96.8 °F (36 °C)   Ht 157.5 cm (62\")   Wt 84.9 kg (187 lb 3.2 oz)   SpO2 95%   BMI 34.24 kg/m²       Physical Exam  Vitals reviewed.   Constitutional:       General: She is not in acute distress.     Appearance: Normal appearance. She is not ill-appearing.   HENT:      Head: Normocephalic and atraumatic.      Nose: Nose normal.      Mouth/Throat:      Pharynx: Oropharynx is clear.   Eyes:      Extraocular Movements: Extraocular movements intact.      Conjunctiva/sclera: Conjunctivae normal.      Pupils: Pupils are equal, round, and reactive to light.   Pulmonary:      Effort: Pulmonary effort is normal.   Abdominal:      General: There is no distension.      Palpations: There is no mass.      Tenderness: There is no abdominal tenderness. "   Musculoskeletal:         General: No swelling. Normal range of motion.      Cervical back: Normal range of motion.   Skin:     General: Skin is warm and dry.      Findings: No bruising or rash.   Neurological:      General: No focal deficit present.      Mental Status: She is alert and oriented to person, place, and time.      Motor: No weakness.      Gait: Gait normal.   Psychiatric:         Mood and Affect: Mood normal.        Result Review :   The following data was reviewed by: Vania Borrero PA-C on 09/28/2023:  CMP          12/2/2022    14:17 4/26/2023    14:25 5/31/2023    16:20   CMP   Glucose  CANCELED  95    BUN  18  14    Creatinine 0.70  0.94  0.91    Sodium  142  145    Potassium  CANCELED  4.2    Chloride  105  105    Calcium  9.3  9.7    Total Protein  6.9  7.4    Albumin  4.5  4.8    Globulin  2.4  2.6    Total Bilirubin  0.5  0.4    Alkaline Phosphatase  67  67    AST (SGOT)  22  17    ALT (SGPT)  31  18    BUN/Creatinine Ratio  19  15      CBC          10/17/2022    15:33 4/26/2023    14:25 5/31/2023    16:20   CBC   WBC 7.3  6.6  7.7    RBC 4.73  4.35  4.52    Hemoglobin 14.2  13.2  14.2    Hematocrit 43.8  39.5  42.1    MCV 93  91  93    MCH 30.0  30.3  31.4    MCHC 32.4  33.4  33.7    RDW 12.1  12.1  12.0    Platelets 346  335  344      CBC w/diff          10/17/2022    15:33 4/26/2023    14:25 5/31/2023    16:20   CBC w/Diff   WBC 7.3  6.6  7.7    RBC 4.73  4.35  4.52    Hemoglobin 14.2  13.2  14.2    Hematocrit 43.8  39.5  42.1    MCV 93  91  93    MCH 30.0  30.3  31.4    MCHC 32.4  33.4  33.7    RDW 12.1  12.1  12.0    Platelets 346  335  344    Neutrophil Rel % 53  51  49    Lymphocyte Rel % 34  34  37    Monocyte Rel % 8  9  9    Eosinophil Rel % 4  5  4    Basophil Rel % 1  1  1            Assessment and Plan    Diagnoses and all orders for this visit:    1. Gastroesophageal reflux disease without esophagitis (Primary)  Overview:  Added automatically from request for surgery  [id number]      2. Irritable bowel syndrome with diarrhea       Continue pantoprazole twice daily.  We did discuss trying to cut out sodas as this can contribute to her GERD.  She is trying to eliminate other foods that contribute as well.  Continue daily Imodium for her diarrhea.    Follow Up   Return in about 1 year (around 9/28/2024) for Vania Arrieta PA-C or Dr. Aguilar.    Manason dictation used throughout this note.            Vania Arrieta PA-C   [facility]  [address] [place] [address]  Office: [phone]

## 2023-10-03 DIAGNOSIS — M54.16 LUMBAR RADICULITIS: ICD-10-CM

## 2023-10-03 DIAGNOSIS — M51.36 DEGENERATION OF LUMBAR INTERVERTEBRAL DISC: ICD-10-CM

## 2023-10-04 RX ORDER — BACLOFEN 10 MG/1
TABLET ORAL
Qty: 60 TABLET | Refills: 1 | Status: SHIPPED | OUTPATIENT
Start: 2023-10-04

## 2023-10-09 ENCOUNTER — FLU SHOT (OUTPATIENT)
Dept: FAMILY MEDICINE CLINIC | Facility: CLINIC | Age: 73
End: 2023-10-09
Payer: MEDICARE

## 2023-10-09 DIAGNOSIS — Z23 NEED FOR INFLUENZA VACCINATION: Primary | ICD-10-CM

## 2023-10-13 DIAGNOSIS — F41.8 DEPRESSION WITH ANXIETY: ICD-10-CM

## 2023-10-17 ENCOUNTER — OFFICE VISIT (OUTPATIENT)
Dept: PAIN MEDICINE | Facility: CLINIC | Age: 73
End: 2023-10-17
Payer: MEDICARE

## 2023-10-17 VITALS
BODY MASS INDEX: 34.6 KG/M2 | OXYGEN SATURATION: 99 % | HEART RATE: 66 BPM | HEIGHT: 62 IN | SYSTOLIC BLOOD PRESSURE: 122 MMHG | DIASTOLIC BLOOD PRESSURE: 82 MMHG | WEIGHT: 188 LBS | TEMPERATURE: 97.7 F

## 2023-10-17 DIAGNOSIS — M51.36 LUMBAR DEGENERATIVE DISC DISEASE: Primary | ICD-10-CM

## 2023-10-17 DIAGNOSIS — M53.3 SACROILIAC JOINT DYSFUNCTION OF LEFT SIDE: ICD-10-CM

## 2023-10-17 PROCEDURE — 96372 THER/PROPH/DIAG INJ SC/IM: CPT

## 2023-10-17 PROCEDURE — 1159F MED LIST DOCD IN RCRD: CPT

## 2023-10-17 PROCEDURE — 99214 OFFICE O/P EST MOD 30 MIN: CPT

## 2023-10-17 PROCEDURE — 1125F AMNT PAIN NOTED PAIN PRSNT: CPT

## 2023-10-17 PROCEDURE — 1160F RVW MEDS BY RX/DR IN RCRD: CPT

## 2023-10-17 RX ORDER — KETOROLAC TROMETHAMINE 30 MG/ML
30 INJECTION, SOLUTION INTRAMUSCULAR; INTRAVENOUS ONCE
Status: COMPLETED | OUTPATIENT
Start: 2023-10-17 | End: 2023-10-17

## 2023-10-17 RX ORDER — GABAPENTIN 300 MG/1
300 CAPSULE ORAL 2 TIMES DAILY
Qty: 60 CAPSULE | Refills: 0 | Status: SHIPPED | OUTPATIENT
Start: 2023-10-17

## 2023-10-17 RX ADMIN — KETOROLAC TROMETHAMINE 30 MG: 30 INJECTION, SOLUTION INTRAMUSCULAR; INTRAVENOUS at 14:02

## 2023-10-17 NOTE — PROGRESS NOTES
"CHIEF COMPLAINT  Back pain    Subjective   Jemima Bell is a 73 y.o. female  who presents for follow-up.  She has a history of chronic low back. She reports that her pain has worsened since her last office visit.    Today pain is 6/10VAS in severity. Pain is located in her low back, more on the right side than the left. She continues to report from left SI joint injection performed in May. Describes this pain as a nearly continuous stabbing pain. She states that pain feels more \"muscular\" in nature. Pain is worsened by walking long distances, and prolonged sitting or standing. Pain improves with rest/reposition, heat, and medications.      She recently restarted Gabapentin 300mg at night after being off medication for 2-3 weeks due to COVID and reports that this has been somewhat helpful to her pain. She also utilizes OTC Tylenol as needed and Baclofen 10mg as needed for muscle spasms. She reports that compound pain cream prescribed at last office visit has been helpful and asks for a refill at this time.     Procedures:  5/12/23 - Left SI Joint Injection - 90% relief x 6 weeks   3/27/23 - L5/S1 LESI - 100% relief to back pain x 1 week, 80% relief to left leg pain    Back Pain  This is a chronic problem. The current episode started more than 1 year ago. The problem occurs constantly. The problem has been waxing and waning since onset. The pain is present in the lumbar spine and sacro-iliac. The quality of the pain is described as aching and stabbing. The pain does not radiate. The pain is at a severity of 6/10. The symptoms are aggravated by standing and position (walking long distances). Associated symptoms include weakness (back). Pertinent negatives include no abdominal pain, dysuria, fever or numbness. She has tried ice, heat and analgesics (PT, Gabapentin) for the symptoms. The treatment provided mild relief.      PEG Assessment   What number best describes your pain on average in the past " week?7  What number best describes how, during the past week, pain has interfered with your enjoyment of life?10  What number best describes how, during the past week, pain has interfered with your general activity?  9    Review of Pertinent Medical Data ---  CT SCAN OF THE LUMBAR SPINE WITHOUT CONTRAST     CLINICAL HISTORY: Lumbar radiculopathy. Low back pain radiating into  right hip and into left lower extremity.     TECHNIQUE: CT scan of the lumbar spine was obtained with 3 mm axial bone  and soft tissue algorithm images. Sagittal and coronal reconstructed  images were obtained.     FINDINGS:     At T11-12 and T12-L1, there is no significant canal or foraminal  stenosis.     At L1-2, a disc osteophyte complex minimally indents the ventral  subarachnoid space. There is a minimal degree of left foraminal  narrowing secondary to a disc osteophyte complex. Prominent degenerative  disc changes are seen at the L1-2 level.     At L2-3, a disc osteophyte complex and a small left central disc  protrusion mildly indent the ventral subarachnoid space. There is no  significant degree of foraminal compromise.     At L3-4, there are advanced degenerative disc changes with vacuum disc  phenomena. A disc osteophyte complex results in a mild degree of canal  and foraminal narrowing.     At L4-5, there are advanced degenerative disc changes. Vacuum disc  phenomena are identified. Degenerative endplate sclerotic changes are  most prominently seen along the right side of the L4-5 disc space. A  disc osteophyte complex and loss of foraminal height result in a  mild-to-moderate degree of bilateral foraminal narrowing. A disc  osteophyte complex mildly indents the ventral subarachnoid space.     At L5-S1, there is mild left foraminal narrowing secondary to a disc  osteophyte complex. There is mild left and moderate right facet  hypertrophic change. There is a mild-to-moderate degree of left and a  mild degree of right foraminal  narrowing secondary to a combination of a  disc osteophyte complex and facet hypertrophy on the left and primarily  facet hypertrophy on the right.     There is a curvilinear radiopacity at the level of the GE junction that  is seen at the site of the patient's known fundoplasty with mesh. Please  correlate as to whether this curvilinear opacity is representative of  the mesh or whether this represents a retained radiopaque foreign body.  Of note, the findings are unchanged in appearance since prior  radiographic examinations dating back to 11/01/2019 and there is no  evidence to suggest an inflammatory process at this site.     IMPRESSION:     There is a mild-to-moderate degree of bilateral L4-5 and left L5-S1  foraminal narrowing. These are the levels of the most prominent degrees  of foraminal stenosis.     Disc osteophyte complexes are noted from L1-2 down to L4-5 that result  in only up to mild degrees of canal narrowing.     There are advanced degenerative disc changes seen at the L4-5 level and  to a lesser extent prominent degenerative disc changes are noted at L1-2  and L3-4.     There is a curvilinear opacity at the level of the GE junction that is  seen at the site of the patient's known fundoplasty with mesh. Please  correlate as to whether this curvilinear opacity is representative of  the mesh or whether this represents a retained radiopaque foreign body.  Of note, the findings are unchanged in appearance since the prior  radiographic examinations dating back to 11/01/2019 and there is no  convincing evidence to suggest an inflammatory process at this site.     These findings and recommendations were discussed with Meredith Kehrer  on 03/02/2023 at approximately 11:10 AM.     Radiation dose reduction techniques were utilized, including automated  exposure control and exposure modulation based on body size.     This report was finalized on 3/3/2023 4:18 PM by Dr. Eddy Bates M.D.     The following  "portions of the patient's history were reviewed and updated as appropriate: allergies, current medications, past family history, past medical history, past social history, past surgical history, and problem list.    Review of Systems   Constitutional:  Positive for fatigue. Negative for chills and fever.   Respiratory:  Negative for cough and shortness of breath.    Gastrointestinal:  Positive for diarrhea. Negative for abdominal pain and constipation.   Genitourinary:  Negative for difficulty urinating and dysuria.   Musculoskeletal:  Positive for back pain.   Neurological:  Positive for weakness (back) and light-headedness. Negative for dizziness and numbness.   Psychiatric/Behavioral:  Positive for sleep disturbance (related to back pain).      I have reviewed and confirmed the accuracy of the ROS as documented by the MA/LPN/RN MACHO Mcgraw    Vitals:    10/17/23 1321   BP: 122/82   BP Location: Left arm   Patient Position: Sitting   Pulse: 66   Temp: 97.7 °F (36.5 °C)   TempSrc: Temporal   SpO2: 99%   Weight: 85.3 kg (188 lb)   Height: 157.5 cm (62\")   PainSc:   6   PainLoc: Back     Objective   Physical Exam  Constitutional:       Appearance: Normal appearance.   HENT:      Head: Normocephalic.   Cardiovascular:      Rate and Rhythm: Normal rate and regular rhythm.   Pulmonary:      Effort: Pulmonary effort is normal.      Breath sounds: Normal breath sounds.   Musculoskeletal:         General: Normal range of motion.      Cervical back: Normal range of motion.      Lumbar back: Spasms and tenderness present.      Comments: - Right SI Joint tenderness  - Right LU's test     Skin:     General: Skin is warm and dry.      Capillary Refill: Capillary refill takes less than 2 seconds.   Neurological:      General: No focal deficit present.      Mental Status: She is alert and oriented to person, place, and time.   Psychiatric:         Mood and Affect: Mood normal.         Behavior: Behavior normal.    "      Thought Content: Thought content normal.         Cognition and Memory: Cognition normal.       Assessment & Plan   Diagnoses and all orders for this visit:    1. Lumbar degenerative disc disease (Primary)  -     ketorolac (TORADOL) injection 30 mg  -     gabapentin (NEURONTIN) 300 MG capsule; Take 1 capsule by mouth 2 (Two) Times a Day.  Dispense: 60 capsule; Refill: 0  -     Ibuprofen 3 %, Gabapentin 10 %, Baclofen 2 %, lidocaine 4 %, Ketamine HCl 4 %; Apply 1-2 g topically to the appropriate area as directed 3 (Three) to 4 (Four) times daily.  Dispense: 90 g; Refill: 0    2. Sacroiliac joint dysfunction of left side  -     Ibuprofen 3 %, Gabapentin 10 %, Baclofen 2 %, lidocaine 4 %, Ketamine HCl 4 %; Apply 1-2 g topically to the appropriate area as directed 3 (Three) to 4 (Four) times daily.  Dispense: 90 g; Refill: 0    --- Continue compound pain cream. Refill sent to [facility]  --- In office Toradol 30mg IM for flare in pain.  --- Continue Gabapentin 300mg. Encouraged patient to increase to twice a day if tolerated to see if this is beneficial to her pain. Discussed medication with the patient. Included in this discussion was the potential for side effects and adverse events. Patient verbalized understanding and wished to proceed. Prescription will be sent to pharmacy.  ---   --- Follow-up as needed for increased pain and/or to repeat procedures    Jemima Bell reports a pain score of 6.  Given her pain assessment as noted, treatment options were discussed and the following options were decided upon as a follow-up plan to address the patient's pain: continuation of current treatment plan for pain.     SANJEEV REPORT  As the clinician, I personally reviewed the SANJEEV from 10/17/23 while the patient was in the office today.    Dictated utilizing Dragon dictation.

## 2023-11-21 ENCOUNTER — TELEPHONE (OUTPATIENT)
Dept: PAIN MEDICINE | Facility: CLINIC | Age: 73
End: 2023-11-21

## 2023-11-21 ENCOUNTER — OFFICE VISIT (OUTPATIENT)
Dept: PAIN MEDICINE | Facility: CLINIC | Age: 73
End: 2023-11-21
Payer: MEDICARE

## 2023-11-21 VITALS
HEIGHT: 62 IN | TEMPERATURE: 92.8 F | WEIGHT: 187.2 LBS | DIASTOLIC BLOOD PRESSURE: 80 MMHG | SYSTOLIC BLOOD PRESSURE: 120 MMHG | RESPIRATION RATE: 18 BRPM | BODY MASS INDEX: 34.45 KG/M2 | HEART RATE: 73 BPM | OXYGEN SATURATION: 94 %

## 2023-11-21 DIAGNOSIS — M54.50 CHRONIC BILATERAL LOW BACK PAIN, UNSPECIFIED WHETHER SCIATICA PRESENT: ICD-10-CM

## 2023-11-21 DIAGNOSIS — M53.3 SACROILIAC JOINT DYSFUNCTION OF BOTH SIDES: Primary | ICD-10-CM

## 2023-11-21 DIAGNOSIS — G89.29 CHRONIC BILATERAL LOW BACK PAIN, UNSPECIFIED WHETHER SCIATICA PRESENT: ICD-10-CM

## 2023-11-21 PROCEDURE — 99214 OFFICE O/P EST MOD 30 MIN: CPT | Performed by: NURSE PRACTITIONER

## 2023-11-21 PROCEDURE — 1159F MED LIST DOCD IN RCRD: CPT | Performed by: NURSE PRACTITIONER

## 2023-11-21 PROCEDURE — 1125F AMNT PAIN NOTED PAIN PRSNT: CPT | Performed by: NURSE PRACTITIONER

## 2023-11-21 PROCEDURE — 1160F RVW MEDS BY RX/DR IN RCRD: CPT | Performed by: NURSE PRACTITIONER

## 2023-11-21 NOTE — TELEPHONE ENCOUNTER
"Caller: Jemima Bell \"Kim\"    Relationship to patient: Self    Best call back number: [phone] (home)       Chief complaint: PARAMJIT HIP     Type of visit: GUIDED INJECTION     Requested date: BEFORE DR. BUSTAMANTE TAKES LEAVE.       "

## 2023-11-21 NOTE — PROGRESS NOTES
CHIEF COMPLAINT  Back pain    Subjective   Jemima Bell is a 73 y.o. female  who presents for follow-up.  She has a history of back pain. Today her pain is 4/10VAS in severity. Her primary pain complaint today is low back pain located over her bilateral sacroiliac joints. This pain is worsened with standing, walking, bending, twisting, and position changes.     She is utilizing Gabapentin 300mg nightly, she did not tolerate higher dosages d/t drowsiness. She has also been utilizing compounded pain cream which has been helpful to her pain. She denies any side effects with her medication regimen.     Procedures:  5/12/23 - Left SI Joint Injection - 90% relief x 5.5 months  3/27/23 - L5/S1 LESI - 100% relief to back pain x 1 week, 80% relief to left leg pain    Back Pain  This is a chronic problem. The current episode started more than 1 year ago. The problem occurs constantly. The problem has been gradually worsening since onset. The pain is present in the sacro-iliac. The quality of the pain is described as aching and stabbing. The pain is at a severity of 4/10. The pain is The same all the time. The symptoms are aggravated by standing (walking). Associated symptoms include headaches. Pertinent negatives include no abdominal pain, chest pain, dysuria, fever, numbness or weakness. Risk factors include menopause. She has tried heat, ice and analgesics (PT previously) for the symptoms.      PEG Assessment   What number best describes your pain on average in the past week?7  What number best describes how, during the past week, pain has interfered with your enjoyment of life?8  What number best describes how, during the past week, pain has interfered with your general activity?  8    The following portions of the patient's history were reviewed and updated as appropriate: allergies, current medications, past family history, past medical history, past social history, past surgical history, and problem  "list.    Review of Systems   Constitutional:  Positive for activity change and fatigue. Negative for fever.   Cardiovascular:  Negative for chest pain.   Gastrointestinal:  Negative for abdominal pain, constipation and diarrhea.   Genitourinary:  Negative for difficulty urinating and dysuria.   Musculoskeletal:  Positive for back pain.   Neurological:  Positive for headaches. Negative for dizziness, weakness, light-headedness and numbness.   Psychiatric/Behavioral:  Positive for agitation and sleep disturbance. Negative for suicidal ideas. The patient is nervous/anxious.      --  The aforementioned information the Chief Complaint section and above subjective data including any HPI data, and also the Review of Systems data, has been personally reviewed and affirmed.  --    Vitals:    11/21/23 1122   BP: 120/80   BP Location: Right arm   Patient Position: Sitting   Cuff Size: Large Adult   Pulse: 73   Resp: 18   Temp: 92.8 °F (33.8 °C)   SpO2: 94%   Weight: 84.9 kg (187 lb 3.2 oz)   Height: 157.5 cm (62\")   PainSc:   4   PainLoc: Back     Objective   Physical Exam  Vitals and nursing note reviewed.   Constitutional:       Appearance: Normal appearance. She is well-developed.   Eyes:      General: Lids are normal.   Cardiovascular:      Rate and Rhythm: Normal rate.   Pulmonary:      Effort: Pulmonary effort is normal.   Musculoskeletal:      Lumbar back: Tenderness and bony tenderness present. Decreased range of motion.      Comments:   +Justice SI Compression  +Justice Kip  +Justice Thigh Thrust  Equivocal Justice Gaenslen's  -Justice SI Distraction   Neurological:      Mental Status: She is alert and oriented to person, place, and time.      Gait: Gait abnormal.   Psychiatric:         Attention and Perception: Attention normal.         Mood and Affect: Mood normal.         Speech: Speech normal.         Behavior: Behavior normal.         Judgment: Judgment normal.       Assessment & Plan   Diagnoses and all orders for this " visit:    1. Sacroiliac joint dysfunction of both sides (Primary)  -     Case Request    2. Chronic bilateral low back pain, unspecified whether sciatica present      Jemima Bell reports a pain score of 4.  Given her pain assessment as noted, treatment options were discussed and the following options were decided upon as a follow-up plan to address the patient's pain: steroid injections.    --- Bilateral SI joint injections in office under ultrasound  Reviewed the procedure at length with the patient.  Included in the review was expectations, complications, risk and benefits.The procedure was described in detail and the risks, benefits and alternatives were discussed with the patient (including but not limited to: bleeding, infection, nerve damage, worsening of pain, inability to perform injection, paralysis, seizures, coma, no pain relief and death) who agreed to proceed. The procedure will plan to be performed at [facility] with ultrasound guidance. Reviewed that this procedure will contain steroids. Questions were answered and in a way the patient could understand.  Patient verbalized understanding and wishes to proceed.  This intervention will be ordered.  Discussed with patient that all procedures are part of a multimodal plan of care and include either formal PT or a home exercise program.  Patient has no evidence of coagulopathy or current infection.    --- Reviewed with patient that she must bring a .   --- Follow-up after procedure     SANJEEV TOLBERT  As part of the patient's treatment plan, I am prescribing controlled substances. The patient has been made aware of appropriate use of such medications, including potential risk of somnolence, limited ability to drive and/or work safely, and the potential for dependence or overdose. It has also been made clear that these medications are for use by this patient only, without concomitant use of alcohol or other substances unless  prescribed.     As the clinician, I personally reviewed the SANJEEV from 11/21/2023 while the patient was in the office today.    History and physical exam exhibit continued safe and appropriate use of controlled substances.    Dictated utilizing Dragon dictation.

## 2023-11-27 RX ORDER — LEVOTHYROXINE SODIUM 88 UG/1
88 TABLET ORAL DAILY
Qty: 90 TABLET | Refills: 0 | Status: SHIPPED | OUTPATIENT
Start: 2023-11-27

## 2023-11-28 NOTE — PROGRESS NOTES
"CHIEF COMPLAINT: Back Pain      Jemima Bell is a 73 y.o. female.  She is here for the following procedure:  Bilateral sacroiliac joint injections with ultrasound guidance.     Vitals:    11/30/23 0825   BP: 130/74   Pulse: 65   Resp: 18   Temp: 97.5 °F (36.4 °C)   SpO2: 97%   Weight: 85.3 kg (188 lb)   Height: 157.5 cm (62\")   PainSc:   5   PainLoc: Back       Lidocaine 1% Lot#: [id number] Exp: 09/2025  NDC: [address]-8  Depo Medrol 80 mg Lot#: [id number] Exp: 02/2024  NDC: [address]-6     SI Joint Injection      Patient reassessed immediately prior to procedure    Patient location during procedure: Pain Clinic    Reason for block: procedure for pain  Performed by  Anesthesiologist: Shey Aranda MD  Preanesthetic Checklist  Completed: patient identified, site marked, risks and benefits discussed, surgical consent, monitors and equipment checked, pre-op evaluation and timeout performed  Prep:  Patient position: prone  Sterile barriers:gloves and mask  Prep: ChloraPrep  Procedure:  Sedation:no  Location:Bilateral  Needle Gauge:22 G  Aspiration:negative  Medications:  Depomedrol:80 mg    Post Assessment  Injection Assessment: negative  Patient Tolerance:comfortable throughout block  Complications:no  Additional Notes  The patient was placed in the prone position.  The curvilinear transducer on the ultrasound machine was used.  It was placed transversely over the lower part of the sacrum at the level of the sacral hiatus, the lateral edge of the sacrum was identified on the bilateral side.  The transducer was moved laterally and cephalad until the bony contour of the ileum was clearly identified.  The inferior most portion of this cleft between the medial border of the ileum and the lateral sacral edge was identified.  Chlorhexidine was used to sterilize the skin along the medial edge of the transducer.  1% lidocaine was injected through a 25-gauge needle to anesthetize the skin.  A 22-gauge spinal needle was " then inserted into the sacroiliac joint with direct visualization from the ultrasound.  After negative aspiration, a volume of 3mL consisting of 40mg of Methylprednisolone mixed with 2mL of 1% Lidocaine was injected into each joint.     The needle was removed intact.  There were no complications following the injection.           Visit Diagnoses:  1. Sacroiliac joint dysfunction of both sides        Shey Aranda MD  Pain Management

## 2023-11-29 ENCOUNTER — PATIENT MESSAGE (OUTPATIENT)
Dept: FAMILY MEDICINE CLINIC | Facility: CLINIC | Age: 73
End: 2023-11-29
Payer: MEDICARE

## 2023-11-29 NOTE — TELEPHONE ENCOUNTER
From: Jemima Bell  To: Meredith Kehrer  Sent: 11/29/2023 4:04 PM EST  Subject: Levothyroxine    My pharmacist just called. They informed me that my levothyroxine prescription was denied by my physician for refill. Since I have a Dec5 appointment and, I believe, blood work, it may be that you want me to wait for refill. However, I thought Dr Canseco approved it for refill. Please let me know what is going on. Thank you.

## 2023-11-30 ENCOUNTER — PROCEDURE VISIT (OUTPATIENT)
Dept: PAIN MEDICINE | Facility: CLINIC | Age: 73
End: 2023-11-30
Payer: MEDICARE

## 2023-11-30 VITALS
HEART RATE: 65 BPM | DIASTOLIC BLOOD PRESSURE: 74 MMHG | SYSTOLIC BLOOD PRESSURE: 130 MMHG | RESPIRATION RATE: 18 BRPM | OXYGEN SATURATION: 97 % | BODY MASS INDEX: 34.6 KG/M2 | HEIGHT: 62 IN | WEIGHT: 188 LBS | TEMPERATURE: 97.5 F

## 2023-11-30 DIAGNOSIS — M53.3 SACROILIAC JOINT DYSFUNCTION OF BOTH SIDES: Primary | ICD-10-CM

## 2023-12-05 ENCOUNTER — OFFICE VISIT (OUTPATIENT)
Dept: FAMILY MEDICINE CLINIC | Facility: CLINIC | Age: 73
End: 2023-12-05
Payer: MEDICARE

## 2023-12-05 VITALS
BODY MASS INDEX: 34.27 KG/M2 | TEMPERATURE: 98.2 F | HEIGHT: 62 IN | WEIGHT: 186.2 LBS | OXYGEN SATURATION: 98 % | SYSTOLIC BLOOD PRESSURE: 114 MMHG | DIASTOLIC BLOOD PRESSURE: 70 MMHG | HEART RATE: 72 BPM

## 2023-12-05 DIAGNOSIS — R11.0 NAUSEA: ICD-10-CM

## 2023-12-05 DIAGNOSIS — M51.36 DEGENERATION OF LUMBAR INTERVERTEBRAL DISC: ICD-10-CM

## 2023-12-05 DIAGNOSIS — F41.8 DEPRESSION WITH ANXIETY: ICD-10-CM

## 2023-12-05 DIAGNOSIS — H10.212 CHEMICAL CONJUNCTIVITIS OF LEFT EYE: ICD-10-CM

## 2023-12-05 DIAGNOSIS — E53.8 B12 DEFICIENCY: ICD-10-CM

## 2023-12-05 DIAGNOSIS — E03.9 HYPOTHYROIDISM, UNSPECIFIED TYPE: ICD-10-CM

## 2023-12-05 DIAGNOSIS — Z00.00 MEDICARE ANNUAL WELLNESS VISIT, SUBSEQUENT: Primary | ICD-10-CM

## 2023-12-05 DIAGNOSIS — M54.16 LUMBAR RADICULITIS: ICD-10-CM

## 2023-12-05 DIAGNOSIS — E78.49 OTHER HYPERLIPIDEMIA: ICD-10-CM

## 2023-12-05 DIAGNOSIS — R29.818 EXTRAPYRAMIDAL SYMPTOM: ICD-10-CM

## 2023-12-05 DIAGNOSIS — E55.9 VITAMIN D DEFICIENCY: ICD-10-CM

## 2023-12-05 RX ORDER — ONDANSETRON 4 MG/1
4 TABLET, FILM COATED ORAL EVERY 8 HOURS PRN
Qty: 15 TABLET | Refills: 2 | Status: SHIPPED | OUTPATIENT
Start: 2023-12-05

## 2023-12-05 RX ORDER — TOBRAMYCIN AND DEXAMETHASONE 3; 1 MG/ML; MG/ML
1 SUSPENSION/ DROPS OPHTHALMIC
Qty: 2.5 ML | Refills: 0 | Status: SHIPPED | OUTPATIENT
Start: 2023-12-05 | End: 2023-12-10

## 2023-12-05 RX ORDER — HYDROXYZINE HYDROCHLORIDE 10 MG/1
10 TABLET, FILM COATED ORAL EVERY 4 HOURS PRN
Qty: 120 TABLET | Refills: 1 | Status: SHIPPED | OUTPATIENT
Start: 2023-12-05

## 2023-12-05 RX ORDER — LEVOTHYROXINE SODIUM 88 UG/1
88 TABLET ORAL DAILY
Qty: 90 TABLET | Refills: 3 | Status: SHIPPED | OUTPATIENT
Start: 2023-12-05 | End: 2023-12-07 | Stop reason: DRUGHIGH

## 2023-12-05 RX ORDER — BACLOFEN 10 MG/1
10 TABLET ORAL 3 TIMES DAILY PRN
Qty: 60 TABLET | Refills: 2 | Status: SHIPPED | OUTPATIENT
Start: 2023-12-05

## 2023-12-05 NOTE — PATIENT INSTRUCTIONS
Medicare Wellness  Personal Prevention Plan of Service     Date of Office Visit:    Encounter Provider:  Meredith Lea Kehrer, MD  Place of Service:  [facility]  Patient Name: Jemima Bell  :  1950    As part of the Medicare Wellness portion of your visit today, we are providing you with this personalized preventive plan of services (PPPS). This plan is based upon recommendations of the [place] Preventive Services Task Force (USPSTF) and the Advisory Committee on Immunization Practices (ACIP).    This lists the preventive care services that should be considered, and provides dates of when you are due. Items listed as completed are up-to-date and do not require any further intervention.    Health Maintenance   Topic Date Due    TDAP/TD VACCINES (1 - Tdap) Never done    ZOSTER VACCINE (1 of 2) Never done    ANNUAL WELLNESS VISIT  10/17/2023    LIPID PANEL  2023    COVID-19 Vaccine (4 - -24 season) 2023 (Originally 2023)    DXA SCAN  2024    BMI FOLLOWUP  2024    COLORECTAL CANCER SCREENING  2027    HEPATITIS C SCREENING  Completed    INFLUENZA VACCINE  Completed    Pneumococcal Vaccine 65+  Completed    MAMMOGRAM  Discontinued       Orders Placed This Encounter   Procedures    Comprehensive Metabolic Panel     Order Specific Question:   Release to patient     Answer:   Routine Release [[id number]]    Lipid Panel     Order Specific Question:   Release to patient     Answer:   Routine Release [[id number]]    TSH     Order Specific Question:   Release to patient     Answer:   Routine Release [[id number]]    Vitamin B12     Order Specific Question:   Release to patient     Answer:   Routine Release [[id number]]    Vitamin D,25-Hydroxy     Order Specific Question:   Release to patient     Answer:   Routine Release [[id number]]    CBC & Differential     Order Specific Question:   Manual Differential     Answer:   No     Order  Specific Question:   Release to patient     Answer:   Routine Release [[id number]]       No follow-ups on file.

## 2023-12-05 NOTE — PROGRESS NOTES
The ABCs of the Annual Wellness Visit  Subsequent Medicare Wellness Visit    Subjective    Jemima Blel is a 73 y.o. female who presents for a Subsequent Medicare Wellness Visit.    The following portions of the patient's history were reviewed and   updated as appropriate: allergies, current medications, past family history, past medical history, past social history, past surgical history, and problem list.    Compared to one year ago, the patient feels her physical   health is better. Back better with injections.    Compared to one year ago, the patient feels her mental   health is the same.    Recent Hospitalizations:  She was not admitted to the hospital during the last year.       Current Medical Providers:  Patient Care Team:  Kehrer, Meredith Lea, MD as PCP - General (Family Medicine)    Outpatient Medications Prior to Visit   Medication Sig Dispense Refill    acetaminophen (TYLENOL) 325 MG tablet Take 2 tablets by mouth.      albuterol sulfate  (90 Base) MCG/ACT inhaler Inhale 2 puffs Every 4 (Four) Hours As Needed.      Diclofenac Sodium (VOLTAREN) 1 % gel gel Apply 4 g topically to the appropriate area as directed 4 (Four) Times a Day As Needed (pain). 150 g 2    fexofenadine (Allegra Allergy) 180 MG tablet Take 1 tablet by mouth Daily.      fluticasone (FLONASE) 50 MCG/ACT nasal spray 2 sprays into the nostril(s) as directed by provider Daily. 48 g 3    gabapentin (NEURONTIN) 300 MG capsule Take 1 capsule by mouth 2 (Two) Times a Day. 60 capsule 0    Ibuprofen 3 %, Gabapentin 10 %, Baclofen 2 %, lidocaine 4 %, Ketamine HCl 4 % Apply 1-2 g topically to the appropriate area as directed 3 (Three) to 4 (Four) times daily. 90 g 0    ipratropium (ATROVENT) 0.03 % nasal spray 2 sprays into the nostril(s) as directed by provider 4 (Four) Times a Day As Needed for Rhinitis. 30 mL 0    pantoprazole (PROTONIX) 40 MG EC tablet Take 1 tablet by mouth 2 (Two) Times a Day. 180 tablet 3    promethazine  (PHENERGAN) 25 MG tablet Take 1 tablet by mouth Every 6 (Six) Hours As Needed for Nausea or Vomiting. 20 tablet 1    traMADol (ULTRAM) 50 MG tablet Take 1-2 tablets by mouth Every 6 (Six) Hours As Needed for Moderate Pain or Severe Pain. 60 tablet 0    baclofen (LIORESAL) 10 MG tablet TAKE ONE TABLET BY MOUTH THREE TIMES A DAY AS NEEDED MUSCLE SPASMS 60 tablet 1    hydrOXYzine (ATARAX) 10 MG tablet Take 1 tablet by mouth Every 4 (Four) Hours As Needed for Anxiety (twitching). 120 tablet 0    levothyroxine (SYNTHROID, LEVOTHROID) 88 MCG tablet Take 1 tablet by mouth Daily. 90 tablet 0    ondansetron (Zofran) 4 MG tablet Take 1 tablet by mouth Every 8 (Eight) Hours As Needed for Nausea or Vomiting. 15 tablet 0    sertraline (ZOLOFT) 50 MG tablet TAKE 1 TABLET BY MOUTH DAILY 90 tablet 0    HYDROcodone-acetaminophen (NORCO) 5-325 MG per tablet Take 1 tablet by mouth Every 6 (Six) Hours As Needed for Moderate Pain or Severe Pain. (Patient not taking: Reported on 12/5/2023) 12 tablet 0    promethazine-dextromethorphan (PROMETHAZINE-DM) 6.25-15 MG/5ML syrup Take 5 mL by mouth 4 (Four) Times a Day As Needed for Cough. (Patient not taking: Reported on 12/5/2023) 180 mL 0     No facility-administered medications prior to visit.       Opioid medication/s are on active medication list.  and I have evaluated her active treatment plan and pain score trends (see table).  There were no vitals filed for this visit.  I have reviewed the chart for potential of high risk medication and harmful drug interactions in the elderly.          Aspirin is not on active medication list.  Aspirin use is not indicated based on review of current medical condition/s. Risk of harm outweighs potential benefits.  .    Patient Active Problem List   Diagnosis    Wheezing    Osteoarthritis    Acute bronchitis    Dyspnea    Hypothyroidism    Shortness of breath    Vitamin D deficiency    Depression with anxiety    Muscle cramp    Back pain    Vitamin B12  "deficiency    UTI (urinary tract infection)    Abnormal EKG    Precordial pain    Anxiety    Borderline systolic HTN    Left upper quadrant abdominal pain    Diarrhea    Gastroesophageal reflux disease    History of Nissen fundoplication    Incontinence of feces with fecal urgency    Foraminal stenosis of lumbar region    Lumbar degenerative disc disease    Lumbar facet arthropathy    Lumbar radiculopathy    Sacroiliac joint dysfunction of both sides     Advance Care Planning   Advance Care Planning     Advance Directive is not on file.  ACP discussion was held with the patient during this visit. Patient does not have an advance directive, information provided.     Objective    Vitals:    23 0911   BP: 114/70   Pulse: 72   Temp: 98.2 °F (36.8 °C)   SpO2: 98%   Weight: 84.5 kg (186 lb 3.2 oz)   Height: 157.5 cm (62\")     Estimated body mass index is 34.06 kg/m² as calculated from the following:    Height as of this encounter: 157.5 cm (62\").    Weight as of this encounter: 84.5 kg (186 lb 3.2 oz).    BMI is >= 30 and <35. (Class 1 Obesity). The following options were offered after discussion;: nutrition counseling/recommendations      Does the patient have evidence of cognitive impairment? No          HEALTH RISK ASSESSMENT    Smoking Status:  Social History     Tobacco Use   Smoking Status Never   Smokeless Tobacco Never     Alcohol Consumption:  Social History     Substance and Sexual Activity   Alcohol Use No     Fall Risk Screen:    PATTIADI Fall Risk Assessment was completed, and patient is at LOW risk for falls.Assessment completed on:2023    Depression Screenin/5/2023     9:10 AM   PHQ-2/PHQ-9 Depression Screening   Little Interest or Pleasure in Doing Things 2-->more than half the days   Feeling Down, Depressed or Hopeless 2-->more than half the days   Trouble Falling or Staying Asleep, or Sleeping Too Much 2-->more than half the days   Feeling Tired or Having Little Energy 3-->nearly every " day   Poor Appetite or Overeating 0-->not at all   Feeling Bad about Yourself - or that You are a Failure or Have Let Yourself or Your Family Down 2-->more than half the days   Trouble Concentrating on Things, Such as Reading the Newspaper or Watching Television 2-->more than half the days   Moving or Speaking So Slowly that Other People Could Have Noticed? Or the Opposite - Being So Fidgety 1-->several days   Thoughts that You Would be Better Off Dead or of Hurting Yourself in Some Way 0-->not at all   PHQ-9: Brief Depression Severity Measure Score 14       Health Habits and Functional and Cognitive Screenin/5/2023     9:11 AM   Functional & Cognitive Status   Do you have difficulty preparing food and eating? No   Do you have difficulty bathing yourself, getting dressed or grooming yourself? No   Do you have difficulty using the toilet? No   Do you have difficulty moving around from place to place? No   Do you have trouble with steps or getting out of a bed or a chair? No   Current Diet Other   Dental Exam Up to date   Eye Exam Up to date   Exercise (times per week) 0 times per week   Current Exercises Include No Regular Exercise   Do you need help using the phone?  No   Are you deaf or do you have serious difficulty hearing?  No   Do you need help to go to places out of walking distance? No   Do you need help shopping? No   Do you need help preparing meals?  No   Do you need help with housework?  No   Do you need help with laundry? No   Do you need help taking your medications? No   Do you need help managing money? No   Do you ever drive or ride in a car without wearing a seat belt? No       Age-appropriate Screening Schedule:  Refer to the list below for future screening recommendations based on patient's age, sex and/or medical conditions. Orders for these recommended tests are listed in the plan section. The patient has been provided with a written plan.    Health Maintenance   Topic Date Due     TDAP/TD VACCINES (1 - Tdap) Never done    ZOSTER VACCINE (1 of 2) Never done    ANNUAL WELLNESS VISIT  10/17/2023    LIPID PANEL  12/05/2023    COVID-19 Vaccine (4 - 2023-24 season) 12/07/2023 (Originally 9/1/2023)    DXA SCAN  02/18/2024    BMI FOLLOWUP  12/05/2024    COLORECTAL CANCER SCREENING  12/23/2027    HEPATITIS C SCREENING  Completed    INFLUENZA VACCINE  Completed    Pneumococcal Vaccine 65+  Completed    MAMMOGRAM  Discontinued                  CMS Preventative Services Quick Reference  Risk Factors Identified During Encounter  None Identified  The above risks/problems have been discussed with the patient.  Pertinent information has been shared with the patient in the After Visit Summary.  An After Visit Summary and PPPS were made available to the patient.    Follow Up:   Next Medicare Wellness visit to be scheduled in 1 year.       Additional E&M Note during same encounter follows:  Patient has multiple medical problems which are significant and separately identifiable that require additional work above and beyond the Medicare Wellness Visit.      Chief Complaint  Medicare Wellness-subsequent, Anxiety, Depression, Hypothyroidism, and eye pain  (Got makeup in eye )    Subjective        HPI  Jemima Bell is also being seen today for follow-up on hypothyroidism, depression anxiety, GERD, lumbar degenerative disc disease and other medical problems including B12 vitamin D deficiency.  She is compliant with her medications and doing fairly well.  She denies any SI HI or hopelessness.  She has frustration dealing with her  with dementia.  They seem to be stable and she says they have enough support at home.  She has been seeing pain management for her back and is doing well after injections and on gabapentin.  She does have 1 acute complaint today.  Left eye burning since hitting it with her makeup brush a few days ago.Has been using some wetting drops. Vision OK.        Objective   Vital  "Signs:  /70   Pulse 72   Temp 98.2 °F (36.8 °C)   Ht 157.5 cm (62\")   Wt 84.5 kg (186 lb 3.2 oz)   SpO2 98%   BMI 34.06 kg/m²     Physical Exam  Vitals and nursing note reviewed.   Constitutional:       General: She is not in acute distress.     Appearance: Normal appearance. She is well-developed.   HENT:      Head: Normocephalic and atraumatic.      Right Ear: Tympanic membrane, ear canal and external ear normal.      Left Ear: Tympanic membrane, ear canal and external ear normal.      Nose: Nose normal.      Mouth/Throat:      Mouth: Mucous membranes are moist.      Pharynx: Oropharynx is clear. No oropharyngeal exudate or posterior oropharyngeal erythema.   Eyes:      General:         Left eye: Discharge present.     Pupils: Pupils are equal, round, and reactive to light.      Comments: Mild left eye discharge with some conjunctival irritation and erythema, cornea looks good   Neck:      Thyroid: No thyromegaly.   Cardiovascular:      Rate and Rhythm: Normal rate and regular rhythm.      Heart sounds: No murmur heard.  Pulmonary:      Effort: Pulmonary effort is normal.      Breath sounds: Normal breath sounds. No wheezing.   Abdominal:      General: Abdomen is flat. Bowel sounds are normal. There is no distension.      Palpations: Abdomen is soft. There is no mass.      Tenderness: There is no abdominal tenderness.      Hernia: No hernia is present.   Musculoskeletal:         General: No swelling. Normal range of motion.      Cervical back: Normal range of motion and neck supple.      Right lower leg: No edema.      Left lower leg: No edema.   Lymphadenopathy:      Cervical: No cervical adenopathy.   Skin:     General: Skin is warm and dry.      Capillary Refill: Capillary refill takes less than 2 seconds.      Findings: No rash.   Neurological:      General: No focal deficit present.      Mental Status: She is alert and oriented to person, place, and time.      Cranial Nerves: No cranial nerve " deficit.   Psychiatric:         Mood and Affect: Mood normal.         Behavior: Behavior normal.                         Assessment and Plan   Diagnoses and all orders for this visit:    1. Medicare annual wellness visit, subsequent (Primary)    2. Hypothyroidism, unspecified type  -     levothyroxine (SYNTHROID, LEVOTHROID) 88 MCG tablet; Take 1 tablet by mouth Daily.  Dispense: 90 tablet; Refill: 3  -     TSH    3. Depression with anxiety  -     hydrOXYzine (ATARAX) 10 MG tablet; Take 1 tablet by mouth Every 4 (Four) Hours As Needed for Anxiety (twitching).  Dispense: 120 tablet; Refill: 1  -     sertraline (ZOLOFT) 50 MG tablet; Take 1 tablet by mouth Daily.  Dispense: 90 tablet; Refill: 3    4. B12 deficiency  -     Vitamin B12    5. Vitamin D deficiency  -     Vitamin D,25-Hydroxy    6. Extrapyramidal symptom    7. Degeneration of lumbar intervertebral disc  -     baclofen (LIORESAL) 10 MG tablet; Take 1 tablet by mouth 3 (Three) Times a Day As Needed for Muscle Spasms.  Dispense: 60 tablet; Refill: 2    8. Lumbar radiculitis  -     baclofen (LIORESAL) 10 MG tablet; Take 1 tablet by mouth 3 (Three) Times a Day As Needed for Muscle Spasms.  Dispense: 60 tablet; Refill: 2    9. Nausea  -     ondansetron (Zofran) 4 MG tablet; Take 1 tablet by mouth Every 8 (Eight) Hours As Needed for Nausea or Vomiting.  Dispense: 15 tablet; Refill: 2    10. Other hyperlipidemia  -     CBC & Differential  -     Comprehensive Metabolic Panel  -     Lipid Panel  -     TSH    11. Chemical conjunctivitis of left eye  -     tobramycin-dexAMETHasone (TobraDex) 0.3-0.1 % ophthalmic suspension; Administer 1 drop into the left eye Every 4 (Four) Hours While Awake for 5 days.  Dispense: 2.5 mL; Refill: 0      Hypothyroidism-due for TSH today  Depression-controlled on current medication  Hyperlipidemia-recheck today  Degenerative disc disease-refill baclofen  Nausea-refill ondansetron  Hyperlipidemia-recheck today  Health  maintenance-up-to-date       Follow Up   Return in about 6 weeks (around 1/16/2024) for Recheck TSH.  Patient was given instructions and counseling regarding her condition or for health maintenance advice. Please see specific information pulled into the AVS if appropriate.

## 2023-12-06 LAB
25(OH)D3+25(OH)D2 SERPL-MCNC: 50.3 NG/ML (ref 30–100)
ALBUMIN SERPL-MCNC: 4.8 G/DL (ref 3.5–5.2)
ALBUMIN/GLOB SERPL: 1.8 G/DL
ALP SERPL-CCNC: 79 U/L (ref 39–117)
ALT SERPL-CCNC: 19 U/L (ref 1–33)
AST SERPL-CCNC: 15 U/L (ref 1–32)
BASOPHILS # BLD AUTO: 0.1 10*3/MM3 (ref 0–0.2)
BASOPHILS NFR BLD AUTO: 1.1 % (ref 0–1.5)
BILIRUB SERPL-MCNC: 0.5 MG/DL (ref 0–1.2)
BUN SERPL-MCNC: 21 MG/DL (ref 8–23)
BUN/CREAT SERPL: 22.8 (ref 7–25)
CALCIUM SERPL-MCNC: 10.2 MG/DL (ref 8.6–10.5)
CHLORIDE SERPL-SCNC: 102 MMOL/L (ref 98–107)
CHOLEST SERPL-MCNC: 249 MG/DL (ref 0–200)
CO2 SERPL-SCNC: 24.7 MMOL/L (ref 22–29)
CREAT SERPL-MCNC: 0.92 MG/DL (ref 0.57–1)
EGFRCR SERPLBLD CKD-EPI 2021: 65.5 ML/MIN/1.73
EOSINOPHIL # BLD AUTO: 0.25 10*3/MM3 (ref 0–0.4)
EOSINOPHIL NFR BLD AUTO: 2.8 % (ref 0.3–6.2)
ERYTHROCYTE [DISTWIDTH] IN BLOOD BY AUTOMATED COUNT: 11.7 % (ref 12.3–15.4)
GLOBULIN SER CALC-MCNC: 2.6 GM/DL
GLUCOSE SERPL-MCNC: 108 MG/DL (ref 65–99)
HBA1C MFR BLD: 5.7 % (ref 4.8–5.6)
HCT VFR BLD AUTO: 41.2 % (ref 34–46.6)
HDLC SERPL-MCNC: 93 MG/DL (ref 40–60)
HGB BLD-MCNC: 13.9 G/DL (ref 12–15.9)
IMM GRANULOCYTES # BLD AUTO: 0.07 10*3/MM3 (ref 0–0.05)
IMM GRANULOCYTES NFR BLD AUTO: 0.8 % (ref 0–0.5)
LDLC SERPL CALC-MCNC: 139 MG/DL (ref 0–100)
LYMPHOCYTES # BLD AUTO: 2.79 10*3/MM3 (ref 0.7–3.1)
LYMPHOCYTES NFR BLD AUTO: 31.3 % (ref 19.6–45.3)
MCH RBC QN AUTO: 31 PG (ref 26.6–33)
MCHC RBC AUTO-ENTMCNC: 33.7 G/DL (ref 31.5–35.7)
MCV RBC AUTO: 92 FL (ref 79–97)
MONOCYTES # BLD AUTO: 0.6 10*3/MM3 (ref 0.1–0.9)
MONOCYTES NFR BLD AUTO: 6.7 % (ref 5–12)
NEUTROPHILS # BLD AUTO: 5.11 10*3/MM3 (ref 1.7–7)
NEUTROPHILS NFR BLD AUTO: 57.3 % (ref 42.7–76)
NRBC BLD AUTO-RTO: 0 /100 WBC (ref 0–0.2)
PLATELET # BLD AUTO: 340 10*3/MM3 (ref 140–450)
POTASSIUM SERPL-SCNC: 4.8 MMOL/L (ref 3.5–5.2)
PROT SERPL-MCNC: 7.4 G/DL (ref 6–8.5)
RBC # BLD AUTO: 4.48 10*6/MM3 (ref 3.77–5.28)
SODIUM SERPL-SCNC: 140 MMOL/L (ref 136–145)
TRIGL SERPL-MCNC: 103 MG/DL (ref 0–150)
TSH SERPL DL<=0.005 MIU/L-ACNC: 7.5 UIU/ML (ref 0.27–4.2)
VIT B12 SERPL-MCNC: 1008 PG/ML (ref 211–946)
VLDLC SERPL CALC-MCNC: 17 MG/DL (ref 5–40)
WBC # BLD AUTO: 8.92 10*3/MM3 (ref 3.4–10.8)
WRITTEN AUTHORIZATION: NORMAL

## 2023-12-06 NOTE — PROGRESS NOTES
SPOKE WITH PATIENT AWARE OF RESULTS / PATIENT STATED SHE DOESN'T EAT MUCH / SHE HAS BEEN TAKING HER MEDICATIONS DAILY AND AWARE SHE WILL GET A DIFFERENT DOSAGE / SPOKE WITH [facility] AND A1C HAS BEEN ADDED

## 2023-12-07 DIAGNOSIS — E03.9 HYPOTHYROIDISM, UNSPECIFIED TYPE: Primary | ICD-10-CM

## 2023-12-07 RX ORDER — LEVOTHYROXINE SODIUM 0.1 MG/1
100 TABLET ORAL
Qty: 90 TABLET | Refills: 3 | Status: SHIPPED | OUTPATIENT
Start: 2023-12-07

## 2023-12-18 ENCOUNTER — PATIENT MESSAGE (OUTPATIENT)
Dept: FAMILY MEDICINE CLINIC | Facility: CLINIC | Age: 73
End: 2023-12-18
Payer: MEDICARE

## 2023-12-18 DIAGNOSIS — F41.8 DEPRESSION WITH ANXIETY: ICD-10-CM

## 2023-12-19 NOTE — TELEPHONE ENCOUNTER
From: Jemima Bell  To: Meredith Kehrer  Sent: 12/18/2023 5:04 PM EST  Subject: Sertraline HCL    I picked up my refill on Sertraline HCL. I have been taking 1 1/2 tablets for some time. The refill says take1 tablet daily. I know to continue with the 11/2 tablets but I don’t know if there will be a problem when I am short medication. The refill is for 90 days. Just thought I should mention this. My pharmacist says the refill request was for one tablet daily. So sorry to have to throw this at you this week. Thank you!

## 2023-12-21 ENCOUNTER — PATIENT MESSAGE (OUTPATIENT)
Dept: FAMILY MEDICINE CLINIC | Facility: CLINIC | Age: 73
End: 2023-12-21
Payer: MEDICARE

## 2023-12-21 RX ORDER — DEXTROMETHORPHAN HYDROBROMIDE AND PROMETHAZINE HYDROCHLORIDE 15; 6.25 MG/5ML; MG/5ML
5 SYRUP ORAL 4 TIMES DAILY PRN
Qty: 180 ML | Refills: 0 | Status: SHIPPED | OUTPATIENT
Start: 2023-12-21

## 2023-12-28 ENCOUNTER — TELEPHONE (OUTPATIENT)
Dept: FAMILY MEDICINE CLINIC | Facility: CLINIC | Age: 73
End: 2023-12-28
Payer: MEDICARE

## 2024-01-09 DIAGNOSIS — M51.36 LUMBAR DEGENERATIVE DISC DISEASE: ICD-10-CM

## 2024-01-09 RX ORDER — GABAPENTIN 300 MG/1
300 CAPSULE ORAL 2 TIMES DAILY
Qty: 60 CAPSULE | Refills: 0 | Status: SHIPPED | OUTPATIENT
Start: 2024-01-09

## 2024-01-13 NOTE — TELEPHONE ENCOUNTER
From: Jemima Bell  To: Meredith Kehrer  Sent: 12/21/2023 10:25 AM EST  Subject: Heavy Cough    Rose Lozada! I am having sinus problems. Trying to keep it from moving to the chest. I’m taking Mucinex D. The cough is bad Certainly not helping the head ache. . I no longer have cough syrup and otc is not helping. I’m hoping you can call some in for me to get me through the week end. Thank you!   Hospital follow up note    Noted stool culture came back positive for Shigella resistant to Cipro but sensitive to Ampicillin. Pt has an allergic history to PCN associated with rash. Pt was discharged yesterday on Cipro and Flagyl for colitis. Cdiff negative.    Called pt today to let him know of his stool culture result. Pt stated he feels very well, back to baseline and no more diarrhea. Though with HIV; his CD4 count is 445 and he remains compliant with his HAART. I discussed with him that since he feels overall well and now asymptomatic, we would hold off on switching him over to amoxicillin especially when he has an allergy to PCN. Also would not make sense to keep on antibiotics when there is resistance and pt is feeling better. Pt was agreeable to stop antibiotics and he will follow up with his PCP on Monday. Return precautions given. I did oneydaide ID on call who was agreeable with plan and recommended to stop abx as well.    Tracy Arndt, DO

## 2024-01-26 ENCOUNTER — OFFICE VISIT (OUTPATIENT)
Dept: ORTHOPEDIC SURGERY | Facility: CLINIC | Age: 74
End: 2024-01-26
Payer: MEDICARE

## 2024-01-26 VITALS — WEIGHT: 185.8 LBS | TEMPERATURE: 97.8 F | HEIGHT: 62 IN | BODY MASS INDEX: 34.19 KG/M2

## 2024-01-26 DIAGNOSIS — M70.52 PES ANSERINUS BURSITIS OF LEFT KNEE: Primary | ICD-10-CM

## 2024-01-26 DIAGNOSIS — M17.11 ARTHRITIS OF RIGHT KNEE: ICD-10-CM

## 2024-01-26 RX ADMIN — METHYLPREDNISOLONE ACETATE 80 MG: 80 INJECTION, SUSPENSION INTRA-ARTICULAR; INTRALESIONAL; INTRAMUSCULAR; SOFT TISSUE at 14:59

## 2024-01-26 RX ADMIN — LIDOCAINE HYDROCHLORIDE 2 ML: 10 INJECTION, SOLUTION EPIDURAL; INFILTRATION; INTRACAUDAL; PERINEURAL at 14:59

## 2024-01-26 NOTE — PROGRESS NOTES
"Patient: Jemima Bell    YOB: 1950    Medical Record Number: [id number]    Chief Complaints:  New complaint left knee pain, Right knee follow up    History of Present Illness:     73 y.o. female patient who presents for evaluation of left knee pain. She reports the symptoms first began approximately 2 months ago.  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  Reports a history of left unicompartment knee replacement at [facility] in [place] many years ago.  Denies any numbness or tingling in the lower leg or foot.  She has a history of back pain and bilateral hip arthritis which is treated through pain management.  Current pain is described as moderate, constant and aching.  Pain is sharp at times and feels as if it is going to give out.   Localizes the majority of her pain to the inside of her leg just below the knee.  Denies any clicking, popping or catching.  Symptoms are worse with activity.  Symptoms are somewhat better with rest. Describes her right knee pain as moderate, constant, and aching.  She is using Salonpas patches as needed.  She says the previous injection helped tremendously, but her symptoms recurred approximately 6 months ago.  She is interested in trying a repeat cortisone injection for the right knee today.  Reports intermittent shooting pain down the legs.  Denies weakness, numbness or paresthesias.    Allergies:   Allergies   Allergen Reactions    Salicylates GI Intolerance     History of gi bleed      Colestipol GI Intolerance    Biaxin [Clarithromycin]     Adhesive Tape Rash     Can use plastic tape, paper tape causes rash    Augmentin [Amoxicillin-Pot Clavulanate] Diarrhea    Prevacid [Lansoprazole] Itching and Swelling     Eyes only    Sulfa Antibiotics GI Intolerance    Sulfamethoxazole-Trimethoprim Nausea And Vomiting and GI Intolerance     \"makes me sick as a dog\"         Home Medications    Current Outpatient " Medications:     acetaminophen (TYLENOL) 325 MG tablet, Take 2 tablets by mouth., Disp: , Rfl:     albuterol sulfate  (90 Base) MCG/ACT inhaler, Inhale 2 puffs Every 4 (Four) Hours As Needed., Disp: , Rfl:     baclofen (LIORESAL) 10 MG tablet, Take 1 tablet by mouth 3 (Three) Times a Day As Needed for Muscle Spasms., Disp: 60 tablet, Rfl: 2    Diclofenac Sodium (VOLTAREN) 1 % gel gel, Apply 4 g topically to the appropriate area as directed 4 (Four) Times a Day As Needed (pain)., Disp: 150 g, Rfl: 2    fexofenadine (Allegra Allergy) 180 MG tablet, Take 1 tablet by mouth Daily., Disp: , Rfl:     fluticasone (FLONASE) 50 MCG/ACT nasal spray, 2 sprays into the nostril(s) as directed by provider Daily., Disp: 48 g, Rfl: 3    gabapentin (NEURONTIN) 300 MG capsule, TAKE 1 CAPSULE BY MOUTH TWICE A DAY, Disp: 60 capsule, Rfl: 0    hydrOXYzine (ATARAX) 10 MG tablet, Take 1 tablet by mouth Every 4 (Four) Hours As Needed for Anxiety (twitching)., Disp: 120 tablet, Rfl: 1    Ibuprofen 3 %, Gabapentin 10 %, Baclofen 2 %, lidocaine 4 %, Ketamine HCl 4 %, Apply 1-2 g topically to the appropriate area as directed 3 (Three) to 4 (Four) times daily., Disp: 90 g, Rfl: 0    ipratropium (ATROVENT) 0.03 % nasal spray, 2 sprays into the nostril(s) as directed by provider 4 (Four) Times a Day As Needed for Rhinitis., Disp: 30 mL, Rfl: 0    levothyroxine (Synthroid) 100 MCG tablet, Take 1 tablet by mouth Every Morning., Disp: 90 tablet, Rfl: 3    ondansetron (Zofran) 4 MG tablet, Take 1 tablet by mouth Every 8 (Eight) Hours As Needed for Nausea or Vomiting., Disp: 15 tablet, Rfl: 2    pantoprazole (PROTONIX) 40 MG EC tablet, Take 1 tablet by mouth 2 (Two) Times a Day., Disp: 180 tablet, Rfl: 3    promethazine (PHENERGAN) 25 MG tablet, Take 1 tablet by mouth Every 6 (Six) Hours As Needed for Nausea or Vomiting., Disp: 20 tablet, Rfl: 1    sertraline (ZOLOFT) 50 MG tablet, Take 1.5 tablets by mouth Daily., Disp: 135 tablet, Rfl: 1     promethazine-dextromethorphan (PROMETHAZINE-DM) 6.25-15 MG/5ML syrup, Take 5 mL by mouth 4 (Four) Times a Day As Needed for Cough. (Patient not taking: Reported on 1/26/2024), Disp: 180 mL, Rfl: 0    traMADol (ULTRAM) 50 MG tablet, Take 1-2 tablets by mouth Every 6 (Six) Hours As Needed for Moderate Pain or Severe Pain. (Patient not taking: Reported on 1/26/2024), Disp: 60 tablet, Rfl: 0    Past Medical History:   Diagnosis Date    Acromioclavicular separation     Acute bronchitis     Acute sinusitis     Allergic 11/2022    Shrimp    Allergic rhinitis     Anemia Childhood    Anxiety     Arthritis     Arthritis of back     Asthma     Back pain     BMI 34.0-34.9,adult     Bursitis of hip     Cervical disc disorder     Cervicalgia     Chills     Cholelithiasis 2004?    Remival    Chronic pain disorder     Cluster headache     Colon polyp 12/2022    Deep vein thrombosis (DVT) of lower extremity     Depression     Dermatitis     Dislocation, shoulder     Dyspnea     Dysuria     Esophageal reflux     Extremity pain     Fatigue     Gastric ulcer     GERD (gastroesophageal reflux disease)     GI (gastrointestinal bleed) 2004?    Headache     Headache, tension-type     Heart murmur 1973    Hernia     Hip arthrosis     Hypothyroidism     Irritable bowel syndrome     Low back strain     Lumbago     Lumbosacral disc disease     Migraine     Nausea     Neuritis     Osteoarthritis     Osteoarthritis of knee     Periarthritis of shoulder     Plantar fasciitis     Pneumonia 89y    Pulled muscle     Pulmonary embolism     Pyelonephritis     Rheumatic fever     Sore throat     Torticollis     Trapezius strain     Urinary incontinence     Urinary pain     Urinary tract infection     UTI symptoms     Vitamin B1 deficiency     Vitamin B12 deficiency     Vitamin D deficiency     Weakness     Wheezing        Past Surgical History:   Procedure Laterality Date    ABDOMINAL SURGERY      APPENDECTOMY      CATARACT EXTRACTION      CATARACT  EXTRACTION      bilateral eyes    CATARACT EXTRACTION      CHOLECYSTECTOMY  2004?    COLONOSCOPY      COLONOSCOPY N/A 12/23/2022    Procedure: COLONOSCOPY to cecum and TI:  with biopsies, cold snare polyp,;  Surgeon: Reji Aguilar MD;  Location:  [facility];  Service: Gastroenterology;  Laterality: N/A;  PREOP/ HX COLON POLYPS  POSTOP/  diverticulosis, polyp, hemorrhoids    ENDOSCOPY N/A 12/23/2022    Procedure: ESOPHAGOGASTRODUODENOSCOPY with biopsies;  Surgeon: Reji Aguilar MD;  Location: [facility];  Service: Gastroenterology;  Laterality: N/A;  PREOP/ HX GASTRIC ULCER, DYSPEPSIA, UPPER ABDOMINAL PAIN  POSTOP/:  HH, gastritis, gastric polyps    EPIDURAL BLOCK      GALLBLADDER SURGERY      HEMORRHOIDECTOMY  1974 & 77?    HERNIA REPAIR  2008?    HYSTERECTOMY      INGUINAL HERNIA REPAIR      JOINT REPLACEMENT      Knee    KNEE SURGERY      LAPAROSCOPIC COLON RESECTION  2005    LAPAROTOMY OOPHERECTOMY      LUMBAR EPIDURAL INJECTION N/A 03/27/2023    Procedure: Lumbar epidural steroid injection at L5-S1 09544;  Surgeon: Shey Aranda MD;  [place] [facility] [place]  Service: Pain Management;  Laterality: N/A;    NISSEN FUNDOPLICATION LAPAROSCOPIC      x2    ORTHOPEDIC SURGERY      Knee replacement    SACROILIAC JOINT INJECTION Left 05/12/2023    Procedure: Left SACROILIAC INJECTION [id number];  Surgeon: Shey Aranda MD;  [place] [facility] [place];  Service: Pain Management;  Laterality: Left;    SACROILIAC JOINT INJECTION Left 07/12/2023    Procedure: SACROILIAC INJECTION LEFT;  Surgeon: Shey Aranda MD;  [place] [facility] [place];  Service: Pain Management;  Laterality: Left;    TONSILLECTOMY      TONSILLECTOMY  1954?    TOTAL KNEE ARTHROPLASTY Left     TUBAL ABDOMINAL LIGATION      UPPER GASTROINTESTINAL ENDOSCOPY         Social History     Occupational History    Not on file   Tobacco Use    Smoking status: Never    Smokeless tobacco: Never   Vaping Use    Vaping Use: Never used   Substance and Sexual  "Activity    Alcohol use: No    Drug use: No    Sexual activity: Yes     Partners: Male     Birth control/protection: None, Hysterectomy      Social History     Social History Narrative    Not on file       Family History   Problem Relation Age of Onset    Arthritis Mother     Depression Mother     Hyperlipidemia Mother     Hypertension Mother     Irritable bowel syndrome Mother     Dislocations Mother     Hypertension Father     Arthritis Father     Stroke Father     Alcohol abuse Maternal Grandfather     Depression Maternal Grandfather     Heart disease Other         IN FEMALES BEFORE AGE 65    Asthma Maternal Grandmother        Review of Systems:      Constitutional: Denies fever, shaking or chills   Eyes: Denies change in visual acuity   HEENT: Denies nasal congestion or sore throat   Respiratory: Denies cough or shortness of breath   Cardiovascular: Denies chest pain or edema  Endocrine: Denies tremors, palpitations, intolerance of heat or cold, polyuria, polydipsia.  GI: Denies abdominal pain, nausea, vomiting, bloody stools or diarrhea  : Denies frequency, urgency, incontinence, retention, or nocturia.  Musculoskeletal: Denies numbness, tingling or loss of motor function except as above  Integument: Denies rash, lesion or ulceration   Neurologic: Denies headache or focal weakness, deficits  Heme: Denies spontaneous or excessive bleeding, epistaxis, hematuria, melena, fatigue, enlarged or tender lymph nodes.      All other pertinent positives and negatives as noted above in HPI.    Physical Exam:   73 y.o. female  Vitals:    01/26/24 1435   Temp: 97.8 °F (36.6 °C)   Weight: 84.3 kg (185 lb 12.8 oz)   Height: 157.5 cm (62\")     General:  Patient is awake and alert.  Appears in no acute distress or discomfort.    Psych:  Affect and demeanor are appropriate.    Eyes:  Conjunctiva and sclera appear grossly normal.  Eyes track well and EOM seem to be intact.    Ears:  No gross abnormalities.  Hearing adequate for " the exam.    Cardiovascular:  Regular rate and rhythm.    Lungs:  Good chest expansion.  Breathing unlabored.    Spine:  Back appears grossly normal.  No palpable masses or adenopathy.  Good motion.  Straight leg raise and crossed straight leg raise maneuver are both negative for lower leg and/or knee pain.    Extremities:  Left knee is examined.  Skin is benign.  Surgical scar appears benign.  No erythema.  No increased warmth.  No obvious gross abnormalities.  No palpable masses or adenopathy.  No effusion.  No focal tenderness noted to the medial or lateral joint line.  Exquisite tenderness noted over the pes bursae.  Motion is 0-120°.  No evident instability.  Strength is well preserved including hip flexion, knee extension, ankle and toe plantarflexion, ankle inversion and eversion.  Good sensory function throughout the leg and foot.  Palpable pulses.  Brisk capillary refill.  Good skin turgor.    Right knee is examined.  Skin is benign.  No atrophy, swellings, or masses.  Focal tenderness along the lateral joint line.  There is a trace effusion.  Knee motion is from 0-120°.  Positive for pain with lateral Yenny's.  Negative medial Yenny's.  No evident instability.  Good strength with hip flexion, knee extension, ankle and great toe plantar flexion and dorsiflexion.  Sensation is intact distally.  Brisk capillary refill in the toes.  Palpable pedal pulses.  Good skin turgor.         Radiology:   Bilateral standing AP views, bilateral merchants views and a lateral view of both knees are ordered by myself and reviewed to evaluate the patient's complaint. These are compared to previous x-rays from 2021. The left knee x-rays show well positioned, well aligned unicompartment knee replacement without complicating factors noted.  In comparison with previous films (AP view) there has been no change.  The right knee x-rays show moderate medial compartment degenerative arthritis including joint space narrowing,  osteophyte formation, and subchondral sclerosis.  No significant changes compared to previous x-rays.     Assessment/Plan:  1.  Left knee pes anserinus bursitis  2.  History of left knee unicompartment knee replacement  3.  Right knee osteoarthritis, possible degenerative meniscal tear    We reviewed the x-rays together.  I do not see any concerning findings on x-ray which would indicate loosening or other complicating factors of the left unicompartment knee replacement.  Her symptoms are more in line with pes bursitis.  We discussed the natural progression of this condition.  Her right knee arthritis appears stable.  No significant progression noted on the x-rays.       Regarding conservative treatments for both conditions, we discussed appropriate activity modifications, anti-inflammatories, injections (including both corticosteroids and visco supplementation), and physical therapy.  She verbalized understanding of all we discussed and has opted for cortisone injections for the left pes bursitis and right knee arthritis.  The risk, benefits, and alternatives to the injections were discussed.  She consented to proceed.  The injections were performed as noted below.  Going forward, she may follow-up with me as needed.    Much of this encounter note is an electronic transcription/translation of spoken language to printed text. The electronic translation of spoken language may permit erroneous, or at times, nonsensical words or phrases to be inadvertently transcribed.  Although I have reviewed the note for such errors, some may still exist.    MACHO Bennett    01/26/2024    CC to Kehrer, Meredith Lea, MD      Large Joint Arthrocentesis: R knee  Date/Time: 1/26/2024 2:59 PM  Consent given by: patient  Site marked: site marked  Timeout: Immediately prior to procedure a time out was called to verify the correct patient, procedure, equipment, support staff and site/side marked as required   Supporting  Documentation  Indications: pain   Procedure Details  Location: knee - R knee  Preparation: Patient was prepped and draped in the usual sterile fashion  Needle gauge: 21 G.  Approach: anterolateral  Medications administered: 80 mg methylPREDNISolone acetate 80 MG/ML; 2 mL lidocaine PF 1% 1 %  Patient tolerance: patient tolerated the procedure well with no immediate complications      Large Joint Arthrocentesis: L knee  Date/Time: 1/26/2024 2:59 PM  Consent given by: patient  Site marked: site marked  Timeout: Immediately prior to procedure a time out was called to verify the correct patient, procedure, equipment, support staff and site/side marked as required   Supporting Documentation  Indications: pain   Procedure Details  Location: knee - L knee (PES BURSA)  Preparation: Patient was prepped and draped in the usual sterile fashion  Needle size: 25 G  Approach: anteromedial  Medications administered: 80 mg methylPREDNISolone acetate 80 MG/ML; 2 mL lidocaine PF 1% 1 %  Patient tolerance: patient tolerated the procedure well with no immediate complications

## 2024-01-28 RX ORDER — METHYLPREDNISOLONE ACETATE 80 MG/ML
80 INJECTION, SUSPENSION INTRA-ARTICULAR; INTRALESIONAL; INTRAMUSCULAR; SOFT TISSUE
Status: COMPLETED | OUTPATIENT
Start: 2024-01-26 | End: 2024-01-26

## 2024-01-28 RX ORDER — LIDOCAINE HYDROCHLORIDE 10 MG/ML
2 INJECTION, SOLUTION EPIDURAL; INFILTRATION; INTRACAUDAL; PERINEURAL
Status: COMPLETED | OUTPATIENT
Start: 2024-01-26 | End: 2024-01-26

## 2024-02-28 ENCOUNTER — OFFICE VISIT (OUTPATIENT)
Dept: ORTHOPEDIC SURGERY | Facility: CLINIC | Age: 74
End: 2024-02-28
Payer: MEDICARE

## 2024-02-28 VITALS — HEIGHT: 62 IN | BODY MASS INDEX: 34.48 KG/M2 | TEMPERATURE: 98.3 F | WEIGHT: 187.4 LBS

## 2024-02-28 DIAGNOSIS — M17.11 ARTHRITIS OF RIGHT KNEE: ICD-10-CM

## 2024-02-28 DIAGNOSIS — M25.561 CHRONIC PAIN OF RIGHT KNEE: Primary | ICD-10-CM

## 2024-02-28 DIAGNOSIS — G89.29 CHRONIC PAIN OF RIGHT KNEE: Primary | ICD-10-CM

## 2024-02-28 PROCEDURE — 1159F MED LIST DOCD IN RCRD: CPT | Performed by: NURSE PRACTITIONER

## 2024-02-28 PROCEDURE — 1160F RVW MEDS BY RX/DR IN RCRD: CPT | Performed by: NURSE PRACTITIONER

## 2024-02-28 PROCEDURE — 99213 OFFICE O/P EST LOW 20 MIN: CPT | Performed by: NURSE PRACTITIONER

## 2024-02-29 NOTE — PROGRESS NOTES
"Chief Complaint:  Follow up bilateral knee pain    HPI:  Ms. Bell comes in today for bilateral knee follow-up.  She reports the injection helped tremendously with the left knee pain.  She feels the bursitis has significantly improved.  She says the injection did not really seem to help her right knee pain.  She is asking if there is anything else we can do about that.    Vitals:    02/28/24 0837   Temp: 98.3 °F (36.8 °C)   TempSrc: Temporal   Weight: 85 kg (187 lb 6.4 oz)   Height: 157.5 cm (62.01\")       Exam: Right knee is examined briefly.  Tenderness to palpation over the lateral joint line and lateral thigh just proximal to the knee.  Knee motion is from 0-120°.  Positive for pain with lateral Yenny's.  No mechanical symptoms present.  No evident instability.  Strength is well-preserved.  Intact motor and sensory function distally.  Palpable pedal pulses.    Left knee is examined briefly.  No tenderness to palpation.      Imaging: None taken today.    Assessment:  1.  Right knee osteoarthritis, possible degenerative meniscal tear versus IT band irritation  2.  Left knee pes anserinus bursitis, resolved      Plan: We discussed her right knee pain and arthritis as well as differential diagnosis including meniscal tear, IT band irritation, and even lumbar radiculopathy.  She follows with pain management for back pain and has an upcoming appointment.  I offered to refer her for a right knee MRI, but she declined for now.  She may consider this as a future option depending on what her pain management provider says about possible radiculopathy.  I have given her a referral for physical therapy.  Going forward, she will follow-up with me as needed.    Liss Romero, APRN    02/28/2024    "

## 2024-03-01 ENCOUNTER — OFFICE VISIT (OUTPATIENT)
Dept: FAMILY MEDICINE CLINIC | Facility: CLINIC | Age: 74
End: 2024-03-01
Payer: MEDICARE

## 2024-03-01 VITALS
WEIGHT: 187.6 LBS | BODY MASS INDEX: 34.52 KG/M2 | DIASTOLIC BLOOD PRESSURE: 82 MMHG | HEART RATE: 74 BPM | SYSTOLIC BLOOD PRESSURE: 122 MMHG | TEMPERATURE: 97.7 F | OXYGEN SATURATION: 97 % | HEIGHT: 62 IN

## 2024-03-01 DIAGNOSIS — F41.8 DEPRESSION WITH ANXIETY: ICD-10-CM

## 2024-03-01 DIAGNOSIS — E03.9 HYPOTHYROIDISM, UNSPECIFIED TYPE: Primary | ICD-10-CM

## 2024-03-01 DIAGNOSIS — R73.03 PREDIABETES: ICD-10-CM

## 2024-03-01 PROCEDURE — 99213 OFFICE O/P EST LOW 20 MIN: CPT | Performed by: FAMILY MEDICINE

## 2024-03-01 RX ORDER — SERTRALINE HYDROCHLORIDE 100 MG/1
100 TABLET, FILM COATED ORAL DAILY
Qty: 90 TABLET | Refills: 1 | Status: SHIPPED | OUTPATIENT
Start: 2024-03-01

## 2024-03-01 NOTE — PROGRESS NOTES
"Chief Complaint  thyroid (Follow up )    Subjective        Jemima Bell presents to [facility]  History of Present Illness  Patient presents for follow-up on her hypothyroidism.  Her medication was adjusted last time.  She has no acute concerns today.  Her depression is fair.  She did increase her salt sertraline to 100 mg herself with all the stress that her  has put her under.  He has had terrible problems with his A-fib and multiple hospital admissions.  She denies any SI HI or hopelessness.      Objective   Vital Signs:  /82   Pulse 74   Temp 97.7 °F (36.5 °C) (Temporal)   Ht 157.5 cm (62.01\")   Wt 85.1 kg (187 lb 9.6 oz)   SpO2 97%   BMI 34.30 kg/m²   Estimated body mass index is 34.3 kg/m² as calculated from the following:    Height as of this encounter: 157.5 cm (62.01\").    Weight as of this encounter: 85.1 kg (187 lb 9.6 oz).               Physical Exam  Constitutional:       General: She is not in acute distress.     Appearance: Normal appearance. She is well-developed.   HENT:      Head: Normocephalic and atraumatic.      Right Ear: External ear normal.      Left Ear: External ear normal.      Mouth/Throat:      Mouth: Mucous membranes are moist.      Pharynx: Oropharynx is clear.   Eyes:      Conjunctiva/sclera: Conjunctivae normal.      Pupils: Pupils are equal, round, and reactive to light.   Neck:      Thyroid: No thyromegaly.   Cardiovascular:      Rate and Rhythm: Normal rate and regular rhythm.      Heart sounds: No murmur heard.  Pulmonary:      Effort: Pulmonary effort is normal.      Breath sounds: Normal breath sounds. No wheezing.   Musculoskeletal:         General: Normal range of motion.      Cervical back: Neck supple.   Lymphadenopathy:      Cervical: No cervical adenopathy.   Skin:     General: Skin is warm and dry.   Neurological:      Mental Status: She is alert and oriented to person, place, and time.   Psychiatric:         " Mood and Affect: Mood normal.         Behavior: Behavior normal.        Result Review :          TSH (12/05/2023 10:06)            Assessment and Plan     Diagnoses and all orders for this visit:    1. Hypothyroidism, unspecified type (Primary)  -     TSH    2. Prediabetes  -     Hemoglobin A1c    3. Depression with anxiety  -     sertraline (Zoloft) 100 MG tablet; Take 1 tablet by mouth Daily.  Dispense: 90 tablet; Refill: 1    Thyroidism-recheck TSH today  Prediabetes on last labs-will recheck A1c  Depression-increase Zoloft to 100 mg, let me know how she is doing and about 4 to 6 weeks         Follow Up     No follow-ups on file.  Patient was given instructions and counseling regarding her condition or for health maintenance advice. Please see specific information pulled into the AVS if appropriate.         Answers submitted by the patient for this visit:  Other (Submitted on 2/29/2024)  Please describe your symptoms.: Follow up visit  Have you had these symptoms before?: No  How long have you been having these symptoms?: Greater than 2 weeks  Primary Reason for Visit (Submitted on 2/29/2024)  What is the primary reason for your visit?: Other

## 2024-03-02 LAB
HBA1C MFR BLD: 5.8 % (ref 4.8–5.6)
TSH SERPL DL<=0.005 MIU/L-ACNC: 3.67 UIU/ML (ref 0.45–4.5)

## 2024-03-03 ENCOUNTER — PATIENT MESSAGE (OUTPATIENT)
Dept: FAMILY MEDICINE CLINIC | Facility: CLINIC | Age: 74
End: 2024-03-03
Payer: MEDICARE

## 2024-03-04 NOTE — TELEPHONE ENCOUNTER
From: Jemima Bell  To: Meredith Kehrer  Sent: 3/3/2024 10:40 PM EST  Subject: Sertraline and Gabapentin     Just a thought. I am seeing the Pain Management Dr on Tuesday afternoon For this reason I check my gabapentin prescription. Stayed in the information it says gabapentin and sertraline should not be taken together because it could cause fatigue. This certainly rings a bell with me as I have relentless fatigue. I spend more time on my sofa than anywhere and have shortness of breath (not like [place])! Hoping to hear from you before my Tuesday appointment, I will ask if there is something else I can take. It’s certain I don’t want to switch the sertraline. As always, I appreciate and value your advice. Thank you.

## 2024-03-05 ENCOUNTER — OFFICE VISIT (OUTPATIENT)
Dept: PAIN MEDICINE | Facility: CLINIC | Age: 74
End: 2024-03-05
Payer: MEDICARE

## 2024-03-05 ENCOUNTER — PREP FOR SURGERY (OUTPATIENT)
Dept: SURGERY | Facility: SURGERY CENTER | Age: 74
End: 2024-03-05
Payer: MEDICARE

## 2024-03-05 VITALS
BODY MASS INDEX: 34.19 KG/M2 | DIASTOLIC BLOOD PRESSURE: 55 MMHG | OXYGEN SATURATION: 96 % | TEMPERATURE: 96.8 F | WEIGHT: 185.8 LBS | HEART RATE: 81 BPM | RESPIRATION RATE: 20 BRPM | SYSTOLIC BLOOD PRESSURE: 97 MMHG | HEIGHT: 62 IN

## 2024-03-05 DIAGNOSIS — M54.42 CHRONIC BILATERAL LOW BACK PAIN WITH BILATERAL SCIATICA: ICD-10-CM

## 2024-03-05 DIAGNOSIS — M54.41 CHRONIC BILATERAL LOW BACK PAIN WITH BILATERAL SCIATICA: ICD-10-CM

## 2024-03-05 DIAGNOSIS — M54.16 LUMBAR RADICULOPATHY: Primary | ICD-10-CM

## 2024-03-05 DIAGNOSIS — M54.16 LUMBAR RADICULOPATHY: ICD-10-CM

## 2024-03-05 DIAGNOSIS — M51.36 LUMBAR DEGENERATIVE DISC DISEASE: Primary | ICD-10-CM

## 2024-03-05 DIAGNOSIS — G89.29 CHRONIC BILATERAL LOW BACK PAIN WITH BILATERAL SCIATICA: ICD-10-CM

## 2024-03-05 DIAGNOSIS — M53.3 SACROILIAC JOINT DYSFUNCTION: ICD-10-CM

## 2024-03-05 PROCEDURE — 1125F AMNT PAIN NOTED PAIN PRSNT: CPT | Performed by: NURSE PRACTITIONER

## 2024-03-05 PROCEDURE — 1160F RVW MEDS BY RX/DR IN RCRD: CPT | Performed by: NURSE PRACTITIONER

## 2024-03-05 PROCEDURE — 99214 OFFICE O/P EST MOD 30 MIN: CPT | Performed by: NURSE PRACTITIONER

## 2024-03-05 PROCEDURE — 1159F MED LIST DOCD IN RCRD: CPT | Performed by: NURSE PRACTITIONER

## 2024-03-05 RX ORDER — GABAPENTIN 100 MG/1
100 CAPSULE ORAL NIGHTLY
Qty: 30 CAPSULE | Refills: 0 | Status: SHIPPED | OUTPATIENT
Start: 2024-03-05

## 2024-03-05 NOTE — PROGRESS NOTES
CHIEF COMPLAINT  Back pain  Pain is consistent.    Subjective   Jemima Bell is a 74 y.o. female  who presents for follow-up.  She has a history of back pain.  She completed bilateral SI joint injections with ultrasound guidance on 11/30/2023 performed by Dr. Aranda. She reports 90% relief from this procedure x approximately 3 months.     Today her pain is 6/10VAS in severity. Today her primary pain complaint is low back pain that is radiating into her bilateral lower extremities (R>L). Her pain is radiates in the lateral aspect of each thigh, radiating into both knees and then anterior aspect of her right shin along the L4 dermatome. She continues to utilize Gabapentin 300 mg nightly as well as compounded pain cream. She states that the Gabapentin is causing quite a bit of daytime drowsiness.     Procedures:  5/12/23-left SI Joint Injection-90% relief x 5.5 months  3/27/23-L5/S1 LESI-100% relief to back pain x 1 week, 80% relief to left leg pain    Back Pain  This is a chronic problem. The current episode started more than 1 year ago. The problem occurs constantly. The problem has been gradually worsening since onset. The pain is present in the sacro-iliac. The quality of the pain is described as aching and stabbing. The pain is at a severity of 6/10. The pain is The same all the time. The symptoms are aggravated by standing (walking). Associated symptoms include headaches and weakness. Pertinent negatives include no abdominal pain, chest pain, dysuria, fever or numbness. Risk factors include menopause. She has tried heat, ice and analgesics (PT previously) for the symptoms.      PEG Assessment   What number best describes your pain on average in the past week?8  What number best describes how, during the past week, pain has interfered with your enjoyment of life?8  What number best describes how, during the past week, pain has interfered with your general activity?  9    The following portions of the  "patient's history were reviewed and updated as appropriate: allergies, current medications, past family history, past medical history, past social history, past surgical history, and problem list.    Review of Systems   Constitutional:  Positive for fatigue. Negative for activity change (less) and fever.   HENT:  Negative for congestion.    Respiratory:  Negative for cough and chest tightness.    Cardiovascular:  Negative for chest pain.   Gastrointestinal:  Negative for abdominal pain, constipation and diarrhea.   Genitourinary:  Negative for difficulty urinating and dysuria.   Musculoskeletal:  Positive for back pain.   Neurological:  Positive for weakness and headaches. Negative for dizziness, light-headedness and numbness.   Psychiatric/Behavioral:  Positive for agitation and sleep disturbance. Negative for suicidal ideas. The patient is nervous/anxious.      --  The aforementioned information the Chief Complaint section and above subjective data including any HPI data, and also the Review of Systems data, has been personally reviewed and affirmed.  --    Vitals:    03/05/24 1337   BP: 97/55   BP Location: Left arm   Patient Position: Sitting   Cuff Size: Large Adult   Pulse: 81   Resp: 20   Temp: 96.8 °F (36 °C)   TempSrc: Temporal   SpO2: 96%   Weight: 84.3 kg (185 lb 12.8 oz)   Height: 157.5 cm (62.01\")   PainSc:   6     Objective   Physical Exam  Vitals and nursing note reviewed.   Constitutional:       Appearance: Normal appearance. She is well-developed.   Eyes:      General: Lids are normal.   Cardiovascular:      Rate and Rhythm: Normal rate.   Pulmonary:      Effort: Pulmonary effort is normal.   Musculoskeletal:      Lumbar back: Tenderness and bony tenderness present. Decreased range of motion. Positive left straight leg raise test. Negative right straight leg raise test.   Neurological:      Mental Status: She is alert and oriented to person, place, and time.   Psychiatric:         Attention and " Perception: Attention normal.         Mood and Affect: Mood normal.         Speech: Speech normal.         Behavior: Behavior normal.         Judgment: Judgment normal.       Assessment & Plan   Diagnoses and all orders for this visit:    1. Lumbar degenerative disc disease (Primary)  -     gabapentin (NEURONTIN) 100 MG capsule; Take 1 capsule by mouth Every Night.  Dispense: 30 capsule; Refill: 0    2. Lumbar radiculopathy  -     gabapentin (NEURONTIN) 100 MG capsule; Take 1 capsule by mouth Every Night.  Dispense: 30 capsule; Refill: 0    3. Chronic bilateral low back pain with bilateral sciatica    4. Sacroiliac joint dysfunction      Jemima Bell reports a pain score of 6.  Given her pain assessment as noted, treatment options were discussed and the following options were decided upon as a follow-up plan to address the patient's pain: continuation of current treatment plan for pain.    --- LESI at L4/5  Reviewed the procedure at length with the patient.  Included in the review was expectations, complications, risk and benefits.The procedure was described in detail and the risks, benefits and alternatives were discussed with the patient (including but not limited to: bleeding, infection, nerve damage, worsening of pain, inability to perform injection, paralysis, seizures, coma, no pain relief and death) who agreed to proceed.  Discussed the potential for sedation if warranted/wanted.  The procedure will plan to be performed at [facility] with fluoroscopic guidance(unless ultrasound is indicated) and could potentially have steroids and contrast dye used. Questions were answered and in a way the patient could understand.  Patient verbalized understanding and wishes to proceed.  This intervention will be ordered.  Discussed with patient that all procedures are part of a multimodal plan of care and include either formal PT or a home exercise program.  Patient has no evidence of  coagulopathy or current infection.    --- Consider repeat bilateral SI joint injections in the future.   --- Decrease Gabapentin to 100 mg nightly x 2 weeks. If she continues to experience drowsiness or finds that this does is not helping her pain then she will discontinue this medication.   --- Follow-up after procedure     SANJEEV REPORT  As part of the patient's treatment plan, I am prescribing controlled substances. The patient has been made aware of appropriate use of such medications, including potential risk of somnolence, limited ability to drive and/or work safely, and the potential for dependence or overdose. It has also been made clear that these medications are for use by this patient only, without concomitant use of alcohol or other substances unless prescribed.     As the clinician, I personally reviewed the SANJEEV from 3/5/2024 while the patient was in the office today.    History and physical exam exhibit continued safe and appropriate use of controlled substances.    Dictated utilizing Dragon dictation.

## 2024-03-05 NOTE — H&P (VIEW-ONLY)
CHIEF COMPLAINT  Back pain  Pain is consistent.    Subjective   Jemima Bell is a 74 y.o. female  who presents for follow-up.  She has a history of back pain.  She completed bilateral SI joint injections with ultrasound guidance on 11/30/2023 performed by Dr. Aranda. She reports 90% relief from this procedure x approximately 3 months.     Today her pain is 6/10VAS in severity. Today her primary pain complaint is low back pain that is radiating into her bilateral lower extremities (R>L). Her pain is radiates in the lateral aspect of each thigh, radiating into both knees and then anterior aspect of her right shin along the L4 dermatome. She continues to utilize Gabapentin 300 mg nightly as well as compounded pain cream. She states that the Gabapentin is causing quite a bit of daytime drowsiness.     Procedures:  5/12/23-left SI Joint Injection-90% relief x 5.5 months  3/27/23-L5/S1 LESI-100% relief to back pain x 1 week, 80% relief to left leg pain    Back Pain  This is a chronic problem. The current episode started more than 1 year ago. The problem occurs constantly. The problem has been gradually worsening since onset. The pain is present in the sacro-iliac. The quality of the pain is described as aching and stabbing. The pain is at a severity of 6/10. The pain is The same all the time. The symptoms are aggravated by standing (walking). Associated symptoms include headaches and weakness. Pertinent negatives include no abdominal pain, chest pain, dysuria, fever or numbness. Risk factors include menopause. She has tried heat, ice and analgesics (PT previously) for the symptoms.      PEG Assessment   What number best describes your pain on average in the past week?8  What number best describes how, during the past week, pain has interfered with your enjoyment of life?8  What number best describes how, during the past week, pain has interfered with your general activity?  9    The following portions of the  "patient's history were reviewed and updated as appropriate: allergies, current medications, past family history, past medical history, past social history, past surgical history, and problem list.    Review of Systems   Constitutional:  Positive for fatigue. Negative for activity change (less) and fever.   HENT:  Negative for congestion.    Respiratory:  Negative for cough and chest tightness.    Cardiovascular:  Negative for chest pain.   Gastrointestinal:  Negative for abdominal pain, constipation and diarrhea.   Genitourinary:  Negative for difficulty urinating and dysuria.   Musculoskeletal:  Positive for back pain.   Neurological:  Positive for weakness and headaches. Negative for dizziness, light-headedness and numbness.   Psychiatric/Behavioral:  Positive for agitation and sleep disturbance. Negative for suicidal ideas. The patient is nervous/anxious.      --  The aforementioned information the Chief Complaint section and above subjective data including any HPI data, and also the Review of Systems data, has been personally reviewed and affirmed.  --    Vitals:    03/05/24 1337   BP: 97/55   BP Location: Left arm   Patient Position: Sitting   Cuff Size: Large Adult   Pulse: 81   Resp: 20   Temp: 96.8 °F (36 °C)   TempSrc: Temporal   SpO2: 96%   Weight: 84.3 kg (185 lb 12.8 oz)   Height: 157.5 cm (62.01\")   PainSc:   6     Objective   Physical Exam  Vitals and nursing note reviewed.   Constitutional:       Appearance: Normal appearance. She is well-developed.   Eyes:      General: Lids are normal.   Cardiovascular:      Rate and Rhythm: Normal rate.   Pulmonary:      Effort: Pulmonary effort is normal.   Musculoskeletal:      Lumbar back: Tenderness and bony tenderness present. Decreased range of motion. Positive left straight leg raise test. Negative right straight leg raise test.   Neurological:      Mental Status: She is alert and oriented to person, place, and time.   Psychiatric:         Attention and " Perception: Attention normal.         Mood and Affect: Mood normal.         Speech: Speech normal.         Behavior: Behavior normal.         Judgment: Judgment normal.       Assessment & Plan   Diagnoses and all orders for this visit:    1. Lumbar degenerative disc disease (Primary)  -     gabapentin (NEURONTIN) 100 MG capsule; Take 1 capsule by mouth Every Night.  Dispense: 30 capsule; Refill: 0    2. Lumbar radiculopathy  -     gabapentin (NEURONTIN) 100 MG capsule; Take 1 capsule by mouth Every Night.  Dispense: 30 capsule; Refill: 0    3. Chronic bilateral low back pain with bilateral sciatica    4. Sacroiliac joint dysfunction      Jemima Bell reports a pain score of 6.  Given her pain assessment as noted, treatment options were discussed and the following options were decided upon as a follow-up plan to address the patient's pain: continuation of current treatment plan for pain.    --- LESI at L4/5  Reviewed the procedure at length with the patient.  Included in the review was expectations, complications, risk and benefits.The procedure was described in detail and the risks, benefits and alternatives were discussed with the patient (including but not limited to: bleeding, infection, nerve damage, worsening of pain, inability to perform injection, paralysis, seizures, coma, no pain relief and death) who agreed to proceed.  Discussed the potential for sedation if warranted/wanted.  The procedure will plan to be performed at [facility] with fluoroscopic guidance(unless ultrasound is indicated) and could potentially have steroids and contrast dye used. Questions were answered and in a way the patient could understand.  Patient verbalized understanding and wishes to proceed.  This intervention will be ordered.  Discussed with patient that all procedures are part of a multimodal plan of care and include either formal PT or a home exercise program.  Patient has no evidence of  coagulopathy or current infection.    --- Consider repeat bilateral SI joint injections in the future.   --- Decrease Gabapentin to 100 mg nightly x 2 weeks. If she continues to experience drowsiness or finds that this does is not helping her pain then she will discontinue this medication.   --- Follow-up after procedure     SANJEEV REPORT  As part of the patient's treatment plan, I am prescribing controlled substances. The patient has been made aware of appropriate use of such medications, including potential risk of somnolence, limited ability to drive and/or work safely, and the potential for dependence or overdose. It has also been made clear that these medications are for use by this patient only, without concomitant use of alcohol or other substances unless prescribed.     As the clinician, I personally reviewed the SANJEEV from 3/5/2024 while the patient was in the office today.    History and physical exam exhibit continued safe and appropriate use of controlled substances.    Dictated utilizing Dragon dictation.

## 2024-03-06 ENCOUNTER — TRANSCRIBE ORDERS (OUTPATIENT)
Dept: SURGERY | Facility: SURGERY CENTER | Age: 74
End: 2024-03-06
Payer: MEDICARE

## 2024-03-06 DIAGNOSIS — Z41.9 SURGERY, ELECTIVE: Primary | ICD-10-CM

## 2024-03-08 ENCOUNTER — TELEPHONE (OUTPATIENT)
Dept: PAIN MEDICINE | Facility: CLINIC | Age: 74
End: 2024-03-08

## 2024-03-08 NOTE — TELEPHONE ENCOUNTER
"  Caller: Jemima Bell \"Kim\"    Relationship to patient: Self    Best call back number: [phone]    Chief complaint: LOWER BACK    Type of visit: INJECTION     Requested date: ASAP      If rescheduling, when is the original appointment: 4-3-24     Additional notes: PATIENT IS IN A GREAT DEAL OF PAIN AND CAN HARDLY WALK SOMETIMES. SHE  WOULD LIKE TO BE SEEN SOONER IF ANY CANCELLATIONS.          "

## 2024-03-12 NOTE — DISCHARGE INSTRUCTIONS
[facility] - Post-procedure Instructions          --  While there are no absolute restrictions, it is recommended that you do not perform strenuous activity today. In the morning, you may resume your level of activity as before your block.    --  If you have a band-aid at your injection site, please remove it later today. Observe the area for any redness, swelling, pus-like drainage, or a temperature over 101°. If any of these symptoms occur, please call your doctor at [phone]. If after office hours, leave a message and the on-call provider will return your call.    --  Ice may be applied to your injection site. It is recommended you avoid direct heat (heating pad; hot tub) for 1-2 days.    --  Call [facility] at [phone] if you experience persistent headache, persistent bleeding from the injection site, or severe pain not relieved by heat or oral medication.    --  Do not make important decisions today.    --  Due to the effects of the block and/or the I.V. Sedation, DO NOT drive or operate hazardous machinery for 12 hours.  Local anesthetics may cause numbness after procedure and precautions must be taken with regards to operating equipment as well as with walking, even if ambulating with assistance of another person or with an assistive device.    --  Do not drink alcohol for 12 hours.    -- You may return to work tomorrow, or as directed by your referring doctor.    --  Occasionally you may notice a slight increase in your pain after the procedure. This should start to improve within the next 24-48 hours. Radiofrequency ablation procedure pain may last 3-4 weeks.    --  It may take as long as 3-4 days before you notice a gradual improvement in your pain and/or other symptoms.    -- You may continue to take your prescribed pain medication as needed.    --  Some normal possible side effects of steroid use could include fluid retention, increased blood sugar, dull headache,  increased sweating, increased appetite, mood swings and flushing.    --  Diabetics are recommended to watch their blood glucose level closely for 24-48 hours after the injection.    --  Must stay in PACU for 20 min upon arrival and prove no leg weakness before being discharged.    --  IN THE EVENT OF A LIFE THREATENING EMERGENCY, (CHEST PAIN, BREATHING DIFFICULTIES, PARALYSIS…) YOU SHOULD GO TO YOUR NEAREST EMERGENCY ROOM.    --  You should be contacted by our office within 2-3 days to schedule follow up or next appointment date.  If not contacted within 7 days, please call the office at [phone]

## 2024-03-13 ENCOUNTER — HOSPITAL ENCOUNTER (OUTPATIENT)
Dept: GENERAL RADIOLOGY | Facility: SURGERY CENTER | Age: 74
Setting detail: HOSPITAL OUTPATIENT SURGERY
End: 2024-03-13
Payer: MEDICARE

## 2024-03-13 ENCOUNTER — HOSPITAL ENCOUNTER (OUTPATIENT)
Facility: SURGERY CENTER | Age: 74
Setting detail: HOSPITAL OUTPATIENT SURGERY
Discharge: HOME | End: 2024-03-13
Attending: ANESTHESIOLOGY | Admitting: ANESTHESIOLOGY
Payer: MEDICARE

## 2024-03-13 VITALS
TEMPERATURE: 97.5 F | DIASTOLIC BLOOD PRESSURE: 73 MMHG | SYSTOLIC BLOOD PRESSURE: 138 MMHG | RESPIRATION RATE: 16 BRPM | HEART RATE: 62 BPM | OXYGEN SATURATION: 96 %

## 2024-03-13 DIAGNOSIS — Z41.9 SURGERY, ELECTIVE: ICD-10-CM

## 2024-03-13 PROCEDURE — 25010000002 BUPIVACAINE (PF) 0.25 % SOLUTION 10 ML VIAL: Performed by: ANESTHESIOLOGY

## 2024-03-13 PROCEDURE — 77002 NEEDLE LOCALIZATION BY XRAY: CPT

## 2024-03-13 PROCEDURE — 62323 NJX INTERLAMINAR LMBR/SAC: CPT | Performed by: ANESTHESIOLOGY

## 2024-03-13 PROCEDURE — 25510000001 IOPAMIDOL 61 % SOLUTION 30 ML VIAL: Performed by: ANESTHESIOLOGY

## 2024-03-13 PROCEDURE — 76000 FLUOROSCOPY <1 HR PHYS/QHP: CPT

## 2024-03-13 PROCEDURE — 25010000002 DEXAMETHASONE SODIUM PHOSPHATE 100 MG/10ML SOLUTION 10 ML VIAL: Performed by: ANESTHESIOLOGY

## 2024-03-13 NOTE — OP NOTE
L4/L5 Interlaminar Lumbar Epidural Steroid Injection   [facility]    PREOPERATIVE DIAGNOSIS:   Lumbar Radiculopathy  POSTOPERATIVE DIAGNOSIS:  Same as preop diagnosis    PROCEDURE:   Lumbar Epidural Steroid Injection, Therapeutic Interlaminar Injection, with epidurogram, at  L4/L5 level    PRE-PROCEDURE DISCUSSION WITH PATIENT:    Risks and complications were discussed with the patient prior to starting the procedure and informed consent was obtained.  We discussed various topics including but not limited to bleeding, infection, injury, paralysis, nerve injury, dural puncture, coma, death, worsening of clinical picture, lack of pain relief, and postprocedural soreness.    SURGEON:  Shey Aranda MD    REASON FOR PROCEDURE:    Radicular pain pattern seems consistent with this dermatome.    SEDATION:  No sedation was used for this procedure  ANESTHETIC:  Marcaine 0.25%  STEROID:   10mg dexamethasone    DESCRIPTON OF PROCEDURE:    After obtaining informed consent, I.V. was not started in the preop area.   The patient was taken to the operating room and placed in the prone position.  EKG, blood pressure, and pulse oximeter were monitored throughout, and sedation was provided as needed by the RN under my guidance. All pressure points were well padded.  The lumbar spine area was prepped with Chloraprep and draped in a sterile fashion.      AP fluoroscopic image was used to visualize the L4/L5 interspace.  The skin and subcutaneous tissue over the area was anesthetized with 1% Lidocaine.  An 18-Gauge Tuohy needle was then advanced through the anesthetized skin tract under fluoroscopic guidance in a coaxial view using a loss of resistance technique.  Lateral fluoroscopy was used to verify appropriate needle depth.  Once the needle tip was felt to be in the posterior epidural space, aspiration was noted to be negative for blood or CSF.  A volume of 1mL of Isovue was then injected under live fluoroscopy  in an AP view which produced good epidural spread with no evidence of loculation, vascular run-off or intrathecal spread.  Subsequently, a total volume of 5mL consisting of 10mg of dexamethasone, 0.5mL of 0.25% bupivcacaine and normal saline was injected without resistance.  The needle was removed intact.     ESTIMATED BLOOD LOSS:  <5 mL  SPECIMENS:  None    COMPLICATIONS:     No complications were noted., There was no indication of vascular uptake on live injection of contrast dye., and There was no indication of intrathecal uptake on live injection of contrast dye.    TOLERANCE & DISCHARGE CONDITION:    The patient tolerated the procedure well.  The patient was transported to the recovery area without difficulties.  The patient was discharged to home under the care of family in stable and satisfactory condition.    PLAN OF CARE:  The patient was given our standard instruction sheet.  The patient will Return to clinic 4-6 wks  The patient will resume all medications as per the medication reconciliation sheet.

## 2024-03-21 DIAGNOSIS — M51.36 LUMBAR DEGENERATIVE DISC DISEASE: ICD-10-CM

## 2024-03-21 DIAGNOSIS — M54.16 LUMBAR RADICULOPATHY: ICD-10-CM

## 2024-03-21 RX ORDER — GABAPENTIN 100 MG/1
100 CAPSULE ORAL NIGHTLY
Qty: 30 CAPSULE | Refills: 0 | Status: CANCELLED | OUTPATIENT
Start: 2024-03-21

## 2024-03-22 RX ORDER — GABAPENTIN 300 MG/1
300 CAPSULE ORAL NIGHTLY
Qty: 30 CAPSULE | Refills: 0 | Status: SHIPPED | OUTPATIENT
Start: 2024-03-22

## 2024-03-29 ENCOUNTER — TELEPHONE (OUTPATIENT)
Dept: PAIN MEDICINE | Facility: CLINIC | Age: 74
End: 2024-03-29

## 2024-03-29 NOTE — TELEPHONE ENCOUNTER
"  Caller: Jemima Bell \"Kim\"    Relationship to patient: Self    Best call back number:     Chief complaint: BACK PAIN     Type of visit: FUP     Requested date: 3/29/24     If rescheduling, when is the original appointment: 4/1/24     Additional notes:PATIENT IN PAIN AND WOULD LIKE TO BE SEEN TODAY IF POSSIBLE   "

## 2024-04-01 ENCOUNTER — OFFICE VISIT (OUTPATIENT)
Dept: PAIN MEDICINE | Facility: CLINIC | Age: 74
End: 2024-04-01
Payer: MEDICARE

## 2024-04-01 VITALS
WEIGHT: 192.4 LBS | OXYGEN SATURATION: 98 % | HEIGHT: 62 IN | BODY MASS INDEX: 35.41 KG/M2 | HEART RATE: 70 BPM | TEMPERATURE: 96.2 F | SYSTOLIC BLOOD PRESSURE: 98 MMHG | DIASTOLIC BLOOD PRESSURE: 60 MMHG

## 2024-04-01 DIAGNOSIS — M51.36 LUMBAR DEGENERATIVE DISC DISEASE: Primary | ICD-10-CM

## 2024-04-01 DIAGNOSIS — M54.42 CHRONIC BILATERAL LOW BACK PAIN WITH BILATERAL SCIATICA: ICD-10-CM

## 2024-04-01 DIAGNOSIS — M54.41 CHRONIC BILATERAL LOW BACK PAIN WITH BILATERAL SCIATICA: ICD-10-CM

## 2024-04-01 DIAGNOSIS — M54.16 LUMBAR RADICULOPATHY: ICD-10-CM

## 2024-04-01 DIAGNOSIS — M53.3 SACROILIAC JOINT DYSFUNCTION OF BOTH SIDES: ICD-10-CM

## 2024-04-01 DIAGNOSIS — G89.29 CHRONIC BILATERAL LOW BACK PAIN WITH BILATERAL SCIATICA: ICD-10-CM

## 2024-04-01 PROCEDURE — 1159F MED LIST DOCD IN RCRD: CPT | Performed by: NURSE PRACTITIONER

## 2024-04-01 PROCEDURE — 99214 OFFICE O/P EST MOD 30 MIN: CPT | Performed by: NURSE PRACTITIONER

## 2024-04-01 PROCEDURE — 1160F RVW MEDS BY RX/DR IN RCRD: CPT | Performed by: NURSE PRACTITIONER

## 2024-04-01 PROCEDURE — 1125F AMNT PAIN NOTED PAIN PRSNT: CPT | Performed by: NURSE PRACTITIONER

## 2024-04-01 RX ORDER — PREGABALIN 50 MG/1
50 CAPSULE ORAL NIGHTLY
Qty: 30 CAPSULE | Refills: 0 | Status: SHIPPED | OUTPATIENT
Start: 2024-04-01

## 2024-04-01 NOTE — PROGRESS NOTES
CHIEF COMPLAINT    Back pain    Subjective   Jemima Bell is a 74 y.o. female  who presents to the office for follow-up of procedure.  She completed a L4/L5 Interlaminar Lumbar Epidural Steroid Injection  on  3/13/24 performed by Dr. Aranda for management of back pain. Patient reports 80% relief from the procedure for the back, there was some relief for leg pain for only a few days, however the pain has returned to bilateral leg, right worse than left.     Today her pain is 7/10VAS in severity. Her primary pain complaint remains her right lower extremity pain. This pain is located in the anterior/lateral aspect of her right thigh radiating into the right knee and shin stopping at the ankle. She does have some left leg pain as well, but this is not as severe as the right leg. She also has low back pain but this is not nearly as severe as her right leg pain. She is utilizing Gabapentin 300 mg nightly which is helpful to her pain, this is causing drowsiness. She tried decreasing this to Gabapentin 100 mg, but states that this did not help her pain, so she resumed the 300 mg nightly.     Not a candidate for MRI d/t bladder stimulator.     Not a good candidate for NSAIDs d/t GI upset.     Procedures:  11/30/2023-bilateral SI joint injections-90% relief for approximately 3 months  5/12/23-left SI Joint Injection-90% relief x 5.5 months  3/27/23-L5/S1 LESI-100% relief to back pain x 1 week, 80% relief to left leg pain    Back Pain  This is a chronic problem. The current episode started more than 1 year ago. The problem occurs constantly. The problem has been gradually worsening since onset. The pain is present in the sacro-iliac. The quality of the pain is described as aching and stabbing. The pain is at a severity of 7/10. The pain is The same all the time. The symptoms are aggravated by standing (walking). Associated symptoms include headaches. Pertinent negatives include no abdominal pain, chest pain, dysuria,  fever, numbness or weakness (bilateral leg). Risk factors include menopause. She has tried heat, ice and analgesics (PT previously) for the symptoms.      PEG Assessment   What number best describes your pain on average in the past week?7  What number best describes how, during the past week, pain has interfered with your enjoyment of life?7  What number best describes how, during the past week, pain has interfered with your general activity?  7    The following portions of the patient's history were reviewed and updated as appropriate: allergies, current medications, past family history, past medical history, past social history, past surgical history, and problem list.    Review of Systems   Constitutional:  Negative for chills and fever.   Respiratory:  Negative for cough and shortness of breath.    Cardiovascular:  Negative for chest pain.   Gastrointestinal:  Negative for abdominal pain, constipation and diarrhea.   Genitourinary:  Negative for difficulty urinating and dysuria.   Musculoskeletal:  Positive for back pain.   Neurological:  Positive for headaches. Negative for dizziness, weakness (bilateral leg), light-headedness and numbness.   Psychiatric/Behavioral:  Negative for agitation.      --  The aforementioned information the Chief Complaint section and above subjective data including any HPI data, and also the Review of Systems data, has been personally reviewed and affirmed.  --     XR HIP W OR WO PELVIS 2-3 VIEW LEFT-     INDICATIONS: Pain     TECHNIQUE: Frontal view the pelvis, 2 views of the left hip     COMPARISON: None available     FINDINGS:     No acute fracture, erosion, or dislocation is identified. Hip joint  spaces appear preserved. Degenerative changes are seen in the partly  included lumbar spine. Sacral stimulator device is noted.  Follow-up/further evaluation can be obtained as indications persist.     IMPRESSION:     As described.     This report was finalized on 2/7/2023 1:51 PM by  "Dr. Edd Quijano M.D.    Vitals:    04/01/24 1105   BP: 98/60   BP Location: Left arm   Patient Position: Sitting   Pulse: 70   Temp: 96.2 °F (35.7 °C)   TempSrc: Temporal   SpO2: 98%   Weight: 87.3 kg (192 lb 6.4 oz)   Height: 157.5 cm (62.01\")   PainSc:   7   PainLoc: Back     Objective   Physical Exam  Vitals and nursing note reviewed.   Constitutional:       Appearance: Normal appearance. She is well-developed.   Eyes:      General: Lids are normal.   Cardiovascular:      Rate and Rhythm: Normal rate.   Pulmonary:      Effort: Pulmonary effort is normal.   Musculoskeletal:      Lumbar back: No tenderness. Normal range of motion. Negative right straight leg raise test and negative left straight leg raise test.      Comments:   -Justice Kip  -Justice Thigh Thrust  -Justice Gaenslen's  -Justice SI Compression   Neurological:      Mental Status: She is alert and oriented to person, place, and time.   Psychiatric:         Attention and Perception: Attention normal.         Mood and Affect: Mood normal.         Speech: Speech normal.         Behavior: Behavior normal.         Judgment: Judgment normal.       Assessment & Plan   Diagnoses and all orders for this visit:    1. Lumbar degenerative disc disease (Primary)    2. Lumbar radiculopathy  -     Ambulatory Referral to Neurosurgery  -     pregabalin (LYRICA) 50 MG capsule; Take 1 capsule by mouth Every Night.  Dispense: 30 capsule; Refill: 0    3. Sacroiliac joint dysfunction of both sides  -     Ambulatory Referral to Neurosurgery    4. Chronic bilateral low back pain with bilateral sciatica  -     Ambulatory Referral to Neurosurgery  -     pregabalin (LYRICA) 50 MG capsule; Take 1 capsule by mouth Every Night.  Dispense: 30 capsule; Refill: 0      Jemima ISAURA Rooseveltasher reports a pain score of 7.  Given her pain assessment as noted, treatment options were discussed and the following options were decided upon as a follow-up plan to address the patient's pain: " prescription for non-opioid analgesics and referral to specialist for assistance in pain treatment guidance.    --- On exam today she all bilateral SI joint provocative maneuvers are negative. D/t her primary pain complaint being radicular pain and no therapeutic benefits from LESI I recommend she be evaluated by neurosurgery at this time.   --- Stop Gabapentin, start Lyrica 50 mg nightly  --- Follow-up 1 month or sooner if needed.      SANJEEV REPORT  As part of the patient's treatment plan, I am prescribing controlled substances. The patient has been made aware of appropriate use of such medications, including potential risk of somnolence, limited ability to drive and/or work safely, and the potential for dependence or overdose. It has also been made clear that these medications are for use by this patient only, without concomitant use of alcohol or other substances unless prescribed.     As the clinician, I personally reviewed the SANJEEV from 4/1/2024 while the patient was in the office today.    History and physical exam exhibit continued safe and appropriate use of controlled substances.    Dictated utilizing Dragon dictation.

## 2024-04-03 ENCOUNTER — TELEPHONE (OUTPATIENT)
Dept: ORTHOPEDIC SURGERY | Facility: CLINIC | Age: 74
End: 2024-04-03
Payer: MEDICARE

## 2024-04-03 ENCOUNTER — PRIOR AUTHORIZATION (OUTPATIENT)
Dept: PAIN MEDICINE | Facility: CLINIC | Age: 74
End: 2024-04-03
Payer: MEDICARE

## 2024-04-11 ENCOUNTER — TELEPHONE (OUTPATIENT)
Dept: NEUROSURGERY | Facility: CLINIC | Age: 74
End: 2024-04-11
Payer: MEDICARE

## 2024-04-12 ENCOUNTER — OFFICE VISIT (OUTPATIENT)
Dept: NEUROSURGERY | Facility: CLINIC | Age: 74
End: 2024-04-12
Payer: MEDICARE

## 2024-04-12 VITALS
RESPIRATION RATE: 18 BRPM | TEMPERATURE: 98 F | OXYGEN SATURATION: 97 % | HEIGHT: 62 IN | HEART RATE: 74 BPM | BODY MASS INDEX: 35.33 KG/M2 | WEIGHT: 192 LBS

## 2024-04-12 DIAGNOSIS — M54.42 CHRONIC BILATERAL LOW BACK PAIN WITH BILATERAL SCIATICA: ICD-10-CM

## 2024-04-12 DIAGNOSIS — M85.859 OSTEOPENIA OF HIP, UNSPECIFIED LATERALITY: ICD-10-CM

## 2024-04-12 DIAGNOSIS — M51.36 DEGENERATION OF LUMBAR INTERVERTEBRAL DISC: ICD-10-CM

## 2024-04-12 DIAGNOSIS — M54.16 LUMBAR RADICULITIS: ICD-10-CM

## 2024-04-12 DIAGNOSIS — M54.41 CHRONIC BILATERAL LOW BACK PAIN WITH BILATERAL SCIATICA: ICD-10-CM

## 2024-04-12 DIAGNOSIS — G89.29 CHRONIC BILATERAL LOW BACK PAIN WITH BILATERAL SCIATICA: ICD-10-CM

## 2024-04-12 DIAGNOSIS — M54.16 LUMBAR RADICULOPATHY: ICD-10-CM

## 2024-04-12 DIAGNOSIS — M15.9 OSTEOARTHRITIS OF MULTIPLE JOINTS, UNSPECIFIED OSTEOARTHRITIS TYPE: ICD-10-CM

## 2024-04-12 DIAGNOSIS — M85.88 OTHER SPECIFIED DISORDERS OF BONE DENSITY AND STRUCTURE, OTHER SITE: ICD-10-CM

## 2024-04-12 DIAGNOSIS — M51.36 DDD (DEGENERATIVE DISC DISEASE), LUMBAR: Primary | ICD-10-CM

## 2024-04-12 RX ORDER — TRAMADOL HYDROCHLORIDE 50 MG/1
50-100 TABLET ORAL EVERY 4 HOURS PRN
Qty: 18 TABLET | Refills: 0 | Status: SHIPPED | OUTPATIENT
Start: 2024-04-12

## 2024-04-12 RX ORDER — PREGABALIN 50 MG/1
50 CAPSULE ORAL 2 TIMES DAILY
Qty: 60 CAPSULE | Refills: 0 | Status: SHIPPED | OUTPATIENT
Start: 2024-04-12

## 2024-04-12 NOTE — PROGRESS NOTES
Subjective   History of Present Illness: Jemima Bell is a 74 y.o. female is being seen for consultation today at the request of MACHO Bangura for low back pain that radiates into her lower extremity.  Patient complains of progressive back pain as well as pain radiating into her lower extremities right worse than the left.  Pain is most severe when she is standing and walking and improves to some extent when she sits down.  Pain has been going on for years and getting worse.  Patient is undergone multiple epidural injections as well as physical therapy without any lasting improvement.  Patient has a bladder stimulator and cannot get an MRI    Chief Complaint   Patient presents with    Back Pain    Leg Pain     Bilateral          Previous treatment: Lyrica, Gabapentin, Baclofen, Compound pain cream, Tramadol, NSAID'S    Previous neurosurgery: None     Previous injections:   She completed a L4/L5 Interlaminar Lumbar Epidural Steroid Injection  on  3/13/24 performed by Dr. Aranda for management of back pain. Patient reports 80% r     The following portions of the patient's history were reviewed and updated as appropriate: allergies, current medications, past family history, past medical history, past social history, past surgical history, and problem list.    Review of Systems   Constitutional:  Positive for activity change.   HENT: Negative.     Eyes: Negative.    Respiratory:  Negative for chest tightness and shortness of breath.    Cardiovascular:  Negative for chest pain.   Gastrointestinal: Negative.    Endocrine: Negative.    Genitourinary: Negative.    Musculoskeletal:  Positive for back pain, gait problem and myalgias.        Bilateral leg pain   Skin: Negative.    Allergic/Immunologic: Negative.    Neurological:  Negative for weakness and numbness.   Hematological: Negative.    Psychiatric/Behavioral: Negative.         Objective      Pulse 74   Temp 98 °F (36.7 °C)   Resp 18   Ht 157.5 cm  "(62.01\")   Wt 87.1 kg (192 lb)   LMP  (LMP Unknown)   SpO2 97%   BMI 35.11 kg/m²    Body mass index is 35.11 kg/m².  Vitals:    04/12/24 1302   PainSc:   8   PainLoc: Back           Neurologic Exam     Mental Status   Oriented to person, place, and time.     Motor Exam     Strength   Strength 5/5 throughout.     Sensory Exam   Light touch normal.     Gait, Coordination, and Reflexes     Reflexes   Right Fischer: absent  Left Fischer: absent    Assessment & Plan   Independent Review of Radiographic Studies:      I personally reviewed and interpreted the images from the following studies.    CT lumbar spine 2023: Moderate to severe degenerative changes from L3-S1.    Medical Decision Making:      Jemima Bell is a 74 y.o. female progressive history of symptoms concerning for radiculopathy neurogenic claudication as well as severe back pain with severe degenerative changes on CT scan from a year ago.  Patient cannot undergo an MRI so we will send her for CT myelogram to further evaluate.  Will also send her for DEXA scan and scoliosis x-ray in preparation for possible surgery.  I will see her back when she completes these.      There are no diagnoses linked to this encounter.  No follow-ups on file.    This patient was examined wearing appropriate personal protective equipment.                      Dr. Michelet Rice IV    04/12/24  13:04 EDT  "

## 2024-04-13 NOTE — PROGRESS NOTES
04/18/24 0001   Pre-Procedure Phone Call   Procedure Time Verified Yes   Arrival Time 1200   Procedure Location Verified Yes   Medical History Reviewed Yes   NPO Status Reinforced Yes   Ride and Caregiver Arranged Yes

## 2024-04-17 ENCOUNTER — HOSPITAL ENCOUNTER (OUTPATIENT)
Dept: GENERAL RADIOLOGY | Facility: HOSPITAL | Age: 74
Discharge: HOME | End: 2024-04-17
Payer: MEDICARE

## 2024-04-17 ENCOUNTER — HOSPITAL ENCOUNTER (OUTPATIENT)
Dept: BONE DENSITY | Facility: HOSPITAL | Age: 74
Discharge: HOME | End: 2024-04-17
Payer: MEDICARE

## 2024-04-17 DIAGNOSIS — M51.36 DDD (DEGENERATIVE DISC DISEASE), LUMBAR: ICD-10-CM

## 2024-04-17 DIAGNOSIS — M85.88 OTHER SPECIFIED DISORDERS OF BONE DENSITY AND STRUCTURE, OTHER SITE: ICD-10-CM

## 2024-04-17 DIAGNOSIS — M15.9 OSTEOARTHRITIS OF MULTIPLE JOINTS, UNSPECIFIED OSTEOARTHRITIS TYPE: ICD-10-CM

## 2024-04-17 DIAGNOSIS — M54.16 LUMBAR RADICULOPATHY: ICD-10-CM

## 2024-04-17 DIAGNOSIS — M85.859 OSTEOPENIA OF HIP, UNSPECIFIED LATERALITY: ICD-10-CM

## 2024-04-17 PROCEDURE — 72082 X-RAY EXAM ENTIRE SPI 2/3 VW: CPT

## 2024-04-17 PROCEDURE — 77080 DXA BONE DENSITY AXIAL: CPT

## 2024-04-18 ENCOUNTER — HOSPITAL ENCOUNTER (OUTPATIENT)
Dept: CT IMAGING | Facility: HOSPITAL | Age: 74
Discharge: HOME | End: 2024-04-18
Payer: MEDICARE

## 2024-04-18 ENCOUNTER — HOSPITAL ENCOUNTER (OUTPATIENT)
Dept: GENERAL RADIOLOGY | Facility: HOSPITAL | Age: 74
Discharge: HOME | End: 2024-04-18
Payer: MEDICARE

## 2024-04-18 VITALS
BODY MASS INDEX: 34.04 KG/M2 | HEART RATE: 59 BPM | WEIGHT: 185 LBS | HEIGHT: 62 IN | DIASTOLIC BLOOD PRESSURE: 73 MMHG | SYSTOLIC BLOOD PRESSURE: 124 MMHG | RESPIRATION RATE: 16 BRPM | OXYGEN SATURATION: 97 %

## 2024-04-18 DIAGNOSIS — M54.16 LUMBAR RADICULOPATHY: ICD-10-CM

## 2024-04-18 DIAGNOSIS — M51.36 DDD (DEGENERATIVE DISC DISEASE), LUMBAR: ICD-10-CM

## 2024-04-18 PROCEDURE — 25010000002 LIDOCAINE 1 % SOLUTION: Performed by: NEUROLOGICAL SURGERY

## 2024-04-18 PROCEDURE — 72132 CT LUMBAR SPINE W/DYE: CPT

## 2024-04-18 PROCEDURE — 62305 MYELOGRAPHY LUMBAR INJECTION: CPT

## 2024-04-18 PROCEDURE — 72126 CT NECK SPINE W/DYE: CPT

## 2024-04-18 PROCEDURE — 72240 MYELOGRAPHY NECK SPINE: CPT

## 2024-04-18 PROCEDURE — 25510000001 IOPAMIDOL 61 % SOLUTION: Performed by: NEUROLOGICAL SURGERY

## 2024-04-18 PROCEDURE — 72129 CT CHEST SPINE W/DYE: CPT

## 2024-04-18 RX ORDER — LIDOCAINE HYDROCHLORIDE 10 MG/ML
10 INJECTION, SOLUTION INFILTRATION; PERINEURAL ONCE
Status: COMPLETED | OUTPATIENT
Start: 2024-04-18 | End: 2024-04-18

## 2024-04-18 RX ORDER — IOPAMIDOL 612 MG/ML
15 INJECTION, SOLUTION INTRATHECAL
Status: COMPLETED | OUTPATIENT
Start: 2024-04-18 | End: 2024-04-18

## 2024-04-18 RX ADMIN — IOPAMIDOL 9 ML: 612 INJECTION, SOLUTION INTRATHECAL at 14:16

## 2024-04-18 RX ADMIN — LIDOCAINE HYDROCHLORIDE 5 ML: 10 INJECTION, SOLUTION INFILTRATION; PERINEURAL at 14:17

## 2024-04-18 NOTE — DISCHARGE INSTRUCTIONS
EDUCATION /DISCHARGE INSTRUCTIONS:  A myelogram is a special radiology procedure of the spinal cord, spinal nerves and other related structures.  You will be awake during the examination.  An area of your lower back will be cleansed with an antiseptic solution.  The physician will inject a numbing medication in your lower back.  While your back is numb, a needle will be placed in the lower back area.  A small amount of spinal fluid may be withdrawn and sent to the lab if ordered by your physician. While the needle is in the back, an injection of a contrast material (xray dye) will be given through the needle.  The contrast material will allow the physician to see the spinal cord and spinal nerves.  Once injected, the needle will be removed and a band aid will be placed over the injection site.  The table will be tilted during the process to allow the contrast material to flow to particular areas in the spine.  Following the injection and xrays, you will be taken to the CT scan where more pictures will be taken. After the procedure is finished, the contrast material will be absorbed by your body and eliminated through your kidneys.  The radiologist will study and interpret your myelogram and send the results to your physician.  Procedure risks of a myelogram include, but are not limited to:  *  Bleeding   *  Seizure  *  Infection   *  Headache, possibly severe requiring a blood patch  *  Contrast reaction  *  Nerve or cord injury  *  Paralysis and death    Benefits of the procedure:    Best examination for delineating pathology related to spinal cord compression from a disc and/or nerve root compression.  Alternatives to the procedure:MRI - a non invasive procedure requiring intravenous contrast injection. Cannot be done on patients with certain pacemakers or metal in the body.  MRI risks include possible reaction to the contrast material, movement of metal located in the body.   Benefit to MRI:  Non-invasive and  usually painless procedure.    Important information following your myelogram:    *  When you get home, lie down with no more than 2 pillows under your head.  *  Sit up in the car going home. At home, sit up to eat and use the restroom only, then lie back down.   *  24 HOURS COMPLETE AT __Friday, April 19, 2024 at 2:00 PM________   *  Tomorrow, after 24 hours completed, take it easy and rest the next 2-3 days.  *  Do not drive for 24 hours following a myelogram  *  You may remove the bandage and shower in the morning  *  Increase your fluids for the next 24 hours.  Caffeinated drinks are encouraged.Increase your intake of fluids the next 2-3 days    CALL YOUR PHYSICIAN FOR THE FOLLOWING:  * Pain at the injection site  * Redness, swelling, bruising or drainage at the injection site  * A fever by mouth of 101.0  * Any new symptoms  If you have problems with a headache that is not relieved with rest and medication, please call the Radiology Triage Nurse desk [phone]

## 2024-04-19 ENCOUNTER — TELEPHONE (OUTPATIENT)
Dept: INTERVENTIONAL RADIOLOGY/VASCULAR | Facility: HOSPITAL | Age: 74
End: 2024-04-19
Payer: MEDICARE

## 2024-04-22 ENCOUNTER — OFFICE VISIT (OUTPATIENT)
Dept: PAIN MEDICINE | Facility: CLINIC | Age: 74
End: 2024-04-22
Payer: MEDICARE

## 2024-04-22 VITALS
WEIGHT: 190.8 LBS | TEMPERATURE: 96.8 F | BODY MASS INDEX: 35.11 KG/M2 | HEIGHT: 62 IN | OXYGEN SATURATION: 96 % | RESPIRATION RATE: 16 BRPM | SYSTOLIC BLOOD PRESSURE: 131 MMHG | DIASTOLIC BLOOD PRESSURE: 78 MMHG | HEART RATE: 70 BPM

## 2024-04-22 DIAGNOSIS — M54.41 CHRONIC BILATERAL LOW BACK PAIN WITH BILATERAL SCIATICA: ICD-10-CM

## 2024-04-22 DIAGNOSIS — G89.29 CHRONIC BILATERAL LOW BACK PAIN WITH BILATERAL SCIATICA: ICD-10-CM

## 2024-04-22 DIAGNOSIS — M54.42 CHRONIC BILATERAL LOW BACK PAIN WITH BILATERAL SCIATICA: ICD-10-CM

## 2024-04-22 DIAGNOSIS — M51.36 DEGENERATION OF LUMBAR INTERVERTEBRAL DISC: ICD-10-CM

## 2024-04-22 DIAGNOSIS — M54.16 LUMBAR RADICULITIS: Primary | ICD-10-CM

## 2024-04-22 DIAGNOSIS — M51.36 LUMBAR DEGENERATIVE DISC DISEASE: ICD-10-CM

## 2024-04-22 LAB
POC AMPHETAMINES: NEGATIVE
POC BARBITURATES: NEGATIVE
POC BENZODIAZEPHINES: NEGATIVE
POC COCAINE: NEGATIVE
POC METHADONE: NEGATIVE
POC METHAMPHETAMINE SCREEN URINE: NEGATIVE
POC OPIATES: NEGATIVE
POC OXYCODONE: NEGATIVE
POC PHENCYCLIDINE: NEGATIVE
POC PROPOXYPHENE: NEGATIVE
POC THC: NEGATIVE
POC TRICYCLIC ANTIDEPRESSANTS: NEGATIVE

## 2024-04-22 PROCEDURE — 1125F AMNT PAIN NOTED PAIN PRSNT: CPT | Performed by: NURSE PRACTITIONER

## 2024-04-22 PROCEDURE — 80305 DRUG TEST PRSMV DIR OPT OBS: CPT | Performed by: NURSE PRACTITIONER

## 2024-04-22 PROCEDURE — 1160F RVW MEDS BY RX/DR IN RCRD: CPT | Performed by: NURSE PRACTITIONER

## 2024-04-22 PROCEDURE — 99214 OFFICE O/P EST MOD 30 MIN: CPT | Performed by: NURSE PRACTITIONER

## 2024-04-22 PROCEDURE — 1159F MED LIST DOCD IN RCRD: CPT | Performed by: NURSE PRACTITIONER

## 2024-04-22 RX ORDER — PREGABALIN 50 MG/1
50 CAPSULE ORAL 2 TIMES DAILY
Qty: 60 CAPSULE | Refills: 0 | Status: SHIPPED | OUTPATIENT
Start: 2024-04-22

## 2024-04-22 RX ORDER — TRAMADOL HYDROCHLORIDE 50 MG/1
50 TABLET ORAL 3 TIMES DAILY PRN
Qty: 90 TABLET | Refills: 0 | Status: SHIPPED | OUTPATIENT
Start: 2024-04-22

## 2024-04-22 NOTE — PROGRESS NOTES
CHIEF COMPLAINT  Back pain  Pain is consistent.  Urine:neg    Subjective   Jemima Bell is a 74 y.o. female  who presents for follow-up.  She has a history of back pain.  Today her pain is 7/10VAS in severity. She states that her pain is severe. She completed a CT myelogram ordered by Dr. Rice and is waiting on the results from this.     She is utilizing the Lyrica 50 mg daily and has been using an acute supply of Tramadol 50 mg every 6 hours PRN for her pain (2-3 tablets a day). She states that this medication is helpful to her pain. This decreases her pain to the point that she can tolerate standing and walking. She denies side effects.     Not a candidate for MRI d/t bladder stimulator.      Not a good candidate for NSAIDs d/t GI upset.      Procedures:  3/13/2024-LESI at L4/5-80% relief to her back, only temporary relief to leg pain  11/30/2023-bilateral SI joint injections-90% relief for approximately 3 months  5/12/23-left SI Joint Injection-90% relief x 5.5 months  3/27/23-L5/S1 LESI-100% relief to back pain x 1 week, 80% relief to left leg pain     Back Pain  This is a chronic problem. The current episode started more than 1 year ago. The problem occurs constantly. The problem has been gradually worsening since onset. The pain is present in the sacro-iliac. The quality of the pain is described as aching and stabbing. The pain radiates to the right thigh, left thigh, left knee and right knee (R>L). The pain is at a severity of 7/10. The pain is The same all the time. The symptoms are aggravated by standing (walking). Associated symptoms include weakness. Pertinent negatives include no abdominal pain, chest pain, dysuria, fever, headaches or numbness. Risk factors include menopause. She has tried heat, ice and analgesics (PT previously) for the symptoms.      PEG Assessment   What number best describes your pain on average in the past week?8  What number best describes how, during the past week,  "pain has interfered with your enjoyment of life?9  What number best describes how, during the past week, pain has interfered with your general activity?  9    The following portions of the patient's history were reviewed and updated as appropriate: allergies, current medications, past family history, past medical history, past social history, past surgical history, and problem list.    Review of Systems   Constitutional:  Negative for activity change (none), fatigue and fever.   HENT:  Negative for congestion.    Respiratory:  Negative for cough and chest tightness.    Cardiovascular:  Negative for chest pain.   Gastrointestinal:  Negative for abdominal pain and constipation.   Genitourinary:  Negative for difficulty urinating and dysuria.   Musculoskeletal:  Positive for back pain.   Neurological:  Positive for weakness. Negative for dizziness, light-headedness, numbness and headaches.   Psychiatric/Behavioral:  Positive for agitation and sleep disturbance. Negative for suicidal ideas. The patient is nervous/anxious.      --  The aforementioned information the Chief Complaint section and above subjective data including any HPI data, and also the Review of Systems data, has been personally reviewed and affirmed.  --    Vitals:    04/22/24 1134   BP: 131/78   BP Location: Left arm   Patient Position: Sitting   Cuff Size: Adult   Pulse: 70   Resp: 16   Temp: 96.8 °F (36 °C)   TempSrc: Temporal   SpO2: 96%   Weight: 86.5 kg (190 lb 12.8 oz)   Height: 157.5 cm (62\")   PainSc:   7     Objective   Physical Exam  Vitals and nursing note reviewed.   Constitutional:       Appearance: Normal appearance. She is well-developed.   Eyes:      General: Lids are normal.   Cardiovascular:      Rate and Rhythm: Normal rate.   Pulmonary:      Effort: Pulmonary effort is normal.   Musculoskeletal:      Lumbar back: Tenderness and bony tenderness present. Decreased range of motion.   Neurological:      Mental Status: She is alert and " oriented to person, place, and time.   Psychiatric:         Attention and Perception: Attention normal.         Mood and Affect: Mood normal.         Speech: Speech normal.         Behavior: Behavior normal.         Judgment: Judgment normal.         Assessment & Plan   Diagnoses and all orders for this visit:    1. Lumbar radiculitis (Primary)  -     pregabalin (LYRICA) 50 MG capsule; Take 1 capsule by mouth 2 (Two) Times a Day.  Dispense: 60 capsule; Refill: 0  -     traMADol (ULTRAM) 50 MG tablet; Take 1 tablet by mouth 3 (Three) Times a Day As Needed for Moderate Pain or Severe Pain.  Dispense: 90 tablet; Refill: 0    2. Degeneration of lumbar intervertebral disc  -     traMADol (ULTRAM) 50 MG tablet; Take 1 tablet by mouth 3 (Three) Times a Day As Needed for Moderate Pain or Severe Pain.  Dispense: 90 tablet; Refill: 0    3. Chronic bilateral low back pain with bilateral sciatica    4. Lumbar degenerative disc disease      Jemima Bell reports a pain score of 7.  Given her pain assessment as noted, treatment options were discussed and the following options were decided upon as a follow-up plan to address the patient's pain: prescription for non-opioid analgesics and prescription for opioid analgesics.    --- Routine UDS in office today as part of monitoring requirements for controlled substances.  The specimen was viewed and the immunoassay result reviewed and is NEG.  This specimen will be sent to [facility] for confirmation.     --- CSA explained and signed in office.   --- Refill Tramadol 50 mg TID PRN. Patient appears stable with current regimen. No adverse effects. Regarding continuation of opioids, there is no evidence of aberrant behavior or any red flags.  The patient continues with appropriate response to opioid therapy. ADL's remain intact by self.   --- Increase Lyrica to 50 mg BID  --- Continue to work with Dr. Rice.   --- Follow-up 1 month or sooner if needed.      SANJEEV  REPORT  As part of the patient's treatment plan, I am prescribing controlled substances. The patient has been made aware of appropriate use of such medications, including potential risk of somnolence, limited ability to drive and/or work safely, and the potential for dependence or overdose. It has also been made clear that these medications are for use by this patient only, without concomitant use of alcohol or other substances unless prescribed.     Patient has completed prescribing agreement detailing terms of continued prescribing of controlled substances, including monitoring SANJEEV reports, urine drug screening, and pill counts if necessary. The patient is aware that inappropriate use will results in cessation of prescribing such medications.    As the clinician, I personally reviewed the SANJEEV from 4/22/2024 while the patient was in the office today.    History and physical exam exhibit continued safe and appropriate use of controlled substances.    Dictated utilizing Dragon dictation.

## 2024-04-26 ENCOUNTER — PATIENT MESSAGE (OUTPATIENT)
Dept: FAMILY MEDICINE CLINIC | Facility: CLINIC | Age: 74
End: 2024-04-26
Payer: MEDICARE

## 2024-04-26 ENCOUNTER — PREP FOR SURGERY (OUTPATIENT)
Dept: OTHER | Facility: HOSPITAL | Age: 74
End: 2024-04-26
Payer: MEDICARE

## 2024-04-26 ENCOUNTER — OFFICE VISIT (OUTPATIENT)
Dept: NEUROSURGERY | Facility: CLINIC | Age: 74
End: 2024-04-26
Payer: MEDICARE

## 2024-04-26 VITALS
RESPIRATION RATE: 18 BRPM | HEIGHT: 62 IN | BODY MASS INDEX: 34.78 KG/M2 | TEMPERATURE: 97 F | OXYGEN SATURATION: 97 % | HEART RATE: 70 BPM | WEIGHT: 189 LBS

## 2024-04-26 DIAGNOSIS — R79.9 ABNORMAL FINDING OF BLOOD CHEMISTRY, UNSPECIFIED: ICD-10-CM

## 2024-04-26 DIAGNOSIS — M48.062 LUMBAR STENOSIS WITH NEUROGENIC CLAUDICATION: Primary | ICD-10-CM

## 2024-04-26 DIAGNOSIS — M54.16 LUMBAR RADICULOPATHY: ICD-10-CM

## 2024-04-26 DIAGNOSIS — R79.1 ABNORMAL COAGULATION PROFILE: ICD-10-CM

## 2024-04-26 DIAGNOSIS — M51.36 LUMBAR DEGENERATIVE DISC DISEASE: ICD-10-CM

## 2024-04-26 NOTE — TELEPHONE ENCOUNTER
From: Jemima Bell  To: Meredith Kehrer  Sent: 4/26/2024 3:22 PM EDT  Subject: Back Problems    I have just returned from Neurosurgeon Michelet Rice’s office. I was sent to him from Pain Management. After many X-rays, CT scans, he recommends surgery. My lower back is only getting worse and is effecting my walking, my legs. You name it. Fusion of 4 or 5 discs is recommended. He asked that I contact you for surgery clearance. I will call for an appointment. Thank you.

## 2024-04-26 NOTE — PROGRESS NOTES
"Subjective   History of Present Illness: Jemima Bell is a 74 y.o. female is here today for follow-up on lumbar radiculopathy with a new Myelogram. Today patient reports back and low back.  No changes since she was last seen    Chief Complaint   Patient presents with    Back Pain    Leg Pain          Previous treatment: Lyrica, Gabapentin, Baclofen, Compound pain cream, Tramadol, NSAID'S     Previous neurosurgery:  None    Previous injections:   3/13/2024-LESI at L4/5-80% relief to her back, only temporary relief to leg pain  11/30/2023-bilateral SI joint injections-90% relief for approximately 3 months  5/12/23-left SI Joint Injection-90% relief x 5.5 months  3/27/23-L5/S1 LESI-100% relief to back pain x 1 week, 80% relief to left leg pain    The following portions of the patient's history were reviewed and updated as appropriate: allergies, current medications, past family history, past medical history, past social history, past surgical history, and problem list.    Review of Systems   Constitutional:  Positive for activity change.   Respiratory:  Negative for chest tightness and shortness of breath.    Cardiovascular:  Negative for chest pain.   Musculoskeletal:  Positive for arthralgias, back pain, gait problem and myalgias.        + leg pain   Neurological:  Negative for weakness and numbness.       Objective      Pulse 70   Temp 97 °F (36.1 °C)   Resp 18   Ht 157.5 cm (62\")   Wt 85.7 kg (189 lb)   LMP  (LMP Unknown)   SpO2 97%   BMI 34.57 kg/m²    Body mass index is 34.57 kg/m².  Vitals:    04/26/24 1337   PainSc: 10-Worst pain ever  Comment: Depending on activity   PainLoc: Back           Neurologic Exam    Assessment & Plan   Independent Review of Radiographic Studies:      I personally reviewed and interpreted the images from the following studies.    CT myelogram cervical thoracic lumbar spine: No high-grade stenosis or severe degenerative changes in the cervical or thoracic spine.  The " lumbar spine there is severe degenerative changes from L3-S1 with moderate to severe central stenosis at L3-4 and L4-5 as well as foraminal stenosis at all levels.    Scoliosis x-ray: Mild thoracic coronal deformity.  Well-balanced sagittally    Medical Decision Making:      Jemima Bell is a 74 y.o. female with history of progressive low back pain neurogenic claudication and radiculopathy with severe degenerative changes with central and foraminal stenosis from L3 to sacrum.  Patient has failed all conservative measures in the form of physical therapy and injections.  She would benefit from surgical intervention in the form of L3-5 LLIF, L5-S1 TLIF and L3-S1 fusion.  She will require medical clearance.      Diagnoses and all orders for this visit:    1. Lumbar stenosis with neurogenic claudication (Primary)    2. Lumbar radiculopathy    3. Lumbar degenerative disc disease      No follow-ups on file.    This patient was examined wearing appropriate personal protective equipment.                      Dr. Michelet Rice IV    04/26/24  14:19 EDT

## 2024-05-01 ENCOUNTER — OFFICE VISIT (OUTPATIENT)
Dept: FAMILY MEDICINE CLINIC | Facility: CLINIC | Age: 74
End: 2024-05-01
Payer: MEDICARE

## 2024-05-01 ENCOUNTER — PATIENT MESSAGE (OUTPATIENT)
Dept: FAMILY MEDICINE CLINIC | Facility: CLINIC | Age: 74
End: 2024-05-01

## 2024-05-01 VITALS
WEIGHT: 188.6 LBS | BODY MASS INDEX: 34.71 KG/M2 | TEMPERATURE: 96.9 F | SYSTOLIC BLOOD PRESSURE: 128 MMHG | HEIGHT: 62 IN | OXYGEN SATURATION: 97 % | DIASTOLIC BLOOD PRESSURE: 78 MMHG | HEART RATE: 62 BPM

## 2024-05-01 DIAGNOSIS — R73.03 PREDIABETES: ICD-10-CM

## 2024-05-01 DIAGNOSIS — M48.062 LUMBAR STENOSIS WITH NEUROGENIC CLAUDICATION: ICD-10-CM

## 2024-05-01 DIAGNOSIS — M51.36 LUMBAR DEGENERATIVE DISC DISEASE: ICD-10-CM

## 2024-05-01 DIAGNOSIS — R00.1 SINUS BRADYCARDIA: ICD-10-CM

## 2024-05-01 DIAGNOSIS — E03.9 HYPOTHYROIDISM, UNSPECIFIED TYPE: ICD-10-CM

## 2024-05-01 DIAGNOSIS — I49.8 SINUS ARRHYTHMIA: ICD-10-CM

## 2024-05-01 DIAGNOSIS — R00.1 SINUS BRADYCARDIA: Primary | ICD-10-CM

## 2024-05-01 DIAGNOSIS — Z01.818 PREOP EXAMINATION: Primary | ICD-10-CM

## 2024-05-01 DIAGNOSIS — Z01.818 PREOP EXAMINATION: ICD-10-CM

## 2024-05-01 PROCEDURE — 93000 ELECTROCARDIOGRAM COMPLETE: CPT | Performed by: FAMILY MEDICINE

## 2024-05-01 PROCEDURE — 99214 OFFICE O/P EST MOD 30 MIN: CPT | Performed by: FAMILY MEDICINE

## 2024-05-01 NOTE — PROGRESS NOTES
"Subjective   Jemima Bell is a 74 y.o. female who presents to the office today for a preoperative consultation at the request of surgeon Dr. Rice who plans on performing Lumbar fusion, etc. on  unknown   unknown . This consultation is requested for the specific conditions prompting preoperative evaluation (i.e. because of potential affect on operative risk): age. Planned anesthesia: general. The patient has the following known anesthesia issues:  none except nausea . Patients bleeding risk: no recent abnormal bleeding. Patient does not have objections to receiving blood products if needed.    The following portions of the patient's history were reviewed and updated as appropriate: allergies, current medications, past family history, past medical history, past social history, past surgical history, and problem list.    Review of Systems  A comprehensive review of systems was negative except for: Musculoskeletal: positive for back pain and muscle weakness  Neurological: positive for paresthesia    Objective   /78   Pulse 62   Temp 96.9 °F (36.1 °C) (Temporal)   Ht 157.5 cm (62.01\")   Wt 85.5 kg (188 lb 9.6 oz)   LMP  (LMP Unknown)   SpO2 97%   BMI 34.49 kg/m²   Physical Exam  Vitals and nursing note reviewed.   Constitutional:       General: She is not in acute distress.     Appearance: Normal appearance. She is well-developed.   HENT:      Head: Normocephalic and atraumatic.      Right Ear: Tympanic membrane, ear canal and external ear normal.      Left Ear: Tympanic membrane, ear canal and external ear normal.      Nose: Nose normal.      Mouth/Throat:      Mouth: Mucous membranes are moist.      Pharynx: Oropharynx is clear. No oropharyngeal exudate or posterior oropharyngeal erythema.   Eyes:      Conjunctiva/sclera: Conjunctivae normal.      Pupils: Pupils are equal, round, and reactive to light.   Neck:      Thyroid: No thyromegaly.   Cardiovascular:      Rate and Rhythm: Normal rate and " regular rhythm.      Heart sounds: No murmur heard.  Pulmonary:      Effort: Pulmonary effort is normal.      Breath sounds: Normal breath sounds. No wheezing.   Abdominal:      General: Abdomen is flat. Bowel sounds are normal. There is no distension.      Palpations: Abdomen is soft. There is no mass.      Tenderness: There is no abdominal tenderness.      Hernia: No hernia is present.   Musculoskeletal:         General: No swelling. Normal range of motion.      Cervical back: Normal range of motion and neck supple.      Right lower leg: No edema.      Left lower leg: No edema.   Lymphadenopathy:      Cervical: No cervical adenopathy.   Skin:     General: Skin is warm and dry.      Capillary Refill: Capillary refill takes less than 2 seconds.      Findings: No rash.   Neurological:      General: No focal deficit present.      Mental Status: She is alert and oriented to person, place, and time.      Cranial Nerves: No cranial nerve deficit.   Psychiatric:         Mood and Affect: Mood normal.         Behavior: Behavior normal.        Cardiographics  ECG:  see smart bloc  Echocardiogram: not done    ECG 12 Lead    Date/Time: 5/1/2024 2:23 PM  Performed by: Kehrer, Meredith Lea, MD    Authorized by: Kehrer, Meredith Lea, MD  Comparison: compared with previous ECG from 2/17/2021  Rhythm: sinus bradycardia  Ectopy: atrial premature contractions  Rate: bradycardic  BPM: 55  Conduction: left anterior fascicular block  QRS axis: normal  Other: no other findings    Clinical impression: abnormal EKG         Imaging  Chest x-ray: not done     Lab Review   not applicable  Pending    Assessment & Plan   74 y.o. female with planned surgery as above.      Diagnoses and all orders for this visit:    1. Preop examination (Primary)  -     CBC & Differential  -     Comprehensive Metabolic Panel  -     TSH  -     Hemoglobin A1c    2. Lumbar degenerative disc disease    3. Lumbar stenosis with neurogenic claudication    4.  Hypothyroidism, unspecified type  -     TSH    5. Prediabetes  -     Hemoglobin A1c    6. Sinus bradycardia    7. Sinus arrhythmia    Other orders  -     ECG 12 Lead       After reviewing records and seeing that the sinus bradycardia with a sinus arrhythmia is a new finding, will discuss with patient about getting some workup done versus seeing cardiology.  She is not planning her surgery in the immediate future since she is getting ready to move.

## 2024-05-02 LAB
ALBUMIN SERPL-MCNC: 4.3 G/DL (ref 3.5–5.2)
ALBUMIN/GLOB SERPL: 1.8 G/DL
ALP SERPL-CCNC: 72 U/L (ref 39–117)
ALT SERPL-CCNC: 15 U/L (ref 1–33)
AST SERPL-CCNC: 19 U/L (ref 1–32)
BASOPHILS # BLD AUTO: 0.08 10*3/MM3 (ref 0–0.2)
BASOPHILS NFR BLD AUTO: 1.4 % (ref 0–1.5)
BILIRUB SERPL-MCNC: 0.5 MG/DL (ref 0–1.2)
BUN SERPL-MCNC: 23 MG/DL (ref 8–23)
BUN/CREAT SERPL: 22.1 (ref 7–25)
CALCIUM SERPL-MCNC: 9.6 MG/DL (ref 8.6–10.5)
CHLORIDE SERPL-SCNC: 105 MMOL/L (ref 98–107)
CO2 SERPL-SCNC: 28.2 MMOL/L (ref 22–29)
CREAT SERPL-MCNC: 1.04 MG/DL (ref 0.57–1)
EGFRCR SERPLBLD CKD-EPI 2021: 56.2 ML/MIN/1.73
EOSINOPHIL # BLD AUTO: 0.31 10*3/MM3 (ref 0–0.4)
EOSINOPHIL NFR BLD AUTO: 5.4 % (ref 0.3–6.2)
ERYTHROCYTE [DISTWIDTH] IN BLOOD BY AUTOMATED COUNT: 11.9 % (ref 12.3–15.4)
GLOBULIN SER CALC-MCNC: 2.4 GM/DL
GLUCOSE SERPL-MCNC: 114 MG/DL (ref 65–99)
HBA1C MFR BLD: 5.8 % (ref 4.8–5.6)
HCT VFR BLD AUTO: 39.9 % (ref 34–46.6)
HGB BLD-MCNC: 13.2 G/DL (ref 12–15.9)
IMM GRANULOCYTES # BLD AUTO: 0.01 10*3/MM3 (ref 0–0.05)
IMM GRANULOCYTES NFR BLD AUTO: 0.2 % (ref 0–0.5)
LYMPHOCYTES # BLD AUTO: 1.81 10*3/MM3 (ref 0.7–3.1)
LYMPHOCYTES NFR BLD AUTO: 31.8 % (ref 19.6–45.3)
MCH RBC QN AUTO: 30.6 PG (ref 26.6–33)
MCHC RBC AUTO-ENTMCNC: 33.1 G/DL (ref 31.5–35.7)
MCV RBC AUTO: 92.6 FL (ref 79–97)
MONOCYTES # BLD AUTO: 0.55 10*3/MM3 (ref 0.1–0.9)
MONOCYTES NFR BLD AUTO: 9.6 % (ref 5–12)
NEUTROPHILS # BLD AUTO: 2.94 10*3/MM3 (ref 1.7–7)
NEUTROPHILS NFR BLD AUTO: 51.6 % (ref 42.7–76)
NRBC BLD AUTO-RTO: 0 /100 WBC (ref 0–0.2)
PLATELET # BLD AUTO: 288 10*3/MM3 (ref 140–450)
POTASSIUM SERPL-SCNC: 4.4 MMOL/L (ref 3.5–5.2)
PROT SERPL-MCNC: 6.7 G/DL (ref 6–8.5)
RBC # BLD AUTO: 4.31 10*6/MM3 (ref 3.77–5.28)
SODIUM SERPL-SCNC: 142 MMOL/L (ref 136–145)
TSH SERPL DL<=0.005 MIU/L-ACNC: 4.29 UIU/ML (ref 0.27–4.2)
WBC # BLD AUTO: 5.7 10*3/MM3 (ref 3.4–10.8)

## 2024-05-02 NOTE — TELEPHONE ENCOUNTER
From: Meredith Kehrer  To: Jemima Bell  Sent: 5/1/2024 2:34 PM EDT  Subject: ekg    Kim, after comparing your EKG to your previous ones, there is a change. Your heart rate is a little lower and you have something we call sinus arrhythmia. This was not present when you had your stress test and workup with cardiology a few years ago. I would like you to have this evaluated further. I can either set up a Holter and an echocardiogram or get you in to see cardiology for follow-up. Which would you prefer?-Dr. BARBER

## 2024-05-12 ENCOUNTER — HOSPITAL ENCOUNTER (EMERGENCY)
Facility: HOSPITAL | Age: 74
Discharge: HOME | End: 2024-05-12
Attending: EMERGENCY MEDICINE | Admitting: EMERGENCY MEDICINE
Payer: MEDICARE

## 2024-05-12 ENCOUNTER — TELEPHONE (OUTPATIENT)
Dept: PAIN MEDICINE | Facility: CLINIC | Age: 74
End: 2024-05-12
Payer: MEDICARE

## 2024-05-12 VITALS
OXYGEN SATURATION: 93 % | WEIGHT: 180 LBS | TEMPERATURE: 97.9 F | DIASTOLIC BLOOD PRESSURE: 77 MMHG | HEART RATE: 70 BPM | RESPIRATION RATE: 18 BRPM | BODY MASS INDEX: 33.13 KG/M2 | HEIGHT: 62 IN | SYSTOLIC BLOOD PRESSURE: 107 MMHG

## 2024-05-12 DIAGNOSIS — M54.2 NECK PAIN: Primary | ICD-10-CM

## 2024-05-12 DIAGNOSIS — M62.838 MUSCLE SPASM: ICD-10-CM

## 2024-05-12 PROCEDURE — 99283 EMERGENCY DEPT VISIT LOW MDM: CPT

## 2024-05-12 PROCEDURE — 25010000002 KETOROLAC TROMETHAMINE PER 15 MG: Performed by: NURSE PRACTITIONER

## 2024-05-12 PROCEDURE — 96372 THER/PROPH/DIAG INJ SC/IM: CPT

## 2024-05-12 RX ORDER — LIDOCAINE 4 G/G
1 PATCH TOPICAL
Status: DISCONTINUED | OUTPATIENT
Start: 2024-05-12 | End: 2024-05-12 | Stop reason: HOSPADM

## 2024-05-12 RX ORDER — DIAZEPAM 5 MG/1
5 TABLET ORAL ONCE
Status: COMPLETED | OUTPATIENT
Start: 2024-05-12 | End: 2024-05-12

## 2024-05-12 RX ORDER — LIDOCAINE 50 MG/G
1 PATCH TOPICAL EVERY 24 HOURS
Qty: 30 EACH | Refills: 0 | Status: SHIPPED | OUTPATIENT
Start: 2024-05-12

## 2024-05-12 RX ORDER — METHOCARBAMOL 500 MG/1
500 TABLET, FILM COATED ORAL 4 TIMES DAILY PRN
Qty: 30 TABLET | Refills: 0 | Status: SHIPPED | OUTPATIENT
Start: 2024-05-12

## 2024-05-12 RX ORDER — KETOROLAC TROMETHAMINE 30 MG/ML
30 INJECTION, SOLUTION INTRAMUSCULAR; INTRAVENOUS ONCE
Status: COMPLETED | OUTPATIENT
Start: 2024-05-12 | End: 2024-05-12

## 2024-05-12 RX ORDER — METHYLPREDNISOLONE 4 MG/1
TABLET ORAL
Qty: 21 TABLET | Refills: 0 | Status: SHIPPED | OUTPATIENT
Start: 2024-05-12

## 2024-05-12 RX ADMIN — KETOROLAC TROMETHAMINE 30 MG: 30 INJECTION, SOLUTION INTRAMUSCULAR at 10:24

## 2024-05-12 RX ADMIN — LIDOCAINE 1 PATCH: 4 PATCH TOPICAL at 10:21

## 2024-05-12 RX ADMIN — DIAZEPAM 5 MG: 5 TABLET ORAL at 10:19

## 2024-05-12 NOTE — ED PROVIDER NOTES
EMERGENCY DEPARTMENT ENCOUNTER  Room Number:  34/34  PCP: Kehrer, Meredith Lea, MD  Independent Historians: Patient      HPI:  Chief Complaint: had concerns including Neck Pain.     A complete HPI/ROS/PMH/PSH/SH/FH are unobtainable due to:     Chronic or social conditions impacting patient care (Social Determinants of Health):       Context: Jemima Bell is a pleasant afebrile 74 y.o.  female with a medical history of lumbar stenosis who presents to the ED c/o acute neck pain    States she was supposed to have back surgery but could not pass pre-admission testing because of bradycardia, has appt with cardiologist   No chronic lung disease  Takes tramadol and lyrica for pain  Follows with pain mgmt  States she has been having neck pain since Friday states she thinks she sat for too long and has had pain ever since      Review of prior external notes (non-ED) -and- Review of prior external test results outside of this encounter:  4/18/2024 patient had CT cervical spine with intrathecal contrast that showed multilevel cervical degenerative disease, follows with  as her [facility]    Prescription drug monitoring program review:         PAST MEDICAL HISTORY  Active Ambulatory Problems     Diagnosis Date Noted    Wheezing 09/16/2019    Osteoarthritis 09/16/2019    Acute bronchitis 09/16/2019    Dyspnea 10/07/2019    Hypothyroidism 10/07/2019    Shortness of breath 11/19/2019    Vitamin D deficiency 01/07/2020    Depression with anxiety 01/07/2020    Muscle cramp 01/07/2020    Back pain 01/17/2020    Vitamin B12 deficiency 05/04/2020    UTI (urinary tract infection) 10/30/2020    Abnormal EKG 02/17/2021    Precordial pain 02/17/2021    Anxiety 02/17/2021    Borderline systolic HTN 02/17/2021    Left upper quadrant abdominal pain 10/25/2022    Diarrhea 10/25/2022    Gastroesophageal reflux disease 10/25/2022    History of Nissen fundoplication 10/25/2022    Incontinence of feces with fecal urgency  10/25/2022    Foraminal stenosis of lumbar region 03/24/2023    Lumbar degenerative disc disease 03/24/2023    Lumbar facet arthropathy 03/24/2023    Lumbar radiculopathy 03/24/2023    Sacroiliac joint dysfunction of both sides 04/27/2023    Lumbar stenosis with neurogenic claudication 05/01/2024     Resolved Ambulatory Problems     Diagnosis Date Noted    Morbid obesity with BMI of 40.0-44.9, adult 02/17/2021     Past Medical History:   Diagnosis Date    Acromioclavicular separation     Acute sinusitis     Allergic 11/2022    Allergic rhinitis     Anemia Childhood    Arthritis     Arthritis of back     Asthma     BMI 34.0-34.9,adult     Bursitis of hip     Cervical disc disorder     Cervicalgia     Chills     Cholelithiasis 2004?    Chronic pain disorder     Cluster headache     Colon polyp 12/2022    Deep vein thrombosis (DVT) of lower extremity     Depression     Dermatitis     Dislocation, shoulder     Dysuria     Esophageal reflux     Extremity pain     Fatigue     Gastric ulcer     GERD (gastroesophageal reflux disease)     GI (gastrointestinal bleed) 2004?    Headache     Headache, tension-type     Heart murmur 1973    Hernia     Hip arthrosis     Irritable bowel syndrome     Low back strain     Lumbago     Lumbosacral disc disease     Migraine     Nausea     Neuritis     Osteoarthritis of knee     Periarthritis of shoulder     Plantar fasciitis     Pneumonia 89y    Pulled muscle     Pulmonary embolism     Pyelonephritis     Rheumatic fever     Sore throat     Torticollis     Trapezius strain     Urinary incontinence     Urinary pain     Urinary tract infection     UTI symptoms     Vitamin B1 deficiency     Weakness          PAST SURGICAL HISTORY  Past Surgical History:   Procedure Laterality Date    ABDOMINAL SURGERY      APPENDECTOMY      CATARACT EXTRACTION      CATARACT EXTRACTION      bilateral eyes    CATARACT EXTRACTION      CHOLECYSTECTOMY  2004?    COLONOSCOPY      COLONOSCOPY N/A 12/23/2022     Procedure: COLONOSCOPY to cecum and TI:  with biopsies, cold snare polyp,;  Surgeon: Reji Aguilar MD;  Location:  [facility];  Service: Gastroenterology;  Laterality: N/A;  PREOP/ HX COLON POLYPS  POSTOP/  diverticulosis, polyp, hemorrhoids    ENDOSCOPY N/A 12/23/2022    Procedure: ESOPHAGOGASTRODUODENOSCOPY with biopsies;  Surgeon: Reji Aguilar MD;  Location:  [facility];  Service: Gastroenterology;  Laterality: N/A;  PREOP/ HX GASTRIC ULCER, DYSPEPSIA, UPPER ABDOMINAL PAIN  POSTOP/:  HH, gastritis, gastric polyps    EPIDURAL BLOCK      GALLBLADDER SURGERY      HEMORRHOIDECTOMY  1974 & 77?    HERNIA REPAIR  2008?    HYSTERECTOMY      INGUINAL HERNIA REPAIR      JOINT REPLACEMENT      Knee    KNEE SURGERY      LAPAROSCOPIC COLON RESECTION  2005    LAPAROTOMY OOPHERECTOMY      LUMBAR EPIDURAL INJECTION N/A 03/27/2023    Procedure: Lumbar epidural steroid injection at L5-S1 99463;  Surgeon: Shey Aranda MD;  Location: [facility];  Service: Pain Management;  Laterality: N/A;    LUMBAR EPIDURAL INJECTION N/A 3/13/2024    Procedure: lumbar epidural steroid injection at L4/5 25551;  Surgeon: Shey Aranda MD;  Location: [facility];  Service: Pain Management;  Laterality: N/A;    NISSEN FUNDOPLICATION LAPAROSCOPIC      x2    ORTHOPEDIC SURGERY      Knee replacement    SACROILIAC JOINT INJECTION Left 05/12/2023    Procedure: Left SACROILIAC INJECTION 17967;  Surgeon: Shey Aranda MD;  Location: [facility];  Service: Pain Management;  Laterality: Left;    SACROILIAC JOINT INJECTION Left 07/12/2023    Procedure: SACROILIAC INJECTION LEFT;  Surgeon: Shey Aranda MD;  Location: [facility];  Service: Pain Management;  Laterality: Left;    TONSILLECTOMY      TONSILLECTOMY  1954?    TOTAL KNEE ARTHROPLASTY Left     TUBAL ABDOMINAL LIGATION      UPPER GASTROINTESTINAL ENDOSCOPY           FAMILY HISTORY  Family History   Problem Relation Age of Onset    Arthritis Mother     Depression Mother      Hyperlipidemia Mother     Hypertension Mother     Irritable bowel syndrome Mother     Dislocations Mother     Hypertension Father     Arthritis Father     Stroke Father     Alcohol abuse Maternal Grandfather     Depression Maternal Grandfather     Heart disease Other         IN FEMALES BEFORE AGE 65    Asthma Maternal Grandmother          SOCIAL HISTORY  Social History     Socioeconomic History    Marital status:    Tobacco Use    Smoking status: Never    Smokeless tobacco: Never   Vaping Use    Vaping status: Never Used   Substance and Sexual Activity    Alcohol use: No    Drug use: No    Sexual activity: Yes     Partners: Male     Birth control/protection: None, Hysterectomy         ALLERGIES  Salicylates, Colestipol, Biaxin [clarithromycin], Adhesive tape, Augmentin [amoxicillin-pot clavulanate], Prevacid [lansoprazole], Sulfa antibiotics, and Sulfamethoxazole-trimethoprim      REVIEW OF SYSTEMS  Review of Systems  Included in HPI  All systems reviewed and negative except for those discussed in HPI.      PHYSICAL EXAM    I have reviewed the triage vital signs and nursing notes.    ED Triage Vitals [05/12/24 0928]   Temp Heart Rate Resp BP SpO2   97.9 °F (36.6 °C) 95 18 -- 93 %      Temp src Heart Rate Source Patient Position BP Location FiO2 (%)   Tympanic Monitor -- -- --       Physical Exam  GENERAL: alert, no acute distress  SKIN: Warm, dry  HENT: Normocephalic, atraumatic  Neck: reproducible tenderness to palpation to left paraspinal tenderness to palpation   EYES: no scleral icterus  CV: regular rhythm, regular rate no murmur or peripheral edema  RESPIRATORY: normal effort, lungs clear  ABDOMEN: soft, nontender, nondistended  MUSCULOSKELETAL: no deformity  Psychiatric: Flat affect, nonsuicidal  NEURO: alert, moves all extremities, follows commands            LAB RESULTS  No results found for this or any previous visit (from the past 24 hour(s)).      RADIOLOGY  No Radiology Exams Resulted Within  Past 24 Hours      MEDICATIONS GIVEN IN ER  Medications   Lidocaine 4 % 1 patch (1 patch Transdermal Medication Applied 5/12/24 1021)   ketorolac (TORADOL) injection 30 mg (30 mg Intramuscular Given 5/12/24 1024)   diazePAM (VALIUM) tablet 5 mg (5 mg Oral Given 5/12/24 1019)         ORDERS PLACED DURING THIS VISIT:  No orders of the defined types were placed in this encounter.        OUTPATIENT MEDICATION MANAGEMENT:  Current Facility-Administered Medications Ordered in Epic   Medication Dose Route Frequency Provider Last Rate Last Admin    Lidocaine 4 % 1 patch  1 patch Transdermal Q24H Karin TraylorMACHO   1 patch at 05/12/24 1021     Current Outpatient Medications Ordered in Epic   Medication Sig Dispense Refill    pregabalin (LYRICA) 50 MG capsule Take 1 capsule by mouth 2 (Two) Times a Day. 60 capsule 0    traMADol (ULTRAM) 50 MG tablet Take 1 tablet by mouth 3 (Three) Times a Day As Needed for Moderate Pain or Severe Pain. 90 tablet 0    acetaminophen (TYLENOL) 325 MG tablet Take 2 tablets by mouth.      albuterol sulfate  (90 Base) MCG/ACT inhaler Inhale 2 puffs Every 4 (Four) Hours As Needed.      Diclofenac Sodium (VOLTAREN) 1 % gel gel Apply 4 g topically to the appropriate area as directed 4 (Four) Times a Day As Needed (pain). 150 g 2    fexofenadine (Allegra Allergy) 180 MG tablet Take 1 tablet by mouth Daily.      fluticasone (FLONASE) 50 MCG/ACT nasal spray 2 sprays into the nostril(s) as directed by provider Daily. 48 g 3    hydrOXYzine (ATARAX) 10 MG tablet Take 1 tablet by mouth Every 4 (Four) Hours As Needed for Anxiety (twitching). 120 tablet 1    Ibuprofen 3 %, Gabapentin 10 %, Baclofen 2 %, lidocaine 4 %, Ketamine HCl 4 % Apply 1-2 g topically to the appropriate area as directed 3 (Three) to 4 (Four) times daily. 90 g 0    ipratropium (ATROVENT) 0.03 % nasal spray 2 sprays into the nostril(s) as directed by provider 4 (Four) Times a Day As Needed for Rhinitis. 30 mL 0    levothyroxine  (Synthroid) 100 MCG tablet Take 1 tablet by mouth Every Morning. 90 tablet 3    lidocaine (LIDODERM) 5 % Place 1 patch on the skin as directed by provider Daily. Remove & Discard patch within 12 hours or as directed by MD 30 each 0    methocarbamol (ROBAXIN) 500 MG tablet Take 1 tablet by mouth 4 (Four) Times a Day As Needed for Muscle Spasms. 30 tablet 0    methylPREDNISolone (MEDROL) 4 MG dose pack Take as directed on package instructions. 21 tablet 0    ondansetron (Zofran) 4 MG tablet Take 1 tablet by mouth Every 8 (Eight) Hours As Needed for Nausea or Vomiting. 15 tablet 2    pantoprazole (PROTONIX) 40 MG EC tablet Take 1 tablet by mouth 2 (Two) Times a Day. 180 tablet 3    pregabalin (LYRICA) 50 MG capsule Take 1 capsule by mouth 2 (Two) Times a Day. (Patient not taking: Reported on 5/1/2024) 60 capsule 0    promethazine (PHENERGAN) 25 MG tablet Take 1 tablet by mouth Every 6 (Six) Hours As Needed for Nausea or Vomiting. 20 tablet 1    sertraline (Zoloft) 100 MG tablet Take 1 tablet by mouth Daily. 90 tablet 1         PROCEDURES  Procedures            PROGRESS, DATA ANALYSIS, CONSULTS, AND MEDICAL DECISION MAKING  All labs have been independently interpreted by me.  All radiology studies have been reviewed by me. All EKG's have been independently viewed and interpreted by me.  Discussion below represents my analysis of pertinent findings related to patient's condition, differential diagnosis, treatment plan and final disposition.    Differential diagnosis includes but is not limited to muscle spasm, osteoarthritis, shingles                      ED Course as of 05/12/24 1317   Sun May 12, 2024   1115 Patient reports improvement of neck discomfort following lidocaine Valium and Toradol.  Agreeable to follow-up with her neurosurgeon as an outpatient []      ED Course User Index  [] Karin Traylor APRN             AS OF 13:17 EDT VITALS:    BP - 107/77  HR - 70  TEMP - 97.9 °F (36.6 °C) (Tympanic)  O2 SATS -  93%    COMPLEXITY OF CARE  Admission was considered but after careful review of the patient's presentation, physical examination, diagnostic results, and response to treatment the patient may be safely discharged with outpatient follow-up.      DIAGNOSIS  Final diagnoses:   Neck pain   Muscle spasm         DISPOSITION  ED Disposition       ED Disposition   Discharge    Condition   Stable    Comment   --                Please note that portions of this document were completed with a voice recognition program.    Note Disclaimer: At [facility], we believe that sharing information builds trust and better relationships. You are receiving this note because you recently visited [facility]. It is possible you will see health information before a provider has talked with you about it. This kind of information can be easy to misunderstand. To help you fully understand what it means for your health, we urge you to discuss this note with your provider.         Karin Traylor, MACHO  05/12/24 8239

## 2024-05-14 NOTE — TELEPHONE ENCOUNTER
Patient called answering service complaining of severe neck pain.  Spoke with patient at 07:20.  She states the pain started 2-3 days ago and has become worse since onset.  She feels as if she cannot move the pain is so severe.  I advised her to seek emergency medical attention due to the severity of her symptoms.

## 2024-05-15 ENCOUNTER — PREP FOR SURGERY (OUTPATIENT)
Dept: SURGERY | Facility: SURGERY CENTER | Age: 74
End: 2024-05-15
Payer: MEDICARE

## 2024-05-15 ENCOUNTER — OFFICE VISIT (OUTPATIENT)
Dept: PAIN MEDICINE | Facility: CLINIC | Age: 74
End: 2024-05-15
Payer: MEDICARE

## 2024-05-15 VITALS
TEMPERATURE: 97.5 F | WEIGHT: 189 LBS | OXYGEN SATURATION: 96 % | SYSTOLIC BLOOD PRESSURE: 150 MMHG | RESPIRATION RATE: 18 BRPM | DIASTOLIC BLOOD PRESSURE: 87 MMHG | HEIGHT: 62 IN | BODY MASS INDEX: 34.78 KG/M2 | HEART RATE: 68 BPM

## 2024-05-15 DIAGNOSIS — M54.2 NECK PAIN: ICD-10-CM

## 2024-05-15 DIAGNOSIS — M51.36 DEGENERATION OF LUMBAR INTERVERTEBRAL DISC: ICD-10-CM

## 2024-05-15 DIAGNOSIS — G89.29 CHRONIC BILATERAL LOW BACK PAIN WITH BILATERAL SCIATICA: ICD-10-CM

## 2024-05-15 DIAGNOSIS — M54.12 CERVICAL RADICULOPATHY: Primary | ICD-10-CM

## 2024-05-15 DIAGNOSIS — M54.16 LUMBAR RADICULITIS: ICD-10-CM

## 2024-05-15 DIAGNOSIS — M54.41 CHRONIC BILATERAL LOW BACK PAIN WITH BILATERAL SCIATICA: ICD-10-CM

## 2024-05-15 DIAGNOSIS — Z79.899 ENCOUNTER FOR LONG-TERM (CURRENT) USE OF HIGH-RISK MEDICATION: ICD-10-CM

## 2024-05-15 DIAGNOSIS — M54.42 CHRONIC BILATERAL LOW BACK PAIN WITH BILATERAL SCIATICA: ICD-10-CM

## 2024-05-15 PROCEDURE — 1125F AMNT PAIN NOTED PAIN PRSNT: CPT | Performed by: NURSE PRACTITIONER

## 2024-05-15 PROCEDURE — 99214 OFFICE O/P EST MOD 30 MIN: CPT | Performed by: NURSE PRACTITIONER

## 2024-05-15 PROCEDURE — 1160F RVW MEDS BY RX/DR IN RCRD: CPT | Performed by: NURSE PRACTITIONER

## 2024-05-15 PROCEDURE — 1159F MED LIST DOCD IN RCRD: CPT | Performed by: NURSE PRACTITIONER

## 2024-05-15 RX ORDER — TRAMADOL HYDROCHLORIDE 50 MG/1
50 TABLET ORAL 3 TIMES DAILY PRN
Qty: 90 TABLET | Refills: 0 | Status: SHIPPED | OUTPATIENT
Start: 2024-05-15

## 2024-05-15 NOTE — PROGRESS NOTES
CHIEF COMPLAINT  Follow-up for back pain.    Subjective   Jemima Bell is a 74 y.o. female  who presents for follow-up.  She has a history of back pain.  At her last office visit increased tramadol to 50 mg 3 times daily as needed and Lyrica to 50 mg twice daily.     Today her pain is 7/10VAS in severity. Since her last office visit she developed neck pain. This started about 1 weeks ago. She states by Sunday her pain was unbearable. She describes this as continuous burning and shooting pain with intermittent stabbing pain. This is located on the left side of her neck and is radiating into the right shoulder. When the pain first started the pain was radiating down the her left elbow, this has improved some. She was treated in the ED on 5/12/2024 and was prescribed a steroid pack. She has not been able to start this yet. Her neck pain is worsened with movement and walking. She has very little range of motion in her neck at this time. She has treated this with TENS, heat, ice, compounded pain cream, and lidocaine patches. She states the combination of this has helped.     With regards to her back pain she states that this is unchanged. Her pain remains in her low back radiating into her lower extremities (R>L). This pain is worsened with standing and walking.     She continues with Tramadol 50 mg 3/day and Lyrica 50 mg 1-2/day (She does not tolerate this twice a day every day d/t drowsiness). She states that the tramadol is helping her pain some. She denies any side effects including somnolence or constipation.    Not a candidate for MRI d/t bladder stimulator.      Not a good candidate for NSAIDs d/t GI upset. Failed Gabapentin (100 mg was not helpful, 300 mg caused side effects)    Procedures:  3/13/2024-LESI at L4/5-80% relief to her back, only temporary relief to leg pain  11/30/2023-bilateral SI joint injections-90% relief for approximately 3 months  5/12/23-left SI Joint Injection-90% relief x 5.5  months  3/27/23-L5/S1 LESI-100% relief to back pain x 1 week, 80% relief to left leg pain    Back Pain  This is a chronic problem. The current episode started more than 1 year ago. The problem occurs constantly. The problem has been gradually worsening since onset. The pain is present in the sacro-iliac. The quality of the pain is described as aching and stabbing. The pain radiates to the right thigh, left thigh, left knee and right knee (R>L). The pain is at a severity of 7/10. The pain is The same all the time. The symptoms are aggravated by standing (walking). Pertinent negatives include no abdominal pain, chest pain, dysuria, fever, headaches, numbness or weakness. Risk factors include menopause. She has tried heat, ice and analgesics (PT previously) for the symptoms.   Neck Pain   This is a new problem. The current episode started in the past 7 days. The problem occurs constantly. The problem has been gradually improving. The pain is associated with nothing. The pain is present in the left side. The quality of the pain is described as burning, shooting and stabbing. The pain is at a severity of 7/10. The symptoms are aggravated by position (movement). Pertinent negatives include no chest pain, fever, headaches, numbness or weakness. She has tried heat, ice, NSAIDs, oral narcotics and muscle relaxants (TENS, Lyrica) for the symptoms.      PEG Assessment   What number best describes your pain on average in the past week?5  What number best describes how, during the past week, pain has interfered with your enjoyment of life?0  What number best describes how, during the past week, pain has interfered with your general activity?  7    Review of Pertinent Medical Data ---    ED visit from 5/12/2024 reviewed.  Patient presented for neck pain.  She was treated with Valium, lidocaine, and Toradol.  Agreeable to follow-up with neurosurgeon as outpatient.  Discharged home.    Office visit from 4/26/2024 with Dr. Rice  "reviewed.  Patient seen in follow-up for lumbar radiculopathy with a myelogram.  She has a history of progressive low back pain, neurogenic claudication, and radiculopathy with severe degenerative changes with central and foraminal stenosis from L3 to sacrum.  She has failed all conservative measures and from physical therapy and injections.  She would benefit from surgical intervention in the form of L3-5 LLIF, L5-S1 TLIF, and L3-S1 fusion.  She will require medical clearance.    The following portions of the patient's history were reviewed and updated as appropriate: allergies, current medications, past family history, past medical history, past social history, past surgical history, and problem list.    Review of Systems   Constitutional:  Negative for fever.   Cardiovascular:  Negative for chest pain.   Gastrointestinal:  Positive for diarrhea. Negative for abdominal pain and constipation.   Genitourinary:  Negative for difficulty urinating and dysuria.   Musculoskeletal:  Positive for back pain, neck pain and neck stiffness.   Neurological:  Negative for weakness, numbness and headaches.   Psychiatric/Behavioral:  Positive for sleep disturbance. Negative for suicidal ideas. The patient is nervous/anxious.      --  The aforementioned information the Chief Complaint section and above subjective data including any HPI data, and also the Review of Systems data, has been personally reviewed and affirmed.  --    Vitals:    05/15/24 1112   BP: 150/87   Pulse: 68   Resp: 18   Temp: 97.5 °F (36.4 °C)   SpO2: 96%   Weight: 85.7 kg (189 lb)   Height: 157.5 cm (62\")   PainSc:   7   PainLoc: Back     Objective   Physical Exam  Vitals and nursing note reviewed.   Constitutional:       Appearance: Normal appearance. She is well-developed.   Eyes:      General: Lids are normal.   Cardiovascular:      Rate and Rhythm: Normal rate.   Pulmonary:      Effort: Pulmonary effort is normal.   Musculoskeletal:      Cervical back: " Tenderness present. Decreased range of motion.      Lumbar back: Tenderness and bony tenderness present. Decreased range of motion.   Neurological:      Mental Status: She is alert and oriented to person, place, and time.   Psychiatric:         Attention and Perception: Attention normal.         Mood and Affect: Mood normal.         Speech: Speech normal.         Behavior: Behavior normal.         Judgment: Judgment normal.       Assessment & Plan   Diagnoses and all orders for this visit:    1. Cervical radiculopathy (Primary)  -     Ambulatory Referral to Physical Therapy    2. Neck pain  -     Ambulatory Referral to Physical Therapy    3. Lumbar radiculitis  -     traMADol (ULTRAM) 50 MG tablet; Take 1 tablet by mouth 3 (Three) Times a Day As Needed for Moderate Pain or Severe Pain.  Dispense: 90 tablet; Refill: 0    4. Degeneration of lumbar intervertebral disc  -     traMADol (ULTRAM) 50 MG tablet; Take 1 tablet by mouth 3 (Three) Times a Day As Needed for Moderate Pain or Severe Pain.  Dispense: 90 tablet; Refill: 0    5. Chronic bilateral low back pain with bilateral sciatica    6. Encounter for long-term (current) use of high-risk medication      Jemima Bell reports a pain score of 7.  Given her pain assessment as noted, treatment options were discussed and the following options were decided upon as a follow-up plan to address the patient's pain: prescription for opioid analgesics, referral to Physical Therapy, and steroid injections.    --- Start the steroid pack prescribed in the ED for her neck pain.   --- Referral for PT placed.   --- Plan for SALLIE at C7/T1. If her pain improves with the MDP and PT she can hold off on this.   Reviewed the procedure at length with the patient.  Included in the review was expectations, complications, risk and benefits.The procedure was described in detail and the risks, benefits and alternatives were discussed with the patient (including but not limited to:  bleeding, infection, nerve damage, worsening of pain, inability to perform injection, paralysis, seizures, coma, no pain relief and death) who agreed to proceed.  Discussed the potential for sedation if warranted/wanted.  The procedure will plan to be performed at [facility] with fluoroscopic guidance(unless ultrasound is indicated) and could potentially have steroids and contrast dye used. Questions were answered and in a way the patient could understand.  Patient verbalized understanding and wishes to proceed.  This intervention will be ordered.  Discussed with patient that all procedures are part of a multimodal plan of care and include either formal PT or a home exercise program.  Patient has no evidence of coagulopathy or current infection.  --- The urine drug screen confirmation from 4/22/2024 has been reviewed and the result is appropriate based on patient history and SANJEEV report  --- CSA updated 4/22/2024  --- Refill Tramadol. Fill date 5/22/2024 applied. Patient appears stable with current regimen. No adverse effects. Regarding continuation of opioids, there is no evidence of aberrant behavior or any red flags.  The patient continues with appropriate response to opioid therapy. ADL's remain intact by self.   --- Continue Lyrica 50 mg daily, OK to use BID PRN.   --- Continue to work with Dr. Rice regarding her lumbar pain.   --- Follow-up after procedure     SANJEEV REPORT  As part of the patient's treatment plan, I am prescribing controlled substances. The patient has been made aware of appropriate use of such medications, including potential risk of somnolence, limited ability to drive and/or work safely, and the potential for dependence or overdose. It has also been made clear that these medications are for use by this patient only, without concomitant use of alcohol or other substances unless prescribed.     Patient has completed prescribing agreement detailing terms of continued  prescribing of controlled substances, including monitoring SANJEEV reports, urine drug screening, and pill counts if necessary. The patient is aware that inappropriate use will results in cessation of prescribing such medications.    As the clinician, I personally reviewed the SANJEEV from 5/15/2024 while the patient was in the office today.    History and physical exam exhibit continued safe and appropriate use of controlled substances.    Dictated utilizing Dragon dictation.

## 2024-05-16 ENCOUNTER — TELEPHONE (OUTPATIENT)
Dept: PAIN MEDICINE | Facility: CLINIC | Age: 74
End: 2024-05-16

## 2024-05-16 NOTE — TELEPHONE ENCOUNTER
"Caller: Jemima Bell \"Kim\"    Relationship to patient: Self    Best call back number: [phone] (home)     Patient is needing: PATIENT NEEDS TO R/S PROCEDURE SHE JUST SCHEDULED THAT IS ON 6/24/24 WITH DR BUSTAMANTE. HER  HAS AN APPT THAT DAY. PLEASE ADVISE   "

## 2024-05-17 ENCOUNTER — TRANSCRIBE ORDERS (OUTPATIENT)
Dept: SURGERY | Facility: SURGERY CENTER | Age: 74
End: 2024-05-17
Payer: MEDICARE

## 2024-05-17 DIAGNOSIS — Z41.9 SURGERY, ELECTIVE: Primary | ICD-10-CM

## 2024-05-17 PROBLEM — M54.2 NECK PAIN: Status: ACTIVE | Noted: 2024-05-15

## 2024-05-17 PROBLEM — M54.12 CERVICAL RADICULOPATHY: Status: ACTIVE | Noted: 2024-05-15

## 2024-05-20 ENCOUNTER — OFFICE VISIT (OUTPATIENT)
Dept: CARDIOLOGY | Facility: CLINIC | Age: 74
End: 2024-05-20
Payer: MEDICARE

## 2024-05-20 VITALS
WEIGHT: 188 LBS | BODY MASS INDEX: 34.6 KG/M2 | DIASTOLIC BLOOD PRESSURE: 83 MMHG | HEART RATE: 51 BPM | SYSTOLIC BLOOD PRESSURE: 156 MMHG | HEIGHT: 62 IN

## 2024-05-20 DIAGNOSIS — R06.02 SHORTNESS OF BREATH: ICD-10-CM

## 2024-05-20 DIAGNOSIS — R03.0 BORDERLINE SYSTOLIC HTN: ICD-10-CM

## 2024-05-20 DIAGNOSIS — Z01.810 PREOP CARDIOVASCULAR EXAM: ICD-10-CM

## 2024-05-20 DIAGNOSIS — R01.1 HEART MURMUR: ICD-10-CM

## 2024-05-20 DIAGNOSIS — R94.31 ABNORMAL EKG: Primary | ICD-10-CM

## 2024-05-20 RX ORDER — BISOPROLOL FUMARATE AND HYDROCHLOROTHIAZIDE 5; 6.25 MG/1; MG/1
1 TABLET ORAL DAILY
Qty: 30 TABLET | Refills: 6 | Status: SHIPPED | OUTPATIENT
Start: 2024-05-20

## 2024-05-20 NOTE — PROGRESS NOTES
NEW PATIENT- CLEARANCE FOR BACK SURGERY-DR. ENCINAS   Subjective:        [facility]  Cardiology Consult Note    Patient Identification:  Name: Jemima Bell  Age: 74 y.o.  Sex: female  :  1950  MRN: [id number]             CC  BACK SURG CLEARANCE  ABNORMAL EKG  SOB  HEART MURMUR  History of Present Illness:   74-year-old female, needs back surgery clearance denies any chest pains or tightness in chest complains of shortness of breath on exertion    Comorbid cardiac risk factors:     Past Medical History:  Past Medical History:   Diagnosis Date    Acromioclavicular separation     Acute bronchitis     Acute sinusitis     Allergic 2022    Shrimp    Allergic rhinitis     Anemia Childhood    Anxiety     Arthritis     Arthritis of back     Asthma     Back pain     BMI 34.0-34.9,adult     Bursitis of hip     Cervical disc disorder     Cervicalgia     Chills     Cholelithiasis ?    Remival    Chronic pain disorder     Cluster headache     Colon polyp 2022    Deep vein thrombosis (DVT) of lower extremity     Depression     Dermatitis     Dislocation, shoulder     Dyspnea     Dysuria     Esophageal reflux     Extremity pain     Fatigue     Gastric ulcer     GERD (gastroesophageal reflux disease)     GI (gastrointestinal bleed) ?    Headache     Headache, tension-type     Heart murmur 1973    Hernia     Hip arthrosis     Hypothyroidism     Irritable bowel syndrome     Low back strain     Lumbago     Lumbar stenosis with neurogenic claudication 2024    Lumbosacral disc disease     Migraine     Nausea     Neuritis     Osteoarthritis     Osteoarthritis of knee     Periarthritis of shoulder     Plantar fasciitis     Pneumonia 89y    Pulled muscle     Pulmonary embolism     Pyelonephritis     Rheumatic fever     Sore throat     Torticollis     Trapezius strain     Urinary incontinence     Urinary pain     Urinary tract infection     UTI symptoms     Vitamin B1 deficiency     Vitamin  B12 deficiency     Vitamin D deficiency     Weakness     Wheezing      Past Surgical History:  Past Surgical History:   Procedure Laterality Date    ABDOMINAL SURGERY      APPENDECTOMY      CATARACT EXTRACTION      CATARACT EXTRACTION      bilateral eyes    CATARACT EXTRACTION      CHOLECYSTECTOMY  2004?    COLONOSCOPY      COLONOSCOPY N/A 12/23/2022    Procedure: COLONOSCOPY to cecum and TI:  with biopsies, cold snare polyp,;  Surgeon: Reji Aguilar MD;  Location:  [facility];  Service: Gastroenterology;  Laterality: N/A;  PREOP/ HX COLON POLYPS  POSTOP/  diverticulosis, polyp, hemorrhoids    ENDOSCOPY N/A 12/23/2022    Procedure: ESOPHAGOGASTRODUODENOSCOPY with biopsies;  Surgeon: Reji Aguilar MD;  Location:  [facility];  Service: Gastroenterology;  Laterality: N/A;  PREOP/ HX GASTRIC ULCER, DYSPEPSIA, UPPER ABDOMINAL PAIN  POSTOP/:  HH, gastritis, gastric polyps    EPIDURAL BLOCK      GALLBLADDER SURGERY      HEMORRHOIDECTOMY  1974 & 77?    HERNIA REPAIR  2008?    HYSTERECTOMY      INGUINAL HERNIA REPAIR      JOINT REPLACEMENT      Knee    KNEE SURGERY      LAPAROSCOPIC COLON RESECTION  2005    LAPAROTOMY OOPHERECTOMY      LUMBAR EPIDURAL INJECTION N/A 03/27/2023    Procedure: Lumbar epidural steroid injection at L5-S1 00348;  Surgeon: Shey Aranda MD;  [place] [facility] [place] [facility] [place];  Service: Pain Management;  Laterality: N/A;    LUMBAR EPIDURAL INJECTION N/A 3/13/2024    Procedure: lumbar epidural steroid injection at L4/5 82752;  Surgeon: Shey Aranda MD;  Location: [facility];  Service: Pain Management;  Laterality: N/A;    NISSEN FUNDOPLICATION LAPAROSCOPIC      x2    ORTHOPEDIC SURGERY      Knee replacement    SACROILIAC JOINT INJECTION Left 05/12/2023    Procedure: Left SACROILIAC INJECTION 54174;  Surgeon: Shey Aranda MD;  [place] [facility] [place] [place];  Service: Pain Management;  Laterality: Left;    SACROILIAC JOINT INJECTION Left 07/12/2023    Procedure: SACROILIAC INJECTION LEFT;   "Surgeon: Shey Aranda MD;  [place] [facility] [place]  Service: Pain Management;  Laterality: Left;    TONSILLECTOMY      TONSILLECTOMY  1954?    TOTAL KNEE ARTHROPLASTY Left     TUBAL ABDOMINAL LIGATION      UPPER GASTROINTESTINAL ENDOSCOPY        Allergies:  Allergies   Allergen Reactions    Salicylates GI Intolerance     History of gi bleed      Colestipol GI Intolerance    Biaxin [Clarithromycin]     Adhesive Tape Rash     Can use plastic tape, paper tape causes rash    Augmentin [Amoxicillin-Pot Clavulanate] Diarrhea    Prevacid [Lansoprazole] Itching and Swelling     Eyes only    Sulfa Antibiotics GI Intolerance    Sulfamethoxazole-Trimethoprim Nausea And Vomiting and GI Intolerance     \"makes me sick as a dog\"       Home Meds:  (Not in a hospital admission)    Current Meds:     Current Outpatient Medications:     acetaminophen (TYLENOL) 325 MG tablet, Take 2 tablets by mouth., Disp: , Rfl:     albuterol sulfate  (90 Base) MCG/ACT inhaler, Inhale 2 puffs Every 4 (Four) Hours As Needed., Disp: , Rfl:     Diclofenac Sodium (VOLTAREN) 1 % gel gel, Apply 4 g topically to the appropriate area as directed 4 (Four) Times a Day As Needed (pain)., Disp: 150 g, Rfl: 2    fexofenadine (Allegra Allergy) 180 MG tablet, Take 1 tablet by mouth Daily., Disp: , Rfl:     fluticasone (FLONASE) 50 MCG/ACT nasal spray, 2 sprays into the nostril(s) as directed by provider Daily., Disp: 48 g, Rfl: 3    hydrOXYzine (ATARAX) 10 MG tablet, Take 1 tablet by mouth Every 4 (Four) Hours As Needed for Anxiety (twitching)., Disp: 120 tablet, Rfl: 1    Ibuprofen 3 %, Gabapentin 10 %, Baclofen 2 %, lidocaine 4 %, Ketamine HCl 4 %, Apply 1-2 g topically to the appropriate area as directed 3 (Three) to 4 (Four) times daily., Disp: 90 g, Rfl: 0    levothyroxine (Synthroid) 100 MCG tablet, Take 1 tablet by mouth Every Morning., Disp: 90 tablet, Rfl: 3    lidocaine (LIDODERM) 5 %, Place 1 patch on the skin as directed by provider Daily. " Remove & Discard patch within 12 hours or as directed by MD, Disp: 30 each, Rfl: 0    methocarbamol (ROBAXIN) 500 MG tablet, Take 1 tablet by mouth 4 (Four) Times a Day As Needed for Muscle Spasms., Disp: 30 tablet, Rfl: 0    methylPREDNISolone (MEDROL) 4 MG dose pack, Take as directed on package instructions., Disp: 21 tablet, Rfl: 0    ondansetron (Zofran) 4 MG tablet, Take 1 tablet by mouth Every 8 (Eight) Hours As Needed for Nausea or Vomiting., Disp: 15 tablet, Rfl: 2    pantoprazole (PROTONIX) 40 MG EC tablet, Take 1 tablet by mouth 2 (Two) Times a Day., Disp: 180 tablet, Rfl: 3    pregabalin (LYRICA) 50 MG capsule, Take 1 capsule by mouth 2 (Two) Times a Day., Disp: 60 capsule, Rfl: 0    promethazine (PHENERGAN) 25 MG tablet, Take 1 tablet by mouth Every 6 (Six) Hours As Needed for Nausea or Vomiting., Disp: 20 tablet, Rfl: 1    sertraline (Zoloft) 100 MG tablet, Take 1 tablet by mouth Daily., Disp: 90 tablet, Rfl: 1    traMADol (ULTRAM) 50 MG tablet, Take 1 tablet by mouth 3 (Three) Times a Day As Needed for Moderate Pain or Severe Pain., Disp: 90 tablet, Rfl: 0    bisoprolol-hydrochlorothiazide (Ziac) 5-6.25 MG per tablet, Take 1 tablet by mouth Daily., Disp: 30 tablet, Rfl: 6  Social History:   Social History     Tobacco Use    Smoking status: Never    Smokeless tobacco: Never   Substance Use Topics    Alcohol use: No      Family History:  Family History   Problem Relation Age of Onset    Arthritis Mother     Depression Mother     Hyperlipidemia Mother     Hypertension Mother     Irritable bowel syndrome Mother     Dislocations Mother     Hypertension Father     Arthritis Father     Stroke Father     Alcohol abuse Maternal Grandfather     Depression Maternal Grandfather     Heart disease Other         IN FEMALES BEFORE AGE 65    Asthma Maternal Grandmother         Review of Systems    Constitutional: No weakness,fatigue, fever, rigors, chills   Eyes: No vision changes, eye pain   ENT/oropharynx: No  difficulty swallowing, sore throat, epistaxis, changes in hearing   Cardiovascular: No chest pain, chest tightness, palpitations, paroxysmal nocturnal dyspnea, orthopnea, diaphoresis, dizziness / syncopal episode   Respiratory: Moderate shortness of breath, dyspnea on exertion, cough, wheezing hemoptysis   Gastrointestinal: No abdominal pain, nausea, vomiting, diarrhea, bloody stools   Genitourinary: No hematuria, dysuria   Neurological: No headache, tremors, numbness,  one-sided weakness    Musculoskeletal: No cramps, myalgias,  joint pain, joint swelling   Integument: No rash, edema           Constitutional:  Heart Rate:  [51] 51  BP: (156)/(83) 156/83    Physical Exam   General:  Appears in no acute distress  Eyes: PERTL,  HEENT:  No JVD. Thyroid not visibly enlarged. No mucosal pallor or cyanosis  Respiratory: Respirations regular and unlabored at rest. BBS with good air entry in all fields. No crackles, rubs or wheezes auscultated  Cardiovascular: S1S2 Regular rate and rhythm. NSYS murmur, rub or gallop auscultated. No carotid bruits. DP/PT pulses    . No pretibial pitting edema  Gastrointestinal: Abdomen soft, flat, non tender. Bowel sounds present. No hepatosplenomegaly. No ascites  Musculoskeletal: CARRASCO x4. No abnormal movements  Extremities: No digital clubbing or cyanosis  Skin: Color pink. Skin warm and dry to touch. No rashes  No xanthoma  Neuro: AAO x3 CN II-XII grossly intact            ECG 12 Lead    Date/Time: 5/20/2024 1:41 PM  Performed by: Tom Littlejohn MD    Authorized by: Tom Littlejohn MD  Comparison: compared with previous ECG   Similar to previous ECG  Rhythm: sinus rhythm  Conduction: incomplete right bundle branch block    Clinical impression: abnormal EKG              Cardiographics  ECG:     Telemetry:    Echocardiogram:   Results for orders placed in visit on 02/17/21    Adult Transthoracic Echo Complete W/ Cont if Necessary Per Protocol    Interpretation Summary  ·  Calculated left ventricular EF = 69.4% Estimated left ventricular EF was in agreement with the calculated left ventricular EF.  · Left ventricular diastolic function was normal.  · Calculated left ventricular EF = 69.4%  · There is no evidence of pericardial effusion. .        Imaging  Chest X-ray:     Lab Review                               Assessment:/ Recommendations / Plan:                  ICD-10-CM ICD-9-CM   1. Abnormal EKG  R94.31 794.31   2. Borderline systolic HTN  R03.0 796.2   3. Heart murmur  R01.1 785.2   4. Shortness of breath  R06.02 786.05   5. Preop cardiovascular exam  Z01.810 V72.81     1. Abnormal EKG  Considering the patient's symptoms as well as clinical situation and  EKG findings, along with cardiac risk factors, ischemic workup is necessary to rule out ischemic cardiomyopathy, stress induced arrhythmias, and functional capacity for diagnosis as well as prognostic consideration    - Stress Test With Myocardial Perfusion One Day; Future  - Adult Transthoracic Echo Complete W/ Cont if Necessary Per Protocol    2. Borderline systolic HTN  Will add beta-blockers    3. Heart murmur  Considering patient's medical condition as well as the risk factors, patient will require echocardiogram for further evaluation for the LV function, four-chamber evaluation, including the pressures, valvular function and  pericardial disease and pericardial effusion      4. Shortness of breath  Multifactorial    5. Preop cardiovascular exam  Considering the patient's symptoms as well as clinical situation and  EKG findings, along with cardiac risk factors, ischemic workup is necessary to rule out ischemic cardiomyopathy, stress induced arrhythmias, and functional capacity for diagnosis as well as prognostic consideration        START ZIAC  Pros and cons of this new medication / change medication has been explained to  the patient    Possible side effects has been explained    Associated need of the blood  Work has  been explained    Need for the compliance of the medication has been explained    ECHO, WALKING NEVAEH    FOLLOW UP            Tom Littlejohn MD  5/21/2024, 10:52 EDT      EMR Dragon/Transcription:   Dictated utilizing Dragon dictation

## 2024-05-21 ENCOUNTER — TELEPHONE (OUTPATIENT)
Dept: NEUROSURGERY | Facility: CLINIC | Age: 74
End: 2024-05-21
Payer: MEDICARE

## 2024-05-21 ENCOUNTER — DOCUMENTATION (OUTPATIENT)
Dept: NEUROSURGERY | Facility: CLINIC | Age: 74
End: 2024-05-21
Payer: MEDICARE

## 2024-05-21 PROBLEM — Z01.810 PREOP CARDIOVASCULAR EXAM: Status: ACTIVE | Noted: 2024-05-21

## 2024-05-21 PROBLEM — R01.1 HEART MURMUR: Status: ACTIVE | Noted: 2024-05-21

## 2024-05-21 NOTE — TELEPHONE ENCOUNTER
PATIENT IS PENDING MEDICAL/CARDIAC CLEARANCE AND WILL NEED STRESS TEST PRIOR TO SURGERY WITH DR ENCINAS.

## 2024-05-21 NOTE — TELEPHONE ENCOUNTER
PATIENT IS PENDING MEDICAL/CARDIAC CLEARANCE TO PROCEED WITH SURGERY WITH DR ENCINAS, PATIENT IS BEING SCHEDULED FOR STRESS TEST

## 2024-05-24 ENCOUNTER — PATIENT ROUNDING (BHMG ONLY) (OUTPATIENT)
Dept: CARDIOLOGY | Facility: CLINIC | Age: 74
End: 2024-05-24
Payer: MEDICARE

## 2024-05-25 NOTE — PROGRESS NOTES
May 24, 2024    Tell me about your visit with us. What things went well?       We're always looking for ways to make our patients' experiences even better. Do you have recommendations on ways we may improve?      Overall were you satisfied with your first visit to our practice?      I appreciate you taking the time to speak with me today. Is there anything else I can do for you?     Thank you, and have a great day.

## 2024-06-03 ENCOUNTER — TREATMENT (OUTPATIENT)
Dept: PHYSICAL THERAPY | Facility: CLINIC | Age: 74
End: 2024-06-03
Payer: MEDICARE

## 2024-06-03 DIAGNOSIS — M54.2 CHRONIC NECK AND BACK PAIN: Primary | ICD-10-CM

## 2024-06-03 DIAGNOSIS — G89.29 CHRONIC NECK AND BACK PAIN: Primary | ICD-10-CM

## 2024-06-03 DIAGNOSIS — M54.9 CHRONIC NECK AND BACK PAIN: Primary | ICD-10-CM

## 2024-06-03 DIAGNOSIS — R29.898 DECREASED RANGE OF MOTION OF NECK: ICD-10-CM

## 2024-06-03 PROCEDURE — 97161 PT EVAL LOW COMPLEX 20 MIN: CPT

## 2024-06-03 PROCEDURE — 97110 THERAPEUTIC EXERCISES: CPT

## 2024-06-03 NOTE — PROGRESS NOTES
Physical Therapy Initial Evaluation and Plan of Care  [facility] [address], [address]  [place] [address]     Patient: Jemima Bell   : 1950  Referring practitioner: Claire Mendoza APRN  Date of Initial Visit: 6/3/2024  Today's Date: 6/3/2024  Patient seen for 1 sessions  PT Clinic location: [address], [address]  [id number]          Visit Diagnoses:    ICD-10-CM ICD-9-CM   1. Chronic neck and back pain  M54.2 723.1    M54.9 724.5    G89.29 338.29   2. Decreased range of motion of neck  R29.898 723.8       Subjective Questionnaire: NDI:27    Subjective Evaluation    History of Present Illness  Mechanism of injury: I have been having a lot of back issues that I have been going to pain management for. They sent me to a surgeon but I failed because of a heart murmur so I have to wait for surgery. As I was waiting for all of this my neck started hurting which was about 3 weeks ago. I went to bed and it was really stiff but when I woke up I felt like my neck was being stabbed. I went to the emergency room where they gave me a steroid pack and a muscle relaxer and both of these seemed to help. I also ice my neck which helps. This is all mainly on the left side but I occasionally have some pain on my right side. I do not have any numbness or tingling into my arms but I do get some pain into my shoulder blade areas after sitting for a while. My neck just feels like it gets tired and then the pain starts. I also have seen an increase in headaches since this began, they seem to be most frequent at night. The pain does keep me up at night, it is hard to find a comfortable position.   I do have history of arthritis and bulging discs in my back.   I have more pain when I am trying to read, watch t.v, vacuuming, and driving. When I drive I feel like I have to turn my whole body because I can't turn my neck.      Patient  Occupation: Retired Quality of life: good    Pain  Current pain ratin  At best pain ratin  At worst pain ratin (at the end of the day)  Quality: knife-like, dull ache and burning  Relieving factors: ice, medications and relaxation  Aggravating factors: lifting, overhead activity, sleeping and repetitive movement    Treatments  Previous treatment: physical therapy (Lower back)  Patient Goals  Patient goals for therapy: decreased pain, increased motion, increased strength, independence with ADLs/IADLs and return to sport/leisure activities  Patient goal: I want to be able to do normal activities that I can't  do right now because of the pain such as cooking, cleaning, and doing dishes.       Medical history: Heart murmur, L knee replacement, hypothyroidism, chronic lumbar pain, HTN. See chart for further detail.   Therapy Precautions: N/A      Objective          Static Posture     Head  Forward.    Shoulders  Rounded.    Palpation   Left   No palpable tenderness to the suboccipitals.   Tenderness of the cervical interspinals, cervical paraspinals, intercostals, levator scapulae, rhomboids, scalenes, sternocleidomastoid and upper trapezius.     Right   No palpable tenderness to the sternocleidomastoid. Tenderness of the cervical interspinals, cervical paraspinals, intercostals, levator scapulae, rhomboids, scalenes, suboccipitals and upper trapezius.     Tenderness   Cervical Spine   Tenderness in the spinous process, left transverse process and right transverse process.     Active Range of Motion   Cervical/Thoracic Spine   Cervical    Flexion: 20 degrees   Extension: 10 (feels really heavy) degrees   Left rotation: 25 degrees with pain  Right rotation: 30 degrees with pain    Additional Active Range of Motion Details  Decreased thoracic spine mobility bilaterally: < 50 degrees - pain in the lumbar spine     Strength/Myotome Testing   Cervical Spine   Neck extension: 4+ (pain)  Neck flexion: 4-  (pain)    Left   Neck lateral flexion (C3): 4    Right   Neck lateral flexion (C3): 4    Left Shoulder     Planes of Motion   Flexion: 4   Abduction: 4+   External rotation at 0°: 4   Internal rotation at 0°: 4+     Right Shoulder     Planes of Motion   Flexion: 4+   Abduction: 4   External rotation at 0°: 4+   Internal rotation at 0°: 4+     Left Elbow   Flexion: 4+  Extension: 4+    Right Elbow   Flexion: 4+  Extension: 4+    Left Wrist/Hand   Wrist extension: 5  Wrist flexion: 5    Right Wrist/Hand   Wrist extension: 5  Wrist flexion: 5          Assessment & Plan       Assessment  Impairments: abnormal or restricted ROM, activity intolerance, impaired physical strength, lacks appropriate home exercise program and pain with function   Functional limitations: carrying objects, lifting, sleeping, pulling, moving in bed, sitting, standing, reaching overhead and unable to perform repetitive tasks   Assessment details: Pt is a 74 year old female who presents to PT with symptoms consistent with cervicalgia. Upon initial evaluation she presents with the following deficits and impairments: decreased cervical ROM, postural deficits, decreased deep cervical muscles, and several cervical muscles were TTP. These deficits make it difficult for the patient to complete any daily activities such as cooking, cleaning the house, yard work, sleeping, and other activities such as reading and watching t.v. Pt would benefit from skilled PT intervention to address the deficits noted and to improve overall quality of life.     Educated the pt to underlying pathology present and the plan of care for recovery.  Will review good postural habits to decrease the stress of the cervical region.    Prognosis: good    Goals  Plan Goals: SHORT TERM GOALS: 6 Weeks  1. Pt will be compliant with HEP.  2. Pt to exhibit 40 degrees of cervical flexion to allow for viewing traffic without pain or limitations  3. Pt to report ability to sleep through the  night without awakening  4. Pt able to perform ADL's and recreational activities without pain     LONG TERM GOALS: 12 Weeks  1.  Pt to score <15 perceived disability on Neck Index  2.  Pain level < 5/10 at worse with driving > 30 min. and ADL's  3.  Increased cervical AROM to WFL to allow for driving and household tasks with less restrictions.  4.  Pt able to complete cleaning  activities without complaints of pain limiting function.     Plan  Therapy options: will be seen for skilled therapy services  Planned modality interventions: cryotherapy, electrical stimulation/Russian stimulation, iontophoresis, TENS, thermotherapy (hydrocollator packs), traction, ultrasound and dry needling  Planned therapy interventions: abdominal trunk stabilization, ADL retraining, body mechanics training, flexibility, functional ROM exercises, home exercise program, joint mobilization, manual therapy, neuromuscular re-education, postural training, soft tissue mobilization, spinal/joint mobilization, strengthening, stretching and therapeutic activities  Frequency: 2x week  Duration in weeks: 12  Treatment plan discussed with: patient        See flowsheets for treatment detail.  Education: Discussed underlying issue, POC, and HEP.     History # of Personal Factors and/or Comorbidities: LOW (0)  Examination of Body System(s): # of elements: LOW (1-2)  Clinical Presentation: STABLE   Clinical Decision Making: LOW       Timed:         Manual Therapy:    -     mins  29059;     Therapeutic Exercise:    13     mins  70694;     Neuromuscular Marian:    -    mins  46739;    Therapeutic Activity:     -     mins  19303;     Gait Training:           mins  68234;     Ultrasound:          mins  36995;    Ionto                                   mins   21092  Self Care                       8     mins   74622      Un-Timed:  Electrical Stimulation:         mins  22845 ( );  Dry Needling          mins self-pay  Traction          mins 14719  Low  Eval     35     Mins  23148  Mod Eval     -     Mins  17462  High Eval                       -     Mins  31013  Re-Eval                               mins  85216      Timed Treatment:   21   mins   Total Treatment:     56   mins    PT SIGNATURE: Karma KYLAH Argueta   [place] PT license #: [id number]  DATE TREATMENT INITIATED: 6/3/2024    Initial Certification  Certification Period: 8/31/2024  I certify that the therapy services are furnished while this patient is under my care.  The services outlined above are required by this patient, and will be reviewed every 90 days.    PHYSICIAN: Claire Mendoza APRN  NPI: [id number]                                      DATE:     Please sign and return via fax to Jacinto - Fax #: [phone]. Thank you, [facility].

## 2024-06-05 ENCOUNTER — TREATMENT (OUTPATIENT)
Dept: PHYSICAL THERAPY | Facility: CLINIC | Age: 74
End: 2024-06-05
Payer: MEDICARE

## 2024-06-05 DIAGNOSIS — G89.29 CHRONIC NECK AND BACK PAIN: Primary | ICD-10-CM

## 2024-06-05 DIAGNOSIS — M54.9 CHRONIC NECK AND BACK PAIN: Primary | ICD-10-CM

## 2024-06-05 DIAGNOSIS — R29.898 DECREASED RANGE OF MOTION OF NECK: ICD-10-CM

## 2024-06-05 DIAGNOSIS — M54.2 CHRONIC NECK AND BACK PAIN: Primary | ICD-10-CM

## 2024-06-05 NOTE — PROGRESS NOTES
Physical Therapy Daily Treatment Note  [facility] [place]  [address], [address]  [place] [id number]     Patient: Jemima Bell  : 1950  Referring practitioner: Claire Mendoza APRN  Today's Date: 2024    VISIT#: 2    Visit Diagnoses:    ICD-10-CM ICD-9-CM   1. Chronic neck and back pain  M54.2 723.1    M54.9 724.5    G89.29 338.29   2. Decreased range of motion of neck  R29.898 723.8       Subjective   Jemima Bell reports: Exercises are going well and I have not had any headaches the last couple days. The pain is mostly on the L side.      Objective       See Exercise, Manual, and Modality Logs for complete treatment.     Patient Education: HEP review  Exercise rationale/ pain free exercise performance  Alternate exercise positions  Verbal/Tactile cues to ensure correct exercise performance/technique       Assessment/Plan  Patient demonstrates good tolerance to therapeutic exercises and manual therapy on this date with minimal cervical pain throughout. She reports feeling decrease in pain after sustained pressure to the anterior scalenes. Continued with pain free ROM exercises and added some gentle stretching to HEP. Will continue to progress as tolerated.       Progress per Plan of Care and Progress strengthening /stabilization /functional activity          Timed:         Manual Therapy:    15     mins  83688;     Therapeutic Exercise:    10     mins  46008;     Neuromuscular Marian:    23    mins  66900;    Therapeutic Activity:     -     mins  47659;     Gait Training:           mins  64515;     Ultrasound:          mins  75925;    Ionto:                                   mins  68615  Self Care:                       5     mins  34623    Un-Timed:  Electrical Stimulation:         mins  58384 ( );  Dry Needling          mins self-pay  Traction          mins 37526  Re-Eval                               mins  58094  Group Therapy            ___ mins 87447    Timed Treatment:   53   mins   Total Treatment:     53   mins    Karma Argueta PT  Physical Therapist  [place]TERO license #: [id number]

## 2024-06-10 ENCOUNTER — OFFICE VISIT (OUTPATIENT)
Dept: FAMILY MEDICINE CLINIC | Facility: CLINIC | Age: 74
End: 2024-06-10
Payer: MEDICARE

## 2024-06-10 VITALS
SYSTOLIC BLOOD PRESSURE: 112 MMHG | TEMPERATURE: 97.5 F | HEART RATE: 50 BPM | HEIGHT: 62 IN | BODY MASS INDEX: 34.3 KG/M2 | OXYGEN SATURATION: 97 % | DIASTOLIC BLOOD PRESSURE: 72 MMHG | WEIGHT: 186.4 LBS

## 2024-06-10 DIAGNOSIS — R79.89 ELEVATED SERUM CREATININE: ICD-10-CM

## 2024-06-10 DIAGNOSIS — F41.8 DEPRESSION WITH ANXIETY: ICD-10-CM

## 2024-06-10 DIAGNOSIS — R11.0 NAUSEA: ICD-10-CM

## 2024-06-10 DIAGNOSIS — R01.1 HEART MURMUR: ICD-10-CM

## 2024-06-10 DIAGNOSIS — R94.31 ABNORMAL EKG: ICD-10-CM

## 2024-06-10 DIAGNOSIS — E03.9 HYPOTHYROIDISM, UNSPECIFIED TYPE: Primary | ICD-10-CM

## 2024-06-10 PROCEDURE — 1125F AMNT PAIN NOTED PAIN PRSNT: CPT | Performed by: FAMILY MEDICINE

## 2024-06-10 PROCEDURE — 99214 OFFICE O/P EST MOD 30 MIN: CPT | Performed by: FAMILY MEDICINE

## 2024-06-10 RX ORDER — ONDANSETRON 4 MG/1
4 TABLET, FILM COATED ORAL EVERY 8 HOURS PRN
Qty: 15 TABLET | Refills: 2 | Status: SHIPPED | OUTPATIENT
Start: 2024-06-10

## 2024-06-10 RX ORDER — METHOCARBAMOL 500 MG/1
500 TABLET, FILM COATED ORAL 4 TIMES DAILY PRN
Qty: 120 TABLET | Refills: 0 | Status: SHIPPED | OUTPATIENT
Start: 2024-06-10

## 2024-06-10 NOTE — PROGRESS NOTES
"Chief Complaint  Hypothyroidism and Med Refill    Subjective        Jemima Bell presents to [facility]  History of Present Illness  History of Present Illness  The patient is a 74-year-old female here for routine follow-up on hypothyroidism and other medical problems.  Thyroid was checked preoperatively.    The patient recently consulted a cardiologist for a preoperative evaluation, during which she was found to have a heart murmur. A comprehensive cardiology workup has been scheduled for the upcoming Wednesday. She was prescribed Ziac for hypertension, which she has been taking for the past 3 weeks. She denies experiencing any chest pain but reports occasional episodes of lightheadedness and dizziness.    The patient reports experiencing significant nausea, which she believes is related to her current medication regimen, including Lyrica. She is requesting a refill of her Zofran prescription. Additionally, she takes Phenergan during sleep.    Supplemental Information  She has issues with her lower back and neck area. She has gone to physical therapy for her neck twice. Her mood is okay on her medication. She has memory issues.  She never did have her spine surgery because of the above problem.       Objective   Vital Signs:  /72   Pulse 50   Temp 97.5 °F (36.4 °C)   Ht 157.5 cm (62.01\")   Wt 84.6 kg (186 lb 6.4 oz)   SpO2 97%   BMI 34.08 kg/m²   Estimated body mass index is 34.08 kg/m² as calculated from the following:    Height as of this encounter: 157.5 cm (62.01\").    Weight as of this encounter: 84.6 kg (186 lb 6.4 oz).               Physical Exam   Physical Exam  The patient is alert, in no acute distress.  Pupils are equal and round. The oral mucosa is moist.  A systolic murmur is present in the right upper sternal border of the heart.    Vital Signs  The patient's blood pressure is 104/62.       Result Review :          Results  Laboratory Studies  TSH " is abnormal.   Hemoglobin A1c (05/01/2024 09:14)  TSH (05/01/2024 09:14)  Comprehensive Metabolic Panel (05/01/2024 09:14)  CBC & Differential (05/01/2024 09:14)           Assessment and Plan     Diagnoses and all orders for this visit:    1. Hypothyroidism, unspecified type (Primary)    2. Abnormal EKG    3. Heart murmur    4. Elevated serum creatinine  -     Basic Metabolic Panel    5. Depression with anxiety    6. Nausea  -     ondansetron (Zofran) 4 MG tablet; Take 1 tablet by mouth Every 8 (Eight) Hours As Needed for Nausea or Vomiting.  Dispense: 15 tablet; Refill: 2      Assessment & Plan  1. Hypertension.  The patient has been advised to utilize an automatic blood pressure monitor and maintain a log of her blood pressure readings. She has been instructed to monitor her blood pressure twice daily, once in the morning and mid-afternoon, and forward the readings to me in a few days.  Also keep follow-up with cardiology.    2. Hypothyroidism.  The patient's TSH levels were found to be abnormal approximately 6 weeks ago. The patient's thyroid levels will be reassessed in 2 months.    3. Nausea.  A prescription for ondansetron has been issued.    Follow-up  The patient is scheduled for a follow-up visit in 2 months.            Follow Up     Return in about 6 weeks (around 7/22/2024) for Recheck thyroid.  Patient was given instructions and counseling regarding her condition or for health maintenance advice. Please see specific information pulled into the AVS if appropriate.    Patient or patient representative verbalized consent for the use of Ambient Listening during the visit with  Meredith Lea Kehrer, MD for chart documentation. 6/10/2024  13:41 EDT      Answers submitted by the patient for this visit:  Other (Submitted on 6/8/2024)  Please describe your symptoms.: Return visit  Have you had these symptoms before?: No  How long have you been having these symptoms?: Greater than 2 weeks  Please list any  medications you are currently taking for this condition.: Current list provided  Primary Reason for Visit (Submitted on 6/8/2024)  What is the primary reason for your visit?: Other

## 2024-06-11 LAB
BUN SERPL-MCNC: 16 MG/DL (ref 8–27)
BUN/CREAT SERPL: 18 (ref 12–28)
CALCIUM SERPL-MCNC: 9.5 MG/DL (ref 8.7–10.3)
CHLORIDE SERPL-SCNC: 103 MMOL/L (ref 96–106)
CO2 SERPL-SCNC: 27 MMOL/L (ref 20–29)
CREAT SERPL-MCNC: 0.89 MG/DL (ref 0.57–1)
EGFRCR SERPLBLD CKD-EPI 2021: 68 ML/MIN/1.73
GLUCOSE SERPL-MCNC: 106 MG/DL (ref 70–99)
POTASSIUM SERPL-SCNC: 4.5 MMOL/L (ref 3.5–5.2)
SODIUM SERPL-SCNC: 143 MMOL/L (ref 134–144)

## 2024-06-12 ENCOUNTER — HOSPITAL ENCOUNTER (OUTPATIENT)
Dept: CARDIOLOGY | Facility: HOSPITAL | Age: 74
Discharge: HOME | End: 2024-06-12
Payer: MEDICARE

## 2024-06-12 ENCOUNTER — HOSPITAL ENCOUNTER (OUTPATIENT)
Dept: CARDIOLOGY | Facility: HOSPITAL | Age: 74
Discharge: HOME | End: 2024-06-12
Admitting: INTERNAL MEDICINE
Payer: MEDICARE

## 2024-06-12 ENCOUNTER — TREATMENT (OUTPATIENT)
Dept: PHYSICAL THERAPY | Facility: CLINIC | Age: 74
End: 2024-06-12
Payer: MEDICARE

## 2024-06-12 VITALS
BODY MASS INDEX: 34.6 KG/M2 | HEIGHT: 62 IN | OXYGEN SATURATION: 97 % | WEIGHT: 188 LBS | DIASTOLIC BLOOD PRESSURE: 68 MMHG | SYSTOLIC BLOOD PRESSURE: 110 MMHG | HEART RATE: 57 BPM

## 2024-06-12 VITALS
SYSTOLIC BLOOD PRESSURE: 140 MMHG | DIASTOLIC BLOOD PRESSURE: 75 MMHG | WEIGHT: 188 LBS | HEIGHT: 62 IN | HEART RATE: 50 BPM | BODY MASS INDEX: 34.6 KG/M2

## 2024-06-12 DIAGNOSIS — M54.2 CHRONIC NECK AND BACK PAIN: Primary | ICD-10-CM

## 2024-06-12 DIAGNOSIS — R29.898 DECREASED RANGE OF MOTION OF NECK: ICD-10-CM

## 2024-06-12 DIAGNOSIS — M54.9 CHRONIC NECK AND BACK PAIN: Primary | ICD-10-CM

## 2024-06-12 DIAGNOSIS — R94.31 ABNORMAL EKG: ICD-10-CM

## 2024-06-12 DIAGNOSIS — G89.29 CHRONIC NECK AND BACK PAIN: Primary | ICD-10-CM

## 2024-06-12 PROCEDURE — 93306 TTE W/DOPPLER COMPLETE: CPT | Performed by: INTERNAL MEDICINE

## 2024-06-12 PROCEDURE — 78452 HT MUSCLE IMAGE SPECT MULT: CPT

## 2024-06-12 PROCEDURE — A9500 TC99M SESTAMIBI: HCPCS | Performed by: INTERNAL MEDICINE

## 2024-06-12 PROCEDURE — 93306 TTE W/DOPPLER COMPLETE: CPT

## 2024-06-12 PROCEDURE — 25010000002 REGADENOSON 0.4 MG/5ML SOLUTION: Performed by: INTERNAL MEDICINE

## 2024-06-12 PROCEDURE — 93017 CV STRESS TEST TRACING ONLY: CPT

## 2024-06-12 PROCEDURE — 0 TECHNETIUM SESTAMIBI: Performed by: INTERNAL MEDICINE

## 2024-06-12 RX ORDER — REGADENOSON 0.08 MG/ML
0.4 INJECTION, SOLUTION INTRAVENOUS
Status: COMPLETED | OUTPATIENT
Start: 2024-06-12 | End: 2024-06-12

## 2024-06-12 RX ADMIN — TECHNETIUM TC 99M SESTAMIBI 1 DOSE: 1 INJECTION INTRAVENOUS at 10:09

## 2024-06-12 RX ADMIN — TECHNETIUM TC 99M SESTAMIBI 1 DOSE: 1 INJECTION INTRAVENOUS at 08:10

## 2024-06-12 RX ADMIN — REGADENOSON 0.4 MG: 0.08 INJECTION, SOLUTION INTRAVENOUS at 10:09

## 2024-06-12 NOTE — PROGRESS NOTES
Physical Therapy Daily Treatment Note    [facility] [place]  [address], [address]  [place] [address]    Visit # 3        Patient: Jmeima Bell   : 1950  Referring practitioner: MACHO Bangura  Date of Initial Evaluation:  Type: THERAPY  Noted: 6/3/2024  Today's Date: 2024           ICD-10-CM ICD-9-CM   1. Chronic neck and back pain  M54.2 723.1    M54.9 724.5    G89.29 338.29   2. Decreased range of motion of neck  R29.898 723.8       Subjective  Jemima Bell reports:   I feel really stiff all over today, both my neck and my back and all along my spine.  My neck feels worse than my back right now.     Objective   See Exercise, Manual, and Modality Logs for complete treatment   Note: any exercises/interventions not performed today have been marked as deferred on flowsheets.     Pt Education:  HEP review  Exercise rationale/ pain free exercise performance  Posture/Postural awareness  Lumbar support  Alternate exercise positions  Verbal/Tactile cues to ensure correct exercise performance/technique    Reviewed current HEP, progressed therex program with exercises as noted.  Added LTR, open books to HEP, written instructions issued.      Assessment/Plan  Tolerated continued progression of therapeutic exercise/therapeutic activity well today, no increased pain reported during or after session, does note general fatigue.    Did require some review and cueing of previously issued HEP for proper performance. Pt demonstrates improved performance of HEP after review and practice.     Would continue to benefit from skilled PT progressing with functional ROM and strength/stability of CS/LS.     Progress per Plan of Care and Progress strengthening /stabilization /functional activity         Timed:         Manual Therapy:         mins  82100     Therapeutic Exercise:     15    mins  66898     Neuromuscular Marian:     10   mins  19054    Therapeutic  Activity:      5    mins  56748     Gait Training:           mins  18949     Ultrasound:          mins  60678    Ionto                                   mins  02443  Self Care                            mins  47868    Un-Timed:  Electrical Stimulation:         mins 19815 ( )  Traction          mins 11795    Timed Treatment:   30   mins   Total Treatment:     30   mins    [employer] [place] License #[id number]  Physical Therapist Assistant

## 2024-06-13 LAB
AORTIC ARCH: 3 CM
AORTIC DIMENSIONLESS INDEX: 0.7 (DI)
ASCENDING AORTA: 3.2 CM
BH CV ECHO MEAS - ACS: 1.42 CM
BH CV ECHO MEAS - AO MAX PG: 8.5 MMHG
BH CV ECHO MEAS - AO MEAN PG: 4.4 MMHG
BH CV ECHO MEAS - AO ROOT DIAM: 3 CM
BH CV ECHO MEAS - AO V2 MAX: 145.5 CM/SEC
BH CV ECHO MEAS - AO V2 VTI: 35.9 CM
BH CV ECHO MEAS - AVA(I,D): 1.66 CM2
BH CV ECHO MEAS - EDV(CUBED): 114 ML
BH CV ECHO MEAS - EDV(MOD-SP2): 55 ML
BH CV ECHO MEAS - EDV(MOD-SP4): 61 ML
BH CV ECHO MEAS - EF(MOD-BP): 63.3 %
BH CV ECHO MEAS - EF(MOD-SP2): 61.8 %
BH CV ECHO MEAS - EF(MOD-SP4): 65.6 %
BH CV ECHO MEAS - ESV(CUBED): 42.5 ML
BH CV ECHO MEAS - ESV(MOD-SP2): 21 ML
BH CV ECHO MEAS - ESV(MOD-SP4): 21 ML
BH CV ECHO MEAS - FS: 28 %
BH CV ECHO MEAS - IVS/LVPW: 1.01 CM
BH CV ECHO MEAS - IVSD: 1.04 CM
BH CV ECHO MEAS - LAT PEAK E' VEL: 10.4 CM/SEC
BH CV ECHO MEAS - LV DIASTOLIC VOL/BSA (35-75): 32.8 CM2
BH CV ECHO MEAS - LV MASS(C)D: 182.7 GRAMS
BH CV ECHO MEAS - LV MAX PG: 4.4 MMHG
BH CV ECHO MEAS - LV MEAN PG: 2.27 MMHG
BH CV ECHO MEAS - LV SYSTOLIC VOL/BSA (12-30): 11.3 CM2
BH CV ECHO MEAS - LV V1 MAX: 104.3 CM/SEC
BH CV ECHO MEAS - LV V1 VTI: 24 CM
BH CV ECHO MEAS - LVIDD: 4.8 CM
BH CV ECHO MEAS - LVIDS: 3.5 CM
BH CV ECHO MEAS - LVOT AREA: 2.49 CM2
BH CV ECHO MEAS - LVOT DIAM: 1.78 CM
BH CV ECHO MEAS - LVPWD: 1.04 CM
BH CV ECHO MEAS - MED PEAK E' VEL: 10.6 CM/SEC
BH CV ECHO MEAS - MV A DUR: 0.13 SEC
BH CV ECHO MEAS - MV A MAX VEL: 80 CM/SEC
BH CV ECHO MEAS - MV DEC SLOPE: 257.2 CM/SEC2
BH CV ECHO MEAS - MV DEC TIME: 0.25 SEC
BH CV ECHO MEAS - MV E MAX VEL: 81.5 CM/SEC
BH CV ECHO MEAS - MV E/A: 1.02
BH CV ECHO MEAS - MV MAX PG: 3.7 MMHG
BH CV ECHO MEAS - MV MEAN PG: 1.15 MMHG
BH CV ECHO MEAS - MV P1/2T: 110.1 MSEC
BH CV ECHO MEAS - MV V2 VTI: 40.4 CM
BH CV ECHO MEAS - MVA(P1/2T): 2 CM2
BH CV ECHO MEAS - MVA(VTI): 1.47 CM2
BH CV ECHO MEAS - PA ACC TIME: 0.08 SEC
BH CV ECHO MEAS - PA V2 MAX: 122.2 CM/SEC
BH CV ECHO MEAS - PULM A REVS DUR: 0.14 SEC
BH CV ECHO MEAS - PULM A REVS VEL: 34.4 CM/SEC
BH CV ECHO MEAS - PULM DIAS VEL: 40.9 CM/SEC
BH CV ECHO MEAS - PULM S/D: 1
BH CV ECHO MEAS - PULM SYS VEL: 40.9 CM/SEC
BH CV ECHO MEAS - RAP SYSTOLE: 3 MMHG
BH CV ECHO MEAS - RV MAX PG: 3.2 MMHG
BH CV ECHO MEAS - RV V1 MAX: 88.8 CM/SEC
BH CV ECHO MEAS - RV V1 VTI: 19.9 CM
BH CV ECHO MEAS - RVSP: 19 MMHG
BH CV ECHO MEAS - SV(LVOT): 59.6 ML
BH CV ECHO MEAS - SV(MOD-SP2): 34 ML
BH CV ECHO MEAS - SV(MOD-SP4): 40 ML
BH CV ECHO MEAS - SVI(LVOT): 32 ML/M2
BH CV ECHO MEAS - SVI(MOD-SP2): 18.3 ML/M2
BH CV ECHO MEAS - SVI(MOD-SP4): 21.5 ML/M2
BH CV ECHO MEAS - TAPSE (>1.6): 2.24 CM
BH CV ECHO MEAS - TR MAX PG: 16.1 MMHG
BH CV ECHO MEAS - TR MAX VEL: 200.9 CM/SEC
BH CV ECHO MEASUREMENTS AVERAGE E/E' RATIO: 7.76
BH CV IMMEDIATE POST TECH DATA BLOOD PRESSURE: NORMAL MMHG
BH CV IMMEDIATE POST TECH DATA HEART RATE: 71 BPM
BH CV REST NUCLEAR ISOTOPE DOSE: 10.9 MCI
BH CV STRESS BP STAGE 1: NORMAL
BH CV STRESS COMMENTS STAGE 1: NORMAL
BH CV STRESS DOSE REGADENOSON STAGE 1: 0.4
BH CV STRESS DURATION MIN STAGE 1: 1
BH CV STRESS DURATION SEC STAGE 1: 48
BH CV STRESS GRADE STAGE 1: 0
BH CV STRESS HR STAGE 1: 72
BH CV STRESS METS STAGE 1: 1.6
BH CV STRESS NUCLEAR ISOTOPE DOSE: 32.9 MCI
BH CV STRESS PROTOCOL 1: NORMAL
BH CV STRESS RECOVERY BP: NORMAL MMHG
BH CV STRESS RECOVERY HR: 59 BPM
BH CV STRESS RECOVERY O2: 97 %
BH CV STRESS SPEED STAGE 1: 1
BH CV STRESS STAGE 1: 1
BH CV THREE MINUTE POST TECH DATA BLOOD PRESSURE: NORMAL MMHG
BH CV THREE MINUTE POST TECH DATA HEART RATE: 65 BPM
BH CV XLRA - RV BASE: 3.1 CM
BH CV XLRA - RV LENGTH: 6.8 CM
BH CV XLRA - RV MID: 2.34 CM
BH CV XLRA - TDI S': 11.2 CM/SEC
LEFT ATRIUM VOLUME INDEX: 32.7 ML/M2
LV EF NUC BP: 60 %
MAXIMAL PREDICTED HEART RATE: 145 BPM
PERCENT MAX PREDICTED HR: 49.66 %
STRESS BASELINE BP: NORMAL MMHG
STRESS BASELINE HR: 47 BPM
STRESS O2 SAT REST: 97 %
STRESS PERCENT HR: 58 %
STRESS POST ESTIMATED WORKLOAD: 1.6 METS
STRESS POST EXERCISE DUR MIN: 1 MIN
STRESS POST EXERCISE DUR SEC: 48 SEC
STRESS POST PEAK BP: NORMAL MMHG
STRESS POST PEAK HR: 72 BPM
STRESS TARGET HR: 123 BPM

## 2024-06-14 ENCOUNTER — TREATMENT (OUTPATIENT)
Dept: PHYSICAL THERAPY | Facility: CLINIC | Age: 74
End: 2024-06-14
Payer: MEDICARE

## 2024-06-14 DIAGNOSIS — G89.29 CHRONIC NECK AND BACK PAIN: Primary | ICD-10-CM

## 2024-06-14 DIAGNOSIS — M54.2 CHRONIC NECK AND BACK PAIN: Primary | ICD-10-CM

## 2024-06-14 DIAGNOSIS — R29.898 DECREASED RANGE OF MOTION OF NECK: ICD-10-CM

## 2024-06-14 DIAGNOSIS — M54.9 CHRONIC NECK AND BACK PAIN: Primary | ICD-10-CM

## 2024-06-14 NOTE — PROGRESS NOTES
Physical Therapy Daily Treatment Note  [facility] [place]  [address], [address]  [place] [address]     Patient: Jemima Bell  : 1950  Referring practitioner: Claire Mendoza APRN  Today's Date: 2024    VISIT#: 3    Visit Diagnoses:    ICD-10-CM ICD-9-CM   1. Chronic neck and back pain  M54.2 723.1    M54.9 724.5    G89.29 338.29   2. Decreased range of motion of neck  R29.898 723.8       Subjective   Jemima Bell reports: Patient reports that she was really sore after last visit in her lumbar spine and into her legs. She  had some tingling into her right hand last night but today is doing better.       Objective       See Exercise, Manual, and Modality Logs for complete treatment.     Patient Education: HEP review  Exercise rationale/ pain free exercise performance  Alternate exercise positions  Verbal/Tactile cues to ensure correct exercise performance/technique       Assessment/Plan  Patient demonstrates good tolerance to cervical and thoracic mobility exercises. Her lumbar spine was pretty irritated so deferred exercises at this time. Patient reports that she is hoping to focus on the cervical region. Will continue to progress as tolerated.         Progress per Plan of Care and Progress strengthening /stabilization /functional activity          Timed:         Manual Therapy:    -     mins  02668;     Therapeutic Exercise:    15     mins  81977;     Neuromuscular Marian:    15    mins  14892;    Therapeutic Activity:     -     mins  42163;     Gait Training:           mins  08811;     Ultrasound:          mins  02032;    Ionto:                                   mins  97612  Self Care:                       -     mins  50976    Un-Timed:  Electrical Stimulation:         mins  53618 ( );  Dry Needling          mins self-pay  Traction          mins 40122  Re-Eval                               mins  04680  Group Therapy           ___ mins  12899    Timed Treatment:   30   mins   Total Treatment:     46   mins    Karma Argueta PT  Physical Therapist  [place]TERO license #: [id number]

## 2024-06-17 ENCOUNTER — OFFICE VISIT (OUTPATIENT)
Dept: CARDIOLOGY | Facility: CLINIC | Age: 74
End: 2024-06-17
Payer: MEDICARE

## 2024-06-17 ENCOUNTER — PATIENT MESSAGE (OUTPATIENT)
Dept: FAMILY MEDICINE CLINIC | Facility: CLINIC | Age: 74
End: 2024-06-17
Payer: MEDICARE

## 2024-06-17 VITALS
DIASTOLIC BLOOD PRESSURE: 77 MMHG | WEIGHT: 186 LBS | HEART RATE: 58 BPM | BODY MASS INDEX: 34.23 KG/M2 | SYSTOLIC BLOOD PRESSURE: 128 MMHG | HEIGHT: 62 IN

## 2024-06-17 DIAGNOSIS — R94.31 ABNORMAL EKG: Primary | ICD-10-CM

## 2024-06-17 DIAGNOSIS — R06.02 SHORTNESS OF BREATH: ICD-10-CM

## 2024-06-17 DIAGNOSIS — R01.1 HEART MURMUR: ICD-10-CM

## 2024-06-17 DIAGNOSIS — Z01.810 PREOP CARDIOVASCULAR EXAM: ICD-10-CM

## 2024-06-17 RX ORDER — BISOPROLOL FUMARATE AND HYDROCHLOROTHIAZIDE 5; 6.25 MG/1; MG/1
1 TABLET ORAL DAILY
Qty: 90 TABLET | Refills: 3 | Status: SHIPPED | OUTPATIENT
Start: 2024-06-17

## 2024-06-17 NOTE — PROGRESS NOTES
TEST RESULTS - CLEARANCE FOR BACK SURGERY, DR. ENCINAS   Subjective:        Jemima Bell is a 74 y.o. female who here for follow up    CC      For the back surgery  HPI  74-year-old female with shortness of breath as well as heart murmur recently underwent a workup for the back surgery clearance denies any chest pains or tightness in the chest     Problems Addressed this Visit          Cardiac and Vasculature    Abnormal EKG - Primary    Heart murmur    Preop cardiovascular exam       Pulmonary and Pneumonias    Shortness of breath     Diagnoses         Codes Comments    Abnormal EKG    -  Primary ICD-10-CM: R94.31  ICD-9-CM: 794.31     Shortness of breath     ICD-10-CM: R06.02  ICD-9-CM: 786.05     Heart murmur     ICD-10-CM: R01.1  ICD-9-CM: 785.2     Preop cardiovascular exam     ICD-10-CM: Z01.810  ICD-9-CM: V72.81           .    The following portions of the patient's history were reviewed and updated as appropriate: allergies, current medications, past family history, past medical history, past social history, past surgical history and problem list.    Past Medical History:   Diagnosis Date    Acromioclavicular separation     Acute bronchitis     Acute sinusitis     Allergic 11/2022    Shrimp    Allergic rhinitis     Anemia Childhood    Anxiety     Arthritis     Arthritis of back     Asthma     Back pain     BMI 34.0-34.9,adult     Bursitis of hip     Cervical disc disorder     Cervicalgia     Chills     Cholelithiasis 2004?    Remival    Chronic pain disorder     Cluster headache     Colon polyp 12/2022    Deep vein thrombosis (DVT) of lower extremity     Depression     Dermatitis     Dislocation, shoulder     Dyspnea     Dysuria     Esophageal reflux     Extremity pain     Fatigue     Gastric ulcer     GERD (gastroesophageal reflux disease)     GI (gastrointestinal bleed) 2004?    Headache     Headache, tension-type     Heart murmur 1973    Hernia     Hip arthrosis     Hypothyroidism     Irritable  "bowel syndrome     Low back strain     Lumbago     Lumbar stenosis with neurogenic claudication 5/1/2024    Lumbosacral disc disease     Migraine     Nausea     Neuritis     Osteoarthritis     Osteoarthritis of knee     Periarthritis of shoulder     Plantar fasciitis     Pneumonia 89y    Pulled muscle     Pulmonary embolism     Pyelonephritis     Rheumatic fever     Sore throat     Torticollis     Trapezius strain     Urinary incontinence     Urinary pain     Urinary tract infection     UTI symptoms     Vitamin B1 deficiency     Vitamin B12 deficiency     Vitamin D deficiency     Weakness     Wheezing      reports that she has never smoked. She has never used smokeless tobacco. She reports that she does not drink alcohol and does not use drugs.   Family History   Problem Relation Age of Onset    Arthritis Mother     Depression Mother     Hyperlipidemia Mother     Hypertension Mother     Irritable bowel syndrome Mother     Dislocations Mother     Hypertension Father     Arthritis Father     Stroke Father     Alcohol abuse Maternal Grandfather     Depression Maternal Grandfather     Heart disease Other         IN FEMALES BEFORE AGE 65    Asthma Maternal Grandmother        Review of Systems  Constitutional: No wt loss, fever, fatigue  Gastrointestinal: No nausea, abdominal pain  Behavioral/Psych: No insomnia or anxiety   Cardiovascular no chest pains or tightness in the chest  Objective:       Physical Exam  /77   Pulse 58   Ht 157.5 cm (62\")   Wt 84.4 kg (186 lb)   LMP  (LMP Unknown)   BMI 34.02 kg/m²   General appearance: No acute changes   Neck: Trachea midline; NECK, supple, no thyromegaly or lymphadenopathy   Lungs: Normal size and shape, normal breath sounds, equal distribution of air, no rales and rhonchi   CV: S1-S2 regular, no murmurs, no rub, no gallop   Abdomen: Soft, nontender; no masses , no abnormal abdominal sounds   Extremities: No deformity , normal color , no peripheral edema   Skin: " Normal temperature, turgor and texture; no rash, ulcers          Procedures      Echocardiogram:    Results for orders placed in visit on 05/20/24    Adult Transthoracic Echo Complete W/ Cont if Necessary Per Protocol    Interpretation Summary    Left ventricular ejection fraction appears to be 61 - 65%.    Left ventricular diastolic function was normal.    Estimated right ventricular systolic pressure from tricuspid regurgitation is normal (<35 mmHg).    Mild dilation of the aortic root is present.    rpretation Summary         Findings consistent with a normal ECG stress test.    Myocardial perfusion imaging indicates a normal myocardial perfusion study with no evidence of ischemia. Impressions are consistent with a low risk study.    Left ventricular ejection fraction is normal (Calculated EF = 60%).      Current Outpatient Medications:     acetaminophen (TYLENOL) 325 MG tablet, Take 2 tablets by mouth., Disp: , Rfl:     albuterol sulfate  (90 Base) MCG/ACT inhaler, Inhale 2 puffs Every 4 (Four) Hours As Needed., Disp: , Rfl:     bisoprolol-hydrochlorothiazide (Ziac) 5-6.25 MG per tablet, Take 1 tablet by mouth Daily., Disp: 90 tablet, Rfl: 3    Diclofenac Sodium (VOLTAREN) 1 % gel gel, Apply 4 g topically to the appropriate area as directed 4 (Four) Times a Day As Needed (pain)., Disp: 150 g, Rfl: 2    fexofenadine (Allegra Allergy) 180 MG tablet, Take 1 tablet by mouth Daily., Disp: , Rfl:     fluticasone (FLONASE) 50 MCG/ACT nasal spray, 2 sprays into the nostril(s) as directed by provider Daily., Disp: 48 g, Rfl: 3    hydrOXYzine (ATARAX) 10 MG tablet, Take 1 tablet by mouth Every 4 (Four) Hours As Needed for Anxiety (twitching)., Disp: 120 tablet, Rfl: 1    Ibuprofen 3 %, Gabapentin 10 %, Baclofen 2 %, lidocaine 4 %, Ketamine HCl 4 %, Apply 1-2 g topically to the appropriate area as directed 3 (Three) to 4 (Four) times daily., Disp: 90 g, Rfl: 0    levothyroxine (Synthroid) 100 MCG tablet, Take 1  tablet by mouth Every Morning., Disp: 90 tablet, Rfl: 3    lidocaine (LIDODERM) 5 %, Place 1 patch on the skin as directed by provider Daily. Remove & Discard patch within 12 hours or as directed by MD, Disp: 30 each, Rfl: 0    methocarbamol (ROBAXIN) 500 MG tablet, Take 1 tablet by mouth 4 (Four) Times a Day As Needed for Muscle Spasms., Disp: 120 tablet, Rfl: 0    methylPREDNISolone (MEDROL) 4 MG dose pack, Take as directed on package instructions., Disp: 21 tablet, Rfl: 0    ondansetron (Zofran) 4 MG tablet, Take 1 tablet by mouth Every 8 (Eight) Hours As Needed for Nausea or Vomiting., Disp: 15 tablet, Rfl: 2    pantoprazole (PROTONIX) 40 MG EC tablet, Take 1 tablet by mouth 2 (Two) Times a Day., Disp: 180 tablet, Rfl: 3    pregabalin (LYRICA) 50 MG capsule, Take 1 capsule by mouth 2 (Two) Times a Day., Disp: 60 capsule, Rfl: 0    promethazine (PHENERGAN) 25 MG tablet, Take 1 tablet by mouth Every 6 (Six) Hours As Needed for Nausea or Vomiting., Disp: 20 tablet, Rfl: 1    sertraline (Zoloft) 100 MG tablet, Take 1 tablet by mouth Daily., Disp: 90 tablet, Rfl: 1    traMADol (ULTRAM) 50 MG tablet, Take 1 tablet by mouth 3 (Three) Times a Day As Needed for Moderate Pain or Severe Pain., Disp: 90 tablet, Rfl: 0   Assessment:                Plan:          ICD-10-CM ICD-9-CM   1. Abnormal EKG  R94.31 794.31   2. Shortness of breath  R06.02 786.05   3. Heart murmur  R01.1 785.2   4. Preop cardiovascular exam  Z01.810 V72.81     1. Abnormal EKG  No angina pectoris    2. Shortness of breath  Multifactor    3. Heart murmur  No change    4. Preop cardiovascular exam      Specificity and sensitivity of the stress test/ cardiac workup has been explained. Pt has been explained if  Symptoms continue please go to ER, and further w/p will be required.    Also explained this does not rule out coronary artery disease or the future events, continue to emphasize on risk reductions for coronary artery disease    Pt also advised to  contact PCP for other causes of symptoms    Jemima Bell seen and examined with no clinical signs of angina or chf, pt is cleared for surgery with non modifiable risk factors.  Jemima Bell has been advised to take cardiac meds with sip of water on the day of surgery.    Please use beta blocker for tachycardia perioperatively    Anticoagulation to be managed appropriately    Watch for chest pain, shortness of breath, palpitations, arrhythmias, and significant change in the blood pressure perioperatively,     Please check EKG preop and postop if any questions, notify us if any change in patient's cardiovascular conditions    COUNSELING:    Jemima TrinidadJefersonounseling was given to patient for the following topics: diagnostic results, risk factor reductions, impressions, risks and benefits of treatment options and importance of treatment compliance .       SMOKING COUNSELING:        Dictated using Dragon dictation

## 2024-06-18 NOTE — TELEPHONE ENCOUNTER
From: Jemima Bell  To: Norydith Kehrer  Sent: 6/17/2024 4:49 PM EDT  Subject: Blood Pressure    When I was last in your office you asked me to take my blood pressure for a few days. Including the visits with cardiology my readings have stayed around 126 to 128 over high 70s to mid 80. Last week I had a complete cardiology work up. Stress test and eco showed me within normal range. As you suspected the murmur is due to mild calcification to the artery. Dr WATSON says there is no worry and cleared me today for surgery. (Yuck!). My fear is the surgeon says there is a 6 month recovery period. This is more frightening to me than the actual surgery. Guess I will call for a better definition. If you have any clarity I would love to hear it.   Thank you !

## 2024-06-19 ENCOUNTER — TREATMENT (OUTPATIENT)
Dept: PHYSICAL THERAPY | Facility: CLINIC | Age: 74
End: 2024-06-19
Payer: MEDICARE

## 2024-06-19 DIAGNOSIS — M54.2 CHRONIC NECK AND BACK PAIN: Primary | ICD-10-CM

## 2024-06-19 DIAGNOSIS — M54.9 CHRONIC NECK AND BACK PAIN: Primary | ICD-10-CM

## 2024-06-19 DIAGNOSIS — R29.898 DECREASED RANGE OF MOTION OF NECK: ICD-10-CM

## 2024-06-19 DIAGNOSIS — G89.29 CHRONIC NECK AND BACK PAIN: Primary | ICD-10-CM

## 2024-06-19 NOTE — PROGRESS NOTES
Physical Therapy Daily Treatment Note    [facility] [place]  [address], [address]  [place] [address]    Visit # 5        Patient: Jemima Bell   : 1950  Referring practitioner: MACHO Bangura  Date of Initial Evaluation:  Type: THERAPY  Noted: 6/3/2024  Today's Date: 2024           ICD-10-CM ICD-9-CM   1. Chronic neck and back pain  M54.2 723.1    M54.9 724.5    G89.29 338.29   2. Decreased range of motion of neck  R29.898 723.8       Subjective  Jemima Bell reports:   My head just feels heavy all the time.  Today I'm having more stiffness in my neck than yesterday, it comes and goes, some days are better than others.     Objective   See Exercise, Manual, and Modality Logs for complete treatment   Note: any exercises/interventions not performed today have been marked as deferred on flowsheets.     Pt Education:  HEP review  Exercise rationale/ pain free exercise performance  Posture/Postural awareness  Alternate exercise positions  Verbal/Tactile cues to ensure correct exercise performance/technique    Assessment/Plan  Tolerated continued progression of therapeutic exercise/therapeutic activity well today, no increased pain reported during or after session, does note general fatigue of UEs and thoracic area upon sitting.      Would continue to benefit from skilled PT progressing with functional ROM and strength/stability of CS/LS.     Progress per Plan of Care and Progress strengthening /stabilization /functional activity         Timed:         Manual Therapy:         mins  21368     Therapeutic Exercise:     15    mins  10194     Neuromuscular Marian:     10   mins  43218    Therapeutic Activity:      5    mins  58802     Gait Training:           mins  64814     Ultrasound:          mins  50837    Ionto                                   mins  40469  Self Care                            mins  15598    Un-Timed:  Electrical  Stimulation:         mins 35655 ( )  Traction          mins 97604    Timed Treatment:   30   mins   Total Treatment:     30   mins    ADITYA [place] License #[id number]  Physical Therapist Assistant

## 2024-06-21 ENCOUNTER — TELEPHONE (OUTPATIENT)
Dept: PHYSICAL THERAPY | Facility: CLINIC | Age: 74
End: 2024-06-21

## 2024-06-21 NOTE — TELEPHONE ENCOUNTER
"  Caller: Jemima Bell \"Kim\"    Relationship: Self         What was the call regarding: NOT FEELING WELL          "

## 2024-06-26 ENCOUNTER — HOSPITAL ENCOUNTER (OUTPATIENT)
Facility: SURGERY CENTER | Age: 74
Setting detail: HOSPITAL OUTPATIENT SURGERY
Discharge: HOME | End: 2024-06-26
Attending: ANESTHESIOLOGY | Admitting: ANESTHESIOLOGY
Payer: MEDICARE

## 2024-06-26 ENCOUNTER — HOSPITAL ENCOUNTER (OUTPATIENT)
Dept: GENERAL RADIOLOGY | Facility: SURGERY CENTER | Age: 74
End: 2024-06-26
Payer: MEDICARE

## 2024-06-26 VITALS
OXYGEN SATURATION: 96 % | WEIGHT: 184 LBS | RESPIRATION RATE: 18 BRPM | TEMPERATURE: 97.8 F | HEART RATE: 45 BPM | SYSTOLIC BLOOD PRESSURE: 130 MMHG | BODY MASS INDEX: 33.86 KG/M2 | HEIGHT: 62 IN | DIASTOLIC BLOOD PRESSURE: 64 MMHG

## 2024-06-26 DIAGNOSIS — Z41.9 SURGERY, ELECTIVE: ICD-10-CM

## 2024-06-26 PROCEDURE — 25010000002 DEXAMETHASONE SODIUM PHOSPHATE 100 MG/10ML SOLUTION 10 ML VIAL: Performed by: ANESTHESIOLOGY

## 2024-06-26 PROCEDURE — 62321 NJX INTERLAMINAR CRV/THRC: CPT | Performed by: ANESTHESIOLOGY

## 2024-06-26 PROCEDURE — 77002 NEEDLE LOCALIZATION BY XRAY: CPT

## 2024-06-26 PROCEDURE — 25510000001 IOPAMIDOL 61 % SOLUTION 30 ML VIAL: Performed by: ANESTHESIOLOGY

## 2024-06-26 PROCEDURE — 76000 FLUOROSCOPY <1 HR PHYS/QHP: CPT

## 2024-06-26 NOTE — H&P
[facility]   HISTORY AND PHYSICAL    Patient Name: Jemima Bell  : 1950  MRN: [id number]  Primary Care Physician:  Kehrer, Meredith Lea, MD  Date of admission: 2024    Subjective   Subjective     Chief Complaint: Neck pain    History of Present Illness  Chronic neck pain that starts in the left side of the neck and radiates into the right shoulder.       Review of Systems   Constitutional:  Negative for chills and fever.   Respiratory:  Negative for cough and shortness of breath.    Musculoskeletal:  Positive for neck pain.        Personal History     Past Medical History:   Diagnosis Date    Acromioclavicular separation     Acute bronchitis     Acute sinusitis     Allergic 2022    Shrimp    Allergic rhinitis     Anemia Childhood    Anxiety     Arthritis     Arthritis of back     Asthma     Back pain     BMI 34.0-34.9,adult     Bursitis of hip     Cervical disc disorder     Cervicalgia     Chills     Cholelithiasis ?    Remival    Chronic pain disorder     Cluster headache     Colon polyp 2022    Deep vein thrombosis (DVT) of lower extremity     Depression     Dermatitis     Dislocation, shoulder     Dyspnea     Dysuria     Esophageal reflux     Extremity pain     Fatigue     Gastric ulcer     GERD (gastroesophageal reflux disease)     GI (gastrointestinal bleed) ?    Headache     Headache, tension-type     Heart murmur 1973    Hernia     Hip arthrosis     Hypothyroidism     Irritable bowel syndrome     Low back strain     Lumbago     Lumbar stenosis with neurogenic claudication 2024    Lumbosacral disc disease     Migraine     Nausea     Neuritis     Osteoarthritis     Osteoarthritis of knee     Periarthritis of shoulder     Plantar fasciitis     Pneumonia 89y    Pulled muscle     Pulmonary embolism     Pyelonephritis     Rheumatic fever     Sore throat     Torticollis     Trapezius strain     Urinary incontinence     Urinary pain     Urinary tract infection     UTI  symptoms     Vitamin B1 deficiency     Vitamin B12 deficiency     Vitamin D deficiency     Weakness     Wheezing        Past Surgical History:   Procedure Laterality Date    ABDOMINAL SURGERY      APPENDECTOMY      CATARACT EXTRACTION      CATARACT EXTRACTION      bilateral eyes    CATARACT EXTRACTION      CHOLECYSTECTOMY  2004?    COLONOSCOPY      COLONOSCOPY N/A 12/23/2022    Procedure: COLONOSCOPY to cecum and TI:  with biopsies, cold snare polyp,;  Surgeon: Reji Aguilar MD;  Location:  [facility];  Service: Gastroenterology;  Laterality: N/A;  PREOP/ HX COLON POLYPS  POSTOP/  diverticulosis, polyp, hemorrhoids    ENDOSCOPY N/A 12/23/2022    Procedure: ESOPHAGOGASTRODUODENOSCOPY with biopsies;  Surgeon: Reji Aguilar MD;  Location: [facility];  Service: Gastroenterology;  Laterality: N/A;  PREOP/ HX GASTRIC ULCER, DYSPEPSIA, UPPER ABDOMINAL PAIN  POSTOP/:  HH, gastritis, gastric polyps    EPIDURAL BLOCK      GALLBLADDER SURGERY      HEMORRHOIDECTOMY  1974 & 77?    HERNIA REPAIR  2008?    HYSTERECTOMY      INGUINAL HERNIA REPAIR      JOINT REPLACEMENT      Knee    KNEE SURGERY      LAPAROSCOPIC COLON RESECTION  2005    LAPAROTOMY OOPHERECTOMY      LUMBAR EPIDURAL INJECTION N/A 03/27/2023    Procedure: Lumbar epidural steroid injection at L5-S1 55127;  Surgeon: Shey Aranda MD;  [place] [facility];  Service: Pain Management;  Laterality: N/A;    LUMBAR EPIDURAL INJECTION N/A 3/13/2024    Procedure: lumbar epidural steroid injection at L4/5 23448;  Surgeon: Shey Aranda MD;  Location: [facility];  Service: Pain Management;  Laterality: N/A;    NISSEN FUNDOPLICATION LAPAROSCOPIC      x2    ORTHOPEDIC SURGERY      Knee replacement    SACROILIAC JOINT INJECTION Left 05/12/2023    Procedure: Left SACROILIAC INJECTION 09818;  Surgeon: Shey Aranda MD;  [place] [facility];  Service: Pain Management;  Laterality: Left;    SACROILIAC JOINT INJECTION Left 07/12/2023    Procedure:  "SACROILIAC INJECTION LEFT;  Surgeon: Shey Aranda MD;  [place] [facility];  Service: Pain Management;  Laterality: Left;    TONSILLECTOMY      TONSILLECTOMY  1954?    TOTAL KNEE ARTHROPLASTY Left     TUBAL ABDOMINAL LIGATION      UPPER GASTROINTESTINAL ENDOSCOPY         Family History: family history includes Alcohol abuse in her maternal grandfather; Arthritis in her father and mother; Asthma in her maternal grandmother; Depression in her maternal grandfather and mother; Dislocations in her mother; Heart disease in an other family member; Hyperlipidemia in her mother; Hypertension in her father and mother; Irritable bowel syndrome in her mother; Stroke in her father. Otherwise pertinent FHx was reviewed and not pertinent to current issue.    Social History:  reports that she has never smoked. She has never used smokeless tobacco. She reports that she does not drink alcohol and does not use drugs.    Home Medications:  Diclofenac Sodium, (Ibuprofen 3 %, Gabapentin 10 %, Baclofen 2 %, lidocaine 4 %, Ketamine HCl 4 %), acetaminophen, albuterol sulfate HFA, bisoprolol-hydrochlorothiazide, fexofenadine, fluticasone, hydrOXYzine, levothyroxine, lidocaine, methocarbamol, methylPREDNISolone, ondansetron, pantoprazole, pregabalin, promethazine, sertraline, and traMADol    Allergies:  Allergies   Allergen Reactions    Salicylates GI Intolerance     History of gi bleed      Colestipol GI Intolerance    Biaxin [Clarithromycin]     Adhesive Tape Rash     Can use plastic tape, paper tape causes rash    Augmentin [Amoxicillin-Pot Clavulanate] Diarrhea    Prevacid [Lansoprazole] Itching and Swelling     Eyes only    Sulfa Antibiotics GI Intolerance    Sulfamethoxazole-Trimethoprim Nausea And Vomiting and GI Intolerance     \"makes me sick as a dog\"         Objective    Objective     Vitals:        Physical Exam  Constitutional:       General: She is not in acute distress.  Pulmonary:      Effort: Pulmonary effort is " normal. No respiratory distress.   Skin:     General: Skin is warm and dry.   Neurological:      Mental Status: She is alert.   Psychiatric:         Mood and Affect: Mood normal.         Thought Content: Thought content normal.         Result Review    Result Review:  I have personally reviewed the results from the time of this admission to 6/26/2024 12:35 EDT and agree with these findings:  []  Laboratory list / accordion  []  Microbiology  []  Radiology  []  EKG/Telemetry   []  Cardiology/Vascular   []  Pathology  []  Old records  []  Other:  Most notable findings include: No new       Assessment & Plan   Assessment / Plan     Brief Patient Summary:  Jemima Bell is a 74 y.o. female who has chronic neck pain radiating into the right shoulder    Active Hospital Problems:  Active Hospital Problems    Diagnosis     Cervical radiculopathy     Neck pain      Plan: Cervical Epidural steroid injection       VTE Prophylaxis:  No VTE prophylaxis order currently exists.    Shey Aranda MD

## 2024-06-26 NOTE — DISCHARGE INSTRUCTIONS
[facility] - Post-procedure Instructions          --  While there are no absolute restrictions, it is recommended that you do not perform strenuous activity today. In the morning, you may resume your level of activity as before your block.    --  If you have a band-aid at your injection site, please remove it later today. Observe the area for any redness, swelling, pus-like drainage, or a temperature over 101°. If any of these symptoms occur, please call your doctor at [phone]. If after office hours, leave a message and the on-call provider will return your call.    --  Ice may be applied to your injection site. It is recommended you avoid direct heat (heating pad; hot tub) for 1-2 days.    --  Call [facility] at [phone] if you experience persistent headache, persistent bleeding from the injection site, or severe pain not relieved by heat or oral medication.    --  Do not make important decisions today.    --  Due to the effects of the block and/or the I.V. Sedation, DO NOT drive or operate hazardous machinery for 12 hours.  Local anesthetics may cause numbness after procedure and precautions must be taken with regards to operating equipment as well as with walking, even if ambulating with assistance of another person or with an assistive device.    --  Do not drink alcohol for 12 hours.    -- You may return to work tomorrow, or as directed by your referring doctor.    --  Occasionally you may notice a slight increase in your pain after the procedure. This should start to improve within the next 24-48 hours. Radiofrequency ablation procedure pain may last 3-4 weeks.    --  It may take as long as 3-4 days before you notice a gradual improvement in your pain and/or other symptoms.    -- You may continue to take your prescribed pain medication as needed.    --  Some normal possible side effects of steroid use could include fluid retention, increased blood sugar, dull headache,  increased sweating, increased appetite, mood swings and flushing.    --  Diabetics are recommended to watch their blood glucose level closely for 24-48 hours after the injection.    --  Must stay in PACU for 20 min upon arrival and prove no leg weakness before being discharged.    --  IN THE EVENT OF A LIFE THREATENING EMERGENCY, (CHEST PAIN, BREATHING DIFFICULTIES, PARALYSIS…) YOU SHOULD GO TO YOUR NEAREST EMERGENCY ROOM.    --  You should be contacted by our office within 2-3 days to schedule follow up or next appointment date.  If not contacted within 7 days, please call the office at [phone]

## 2024-06-26 NOTE — OP NOTE
Cervical Epidural Steroid Injection   [facility]    PREOPERATIVE DIAGNOSIS:  Cervical Radiculopathy  POSTOPERATIVE DIAGNOSIS:  Same as preop diagnosis    PROCEDURE:   Cervical Epidural Steroid Injection, Therapeutic Interlaminar Injection, with epidurogram, at  C7-T1    PRE-PROCEDURE DISCUSSION WITH PATIENT:    Risks and complications were discussed with the patient prior to starting the procedure and informed consent was obtained.  We discussed various topics including but not limited to bleeding, infection, injury, paralysis, nerve injury, dural puncture, coma, death, worsening of clinical picture, lack of pain relief, and postprocedural soreness.    SURGEON:  Shey Aranda MD    REASON FOR PROCEDURE:   Diagnostic injection at this level is needed    SEDATION:  No sedation was used for this procedure  ANESTHETIC:  None  STEROID:   10mg Dexamethasone    DESCRIPTON OF PROCEDURE:  After obtaining informed consent, the patient was taken to the operating room and placed in the prone position.  Heart rate, blood pressure, and pulse oximeter were monitored throughout, and sedation was provided as needed by the RN under my guidance. All pressure points were well padded.  The cervicothoracic spine area was prepped with Chloraprep and draped in a sterile fashion.      AP fluoroscopic image was used to visualize the C7-T1 interspace.  The skin and subcutaneous tissue over the area was anesthetized with 1% Lidocaine.  An 18-Gauge Tuohy needle was then advanced through the anesthetized skin tract under fluoroscopic guidance in a coaxial view using a loss of resistance technique.  Lateral fluoroscopy was used to verify appropriate needle depth.  Once the needle tip was felt to be in the posterior epidural space, aspiration was noted to be negative for blood or CSF.  A volume of 1mL of Isovue 61% was then injected under live fluoroscopy in an AP view which produced good epidural spread with no evidence of  loculation, vascular run-off or intrathecal spread.  Subsequently, a total volume of 3mL consisting of 10mg of dexamethasone mixed with normal saline was injected without resistance.      ESTIMATED BLOOD LOSS:  <5 mL  SPECIMENS:  None    COMPLICATIONS:     No complications were noted., There was no indication of vascular uptake on live injection of contrast dye., and There was no indication of intrathecal uptake on live injection of contrast dye.    TOLERANCE & DISCHARGE CONDITION:    The patient tolerated the procedure well.  The patient was transported to the recovery area without difficulties.  The patient was discharged to home under the care of family in stable and satisfactory condition.    PLAN OF CARE:  The patient was given our standard instruction sheet.        2.   The patient will Return to clinic 4-6 wks.  3.    The patient will resume all medications as per the medication reconciliation sheet.

## 2024-06-28 ENCOUNTER — TREATMENT (OUTPATIENT)
Dept: PHYSICAL THERAPY | Facility: CLINIC | Age: 74
End: 2024-06-28
Payer: MEDICARE

## 2024-06-28 DIAGNOSIS — G89.29 CHRONIC NECK AND BACK PAIN: Primary | ICD-10-CM

## 2024-06-28 DIAGNOSIS — M54.9 CHRONIC NECK AND BACK PAIN: Primary | ICD-10-CM

## 2024-06-28 DIAGNOSIS — R29.898 DECREASED RANGE OF MOTION OF NECK: ICD-10-CM

## 2024-06-28 DIAGNOSIS — M54.2 CHRONIC NECK AND BACK PAIN: Primary | ICD-10-CM

## 2024-06-28 NOTE — PROGRESS NOTES
"  Physical Therapy Daily Treatment Note    [facility] [place]  [address], [address]  [place] [address]    Visit # 6        Patient: Jemima Bell   : 1950  Referring practitioner: MACHO Bangura  Date of Initial Evaluation:  Type: THERAPY  Noted: 6/3/2024  Today's Date: 2024           ICD-10-CM ICD-9-CM   1. Chronic neck and back pain  M54.2 723.1    M54.9 724.5    G89.29 338.29   2. Decreased range of motion of neck  R29.898 723.8       Subjective  Jemima Bell reports:   I had an injection in my neck Wednesday (24).  It seems to have really calmed some things down.  I still feel stiff but my head doesn't fel quite as heavy.      Objective   See Exercise, Manual, and Modality Logs for complete treatment   Note: any exercises/interventions not performed today have been marked as deferred on flowsheets.     Pt Education:  HEP review  Exercise rationale/ pain free exercise performance  Posture/Postural awareness  Alternate exercise positions  Verbal/Tactile cues to ensure correct exercise performance/technique    Assessment/Plan  Tolerated continued manual therapy and progression of therapeutic exercise well today, no increased pain reported during or after exercises, with pt noting her head and neck felt \"lighter\" after MT and at conclusion of session.     Would continue to benefit from skilled PT progressing with functional ROM and strength/stability of CS/LS.     Progress per Plan of Care and Progress strengthening /stabilization /functional activity         Timed:         Manual Therapy:    10     mins  05802     Therapeutic Exercise:     20    mins  92394     Neuromuscular Marian:    10    mins  62981    Therapeutic Activity:          mins  56979     Gait Training:           mins  50943     Ultrasound:          mins  89312    Ionto                                   mins  38162  Self Care                            mins  " 93415    Un-Timed:  Electrical Stimulation:         mins 60603 ( )  Traction          mins 29266    Timed Treatment:   40   mins   Total Treatment:     50   mins    ADITYA [place] License #[id number]  Physical Therapist Assistant

## 2024-07-11 DIAGNOSIS — Z98.1 S/P SPINAL FUSION: Primary | ICD-10-CM

## 2024-07-18 ENCOUNTER — LAB (OUTPATIENT)
Dept: LAB | Facility: HOSPITAL | Age: 74
End: 2024-07-18
Payer: MEDICARE

## 2024-07-18 LAB
ANION GAP SERPL CALCULATED.3IONS-SCNC: 10 MMOL/L (ref 5–15)
BUN SERPL-MCNC: 23 MG/DL (ref 8–23)
BUN/CREAT SERPL: 21.5 (ref 7–25)
CALCIUM SPEC-SCNC: 9.6 MG/DL (ref 8.6–10.5)
CHLORIDE SERPL-SCNC: 104 MMOL/L (ref 98–107)
CO2 SERPL-SCNC: 27 MMOL/L (ref 22–29)
CREAT SERPL-MCNC: 1.07 MG/DL (ref 0.57–1)
EGFRCR SERPLBLD CKD-EPI 2021: 54.3 ML/MIN/1.73
GLUCOSE SERPL-MCNC: 119 MG/DL (ref 65–99)
INR PPP: 0.94 (ref 0.9–1.1)
MRSA DNA SPEC QL NAA+PROBE: NORMAL
POTASSIUM SERPL-SCNC: 4.4 MMOL/L (ref 3.5–5.2)
PROTHROMBIN TIME: 12.8 SECONDS (ref 11.7–14.2)
SODIUM SERPL-SCNC: 141 MMOL/L (ref 136–145)

## 2024-07-18 PROCEDURE — 85610 PROTHROMBIN TIME: CPT

## 2024-07-18 PROCEDURE — 80048 BASIC METABOLIC PNL TOTAL CA: CPT

## 2024-07-18 PROCEDURE — 87641 MR-STAPH DNA AMP PROBE: CPT

## 2024-07-22 ENCOUNTER — HOSPITAL ENCOUNTER (OUTPATIENT)
Dept: CARDIOLOGY | Facility: HOSPITAL | Age: 74
Discharge: HOME | End: 2024-07-22
Payer: MEDICARE

## 2024-07-22 ENCOUNTER — TELEPHONE (OUTPATIENT)
Dept: NEUROSURGERY | Facility: CLINIC | Age: 74
End: 2024-07-22
Payer: MEDICARE

## 2024-07-22 ENCOUNTER — HOSPITAL ENCOUNTER (OUTPATIENT)
Dept: CT IMAGING | Facility: HOSPITAL | Age: 74
Discharge: HOME | End: 2024-07-22
Admitting: NEUROLOGICAL SURGERY
Payer: MEDICARE

## 2024-07-22 ENCOUNTER — LAB (OUTPATIENT)
Dept: LAB | Facility: HOSPITAL | Age: 74
End: 2024-07-22
Payer: MEDICARE

## 2024-07-22 ENCOUNTER — OFFICE VISIT (OUTPATIENT)
Dept: PAIN MEDICINE | Facility: CLINIC | Age: 74
End: 2024-07-22
Payer: MEDICARE

## 2024-07-22 ENCOUNTER — OFFICE VISIT (OUTPATIENT)
Dept: FAMILY MEDICINE CLINIC | Facility: CLINIC | Age: 74
End: 2024-07-22
Payer: MEDICARE

## 2024-07-22 VITALS
TEMPERATURE: 98.3 F | HEART RATE: 74 BPM | BODY MASS INDEX: 34.85 KG/M2 | WEIGHT: 189.4 LBS | HEIGHT: 62 IN | OXYGEN SATURATION: 98 % | DIASTOLIC BLOOD PRESSURE: 78 MMHG | SYSTOLIC BLOOD PRESSURE: 132 MMHG

## 2024-07-22 VITALS
SYSTOLIC BLOOD PRESSURE: 99 MMHG | OXYGEN SATURATION: 98 % | HEART RATE: 58 BPM | WEIGHT: 187.8 LBS | DIASTOLIC BLOOD PRESSURE: 62 MMHG | BODY MASS INDEX: 34.56 KG/M2 | HEIGHT: 62 IN | TEMPERATURE: 96.8 F

## 2024-07-22 DIAGNOSIS — M54.12 CERVICAL RADICULOPATHY: ICD-10-CM

## 2024-07-22 DIAGNOSIS — M48.062 LUMBAR STENOSIS WITH NEUROGENIC CLAUDICATION: ICD-10-CM

## 2024-07-22 DIAGNOSIS — Z79.899 ENCOUNTER FOR LONG-TERM (CURRENT) USE OF HIGH-RISK MEDICATION: Primary | ICD-10-CM

## 2024-07-22 DIAGNOSIS — M54.42 CHRONIC BILATERAL LOW BACK PAIN WITH BILATERAL SCIATICA: ICD-10-CM

## 2024-07-22 DIAGNOSIS — M51.36 DEGENERATION OF LUMBAR INTERVERTEBRAL DISC: ICD-10-CM

## 2024-07-22 DIAGNOSIS — M54.16 LUMBAR RADICULITIS: ICD-10-CM

## 2024-07-22 DIAGNOSIS — M54.16 LUMBAR RADICULOPATHY: ICD-10-CM

## 2024-07-22 DIAGNOSIS — M54.41 CHRONIC BILATERAL LOW BACK PAIN WITH BILATERAL SCIATICA: ICD-10-CM

## 2024-07-22 DIAGNOSIS — M54.2 NECK PAIN: ICD-10-CM

## 2024-07-22 DIAGNOSIS — F41.8 DEPRESSION WITH ANXIETY: ICD-10-CM

## 2024-07-22 DIAGNOSIS — E03.9 HYPOTHYROIDISM, UNSPECIFIED TYPE: Primary | ICD-10-CM

## 2024-07-22 DIAGNOSIS — G89.29 CHRONIC BILATERAL LOW BACK PAIN WITH BILATERAL SCIATICA: ICD-10-CM

## 2024-07-22 LAB
BACTERIA UR QL AUTO: NORMAL /HPF
BASOPHILS # BLD AUTO: 0.08 10*3/MM3 (ref 0–0.2)
BASOPHILS NFR BLD AUTO: 1.2 % (ref 0–1.5)
BILIRUB UR QL STRIP: NEGATIVE
CLARITY UR: CLEAR
COLOR UR: YELLOW
DEPRECATED RDW RBC AUTO: 37.7 FL (ref 37–54)
EOSINOPHIL # BLD AUTO: 0.39 10*3/MM3 (ref 0–0.4)
EOSINOPHIL NFR BLD AUTO: 5.9 % (ref 0.3–6.2)
ERYTHROCYTE [DISTWIDTH] IN BLOOD BY AUTOMATED COUNT: 11.6 % (ref 12.3–15.4)
GLUCOSE UR STRIP-MCNC: NEGATIVE MG/DL
HBA1C MFR BLD: 6 % (ref 4.8–5.6)
HCT VFR BLD AUTO: 40.9 % (ref 34–46.6)
HGB BLD-MCNC: 13.5 G/DL (ref 12–15.9)
HGB UR QL STRIP.AUTO: NEGATIVE
HYALINE CASTS UR QL AUTO: NORMAL /LPF
IMM GRANULOCYTES # BLD AUTO: 0.02 10*3/MM3 (ref 0–0.05)
IMM GRANULOCYTES NFR BLD AUTO: 0.3 % (ref 0–0.5)
KETONES UR QL STRIP: NEGATIVE
LEUKOCYTE ESTERASE UR QL STRIP.AUTO: ABNORMAL
LYMPHOCYTES # BLD AUTO: 2.19 10*3/MM3 (ref 0.7–3.1)
LYMPHOCYTES NFR BLD AUTO: 33.1 % (ref 19.6–45.3)
MCH RBC QN AUTO: 29.5 PG (ref 26.6–33)
MCHC RBC AUTO-ENTMCNC: 33 G/DL (ref 31.5–35.7)
MCV RBC AUTO: 89.5 FL (ref 79–97)
MONOCYTES # BLD AUTO: 0.64 10*3/MM3 (ref 0.1–0.9)
MONOCYTES NFR BLD AUTO: 9.7 % (ref 5–12)
NEUTROPHILS NFR BLD AUTO: 3.29 10*3/MM3 (ref 1.7–7)
NEUTROPHILS NFR BLD AUTO: 49.8 % (ref 42.7–76)
NITRITE UR QL STRIP: NEGATIVE
NRBC BLD AUTO-RTO: 0 /100 WBC (ref 0–0.2)
PH UR STRIP.AUTO: 5.5 [PH] (ref 5–8)
PLATELET # BLD AUTO: 294 10*3/MM3 (ref 140–450)
PMV BLD AUTO: 11.5 FL (ref 6–12)
PROT UR QL STRIP: NEGATIVE
QT INTERVAL: 443 MS
QTC INTERVAL: 409 MS
RBC # BLD AUTO: 4.57 10*6/MM3 (ref 3.77–5.28)
RBC # UR STRIP: NORMAL /HPF
REF LAB TEST METHOD: NORMAL
SP GR UR STRIP: 1.02 (ref 1–1.03)
SQUAMOUS #/AREA URNS HPF: NORMAL /HPF
UROBILINOGEN UR QL STRIP: ABNORMAL
WBC # UR STRIP: NORMAL /HPF
WBC NRBC COR # BLD AUTO: 6.61 10*3/MM3 (ref 3.4–10.8)

## 2024-07-22 PROCEDURE — 83036 HEMOGLOBIN GLYCOSYLATED A1C: CPT

## 2024-07-22 PROCEDURE — 81001 URINALYSIS AUTO W/SCOPE: CPT

## 2024-07-22 PROCEDURE — 36415 COLL VENOUS BLD VENIPUNCTURE: CPT | Performed by: NEUROLOGICAL SURGERY

## 2024-07-22 PROCEDURE — 72131 CT LUMBAR SPINE W/O DYE: CPT

## 2024-07-22 PROCEDURE — 85025 COMPLETE CBC W/AUTO DIFF WBC: CPT | Performed by: NEUROLOGICAL SURGERY

## 2024-07-22 PROCEDURE — 1160F RVW MEDS BY RX/DR IN RCRD: CPT | Performed by: NURSE PRACTITIONER

## 2024-07-22 PROCEDURE — 1125F AMNT PAIN NOTED PAIN PRSNT: CPT | Performed by: FAMILY MEDICINE

## 2024-07-22 PROCEDURE — 1159F MED LIST DOCD IN RCRD: CPT | Performed by: NURSE PRACTITIONER

## 2024-07-22 PROCEDURE — 99213 OFFICE O/P EST LOW 20 MIN: CPT | Performed by: FAMILY MEDICINE

## 2024-07-22 PROCEDURE — 93005 ELECTROCARDIOGRAM TRACING: CPT | Performed by: NEUROLOGICAL SURGERY

## 2024-07-22 PROCEDURE — 1125F AMNT PAIN NOTED PAIN PRSNT: CPT | Performed by: NURSE PRACTITIONER

## 2024-07-22 PROCEDURE — 99214 OFFICE O/P EST MOD 30 MIN: CPT | Performed by: NURSE PRACTITIONER

## 2024-07-22 RX ORDER — PREGABALIN 50 MG/1
50 CAPSULE ORAL 2 TIMES DAILY
Qty: 60 CAPSULE | Refills: 0 | Status: SHIPPED | OUTPATIENT
Start: 2024-07-22

## 2024-07-22 RX ORDER — TRAMADOL HYDROCHLORIDE 50 MG/1
50 TABLET ORAL 3 TIMES DAILY PRN
Qty: 90 TABLET | Refills: 0 | Status: SHIPPED | OUTPATIENT
Start: 2024-07-22

## 2024-07-22 NOTE — TELEPHONE ENCOUNTER
Patient would like to know what to expect for surgery and what robot you are using as she has not been told any of this.

## 2024-07-22 NOTE — PROGRESS NOTES
CHIEF COMPLAINT  Neck pain  Back pain      Subjective   Jemima Bell is a 74 y.o. female  who presents to the office for follow-up of procedure.  She completed a cervical epidural steroid injection at C7/T1   on  6/26/24 performed by Dr. Aranda for management of neck pain. Patient reports 75-80% relief from the procedure.     Today her pain is 7/10VAS in severity.  She is scheduled for a lumbar fusion with Dr. Rice on 7/30/2024.  She continues with Tramadol 50 mg 3/day and Lyrica 50 mg 1/day (She does not tolerate this twice a day every day d/t drowsiness). This medication regimen decreases her pain by a moderate amount. ADLs by self.     Not a candidate for MRI d/t bladder stimulator.      Not a good candidate for NSAIDs d/t GI upset. Failed Gabapentin (100 mg was not helpful, 300 mg caused side effects)    Procedures:  3/13/2024-LESI at L4/5-80% relief to her back, only temporary relief to leg pain  11/30/2023-bilateral SI joint injections-90% relief for approximately 3 months  5/12/23-left SI Joint Injection-90% relief x 5.5 months  3/27/23-L5/S1 LESI-100% relief to back pain x 1 week, 80% relief to left leg pain     Back Pain  This is a chronic problem. The current episode started more than 1 year ago. The problem occurs constantly. The problem has been worse since onset. The pain is present in the sacro-iliac. The quality of the pain is described as aching and stabbing. The pain radiates to the right thigh, left thigh, left knee and right knee (R>L). The pain is at a severity of 7/10. The pain is The same all the time. The symptoms are aggravated by standing (walking). Pertinent negatives include no abdominal pain, chest pain, dysuria, fever, headaches, numbness or weakness. Risk factors include menopause. She has tried heat, ice and analgesics (PT previously) for the symptoms.   Neck Pain   This is a new problem. The current episode started in the past 7 days. The problem occurs constantly.  Progression since onset: improved since last office visit. The pain is associated with nothing. The pain is present in the left side. The quality of the pain is described as burning, shooting and stabbing. The symptoms are aggravated by position (movement). Pertinent negatives include no chest pain, fever, headaches, numbness or weakness. She has tried heat, ice, NSAIDs, oral narcotics and muscle relaxants (TENS, Lyrica) for the symptoms.      PEG Assessment   What number best describes your pain on average in the past week?7  What number best describes how, during the past week, pain has interfered with your enjoyment of life?9  What number best describes how, during the past week, pain has interfered with your general activity?  9    The following portions of the patient's history were reviewed and updated as appropriate: allergies, current medications, past family history, past medical history, past social history, past surgical history, and problem list.    Review of Systems   Constitutional:  Positive for activity change and fatigue. Negative for fever.   Cardiovascular:  Negative for chest pain.   Gastrointestinal:  Negative for abdominal pain, constipation and diarrhea.   Genitourinary:  Negative for difficulty urinating and dysuria.   Musculoskeletal:  Positive for back pain and neck pain.   Neurological:  Negative for dizziness, weakness, light-headedness, numbness and headaches.   Psychiatric/Behavioral:  Positive for agitation and sleep disturbance. Negative for suicidal ideas. The patient is nervous/anxious.        --  The aforementioned information the Chief Complaint section and above subjective data including any HPI data, and also the Review of Systems data, has been personally reviewed and affirmed.  --     Vitals:    07/22/24 1144   BP: 99/62   BP Location: Right arm   Patient Position: Sitting   Cuff Size: Adult   Pulse: 58   Temp: 96.8 °F (36 °C)   SpO2: 98%   Weight: 85.2 kg (187 lb 12.8 oz)  "  Height: 157.5 cm (62\")   PainSc:   7     Objective   Physical Exam  Vitals and nursing note reviewed.   Constitutional:       Appearance: Normal appearance. She is well-developed.   Eyes:      General: Lids are normal.   Cardiovascular:      Rate and Rhythm: Normal rate.   Pulmonary:      Effort: Pulmonary effort is normal.   Musculoskeletal:      Cervical back: Decreased range of motion.      Lumbar back: Tenderness and bony tenderness present. Decreased range of motion.   Neurological:      Mental Status: She is alert and oriented to person, place, and time.   Psychiatric:         Attention and Perception: Attention normal.         Mood and Affect: Mood normal.         Speech: Speech normal.         Behavior: Behavior normal.         Judgment: Judgment normal.         Assessment & Plan   Diagnoses and all orders for this visit:    1. Encounter for long-term (current) use of high-risk medication (Primary)    2. Lumbar radiculitis  -     traMADol (ULTRAM) 50 MG tablet; Take 1 tablet by mouth 3 (Three) Times a Day As Needed for Moderate Pain or Severe Pain.  Dispense: 90 tablet; Refill: 0    3. Degeneration of lumbar intervertebral disc  -     traMADol (ULTRAM) 50 MG tablet; Take 1 tablet by mouth 3 (Three) Times a Day As Needed for Moderate Pain or Severe Pain.  Dispense: 90 tablet; Refill: 0    4. Lumbar radiculopathy  -     pregabalin (LYRICA) 50 MG capsule; Take 1 capsule by mouth 2 (Two) Times a Day.  Dispense: 60 capsule; Refill: 0    5. Chronic bilateral low back pain with bilateral sciatica  -     pregabalin (LYRICA) 50 MG capsule; Take 1 capsule by mouth 2 (Two) Times a Day.  Dispense: 60 capsule; Refill: 0    6. Cervical radiculopathy    7. Neck pain      Jemima Bell reports a pain score of 7.  Given her pain assessment as noted, treatment options were discussed and the following options were decided upon as a follow-up plan to address the patient's pain: continuation of current treatment plan " for pain.    --- Release to Dr. Rice for post-operative pain management  --- The urine drug screen confirmation from 4/22/2024 has been reviewed and the result is appropriate based on patient history and SANJEEV report  --- CSA updated 4/22/2024  --- Refill Tramadol. Patient appears stable with current regimen. No adverse effects. Regarding continuation of opioids, there is no evidence of aberrant behavior or any red flags.  The patient continues with appropriate response to opioid therapy. ADL's remain intact by self.   --- Refill Lyrica. She will trial taking this 100 mg at night.   --- She asks a number of questions regarding her upcoming lumbar surgery today. I recommend she send these questions to neurosurgery or schedule an in office appointment with one of the NPs in that office to have her questions answered  --- May repeat SALLIE every 3 months PRN  --- Follow-up 2 months or sooner if needed.      SANJEEV REPORT  As part of the patient's treatment plan, I am prescribing controlled substances. The patient has been made aware of appropriate use of such medications, including potential risk of somnolence, limited ability to drive and/or work safely, and the potential for dependence or overdose. It has also been made clear that these medications are for use by this patient only, without concomitant use of alcohol or other substances unless prescribed.     Patient has completed prescribing agreement detailing terms of continued prescribing of controlled substances, including monitoring SANJEEV reports, urine drug screening, and pill counts if necessary. The patient is aware that inappropriate use will results in cessation of prescribing such medications.    As the clinician, I personally reviewed the SANJEEV from 7/22/2024 while the patient was in the office today.    History and physical exam exhibit continued safe and appropriate use of controlled substances.    Dictated utilizing Dragon dictation.

## 2024-07-22 NOTE — PROGRESS NOTES
"Chief Complaint  Med Refill and Hypothyroidism    Subjective        Jemima Bell presents to [facility]  History of Present Illness  History of Present Illness  The patient is a 74-year-old female who presents for routine follow-up.    She is managing her hypothyroidism with daily medication, typically in conjunction with her metoprolol.   She is undergoing physical therapy for pain management and is scheduled for surgery on 07/30/2024, although she is not enthusiastic about the procedure. Her mood is stable on sertraline, and she denies any thoughts of self-harm or harm to others, nor does she feel hopeless. She is content with her current medication regimen, which aids her in standing and walking. She is uncertain about the duration of her pain management medication or whether she can proceed with the surgery in the hope of improving her condition. She experiences pain radiating down her legs, but denies any numbness or tingling. Preoperative labs were conducted today, including a thyroid check, at the facility.       Objective   Vital Signs:  /78   Pulse 74   Temp 98.3 °F (36.8 °C)   Ht 157.5 cm (62\")   Wt 85.9 kg (189 lb 6.4 oz)   SpO2 98%   BMI 34.64 kg/m²   Estimated body mass index is 34.64 kg/m² as calculated from the following:    Height as of this encounter: 157.5 cm (62\").    Weight as of this encounter: 85.9 kg (189 lb 6.4 oz).               Physical Exam   Physical Exam  Patient is alert, in no acute distress.  Lungs are clear.  Heart rhythm is regular.  Trace edema noted in bilateral ankles.       Result Review :          Results                Assessment and Plan     Diagnoses and all orders for this visit:    1. Hypothyroidism, unspecified type (Primary)  -     Cancel: TSH  -     TSH    2. Depression with anxiety      Assessment & Plan  1. Hypothyroidism.  She was advised to take her medication 30 minutes before any other medication, eating, or " drinking anything other than water. A TSH test was ordered.    2.  Depression with anxiety.  Para control, continue current medication and keep close follow-up..    Follow-up  She will return in 5 months for a Medicare wellness visit.            Follow Up     Return in about 5 months (around 12/22/2024) for Medicare Wellness.  Patient was given instructions and counseling regarding her condition or for health maintenance advice. Please see specific information pulled into the AVS if appropriate.    Patient or patient representative verbalized consent for the use of Ambient Listening during the visit with  Meredith Lea Kehrer, MD for chart documentation. 7/22/2024  13:57 EDT

## 2024-07-23 LAB — TSH SERPL DL<=0.005 MIU/L-ACNC: 0.73 UIU/ML (ref 0.45–4.5)

## 2024-07-23 NOTE — TELEPHONE ENCOUNTER
RETURNED CALL TO PATIENT LET HER KNOW WHAT DR ENCINAS RESPONDED TO MESSAGE,PATIENT WAS VERY HAPPY TO HEAR AND WILL CALL IF SHE HAS ANY ADDITIONAL QUESTIONS OR CONCERNS

## 2024-07-29 ENCOUNTER — ANESTHESIA EVENT (OUTPATIENT)
Dept: PERIOP | Facility: HOSPITAL | Age: 74
End: 2024-07-29
Payer: MEDICARE

## 2024-07-30 ENCOUNTER — ANESTHESIA (OUTPATIENT)
Dept: PERIOP | Facility: HOSPITAL | Age: 74
End: 2024-07-30
Payer: MEDICARE

## 2024-07-30 ENCOUNTER — HOSPITAL ENCOUNTER (INPATIENT)
Facility: HOSPITAL | Age: 74
LOS: 3 days | Discharge: NURSING, REHAB OR LONG-TERM CARE | End: 2024-08-02
Attending: NEUROLOGICAL SURGERY | Admitting: NEUROLOGICAL SURGERY
Payer: MEDICARE

## 2024-07-30 ENCOUNTER — APPOINTMENT (OUTPATIENT)
Dept: GENERAL RADIOLOGY | Facility: HOSPITAL | Age: 74
End: 2024-07-30
Payer: MEDICARE

## 2024-07-30 DIAGNOSIS — M48.062 LUMBAR STENOSIS WITH NEUROGENIC CLAUDICATION: ICD-10-CM

## 2024-07-30 DIAGNOSIS — Z98.1 S/P LUMBAR FUSION: Primary | ICD-10-CM

## 2024-07-30 LAB
ABO GROUP BLD: NORMAL
ABO GROUP BLD: NORMAL
BLD GP AB SCN SERPL QL: NEGATIVE
RH BLD: POSITIVE
RH BLD: POSITIVE
T&S EXPIRATION DATE: NORMAL

## 2024-07-30 PROCEDURE — 0ST20ZZ RESECTION OF LUMBAR VERTEBRAL DISC, OPEN APPROACH: ICD-10-PCS | Performed by: NEUROLOGICAL SURGERY

## 2024-07-30 PROCEDURE — 22632 ARTHRD PST TQ 1NTRSPC LM EA: CPT | Performed by: NEUROLOGICAL SURGERY

## 2024-07-30 PROCEDURE — 63053 LAM FACTC/FRMT ARTHRD LUM EA: CPT

## 2024-07-30 PROCEDURE — 86900 BLOOD TYPING SEROLOGIC ABO: CPT

## 2024-07-30 PROCEDURE — 63052 LAM FACETC/FRMT ARTHRD LUM 1: CPT | Performed by: NEUROLOGICAL SURGERY

## 2024-07-30 PROCEDURE — 25010000002 CEFAZOLIN PER 500 MG

## 2024-07-30 PROCEDURE — C1713 ANCHOR/SCREW BN/BN,TIS/BN: HCPCS | Performed by: NEUROLOGICAL SURGERY

## 2024-07-30 PROCEDURE — 72100 X-RAY EXAM L-S SPINE 2/3 VWS: CPT

## 2024-07-30 PROCEDURE — 22853 INSJ BIOMECHANICAL DEVICE: CPT | Performed by: NEUROLOGICAL SURGERY

## 2024-07-30 PROCEDURE — 61783 SCAN PROC SPINAL: CPT | Performed by: NEUROLOGICAL SURGERY

## 2024-07-30 PROCEDURE — 86901 BLOOD TYPING SEROLOGIC RH(D): CPT

## 2024-07-30 PROCEDURE — 63052 LAM FACETC/FRMT ARTHRD LUM 1: CPT

## 2024-07-30 PROCEDURE — 22632 ARTHRD PST TQ 1NTRSPC LM EA: CPT

## 2024-07-30 PROCEDURE — 86901 BLOOD TYPING SEROLOGIC RH(D): CPT | Performed by: NEUROLOGICAL SURGERY

## 2024-07-30 PROCEDURE — 25010000002 GLYCOPYRROLATE 0.2 MG/ML SOLUTION: Performed by: NURSE ANESTHETIST, CERTIFIED REGISTERED

## 2024-07-30 PROCEDURE — 76000 FLUOROSCOPY <1 HR PHYS/QHP: CPT

## 2024-07-30 PROCEDURE — 25010000002 DEXAMETHASONE SODIUM PHOSPHATE 20 MG/5ML SOLUTION: Performed by: NURSE ANESTHETIST, CERTIFIED REGISTERED

## 2024-07-30 PROCEDURE — 25010000002 FENTANYL CITRATE (PF) 100 MCG/2ML SOLUTION: Performed by: NURSE ANESTHETIST, CERTIFIED REGISTERED

## 2024-07-30 PROCEDURE — 25810000003 SODIUM CHLORIDE 0.9 % SOLUTION 250 ML FLEX CONT: Performed by: NURSE ANESTHETIST, CERTIFIED REGISTERED

## 2024-07-30 PROCEDURE — 22630 ARTHRD PST TQ 1NTRSPC LUM: CPT | Performed by: NEUROLOGICAL SURGERY

## 2024-07-30 PROCEDURE — 0ST40ZZ RESECTION OF LUMBOSACRAL DISC, OPEN APPROACH: ICD-10-PCS | Performed by: NEUROLOGICAL SURGERY

## 2024-07-30 PROCEDURE — 86850 RBC ANTIBODY SCREEN: CPT | Performed by: NEUROLOGICAL SURGERY

## 2024-07-30 PROCEDURE — 25010000002 ONDANSETRON PER 1 MG: Performed by: NURSE ANESTHETIST, CERTIFIED REGISTERED

## 2024-07-30 PROCEDURE — 00QT0ZZ REPAIR SPINAL MENINGES, OPEN APPROACH: ICD-10-PCS | Performed by: NEUROLOGICAL SURGERY

## 2024-07-30 PROCEDURE — 22853 INSJ BIOMECHANICAL DEVICE: CPT

## 2024-07-30 PROCEDURE — 63053 LAM FACTC/FRMT ARTHRD LUM EA: CPT | Performed by: NEUROLOGICAL SURGERY

## 2024-07-30 PROCEDURE — C1889 IMPLANT/INSERT DEVICE, NOC: HCPCS | Performed by: NEUROLOGICAL SURGERY

## 2024-07-30 PROCEDURE — 86900 BLOOD TYPING SEROLOGIC ABO: CPT | Performed by: NEUROLOGICAL SURGERY

## 2024-07-30 PROCEDURE — 25010000002 METHYLPREDNISOLONE PER 80 MG: Performed by: NEUROLOGICAL SURGERY

## 2024-07-30 PROCEDURE — 25010000002 PROPOFOL 10 MG/ML EMULSION: Performed by: NURSE ANESTHETIST, CERTIFIED REGISTERED

## 2024-07-30 PROCEDURE — 25010000002 HYDROMORPHONE 1 MG/ML SOLUTION: Performed by: NURSE ANESTHETIST, CERTIFIED REGISTERED

## 2024-07-30 PROCEDURE — 22842 INSERT SPINE FIXATION DEVICE: CPT | Performed by: NEUROLOGICAL SURGERY

## 2024-07-30 PROCEDURE — 22630 ARTHRD PST TQ 1NTRSPC LUM: CPT

## 2024-07-30 PROCEDURE — 0SG30AJ FUSION OF LUMBOSACRAL JOINT WITH INTERBODY FUSION DEVICE, POSTERIOR APPROACH, ANTERIOR COLUMN, OPEN APPROACH: ICD-10-PCS | Performed by: NEUROLOGICAL SURGERY

## 2024-07-30 PROCEDURE — 8E0W0CZ ROBOTIC ASSISTED PROCEDURE OF TRUNK REGION, OPEN APPROACH: ICD-10-PCS | Performed by: NEUROLOGICAL SURGERY

## 2024-07-30 PROCEDURE — 0SG10AJ FUSION OF 2 OR MORE LUMBAR VERTEBRAL JOINTS WITH INTERBODY FUSION DEVICE, POSTERIOR APPROACH, ANTERIOR COLUMN, OPEN APPROACH: ICD-10-PCS | Performed by: NEUROLOGICAL SURGERY

## 2024-07-30 PROCEDURE — 25010000002 PROPOFOL 200 MG/20ML EMULSION: Performed by: NURSE ANESTHETIST, CERTIFIED REGISTERED

## 2024-07-30 PROCEDURE — 25010000002 SUCCINYLCHOLINE PER 20 MG: Performed by: NURSE ANESTHETIST, CERTIFIED REGISTERED

## 2024-07-30 PROCEDURE — 25010000002 PHENYLEPHRINE 10 MG/ML SOLUTION 5 ML VIAL: Performed by: NURSE ANESTHETIST, CERTIFIED REGISTERED

## 2024-07-30 PROCEDURE — 25010000002 HYDROMORPHONE 1 MG/ML SOLUTION

## 2024-07-30 PROCEDURE — 25010000002 CEFAZOLIN PER 500 MG: Performed by: NEUROLOGICAL SURGERY

## 2024-07-30 PROCEDURE — 25810000003 LACTATED RINGERS PER 1000 ML: Performed by: NEUROLOGICAL SURGERY

## 2024-07-30 PROCEDURE — 22842 INSERT SPINE FIXATION DEVICE: CPT

## 2024-07-30 DEVICE — I-FACTOR PUTTY, 2.5CC SYRINGE
Type: IMPLANTABLE DEVICE | Site: SPINE LUMBAR | Status: FUNCTIONAL
Brand: I-FACTOR PEPTIDE ENHANCED BONE GRAFT

## 2024-07-30 DEVICE — CREO MIS MODULAR POLYAXIAL TULIP, 30MM REDUCTION
Type: IMPLANTABLE DEVICE | Site: SPINE LUMBAR | Status: FUNCTIONAL
Brand: CREO

## 2024-07-30 DEVICE — DEV CONTRL TISS STRATAFIX SPIRAL MNCRYL UD 3/0 PLS 30CM: Type: IMPLANTABLE DEVICE | Site: SPINE LUMBAR | Status: FUNCTIONAL

## 2024-07-30 DEVICE — CREO MIS LOCKING CAP
Type: IMPLANTABLE DEVICE | Site: SPINE LUMBAR | Status: FUNCTIONAL
Brand: CREO

## 2024-07-30 DEVICE — SEAL DURL ADHERUS/AUTOSPRAY HYDROGEL DS: Type: IMPLANTABLE DEVICE | Site: SPINE LUMBAR | Status: FUNCTIONAL

## 2024-07-30 DEVICE — DURAGEN® PLUS DURAL REGENERATION MATRIX, 1 IN X 1 IN (2.5 CM X 2.5 CM)
Type: IMPLANTABLE DEVICE | Site: SPINE LUMBAR | Status: FUNCTIONAL
Brand: DURAGEN® PLUS

## 2024-07-30 DEVICE — RISE SPACER 10X22MM, 9-15MM
Type: IMPLANTABLE DEVICE | Site: SPINE LUMBAR | Status: FUNCTIONAL
Brand: RISE

## 2024-07-30 DEVICE — 5.5 X 40MM CANNULATED SCREW, MODULAR, CREO AMP
Type: IMPLANTABLE DEVICE | Site: SPINE LUMBAR | Status: FUNCTIONAL
Brand: CREO

## 2024-07-30 DEVICE — ALLOGRAFT BONE OSTEOAMP SELECT FLO 10CC: Type: IMPLANTABLE DEVICE | Site: SPINE LUMBAR | Status: FUNCTIONAL

## 2024-07-30 DEVICE — CREO MIS 5.5MM CURVED ROD, TITANIUM ALLOY, 110MM LENGTH
Type: IMPLANTABLE DEVICE | Site: SPINE LUMBAR | Status: FUNCTIONAL
Brand: CREO

## 2024-07-30 DEVICE — RISE SPACER 10X22MM, 7-13MM
Type: IMPLANTABLE DEVICE | Site: SPINE LUMBAR | Status: FUNCTIONAL
Brand: RISE

## 2024-07-30 DEVICE — ALLOGRAFT BONE OSTEOAMP SELECT FLO 5CC: Type: IMPLANTABLE DEVICE | Site: SPINE LUMBAR | Status: FUNCTIONAL

## 2024-07-30 DEVICE — DEV WND/CLS CONTRL TISS STRATAFIX SYMM PDS PLS CTX 60CM VIL: Type: IMPLANTABLE DEVICE | Site: SPINE LUMBAR | Status: FUNCTIONAL

## 2024-07-30 DEVICE — 5.5 X 35MM CANNULATED SCREW, MODULAR, CREO AMP
Type: IMPLANTABLE DEVICE | Site: SPINE LUMBAR | Status: FUNCTIONAL
Brand: CREO

## 2024-07-30 DEVICE — FLOSEAL WITH RECOTHROM - 10ML.
Type: IMPLANTABLE DEVICE | Site: SPINE LUMBAR | Status: FUNCTIONAL
Brand: FLOSEAL HEMOSTATIC MATRIX

## 2024-07-30 RX ORDER — HYDROXYZINE HYDROCHLORIDE 10 MG/1
10 TABLET, FILM COATED ORAL EVERY 4 HOURS PRN
Status: DISCONTINUED | OUTPATIENT
Start: 2024-07-30 | End: 2024-08-02 | Stop reason: HOSPADM

## 2024-07-30 RX ORDER — METHYLPREDNISOLONE ACETATE 80 MG/ML
INJECTION, SUSPENSION INTRA-ARTICULAR; INTRALESIONAL; INTRAMUSCULAR; SOFT TISSUE AS NEEDED
Status: DISCONTINUED | OUTPATIENT
Start: 2024-07-30 | End: 2024-07-30 | Stop reason: HOSPADM

## 2024-07-30 RX ORDER — ONDANSETRON 2 MG/ML
INJECTION INTRAMUSCULAR; INTRAVENOUS AS NEEDED
Status: DISCONTINUED | OUTPATIENT
Start: 2024-07-30 | End: 2024-07-30 | Stop reason: SURG

## 2024-07-30 RX ORDER — SUCCINYLCHOLINE CHLORIDE 20 MG/ML
INJECTION INTRAMUSCULAR; INTRAVENOUS AS NEEDED
Status: DISCONTINUED | OUTPATIENT
Start: 2024-07-30 | End: 2024-07-30 | Stop reason: SURG

## 2024-07-30 RX ORDER — LIDOCAINE HYDROCHLORIDE 20 MG/ML
INJECTION, SOLUTION EPIDURAL; INFILTRATION; INTRACAUDAL; PERINEURAL AS NEEDED
Status: DISCONTINUED | OUTPATIENT
Start: 2024-07-30 | End: 2024-07-30 | Stop reason: SURG

## 2024-07-30 RX ORDER — LIDOCAINE HYDROCHLORIDE AND EPINEPHRINE 10; 10 MG/ML; UG/ML
INJECTION, SOLUTION INFILTRATION; PERINEURAL AS NEEDED
Status: DISCONTINUED | OUTPATIENT
Start: 2024-07-30 | End: 2024-07-30 | Stop reason: HOSPADM

## 2024-07-30 RX ORDER — DROPERIDOL 2.5 MG/ML
0.62 INJECTION, SOLUTION INTRAMUSCULAR; INTRAVENOUS ONCE AS NEEDED
Status: DISCONTINUED | OUTPATIENT
Start: 2024-07-30 | End: 2024-07-30 | Stop reason: HOSPADM

## 2024-07-30 RX ORDER — DEXAMETHASONE SODIUM PHOSPHATE 4 MG/ML
INJECTION, SOLUTION INTRA-ARTICULAR; INTRALESIONAL; INTRAMUSCULAR; INTRAVENOUS; SOFT TISSUE AS NEEDED
Status: DISCONTINUED | OUTPATIENT
Start: 2024-07-30 | End: 2024-07-30 | Stop reason: SURG

## 2024-07-30 RX ORDER — GABAPENTIN 300 MG/1
300 CAPSULE ORAL ONCE
Status: COMPLETED | OUTPATIENT
Start: 2024-07-30 | End: 2024-07-30

## 2024-07-30 RX ORDER — NALOXONE HCL 0.4 MG/ML
0.4 VIAL (ML) INJECTION
Status: DISCONTINUED | OUTPATIENT
Start: 2024-07-30 | End: 2024-08-02 | Stop reason: HOSPADM

## 2024-07-30 RX ORDER — LIDOCAINE HYDROCHLORIDE 10 MG/ML
0.5 INJECTION, SOLUTION INFILTRATION; PERINEURAL ONCE AS NEEDED
Status: DISCONTINUED | OUTPATIENT
Start: 2024-07-30 | End: 2024-07-30 | Stop reason: HOSPADM

## 2024-07-30 RX ORDER — SODIUM CHLORIDE 0.9 % (FLUSH) 0.9 %
10 SYRINGE (ML) INJECTION AS NEEDED
Status: DISCONTINUED | OUTPATIENT
Start: 2024-07-30 | End: 2024-08-02 | Stop reason: HOSPADM

## 2024-07-30 RX ORDER — PROMETHAZINE HYDROCHLORIDE 25 MG/1
25 SUPPOSITORY RECTAL ONCE AS NEEDED
Status: DISCONTINUED | OUTPATIENT
Start: 2024-07-30 | End: 2024-07-30 | Stop reason: HOSPADM

## 2024-07-30 RX ORDER — ACETAMINOPHEN 650 MG/1
650 SUPPOSITORY RECTAL EVERY 4 HOURS PRN
Status: DISCONTINUED | OUTPATIENT
Start: 2024-07-30 | End: 2024-07-30 | Stop reason: HOSPADM

## 2024-07-30 RX ORDER — SODIUM CHLORIDE, SODIUM LACTATE, POTASSIUM CHLORIDE, CALCIUM CHLORIDE 600; 310; 30; 20 MG/100ML; MG/100ML; MG/100ML; MG/100ML
1000 INJECTION, SOLUTION INTRAVENOUS CONTINUOUS
Status: DISCONTINUED | OUTPATIENT
Start: 2024-07-30 | End: 2024-07-30

## 2024-07-30 RX ORDER — ACETAMINOPHEN 650 MG/1
650 SUPPOSITORY RECTAL EVERY 4 HOURS PRN
Status: DISCONTINUED | OUTPATIENT
Start: 2024-07-30 | End: 2024-08-02 | Stop reason: HOSPADM

## 2024-07-30 RX ORDER — HYDRALAZINE HYDROCHLORIDE 20 MG/ML
5 INJECTION INTRAMUSCULAR; INTRAVENOUS
Status: DISCONTINUED | OUTPATIENT
Start: 2024-07-30 | End: 2024-07-30 | Stop reason: HOSPADM

## 2024-07-30 RX ORDER — OXYCODONE HYDROCHLORIDE 5 MG/1
10 TABLET ORAL ONCE AS NEEDED
Status: DISCONTINUED | OUTPATIENT
Start: 2024-07-30 | End: 2024-07-30 | Stop reason: HOSPADM

## 2024-07-30 RX ORDER — PREGABALIN 25 MG/1
50 CAPSULE ORAL 2 TIMES DAILY
Status: DISCONTINUED | OUTPATIENT
Start: 2024-07-30 | End: 2024-08-02 | Stop reason: HOSPADM

## 2024-07-30 RX ORDER — EPHEDRINE SULFATE 5 MG/ML
INJECTION INTRAVENOUS AS NEEDED
Status: DISCONTINUED | OUTPATIENT
Start: 2024-07-30 | End: 2024-07-30 | Stop reason: SURG

## 2024-07-30 RX ORDER — GLYCOPYRROLATE 0.2 MG/ML
INJECTION INTRAMUSCULAR; INTRAVENOUS AS NEEDED
Status: DISCONTINUED | OUTPATIENT
Start: 2024-07-30 | End: 2024-07-30 | Stop reason: SURG

## 2024-07-30 RX ORDER — SODIUM CHLORIDE, SODIUM LACTATE, POTASSIUM CHLORIDE, CALCIUM CHLORIDE 600; 310; 30; 20 MG/100ML; MG/100ML; MG/100ML; MG/100ML
9 INJECTION, SOLUTION INTRAVENOUS CONTINUOUS PRN
Status: DISCONTINUED | OUTPATIENT
Start: 2024-07-30 | End: 2024-07-30 | Stop reason: HOSPADM

## 2024-07-30 RX ORDER — PANTOPRAZOLE SODIUM 40 MG/1
40 TABLET, DELAYED RELEASE ORAL 2 TIMES DAILY
Status: DISCONTINUED | OUTPATIENT
Start: 2024-07-30 | End: 2024-08-02 | Stop reason: HOSPADM

## 2024-07-30 RX ORDER — OXYCODONE HCL 10 MG/1
10 TABLET, FILM COATED, EXTENDED RELEASE ORAL ONCE
Status: COMPLETED | OUTPATIENT
Start: 2024-07-30 | End: 2024-07-30

## 2024-07-30 RX ORDER — POLYETHYLENE GLYCOL 3350 17 G/17G
17 POWDER, FOR SOLUTION ORAL DAILY PRN
Status: DISCONTINUED | OUTPATIENT
Start: 2024-07-30 | End: 2024-08-02 | Stop reason: HOSPADM

## 2024-07-30 RX ORDER — FENTANYL CITRATE 50 UG/ML
50 INJECTION, SOLUTION INTRAMUSCULAR; INTRAVENOUS
Status: DISCONTINUED | OUTPATIENT
Start: 2024-07-30 | End: 2024-07-30 | Stop reason: HOSPADM

## 2024-07-30 RX ORDER — SODIUM CHLORIDE 0.9 % (FLUSH) 0.9 %
10 SYRINGE (ML) INJECTION EVERY 12 HOURS SCHEDULED
Status: DISCONTINUED | OUTPATIENT
Start: 2024-07-30 | End: 2024-08-02 | Stop reason: HOSPADM

## 2024-07-30 RX ORDER — SERTRALINE HYDROCHLORIDE 100 MG/1
100 TABLET, FILM COATED ORAL DAILY
Status: DISCONTINUED | OUTPATIENT
Start: 2024-07-30 | End: 2024-08-02 | Stop reason: HOSPADM

## 2024-07-30 RX ORDER — ONDANSETRON 4 MG/1
4 TABLET, ORALLY DISINTEGRATING ORAL EVERY 6 HOURS PRN
Status: DISCONTINUED | OUTPATIENT
Start: 2024-07-30 | End: 2024-08-02 | Stop reason: HOSPADM

## 2024-07-30 RX ORDER — PROPOFOL 10 MG/ML
INJECTION, EMULSION INTRAVENOUS AS NEEDED
Status: DISCONTINUED | OUTPATIENT
Start: 2024-07-30 | End: 2024-07-30 | Stop reason: SURG

## 2024-07-30 RX ORDER — METHOCARBAMOL 750 MG/1
750 TABLET, FILM COATED ORAL 4 TIMES DAILY PRN
Status: DISCONTINUED | OUTPATIENT
Start: 2024-07-30 | End: 2024-08-02 | Stop reason: HOSPADM

## 2024-07-30 RX ORDER — SODIUM CHLORIDE 0.9 % (FLUSH) 0.9 %
10 SYRINGE (ML) INJECTION AS NEEDED
Status: DISCONTINUED | OUTPATIENT
Start: 2024-07-30 | End: 2024-07-30 | Stop reason: HOSPADM

## 2024-07-30 RX ORDER — PROMETHAZINE HYDROCHLORIDE 25 MG/1
25 TABLET ORAL ONCE AS NEEDED
Status: DISCONTINUED | OUTPATIENT
Start: 2024-07-30 | End: 2024-07-30 | Stop reason: HOSPADM

## 2024-07-30 RX ORDER — ENOXAPARIN SODIUM 100 MG/ML
40 INJECTION SUBCUTANEOUS DAILY
Status: DISCONTINUED | OUTPATIENT
Start: 2024-07-31 | End: 2024-08-02 | Stop reason: HOSPADM

## 2024-07-30 RX ORDER — ACETAMINOPHEN 325 MG/1
650 TABLET ORAL ONCE AS NEEDED
Status: DISCONTINUED | OUTPATIENT
Start: 2024-07-30 | End: 2024-07-30 | Stop reason: HOSPADM

## 2024-07-30 RX ORDER — ALBUTEROL SULFATE 90 UG/1
2 AEROSOL, METERED RESPIRATORY (INHALATION) EVERY 4 HOURS PRN
Status: DISCONTINUED | OUTPATIENT
Start: 2024-07-30 | End: 2024-08-02 | Stop reason: HOSPADM

## 2024-07-30 RX ORDER — SODIUM CHLORIDE 9 MG/ML
40 INJECTION, SOLUTION INTRAVENOUS AS NEEDED
Status: DISCONTINUED | OUTPATIENT
Start: 2024-07-30 | End: 2024-07-30 | Stop reason: HOSPADM

## 2024-07-30 RX ORDER — SODIUM CHLORIDE 0.9 % (FLUSH) 0.9 %
10 SYRINGE (ML) INJECTION EVERY 12 HOURS SCHEDULED
Status: DISCONTINUED | OUTPATIENT
Start: 2024-07-30 | End: 2024-07-30 | Stop reason: HOSPADM

## 2024-07-30 RX ORDER — ACETAMINOPHEN 160 MG/5ML
650 SOLUTION ORAL EVERY 4 HOURS PRN
Status: DISCONTINUED | OUTPATIENT
Start: 2024-07-30 | End: 2024-08-02 | Stop reason: HOSPADM

## 2024-07-30 RX ORDER — ACETAMINOPHEN 325 MG/1
650 TABLET ORAL EVERY 4 HOURS PRN
Status: DISCONTINUED | OUTPATIENT
Start: 2024-07-30 | End: 2024-08-02 | Stop reason: HOSPADM

## 2024-07-30 RX ORDER — REMIFENTANIL HYDROCHLORIDE 1 MG/ML
INJECTION, POWDER, LYOPHILIZED, FOR SOLUTION INTRAVENOUS AS NEEDED
Status: DISCONTINUED | OUTPATIENT
Start: 2024-07-30 | End: 2024-07-30

## 2024-07-30 RX ORDER — SODIUM CHLORIDE 9 MG/ML
40 INJECTION, SOLUTION INTRAVENOUS AS NEEDED
Status: DISCONTINUED | OUTPATIENT
Start: 2024-07-30 | End: 2024-08-02 | Stop reason: HOSPADM

## 2024-07-30 RX ORDER — HYDROCODONE BITARTRATE AND ACETAMINOPHEN 5; 325 MG/1; MG/1
1 TABLET ORAL EVERY 4 HOURS PRN
Status: DISCONTINUED | OUTPATIENT
Start: 2024-07-30 | End: 2024-08-02 | Stop reason: HOSPADM

## 2024-07-30 RX ORDER — BISACODYL 5 MG/1
5 TABLET, DELAYED RELEASE ORAL DAILY PRN
Status: DISCONTINUED | OUTPATIENT
Start: 2024-07-30 | End: 2024-08-02 | Stop reason: HOSPADM

## 2024-07-30 RX ORDER — BISOPROLOL FUMARATE AND HYDROCHLOROTHIAZIDE 5; 6.25 MG/1; MG/1
1 TABLET ORAL DAILY
Status: DISCONTINUED | OUTPATIENT
Start: 2024-07-30 | End: 2024-08-02 | Stop reason: HOSPADM

## 2024-07-30 RX ORDER — LABETALOL HYDROCHLORIDE 5 MG/ML
5 INJECTION, SOLUTION INTRAVENOUS
Status: DISCONTINUED | OUTPATIENT
Start: 2024-07-30 | End: 2024-07-30 | Stop reason: HOSPADM

## 2024-07-30 RX ORDER — LEVOTHYROXINE SODIUM 0.1 MG/1
100 TABLET ORAL
Status: DISCONTINUED | OUTPATIENT
Start: 2024-07-31 | End: 2024-08-02 | Stop reason: HOSPADM

## 2024-07-30 RX ORDER — BISACODYL 10 MG
10 SUPPOSITORY, RECTAL RECTAL DAILY PRN
Status: DISCONTINUED | OUTPATIENT
Start: 2024-07-30 | End: 2024-08-02 | Stop reason: HOSPADM

## 2024-07-30 RX ORDER — ACETAMINOPHEN 500 MG
1000 TABLET ORAL ONCE
Status: COMPLETED | OUTPATIENT
Start: 2024-07-30 | End: 2024-07-30

## 2024-07-30 RX ORDER — AMOXICILLIN 250 MG
2 CAPSULE ORAL 2 TIMES DAILY PRN
Status: DISCONTINUED | OUTPATIENT
Start: 2024-07-30 | End: 2024-08-02 | Stop reason: HOSPADM

## 2024-07-30 RX ORDER — FENTANYL CITRATE 50 UG/ML
INJECTION, SOLUTION INTRAMUSCULAR; INTRAVENOUS AS NEEDED
Status: DISCONTINUED | OUTPATIENT
Start: 2024-07-30 | End: 2024-07-30 | Stop reason: SURG

## 2024-07-30 RX ADMIN — PANTOPRAZOLE SODIUM 40 MG: 40 TABLET, DELAYED RELEASE ORAL at 21:10

## 2024-07-30 RX ADMIN — HYDROMORPHONE HYDROCHLORIDE 0.5 MG: 1 INJECTION, SOLUTION INTRAMUSCULAR; INTRAVENOUS; SUBCUTANEOUS at 14:01

## 2024-07-30 RX ADMIN — ONDANSETRON 4 MG: 2 INJECTION INTRAMUSCULAR; INTRAVENOUS at 13:59

## 2024-07-30 RX ADMIN — EPHEDRINE SULFATE 10 MG: 5 INJECTION INTRAVENOUS at 12:28

## 2024-07-30 RX ADMIN — SERTRALINE 100 MG: 100 TABLET, FILM COATED ORAL at 19:49

## 2024-07-30 RX ADMIN — OXYCODONE HYDROCHLORIDE 10 MG: 10 TABLET, FILM COATED, EXTENDED RELEASE ORAL at 09:53

## 2024-07-30 RX ADMIN — HYDROMORPHONE HYDROCHLORIDE 0.5 MG: 1 INJECTION, SOLUTION INTRAMUSCULAR; INTRAVENOUS; SUBCUTANEOUS at 15:45

## 2024-07-30 RX ADMIN — PROPOFOL 150 MCG/KG/MIN: 10 INJECTION, EMULSION INTRAVENOUS at 11:08

## 2024-07-30 RX ADMIN — DEXAMETHASONE SODIUM PHOSPHATE 10 MG: 4 INJECTION, SOLUTION INTRAMUSCULAR; INTRAVENOUS at 11:16

## 2024-07-30 RX ADMIN — REMIFENTANIL HYDROCHLORIDE 0.1 MCG/KG/MIN: 1 INJECTION, POWDER, LYOPHILIZED, FOR SOLUTION INTRAVENOUS at 11:08

## 2024-07-30 RX ADMIN — GABAPENTIN 300 MG: 300 CAPSULE ORAL at 09:53

## 2024-07-30 RX ADMIN — EPHEDRINE SULFATE 10 MG: 5 INJECTION INTRAVENOUS at 11:13

## 2024-07-30 RX ADMIN — FENTANYL CITRATE 100 MCG: 50 INJECTION, SOLUTION INTRAMUSCULAR; INTRAVENOUS at 11:20

## 2024-07-30 RX ADMIN — EPHEDRINE SULFATE 10 MG: 5 INJECTION INTRAVENOUS at 13:04

## 2024-07-30 RX ADMIN — HYDROMORPHONE HYDROCHLORIDE 0.25 MG: 1 INJECTION, SOLUTION INTRAMUSCULAR; INTRAVENOUS; SUBCUTANEOUS at 19:49

## 2024-07-30 RX ADMIN — SUCCINYLCHOLINE CHLORIDE 80 MG: 20 INJECTION, SOLUTION INTRAMUSCULAR; INTRAVENOUS at 11:08

## 2024-07-30 RX ADMIN — PROPOFOL 50 MG: 10 INJECTION, EMULSION INTRAVENOUS at 11:18

## 2024-07-30 RX ADMIN — SODIUM CHLORIDE 2 G: 900 INJECTION INTRAVENOUS at 10:55

## 2024-07-30 RX ADMIN — GLYCOPYRROLATE 0.2 MG: 0.2 INJECTION INTRAMUSCULAR; INTRAVENOUS at 11:06

## 2024-07-30 RX ADMIN — Medication 25 MG: at 11:18

## 2024-07-30 RX ADMIN — BISOPROLOL FUMARATE AND HYDROCHLOROTHIAZIDE 1 TABLET: 5; 6.25 TABLET, FILM COATED ORAL at 21:10

## 2024-07-30 RX ADMIN — SODIUM CHLORIDE, SODIUM LACTATE, POTASSIUM CHLORIDE, AND CALCIUM CHLORIDE: .6; .31; .03; .02 INJECTION, SOLUTION INTRAVENOUS at 11:01

## 2024-07-30 RX ADMIN — HYDROCODONE BITARTRATE AND ACETAMINOPHEN 1 TABLET: 5; 325 TABLET ORAL at 21:10

## 2024-07-30 RX ADMIN — SODIUM CHLORIDE, POTASSIUM CHLORIDE, SODIUM LACTATE AND CALCIUM CHLORIDE 1000 ML: 600; 310; 30; 20 INJECTION, SOLUTION INTRAVENOUS at 09:31

## 2024-07-30 RX ADMIN — REMIFENTANIL HYDROCHLORIDE 0.2 MCG/KG/MIN: 1 INJECTION, POWDER, LYOPHILIZED, FOR SOLUTION INTRAVENOUS at 11:16

## 2024-07-30 RX ADMIN — PROPOFOL 150 MG: 10 INJECTION, EMULSION INTRAVENOUS at 11:08

## 2024-07-30 RX ADMIN — PREGABALIN 50 MG: 25 CAPSULE ORAL at 21:10

## 2024-07-30 RX ADMIN — Medication 15 MG: at 12:48

## 2024-07-30 RX ADMIN — PHENYLEPHRINE HYDROCHLORIDE 0.5 MCG/KG/MIN: 10 INJECTION INTRAVENOUS at 11:32

## 2024-07-30 RX ADMIN — Medication 10 MG: at 12:20

## 2024-07-30 RX ADMIN — Medication 10 ML: at 21:12

## 2024-07-30 RX ADMIN — EPHEDRINE SULFATE 10 MG: 5 INJECTION INTRAVENOUS at 11:34

## 2024-07-30 RX ADMIN — PROPOFOL 150 MCG/KG/MIN: 10 INJECTION, EMULSION INTRAVENOUS at 12:09

## 2024-07-30 RX ADMIN — EPHEDRINE SULFATE 10 MG: 5 INJECTION INTRAVENOUS at 14:10

## 2024-07-30 RX ADMIN — LIDOCAINE HYDROCHLORIDE 100 MG: 20 INJECTION, SOLUTION EPIDURAL; INFILTRATION; INTRACAUDAL; PERINEURAL at 11:08

## 2024-07-30 RX ADMIN — SODIUM CHLORIDE 2000 MG: 900 INJECTION INTRAVENOUS at 19:49

## 2024-07-30 RX ADMIN — ACETAMINOPHEN 1000 MG: 500 TABLET, FILM COATED ORAL at 09:53

## 2024-07-30 NOTE — OP NOTE
LUMBAR DIRECT LATERAL INTERBODY FUSION WITH NEURO ROBOT, LUMBAR TRANSFORAMINAL INTERBODY FUSION WITH NEURO ROBOT  Procedure Report    Patient Name:  Jemima Bell  YOB: 1950    Date of Surgery:  7/30/2024     Indications: 74-year-old female with a history of worsening symptoms of low back pain radiculopathy and neurogenic claudication refractory to conservative measures with significant degenerative changes with central and foraminal stenosis from L3-S1.  Patient failed all conservative measures.  Given this patient was taken to the OR for L3-S1 TLIF.  Patient understood the risks of surgery including bleeding infection CSF leak nerve damage spinal cord injury hardware failure pseudoarthrosis need for future operation among many other risks and she agreed to undergo the procedure    Pre-op Diagnosis:   Lumbar stenosis with neurogenic claudication [M48.062]       Post-Op Diagnosis Codes:     * Lumbar stenosis with neurogenic claudication [M48.062]    Procedure/CPT® Codes:      Procedure(s):  L3-4, L4-5 and L5-S1 transforaminal lumbar interbody fusion  L3-4, L4-5 and L5-S1 placement of globus expandable interbody cage  L3-S1 segmental instrumented fusion with globus pedicle screws and rods  Use of neuronavigation robotic technology    Spinal Surgery Levels Completed:3 Levels      Staff:  Surgeon(s):  Michleet Rice IV, MD    Assistant: Gomez Ramos PA    Anesthesia: General    Estimated Blood Loss: 150 mL    Implants:    Implant Name Type Inv. Item Serial No.  Lot No. LRB No. Used Action   DEV CONTRL TISS STRATAFIX SPIRAL MNCRYL UD 3/0 PLS 30CM - [id number] Implant DEV CONTRL TISS STRATAFIX SPIRAL MNCRYL UD 3/0 PLS 30CM  [employer]  DIV OF J AND J [id number] N/A 1 Implanted   GRFT BONE IFACTOR PUTTY 2.5ML - [id number] Implant GRFT BONE IFACTOR PUTTY 2.5ML  [employer] [id number] N/A 1 Implanted   GRFT BONE IFACTOR PUTTY 2.5ML - [id number] Implant GRFT BONE IFACTOR PUTTY 2.5ML   [employer] [id number] N/A 2 Implanted   KT SEAL HEMOS ABS FLOSEAL MATRX 1.5/FAST/PREP 5000/IU 10ML - [id number] Implant KT SEAL HEMOS ABS FLOSEAL MATRX 1.5/FAST/PREP 5000/IU 10ML  [facility] [id number] N/A 3 Implanted   ALLOGRAFT BONE OSTEOAMP SELECT MIAH 10CC - [id number] - [id number] Implant ALLOGRAFT BONE OSTEOAMP SELECT MIAH 10CC [id number] [facility] . N/A 1 Implanted   ALLOGRAFT BONE OSTEOAMP SELECT MIAH 5CC - [id number] - [id number] Implant ALLOGRAFT BONE OSTEOAMP SELECT MIAH 5CC [id number] [facility] . N/A 1 Implanted   KT SEAL HEMOS ABS FLOSEAL MATRX 1.5/FAST/PREP 5000/IU 10ML - [id number] Implant KT SEAL HEMOS ABS FLOSEAL MATRX 1.5/FAST/PREP 5000/IU 10ML  [facility] [id number] N/A 1 Implanted   DEV CONTRL TISS STRATAFIX SPIRAL MNCRYL UD 3/0 PLS 30CM - [id number] Implant DEV CONTRL TISS STRATAFIX SPIRAL MNCRYL UD 3/0 PLS 30CM  [employer]  DIV OF J AND J [id number] N/A 1 Implanted   SCRW PEDCL SPINE CREO/MIS ARIES MOD EXT/HD 5.5X40MM - [id number] Implant SCRW PEDCL SPINE CREO/MIS ARIES MOD EXT/HD 5.5X40MM  [employer] . N/A 5 Implanted   SCRW PEDCL SPINE CREO/MIS ARIES MOD EXT/HD 5.5X35MM - [id number] Implant SCRW PEDCL SPINE CREO/MIS ARIES MOD EXT/HD 5.5X35MM  [employer] . N/A 2 Implanted   SCRWHD THOR/LUM CREO/MIS EXT PA 30MM - [id number] Implant SCRWHD THOR/LUM CREO/MIS EXT PA 30MM  [employer] . N/A 7 Implanted   CP LK THOR/LUM CREO/MIS THRD - [id number] Implant CP LK THOR/LUM CREO/MIS THRD  [employer] . N/A 7 Implanted   rise     . N/A 2 Implanted   SPCR TLIF RISE TI [id number] - [id number] Implant SPCR TLIF RISE TI [id number]  [facility] . N/A 1 Implanted   SEAL DURL ADHERUS/AUTOSPRAY HYDROGEL DS - [id number] Implant SEAL DURL ADHERUS/AUTOSPRAY HYDROGEL DS  SAMANTHA ESTEFANI [id number] N/A 1 Implanted   MATRIX DURAL DURAGEN/PLS RTM COLLGN 2.5X2.5CM - [id number] Implant MATRIX DURAL DURAGEN/PLS RTM COLLGN 2.5X2.5CM  [employer] [id number] N/A 1 Implanted   PATRICK SPINE CREO/MIS  CRV TI 5.6Z536UJ - [id number] Implant PATRICK SPINE CREO/MIS CRV TI 5.5V633MA  [employer] . N/A 2 Implanted   DEV WND/CLS CONTRL TISS STRATAFIX SYMM PDS PLS CTX 60CM SENG - [id number] Implant DEV WND/CLS CONTRL TISS STRATAFIX SYMM PDS PLS CTX 60CM SENG  [employer]  DIV OF J AND J [id number] N/A 1 Implanted       Specimen:          None        Findings: None    Complications: Incidental durotomy    Description of Procedure: Patient was brought to the OR and placed under general endotracheal anesthesia.  She was flipped into the prone position on a Buddy table and prepped and draped in the usual fashion.  DRB and surveillance markers were placed in the patient's posterior iliac crest and x-rays were taken to orient the Photomedex robot and its neuronavigation software to the patient.  Robot was brought in the field.  Incisions were planned along screw trajectories bilaterally from L3-S1.  Incisions were made and carried down to the fascia.  Robot and neuronavigation was then used to place screws bilaterally from L3-S1.  High-speed bur was used to make an initial cut through the cortical bone followed by a drill through the pedicle and the proper trajectory followed by placement of the screw.  Tubular retractor was then placed over the L3-4 disc space using neuronavigation guidance.  Remaining tissue was removed from over the ipsilateral lamina and facet.  High-speed bur was used to remove the ipsilateral lamina and facet.  Ligamentum flavum was opened and removed with 3 and 4 Kerrisons.  Disc space was identified in Kambin's triangle and opened with a 15 blade.  A series of endplate scraping devices were passed into the disc space under neuronavigation guidance removing all soft disc and cartilaginous endplate.  The disc space was then filled with iFactor and allograft and a globus expandable interbody cage was passed into the disc space under neuronavigation guidance and expanded.  Hemostasis was achieved with bipolar cautery  and Gelfoam powder.  The area was thoroughly irrigated.  Tubular retractor was then turned to look to the contralateral side.  High-speed bur was used to remove contralateral lamina and medial facet.  3 and 4 Kerrisons were used to remove contralateral ligamentum flavum and perform foraminotomies.  Hemostasis was again achieved with bipolar cautery and Gelfoam powder.  Tubular retractor was then removed and replaced at the L4-5 disc space.  Same series of steps were undertaken with full unilateral facetectomy and bilateral laminectomy with removal of ligamentum flavum.  Disc space was opened and displaced prep was undertaken under neuronavigation guidance.  Disc space was again filled with iFactor and allograft, and a globus expandable interbody cage was passed into the disc space under neuronavigation guidance and expanded.  Incidental durotomy occurred at this level with a arachnoid bleb and slight leakage of CSF.  This was repaired with DuraGen and dural sealant.  Hemostasis was again achieved with bipolar cautery and Gelfoam powder.  Tubular retractor was removed and replaced at the L5-S1 disc space for the same series of steps were undertaken with full facetectomy and ipsilateral laminectomy removal of disc and endplate preparation, packing the disc space with iFactor and allograft and placement of the globus expandable interbody cage.  Hemostasis was again achieved with bipolar cautery and Gelfoam powder and the area was irrigated.  Tubular retractor was removed.  Screws were stimulated and all screws stimulated above 19 mA with the exception of the right-sided L5 screw which stimulated below 10 mA.  Given this, the L5 screw on the right was removed as a precautionary measure.  Rods were then placed bilaterally under fluoroscopic guidance and locking caps were placed and final tightened.  Final x-rays demonstrated good positioning of all hardware.  Wounds were then thoroughly irrigated and hemostasis was  achieved with bipolar cautery and Gelfoam powder.  Muscle was reapproximated using 0 Vicryl sutures, the fascia was closed with 0 Vicryl sutures and running strata fix 1, deep dermal layer was closed with 2-0 Vicryl sutures and the skin was closed with a running subcuticular Monocryl.  Dermabond was placed over the wound.  There were no changes in neuromonitoring throughout the case.                Assistant: Gomez Ramos PA  was responsible for performing the following activities: Retraction, Suction, Irrigation, Suturing, Closing, and Placing Dressing and their skilled assistance was necessary for the success of this case.    Michelet Rice IV, MD     Date: 7/30/2024  Time: 14:39 EDT

## 2024-07-30 NOTE — ANESTHESIA POSTPROCEDURE EVALUATION
Patient: Jemima Bell    Procedure Summary       Date: 07/30/24 Room / Location: [facility] OR 12 / [facility] MAIN OR    Anesthesia Start: 1101 Anesthesia Stop: 1507    Procedures:       lumbar 3 to sacral 1 transforaminal lumbar interbody fusion, lumbar 3 to sacral 1 fusion (Spine Lumbar)      LUMBAR TRANSFORAMINAL INTERBODY FUSION WITH NEURO ROBOT (Spine Lumbar) Diagnosis:       Lumbar stenosis with neurogenic claudication      (Lumbar stenosis with neurogenic claudication [M48.062])    Surgeons: Michelet Rice IV, MD Provider: Giuseppe Orantes MD    Anesthesia Type: general, ERAS Protocol ASA Status: 3            Anesthesia Type: general, ERAS Protocol    Vitals  Vitals Value Taken Time   /44 07/30/24 1523   Temp 98.4 °F (36.9 °C) 07/30/24 1500   Pulse 70 07/30/24 1526   Resp 13 07/30/24 1510   SpO2 96 % 07/30/24 1526   Vitals shown include unfiled device data.        Post Anesthesia Care and Evaluation    Patient location during evaluation: PACU  Patient participation: complete - patient participated  Level of consciousness: awake  Pain scale: See nurse's notes for pain score.  Pain management: adequate    Airway patency: patent  Anesthetic complications: No anesthetic complications  PONV Status: none  Cardiovascular status: acceptable  Respiratory status: acceptable and spontaneous ventilation  Hydration status: acceptable    Comments: Patient seen and examined postoperatively; vital signs stable; SpO2 greater than or equal to 90%; cardiopulmonary status stable; nausea/vomiting adequately controlled; pain adequately controlled; no apparent anesthesia complications; patient discharged from anesthesia care when discharge criteria were met     Admission

## 2024-07-30 NOTE — H&P
History of Present Illness: Jemima Bell is a 74 y.o. female with progressive back pain as well as pain radiating into her lower extremities right worse than the left.  Pain is most severe when she is standing and walking and improves to some extent when she sits down.  Pain has been going on for years and getting worse.  Patient is undergone multiple epidural injections as well as physical therapy without any lasting improvement.  Patient has a bladder stimulator and cannot get an MRI          Chief Complaint   Patient presents with    Back Pain    Leg Pain       Bilateral            Previous treatment: Lyrica, Gabapentin, Baclofen, Compound pain cream, Tramadol, NSAID'S     Previous neurosurgery: None      Previous injections:   She completed a L4/L5 Interlaminar Lumbar Epidural Steroid Injection  on  3/13/24 performed by Dr. Aranda for management of back pain. Patient reports 80% r      The following portions of the patient's history were reviewed and updated as appropriate: allergies, current medications, past family history, past medical history, past social history, past surgical history, and problem list.     Review of Systems   Constitutional:  Positive for activity change.   HENT: Negative.     Eyes: Negative.    Respiratory:  Negative for chest tightness and shortness of breath.    Cardiovascular:  Negative for chest pain.   Gastrointestinal: Negative.    Endocrine: Negative.    Genitourinary: Negative.    Musculoskeletal:  Positive for back pain, gait problem and myalgias.        Bilateral leg pain   Skin: Negative.    Allergic/Immunologic: Negative.    Neurological:  Negative for weakness and numbness.   Hematological: Negative.    Psychiatric/Behavioral: Negative.                 Objective    Neurologic Exam      Mental Status   Oriented to person, place, and time.      Motor Exam      Strength   Strength 5/5 throughout.      Sensory Exam   Light touch normal.      Gait, Coordination, and  Reflexes      Reflexes   Right Fischer: absent  Left Fischer: absent           Assessment & Plan    Medical Decision Making:    Jemima Bell is a 74 y.o. female with history of progressive low back pain neurogenic claudication and radiculopathy with severe degenerative changes with central and foraminal stenosis from L3 to sacrum.  Patient has failed all conservative measures in the form of physical therapy and injections.  She would benefit from surgical intervention.  We initially discussed L3-5 LLIF and L5-S1 TLIF, however I believe we can achieve nerve compression and fusion from a posterior only approach.  We will proceed with L3-S1 TLIF.  Patient understands the risks of surgery as well as increased risk due to medical comorbidities including morbid obesity history of DVT GERD vitamin B12 deficiency vitamin D deficiency history of pulmonary embolism among other medical comorbidities and she has agreed to undergo the procedure

## 2024-07-30 NOTE — CONSULTS
[facility] Medicine Services  Consult Note    Patient Name: Jemima Bell  : 1950  MRN: [id number]  Primary Care Physician:  Kehrer, Meredith Lea, MD  Referring Physician: Michelet Rice IV, MD  Date of admission: 2024  Date and Time of Care: 24 at 1900    Inpatient Hospitalist Consult  Consult performed by: Rachana Yoder APRN  Consult ordered by: Gomez Ramos PA            Reason for Consult/ Chief Complaint: medical management     Consult Requested By: Dr Rice    Subjective:     History of Present Illness: 74-year-old female with a past medical history of GERD, status post Nissen fundoplication, hypothyroidism, depression with anxiety, hypertension, lumbar stenosis with neurogenic claudication who underwent today L3-S1 robotic lumbar fusion.  She is awake and alert and answering questions appropriately.  She is currently tolerating rating p.o. fluids with no complaints.  Family member at bedside.      Review of Systems:   Review of Systems   Constitutional: Negative.    HENT: Negative.     Eyes: Negative.    Respiratory: Negative.     Cardiovascular: Negative.    Gastrointestinal: Negative.    Endocrine: Negative.    Genitourinary: Negative.    Musculoskeletal:  Positive for back pain.   Skin: Negative.    Allergic/Immunologic: Negative.    Neurological: Negative.    Hematological: Negative.    Psychiatric/Behavioral: Negative.     All other systems reviewed and are negative.      Personal History:     Past Medical History:   Diagnosis Date    Acromioclavicular separation     Acute bronchitis     Acute sinusitis     Allergic 2022    Shrimp    Allergic rhinitis     Anemia Childhood    Anxiety     Arthritis     Arthritis of back     Asthma     Back pain     BMI 34.0-34.9,adult     Bursitis of hip     Cervical disc disorder     Cervicalgia     Chills     Cholelithiasis ?    Remival    Chronic pain disorder     Cluster headache     Colon polyp 2022    Deep vein  thrombosis (DVT) of lower extremity     Depression     Dermatitis     Dislocation, shoulder     Dyspnea     Dysuria     Esophageal reflux     Extremity pain     Fatigue     Gastric ulcer     GERD (gastroesophageal reflux disease)     GI (gastrointestinal bleed) 2004?    Headache     Headache, tension-type     Heart murmur 1973    Hernia     Hip arthrosis     Hypothyroidism     Irritable bowel syndrome     Low back strain     Lumbago     Lumbar stenosis with neurogenic claudication 5/1/2024    Lumbosacral disc disease     Migraine     Nausea     Neuritis     Osteoarthritis     Osteoarthritis of knee     Periarthritis of shoulder     Plantar fasciitis     Pneumonia 89y    Pulled muscle     Pulmonary embolism     Pyelonephritis     Rheumatic fever     Sore throat     Torticollis     Trapezius strain     Urinary incontinence     Urinary pain     Urinary tract infection     UTI symptoms     Vitamin B1 deficiency     Vitamin B12 deficiency     Vitamin D deficiency     Weakness     Wheezing        Past Surgical History:   Procedure Laterality Date    ABDOMINAL SURGERY      APPENDECTOMY      CATARACT EXTRACTION      CATARACT EXTRACTION      bilateral eyes    CATARACT EXTRACTION      CERVICAL EPIDURAL N/A 06/26/2024    Procedure: cervical epidural steroid injection at C7/T1 35239;  Surgeon: Shey Aranda MD;  [place] [facility] [place]  Service: Pain Management;  Laterality: N/A;    CHOLECYSTECTOMY  2004?    COLONOSCOPY      COLONOSCOPY N/A 12/23/2022    Procedure: COLONOSCOPY to cecum and TI:  with biopsies, cold snare polyp,;  Surgeon: Reji Aguilar MD;  Location: [facility] ENDOSCOPY;  Service: Gastroenterology;  Laterality: N/A;  PREOP/ HX COLON POLYPS  POSTOP/  diverticulosis, polyp, hemorrhoids    ENDOSCOPY N/A 12/23/2022    Procedure: ESOPHAGOGASTRODUODENOSCOPY with biopsies;  Surgeon: Reji Aguilar MD;  Location: [facility] ENDOSCOPY;  Service: Gastroenterology;  Laterality: N/A;  PREOP/ HX GASTRIC ULCER, DYSPEPSIA, UPPER  ABDOMINAL PAIN  POSTOP/:  HH, gastritis, gastric polyps    EPIDURAL BLOCK      GALLBLADDER SURGERY      HEMORRHOIDECTOMY  1974 & 77?    HERNIA REPAIR  2008?    HYSTERECTOMY      INGUINAL HERNIA REPAIR      INTERSTIM PLACEMENT      for Bladder incontinence    JOINT REPLACEMENT      Knee    KNEE SURGERY      LAPAROSCOPIC COLON RESECTION  2005    LAPAROTOMY OOPHERECTOMY      LUMBAR EPIDURAL INJECTION N/A 03/27/2023    Procedure: Lumbar epidural steroid injection at L5-S1 66541;  Surgeon: Shey Aranda MD;  Location[facility];  Service: Pain Management;  Laterality: N/A;    LUMBAR EPIDURAL INJECTION N/A 03/13/2024    Procedure: lumbar epidural steroid injection at L4/5 41486;  Surgeon: Shey Aranda MD;  Location: [facility] OR;  Service: Pain Management;  Laterality: N/A;    NISSEN FUNDOPLICATION LAPAROSCOPIC      x2    ORTHOPEDIC SURGERY      Knee replacement    SACROILIAC JOINT INJECTION Left 05/12/2023    Procedure: Left SACROILIAC INJECTION 52258;  Surgeon: Shey Aranda MD;  Location: [facility];  Service: Pain Management;  Laterality: Left;    SACROILIAC JOINT INJECTION Left 07/12/2023    Procedure: SACROILIAC INJECTION LEFT;  Surgeon: Shey Aranda MD;  Location: [facility];  Service: Pain Management;  Laterality: Left;    TONSILLECTOMY      TONSILLECTOMY  1954?    TOTAL KNEE ARTHROPLASTY Left     TUBAL ABDOMINAL LIGATION      UPPER GASTROINTESTINAL ENDOSCOPY         Family History: family history includes Alcohol abuse in her maternal grandfather; Arthritis in her father and mother; Asthma in her maternal grandmother; Depression in her maternal grandfather and mother; Dislocations in her mother; Heart disease in an other family member; Hyperlipidemia in her mother; Hypertension in her father and mother; Irritable bowel syndrome in her mother; Stroke in her father. Otherwise pertinent FHx was reviewed and not pertinent to current issue.    Social History:  reports that she has never  "smoked. She has never used smokeless tobacco. She reports that she does not drink alcohol and does not use drugs.    Home Medications:   (Ibuprofen 3 %, Gabapentin 10 %, Baclofen 2 %, lidocaine 4 %, Ketamine HCl 4 %), albuterol sulfate HFA, bisoprolol-hydrochlorothiazide, fluticasone, hydrOXYzine, levothyroxine, lidocaine, methocarbamol, ondansetron, pantoprazole, pregabalin, promethazine, sertraline, and traMADol    Allergies:  Allergies   Allergen Reactions    Salicylates GI Intolerance     History of gi bleed      Colestipol GI Intolerance    Biaxin [Clarithromycin]     Adhesive Tape Rash     Can use plastic tape, paper tape causes rash    Augmentin [Amoxicillin-Pot Clavulanate] Diarrhea    Prevacid [Lansoprazole] Itching and Swelling     Eyes only    Sulfa Antibiotics GI Intolerance    Sulfamethoxazole-Trimethoprim Nausea And Vomiting and GI Intolerance     \"makes me sick as a dog\"           Objective:     Vital Signs  Temp:  [97.5 °F (36.4 °C)-99.6 °F (37.6 °C)] 97.5 °F (36.4 °C)  Heart Rate:  [50-77] 72  Resp:  [10-16] 15  BP: ()/(35-78) 143/77  Flow (L/min):  [4-6] 4   Body mass index is 34.17 kg/m².    Physical Exam  Physical Exam  Vitals reviewed.   Constitutional:       Appearance: Normal appearance. She is obese.   HENT:      Head: Normocephalic and atraumatic.      Right Ear: External ear normal.      Left Ear: External ear normal.      Nose: Nose normal.      Mouth/Throat:      Mouth: Mucous membranes are moist.   Eyes:      Extraocular Movements: Extraocular movements intact.   Cardiovascular:      Rate and Rhythm: Normal rate and regular rhythm.      Pulses: Normal pulses.   Pulmonary:      Effort: Pulmonary effort is normal.      Breath sounds: Normal breath sounds.   Abdominal:      Palpations: Abdomen is soft.   Genitourinary:     Comments: deferred  Musculoskeletal:         General: Normal range of motion.      Cervical back: Normal range of motion and neck supple.      Comments: bedrest "   Skin:     General: Skin is warm and dry.   Neurological:      General: No focal deficit present.      Mental Status: She is alert and oriented to person, place, and time.   Psychiatric:         Mood and Affect: Mood normal.         Behavior: Behavior normal.         Thought Content: Thought content normal.         Judgment: Judgment normal.         Scheduled Meds   bisoprolol-hydrochlorothiazide, 1 tablet, Oral, Daily  ceFAZolin, 2,000 mg, Intravenous, Q8H  [START ON 7/31/2024] enoxaparin, 40 mg, Subcutaneous, Daily  [START ON 7/31/2024] levothyroxine, 100 mcg, Oral, Q AM  pantoprazole, 40 mg, Oral, BID  pregabalin, 50 mg, Oral, BID  sertraline, 100 mg, Oral, Daily  sodium chloride, 10 mL, Intravenous, Q12H       PRN Meds     acetaminophen **OR** acetaminophen **OR** acetaminophen    albuterol sulfate HFA    senna-docusate sodium **AND** polyethylene glycol **AND** bisacodyl **AND** bisacodyl    HYDROcodone-acetaminophen    HYDROmorphone **AND** naloxone    hydrOXYzine    melatonin    methocarbamol    ondansetron ODT    sodium chloride    sodium chloride   Infusions         Diagnostic Data              No radiology results for the last day      I reviewed the patient's new clinical results.    Assessment/Plan:     Active and Resolved Problems  Active Hospital Problems    Diagnosis  POA    **Lumbar stenosis with neurogenic claudication [M48.062]  Yes     Priority: High    Gastroesophageal reflux disease [K21.9]  Yes    Borderline systolic HTN [R03.0]  Yes    Depression with anxiety [F41.8]  Yes    Hypothyroidism [E03.9]  Yes      Resolved Hospital Problems   No resolved problems to display.       Lumbar stenosis with neurogenic claudication status post today robotic lumbar fusion, being followed by neurosurgery, pain management per neurosurgery on cefazolin IV postoperatively per neurosurgery    GERD, status post Nissen fundoplication, on home Protonix    Borderline systolic hypertension, on home bisoprolol  hydrochlorothiazide, monitor BP    Depression with anxiety, on home Zoloft    Hypothyroidism, on home levothyroxine    VTE Prophylaxis:  Pharmacologic & mechanical VTE prophylaxis orders are present.         Code status is   There are no questions and answers to display.       Plan for disposition:pending clinical course     Time: 30 minutes        Signature: Electronically signed by MACHO Lawson, 07/30/24, 20:25 EDT.  [facility] Hospitalist Team

## 2024-07-30 NOTE — ANESTHESIA PROCEDURE NOTES
Airway  Urgency: elective    Date/Time: 7/30/2024 11:11 AM  Airway not difficult    General Information and Staff    Patient location during procedure: OR  CRNA/CAA: Iveth Farrell CRNA    Indications and Patient Condition  Indications for airway management: airway protection    Preoxygenated: yes  MILS maintained throughout  Mask difficulty assessment: 1 - vent by mask    Final Airway Details  Final airway type: endotracheal airway      Successful airway: ETT  Cuffed: yes   Successful intubation technique: direct laryngoscopy  Facilitating devices/methods: intubating stylet and cricoid pressure  Endotracheal tube insertion site: oral  Blade: Rosado  Blade size: 2  ETT size (mm): 7.0  Cormack-Lehane Classification: grade IIb - view of arytenoids or posterior of glottis only  Placement verified by: chest auscultation and capnometry   Measured from: lips  ETT/EBT  to lips (cm): 22  Number of attempts at approach: 1  Assessment: lips, teeth, and gum same as pre-op and atraumatic intubation

## 2024-07-30 NOTE — ANESTHESIA PREPROCEDURE EVALUATION
Anesthesia Evaluation     Patient summary reviewed and Nursing notes reviewed   NPO Solid Status: > 8 hours  NPO Liquid Status: > 2 hours           Airway   Mallampati: II  TM distance: >3 FB  Neck ROM: full  No difficulty expected  Dental - normal exam     Pulmonary    (+) pulmonary embolism, asthma,  Cardiovascular     (+) DVT      Neuro/Psych  (+) headaches, weakness, numbness, psychiatric history  GI/Hepatic/Renal/Endo    (+) GERD, PUD, renal disease-, thyroid problem     Musculoskeletal     (+) back pain, neck pain  Abdominal    Substance History      OB/GYN          Other   arthritis,     ROS/Med Hx Other:   Left ventricular ejection fraction appears to be 61 - 65%.  ·  Left ventricular diastolic function was normal.  ·  Estimated right ventricular systolic pressure from tricuspid regurgitation is normal (<35 mmHg).  ·  Mild dilation of the aortic root is present.                Anesthesia Plan    ASA 3     general and ERAS Protocol   total IV anesthesia  intravenous induction     Anesthetic plan, risks, benefits, and alternatives have been provided, discussed and informed consent has been obtained with: patient.    Plan discussed with CRNA.    CODE STATUS:

## 2024-07-31 LAB
ANION GAP SERPL CALCULATED.3IONS-SCNC: 7.3 MMOL/L (ref 5–15)
BASOPHILS # BLD AUTO: 0.02 10*3/MM3 (ref 0–0.2)
BASOPHILS NFR BLD AUTO: 0.2 % (ref 0–1.5)
BUN SERPL-MCNC: 12 MG/DL (ref 8–23)
BUN/CREAT SERPL: 16 (ref 7–25)
CALCIUM SPEC-SCNC: 8.9 MG/DL (ref 8.6–10.5)
CHLORIDE SERPL-SCNC: 105 MMOL/L (ref 98–107)
CO2 SERPL-SCNC: 28.7 MMOL/L (ref 22–29)
CREAT SERPL-MCNC: 0.75 MG/DL (ref 0.57–1)
DEPRECATED RDW RBC AUTO: 43 FL (ref 37–54)
EGFRCR SERPLBLD CKD-EPI 2021: 83.1 ML/MIN/1.73
EOSINOPHIL # BLD AUTO: 0 10*3/MM3 (ref 0–0.4)
EOSINOPHIL NFR BLD AUTO: 0 % (ref 0.3–6.2)
ERYTHROCYTE [DISTWIDTH] IN BLOOD BY AUTOMATED COUNT: 12.3 % (ref 12.3–15.4)
GLUCOSE SERPL-MCNC: 126 MG/DL (ref 65–99)
HCT VFR BLD AUTO: 34.2 % (ref 34–46.6)
HGB BLD-MCNC: 10.8 G/DL (ref 12–15.9)
IMM GRANULOCYTES # BLD AUTO: 0.04 10*3/MM3 (ref 0–0.05)
IMM GRANULOCYTES NFR BLD AUTO: 0.5 % (ref 0–0.5)
LYMPHOCYTES # BLD AUTO: 0.64 10*3/MM3 (ref 0.7–3.1)
LYMPHOCYTES NFR BLD AUTO: 7.4 % (ref 19.6–45.3)
MCH RBC QN AUTO: 29.8 PG (ref 26.6–33)
MCHC RBC AUTO-ENTMCNC: 31.6 G/DL (ref 31.5–35.7)
MCV RBC AUTO: 94.5 FL (ref 79–97)
MONOCYTES # BLD AUTO: 0.71 10*3/MM3 (ref 0.1–0.9)
MONOCYTES NFR BLD AUTO: 8.2 % (ref 5–12)
NEUTROPHILS NFR BLD AUTO: 7.26 10*3/MM3 (ref 1.7–7)
NEUTROPHILS NFR BLD AUTO: 83.7 % (ref 42.7–76)
NRBC BLD AUTO-RTO: 0 /100 WBC (ref 0–0.2)
PLATELET # BLD AUTO: 205 10*3/MM3 (ref 140–450)
PMV BLD AUTO: 11.3 FL (ref 6–12)
POTASSIUM SERPL-SCNC: 4.4 MMOL/L (ref 3.5–5.2)
RBC # BLD AUTO: 3.62 10*6/MM3 (ref 3.77–5.28)
SODIUM SERPL-SCNC: 141 MMOL/L (ref 136–145)
WBC NRBC COR # BLD AUTO: 8.67 10*3/MM3 (ref 3.4–10.8)

## 2024-07-31 PROCEDURE — 85025 COMPLETE CBC W/AUTO DIFF WBC: CPT

## 2024-07-31 PROCEDURE — 97166 OT EVAL MOD COMPLEX 45 MIN: CPT

## 2024-07-31 PROCEDURE — 80048 BASIC METABOLIC PNL TOTAL CA: CPT

## 2024-07-31 PROCEDURE — 99024 POSTOP FOLLOW-UP VISIT: CPT

## 2024-07-31 PROCEDURE — 25010000002 ENOXAPARIN PER 10 MG

## 2024-07-31 PROCEDURE — 25010000002 CEFAZOLIN PER 500 MG

## 2024-07-31 PROCEDURE — 25010000002 HYDROMORPHONE 1 MG/ML SOLUTION

## 2024-07-31 RX ADMIN — Medication 10 ML: at 08:12

## 2024-07-31 RX ADMIN — ENOXAPARIN SODIUM 40 MG: 100 INJECTION SUBCUTANEOUS at 16:43

## 2024-07-31 RX ADMIN — SODIUM CHLORIDE 2000 MG: 900 INJECTION INTRAVENOUS at 03:57

## 2024-07-31 RX ADMIN — HYDROMORPHONE HYDROCHLORIDE 0.25 MG: 1 INJECTION, SOLUTION INTRAMUSCULAR; INTRAVENOUS; SUBCUTANEOUS at 12:21

## 2024-07-31 RX ADMIN — PREGABALIN 50 MG: 25 CAPSULE ORAL at 20:13

## 2024-07-31 RX ADMIN — Medication 10 ML: at 20:13

## 2024-07-31 RX ADMIN — PANTOPRAZOLE SODIUM 40 MG: 40 TABLET, DELAYED RELEASE ORAL at 08:12

## 2024-07-31 RX ADMIN — HYDROCODONE BITARTRATE AND ACETAMINOPHEN 1 TABLET: 5; 325 TABLET ORAL at 23:52

## 2024-07-31 RX ADMIN — METHOCARBAMOL 750 MG: 750 TABLET ORAL at 01:08

## 2024-07-31 RX ADMIN — HYDROMORPHONE HYDROCHLORIDE 0.25 MG: 1 INJECTION, SOLUTION INTRAMUSCULAR; INTRAVENOUS; SUBCUTANEOUS at 03:57

## 2024-07-31 RX ADMIN — LEVOTHYROXINE SODIUM 100 MCG: 0.1 TABLET ORAL at 05:32

## 2024-07-31 RX ADMIN — METHOCARBAMOL 750 MG: 750 TABLET ORAL at 08:18

## 2024-07-31 RX ADMIN — PANTOPRAZOLE SODIUM 40 MG: 40 TABLET, DELAYED RELEASE ORAL at 20:13

## 2024-07-31 RX ADMIN — HYDROCODONE BITARTRATE AND ACETAMINOPHEN 1 TABLET: 5; 325 TABLET ORAL at 10:18

## 2024-07-31 RX ADMIN — HYDROMORPHONE HYDROCHLORIDE 0.25 MG: 1 INJECTION, SOLUTION INTRAMUSCULAR; INTRAVENOUS; SUBCUTANEOUS at 08:12

## 2024-07-31 RX ADMIN — METHOCARBAMOL 750 MG: 750 TABLET ORAL at 16:45

## 2024-07-31 RX ADMIN — HYDROCODONE BITARTRATE AND ACETAMINOPHEN 1 TABLET: 5; 325 TABLET ORAL at 19:44

## 2024-07-31 RX ADMIN — HYDROCODONE BITARTRATE AND ACETAMINOPHEN 1 TABLET: 5; 325 TABLET ORAL at 01:08

## 2024-07-31 RX ADMIN — HYDROMORPHONE HYDROCHLORIDE 0.25 MG: 1 INJECTION, SOLUTION INTRAMUSCULAR; INTRAVENOUS; SUBCUTANEOUS at 16:45

## 2024-07-31 RX ADMIN — HYDROCODONE BITARTRATE AND ACETAMINOPHEN 1 TABLET: 5; 325 TABLET ORAL at 14:37

## 2024-07-31 RX ADMIN — HYDROCODONE BITARTRATE AND ACETAMINOPHEN 1 TABLET: 5; 325 TABLET ORAL at 05:32

## 2024-07-31 RX ADMIN — SERTRALINE 100 MG: 100 TABLET, FILM COATED ORAL at 08:12

## 2024-07-31 RX ADMIN — PREGABALIN 50 MG: 25 CAPSULE ORAL at 08:12

## 2024-07-31 RX ADMIN — BISOPROLOL FUMARATE AND HYDROCHLOROTHIAZIDE 1 TABLET: 5; 6.25 TABLET, FILM COATED ORAL at 08:12

## 2024-07-31 RX ADMIN — HYDROMORPHONE HYDROCHLORIDE 0.25 MG: 1 INJECTION, SOLUTION INTRAMUSCULAR; INTRAVENOUS; SUBCUTANEOUS at 00:02

## 2024-07-31 RX ADMIN — HYDROMORPHONE HYDROCHLORIDE 0.25 MG: 1 INJECTION, SOLUTION INTRAMUSCULAR; INTRAVENOUS; SUBCUTANEOUS at 21:16

## 2024-07-31 NOTE — PLAN OF CARE
Goal Outcome Evaluation:  Plan of Care Reviewed With: patient, daughter           Outcome Evaluation: Pt is a 73 y/o F admitted to [facility] 7/30/24 for L3-S1 TLIF by Dr. Rice. PMHx significant for depression, anxiety, OA, incontinent of stool, and hx DVT and PE. She has a history of worsening symptoms of low back pain radiculopathy and neurogenic claudication refractory to conservative measures with significant degenerative changes with central and foraminal stenosis from L3-S1. At baseline pt lives in a H with one step to enter. She lives with her . Typically, she is IND with ADLs and IADLs w/o the need for an assistive device. She is an active . This date, pt educated on spinal precautions and a handout was provided. After ed, pt performed logroll with min A, then side-lying to sit EOB with Mod A. Mod A required for lower body dressing at EOB but this is expected to improve quickly via figure four positioning. She was able ot come to standing with CGA using RW, then to ambulate approx 20 feet with support of RW, albeit very slowly. Her daughter reports she will be available to assist only intermittently post-op. OT feels pt is unsafe to dc home at this time as her  is unable to provide consistent physical assistance currently needed. He has his own medical issues with which to contend. OT recommending acute IP rehab at this time.      Anticipated Discharge Disposition (OT): inpatient rehabilitation facility

## 2024-07-31 NOTE — PLAN OF CARE
Goal Outcome Evaluation:      Patient has been very painful, treated per MAR. Alegre remains in place. Family at bedside. Care continuing.

## 2024-07-31 NOTE — CASE MANAGEMENT/SOCIAL WORK
Discharge Planning Assessment   [place]     Patient Name: Jemima Bell  MRN: [id number]  Today's Date: 7/31/2024    Admit Date: 7/30/2024    Plan: DC PLAN: Routine home       Discharge Needs Assessment       Row Name 07/31/24 1339       Living Environment    People in Home spouse    Current Living Arrangements home    Potentially Unsafe Housing Conditions none    In the past 12 months has the electric, gas, oil, or water company threatened to shut off services in your home? No    Primary Care Provided by self    Provides Primary Care For no one    Family Caregiver if Needed spouse    Quality of Family Relationships helpful;involved;supportive    Able to Return to Prior Arrangements yes       Resource/Environmental Concerns    Resource/Environmental Concerns none    Transportation Concerns none       Transportation Needs    In the past 12 months, has lack of transportation kept you from medical appointments or from getting medications? no    In the past 12 months, has lack of transportation kept you from meetings, work, or from getting things needed for daily living? No       Food Insecurity    Within the past 12 months, you worried that your food would run out before you got the money to buy more. Never true    Within the past 12 months, the food you bought just didn't last and you didn't have money to get more. Never true       Transition Planning    Patient/Family Anticipates Transition to home with family    Patient/Family Anticipated Services at Transition none    Transportation Anticipated car, drives self;family or friend will provide       Discharge Needs Assessment    Readmission Within the Last 30 Days no previous admission in last 30 days    Equipment Currently Used at Home none    Anticipated Changes Related to Illness none    Equipment Needed After Discharge none                   Discharge Plan       Row Name 07/31/24 4261       Plan    Plan DC PLAN: Routine home    Patient/Family in Agreement  with Plan yes    Plan Comments Met with patient at bedside, from routine home with . Independent with ADL's no DME. PCP is [facility], Pharmacy is [facility]. Able to afford medications and denies any issues with food or utilities. Denies any transportation issues, Denies any HHC or SNF needs. Denies any concerns about return home.                      Continued Care and Services - Admitted Since 7/30/2024    No active coordination exists for this encounter.       Expected Discharge Date and Time       Expected Discharge Date Expected Discharge Time    Aug 1, 2024            Demographic Summary       Row Name 07/31/24 1338       General Information    Admission Type inpatient    Arrived From emergency department    Required Notices Provided Important Message from Medicare    Referral Source admission list    Reason for Consult discharge planning    Preferred Language English       Contact Information    Permission Granted to Share Info With     Contact Information Obtained for                    Functional Status       Row Name 07/31/24 1339       Functional Status    Usual Activity Tolerance excellent    Current Activity Tolerance excellent       Physical Activity    On average, how many days per week do you engage in moderate to strenuous exercise (like a brisk walk)? 0 days    On average, how many minutes do you engage in exercise at this level? 0 min    Number of minutes of exercise per week 0       Assessment of Health Literacy    How often do you have someone help you read hospital materials? Sometimes    How often do you have problems learning about your medical condition because of difficulty understanding written information? Sometimes    How often do you have a problem understanding what is told to you about your medical condition? Sometimes    How confident are you filling out medical forms by yourself? Somewhat    Health Literacy Moderate       Functional Status, IADL     Medications independent    Meal Preparation independent    Housekeeping independent    Laundry independent    Shopping independent       Mental Status    General Appearance WDL WDL       Mental Status Summary    Recent Changes in Mental Status/Cognitive Functioning no changes                      Marie Augustine RN   Case Management    [phone]

## 2024-07-31 NOTE — THERAPY EVALUATION
Patient Name: Jemima Bell  : 1950    MRN: [id number]                              Today's Date: 2024       Admit Date: 2024    Visit Dx:     ICD-10-CM ICD-9-CM   1. Lumbar stenosis with neurogenic claudication  M48.062 724.03     Patient Active Problem List   Diagnosis    Wheezing    Osteoarthritis    Acute bronchitis    Dyspnea    Hypothyroidism    Shortness of breath    Vitamin D deficiency    Depression with anxiety    Muscle cramp    Back pain    Vitamin B12 deficiency    UTI (urinary tract infection)    Abnormal EKG    Precordial pain    Anxiety    Borderline systolic HTN    Left upper quadrant abdominal pain    Diarrhea    Gastroesophageal reflux disease    History of Nissen fundoplication    Incontinence of feces with fecal urgency    Foraminal stenosis of lumbar region    Lumbar degenerative disc disease    Lumbar facet arthropathy    Lumbar radiculopathy    Sacroiliac joint dysfunction of both sides    Lumbar stenosis with neurogenic claudication    Cervical radiculopathy    Neck pain    Heart murmur    Preop cardiovascular exam     Past Medical History:   Diagnosis Date    Acromioclavicular separation     Acute bronchitis     Acute sinusitis     Allergic 2022    Shrimp    Allergic rhinitis     Anemia Childhood    Anxiety     Arthritis     Arthritis of back     Asthma     Back pain     BMI 34.0-34.9,adult     Bursitis of hip     Cervical disc disorder     Cervicalgia     Chills     Cholelithiasis ?    Remival    Chronic pain disorder     Cluster headache     Colon polyp 2022    Deep vein thrombosis (DVT) of lower extremity     Depression     Dermatitis     Dislocation, shoulder     Dyspnea     Dysuria     Esophageal reflux     Extremity pain     Fatigue     Gastric ulcer     GERD (gastroesophageal reflux disease)     GI (gastrointestinal bleed) 2004?    Headache     Headache, tension-type     Heart murmur 1973    Hernia     Hip arthrosis     Hypothyroidism     Irritable  bowel syndrome     Low back strain     Lumbago     Lumbar stenosis with neurogenic claudication 5/1/2024    Lumbosacral disc disease     Migraine     Nausea     Neuritis     Osteoarthritis     Osteoarthritis of knee     Periarthritis of shoulder     Plantar fasciitis     Pneumonia 89y    Pulled muscle     Pulmonary embolism     Pyelonephritis     Rheumatic fever     Sore throat     Torticollis     Trapezius strain     Urinary incontinence     Urinary pain     Urinary tract infection     UTI symptoms     Vitamin B1 deficiency     Vitamin B12 deficiency     Vitamin D deficiency     Weakness     Wheezing      Past Surgical History:   Procedure Laterality Date    ABDOMINAL SURGERY      APPENDECTOMY      CATARACT EXTRACTION      CATARACT EXTRACTION      bilateral eyes    CATARACT EXTRACTION      CERVICAL EPIDURAL N/A 06/26/2024    Procedure: cervical epidural steroid injection at C7/T1 79344;  Surgeon: Shey Aranda MD;  [place] [facility] [place];  Service: Pain Management;  Laterality: N/A;    CHOLECYSTECTOMY  2004?    COLONOSCOPY      COLONOSCOPY N/A 12/23/2022    Procedure: COLONOSCOPY to cecum and TI:  with biopsies, cold snare polyp,;  Surgeon: Reji Aguilar MD;  Location: [facility] ENDOSCOPY;  Service: Gastroenterology;  Laterality: N/A;  PREOP/ HX COLON POLYPS  POSTOP/  diverticulosis, polyp, hemorrhoids    ENDOSCOPY N/A 12/23/2022    Procedure: ESOPHAGOGASTRODUODENOSCOPY with biopsies;  Surgeon: Reji Aguilar MD;  Location: [facility] ENDOSCOPY;  Service: Gastroenterology;  Laterality: N/A;  PREOP/ HX GASTRIC ULCER, DYSPEPSIA, UPPER ABDOMINAL PAIN  POSTOP/:  HH, gastritis, gastric polyps    EPIDURAL BLOCK      GALLBLADDER SURGERY      HEMORRHOIDECTOMY  1974 & 77?    HERNIA REPAIR  2008?    HYSTERECTOMY      INGUINAL HERNIA REPAIR      INTERSTIM PLACEMENT      for Bladder incontinence    JOINT REPLACEMENT      Knee    KNEE SURGERY      LAPAROSCOPIC COLON RESECTION  2005    LAPAROTOMY OOPHERECTOMY      LUMBAR EPIDURAL  INJECTION N/A 03/27/2023    Procedure: Lumbar epidural steroid injection at L5-S1 80185;  Surgeon: Shey Aranda MD;  Location[facility];  Service: Pain Management;  Laterality: N/A;    LUMBAR EPIDURAL INJECTION N/A 03/13/2024    Procedure: lumbar epidural steroid injection at L4/5 34669;  Surgeon: Shey Aranda MD;  Location: [facility];  Service: Pain Management;  Laterality: N/A;    NISSEN FUNDOPLICATION LAPAROSCOPIC      x2    ORTHOPEDIC SURGERY      Knee replacement    SACROILIAC JOINT INJECTION Left 05/12/2023    Procedure: Left SACROILIAC INJECTION 23163;  Surgeon: Shey Aranda MD;  Location: [facility];  Service: Pain Management;  Laterality: Left;    SACROILIAC JOINT INJECTION Left 07/12/2023    Procedure: SACROILIAC INJECTION LEFT;  Surgeon: Shey Aranda MD;  Location: [facility];  Service: Pain Management;  Laterality: Left;    TONSILLECTOMY      TONSILLECTOMY  1954?    TOTAL KNEE ARTHROPLASTY Left     TUBAL ABDOMINAL LIGATION      UPPER GASTROINTESTINAL ENDOSCOPY        General Information       Row Name 07/31/24 1723          OT Time and Intention    Document Type evaluation  -LS     Mode of Treatment occupational therapy  -LS       Row Name 07/31/24 1723          General Information    Patient Profile Reviewed yes  -LS     Prior Level of Function independent:;ADL's;driving;community mobility;home management;cooking;cleaning  -LS     Existing Precautions/Restrictions spinal  CSF leak precautions lifted this date  -     Barriers to Rehab medically complex  -LS       Row Name 07/31/24 1723          Living Environment    People in Home spouse  -LS       Row Name 07/31/24 1723          Home Main Entrance    Number of Stairs, Main Entrance one  -LS     Stair Railings, Main Entrance railings safe and in good condition  -LS       Row Name 07/31/24 1723          Stairs Within Home, Primary    Number of Stairs, Within Home, Primary none  -LS       Row Name 07/31/24 1723           Cognition    Orientation Status (Cognition) oriented x 4  -       Row Name 07/31/24 1723          Safety Issues, Functional Mobility    Impairments Affecting Function (Mobility) pain;endurance/activity tolerance  -               User Key  (r) = Recorded By, (t) = Taken By, (c) = Cosigned By      Initials Name Provider Type    Reji Dominguez OT Occupational Therapist                     Mobility/ADL's       Row Name 07/31/24 1726          Bed Mobility    Bed Mobility rolling left;supine-sit  -LS     Rolling Left [place] (Bed Mobility) minimum assist (75% patient effort)  -LS     Supine-Sit [place] (Bed Mobility) moderate assist (50% patient effort)  -     Assistive Device (Bed Mobility) bed rails  -       Row Name 07/31/24 1726          Transfers    Transfers sit-stand transfer  -       Row Name 07/31/24 1726          Sit-Stand Transfer    Sit-Stand [place] (Transfers) contact guard  -     Assistive Device (Sit-Stand Transfers) walker, front-wheeled  -       Row Name 07/31/24 1726          Functional Mobility    Functional Mobility- Ind. Level contact guard assist  -     Functional Mobility- Device walker, front-wheeled  -LS     Patient was able to Ambulate yes  -       Row Name 07/31/24 1726          Activities of Daily Living    BADL Assessment/Intervention lower body dressing  -       Row Name 07/31/24 1726          Lower Body Dressing Assessment/Training    [place] Level (Lower Body Dressing) lower body dressing skills;doff;don;socks;moderate assist (50% patient effort)  -     Position (Lower Body Dressing) edge of bed sitting  -               User Key  (r) = Recorded By, (t) = Taken By, (c) = Cosigned By      Initials Name Provider Type    Reji Dominguez OT Occupational Therapist                   Obj/Interventions       Row Name 07/31/24 1728          Sensory Assessment (Somatosensory)    Sensory Assessment (Somatosensory) UE sensation intact  -       Row Name  07/31/24 1728          Vision Assessment/Intervention    Visual Impairment/Limitations WFL  -LS       Row Name 07/31/24 1728          Range of Motion Comprehensive    General Range of Motion bilateral upper extremity ROM WFL  -[facility] Name 07/31/24 1728          Strength Comprehensive (MMT)    Comment, General Manual Muscle Testing (MMT) Assessment BUEs grossly WFL but not formally tested d/t spinal precautions  -LS       Row Name 07/31/24 1728          Motor Skills    Motor Skills functional endurance  -LS     Functional Endurance fair minus  -LS       [facility] Name 07/31/24 1728          Balance    Balance Assessment sitting static balance;sitting dynamic balance;standing static balance;standing dynamic balance  -LS     Static Sitting Balance independent  -LS     Dynamic Sitting Balance standby assist  -LS     Position, Sitting Balance unsupported;sitting edge of bed  -LS     Static Standing Balance contact guard  -LS     Dynamic Standing Balance contact guard  -LS     Position/Device Used, Standing Balance walker, front-wheeled  -LS               User Key  (r) = Recorded By, (t) = Taken By, (c) = Cosigned By      Initials Name Provider Type    Reji Dominguez OT Occupational Therapist                   Goals/Plan       [facility] Name 07/31/24 1735          Bed Mobility Goal 1 (OT)    Activity/Assistive Device (Bed Mobility Goal 1, OT) bed mobility activities, all  -LS     [place] Level/Cues Needed (Bed Mobility Goal 1, OT) modified independence  -LS     Time Frame (Bed Mobility Goal 1, OT) long term goal (LTG);2 weeks  -[facility] Name 07/31/24 1735          Transfer Goal 1 (OT)    Activity/Assistive Device (Transfer Goal 1, OT) transfers, all  -LS     [place] Level/Cues Needed (Transfer Goal 1, OT) modified independence  -LS     Time Frame (Transfer Goal 1, OT) long term goal (LTG);2 weeks  -[facility] Name 07/31/24 1732          Dressing Goal 1 (OT)    Activity/Device (Dressing Goal 1, OT) dressing  skills, all  -LS     [place]/Cues Needed (Dressing Goal 1, OT) modified independence  -LS     Time Frame (Dressing Goal 1, OT) long term goal (LTG);2 weeks  -LS       Row Name 07/31/24 1734          Toileting Goal 1 (OT)    Activity/Device (Toileting Goal 1, OT) toileting skills, all  -LS     [place] Level/Cues Needed (Toileting Goal 1, OT) modified independence  -LS     Time Frame (Toileting Goal 1, OT) long term goal (LTG);2 weeks  -LS       Row Name 07/31/24 173          Therapy Assessment/Plan (OT)    Planned Therapy Interventions (OT) activity tolerance training;BADL retraining;functional balance retraining;IADL retraining;occupation/activity based interventions;ROM/therapeutic exercise;strengthening exercise;transfer/mobility retraining;patient/caregiver education/training  -LS               User Key  (r) = Recorded By, (t) = Taken By, (c) = Cosigned By      Initials Name Provider Type    LS Reji Beyer OT Occupational Therapist                   Clinical Impression       Row Name 07/31/24 1112          Pain Assessment    Pretreatment Pain Rating 6/10  -LS     Posttreatment Pain Rating 6/10  -LS     Pain Location incisional  -LS     Pain Location - back  -LS       Row Name 07/31/24 4998          Plan of Care Review    Plan of Care Reviewed With patient;daughter  -LS     Outcome Evaluation Pt is a 73 y/o F admitted to [facility] 7/30/24 for L3-S1 TLIF by Dr. Rice. PMHx significant for depression, anxiety, OA, incontinent of stool, and hx DVT and PE. She has a history of worsening symptoms of low back pain radiculopathy and neurogenic claudication refractory to conservative measures with significant degenerative changes with central and foraminal stenosis from L3-S1. At baseline pt lives in a [facility] with one step to enter. She lives with her . Typically, she is IND with ADLs and IADLs w/o the need for an assistive device. She is an active . This date, pt educated on spinal precautions and a  handout was provided. After ed, pt performed logroll with min A, then side-lying to sit EOB with Mod A. Mod A required for lower body dressing at EOB but this is expected to improve quickly via figure four positioning. She was able ot come to standing with CGA using RW, then to ambulate approx 20 feet with support of RW, albeit very slowly. Her daughter reports she will be available to assist only intermittently post-op. OT feels pt is unsafe to dc home at this time as her  is unable to provide consistent physical assistance currently needed. He has his own medical issues with which to contend. OT recommending acute IP rehab at this time.  -       Row Name 07/31/24 1729          Therapy Assessment/Plan (OT)    Rehab Potential (OT) good, to achieve stated therapy goals  -     Criteria for Skilled Therapeutic Interventions Met (OT) yes;skilled treatment is necessary  -     Therapy Frequency (OT) 5 times/wk  -     Predicted Duration of Therapy Intervention (OT) until dc  -       Row Name 07/31/24 1729          Therapy Plan Review/Discharge Plan (OT)    Anticipated Discharge Disposition (OT) inpatient rehabilitation facility  -       Row Name 07/31/24 1729          Vital Signs    Pre SpO2 (%) 100  3L  -LS     O2 Delivery Pre Treatment supplemental O2  -LS     Intra SpO2 (%) 88  -LS     O2 Delivery Intra Treatment room air  -LS     Post SpO2 (%) 97  -LS     O2 Delivery Post Treatment supplemental O2  -LS     Pre Patient Position Supine  -LS     Intra Patient Position Standing  -LS     Post Patient Position Sitting  -       Row Name 07/31/24 1729          Positioning and Restraints    Pre-Treatment Position in bed  -LS     Post Treatment Position chair  -LS     In Chair notified nsg;reclined;call light within reach;encouraged to call for assist;exit alarm on;with family/caregiver  -               User Key  (r) = Recorded By, (t) = Taken By, (c) = Cosigned By      Initials Name Provider Type    LS  Reji Beyer OT Occupational Therapist                   Outcome Measures       Row Name 07/31/24 1735          How much help from another is currently needed...    Putting on and taking off regular lower body clothing? 2  -LS     Bathing (including washing, rinsing, and drying) 3  -LS     Toileting (which includes using toilet bed pan or urinal) 3  -LS     Putting on and taking off regular upper body clothing 3  -LS     Taking care of personal grooming (such as brushing teeth) 4  -LS     Eating meals 4  -LS     AM-PAC 6 Clicks Score (OT) 19  -       Row Name 07/31/24 0740          How much help from another person do you currently need...    Turning from your back to your side while in flat bed without using bedrails? 1  -EH     Moving from lying on back to sitting on the side of a flat bed without bedrails? 1  -EH     Moving to and from a bed to a chair (including a wheelchair)? 1  -EH     Standing up from a chair using your arms (e.g., wheelchair, bedside chair)? 1  -EH     Climbing 3-5 steps with a railing? 1  -EH     To walk in hospital room? 1  -EH     AM-PAC 6 Clicks Score (PT) 6  -EH     Highest Level of Mobility Goal 2 --> Bed activities/dependent transfer  -       Row Name 07/31/24 1739          Functional Assessment    Outcome Measure Options AM-PAC 6 Clicks Daily Activity (OT)  -               User Key  (r) = Recorded By, (t) = Taken By, (c) = Cosigned By      Initials Name Provider Type     Reji Beyer OT Occupational Therapist     Akash Carlson, RN Registered Nurse                    Occupational Therapy Education       Title: PT OT SLP Therapies (Done)       Topic: Occupational Therapy (Done)       Point: ADL training (Done)       Description:   Instruct learner(s) on proper safety adaptation and remediation techniques during self care or transfers.   Instruct in proper use of assistive devices.                  Learning Progress Summary             Patient Acceptance, E,TB, VU by  at  7/31/2024 1736                         Point: Home exercise program (Done)       Description:   Instruct learner(s) on appropriate technique for monitoring, assisting and/or progressing therapeutic exercises/activities.                  Learning Progress Summary             Patient Acceptance, E,TB, VU by  at 7/31/2024 1736                         Point: Precautions (Done)       Description:   Instruct learner(s) on prescribed precautions during self-care and functional transfers.                  Learning Progress Summary             Patient Acceptance, E,TB, VU by  at 7/31/2024 1736                         Point: Body mechanics (Done)       Description:   Instruct learner(s) on proper positioning and spine alignment during self-care, functional mobility activities and/or exercises.                  Learning Progress Summary             Patient Acceptance, E,TB, VU by  at 7/31/2024 1736                                         User Key       Initials Effective Dates Name Provider Type Discipline     09/22/22 -  Reji Beyer OT Occupational Therapist OT                  OT Recommendation and Plan  Planned Therapy Interventions (OT): activity tolerance training, BADL retraining, functional balance retraining, IADL retraining, occupation/activity based interventions, ROM/therapeutic exercise, strengthening exercise, transfer/mobility retraining, patient/caregiver education/training  Therapy Frequency (OT): 5 times/wk  Plan of Care Review  Plan of Care Reviewed With: patient, daughter  Outcome Evaluation: Pt is a 73 y/o F admitted to [facility] 7/30/24 for L3-S1 TLIF by Dr. Rice. PMHx significant for depression, anxiety, OA, incontinent of stool, and hx DVT and PE. She has a history of worsening symptoms of low back pain radiculopathy and neurogenic claudication refractory to conservative measures with significant degenerative changes with central and foraminal stenosis from L3-S1. At baseline pt lives in a [facility] with  one step to enter. She lives with her . Typically, she is IND with ADLs and IADLs w/o the need for an assistive device. She is an active . This date, pt educated on spinal precautions and a handout was provided. After ed, pt performed logroll with min A, then side-lying to sit EOB with Mod A. Mod A required for lower body dressing at EOB but this is expected to improve quickly via figure four positioning. She was able ot come to standing with CGA using RW, then to ambulate approx 20 feet with support of RW, albeit very slowly. Her daughter reports she will be available to assist only intermittently post-op. OT feels pt is unsafe to dc home at this time as her  is unable to provide consistent physical assistance currently needed. He has his own medical issues with which to contend. OT recommending acute IP rehab at this time.     Time Calculation:         Time Calculation- OT       Row Name 07/31/24 1739 07/31/24 0800          Time Calculation- OT    OT Start Time 1655  -LS --     OT Stop Time 1720  -LS --     OT Time Calculation (min) 25 min  -LS --     OT Received On 07/31/24  -LS --     OT - Next Appointment 08/01/24  -LS 08/01/24  -MS     OT Goal Re-Cert Due Date 08/14/24  -LS --        Untimed Charges    OT Eval/Re-eval Minutes 25  -LS --        Total Minutes    Untimed Charges Total Minutes 25  -LS --      Total Minutes 25  -LS --               User Key  (r) = Recorded By, (t) = Taken By, (c) = Cosigned By      Initials Name Provider Type    Lizbeth Mauro OT Occupational Therapist    Reji Dominguez OT Occupational Therapist                  Therapy Charges for Today       Code Description Service Date Service Provider Modifiers Qty    [id number] HC OT EVAL MOD COMPLEXITY 4 7/31/2024 Reji Beyer OT GO 1                 Reji Beyer OT  7/31/2024

## 2024-07-31 NOTE — PLAN OF CARE
Goal Outcome Evaluation:                                            Patient with complaints of spinal pain, treated per MAR. Able to make needs known, plan of care ongoing.      PT was evaluated At [facility] ED and was found to have a condition that warranted time of to rest and heal from WORK/SCHOOL.   Betito Hernández PA-C

## 2024-07-31 NOTE — PROGRESS NOTES
Neurosurgery Progress Note    Date: 24     Patient: Jemima Bell   Sex: female   : 1950      SUBJECTIVE    CC: Post op day 1   lumbar 3 to sacral 1 transforaminal lumbar interbody fusion, lumbar 3 to sacral 1 fusion       Interval history:  Patient experiencing the expected surgical pain. Denies no pain or weakness. Has some numbness/tingling, but is better than before surgery. Patient had a CSF leak during surgery and was flat during the night. Denies any HA or dizziness this morning. Urinating without difficulty, no flatus or bowel movement.     Current Medications:  Scheduled Meds:bisoprolol-hydrochlorothiazide, 1 tablet, Oral, Daily  enoxaparin, 40 mg, Subcutaneous, Daily  levothyroxine, 100 mcg, Oral, Q AM  pantoprazole, 40 mg, Oral, BID  pregabalin, 50 mg, Oral, BID  sertraline, 100 mg, Oral, Daily  sodium chloride, 10 mL, Intravenous, Q12H      Continuous Infusions:   PRN Meds:   acetaminophen **OR** acetaminophen **OR** acetaminophen    albuterol sulfate HFA    senna-docusate sodium **AND** polyethylene glycol **AND** bisacodyl **AND** bisacodyl    HYDROcodone-acetaminophen    HYDROmorphone **AND** naloxone    hydrOXYzine    melatonin    methocarbamol    ondansetron ODT    sodium chloride    sodium chloride      OBJECTIVE  Vitals:    24 1816 24 2354 24 0403   BP: 129/78 143/77 129/69 138/68   BP Location:  Right arm Right arm Right arm   Patient Position:  Lying Lying Lying   Pulse: 69 72 58 52   Resp: 12 15 21 20   Temp: 97.5 °F (36.4 °C) 97.5 °F (36.4 °C) 97.5 °F (36.4 °C) 97.5 °F (36.4 °C)   TempSrc: Oral Oral Oral Oral   SpO2: 90% 99% 100% 100%   Weight:       Height:           Physical exam    General  - WD/WN female, appears their stated age  - Awake, cooperative, in no acute distress  HEENT  - Normocephalic, atraumatic  - PERRLA, EOM intact  Respiratory  - Normal respiratory rate and effort  Musculoskeletal  - No joint redness, swelling, or  tenderness  Skin  - Warm and dry, no bleeding, bruising, or rash. Incisions covered without drainage and non-erythematous.   NEUROLOGIC  - A/O x3, GCS 4-6-5  - CN II-XII grossly intact  - Moves all extremities symmetrically and w/ 5/5 strength  - Sensation intact throughout        Results review  CBC          5/1/2024    09:14 7/22/2024    10:04 7/31/2024    03:46   CBC   WBC 5.70  6.61  8.67    RBC 4.31  4.57  3.62    Hemoglobin 13.2  13.5  10.8    Hematocrit 39.9  40.9  34.2    MCV 92.6  89.5  94.5    MCH 30.6  29.5  29.8    MCHC 33.1  33.0  31.6    RDW 11.9  11.6  12.3    Platelets 288  294  205        BMP          6/10/2024    14:06 7/18/2024    14:25 7/31/2024    03:46   BMP   BUN 16  23  12    Creatinine 0.89  1.07  0.75    Sodium 143  141  141    Potassium 4.5  4.4  4.4    Chloride 103  104  105    CO2 27  27.0  28.7    Calcium 9.5  9.6  8.9          XR Spine Lumbar 2 or 3 View    Result Date: 7/30/2024  This procedure was auto-finalized with no dictation required.   CT Lumbar Spine Without Contrast    Result Date: 7/25/2024  CT LUMBAR SPINE WO CONTRAST Date of Exam: 7/22/2024 5:23 PM EDT Indication: Globus robot prep protocol. Comparison: Lumbar spine CT 4/18/2024 Technique: Axial CT images were obtained of the lumbar spine without contrast administration.  Sagittal and coronal reconstructions were performed.  Automated exposure control and iterative reconstruction methods were used. Findings: There are 5 nonrib-bearing lumbar-type vertebral bodies. The lumbar vertebral bodies are maintained in height. There is no acute fracture or aggressive osseous lesion. Multilevel disc desiccation and vacuum disc at L1-2 through L4-5. There is moderate disc narrowing at L1-2. Mild disc narrowing at L2-3 and L3-4. Severe disc narrowing with endplate sclerosis at L4-5. The posterior spinous processes are intact. Transverse spinous processes are intact. The included portions of the pelvis are intact. Normal adrenal glands.  Bilateral peripelvic renal cysts. Nonobstructing 3 mm right lower pole renal calculus. Urinary bladder is thin-walled. The uterus is absent. There is no adnexal mass. The gallbladder is absent. Moderate vascular calcifications of the aorta and branch vasculature without aortic aneurysm. Sacral stimulator lead noted on the right. T11-12: Negative for disc bulge. No canal stenosis or foraminal stenosis. L1-2: Disc desiccation with a circumferential disc osteophyte partially effacing the ventral thecal sac. Normal facet alignment. Negative for right foraminal stenosis. Mild left foraminal stenosis. L2-3: Disc desiccation with circumferential disc bulge. Facet joints normally aligned. Negative for significant canal stenosis. Mild bilateral foraminal narrowing. L3-4: Disc desiccation with moderate circumferential disc bulge. Mild facet hypertrophy and ligamentum flavum thickening. Mild central canal stenosis. Moderate left foraminal stenosis. Mild right foraminal stenosis. L4-5: Severe disc desiccation with a broad-based disc osteophyte. Mild bilateral facet arthritis and ligamentum flavum thickening. Mild central canal stenosis. Moderate bilateral foraminal stenosis. L5-S1: Severe right facet arthritis. Moderate left facet arthritis. Minimal anterolisthesis of L5 on S1 measuring 2 mm. Negative for canal stenosis. Asymmetric osseous spurring resulting in moderate to severe left foraminal stenosis. No right foraminal stenosis.     Impression: Impression: 1. Negative for acute osseous abnormality. 2. Multilevel lumbar spondylosis above. Electronically Signed: Franko Chaudhry MD  7/25/2024 8:32 AM EDT  Workstation ID: [id number]                ASSESSMENT/PLAN  This is a 74 y.o. female who underwent surgery. Patient is having the expected surgical pain in her back, but denies any pain or weakness in her lower extremities. Patient continues to have numbness/tingling in her lower extremities, but this was present prior to surgery  and has improved.     Patient had a CSF leak and was flat all night. Patient denies any HA or dizziness this morning. I told patient that she can try to slowly bring up the head of the bed to see how she feels. If she feels ok, she can work with PT/OT and having standing x-rays today. If she starts having postural headaches, she will need to remain flat and can work with PT/OT and have x-rays when she is feeling better.    Patient has good strength on exam and has no red flags at this time. Please reach out with any questions or concerns.     Plans:  lumbar 3 to sacral 1 transforaminal lumbar interbody fusion, lumbar 3 to sacral 1 fusion   - Continue pain medication   - Bowel regimen as needed  - PT/OT evaluation  - Standing x-rays         I discussed my assessment and recommendations with Dr. Dwayne Trevino PA-C  07/31/24  07:13 EDT      Part of this note may be an electronic transcription/translation of spoken language to printed text using the Dragon Dictation System.

## 2024-07-31 NOTE — PROGRESS NOTES
[facility] MEDICINE SERVICE  DAILY PROGRESS NOTE    NAME: Jemima Bell  : 1950  MRN: [id number]      LOS: 1 day     PROVIDER OF SERVICE: Luther Hopper MD    Chief Complaint: Lumbar stenosis with neurogenic claudication    Subjective:     Interval History:  History taken from: patient    History of Present Illness: 74-year-old female with a past medical history of GERD, status post Nissen fundoplication, hypothyroidism, depression with anxiety, hypertension, lumbar stenosis with neurogenic claudication who underwent today L3-S1 robotic lumbar fusion.  She is awake and alert and answering questions appropriately.  She is currently tolerating rating p.o. fluids with no complaints.  Family member at bedside.     24 seen in bed no acute distress, vital signs stable, patient complaining of back pain.Discussed with RN,    Review of Systems  Constitutional: Negative.    HENT: Negative.     Eyes: Negative.    Respiratory: Negative.     Cardiovascular: Negative.    Gastrointestinal: Negative.    Endocrine: Negative.    Genitourinary: Negative.    Musculoskeletal:  Positive for back pain.   Skin: Negative.    Allergic/Immunologic: Negative.    Neurological: Negative.    Hematological: Negative.    Psychiatric/Behavioral: Negative.     All other systems reviewed and are negative.     Objective:     Vital Signs  Temp:  [97.5 °F (36.4 °C)-98.4 °F (36.9 °C)] 97.7 °F (36.5 °C)  Heart Rate:  [52-77] 52  Resp:  [10-21] 18  BP: ()/(35-78) 121/64  Flow (L/min):  [4-6] 4   Body mass index is 34.17 kg/m².    Physical Exam     Appearance: Normal appearance. She is obese.   HENT:      Head: Normocephalic and atraumatic.      Right Ear: External ear normal.      Left Ear: External ear normal.      Nose: Nose normal.      Mouth/Throat:      Mouth: Mucous membranes are moist.   Eyes:      Extraocular Movements: Extraocular movements intact.   Cardiovascular:      Rate and Rhythm: Normal rate and  regular rhythm.      Pulses: Normal pulses.   Pulmonary:      Effort: Pulmonary effort is normal.      Breath sounds: Normal breath sounds.   Abdominal:      Palpations: Abdomen is soft.   Genitourinary:     Comments: deferred  Musculoskeletal:         General: Normal range of motion.      Cervical back: Normal range of motion and neck supple.      Comments: bedrest   Skin:     General: Skin is warm and dry.   Neurological:      General: No focal deficit present.      Mental Status: She is alert and oriented to person, place, and time.   Psychiatric:         Mood and Affect: Mood normal.         Behavior: Behavior normal.         Thought Content: Thought content normal.         Judgment: Judgment normal.         Scheduled Meds   bisoprolol-hydrochlorothiazide, 1 tablet, Oral, Daily  enoxaparin, 40 mg, Subcutaneous, Daily  levothyroxine, 100 mcg, Oral, Q AM  pantoprazole, 40 mg, Oral, BID  pregabalin, 50 mg, Oral, BID  sertraline, 100 mg, Oral, Daily  sodium chloride, 10 mL, Intravenous, Q12H       PRN Meds     acetaminophen **OR** acetaminophen **OR** acetaminophen    albuterol sulfate HFA    senna-docusate sodium **AND** polyethylene glycol **AND** bisacodyl **AND** bisacodyl    HYDROcodone-acetaminophen    HYDROmorphone **AND** naloxone    hydrOXYzine    melatonin    methocarbamol    ondansetron ODT    sodium chloride    sodium chloride   Infusions         Diagnostic Data    Results from last 7 days   Lab Units 07/31/24  0346   WBC 10*3/mm3 8.67   HEMOGLOBIN g/dL 10.8*   HEMATOCRIT % 34.2   PLATELETS 10*3/mm3 205   GLUCOSE mg/dL 126*   CREATININE mg/dL 0.75   BUN mg/dL 12   SODIUM mmol/L 141   POTASSIUM mmol/L 4.4   ANION GAP mmol/L 7.3       No radiology results for the last day      I reviewed the patient's new clinical results.    Assessment/Plan:     Active and Resolved Problems  Active Hospital Problems    Diagnosis  POA    **Lumbar stenosis with neurogenic claudication [M48.062]  Yes    Gastroesophageal  reflux disease [K21.9]  Yes    Borderline systolic HTN [R03.0]  Yes    Depression with anxiety [F41.8]  Yes    Hypothyroidism [E03.9]  Yes      Resolved Hospital Problems   No resolved problems to display.     Lumbar stenosis with neurogenic claudication status post today robotic lumbar fusion,   -being followed by neurosurgery,   lumbar 3 to sacral 1 transforaminal lumbar interbody fusion, lumbar 3 to sacral 1 fusion   - Continue pain medication   - Bowel regimen as needed  - PT/OT evaluation  - Standing x-rays   -pain management per neurosurgery on cefazolin IV postoperatively      GERD, status post Nissen fundoplication, on home Protonix     Borderline systolic hypertension, on home bisoprolol hydrochlorothiazide, monitor BP     Depression with anxiety, on home Zoloft     Hypothyroidism, on home levothyroxine    VTE Prophylaxis:  Pharmacologic & mechanical VTE prophylaxis orders are present.         Code status is   There are no questions and answers to display.       Plan for disposition:home in 1 day    Time: 30 minutes    Signature: Electronically signed by Luther Hopper MD, 07/31/24, 11:44 EDT.  [facility] [address] Hospitalist Team

## 2024-08-01 PROCEDURE — 97530 THERAPEUTIC ACTIVITIES: CPT

## 2024-08-01 PROCEDURE — 25010000002 HYDROMORPHONE 1 MG/ML SOLUTION

## 2024-08-01 PROCEDURE — 97162 PT EVAL MOD COMPLEX 30 MIN: CPT

## 2024-08-01 PROCEDURE — 97535 SELF CARE MNGMENT TRAINING: CPT

## 2024-08-01 PROCEDURE — 25010000002 ENOXAPARIN PER 10 MG

## 2024-08-01 PROCEDURE — 99024 POSTOP FOLLOW-UP VISIT: CPT

## 2024-08-01 RX ADMIN — PANTOPRAZOLE SODIUM 40 MG: 40 TABLET, DELAYED RELEASE ORAL at 09:16

## 2024-08-01 RX ADMIN — HYDROCODONE BITARTRATE AND ACETAMINOPHEN 1 TABLET: 5; 325 TABLET ORAL at 05:07

## 2024-08-01 RX ADMIN — HYDROCODONE BITARTRATE AND ACETAMINOPHEN 1 TABLET: 5; 325 TABLET ORAL at 09:16

## 2024-08-01 RX ADMIN — LEVOTHYROXINE SODIUM 100 MCG: 0.1 TABLET ORAL at 05:07

## 2024-08-01 RX ADMIN — Medication 10 ML: at 09:46

## 2024-08-01 RX ADMIN — PREGABALIN 50 MG: 25 CAPSULE ORAL at 20:13

## 2024-08-01 RX ADMIN — Medication 10 ML: at 20:13

## 2024-08-01 RX ADMIN — PREGABALIN 50 MG: 25 CAPSULE ORAL at 09:16

## 2024-08-01 RX ADMIN — SERTRALINE 100 MG: 100 TABLET, FILM COATED ORAL at 09:16

## 2024-08-01 RX ADMIN — PANTOPRAZOLE SODIUM 40 MG: 40 TABLET, DELAYED RELEASE ORAL at 20:13

## 2024-08-01 RX ADMIN — ENOXAPARIN SODIUM 40 MG: 100 INJECTION SUBCUTANEOUS at 15:47

## 2024-08-01 RX ADMIN — HYDROCODONE BITARTRATE AND ACETAMINOPHEN 1 TABLET: 5; 325 TABLET ORAL at 15:47

## 2024-08-01 RX ADMIN — HYDROMORPHONE HYDROCHLORIDE 0.25 MG: 1 INJECTION, SOLUTION INTRAMUSCULAR; INTRAVENOUS; SUBCUTANEOUS at 01:44

## 2024-08-01 RX ADMIN — METHOCARBAMOL 750 MG: 750 TABLET ORAL at 01:44

## 2024-08-01 RX ADMIN — BISOPROLOL FUMARATE AND HYDROCHLOROTHIAZIDE 1 TABLET: 5; 6.25 TABLET, FILM COATED ORAL at 09:46

## 2024-08-01 RX ADMIN — HYDROCODONE BITARTRATE AND ACETAMINOPHEN 1 TABLET: 5; 325 TABLET ORAL at 20:13

## 2024-08-01 NOTE — PLAN OF CARE
Assessment: Jemima Bell presents with ADL impairments affecting function including balance, endurance / activity tolerance, pain, postural / trunk control, and strength. Demonstrated functioning below baseline abilities indicate the need for continued skilled intervention while inpatient. Pt expresses significant pain while sitting up in chair after PT, requesting to return to bed. Pt requires min A come to standing with extended time and task segmentation. Pt ambulates slowly from chair to bed, appears guarded with movement. Pt will require significant assist with ADLs to adhere to spinal precautions and d/t pain. OT continues to recommend acute IP rehab when medically appropriate for dc. Tolerating session today without incident. Will continue to follow and progress as tolerated.     Plan/Recommendations:   High Intensity Therapy recommended post-acute care. This is recommended as therapy feels the patient would require 5-6 days per week, 2-3 hours per day. At this time, inpatient rehabilitation (acute rehab) would be the first choice and SNF would be second.. Pt requires no DME at discharge.     Pt desires Inpatient Rehabilitation placement at discharge. Pt cooperative; agreeable to therapeutic recommendations and plan of care.

## 2024-08-01 NOTE — PROGRESS NOTES
Neurosurgery Progress Note    Date: 24     Patient: Jemima Bell   Sex: female   : 1950      SUBJECTIVE    CC: Post op day 1   lumbar 3 to sacral 1 transforaminal lumbar interbody fusion, lumbar 3 to sacral 1 fusion       Interval history:  Patient continues to have surgical pain this morning.  Patient denies any pain or numbness and tingling rating down her legs.  Patient denies any postural headaches this morning.  Patient is willing to work with physical therapy and get standing x-rays today.      Current Medications:  Scheduled Meds:bisoprolol-hydrochlorothiazide, 1 tablet, Oral, Daily  enoxaparin, 40 mg, Subcutaneous, Daily  levothyroxine, 100 mcg, Oral, Q AM  pantoprazole, 40 mg, Oral, BID  pregabalin, 50 mg, Oral, BID  sertraline, 100 mg, Oral, Daily  sodium chloride, 10 mL, Intravenous, Q12H      Continuous Infusions:   PRN Meds:   acetaminophen **OR** acetaminophen **OR** acetaminophen    albuterol sulfate HFA    senna-docusate sodium **AND** polyethylene glycol **AND** bisacodyl **AND** bisacodyl    HYDROcodone-acetaminophen    HYDROmorphone **AND** naloxone    hydrOXYzine    melatonin    methocarbamol    ondansetron ODT    sodium chloride    sodium chloride      OBJECTIVE  Vitals:    24 1240 24 1658 24 1952 24 0441   BP: 91/50 91/54 113/67 113/64   BP Location: Left arm Right arm Right arm Right arm   Patient Position: Lying Lying Lying Lying   Pulse: 51 52 60 67   Resp: 18 18 16 16   Temp: 97.8 °F (36.6 °C) 98.3 °F (36.8 °C) 99.5 °F (37.5 °C) 99.6 °F (37.6 °C)   TempSrc: Oral Oral Oral Oral   SpO2: 100% 98% 98% 94%   Weight:       Height:           Physical exam    General  - WD/WN female, appears their stated age  - Awake, cooperative, in no acute distress  HEENT  - Normocephalic, atraumatic  - PERRLA, EOM intact  Respiratory  - Normal respiratory rate and effort  Musculoskeletal  - No joint redness, swelling, or tenderness  Skin  - Warm and dry, no  bleeding, bruising, or rash  NEUROLOGIC  - A/O x3, GCS 4-6-5  - CN II-XII grossly intact  - Moves all extremities symmetrically and w/ 5/5 strength  - Sensation intact throughout  - Negative pronator drift  - Normal finger-nose coordination      Results review  CBC          5/1/2024    09:14 7/22/2024    10:04 7/31/2024    03:46   CBC   WBC 5.70  6.61  8.67    RBC 4.31  4.57  3.62    Hemoglobin 13.2  13.5  10.8    Hematocrit 39.9  40.9  34.2    MCV 92.6  89.5  94.5    MCH 30.6  29.5  29.8    MCHC 33.1  33.0  31.6    RDW 11.9  11.6  12.3    Platelets 288  294  205        BMP          6/10/2024    14:06 7/18/2024    14:25 7/31/2024    03:46   BMP   BUN 16  23  12    Creatinine 0.89  1.07  0.75    Sodium 143  141  141    Potassium 4.5  4.4  4.4    Chloride 103  104  105    CO2 27  27.0  28.7    Calcium 9.5  9.6  8.9          XR Spine Lumbar 2 or 3 View    Result Date: 7/30/2024  This procedure was auto-finalized with no dictation required.   CT Lumbar Spine Without Contrast    Result Date: 7/25/2024  CT LUMBAR SPINE WO CONTRAST Date of Exam: 7/22/2024 5:23 PM EDT Indication: Globus robot prep protocol. Comparison: Lumbar spine CT 4/18/2024 Technique: Axial CT images were obtained of the lumbar spine without contrast administration.  Sagittal and coronal reconstructions were performed.  Automated exposure control and iterative reconstruction methods were used. Findings: There are 5 nonrib-bearing lumbar-type vertebral bodies. The lumbar vertebral bodies are maintained in height. There is no acute fracture or aggressive osseous lesion. Multilevel disc desiccation and vacuum disc at L1-2 through L4-5. There is moderate disc narrowing at L1-2. Mild disc narrowing at L2-3 and L3-4. Severe disc narrowing with endplate sclerosis at L4-5. The posterior spinous processes are intact. Transverse spinous processes are intact. The included portions of the pelvis are intact. Normal adrenal glands. Bilateral peripelvic renal cysts.  Nonobstructing 3 mm right lower pole renal calculus. Urinary bladder is thin-walled. The uterus is absent. There is no adnexal mass. The gallbladder is absent. Moderate vascular calcifications of the aorta and branch vasculature without aortic aneurysm. Sacral stimulator lead noted on the right. T11-12: Negative for disc bulge. No canal stenosis or foraminal stenosis. L1-2: Disc desiccation with a circumferential disc osteophyte partially effacing the ventral thecal sac. Normal facet alignment. Negative for right foraminal stenosis. Mild left foraminal stenosis. L2-3: Disc desiccation with circumferential disc bulge. Facet joints normally aligned. Negative for significant canal stenosis. Mild bilateral foraminal narrowing. L3-4: Disc desiccation with moderate circumferential disc bulge. Mild facet hypertrophy and ligamentum flavum thickening. Mild central canal stenosis. Moderate left foraminal stenosis. Mild right foraminal stenosis. L4-5: Severe disc desiccation with a broad-based disc osteophyte. Mild bilateral facet arthritis and ligamentum flavum thickening. Mild central canal stenosis. Moderate bilateral foraminal stenosis. L5-S1: Severe right facet arthritis. Moderate left facet arthritis. Minimal anterolisthesis of L5 on S1 measuring 2 mm. Negative for canal stenosis. Asymmetric osseous spurring resulting in moderate to severe left foraminal stenosis. No right foraminal stenosis.     Impression: Impression: 1. Negative for acute osseous abnormality. 2. Multilevel lumbar spondylosis above. Electronically Signed: Franko Chaudhry MD  7/25/2024 8:32 AM EDT  Workstation ID: [id number]                ASSESSMENT/PLAN  This is a 74 y.o. female who underwent surgery.  Due to patient's CSF leak she did not work with physical therapy yesterday.  This morning patient continues to have surgical pain, but otherwise is doing well overall.  Patient denies any or postural headaches this morning.  Patient is urinating without  difficulty, but has not had a bowel movement and still has not passed flatus.    Patient is willing to work with physical therapy and have her standing x-rays today.  Hopefully ambulating and moving around will help her have a bowel movement today.  If tomorrow patient still is not able to pass flatus I will order a CT abdominal scan.    Patient is neurologically intact has good strength on exam.  Patient has no red flags this time.  Will continue to monitor while in the hospital.  Please route any questions or concerns.    Plan:  Post op day 1   lumbar 3 to sacral 1 transforaminal lumbar interbody fusion, lumbar 3 to sacral 1 fusion   -Continue pain meds  -Bowel regimen as necessary  -PT/OT eval today  -Standing x-rays today        I discussed my assessment and recommendations with Dr. Dwayne Trevino PA-C  08/01/24  08:21 EDT      Part of this note may be an electronic transcription/translation of spoken language to printed text using the Dragon Dictation System.

## 2024-08-01 NOTE — PLAN OF CARE
Goal Outcome Evaluation:  Plan of Care Reviewed With: patient  Pt presents as a 75 y/o F POD#2 for elective L3-S1 TLIF per Dr. Rice. Patient had a CSF leak during surgery and was flat during night and in AM POD#1. Pt does not use O2 at baseline and is presently requiring O2 at 3 L. Pt lethargic and O x 4. At baseline, pt lives with spouse in [facility] with 0 PATTI and she uses no AD for independence with community mobility; pt drives. Pt agreeable to mobilize with encouragement and education on importance of being out of bed. Pt was educated on spine precautions. Pt requiring mod assist of 1 for supine to sit with very slow and guarded movements. Pt transferred over to [facility] with rolling walker and min/mod assist of 1 with 100% verbal cueing. Pt required total assist for perianal hygiene. Pt ambulated 6 feet with rolling walker with min/mod assist of 1 with slow edwin, FF posture and decreased step length BLE. After resting in recliner , pt was encouraged to ambulate again but pt declined stating she was unable to tolerate it. Pt is progressing slower than anticipated for TLIF. Pt is far below her baseline for all areas of mobility. PT will follow pt with recommendation of In-patient  Rehabilitation Facility.                Anticipated Discharge Disposition (PT): inpatient rehabilitation facility

## 2024-08-01 NOTE — THERAPY TREATMENT NOTE
Subjective: Pt agreeable to therapeutic plan of care. Pt expresses she is open to the idea of acute IP rehab, notified CM about rehab desires. Pt request assist getting back to bed, expresses sitting up in chair ~45 minutes since working with PT.     Requires reminders 3/3 spinal precautions     Cognition: oriented to Person, Place, Time, and Situation    Objective:     Bed Mobility: Mod-A sit to supine   Functional Transfers: Min-A and with rolling walker extended time, task segmentation, verbal cues for hand placement      Balance: sitting EOB, supported, and static SBA  Functional Ambulation: Min-A and with rolling walker chair to bed     Precautions: High falls risk, spinal precautions     Vitals: WNL    Pain: 5 VAS  Location: spinal, incisional     Interventions for pain: Repositioned, Increased Activity, and Therapeutic Presence, recently received pain meds prior to session     Education: Provided education on the importance of mobility in the acute care setting, Verbal/Tactile Cues, Transfer Training, and Post-Op Precautions      Assessment: Jemima Bell presents with ADL impairments affecting function including balance, endurance / activity tolerance, pain, postural / trunk control, and strength. Demonstrated functioning below baseline abilities indicate the need for continued skilled intervention while inpatient. Pt expresses significant pain while sitting up in chair after PT, requesting to return to bed. Pt requires min A come to standing with extended time and task segmentation. Pt ambulates slowly from chair to bed, appears guarded with movement. Pt will require significant assist with ADLs to adhere to spinal precautions and d/t pain. OT continues to recommend acute IP rehab when medically appropriate for dc. Tolerating session today without incident. Will continue to follow and progress as tolerated.     Plan/Recommendations:   High Intensity Therapy recommended post-acute care. This is  recommended as therapy feels the patient would require 5-6 days per week, 2-3 hours per day. At this time, inpatient rehabilitation (acute rehab) would be the first choice and SNF would be second.. Pt requires no DME at discharge.     Pt desires Inpatient Rehabilitation placement at discharge. Pt cooperative; agreeable to therapeutic recommendations and plan of care.     Modified [place]: N/A = No pre-op stroke/TIA    Post-Tx Position: Supine with HOB Elevated, Alarms activated, and Call light and personal items within reach  PPE: gloves

## 2024-08-01 NOTE — PLAN OF CARE
Goal Outcome Evaluation:      Patient is doing well. Has been painful, treated per MAR. Have weaned off the O2 tonight and is going well so far. Used the bedpan earlier. Care continuing.

## 2024-08-01 NOTE — THERAPY EVALUATION
Patient Name: Jemima Bell  : 1950    MRN: [id number]                              Today's Date: 2024       Admit Date: 2024    Visit Dx:     ICD-10-CM ICD-9-CM   1. Lumbar stenosis with neurogenic claudication  M48.062 724.03     Patient Active Problem List   Diagnosis    Wheezing    Osteoarthritis    Acute bronchitis    Dyspnea    Hypothyroidism    Shortness of breath    Vitamin D deficiency    Depression with anxiety    Muscle cramp    Back pain    Vitamin B12 deficiency    UTI (urinary tract infection)    Abnormal EKG    Precordial pain    Anxiety    Borderline systolic HTN    Left upper quadrant abdominal pain    Diarrhea    Gastroesophageal reflux disease    History of Nissen fundoplication    Incontinence of feces with fecal urgency    Foraminal stenosis of lumbar region    Lumbar degenerative disc disease    Lumbar facet arthropathy    Lumbar radiculopathy    Sacroiliac joint dysfunction of both sides    Lumbar stenosis with neurogenic claudication    Cervical radiculopathy    Neck pain    Heart murmur    Preop cardiovascular exam     Past Medical History:   Diagnosis Date    Acromioclavicular separation     Acute bronchitis     Acute sinusitis     Allergic 2022    Shrimp    Allergic rhinitis     Anemia Childhood    Anxiety     Arthritis     Arthritis of back     Asthma     Back pain     BMI 34.0-34.9,adult     Bursitis of hip     Cervical disc disorder     Cervicalgia     Chills     Cholelithiasis ?    Remival    Chronic pain disorder     Cluster headache     Colon polyp 2022    Deep vein thrombosis (DVT) of lower extremity     Depression     Dermatitis     Dislocation, shoulder     Dyspnea     Dysuria     Esophageal reflux     Extremity pain     Fatigue     Gastric ulcer     GERD (gastroesophageal reflux disease)     GI (gastrointestinal bleed) 2004?    Headache     Headache, tension-type     Heart murmur 1973    Hernia     Hip arthrosis     Hypothyroidism     Irritable  bowel syndrome     Low back strain     Lumbago     Lumbar stenosis with neurogenic claudication 5/1/2024    Lumbosacral disc disease     Migraine     Nausea     Neuritis     Osteoarthritis     Osteoarthritis of knee     Periarthritis of shoulder     Plantar fasciitis     Pneumonia 89y    Pulled muscle     Pulmonary embolism     Pyelonephritis     Rheumatic fever     Sore throat     Torticollis     Trapezius strain     Urinary incontinence     Urinary pain     Urinary tract infection     UTI symptoms     Vitamin B1 deficiency     Vitamin B12 deficiency     Vitamin D deficiency     Weakness     Wheezing      Past Surgical History:   Procedure Laterality Date    ABDOMINAL SURGERY      APPENDECTOMY      CATARACT EXTRACTION      CATARACT EXTRACTION      bilateral eyes    CATARACT EXTRACTION      CERVICAL EPIDURAL N/A 06/26/2024    Procedure: cervical epidural steroid injection at C7/T1 71556;  Surgeon: Shey Aranda MD;  [place] [facility] [place];  Service: Pain Management;  Laterality: N/A;    CHOLECYSTECTOMY  2004?    COLONOSCOPY      COLONOSCOPY N/A 12/23/2022    Procedure: COLONOSCOPY to cecum and TI:  with biopsies, cold snare polyp,;  Surgeon: Reji Aguilar MD;  Location: [facility] ENDOSCOPY;  Service: Gastroenterology;  Laterality: N/A;  PREOP/ HX COLON POLYPS  POSTOP/  diverticulosis, polyp, hemorrhoids    ENDOSCOPY N/A 12/23/2022    Procedure: ESOPHAGOGASTRODUODENOSCOPY with biopsies;  Surgeon: Reji Aguilar MD;  Location: [facility] ENDOSCOPY;  Service: Gastroenterology;  Laterality: N/A;  PREOP/ HX GASTRIC ULCER, DYSPEPSIA, UPPER ABDOMINAL PAIN  POSTOP/:  HH, gastritis, gastric polyps    EPIDURAL BLOCK      GALLBLADDER SURGERY      HEMORRHOIDECTOMY  1974 & 77?    HERNIA REPAIR  2008?    HYSTERECTOMY      INGUINAL HERNIA REPAIR      INTERSTIM PLACEMENT      for Bladder incontinence    JOINT REPLACEMENT      Knee    KNEE SURGERY      LAPAROSCOPIC COLON RESECTION  2005    LAPAROTOMY OOPHERECTOMY      LUMBAR EPIDURAL  INJECTION N/A 03/27/2023    Procedure: Lumbar epidural steroid injection at L5-S1 50815;  Surgeon: Shey Aranda MD;  [facility];  Service: Pain Management;  Laterality: N/A;    LUMBAR EPIDURAL INJECTION N/A 03/13/2024    Procedure: lumbar epidural steroid injection at L4/5 99369;  Surgeon: Shey Aranda MD;  Location: [facility];  Service: Pain Management;  Laterality: N/A;    NISSEN FUNDOPLICATION LAPAROSCOPIC      x2    ORTHOPEDIC SURGERY      Knee replacement    SACROILIAC JOINT INJECTION Left 05/12/2023    Procedure: Left SACROILIAC INJECTION 01156;  Surgeon: Shey Aranda MD;  Location: [facility];  Service: Pain Management;  Laterality: Left;    SACROILIAC JOINT INJECTION Left 07/12/2023    Procedure: SACROILIAC INJECTION LEFT;  Surgeon: Shey Aranda MD;  Location: [facility];  Service: Pain Management;  Laterality: Left;    TONSILLECTOMY      TONSILLECTOMY  1954?    TOTAL KNEE ARTHROPLASTY Left     TUBAL ABDOMINAL LIGATION      UPPER GASTROINTESTINAL ENDOSCOPY        General Information       Row Name 08/01/24 1359          Physical Therapy Time and Intention    Document Type evaluation  -BR     Mode of Treatment physical therapy  -BR       Row Name 08/01/24 4233          General Information    Patient Profile Reviewed yes  -BR     Prior Level of Function independent:;all household mobility;community mobility;gait;transfer;driving  -BR     Existing Precautions/Restrictions spinal;fall;oxygen therapy device and L/min  -BR     Barriers to Rehab medically complex  -BR       Row Name 08/01/24 1357          Living Environment    People in Home spouse  Spouse has had a stroke in past and unable to physically assist the pt.  -BR       Row Name 08/01/24 1354          Home Main Entrance    Number of Stairs, Main Entrance none  -BR       Row Name 08/01/24 1354          Stairs Within Home, Primary    Number of Stairs, Within Home, Primary none  -BR       Row Name 08/01/24 2155           Cognition    Orientation Status (Cognition) oriented x 4  -BR       Row Name 08/01/24 1354          Safety Issues, Functional Mobility    Impairments Affecting Function (Mobility) range of motion (ROM);balance;strength;endurance/activity tolerance;pain  -BR               User Key  (r) = Recorded By, (t) = Taken By, (c) = Cosigned By      Initials Name Provider Type    Romina Calles PT Physical Therapist                   Mobility       Row Name 08/01/24 1355          Bed Mobility    Bed Mobility supine-sit  -BR     Supine-Sit [place] (Bed Mobility) moderate assist (50% patient effort);verbal cues;1 person to manage equipment  -BR     Comment, (Bed Mobility) Pt required extended time and moves very slowly and guarded  -BR       Row Name 08/01/24 1355          Bed-Chair Transfer    Bed-Chair [place] (Transfers) minimum assist (75% patient effort);moderate assist (50% patient effort);1 person assist  -BR     Assistive Device (Bed-Chair Transfers) walker, front-wheeled  -BR     Comment, (Bed-Chair Transfer) Pt transferred bed to BSC  -BR       Row Name 08/01/24 1355          Sit-Stand Transfer    Sit-Stand [place] (Transfers) minimum assist (75% patient effort);verbal cues  -BR     Assistive Device (Sit-Stand Transfers) walker, front-wheeled  -BR       Row Name 08/01/24 1355          Gait/Stairs (Locomotion)    [place] Level (Gait) minimum assist (75% patient effort);moderate assist (50% patient effort)  -BR     Assistive Device (Gait) walker, front-wheeled  -BR     Patient was able to Ambulate yes  -BR     Distance in Feet (Gait) 6  -BR     Deviations/Abnormal Patterns (Gait) edwin decreased;weight shifting decreased;stride length decreased  -BR     Bilateral Gait Deviations heel strike decreased;forward flexed posture  -BR               User Key  (r) = Recorded By, (t) = Taken By, (c) = Cosigned By      Initials Name Provider Type    Romina Calles PT Physical Therapist                    Obj/Interventions       Row Name 08/01/24 1357          Range of Motion Comprehensive    General Range of Motion bilateral lower extremity ROM WFL  -BR       Row Name 08/01/24 1357          Strength Comprehensive (MMT)    Comment, General Manual Muscle Testing (MMT) Assessment BLE strength grossly 3+/5  -BR       Row Name 08/01/24 1357          Balance    Balance Assessment sitting static balance;sitting dynamic balance;standing static balance  -BR     Static Sitting Balance standby assist  -BR     Dynamic Sitting Balance minimal assist  -BR     Position, Sitting Balance unsupported;sitting edge of bed  -BR     Static Standing Balance minimal assist;moderate assist  -BR     Dynamic Standing Balance unable to assess  -BR     Position/Device Used, Standing Balance supported;walker, rolling  -BR       Row Name 08/01/24 1357          Sensory Assessment (Somatosensory)    Sensory Assessment (Somatosensory) LE sensation intact  -BR               User Key  (r) = Recorded By, (t) = Taken By, (c) = Cosigned By      Initials Name Provider Type    BR Romina Gallardo, PT Physical Therapist                   Goals/Plan       Row Name 08/01/24 1412          Bed Mobility Goal 1 (PT)    Activity/Assistive Device (Bed Mobility Goal 1, PT) bed mobility activities, all  -BR     [place] Level/Cues Needed (Bed Mobility Goal 1, PT) modified independence  -BR     Time Frame (Bed Mobility Goal 1, PT) long term goal (LTG);2 weeks  -BR       Row Name 08/01/24 1412          Transfer Goal 1 (PT)    Activity/Assistive Device (Transfer Goal 1, PT) transfers, all  -BR     [place] Level/Cues Needed (Transfer Goal 1, PT) modified independence  -BR     Time Frame (Transfer Goal 1, PT) long term goal (LTG);2 weeks  -BR       Row Name 08/01/24 1412          Gait Training Goal 1 (PT)    Activity/Assistive Device (Gait Training Goal 1, PT) gait (walking locomotion);assistive device use  -BR     [place] Level (Gait Training  Goal 1, PT) modified independence  -BR     Distance (Gait Training Goal 1, PT) 150  -BR     Time Frame (Gait Training Goal 1, PT) long term goal (LTG);2 weeks  -BR       Row Name 08/01/24 1412          Therapy Assessment/Plan (PT)    Planned Therapy Interventions (PT) balance training;bed mobility training;gait training;home exercise program;patient/family education;transfer training;strengthening;ROM (range of motion)  -BR               User Key  (r) = Recorded By, (t) = Taken By, (c) = Cosigned By      Initials Name Provider Type    BR Romina Gallardo, PT Physical Therapist                   Clinical Impression       Row Name 08/01/24 1352          Pain    Pretreatment Pain Rating 6/10  -BR     Posttreatment Pain Rating 8/10  -BR     Pain Location - back  -BR       Row Name 08/01/24 9213          Plan of Care Review    Plan of Care Reviewed With patient  -BR     Outcome Evaluation Pt presents as a 75 y/o F POD#2 for elective L3-S1 TLIF per Dr. Rice. Patient had a CSF leak during surgery and was flat during night and in AM POD#1. Pt does not use O2 at baseline and is presently requiring O2 at 3 L. Pt lethargic and O x 4. At baseline, pt lives with spouse in [facility] with 0 PATTI and she uses no AD for independence with community mobility; pt drives. Pt agreeable to mobilize with encouragement and education on importance of being out of bed. Pt was educated on spine precautions. Pt requiring mod assist of 1 for supine to sit with very slow and guarded movements. Pt transferred over to [facility] with rolling walker and min/mod assist of 1 with 100% verbal cueing. Pt required total assist for perianal hygiene. Pt ambulated 6 feet with rolling walker with min/mod assist of 1 with slow edwin, FF posture and decreased step length BLE. After resting in recliner , pt was encouraged to ambulate again but pt declined stating she was unable to tolerate it. Pt is progressing slower than anticipated for TLIF. Pt is far below her  baseline for all areas of mobility. PT will follow pt with recommendation of In-patient  Rehabilitation Facility.  -BR       Row Name 08/01/24 1358          Therapy Assessment/Plan (PT)    Rehab Potential (PT) good, to achieve stated therapy goals  -BR     Criteria for Skilled Interventions Met (PT) yes;meets criteria;skilled treatment is necessary  -BR     Therapy Frequency (PT) daily  -BR     Predicted Duration of Therapy Intervention (PT) until D/C  -BR       Row Name 08/01/24 1358          Vital Signs    Pre SpO2 (%) 98  -BR     O2 Delivery Pre Treatment nasal cannula  3L  -BR     Intra SpO2 (%) 88  -BR     O2 Delivery Intra Treatment nasal cannula  3L  -BR     Post SpO2 (%) 96  -BR     O2 Delivery Post Treatment nasal cannula  3L  -BR     Pre Patient Position Supine  -BR     Intra Patient Position Standing  -BR     Post Patient Position Sitting  -BR       Row Name 08/01/24 1358          Positioning and Restraints    Pre-Treatment Position in bed  -BR     Post Treatment Position chair  -BR     In Chair notified nsg;reclined;call light within reach;encouraged to call for assist;exit alarm on;legs elevated  -BR               User Key  (r) = Recorded By, (t) = Taken By, (c) = Cosigned By      Initials Name Provider Type    BR Romina Gallardo, PT Physical Therapist                   Outcome Measures       Row Name 08/01/24 1412          How much help from another person do you currently need...    Turning from your back to your side while in flat bed without using bedrails? 3  -BR     Moving from lying on back to sitting on the side of a flat bed without bedrails? 2  -BR     Moving to and from a bed to a chair (including a wheelchair)? 2  -BR     Standing up from a chair using your arms (e.g., wheelchair, bedside chair)? 2  -BR     Climbing 3-5 steps with a railing? 1  -BR     To walk in hospital room? 2  -BR     AM-PAC 6 Clicks Score (PT) 12  -BR     Highest Level of Mobility Goal 4 --> Transfer to chair/commode   -BR       Row Name 08/01/24 1412          Functional Assessment    Outcome Measure Options AM-PAC 6 Clicks Basic Mobility (PT)  -BR               User Key  (r) = Recorded By, (t) = Taken By, (c) = Cosigned By      Initials Name Provider Type    BR Romina Gallardo, PT Physical Therapist                                 Physical Therapy Education       Title: PT OT SLP Therapies (Done)       Topic: Physical Therapy (Done)       Point: Mobility training (Done)       Learning Progress Summary             Patient Acceptance, E,D,H, VU,DU,NR by BR at 8/1/2024 1413                         Point: Body mechanics (Done)       Learning Progress Summary             Patient Acceptance, E,D,H, VU,DU,NR by BR at 8/1/2024 1413                         Point: Precautions (Done)       Learning Progress Summary             Patient Acceptance, E,D,H, VU,DU,NR by BR at 8/1/2024 1413                                         User Key       Initials Effective Dates Name Provider Type Discipline     02/01/22 -  Romina Gallardo PT Physical Therapist PT                  PT Recommendation and Plan  Planned Therapy Interventions (PT): balance training, bed mobility training, gait training, home exercise program, patient/family education, transfer training, strengthening, ROM (range of motion)  Plan of Care Reviewed With: patient  Outcome Evaluation: Pt presents as a 75 y/o F POD#2 for elective L3-S1 TLIF per Dr. Rice. Patient had a CSF leak during surgery and was flat during night and in AM POD#1. Pt does not use O2 at baseline and is presently requiring O2 at 3 L. Pt lethargic and O x 4. At baseline, pt lives with spouse in [facility] with 0 PATTI and she uses no AD for independence with community mobility; pt drives. Pt agreeable to mobilize with encouragement and education on importance of being out of bed. Pt was educated on spine precautions. Pt requiring mod assist of 1 for supine to sit with very slow and guarded movements. [place] [facility] [place] and min/mod assist of 1 with 100% verbal cueing. Pt required total assist for perianal hygiene. Pt ambulated 6 feet with rolling walker with min/mod assist of 1 with slow edwin, FF posture and decreased step length BLE. After resting in recliner , pt was encouraged to ambulate again but pt declined stating she was unable to tolerate it. Pt is progressing slower than anticipated for TLIF. Pt is far below her baseline for all areas of mobility. PT will follow pt with recommendation of In-patient  Rehabilitation Facility.     Time Calculation:         PT Charges       Row Name 08/01/24 1413             Time Calculation    Start Time 0951  -BR      Stop Time 1026  -BR      Time Calculation (min) 35 min  -BR      PT Received On 08/01/24  -BR      PT - Next Appointment 08/02/24  -BR      PT Goal Re-Cert Due Date 08/15/24  -BR         Time Calculation- PT    Total Timed Code Minutes- PT 0 minute(s)  -BR                User Key  (r) = Recorded By, (t) = Taken By, (c) = Cosigned By      Initials Name Provider Type    BR Romina Gallardo, PT Physical Therapist                  Therapy Charges for Today       Code Description Service Date Service Provider Modifiers Qty    [id number] HC PT EVAL MOD COMPLEXITY 4 8/1/2024 Romina Gallardo, PT GP 1            PT G-Codes  Outcome Measure Options: AM-PAC 6 Clicks Basic Mobility (PT)  AM-PAC 6 Clicks Score (PT): 12  AM-PAC 6 Clicks Score (OT): 19  PT Discharge Summary  Anticipated Discharge Disposition (PT): inpatient rehabilitation facility    Romina Gallardo PT  8/1/2024

## 2024-08-01 NOTE — CASE MANAGEMENT/SOCIAL WORK
Continued Stay Note  [facility]     Patient Name: Jemima Bell  MRN: [id number]  Today's Date: 8/1/2024    Admit Date: 7/30/2024    Plan: DC PLAN: Referral to [facility], awaiting response. No precert. No PASRR.   May need Transport       Discharge Plan       Row Name 08/01/24 1425       Plan    Plan DC PLAN: Referral to [facility], awaiting response. No precert. No PASRR.   May need Transport        Patient/Family in Agreement with Plan yes    Plan Comments Went and spoke with patient regarding inpatient rehab. Agreeable. DCP report sent to [facility] and message sent to liasion per patient request. Awaiting response. May need transport at [place].      Row Name 08/01/24 1424       Plan    Plan DC PLAN:  Referral to 1. [facility] 2.[facility], awaiting responses    Patient/Family in Agreement with Plan yes                      Expected Discharge Date and Time       Expected Discharge Date Expected Discharge Time    Aug 1, 2024           Marie Augustine RN    Case Management.      [phone]

## 2024-08-01 NOTE — PLAN OF CARE
Goal Outcome Evaluation:         Pt VSS, A&O x4. Pt very painful today, treated per MAR. Pt able to work with PT and OT this morning. Safety precautions in place, call light within reach. Plan of care ongoing.

## 2024-08-01 NOTE — DISCHARGE PLACEMENT REQUEST
"Jemima Aguilar \"Kim\" (74 y.o. Female)       Date of Birth   1950    Social Security Number       Address   [address] [place], [place] [address]    Home Phone   [phone]    MRN   [id number]       Sabianism   Non-Buddhism    Marital Status                               Admission Date   7/30/24    Admission Type   Elective    Admitting Provider   Michelet Rice IV, MD    Attending Provider   Michelet Rice IV, MD    Department, Room/Bed   [facility] SURGICAL INPATIENT, 4131/1       Discharge Date       Discharge Disposition       Discharge Destination                                 Attending Provider: Michelet Rice IV, MD    Allergies: Salicylates, Colestipol, Biaxin [Clarithromycin], Adhesive Tape, Augmentin [Amoxicillin-pot Clavulanate], Prevacid [Lansoprazole], Sulfa Antibiotics, Sulfamethoxazole-trimethoprim    Isolation: None   Infection: None   Code Status: Not on file    Ht: 157.5 cm (62\")   Wt: 84.7 kg (186 lb 12.8 oz)    Admission Cmt: None   Principal Problem: Lumbar stenosis with neurogenic claudication [M48.062]                   Active Insurance as of 7/30/2024       Primary Coverage       Payor Plan Insurance Group Employer/Plan Group    MEDICARE MEDICARE A & B        Payor Plan Address Payor Plan Phone Number Payor Plan Fax Number Effective Dates    [address] [phone]  2/1/2014 - None Entered    [facility] [id number]         Subscriber Name Subscriber Birth Date Member ID       JEMIMA AGUILAR 1950 [id number]               Secondary Coverage       Payor Plan Insurance Group Employer/Plan Group    HUMANA [employer] [facility]              [id number]       Payor Plan Address Payor Plan Phone Number Payor Plan Fax Number Effective Dates    [address] [address]   1/1/2022 - None Entered    [facility] [id number]         Subscriber Name Subscriber Birth Date Member ID       JEMIMA AGUILAR 1950 [id number]                     Emergency Contacts  "       (Rel.) Home Phone Work Phone Mobile Phone    Junaid Bell (Spouse) -- -- [phone]    Izzy Suggs (Daughter) -- -- [phone]

## 2024-08-01 NOTE — PROGRESS NOTES
[facility] MEDICINE SERVICE  DAILY PROGRESS NOTE    NAME: Jemima Bell  : 1950  MRN: [id number]      LOS: 2 days     PROVIDER OF SERVICE: Luther Hopper MD    Chief Complaint: Lumbar stenosis with neurogenic claudication    Subjective:     Interval History:  History taken from: patient    History of Present Illness: 74-year-old female with a past medical history of GERD, status post Nissen fundoplication, hypothyroidism, depression with anxiety, hypertension, lumbar stenosis with neurogenic claudication who underwent today L3-S1 robotic lumbar fusion.  She is awake and alert and answering questions appropriately.  She is currently tolerating rating p.o. fluids with no complaints.  Family member at bedside.     24 seen in bed no acute distress, vital signs stable, patient complaining of back pain.Discussed with RN,  24 seen in bed NAD, c/o back pain, Dw RN vss    Review of Systems  Constitutional: Negative.    HENT: Negative.     Eyes: Negative.    Respiratory: Negative.     Cardiovascular: Negative.    Gastrointestinal: Negative.    Endocrine: Negative.    Genitourinary: Negative.    Musculoskeletal:  Positive for back pain.   Skin: Negative.    Allergic/Immunologic: Negative.    Neurological: Negative.    Hematological: Negative.    Psychiatric/Behavioral: Negative.     All other systems reviewed and are negative.     Objective:     Vital Signs  Temp:  [97.8 °F (36.6 °C)-99.6 °F (37.6 °C)] 99.6 °F (37.6 °C)  Heart Rate:  [51-67] 67  Resp:  [16-18] 16  BP: ()/(50-67) 114/61  Flow (L/min):  [4] 4   Body mass index is 34.17 kg/m².    Physical Exam     Appearance: Normal appearance. She is obese.   HENT:      Head: Normocephalic and atraumatic.      Right Ear: External ear normal.      Left Ear: External ear normal.      Nose: Nose normal.      Mouth/Throat:      Mouth: Mucous membranes are moist.   Eyes:      Extraocular Movements: Extraocular movements intact.    Cardiovascular:      Rate and Rhythm: Normal rate and regular rhythm.      Pulses: Normal pulses.   Pulmonary:      Effort: Pulmonary effort is normal.      Breath sounds: Normal breath sounds.   Abdominal:      Palpations: Abdomen is soft.   Genitourinary:     Comments: deferred  Musculoskeletal:         General: Normal range of motion.      Cervical back: Normal range of motion and neck supple.      Comments: bedrest   Skin:     General: Skin is warm and dry.   Neurological:      General: No focal deficit present.      Mental Status: She is alert and oriented to person, place, and time.   Psychiatric:         Mood and Affect: Mood normal.         Behavior: Behavior normal.         Thought Content: Thought content normal.         Judgment: Judgment normal.         Scheduled Meds   bisoprolol-hydrochlorothiazide, 1 tablet, Oral, Daily  enoxaparin, 40 mg, Subcutaneous, Daily  levothyroxine, 100 mcg, Oral, Q AM  pantoprazole, 40 mg, Oral, BID  pregabalin, 50 mg, Oral, BID  sertraline, 100 mg, Oral, Daily  sodium chloride, 10 mL, Intravenous, Q12H       PRN Meds     acetaminophen **OR** acetaminophen **OR** acetaminophen    albuterol sulfate HFA    senna-docusate sodium **AND** polyethylene glycol **AND** bisacodyl **AND** bisacodyl    HYDROcodone-acetaminophen    HYDROmorphone **AND** naloxone    hydrOXYzine    melatonin    methocarbamol    ondansetron ODT    sodium chloride    sodium chloride   Infusions         Diagnostic Data    Results from last 7 days   Lab Units 07/31/24  0346   WBC 10*3/mm3 8.67   HEMOGLOBIN g/dL 10.8*   HEMATOCRIT % 34.2   PLATELETS 10*3/mm3 205   GLUCOSE mg/dL 126*   CREATININE mg/dL 0.75   BUN mg/dL 12   SODIUM mmol/L 141   POTASSIUM mmol/L 4.4   ANION GAP mmol/L 7.3       No radiology results for the last day      I reviewed the patient's new clinical results.    Assessment/Plan:     Active and Resolved Problems  Active Hospital Problems    Diagnosis  POA    **Lumbar stenosis with  neurogenic claudication [M48.062]  Yes    Gastroesophageal reflux disease [K21.9]  Yes    Borderline systolic HTN [R03.0]  Yes    Depression with anxiety [F41.8]  Yes    Hypothyroidism [E03.9]  Yes      Resolved Hospital Problems   No resolved problems to display.     Lumbar stenosis with neurogenic claudication status post today robotic lumbar fusion,   -being followed by neurosurgery,   lumbar 3 to sacral 1 transforaminal lumbar interbody fusion, lumbar 3 to sacral 1 fusion   - Continue pain medication   - Bowel regimen as needed  - PT/OT evaluation  - Standing x-rays   -pain management per neurosurgery on cefazolin IV postoperatively      GERD, status post Nissen fundoplication, on home Protonix     Borderline systolic hypertension, on home bisoprolol hydrochlorothiazide, monitor BP     Depression with anxiety, on home Zoloft     Hypothyroidism, on home levothyroxine    VTE Prophylaxis:  Pharmacologic & mechanical VTE prophylaxis orders are present.         Code status is   There are no questions and answers to display.       Plan for disposition:home in 1 day    Time: 30 minutes    Signature: Electronically signed by Luther Hopper MD, 08/01/24, 11:38 EDT.  [facility] Hospitalist Team

## 2024-08-02 ENCOUNTER — APPOINTMENT (OUTPATIENT)
Dept: GENERAL RADIOLOGY | Facility: HOSPITAL | Age: 74
End: 2024-08-02
Payer: MEDICARE

## 2024-08-02 VITALS
TEMPERATURE: 97.5 F | DIASTOLIC BLOOD PRESSURE: 69 MMHG | OXYGEN SATURATION: 90 % | BODY MASS INDEX: 34.37 KG/M2 | RESPIRATION RATE: 14 BRPM | HEART RATE: 62 BPM | HEIGHT: 62 IN | WEIGHT: 186.8 LBS | SYSTOLIC BLOOD PRESSURE: 121 MMHG

## 2024-08-02 PROCEDURE — 97530 THERAPEUTIC ACTIVITIES: CPT

## 2024-08-02 PROCEDURE — 72100 X-RAY EXAM L-S SPINE 2/3 VWS: CPT

## 2024-08-02 PROCEDURE — 97112 NEUROMUSCULAR REEDUCATION: CPT

## 2024-08-02 PROCEDURE — 63710000001 ONDANSETRON ODT 4 MG TABLET DISPERSIBLE

## 2024-08-02 PROCEDURE — 97116 GAIT TRAINING THERAPY: CPT

## 2024-08-02 PROCEDURE — 99024 POSTOP FOLLOW-UP VISIT: CPT

## 2024-08-02 PROCEDURE — 97535 SELF CARE MNGMENT TRAINING: CPT

## 2024-08-02 RX ORDER — HYDROCODONE BITARTRATE AND ACETAMINOPHEN 5; 325 MG/1; MG/1
1 TABLET ORAL EVERY 6 HOURS PRN
Qty: 20 TABLET | Refills: 0 | Status: SHIPPED | OUTPATIENT
Start: 2024-08-02

## 2024-08-02 RX ADMIN — SENNOSIDES AND DOCUSATE SODIUM 2 TABLET: 50; 8.6 TABLET ORAL at 04:43

## 2024-08-02 RX ADMIN — SERTRALINE 100 MG: 100 TABLET, FILM COATED ORAL at 08:34

## 2024-08-02 RX ADMIN — PREGABALIN 50 MG: 25 CAPSULE ORAL at 08:34

## 2024-08-02 RX ADMIN — LEVOTHYROXINE SODIUM 100 MCG: 0.1 TABLET ORAL at 04:44

## 2024-08-02 RX ADMIN — HYDROCODONE BITARTRATE AND ACETAMINOPHEN 1 TABLET: 5; 325 TABLET ORAL at 04:39

## 2024-08-02 RX ADMIN — BISOPROLOL FUMARATE AND HYDROCHLOROTHIAZIDE 1 TABLET: 5; 6.25 TABLET, FILM COATED ORAL at 08:43

## 2024-08-02 RX ADMIN — HYDROCODONE BITARTRATE AND ACETAMINOPHEN 1 TABLET: 5; 325 TABLET ORAL at 00:18

## 2024-08-02 RX ADMIN — METHOCARBAMOL 750 MG: 750 TABLET ORAL at 10:51

## 2024-08-02 RX ADMIN — HYDROCODONE BITARTRATE AND ACETAMINOPHEN 1 TABLET: 5; 325 TABLET ORAL at 08:43

## 2024-08-02 RX ADMIN — PANTOPRAZOLE SODIUM 40 MG: 40 TABLET, DELAYED RELEASE ORAL at 08:34

## 2024-08-02 RX ADMIN — Medication 10 ML: at 08:43

## 2024-08-02 RX ADMIN — ONDANSETRON 4 MG: 4 TABLET, ORALLY DISINTEGRATING ORAL at 10:47

## 2024-08-02 NOTE — PROGRESS NOTES
[facility] MEDICINE SERVICE  DAILY PROGRESS NOTE    NAME: Jemima Bell  : 1950  MRN: [id number]      LOS: 3 days     PROVIDER OF SERVICE: Luther Hopper MD    Chief Complaint: Lumbar stenosis with neurogenic claudication    Subjective:     Interval History:  History taken from: patient    History of Present Illness: 74-year-old female with a past medical history of GERD, status post Nissen fundoplication, hypothyroidism, depression with anxiety, hypertension, lumbar stenosis with neurogenic claudication who underwent today L3-S1 robotic lumbar fusion.  She is awake and alert and answering questions appropriately.  She is currently tolerating rating p.o. fluids with no complaints.  Family member at bedside.     24 seen in bed no acute distress, vital signs stable, patient complaining of back pain.Discussed with RN,  24 seen in bed NAD, c/o back pain, Dw RN vss  24-seen in bed no acute distress, vital signs stable, discussed with RN.  Patient is in less pain than yesterday and tolerated her physical therapy well. Patient has no radicular symptoms this morning.     Review of Systems  Constitutional: Negative.    HENT: Negative.     Eyes: Negative.    Respiratory: Negative.     Cardiovascular: Negative.    Gastrointestinal: Negative.    Endocrine: Negative.    Genitourinary: Negative.    Musculoskeletal:  Positive for back pain.   Skin: Negative.    Allergic/Immunologic: Negative.    Neurological: Negative.    Hematological: Negative.    Psychiatric/Behavioral: Negative.     All other systems reviewed and are negative.     Objective:     Vital Signs  Temp:  [97.5 °F (36.4 °C)-98.5 °F (36.9 °C)] 97.5 °F (36.4 °C)  Heart Rate:  [54-62] 62  Resp:  [14-18] 14  BP: (121-126)/(58-73) 121/69  Flow (L/min):  [2-4] 2   Body mass index is 34.17 kg/m².    Physical Exam     Appearance: Normal appearance. She is obese.   HENT:      Head: Normocephalic and atraumatic.      Right Ear:  External ear normal.      Left Ear: External ear normal.      Nose: Nose normal.      Mouth/Throat:      Mouth: Mucous membranes are moist.   Eyes:      Extraocular Movements: Extraocular movements intact.   Cardiovascular:      Rate and Rhythm: Normal rate and regular rhythm.      Pulses: Normal pulses.   Pulmonary:      Effort: Pulmonary effort is normal.      Breath sounds: Normal breath sounds.   Abdominal:      Palpations: Abdomen is soft.   Genitourinary:     Comments: deferred  Musculoskeletal:         General: Normal range of motion.      Cervical back: Normal range of motion and neck supple.      Comments: bedrest   Skin:     General: Skin is warm and dry.   Neurological:      General: No focal deficit present.      Mental Status: She is alert and oriented to person, place, and time.   Psychiatric:         Mood and Affect: Mood normal.         Behavior: Behavior normal.         Thought Content: Thought content normal.         Judgment: Judgment normal.         Scheduled Meds   bisoprolol-hydrochlorothiazide, 1 tablet, Oral, Daily  enoxaparin, 40 mg, Subcutaneous, Daily  levothyroxine, 100 mcg, Oral, Q AM  pantoprazole, 40 mg, Oral, BID  pregabalin, 50 mg, Oral, BID  sertraline, 100 mg, Oral, Daily  sodium chloride, 10 mL, Intravenous, Q12H       PRN Meds     acetaminophen **OR** acetaminophen **OR** acetaminophen    albuterol sulfate HFA    senna-docusate sodium **AND** polyethylene glycol **AND** bisacodyl **AND** bisacodyl    HYDROcodone-acetaminophen    HYDROmorphone **AND** naloxone    hydrOXYzine    melatonin    methocarbamol    ondansetron ODT    sodium chloride    sodium chloride   Infusions         Diagnostic Data    Results from last 7 days   Lab Units 07/31/24  0346   WBC 10*3/mm3 8.67   HEMOGLOBIN g/dL 10.8*   HEMATOCRIT % 34.2   PLATELETS 10*3/mm3 205   GLUCOSE mg/dL 126*   CREATININE mg/dL 0.75   BUN mg/dL 12   SODIUM mmol/L 141   POTASSIUM mmol/L 4.4   ANION GAP mmol/L 7.3       XR Spine  Lumbar 2 or 3 View    Result Date: 8/2/2024  Impression:  Interval L3-S1 posterior spinal fusion. No evidence of hardware complication. Satisfactory lumbar alignment. Electronically Signed: Lali Orlando MD  8/2/2024 11:26 AM EDT  Workstation ID: [id number]       I reviewed the patient's new clinical results.    Assessment/Plan:     Active and Resolved Problems  Active Hospital Problems    Diagnosis  POA    **Lumbar stenosis with neurogenic claudication [M48.062]  Yes    Gastroesophageal reflux disease [K21.9]  Yes    Borderline systolic HTN [R03.0]  Yes    Depression with anxiety [F41.8]  Yes    Hypothyroidism [E03.9]  Yes      Resolved Hospital Problems   No resolved problems to display.     Lumbar stenosis with neurogenic claudication status post today robotic lumbar fusion,   -being followed by neurosurgery,   lumbar 3 to sacral 1 transforaminal lumbar interbody fusion, lumbar 3 to sacral 1 fusion   - Continue pain medication   - Bowel regimen as needed  - PT/OT evaluation  - Standing x-rays   -pain management per neurosurgery on cefazolin IV postoperatively      GERD, status post Nissen fundoplication, on home Protonix     Borderline systolic hypertension, on home bisoprolol hydrochlorothiazide, monitor BP     Depression with anxiety, on home Zoloft     Hypothyroidism, on home levothyroxine    VTE Prophylaxis:  Pharmacologic & mechanical VTE prophylaxis orders are present.         Code status is   There are no questions and answers to display.       Plan for disposition:home in 1 day    Time: 30 minutes    Signature: Electronically signed by Luther Hopper MD, 08/02/24, 12:40 EDT.  [facility] Hospitalist Team

## 2024-08-02 NOTE — DISCHARGE SUMMARY
Discharge Summary    Patient: Jemima Bell  : 1950    Patient Care Team:  Kehrer, Meredith Lea, MD as PCP - General (Family Medicine)    Date of Admit: 2024    Date of Discharge:  2024    Discharge Diagnosis:  Lumbar stenosis with neurogenic claudication    Hypothyroidism    Depression with anxiety    Borderline systolic HTN    Gastroesophageal reflux disease      Procedures Performed  Procedure(s):  lumbar 3 to sacral 1 transforaminal lumbar interbody fusion, lumbar 3 to sacral 1 fusion with neuro robot  LUMBAR TRANSFORAMINAL INTERBODY FUSION WITH NEURO ROBOT       Complications: None     Consultants:   Consults       Date and Time Order Name Status Description    2024  6:09 PM Inpatient Hospitalist Consult Completed             Condition on Discharge: stable    Discharge disposition: home    HPI: Jemima Bell is a 74 y.o. female who presented with   low back pain radiculopathy and neurogenic claudication. Patient failed all conservative measures. Patient was made aware of all potential risks with surgery and agreed to proceed.    Hospital Course: Patient admitted for above procedure. The patient was transferred to SIPS/ICU following recovery. Patient did well during surgery. Patient worked with PT and had standing x-rays. Patient is stable at this time and has a bed available for rehab.     Vitals:    24 1127   BP: 121/69   Pulse: 62   Resp: 14   Temp: 97.5 °F (36.4 °C)   SpO2: 90%         Lab Results (last 24 hours)       ** No results found for the last 24 hours. **            XR Spine Lumbar 2 or 3 View    Result Date: 2024  XR SPINE LUMBAR 2 OR 3 VW Date of Exam: 2024 11:06 AM EDT Indication: s/p L3-S1 fusion standing xray - post-op lumbar fusion Comparison: CT lumbar spine 2024. Findings: L3-S1 pedicle screw and vertical fusion bobby fixation with L3-4, L4-5 and L5-S1 disc prosthesis placement, new since the CT of 2024. The hardware appears  appropriately positioned and appears intact. Lumbar alignment appears normal. Nonsurgical levels  maintain normal disc space height. No acute osseous abnormality. Sacral stimulator device incidentally noted. Cholecystectomy     Impression: Impression:  Interval L3-S1 posterior spinal fusion. No evidence of hardware complication. Satisfactory lumbar alignment. Electronically Signed: Lali Orlando MD  8/2/2024 11:26 AM EDT  Workstation ID: [id number]    XR Spine Lumbar 2 or 3 View    Result Date: 7/30/2024  This procedure was auto-finalized with no dictation required.   CT Lumbar Spine Without Contrast    Result Date: 7/25/2024  CT LUMBAR SPINE WO CONTRAST Date of Exam: 7/22/2024 5:23 PM EDT Indication: Globus robot prep protocol. Comparison: Lumbar spine CT 4/18/2024 Technique: Axial CT images were obtained of the lumbar spine without contrast administration.  Sagittal and coronal reconstructions were performed.  Automated exposure control and iterative reconstruction methods were used. Findings: There are 5 nonrib-bearing lumbar-type vertebral bodies. The lumbar vertebral bodies are maintained in height. There is no acute fracture or aggressive osseous lesion. Multilevel disc desiccation and vacuum disc at L1-2 through L4-5. There is moderate disc narrowing at L1-2. Mild disc narrowing at L2-3 and L3-4. Severe disc narrowing with endplate sclerosis at L4-5. The posterior spinous processes are intact. Transverse spinous processes are intact. The included portions of the pelvis are intact. Normal adrenal glands. Bilateral peripelvic renal cysts. Nonobstructing 3 mm right lower pole renal calculus. Urinary bladder is thin-walled. The uterus is absent. There is no adnexal mass. The gallbladder is absent. Moderate vascular calcifications of the aorta and branch vasculature without aortic aneurysm. Sacral stimulator lead noted on the right. T11-12: Negative for disc bulge. No canal stenosis or foraminal stenosis. L1-2:  Disc desiccation with a circumferential disc osteophyte partially effacing the ventral thecal sac. Normal facet alignment. Negative for right foraminal stenosis. Mild left foraminal stenosis. L2-3: Disc desiccation with circumferential disc bulge. Facet joints normally aligned. Negative for significant canal stenosis. Mild bilateral foraminal narrowing. L3-4: Disc desiccation with moderate circumferential disc bulge. Mild facet hypertrophy and ligamentum flavum thickening. Mild central canal stenosis. Moderate left foraminal stenosis. Mild right foraminal stenosis. L4-5: Severe disc desiccation with a broad-based disc osteophyte. Mild bilateral facet arthritis and ligamentum flavum thickening. Mild central canal stenosis. Moderate bilateral foraminal stenosis. L5-S1: Severe right facet arthritis. Moderate left facet arthritis. Minimal anterolisthesis of L5 on S1 measuring 2 mm. Negative for canal stenosis. Asymmetric osseous spurring resulting in moderate to severe left foraminal stenosis. No right foraminal stenosis.     Impression: Impression: 1. Negative for acute osseous abnormality. 2. Multilevel lumbar spondylosis above. Electronically Signed: Franko Chaudhry MD  7/25/2024 8:32 AM EDT  Workstation ID: [id number]                Discharge Physical Exam:    General  - WD/WN female, appears their stated age, awake, cooperative, in no acute distress  HEENT  - Normocephalic, atraumatic, PERRLA, EOM intact  Respiratory  - Normal respiratory rate and effort  Abdomen  - Flat, soft, NT/ND  Musculoskeletal  - Moves all extremities well, no joint swelling/tenderness  Skin  - Surgical incision well approximated, clean and dry, no swelling, redness, or drainage  NEUROLOGIC  - A/O x3  - CN II-XII grossly intact  - Moves all extremities symmetrically and with good strength  - Sensation intact throughout      Discharge Medications  Inspect has been reviewed and narcotic consent is on file in the patient's chart.     Your medication  list        START taking these medications        Instructions Last Dose Given Next Dose Due   HYDROcodone-acetaminophen 5-325 MG per tablet  Commonly known as: NORCO      Take 1 tablet by mouth Every 6 (Six) Hours As Needed for Severe Pain.       naloxone 4 MG/0.1ML nasal spray  Commonly known as: NARCAN      Call 911. Don't prime. Chancellor in 1 nostril for overdose. Repeat in 2-3 minutes in other nostril if no or minimal breathing/responsiveness.              CHANGE how you take these medications        Instructions Last Dose Given Next Dose Due   fluticasone 50 MCG/ACT nasal spray  Commonly known as: FLONASE  What changed:   when to take this  reasons to take this      2 sprays into the nostril(s) as directed by provider Daily.       sertraline 100 MG tablet  Commonly known as: Zoloft  What changed: when to take this      Take 1 tablet by mouth Daily.              CONTINUE taking these medications        Instructions Last Dose Given Next Dose Due   albuterol sulfate  (90 Base) MCG/ACT inhaler  Commonly known as: PROVENTIL HFA;VENTOLIN HFA;PROAIR HFA      Inhale 2 puffs Every 4 (Four) Hours As Needed for Shortness of Air or Wheezing.       bisoprolol-hydrochlorothiazide 5-6.25 MG per tablet  Commonly known as: Ziac      Take 1 tablet by mouth Daily.       hydrOXYzine 10 MG tablet  Commonly known as: ATARAX      Take 1 tablet by mouth Every 4 (Four) Hours As Needed for Anxiety (twitching).       Ibuprofen 3 %, Gabapentin 10 %, Baclofen 2 %, lidocaine 4 %, Ketamine HCl 4 %      Apply 1-2 g topically to the appropriate area as directed 3 (Three) to 4 (Four) times daily.       levothyroxine 100 MCG tablet  Commonly known as: Synthroid      Take 1 tablet by mouth Every Morning.       lidocaine 5 %  Commonly known as: LIDODERM      Place 1 patch on the skin as directed by provider Daily. Remove & Discard patch within 12 hours or as directed by MD       methocarbamol 500 MG tablet  Commonly known as: ROBAXIN       Take 1 tablet by mouth 4 (Four) Times a Day As Needed for Muscle Spasms.       ondansetron 4 MG tablet  Commonly known as: Zofran      Take 1 tablet by mouth Every 8 (Eight) Hours As Needed for Nausea or Vomiting.       pantoprazole 40 MG EC tablet  Commonly known as: PROTONIX      Take 1 tablet by mouth 2 (Two) Times a Day.       pregabalin 50 MG capsule  Commonly known as: LYRICA      Take 1 capsule by mouth 2 (Two) Times a Day.       promethazine 25 MG tablet  Commonly known as: PHENERGAN      Take 1 tablet by mouth Every 6 (Six) Hours As Needed for Nausea or Vomiting.              STOP taking these medications      traMADol 50 MG tablet  Commonly known as: ULTRAM                  Where to Get Your Medications        These medications were sent to [address] [place] [address]      Hours: Monday to Friday 7 AM to 7 PM Phone: [phone]   HYDROcodone-acetaminophen 5-325 MG per tablet  naloxone 4 MG/0.1ML nasal spray         Discharge Diet: Regular, advance as tolerated      Activity at Discharge: No bending or twisting at the waist.  No rapid bending or twisting of the neck.  No lifting greater than 5 pounds.  No driving for 1 week.  Do not soak or submerge incision underwater for 6 weeks.      Call for: questions or concerns    Follow-up Appointments  Future Appointments   Date Time Provider Department Center   8/16/2024 11:00 AM Nimo Mary APRN [facility]   9/23/2024 12:40 PM Claire Mendoza APRN [facility]   12/23/2024  1:00 PM Kehrer, Meredith Lea, MD [facility]   6/16/2025 11:15 AM CurryiraTom wren MD [facility]      Follow-up Information       Kehrer, Meredith Lea, MD .    Specialty: Family Medicine  Contact information:  [address]  [facility] [address]  [facility] [address] [phone]                               I discussed the discharge instructions with patient    Amelia Trevino PA-C  08/02/24  13:34 EDT            Part of  this note may be an electronic transcription/translation of spoken language to printed text using the Dragon Dictation System.

## 2024-08-02 NOTE — DISCHARGE INSTRUCTIONS
LUMBAR FUSION  Post-op Guide    Dr. Michelet Rice MD                                                              POST-OP   ACTIVITY GUIDE     DAY OF SURGERY   We want you to get up and Move!    Getting out of bed soon after surgery will speed your recovery!    GOAL:  Out of bed 2 hours after awaking from surgery for your first walk  You may require assistance from nurse  A walker for stability may be used initially but briefly  Short frequent walks (5min) every 2 hours while in hospital  Engage core when getting in and out of bed   Use smart movement strategies when changing positions  Practice DEEP BREATHING while you walk  3-6 count inhale and exhale  Rely on ORAL pain medications NOT IV       ADVANTAGES:  Prevent blood clots in the legs  Prevents pneumonia through promoting deep breathing  Prevents back muscles from stiffening - will decrease pain   You CANNOT walk too much  Oral pain meds have better steady control of pain     POST-OP DAY #1   Diet / Activity / Pain Control / GO HOME    Assessments by Physical Therapy (PT) and Occupational Therapy (OT)    GOAL  Review proper spine mechanics/ restrictions  Walking up and down stairs is GOOD   PT / OT will assess when you are safe to go home  Activities of Daily Living - okay to shower, dress, navigate around house  Surgical Incision needs to be covered in the shower unless otherwise advised by Dr. Rice  No baths or hot tubs 6 weeks or until incision closed without scab.               Criteria for Discharge Home:  Cleared by PT / OT   Cleared by the Medicine Service  Adequate pain control with or without medications  Tolerating food and Liquids  Ability to urinate  Having a bowel movement IS NOT a discharge requirement unless there is a medical issue as per hospitalist   Depends on pain control, support at home, mobility    Occasionally some patients with extra care needs continue their recovery at a local rehabilitation facility (e.g. patients with minimal  home social support, additional medical needs). Hospital based  will assist with rehab placement.     ACTIVITY GUIDELINES    Careful with the BLT's for 3 months !    Bending  Engage Core when changing positions   Use smart movement strategies - Squat, kneel, support yourself using arms  Bending with bad form once or twice is NOT a problem  Surgical hardware won't break from just bending / getting out of bed  Repetitive poor bending form can loosen hardware over time  Not using core to bend can “strain” back muscles    Lifting  General weight limit for first 12 weeks is a maximum of 20-40 lbs   Anything that causes “strain” should not be lifted  Coughing, sneezing, vomiting, straining with bowel movements will not damage your surgical hardware  Lap top, gallon of milk, purse okay  Luggage, large backpack, heavy grocery bag, furniture - not okay  Lift things close to body - limit reaching to pick things up    Twisting  Turn hips, shoulders and spine as one unit  Avoid reaching across the body  Activate core during more complex movements  Remember it is repetitive poor spine mechanics not solitary actions which can harm surgical hardware placement          Driving    - Okay to consider during first 2 weeks after surgery   - MUST meet following requirements:    -You must be OFF narcotics and not under their influence     - You must be a SAFE  yourself.     - Patients may be considered to be UNSAFE if they are:     -Distracted by their pain     -Unable to sit comfortably for the required length of the journey     -Unable to use mirrors safely because of restrictions or surgical pain      ACTIVITY - FIRST 2 WEEKS:    Low Impact Aerobic Exercise   5-30min 2 times perday EVERY DAY  Walking, elliptical, stairs and/or recumbent bike  Slow safe pace, okay to do intervals (walk 4 min rest 1 min x 3-5 sets)  No hiking, speed walking or carrying more than 20lbs   FOCUS ON POSTURE, DEEP BREATHING (6 count) AND  CORE ACTIVATION    Physical Therapy  Will start after your first post-operative office visit (2 weeks)  It's Important, So, YES it's Mandatory  2 times per week x 12 weeks  We can recommend a Physical Therapist near your home  PT should help guide your core exercise program  Home exercises and low impact aerobic work should be done 4-5x per week in addition to PT   My Post-op Core Stabilization Program is the static core program we recommend your physical therapist take you though  Additional modalities are balance and light resistance band training     RETURNING TO WORK     The timescale for Return to Work will vary from patient-to-patient and depends mainly on the nature of your specific surgery and the type of work / activity you wish to re-commence.  General Guidelines:    Can work from home if needed within the first week or so of surgery  Do not work for longer than 30-45 minutes at a time without a break (standing and walking around)    Sedentary jobs (deskwork, etc)   Typically within 1-6 weeks  depends on particulars of job  driving distance   stress, etc  light duty  <10lbs weight limit, Minimal BLT - okay  If you have the option, sometimes returning to work alternate days (i.e Mon-Wed-Fri) for 1-2 weeks assists with the transition back to work.     Manual Labor  3-6 months - varies per case  depends on intensity and weight limit  must have exhibited a strong core with Level 3 or higher on core program or a note from PT reflecting a strong core        RECREATIONAL SPORTS & ACTIVITIES     After 2 weeks  swimming    After 6 weeks   hiking (no Pack, light grade); biking with upright, flat back posture    After 3 months   Must be cleared by Dr Rice and PT  Biking, jogging, resistance training, climbing, Pilates, sports specific drills, body weight interval training, golf, tennis  Core level 3 completed  Start slow and build up slowly   Do NOT go back FULL SPEED right away     After 6 moths - 1 year  Skiing,  horseback riding, ATV riding, snow mobile, etc                  Sports - Non-Contact  Minimum 12 weeks  Must have core level 3 or greater  Must have completed aerobic and resistance training   Must have successfully done sports specific drill and been asymptomatic    Sports - Contact   Varies from sport to sport  Minimum 6 Months  Must exhibit strong Core Level 4 or 5   Must exhibit signs of fusion on CT   Must have completed non-contact  sports specific drills without any symptoms      Lifelong recommendations:  Warm up before EVERY activity 5-10 minutes using Recumbent bike, Stair Master, elliptical , speed walk  Core exercises:   3 -5 x /week - forever!  10 minutes  Should follow warm up prior to activity / sport  Always know your core level

## 2024-08-02 NOTE — PLAN OF CARE
Assessment: Jemima Bell presents with functional mobility impairments which indicate the need for skilled intervention. Tolerating session today without incident. Will continue to follow and progress as tolerated.     Plan/Recommendations:   If medically appropriate, High Intensity Therapy recommended post-acute care. This is recommended as therapy feels the patient would require 5-6 days per week, 2-3 hours per day. At this time, inpatient rehabilitation (acute rehab) would be the first choice and SNF would be second. Pt requires no DME at discharge.     Pt desires Inpatient Rehabilitation placement at discharge. Pt cooperative; agreeable to therapeutic recommendations and plan of care.

## 2024-08-02 NOTE — THERAPY TREATMENT NOTE
Subjective: Pt agreeable to therapeutic plan of care.   Cognition: oriented to Person, Place, Time, and Situation    Objective:     Precautions - spinal precautions    Bed Mobility: SBA   Functional Transfers: CGA and with rolling walker     Balance: supported, static, and standing CGA  Functional Ambulation: CGA and with rolling walker    Toileting: Min-A, with king hygiene for thoroughness d/t spinal precautions   ADL Position: unsupported sitting  ADL Comments: upgraded to commode within bathroom, perineal hygiene     Grooming: Supervision  ADL Position: supported standing and at sink  ADL Comments: oral hygiene standing     Vitals: WNL    Pain: expresses little pain initially, 7 after session VAS  Location: back, incisional   Interventions for pain: Repositioned, RN notified, Increased Activity, and Therapeutic Presence  Education: Provided education on the importance of mobility in the acute care setting, Verbal/Tactile Cues, ADL training, Transfer Training, and Post-Op Precautions      Assessment: Jemima Bell presents with ADL impairments affecting function including balance, endurance / activity tolerance, pain, and postural / trunk control. Demonstrated functioning below baseline abilities indicate the need for continued skilled intervention while inpatient. Pt demo significant increase in activity tolerance this date, able to ambulate to bathroom within room and complete ADL tasks without rest break. Pt does require assist with ADLs to adhere to spinal precautions, demo good safety awareness of precautions. OT continues to recommend acute IP rehab when medically appropriate for dc. Tolerating session today without incident. Will continue to follow and progress as tolerated.     Plan/Recommendations:   High Intensity Therapy recommended post-acute care. This is recommended as therapy feels the patient would require 5-6 days per week, 2-3 hours per day. At this time, inpatient rehabilitation (acute  rehab) would be the first choice and SNF would be second.. Pt requires no DME at discharge.     Pt desires Inpatient Rehabilitation placement at discharge. Pt cooperative; agreeable to therapeutic recommendations and plan of care.     Modified [place]: N/A = No pre-op stroke/TIA    Post-Tx Position: Up in Chair, Alarms activated, and Call light and personal items within reach  PPE: gloves

## 2024-08-02 NOTE — CASE MANAGEMENT/SOCIAL WORK
Continued Stay Note   Domingo     Patient Name: Jemima Bell  MRN: [id number]  Today's Date: 8/2/2024    Admit Date: 7/30/2024    Plan: DC PLAN: [facility] accepted. No precert. No PASRR. [facility] to transport at 1530       Discharge Plan       Row Name 08/02/24 1440       Plan    Plan DC PLAN: [facility] accepted. No precert. No PASRR. [facility] to transport at 1530        Patient/Family in Agreement with Plan yes    Plan Comments Recieved message that [facility] accepted and bed available  8/2. Reached out to MD, discharge orders recieved. [facility] to transport at 1530 today                      Expected Discharge Date and Time       Expected Discharge Date Expected Discharge Time    Aug 2, 2024             Marie Augustine RN

## 2024-08-02 NOTE — THERAPY TREATMENT NOTE
Subjective: Pt agreeable to therapeutic plan of care.    Objective:     Precautions - spinal precautions    Bed mobility - Supine to sitting SBA using bedrails.     Transfers - CGA and with rolling walker    Ambulation - 2x25 feet CGA and with rolling walker. Decreased edwin.     Balance - CGA for dynamic standing balance while performing functional upright tasks at sink level.     Vitals: WNL    Pain: 9 VAS   Location: back  Intervention for pain: RN notified, Increased Activity, and Therapeutic Presence    Education: Provided education on the importance of mobility in the acute care setting, Verbal/Tactile Cues, Transfer Training, Gait Training, and Post-Op Precautions    Assessment: Jemima Bell presents with functional mobility impairments which indicate the need for skilled intervention. Tolerating session today without incident. Will continue to follow and progress as tolerated.     Plan/Recommendations:   If medically appropriate, High Intensity Therapy recommended post-acute care. This is recommended as therapy feels the patient would require 5-6 days per week, 2-3 hours per day. At this time, inpatient rehabilitation (acute rehab) would be the first choice and SNF would be second. Pt requires no DME at discharge.     Pt desires Inpatient Rehabilitation placement at discharge. Pt cooperative; agreeable to therapeutic recommendations and plan of care.     Modified [place]: N/A = No pre-op stroke/TIA    Post-Tx Position: Up in Chair, Alarms activated, and Call light and personal items within reach  PPE: gloves

## 2024-08-02 NOTE — PROGRESS NOTES
Neurosurgery Progress Note    Date: 24     Patient: Jemima Bell   Sex: female   : 1950      SUBJECTIVE    CC: Post op day 1   lumbar 3 to sacral 1 transforaminal lumbar interbody fusion, lumbar 3 to sacral 1 fusion        Interval history:  Patient is doing much better this morning. Patient is in less pain than yesterday and tolerated her physical therapy session well. Patient has no radicular symptoms this morning.     Current Medications:  Scheduled Meds:bisoprolol-hydrochlorothiazide, 1 tablet, Oral, Daily  enoxaparin, 40 mg, Subcutaneous, Daily  levothyroxine, 100 mcg, Oral, Q AM  pantoprazole, 40 mg, Oral, BID  pregabalin, 50 mg, Oral, BID  sertraline, 100 mg, Oral, Daily  sodium chloride, 10 mL, Intravenous, Q12H      Continuous Infusions:   PRN Meds:   acetaminophen **OR** acetaminophen **OR** acetaminophen    albuterol sulfate HFA    senna-docusate sodium **AND** polyethylene glycol **AND** bisacodyl **AND** bisacodyl    HYDROcodone-acetaminophen    HYDROmorphone **AND** naloxone    hydrOXYzine    melatonin    methocarbamol    ondansetron ODT    sodium chloride    sodium chloride      OBJECTIVE  Vitals:    24 [id number] 24 0404 24 0843   BP: 108/58 126/73 124/58 125/69   BP Location: Right arm Right arm Right arm    Patient Position: Lying Lying Lying    Pulse: 68 54 56    Resp: 17 18     Temp: 98.8 °F (37.1 °C) 98.5 °F (36.9 °C) 98.2 °F (36.8 °C)    TempSrc: Oral Oral Oral    SpO2: 94% 95% 96%    Weight:       Height:           Physical exam    General  - WD/WN female, appears their stated age  - Awake, cooperative, in no acute distress  HEENT  - Normocephalic, atraumatic  - PERRLA, EOM intact  Respiratory  - Normal respiratory rate and effort  Musculoskeletal  - No joint redness, swelling, or tenderness  Skin  - Warm and dry, no bleeding, bruising, or rash. Incision covered with dressing.  NEUROLOGIC  - A/O x3, GCS 4-6-5  - CN II-XII grossly intact  - Moves  all extremities symmetrically and w/ 5/5 strength  - Sensation intact throughout        Results review  CBC          5/1/2024    09:14 7/22/2024    10:04 7/31/2024    03:46   CBC   WBC 5.70  6.61  8.67    RBC 4.31  4.57  3.62    Hemoglobin 13.2  13.5  10.8    Hematocrit 39.9  40.9  34.2    MCV 92.6  89.5  94.5    MCH 30.6  29.5  29.8    MCHC 33.1  33.0  31.6    RDW 11.9  11.6  12.3    Platelets 288  294  205        BMP          6/10/2024    14:06 7/18/2024    14:25 7/31/2024    03:46   BMP   BUN 16  23  12    Creatinine 0.89  1.07  0.75    Sodium 143  141  141    Potassium 4.5  4.4  4.4    Chloride 103  104  105    CO2 27  27.0  28.7    Calcium 9.5  9.6  8.9          XR Spine Lumbar 2 or 3 View    Result Date: 7/30/2024  This procedure was auto-finalized with no dictation required.   CT Lumbar Spine Without Contrast    Result Date: 7/25/2024  CT LUMBAR SPINE WO CONTRAST Date of Exam: 7/22/2024 5:23 PM EDT Indication: Globus robot prep protocol. Comparison: Lumbar spine CT 4/18/2024 Technique: Axial CT images were obtained of the lumbar spine without contrast administration.  Sagittal and coronal reconstructions were performed.  Automated exposure control and iterative reconstruction methods were used. Findings: There are 5 nonrib-bearing lumbar-type vertebral bodies. The lumbar vertebral bodies are maintained in height. There is no acute fracture or aggressive osseous lesion. Multilevel disc desiccation and vacuum disc at L1-2 through L4-5. There is moderate disc narrowing at L1-2. Mild disc narrowing at L2-3 and L3-4. Severe disc narrowing with endplate sclerosis at L4-5. The posterior spinous processes are intact. Transverse spinous processes are intact. The included portions of the pelvis are intact. Normal adrenal glands. Bilateral peripelvic renal cysts. Nonobstructing 3 mm right lower pole renal calculus. Urinary bladder is thin-walled. The uterus is absent. There is no adnexal mass. The gallbladder is absent.  Moderate vascular calcifications of the aorta and branch vasculature without aortic aneurysm. Sacral stimulator lead noted on the right. T11-12: Negative for disc bulge. No canal stenosis or foraminal stenosis. L1-2: Disc desiccation with a circumferential disc osteophyte partially effacing the ventral thecal sac. Normal facet alignment. Negative for right foraminal stenosis. Mild left foraminal stenosis. L2-3: Disc desiccation with circumferential disc bulge. Facet joints normally aligned. Negative for significant canal stenosis. Mild bilateral foraminal narrowing. L3-4: Disc desiccation with moderate circumferential disc bulge. Mild facet hypertrophy and ligamentum flavum thickening. Mild central canal stenosis. Moderate left foraminal stenosis. Mild right foraminal stenosis. L4-5: Severe disc desiccation with a broad-based disc osteophyte. Mild bilateral facet arthritis and ligamentum flavum thickening. Mild central canal stenosis. Moderate bilateral foraminal stenosis. L5-S1: Severe right facet arthritis. Moderate left facet arthritis. Minimal anterolisthesis of L5 on S1 measuring 2 mm. Negative for canal stenosis. Asymmetric osseous spurring resulting in moderate to severe left foraminal stenosis. No right foraminal stenosis.     Impression: Impression: 1. Negative for acute osseous abnormality. 2. Multilevel lumbar spondylosis above. Electronically Signed: Franko Chaudhry MD  7/25/2024 8:32 AM EDT  Workstation ID: [id number]                ASSESSMENT/PLAN  This is a 74 y.o. female who underwent surgery. Patient is doing much better this morning and is in less pain than yesterday. Patient reports no headaches this morning. Patient did well with physical therapy and was recommended inpatient rehab. Patient is to have her standing x-rays today. We will continue to monitor while in hospital. Please reach out with any questions or concerns.    Plan:  S/P L3-S1 TLIF, L3-S1 fusion  - Continue pain meds   - Bowel regimen  as needed  - Continue PT/OT  - Standing x-rays   - Awaiting rehab acceptance       I discussed my assessment and recommendations with Dr. Dwayne Trevino PA-C  08/02/24  10:13 EDT      Part of this note may be an electronic transcription/translation of spoken language to printed text using the Dragon Dictation System.

## 2024-08-02 NOTE — PLAN OF CARE
Goal Outcome Evaluation:  Plan of Care Reviewed With: patient        Progress: improving            PLAN OF CARE ON-GOING

## 2024-08-02 NOTE — PLAN OF CARE
Assessment: Jemima Bell presents with ADL impairments affecting function including balance, endurance / activity tolerance, pain, and postural / trunk control. Demonstrated functioning below baseline abilities indicate the need for continued skilled intervention while inpatient. Pt demo significant increase in activity tolerance this date, able to ambulate to bathroom within room and complete ADL tasks without rest break. Pt does require assist with ADLs to adhere to spinal precautions, demo good safety awareness of precautions. OT continues to recommend acute IP rehab when medically appropriate for dc. Tolerating session today without incident. Will continue to follow and progress as tolerated.     Plan/Recommendations:   High Intensity Therapy recommended post-acute care. This is recommended as therapy feels the patient would require 5-6 days per week, 2-3 hours per day. At this time, inpatient rehabilitation (acute rehab) would be the first choice and SNF would be second.. Pt requires no DME at discharge.     Pt desires Inpatient Rehabilitation placement at discharge. Pt cooperative; agreeable to therapeutic recommendations and plan of care.

## 2024-08-16 ENCOUNTER — OFFICE VISIT (OUTPATIENT)
Dept: ORTHOPEDIC SURGERY | Facility: CLINIC | Age: 74
End: 2024-08-16
Payer: MEDICARE

## 2024-08-16 VITALS — HEIGHT: 62 IN | TEMPERATURE: 96.9 F | BODY MASS INDEX: 34.34 KG/M2 | WEIGHT: 186.6 LBS

## 2024-08-16 DIAGNOSIS — Z98.1 S/P LUMBAR FUSION: ICD-10-CM

## 2024-08-16 RX ORDER — METHOCARBAMOL 500 MG/1
500 TABLET, FILM COATED ORAL 4 TIMES DAILY PRN
Qty: 120 TABLET | Refills: 0 | Status: SHIPPED | OUTPATIENT
Start: 2024-08-16

## 2024-08-16 RX ORDER — HYDROCODONE BITARTRATE AND ACETAMINOPHEN 5; 325 MG/1; MG/1
1 TABLET ORAL EVERY 6 HOURS PRN
Qty: 20 TABLET | Refills: 0 | Status: SHIPPED | OUTPATIENT
Start: 2024-08-16

## 2024-08-16 NOTE — PROGRESS NOTES
Patient Name: Jemima Bell   YOB: 1950  Referring Primary Care Physician: Kehrer, Meredith Lea, MD      Chief Complaint:    Chief Complaint   Patient presents with    Lumbar Spine - Follow-up, Pain            HPI:  Jemima Bell is a 74 y.o. female who presents to [facility] for 2-week postop follow-up of L3-S1 fusion on 07/30/2024 with Dr. Rice.  She is doing well.  She has typical postop incisional pain.  She is ambulating with a walker.  Still having some bilateral leg pain with activity.  She was discharged from rehab 5 days ago and is scheduled to have home physical therapy.    PFSH:  See attached    ROS: As per HPI, otherwise negative    Objective:      74 y.o. female  Body mass index is 34.13 kg/m²., 84.6 kg (186 lb 9.6 oz), @HT@  Vitals:    08/16/24 1108   Temp: 96.9 °F (36.1 °C)     Pain Score    08/16/24 1108   PainSc:   7   PainLoc: Back            Spine Musculoskeletal Exam    Gait    Assistive device: walker    Inspection    Coronal balance: no imbalance    Sagittal balance: no imbalance    Thoracolumbar    Prior incision comment: Bilateral lumbar    Incision: clean, dry and intact    Palpation    Thoracolumbar    Right      Muscle tone: normal    Left      Muscle tone: normal    Strength    Thoracolumbar    Thoracolumbar motor exam is normal.       Sensory    Thoracolumbar    Thoracolumbar sensation is normal.        IMAGING:       No imaging in office today.  Postop films reviewed with patient.    Assessment:           Diagnoses and all orders for this visit:    1. S/P lumbar fusion  -     Ambulatory Referral to Physical Therapy for Evaluation & Treatment  -     HYDROcodone-acetaminophen (NORCO) 5-325 MG per tablet; Take 1 tablet by mouth Every 6 (Six) Hours As Needed for Severe Pain.  Dispense: 20 tablet; Refill: 0  -     CT Lumbar Spine Without Contrast; Future    Other orders  -     methocarbamol (ROBAXIN) 500 MG tablet; Take 1 tablet  by mouth 4 (Four) Times a Day As Needed for Muscle Spasms.  Dispense: 120 tablet; Refill: 0             Plan:  Two weeks postop following lumbar 3 to sacral 1 fusion. There are no red flags for epidural hematoma, cauda equina syndrome, or surgical site infection.  No fever, chills or signs of DVT. No deficits on exam.  She is scheduled to start home physical therapy next week, can graduate to outpatient physical therapy when appropriate.  May drive as long as not consuming narcotics and shower like normal but should not submerge the incision underwater for 4 more weeks.  No bending or twisting at the waist.  Hydrocodone and methocarbamol refilled today.  Follow-up in 3 months postoperatively with Dr. Rice with lumbar CT without contrast. Call the office in the meantime with any questions or concerns.    POST-OP   ACTIVITY GUIDE       Careful with the BLT's for 3 months !    Bending  Engage Core when changing positions   Use smart movement strategies - Squat, kneel, support yourself using arms  Bending with bad form once or twice is NOT a problem  Surgical hardware won't break from just bending / getting out of bed  Repetitive poor bending form can loosen hardware over time  Not using core to bend can “strain” back muscles    Lifting  General weight limit for first 12 weeks is a maximum of 20-40 lbs   Anything that causes “strain” should not be lifted  Coughing, sneezing, vomiting, straining with bowel movements will not damage your surgical hardware  Lap top, gallon of milk, purse okay  Luggage, large backpack, heavy grocery bag, furniture - not okay  Lift things close to body - limit reaching to pick things up    Twisting  Turn hips, shoulders and spine as one unit  Avoid reaching across the body  Activate core during more complex movements  Remember it is repetitive poor spine mechanics not solitary actions which can harm surgical hardware placement          Driving    - Okay to consider during first 2 weeks  after surgery   - MUST meet following requirements:    -You must be OFF narcotics and not under their influence     - You must be a SAFE  yourself.     - Patients may be considered to be UNSAFE if they are:     -Distracted by their pain     -Unable to sit comfortably for the required length of the journey     -Unable to use mirrors safely because of restrictions or surgical pain    NSAIDS    - Recommend not restarting for 3 months after any fusion    ACTIVITY - FIRST 2 WEEKS:    Low Impact Aerobic Exercise   5-30min 2 times perday EVERY DAY  Walking, elliptical, stairs and/or recumbent bike  Slow safe pace, okay to do intervals (walk 4 min rest 1 min x 3-5 sets)  No hiking, speed walking or carrying more than 20lbs   FOCUS ON POSTURE, DEEP BREATHING (6 count) AND CORE ACTIVATION    Physical Therapy  Will start after your first post-operative office visit (2 weeks)  It's Important, So, YES it's Mandatory  2 times per week x 12 weeks  We can recommend a Physical Therapist near your home  PT should help guide your core exercise program  Home exercises and low impact aerobic work should be done 4-5x per week in addition to PT   My Post-op Core Stabilization Program is the static core program we recommend your physical therapist take you though  Additional modalities are balance and light resistance band training     RETURNING TO WORK     The timescale for Return to Work will vary from patient-to-patient and depends mainly on the nature of your specific surgery and the type of work / activity you wish to re-commence.  General Guidelines:    Can work from home if needed within the first week or so of surgery  Do not work for longer than 30-45 minutes at a time without a break (standing and walking around)    Sedentary jobs (deskwork, etc)   Typically within 1-6 weeks  depends on particulars of job  driving distance   stress, etc  light duty  <10lbs weight limit, Minimal BLT - okay  If you have the option, sometimes  returning to work alternate days (i.e Mon-Wed-Fri) for 1-2 weeks assists with the transition back to work.     Manual Labor  3-6 months - varies per case  depends on intensity and weight limit  must have exhibited a strong core with Level 3 or higher on core program or a note from PT reflecting a strong core        RECREATIONAL SPORTS & ACTIVITIES     After 2 weeks  swimming    After 6 weeks   hiking (no Pack, light grade); biking with upright, flat back posture    After 3 months   Must be cleared by Dr Rice and PT  Biking, jogging, resistance training, climbing, Pilates, sports specific drills, body weight interval training, golf, tennis  Core level 3 completed  Start slow and build up slowly   Do NOT go back FULL SPEED right away     After 6 moths - 1 year  Skiing, horseback riding, ATV riding, snow mobile, etc                  Sports - Non-Contact  Minimum 12 weeks  Must have core level 3 or greater  Must have completed aerobic and resistance training   Must have successfully done sports specific drill and been asymptomatic    Sports - Contact   Varies from sport to sport  Minimum 6 Months  Must exhibit strong Core Level 4 or 5   Must exhibit signs of fusion on CT   Must have completed non-contact  sports specific drills without any symptoms      Lifelong recommendations:  Warm up before EVERY activity 5-10 minutes using Recumbent bike, Stair Master, elliptical , speed walk  Core exercises:   3 -5 x /week - forever!  10 minutes  Should follow warm up prior to activity / sport  Always know your core level      Return in about 3 months (around 11/16/2024) for Dwayne.    EMR Dragon/Transcription Disclaimer:   Much of this encounter note is an electronic transcription/translation of spoken language to printed text. The electronic translation of spoken language may permit erroneous, or at times, nonsensical words or phrases to be inadvertently transcribed; Although I have reviewed the note for such errors, some may  still exist.  Red flags have been discussed at this or previous visits to include but not limited weakness in extremities, worsening pain that does not respond to conservative treatment and bowel or bladder dysfunction. These are reasons to present to ER and patient has been informed.    The diagnosis(es), natural history, pathophysiology and treatment for diagnosis(es) were discussed. Opportunity given and questions answered. Biomechanics of pertinent body areas discussed.    EXERCISES:  Advice on benefits of, and types of regular/moderate exercise pertaining to diagnosis.  Continue HEP. For back or neck pain, recommend pilates and or yoga as tolerated. Generally it is best to start any new exercise under the guidance of a  or therapist.   MEDICATIONS:  When prescribe, the risks, benefits, warnings,side effects and alternatives of medications discussed. Advised against long term use of narcotics.   PAIN CONTROL:  Cold, heat, OTC lidocaine patches and/or ointment as needed. Avoid direct skin contact with ice. Ice 15-20 minutes 3-4 times daily as needed. For SI joint pain, recommend ice bath in water about 50 degrees for 5 consecutive days, add ice slowly to help with adjustment and may cover with warm towel or robe to help with cold tolerance. If using lidocaine, do not apply heat in conjunction as this can cause a burn.   MEDICAL RECORDS reviewed from other provider(s) for past and current medical history pertinent to this visit..

## 2024-08-19 ENCOUNTER — TELEPHONE (OUTPATIENT)
Dept: NEUROSURGERY | Facility: CLINIC | Age: 74
End: 2024-08-19
Payer: MEDICARE

## 2024-08-19 NOTE — TELEPHONE ENCOUNTER
Patient called the office today with increased pain across her low back and down her left leg.  The pain is better now. It happened over the weekend. She saw Nimo on Friday and everything looked good. I told her if her pain returns or she has other increased symptoms to call us. She denies any injury and her pain meds and muscle relaxer's are working. She has not had that pain since Saturday. I told her it is not uncommon to have some increased pain that is often inflammation and it should continue to improve over the course of time. No incision problems and no fever. She verbalized understanding and will call with any other problems or concerns.

## 2024-08-27 DIAGNOSIS — Z98.1 S/P LUMBAR FUSION: ICD-10-CM

## 2024-08-27 RX ORDER — METHYLPREDNISOLONE 4 MG
TABLET, DOSE PACK ORAL
Qty: 21 TABLET | Refills: 0 | Status: SHIPPED | OUTPATIENT
Start: 2024-08-27

## 2024-08-27 NOTE — TELEPHONE ENCOUNTER
Rx Refill Note  Requested Prescriptions     Pending Prescriptions Disp Refills    HYDROcodone-acetaminophen (NORCO) 5-325 MG per tablet 20 tablet 0     Sig: Take 1 tablet by mouth Every 6 (Six) Hours As Needed for Severe Pain.      Last office visit with prescribing clinician: 4/26/2024   Last telemedicine visit with prescribing clinician: Visit date not found   Next office visit with prescribing clinician: 11/22/2024                         Would you like a call back once the refill request has been completed: [] Yes [] No    If the office needs to give you a call back, can they leave a voicemail: [] Yes [] No    Gabi Rosario MA  08/27/24, 16:13 EDT

## 2024-08-28 RX ORDER — HYDROCODONE BITARTRATE AND ACETAMINOPHEN 5; 325 MG/1; MG/1
1 TABLET ORAL EVERY 6 HOURS PRN
Qty: 20 TABLET | Refills: 0 | Status: SHIPPED | OUTPATIENT
Start: 2024-08-28

## 2024-08-29 ENCOUNTER — TREATMENT (OUTPATIENT)
Dept: PHYSICAL THERAPY | Facility: CLINIC | Age: 74
End: 2024-08-29
Payer: MEDICARE

## 2024-08-29 DIAGNOSIS — R29.898 DECREASED STRENGTH OF LOWER EXTREMITY: ICD-10-CM

## 2024-08-29 DIAGNOSIS — M53.86 DECREASED RANGE OF MOTION OF LUMBAR SPINE: ICD-10-CM

## 2024-08-29 DIAGNOSIS — M54.50 CHRONIC LOW BACK PAIN WITHOUT SCIATICA, UNSPECIFIED BACK PAIN LATERALITY: Primary | ICD-10-CM

## 2024-08-29 DIAGNOSIS — Z98.1 STATUS POST LUMBAR SPINAL FUSION: ICD-10-CM

## 2024-08-29 DIAGNOSIS — G89.29 CHRONIC LOW BACK PAIN WITHOUT SCIATICA, UNSPECIFIED BACK PAIN LATERALITY: Primary | ICD-10-CM

## 2024-08-29 PROCEDURE — 97110 THERAPEUTIC EXERCISES: CPT

## 2024-08-29 PROCEDURE — 97161 PT EVAL LOW COMPLEX 20 MIN: CPT

## 2024-08-29 PROCEDURE — 97535 SELF CARE MNGMENT TRAINING: CPT

## 2024-08-29 NOTE — PROGRESS NOTES
Physical Therapy Initial Evaluation and Plan of Care  [facility] [address], [address]  [place] [address]     Patient: Jemima Bell   : 1950  Referring practitioner: Nimo Mary APRN  Date of Initial Visit: 2024  Today's Date: 2024  Patient seen for 1 sessions  PT Clinic location: [address], [address]  [address]          Visit Diagnoses:    ICD-10-CM ICD-9-CM   1. Chronic low back pain without sciatica, unspecified back pain laterality  M54.50 724.2    G89.29 338.29   2. Status post lumbar spinal fusion  Z98.1 V45.4   3. Decreased range of motion of lumbar spine  M53.86 724.9   4. Decreased strength of lower extremity  R29.898 729.89       Subjective Questionnaire: Back Index: 42/50 (84%- Bed Bound)     Subjective Evaluation    History of Present Illness  Date of surgery: 2024  Mechanism of injury: On  I had a lumbar 3 to sacral 1 transforaminal lumbar interbody fusion, lumbar 3 to sacral 1 fusion. I went to a rehab facility for 10 days after surgery and did some therapy there, this went well. I have been home since then and was not able to get any home health PT. I have been sitting on my couch most of the days because of the pain. I have been taking some muscle relaxors and hydrocodone.   I am only able to stand for about 10 minutes before needing to sit down because it feels like my back is going to buckle and I don't want to fall.   I have not had any numbness or tingling in my legs. I have had some pain down a little past my hip but nothing into or past my knee.  I do not have any stairs inside the house but I have about a 1 inch step to get in from the garage, usually this is okay but today it has been rough.   I am having a hard time getting dressed, sleeping, and doing other ADLs. I can't get into bed yet so I am sleeping on my couch because I can't get up into my bed  "yet, it is too tall.   I am going to start a steroid pack soon.      Patient Occupation: Retired Pain  Current pain ratin  At best pain ratin  At worst pain rating: 10 (Going from sitting to standing)  Quality: dull ache, sharp, tight and throbbing    Social Support  Lives in: one-story house    Treatments  Previous treatment: physical therapy  Patient Goals  Patient goals for therapy: decreased pain, decreased edema, improved balance, increased strength and independence with ADLs/IADLs         Medical history: hypothyroidism, OA, vitamin D deficiency, borderline HTN. See chart for further detail.   Therapy Precautions: Post-op \"BLT\" precautions       Objective          Postural Observations    Additional Postural Observation Details  Surgical incision appears to be healing properly, one small area on the right side, middle incision, appears a little yellow with minimal exudate     Palpation   Left   Tenderness of the erector spinae, lumbar interspinals and lumbar paraspinals.     Right Tenderness of the erector spinae, lumbar interspinals and lumbar paraspinals.     Active Range of Motion     Additional Active Range of Motion Details  Not tested on this date due to post-op precautions     Strength/Myotome Testing     Left Hip   Planes of Motion   Flexion: 4-  External rotation: 4-  Internal rotation: 4-    Right Hip   Planes of Motion   Flexion: 4-  External rotation: 4-  Internal rotation: 4-    Left Knee   Flexion: 4  Extension: 4    Right Knee   Flexion: 4  Extension: 4    Ambulation     Ambulation: Level Surfaces   Ambulation with assistive device: independent    Observational Gait   Gait: antalgic   Decreased walking speed and stride length.   Base of support: increased    Additional Observational Gait Details  Prior level of function: patient was able to ambulate safely prior to surgery with no AD.           Assessment & Plan       Assessment  Impairments: abnormal gait, abnormal or restricted ROM, " activity intolerance, impaired balance, impaired physical strength, lacks appropriate home exercise program, pain with function and safety issue   Functional limitations: carrying objects, lifting, sleeping, walking, pulling, pushing, uncomfortable because of pain, moving in bed, sitting, standing, stooping and unable to perform repetitive tasks   Assessment details: Pt is a 74 year old female who presents to PT with symptoms consistent with lumbar and radiating pain associated with postoperative lumbar 3 to sacral 1 transforaminal lumbar interbody fusion. Upon initial evaluation patient presented with severe pain. She presents with the following findings and deficits: decreased lumbar and thoracic mobility and decreased lower extremity strength. These deficits make it difficult for the patient to complete functional transitions including from sitting to standing, supine to sitting, sitting to supine, and standing to sitting. She also has a difficult time with ADLs, standing, ambulating, sleeping, cleaning, cooking, and any recreational activities. Pt would benefit from skilled PT intervention to address the deficits noted and to improve overall quality of life.     Prognosis: good    Goals  Plan Goals: SHORT TERM GOALS: Time for Goal Achievement: 6 weeks  1.  Patient to be compliant and progression of HEP                             2.  Pain level < 5/10 at worst with mentioned activities to improve function  3.  Increased thoracic, lumbar and hip mobility to allow for increased lumbar AROM with less pain.  4.  Increased lumbar AROM to by 25% in all planes to allow for increased ease with sit-stand transfers and functional activities    LONG TERM GOALS: Time for Goal Achievement: 12 weeks  1.  Pt. to score < 25 % on Back Index  2.  Pain level < 3/10 with all listed activities to return to normal.  3.  Lumbar AROM to WFL to allow for return to household & recreational activities w/o increased symptoms  4.  (B) LE  and lower abdominal strength to 4+/5 to allow for pushing, pulling and activities to occur without pain (driving, sitting, household  & recreational requirements)        Plan  Therapy options: will be seen for skilled therapy services  Planned modality interventions: cryotherapy, electrical stimulation/Russian stimulation, TENS, thermotherapy (hydrocollator packs), ultrasound and dry needling  Other planned modality interventions: Cupping  Planned therapy interventions: body mechanics training, flexibility, functional ROM exercises, home exercise program, manual therapy, neuromuscular re-education, postural training, spinal/joint mobilization, stretching, strengthening, soft tissue mobilization, therapeutic activities, motor coordination training and joint mobilization  Frequency: 2x week  Duration in weeks: 12  Treatment plan discussed with: patient        See flowsheets for treatment detail.  Education: Discussed post-operative    History # of Personal Factors and/or Comorbidities: LOW (0)  Examination of Body System(s): # of elements: LOW (1-2)  Clinical Presentation: STABLE   Clinical Decision Making: LOW       Timed:         Manual Therapy:    -     mins  04720;     Therapeutic Exercise:    12     mins  51343;     Neuromuscular Marian:    -    mins  89477;    Therapeutic Activity:     -     mins  15333;     Gait Training:           mins  16822;     Ultrasound:          mins  93745;    Ionto                                   mins   75348  Self Care                       18     mins   31022      Un-Timed:  Electrical Stimulation:         mins  04899 ( );  Dry Needling          mins self-pay  Traction          mins 41705  Low Eval     45     Mins  23888  Mod Eval     -     Mins  77154  High Eval                       -     Mins  30704  Re-Eval                               mins  43346      Timed Treatment:   30   mins   Total Treatment:     75   mins    PT SIGNATURE: Karma Argueta PT   [place] PT license #:  [id number]  DATE TREATMENT INITIATED: 8/30/2024    Initial Certification  Certification Period: 11/27/2024  I certify that the therapy services are furnished while this patient is under my care.  The services outlined above are required by this patient, and will be reviewed every 90 days.    PHYSICIAN: Nimo Mary APRN  NPI: [id number]                                      DATE:     Please sign and return via fax to [facility] - Fax #: [phone]. Thank you, [facility].

## 2024-09-03 ENCOUNTER — TELEPHONE (OUTPATIENT)
Dept: NEUROSURGERY | Facility: CLINIC | Age: 74
End: 2024-09-03
Payer: MEDICARE

## 2024-09-03 DIAGNOSIS — F41.8 DEPRESSION WITH ANXIETY: ICD-10-CM

## 2024-09-03 RX ORDER — SERTRALINE HYDROCHLORIDE 100 MG/1
100 TABLET, FILM COATED ORAL DAILY
Qty: 90 TABLET | Refills: 1 | Status: SHIPPED | OUTPATIENT
Start: 2024-09-03

## 2024-09-03 NOTE — TELEPHONE ENCOUNTER
Rx Refill Note  Requested Prescriptions     Pending Prescriptions Disp Refills    sertraline (ZOLOFT) 100 MG tablet [Pharmacy Med Name: SERTRALINE  MG TABLET] 90 tablet 1     Sig: TAKE 1 TABLET BY MOUTH DAILY      Last office visit with prescribing clinician: 7/22/2024   Last telemedicine visit with prescribing clinician: Visit date not found   Next office visit with prescribing clinician: 12/23/2024                         Would you like a call back once the refill request has been completed: [] Yes [] No    If the office needs to give you a call back, can they leave a voicemail: [] Yes [] No    Karma Galvez MA  09/03/24, 07:25 EDT

## 2024-09-03 NOTE — TELEPHONE ENCOUNTER
Patient called and stated she is having a lot of  clear fluid drain from her incision on to her shirt. She's had no fever and chills but is worried. I let her know it is normal but will send a message

## 2024-09-04 NOTE — TELEPHONE ENCOUNTER
Called the patient, she has no headaches. She has no redness or swelling around her incision. Her drainage stopped currently. I told her she can watch it. Call us with any increased drainage, headache, swelling, fevers or redness.

## 2024-09-05 ENCOUNTER — TREATMENT (OUTPATIENT)
Dept: PHYSICAL THERAPY | Facility: CLINIC | Age: 74
End: 2024-09-05
Payer: MEDICARE

## 2024-09-05 DIAGNOSIS — R29.898 DECREASED STRENGTH OF LOWER EXTREMITY: ICD-10-CM

## 2024-09-05 DIAGNOSIS — M54.50 CHRONIC LOW BACK PAIN WITHOUT SCIATICA, UNSPECIFIED BACK PAIN LATERALITY: Primary | ICD-10-CM

## 2024-09-05 DIAGNOSIS — M53.86 DECREASED RANGE OF MOTION OF LUMBAR SPINE: ICD-10-CM

## 2024-09-05 DIAGNOSIS — G89.29 CHRONIC LOW BACK PAIN WITHOUT SCIATICA, UNSPECIFIED BACK PAIN LATERALITY: Primary | ICD-10-CM

## 2024-09-05 DIAGNOSIS — Z98.1 STATUS POST LUMBAR SPINAL FUSION: ICD-10-CM

## 2024-09-05 PROCEDURE — 97110 THERAPEUTIC EXERCISES: CPT

## 2024-09-05 PROCEDURE — 97112 NEUROMUSCULAR REEDUCATION: CPT

## 2024-09-05 NOTE — PROGRESS NOTES
Physical Therapy Daily Treatment Note    [facility] [place]  [address], [address]  [place] [address]    Visit # 2        Patient: Jemima Bell   : 1950  Referring practitioner: MACHO Curiel  Date of Initial Evaluation:  Type: THERAPY  Noted: 2024  Today's Date: 2024           ICD-10-CM ICD-9-CM   1. Chronic low back pain without sciatica, unspecified back pain laterality  M54.50 724.2    G89.29 338.29   2. Status post lumbar spinal fusion  Z98.1 V45.4   3. Decreased range of motion of lumbar spine  M53.86 724.9   4. Decreased strength of lower extremity  R29.898 729.89       Subjective  Jemima Bell reports:   Overall I'm feeling better, I don't have any pain around the surgery sites, it's just the pain in my (L) hip that really grabs at times.   I have an area on my back with some drainage but I can't really see it.      Objective   Obs: small scabbed area noted at superior portion of incision both on (L )and (R), no drainage observed upon removal of adhesive bandage, replaced with fresh adhesive bandage.     See Exercise, Manual, and Modality Logs for complete treatment     Pt Education:  HEP review  Exercise rationale/ pain free exercise performance  Posture/Postural awareness  Verbal/Tactile cues to ensure correct exercise performance/technique    Concluded with CP to (L) hip/lower back x10min in supine, LEs elevated on wedge.     Assessment/Plan  Tolerated continued progression of therapeutic exercise/therapeutic activity well today, with focus on controlled repetitive motions to allow pt to adapt to small tolerable movement patterns.  Notes three separate instances of sharp pain in (L) hip that resolve with less than one minute rest breaks.     Would continue to benefit from skilled PT progressing with ROM,movement tolerance, progressing toward more functional movements as thanh.     Progress per Plan of Care          Timed:         Manual Therapy:         mins  23535     Therapeutic Exercise:     25    mins  83322     Neuromuscular Marian:    15    mins  65872    Therapeutic Activity:          mins  77697     Gait Training:           mins  58117     Ultrasound:          mins  68270    Ionto                                   mins  90613  Self Care                            mins  40381    Un-Timed:  Electrical Stimulation:         mins 90872 ( )  Traction          mins 37795    Timed Treatment:   40   mins   Total Treatment:     60   mins    ADITYA [place] License #[id number]  Physical Therapist Assistant

## 2024-09-09 ENCOUNTER — TREATMENT (OUTPATIENT)
Dept: PHYSICAL THERAPY | Facility: CLINIC | Age: 74
End: 2024-09-09
Payer: MEDICARE

## 2024-09-09 DIAGNOSIS — M54.41 CHRONIC BILATERAL LOW BACK PAIN WITH BILATERAL SCIATICA: ICD-10-CM

## 2024-09-09 DIAGNOSIS — R29.898 DECREASED STRENGTH OF LOWER EXTREMITY: ICD-10-CM

## 2024-09-09 DIAGNOSIS — Z98.1 S/P LUMBAR FUSION: ICD-10-CM

## 2024-09-09 DIAGNOSIS — G89.29 CHRONIC LOW BACK PAIN WITHOUT SCIATICA, UNSPECIFIED BACK PAIN LATERALITY: Primary | ICD-10-CM

## 2024-09-09 DIAGNOSIS — M53.86 DECREASED RANGE OF MOTION OF LUMBAR SPINE: ICD-10-CM

## 2024-09-09 DIAGNOSIS — M54.16 LUMBAR RADICULOPATHY: ICD-10-CM

## 2024-09-09 DIAGNOSIS — M54.50 CHRONIC LOW BACK PAIN WITHOUT SCIATICA, UNSPECIFIED BACK PAIN LATERALITY: Primary | ICD-10-CM

## 2024-09-09 DIAGNOSIS — M54.42 CHRONIC BILATERAL LOW BACK PAIN WITH BILATERAL SCIATICA: ICD-10-CM

## 2024-09-09 DIAGNOSIS — Z98.1 STATUS POST LUMBAR SPINAL FUSION: ICD-10-CM

## 2024-09-09 DIAGNOSIS — G89.29 CHRONIC BILATERAL LOW BACK PAIN WITH BILATERAL SCIATICA: ICD-10-CM

## 2024-09-09 PROCEDURE — 97112 NEUROMUSCULAR REEDUCATION: CPT

## 2024-09-09 PROCEDURE — 97110 THERAPEUTIC EXERCISES: CPT

## 2024-09-09 RX ORDER — HYDROCODONE BITARTRATE AND ACETAMINOPHEN 5; 325 MG/1; MG/1
1 TABLET ORAL EVERY 6 HOURS PRN
Qty: 20 TABLET | Refills: 0 | Status: SHIPPED | OUTPATIENT
Start: 2024-09-09 | End: 2024-09-11

## 2024-09-09 RX ORDER — PREGABALIN 50 MG/1
50 CAPSULE ORAL 2 TIMES DAILY
Qty: 60 CAPSULE | Refills: 0 | Status: SHIPPED | OUTPATIENT
Start: 2024-09-09 | End: 2024-09-11 | Stop reason: SDUPTHER

## 2024-09-09 NOTE — TELEPHONE ENCOUNTER
Rx Refill Note  Requested Prescriptions     Pending Prescriptions Disp Refills    HYDROcodone-acetaminophen (NORCO) 5-325 MG per tablet 20 tablet 0     Sig: Take 1 tablet by mouth Every 6 (Six) Hours As Needed for Severe Pain.    pregabalin (LYRICA) 50 MG capsule 60 capsule 0     Sig: Take 1 capsule by mouth 2 (Two) Times a Day.      Last office visit with prescribing clinician: 4/26/2024   Last telemedicine visit with prescribing clinician: Visit date not found   Next office visit with prescribing clinician: 11/22/2024                         Would you like a call back once the refill request has been completed: [] Yes [] No    If the office needs to give you a call back, can they leave a voicemail: [] Yes [] No    Ana Dunbar MA  09/09/24, 14:54 EDT

## 2024-09-09 NOTE — PROGRESS NOTES
Physical Therapy Daily Treatment Note  [facility] Physical Therapy [place]  [address], [address]  [place] [address]     Patient: Jemima Bell  : 1950  Referring practitioner: Nimo Mary APRN  Today's Date: 2024    VISIT#: 3    Visit Diagnoses:    ICD-10-CM ICD-9-CM   1. Chronic low back pain without sciatica, unspecified back pain laterality  M54.50 724.2    G89.29 338.29   2. Status post lumbar spinal fusion  Z98.1 V45.4   3. Decreased range of motion of lumbar spine  M53.86 724.9   4. Decreased strength of lower extremity  R29.898 729.89       Subjective   Jemima Bell reports: Patient reports that she is doing better, she has had less grabbing hip pain and is able to walk further and feels stronger.      Objective       See Exercise, Manual, and Modality Logs for complete treatment.     Patient Education: HEP review  Exercise rationale/ pain free exercise performance  Alternate exercise positions  Verbal/Tactile cues to ensure correct exercise performance/technique       Assessment/Plan  Patient demonstrates good tolerance to continued therapeutic exercises and new exercises. Added supine marching. She reports the first 10 felt good but when she got to 16 she could feel it in her back. Discussed the importance of rest breaks. Discussed the meaning of 2 sets of 10. Transitions remain very difficult with muscle cramping with each transition. Will continue to progress as tolerated.       Progress per Plan of Care and Progress strengthening /stabilization /functional activity          Timed:         Manual Therapy:    -     mins  66795;     Therapeutic Exercise:    25     mins  52415;     Neuromuscular Marian:    11    mins  33762;    Therapeutic Activity:     -     mins  48237;     Gait Training:           mins  67804;     Ultrasound:          mins  48240;    Ionto:                                   mins  31065  Self Care:                       -     mins   56261    Un-Timed:  Electrical Stimulation:         mins  12310 ( );  Dry Needling          mins self-pay  Traction          mins 51243  Re-Eval                               mins  99920  Group Therapy           ___ mins 54637    Timed Treatment:   36   mins   Total Treatment:     36   mins    Karma Argueta PT  Physical Therapist  [place] license #: [id number]

## 2024-09-10 NOTE — TELEPHONE ENCOUNTER
Patient called the office and reports that her pharmacy does not have [place] 5/325. They only have [place] 7.5/325.

## 2024-09-11 ENCOUNTER — TREATMENT (OUTPATIENT)
Dept: PHYSICAL THERAPY | Facility: CLINIC | Age: 74
End: 2024-09-11
Payer: MEDICARE

## 2024-09-11 DIAGNOSIS — M54.50 CHRONIC LOW BACK PAIN WITHOUT SCIATICA, UNSPECIFIED BACK PAIN LATERALITY: Primary | ICD-10-CM

## 2024-09-11 DIAGNOSIS — G89.29 CHRONIC LOW BACK PAIN WITHOUT SCIATICA, UNSPECIFIED BACK PAIN LATERALITY: Primary | ICD-10-CM

## 2024-09-11 DIAGNOSIS — R29.898 DECREASED STRENGTH OF LOWER EXTREMITY: ICD-10-CM

## 2024-09-11 DIAGNOSIS — Z98.1 STATUS POST LUMBAR SPINAL FUSION: ICD-10-CM

## 2024-09-11 DIAGNOSIS — M53.86 DECREASED RANGE OF MOTION OF LUMBAR SPINE: ICD-10-CM

## 2024-09-11 PROCEDURE — 97112 NEUROMUSCULAR REEDUCATION: CPT

## 2024-09-11 PROCEDURE — 97110 THERAPEUTIC EXERCISES: CPT

## 2024-09-11 RX ORDER — HYDROCODONE BITARTRATE AND ACETAMINOPHEN 7.5; 325 MG/1; MG/1
1 TABLET ORAL EVERY 8 HOURS PRN
Qty: 40 TABLET | Refills: 0 | Status: SHIPPED | OUTPATIENT
Start: 2024-09-11

## 2024-09-11 NOTE — PROGRESS NOTES
Physical Therapy Daily Treatment Note  [facility] Physical Therapy [place]  [address], [address]  [place] [address]     Patient: Jemima Bell  : 1950  Referring practitioner: Nimo Mary APRN  Today's Date: 2024    VISIT#: 4    Visit Diagnoses:    ICD-10-CM ICD-9-CM   1. Chronic low back pain without sciatica, unspecified back pain laterality  M54.50 724.2    G89.29 338.29   2. Status post lumbar spinal fusion  Z98.1 V45.4   3. Decreased range of motion of lumbar spine  M53.86 724.9   4. Decreased strength of lower extremity  R29.898 729.89       Subjective   Jemima Bell reports: Patient reports that she gets a sharp pain like a stitch is in her back when she rolls over. This week she has noticed decreased in pain when going from sitting on the toilet to standing. She also feels like she has more mobility with activities around the house.       Objective       See Exercise, Manual, and Modality Logs for complete treatment.     Patient Education: HEP review  Exercise rationale/ pain free exercise performance  Alternate exercise positions  Verbal/Tactile cues to ensure correct exercise performance/technique       Assessment/Plan  Patient demonstrates improved tolerance to therapeutic exercises. Hip mobility is improving and decreased tension in the lumbar spine. Transitions from supine to sitting and sitting to supine are still very difficult. At the end of the session transferring from supine to sitting appeared to be easier and less report of pain. However, when trying to go to stand she reports that her back locked up and she was unable to stand. After about 5 minutes she was able to stand. Added seated marching and Nustep for a gentle warm up, both were tolerated well. Added long axis hip distraction with report of decreased pain and added scar tissue mobilization. Will continue to progress as tolerated.       Progress per Plan of Care and Progress  strengthening /stabilization /functional activity          Timed:         Manual Therapy:    -     mins  66103;     Therapeutic Exercise:    11     mins  07679;     Neuromuscular Marian:    15    mins  28310;    Therapeutic Activity:     4     mins  22039;     Gait Training:           mins  83703;     Ultrasound:          mins  63460;    Ionto:                                   mins  59289  Self Care:                       -     mins  47046    Un-Timed:  Electrical Stimulation:         mins  30588 ( );  Dry Needling          mins self-pay  Traction          mins 49086  Re-Eval                               mins  89779  Group Therapy           ___ mins 65187    Timed Treatment:   30   mins   Total Treatment:     54   mins    Karma Argueta PT  Physical Therapist  [place] license #: [id number]

## 2024-09-16 ENCOUNTER — TREATMENT (OUTPATIENT)
Dept: PHYSICAL THERAPY | Facility: CLINIC | Age: 74
End: 2024-09-16
Payer: MEDICARE

## 2024-09-16 DIAGNOSIS — G89.29 CHRONIC LOW BACK PAIN WITHOUT SCIATICA, UNSPECIFIED BACK PAIN LATERALITY: Primary | ICD-10-CM

## 2024-09-16 DIAGNOSIS — M53.86 DECREASED RANGE OF MOTION OF LUMBAR SPINE: ICD-10-CM

## 2024-09-16 DIAGNOSIS — R29.898 DECREASED STRENGTH OF LOWER EXTREMITY: ICD-10-CM

## 2024-09-16 DIAGNOSIS — M54.50 CHRONIC LOW BACK PAIN WITHOUT SCIATICA, UNSPECIFIED BACK PAIN LATERALITY: Primary | ICD-10-CM

## 2024-09-16 DIAGNOSIS — Z98.1 STATUS POST LUMBAR SPINAL FUSION: ICD-10-CM

## 2024-09-16 PROCEDURE — 97110 THERAPEUTIC EXERCISES: CPT

## 2024-09-16 PROCEDURE — 97140 MANUAL THERAPY 1/> REGIONS: CPT

## 2024-09-23 ENCOUNTER — OFFICE VISIT (OUTPATIENT)
Dept: PAIN MEDICINE | Facility: CLINIC | Age: 74
End: 2024-09-23
Payer: MEDICARE

## 2024-09-23 ENCOUNTER — TREATMENT (OUTPATIENT)
Dept: PHYSICAL THERAPY | Facility: CLINIC | Age: 74
End: 2024-09-23
Payer: MEDICARE

## 2024-09-23 VITALS
HEART RATE: 58 BPM | BODY MASS INDEX: 34.34 KG/M2 | TEMPERATURE: 96.9 F | OXYGEN SATURATION: 93 % | SYSTOLIC BLOOD PRESSURE: 108 MMHG | HEIGHT: 62 IN | WEIGHT: 186.6 LBS | DIASTOLIC BLOOD PRESSURE: 73 MMHG | RESPIRATION RATE: 18 BRPM

## 2024-09-23 DIAGNOSIS — M54.16 LUMBAR RADICULITIS: Primary | ICD-10-CM

## 2024-09-23 DIAGNOSIS — M54.42 CHRONIC BILATERAL LOW BACK PAIN WITH BILATERAL SCIATICA: ICD-10-CM

## 2024-09-23 DIAGNOSIS — M54.2 NECK PAIN: ICD-10-CM

## 2024-09-23 DIAGNOSIS — M53.86 DECREASED RANGE OF MOTION OF LUMBAR SPINE: ICD-10-CM

## 2024-09-23 DIAGNOSIS — Z98.1 S/P LUMBAR FUSION: ICD-10-CM

## 2024-09-23 DIAGNOSIS — M54.41 CHRONIC BILATERAL LOW BACK PAIN WITH BILATERAL SCIATICA: ICD-10-CM

## 2024-09-23 DIAGNOSIS — G89.29 CHRONIC BILATERAL LOW BACK PAIN WITH BILATERAL SCIATICA: ICD-10-CM

## 2024-09-23 DIAGNOSIS — Z98.1 STATUS POST LUMBAR SPINAL FUSION: ICD-10-CM

## 2024-09-23 DIAGNOSIS — M54.50 CHRONIC LOW BACK PAIN WITHOUT SCIATICA, UNSPECIFIED BACK PAIN LATERALITY: Primary | ICD-10-CM

## 2024-09-23 DIAGNOSIS — M54.12 CERVICAL RADICULOPATHY: ICD-10-CM

## 2024-09-23 DIAGNOSIS — G89.29 CHRONIC LOW BACK PAIN WITHOUT SCIATICA, UNSPECIFIED BACK PAIN LATERALITY: Primary | ICD-10-CM

## 2024-09-23 DIAGNOSIS — M51.369 DEGENERATION OF LUMBAR INTERVERTEBRAL DISC: ICD-10-CM

## 2024-09-23 DIAGNOSIS — R29.898 DECREASED STRENGTH OF LOWER EXTREMITY: ICD-10-CM

## 2024-09-23 DIAGNOSIS — Z79.899 ENCOUNTER FOR LONG-TERM (CURRENT) USE OF HIGH-RISK MEDICATION: ICD-10-CM

## 2024-09-23 PROCEDURE — 1125F AMNT PAIN NOTED PAIN PRSNT: CPT | Performed by: NURSE PRACTITIONER

## 2024-09-23 PROCEDURE — 1159F MED LIST DOCD IN RCRD: CPT | Performed by: NURSE PRACTITIONER

## 2024-09-23 PROCEDURE — 97112 NEUROMUSCULAR REEDUCATION: CPT

## 2024-09-23 PROCEDURE — 99214 OFFICE O/P EST MOD 30 MIN: CPT | Performed by: NURSE PRACTITIONER

## 2024-09-23 PROCEDURE — 1160F RVW MEDS BY RX/DR IN RCRD: CPT | Performed by: NURSE PRACTITIONER

## 2024-09-23 PROCEDURE — 97110 THERAPEUTIC EXERCISES: CPT

## 2024-09-23 RX ORDER — PREGABALIN 50 MG/1
50 CAPSULE ORAL 2 TIMES DAILY
COMMUNITY
End: 2024-09-23 | Stop reason: SDUPTHER

## 2024-09-23 RX ORDER — HYDROCODONE BITARTRATE AND ACETAMINOPHEN 7.5; 325 MG/1; MG/1
1 TABLET ORAL 2 TIMES DAILY PRN
Qty: 60 TABLET | Refills: 0 | Status: SHIPPED | OUTPATIENT
Start: 2024-09-23

## 2024-09-23 RX ORDER — PREGABALIN 50 MG/1
50 CAPSULE ORAL 2 TIMES DAILY
Qty: 60 CAPSULE | Refills: 0 | Status: SHIPPED | OUTPATIENT
Start: 2024-09-23

## 2024-09-25 ENCOUNTER — TREATMENT (OUTPATIENT)
Dept: PHYSICAL THERAPY | Facility: CLINIC | Age: 74
End: 2024-09-25
Payer: MEDICARE

## 2024-09-25 ENCOUNTER — TELEPHONE (OUTPATIENT)
Dept: NEUROSURGERY | Facility: CLINIC | Age: 74
End: 2024-09-25
Payer: MEDICARE

## 2024-09-25 DIAGNOSIS — R29.898 DECREASED STRENGTH OF LOWER EXTREMITY: ICD-10-CM

## 2024-09-25 DIAGNOSIS — M53.86 DECREASED RANGE OF MOTION OF LUMBAR SPINE: ICD-10-CM

## 2024-09-25 DIAGNOSIS — M54.50 CHRONIC LOW BACK PAIN WITHOUT SCIATICA, UNSPECIFIED BACK PAIN LATERALITY: Primary | ICD-10-CM

## 2024-09-25 DIAGNOSIS — G89.29 CHRONIC LOW BACK PAIN WITHOUT SCIATICA, UNSPECIFIED BACK PAIN LATERALITY: Primary | ICD-10-CM

## 2024-09-25 DIAGNOSIS — Z98.1 STATUS POST LUMBAR SPINAL FUSION: ICD-10-CM

## 2024-09-25 PROCEDURE — 97110 THERAPEUTIC EXERCISES: CPT

## 2024-09-25 PROCEDURE — 97140 MANUAL THERAPY 1/> REGIONS: CPT

## 2024-09-30 ENCOUNTER — TREATMENT (OUTPATIENT)
Dept: PHYSICAL THERAPY | Facility: CLINIC | Age: 74
End: 2024-09-30
Payer: MEDICARE

## 2024-09-30 DIAGNOSIS — Z98.1 STATUS POST LUMBAR SPINAL FUSION: ICD-10-CM

## 2024-09-30 DIAGNOSIS — M54.50 CHRONIC LOW BACK PAIN WITHOUT SCIATICA, UNSPECIFIED BACK PAIN LATERALITY: Primary | ICD-10-CM

## 2024-09-30 DIAGNOSIS — G89.29 CHRONIC LOW BACK PAIN WITHOUT SCIATICA, UNSPECIFIED BACK PAIN LATERALITY: Primary | ICD-10-CM

## 2024-09-30 DIAGNOSIS — R29.898 DECREASED STRENGTH OF LOWER EXTREMITY: ICD-10-CM

## 2024-09-30 DIAGNOSIS — M53.86 DECREASED RANGE OF MOTION OF LUMBAR SPINE: ICD-10-CM

## 2024-09-30 PROCEDURE — 97530 THERAPEUTIC ACTIVITIES: CPT

## 2024-09-30 PROCEDURE — 97110 THERAPEUTIC EXERCISES: CPT

## 2024-09-30 NOTE — PROGRESS NOTES
Physical Therapy Daily Treatment Note  [facility] Physical Therapy [place]  [address], [address]  [place] [address]     Patient: Jemima Bell  : 1950  Referring practitioner: Nimo Mary APRN  Today's Date: 2024    VISIT#: 8    Visit Diagnoses:    ICD-10-CM ICD-9-CM   1. Chronic low back pain without sciatica, unspecified back pain laterality  M54.50 724.2    G89.29 338.29   2. Status post lumbar spinal fusion  Z98.1 V45.4   3. Decreased range of motion of lumbar spine  M53.86 724.9   4. Decreased strength of lower extremity  R29.898 729.89       Subjective   Jemima Bell reports: Patient reports that she continues to have an aching sensation across her lower back. She does feel like she continues to improve though, she went to the grocery and was able to make it throughout the store using the grocery cart as assistance. However, afterwards she was very fatigued.       Objective       See Exercise, Manual, and Modality Logs for complete treatment.     Patient Education: HEP review  Exercise rationale/ pain free exercise performance  Alternate exercise positions  Verbal/Tactile cues to ensure correct exercise performance/technique       Assessment/Plan  Patient demonstrates good tolerance to progressed therapeutic exercises. Added STS with good tolerance. She had the airex underneath and required B UE assist with walker placed anteriorly. Added LAQ for quad activation and standing 3 way hip kick into flexion, abduction, and extension to continue to target LE strength. Continued with scar tissue mobilization. Discussed continuing supine exercises at home but while she is here we are going to try to progress to more functional strengthening exercises. Will progress as tolerated.       Progress per Plan of Care and Progress strengthening /stabilization /functional activity          Timed:         Manual Therapy:    8     mins  67652;     Therapeutic  Exercise:    10     mins  15983;     Neuromuscular Marian:    -    mins  22699;    Therapeutic Activity:     12     mins  91123;     Gait Training:           mins  86955;     Ultrasound:          mins  29552;    Ionto:                                   mins  53734  Self Care:                       -     mins  19657    Un-Timed:  Electrical Stimulation:         mins  59865 ( );  Dry Needling          mins self-pay  Traction          mins 23454  Re-Eval                               mins  06115  Group Therapy           ___ mins 67934    Timed Treatment:   30   mins   Total Treatment:     47   mins    Karma Argueta PT  Physical Therapist  [place] [facility] license #: [id number]

## 2024-10-02 ENCOUNTER — TREATMENT (OUTPATIENT)
Dept: PHYSICAL THERAPY | Facility: CLINIC | Age: 74
End: 2024-10-02
Payer: MEDICARE

## 2024-10-02 DIAGNOSIS — G89.29 CHRONIC LOW BACK PAIN WITHOUT SCIATICA, UNSPECIFIED BACK PAIN LATERALITY: Primary | ICD-10-CM

## 2024-10-02 DIAGNOSIS — M53.86 DECREASED RANGE OF MOTION OF LUMBAR SPINE: ICD-10-CM

## 2024-10-02 DIAGNOSIS — R29.898 DECREASED STRENGTH OF LOWER EXTREMITY: ICD-10-CM

## 2024-10-02 DIAGNOSIS — M54.50 CHRONIC LOW BACK PAIN WITHOUT SCIATICA, UNSPECIFIED BACK PAIN LATERALITY: Primary | ICD-10-CM

## 2024-10-02 DIAGNOSIS — Z98.1 STATUS POST LUMBAR SPINAL FUSION: ICD-10-CM

## 2024-10-02 PROCEDURE — 97140 MANUAL THERAPY 1/> REGIONS: CPT

## 2024-10-02 PROCEDURE — 97110 THERAPEUTIC EXERCISES: CPT

## 2024-10-02 PROCEDURE — 97112 NEUROMUSCULAR REEDUCATION: CPT

## 2024-10-02 NOTE — PROGRESS NOTES
Physical Therapy Daily Treatment Note  [facility] Physical Therapy [place]  [address], [address]  [place] [address]     Patient: Jemima Bell  : 1950  Referring practitioner: Nimo Mary APRN  Today's Date: 10/2/2024    VISIT#: 9    Visit Diagnoses:    ICD-10-CM ICD-9-CM   1. Chronic low back pain without sciatica, unspecified back pain laterality  M54.50 724.2    G89.29 338.29   2. Status post lumbar spinal fusion  Z98.1 V45.4   3. Decreased range of motion of lumbar spine  M53.86 724.9   4. Decreased strength of lower extremity  R29.898 729.89       Subjective   Jemima Bell reports: Patient reports that the last couple days have been pretty bad. She has been experiencing higher levels of pain, her legs feel heavy and the pain is going down her left leg.       Objective       See Exercise, Manual, and Modality Logs for complete treatment.     Patient Education: HEP review  Exercise rationale/ pain free exercise performance  Alternate exercise positions  Verbal/Tactile cues to ensure correct exercise performance/technique       Assessment/Plan  Patient demonstrates good tolerance to therapeutic exercises on this date with report that she did not experience an increase in lumbar pain throughout. After STM, she reports that she has a decrease in pain levels. Continued with standing exercises and discussed trying to be more consistent with HEP. Verbal cues were provided to avoid compensations with stand 3 way hip kick, and cues for upright posture. Pain levels continue to fluctuate. Will continue to progress as tolerated.       Progress per Plan of Care and Progress strengthening /stabilization /functional activity          Timed:         Manual Therapy:    10     mins  07067;     Therapeutic Exercise:    15     mins  83672;     Neuromuscular Marian:    10    mins  88011;    Therapeutic Activity:     -     mins  41989;     Gait Training:           mins  71019;      Ultrasound:          mins  40428;    Ionto:                                   mins  97637  Self Care:                       5     mins  52456    Un-Timed:  Electrical Stimulation:         mins  38827 ( );  Dry Needling          mins self-pay  Traction          mins 70275  Re-Eval                               mins  47959  Group Therapy           ___ mins 32276    Timed Treatment:   40   mins   Total Treatment:     46   mins    Karma Argueta PT  Physical Therapist  [place] license #: [id number]

## 2024-10-08 ENCOUNTER — TREATMENT (OUTPATIENT)
Dept: PHYSICAL THERAPY | Facility: CLINIC | Age: 74
End: 2024-10-08
Payer: MEDICARE

## 2024-10-08 DIAGNOSIS — G89.29 CHRONIC LOW BACK PAIN WITHOUT SCIATICA, UNSPECIFIED BACK PAIN LATERALITY: Primary | ICD-10-CM

## 2024-10-08 DIAGNOSIS — M53.86 DECREASED RANGE OF MOTION OF LUMBAR SPINE: ICD-10-CM

## 2024-10-08 DIAGNOSIS — Z98.1 STATUS POST LUMBAR SPINAL FUSION: ICD-10-CM

## 2024-10-08 DIAGNOSIS — R29.898 DECREASED STRENGTH OF LOWER EXTREMITY: ICD-10-CM

## 2024-10-08 DIAGNOSIS — M54.50 CHRONIC LOW BACK PAIN WITHOUT SCIATICA, UNSPECIFIED BACK PAIN LATERALITY: Primary | ICD-10-CM

## 2024-10-08 PROCEDURE — 97112 NEUROMUSCULAR REEDUCATION: CPT

## 2024-10-08 PROCEDURE — 97535 SELF CARE MNGMENT TRAINING: CPT

## 2024-10-08 PROCEDURE — 97110 THERAPEUTIC EXERCISES: CPT

## 2024-10-08 PROCEDURE — 97530 THERAPEUTIC ACTIVITIES: CPT

## 2024-10-08 NOTE — PROGRESS NOTES
Physical Therapy Daily Treatment and Progress Note  [facility] Physical Therapy [place]  [address], [address]  [place] [address]     Patient: Jemima Bell  : 1950  Referring practitioner: Nimo Mary APRN  Today's Date: 10/8/2024    VISIT#: 10    Visit Diagnoses:    ICD-10-CM ICD-9-CM   1. Chronic low back pain without sciatica, unspecified back pain laterality  M54.50 724.2    G89.29 338.29   2. Status post lumbar spinal fusion  Z98.1 V45.4   3. Decreased range of motion of lumbar spine  M53.86 724.9   4. Decreased strength of lower extremity  R29.898 729.89     Subjective Questionnaire: Back Index: 42/50 (84%- Bed Bound) - 28/50 (56%- severe disability)    Subjective Evaluation    Pain  Current pain ratin  At best pain ratin  At worst pain ratin (after sitting for awhile, transferring from sitting to standing)         Jemima Bell reports: Patient reports that she is sore, most of the pain is down in her hips. Last night she was unable to sleep due to this constant aching in her leg. Patient has seen about a 70% improvement thus far. She still has a difficult time with standing for a long period of time like doing dishes, getting dressed, loading the , and other ADLs. She can't  end over and has a difficult time in any prolonged position.      Objective   Strength/Myotome Testing      Left Hip   Planes of Motion   Flexion: 4- (4)  External rotation: 4- (4)  Internal rotation: 4- (4)     Right Hip   Planes of Motion   Flexion: 4- (4)  External rotation: 4- (4)  Internal rotation: 4- (4)     Left Knee   Flexion: 4 (4)  Extension: 4 (4+)     Right Knee   Flexion: 4 (4)  Extension: 4 (4+)    See Exercise, Manual, and Modality Logs for complete treatment.     Patient Education: HEP review  Exercise rationale/ pain free exercise performance  Alternate exercise positions  Verbal/Tactile cues to ensure correct exercise performance/technique        Assessment/Plan 10 weeks   Patient has demonstrated great progress thus far with skilled physical therapy interventions. Subjectively, pain levels are much better controlled and she has seen about a 70% improvement since beginning PT. Her outcome measure score decreased significantly, demonstrating a significant decrease in perceived disability. Objectively, transfers are much more controlled and provokes less pain. Her gait pattern has improved, demonstrating a more upright posture and less of an antalgic gait. She continues to present with a decreased edwin and stride length but is progressing well with the rolling walker. Also, her strength measures has improved and will continue to improve. However, functionally she continues to have a difficult time with ADLs including transfers from sitting to standing after prolonged sitting, she has a difficult time with prolonged standing, ambulating, and doing dishes, or any house hold chores. Patient has met 2/4 STGs and is progressing well towards the others. Patient will greatly benefit from continued skilled therapeutic interventions.     Goals  Plan Goals: SHORT TERM GOALS: Time for Goal Achievement: 6 weeks  1.  Patient to be compliant and progression of HEP (MET)                             2.  Pain level < 5/10 at worst with mentioned activities to improve function (progressing)  3.  Increased thoracic, lumbar and hip mobility to allow for increased lumbar AROM with less pain. (MET)  4.  Increased lumbar AROM to by 25% in all planes to allow for increased ease with sit-stand transfers and functional activities (Progressing)     LONG TERM GOALS: Time for Goal Achievement: 12 weeks  1.  Pt. to score < 25 % on Back Index (Progressing)  2.  Pain level < 3/10 with all listed activities to return to normal. (Progressing)  3.  Lumbar AROM to WFL to allow for return to household & recreational activities w/o increased symptoms (Progressing)  4.  (B) LE and lower  abdominal strength to 4+/5 to allow for pushing, pulling and activities to occur without pain (driving, sitting, household  & recreational requirements) (Progressing)    Progress per Plan of Care and Progress strengthening /stabilization /functional activity          Timed:         Manual Therapy:    -     mins  92061;     Therapeutic Exercise:    10     mins  26763;     Neuromuscular Marian:    10    mins  14192;    Therapeutic Activity:     25     mins  09827;     Gait Training:           mins  41822;     Ultrasound:          mins  80697;    Ionto:                                   mins  68074  Self Care:                       8     mins  75508    Un-Timed:  Electrical Stimulation:         mins  42889 ( );  Dry Needling          mins self-pay  Traction          mins 39854  Re-Eval                               mins  58769  Group Therapy           ___ mins 20659    Timed Treatment:   53   mins   Total Treatment:     63   mins    Karma Argueta PT  Physical Therapist  [place] [facility] license #: [id number]

## 2024-10-08 NOTE — LETTER
Physical Therapy Daily Treatment and Progress Note  [facility] Physical Therapy [place]  [address]  [place] [address]     Patient: Jemima Bell  : 1950  Referring practitioner: Nimo Mary APRN  Today's Date: 10/8/2024    VISIT#: 10    Visit Diagnoses:    ICD-10-CM ICD-9-CM   1. Chronic low back pain without sciatica, unspecified back pain laterality  M54.50 724.2    G89.29 338.29   2. Status post lumbar spinal fusion  Z98.1 V45.4   3. Decreased range of motion of lumbar spine  M53.86 724.9   4. Decreased strength of lower extremity  R29.898 729.89     Subjective Questionnaire: Back Index: 42/50 (84%- Bed Bound) - 28/50 (56%- severe disability)    Subjective Evaluation    Pain  Current pain ratin  At best pain ratin  At worst pain ratin (after sitting for awhile, transferring from sitting to standing)         Jemima Bell reports: Patient reports that she is sore, most of the pain is down in her hips. Last night she was unable to sleep due to this constant aching in her leg. Patient has seen about a 70% improvement thus far. She still has a difficult time with standing for a long period of time like doing dishes, getting dressed, loading the , and other ADLs. She can't  end over and has a difficult time in any prolonged position.      Objective   Strength/Myotome Testing      Left Hip   Planes of Motion   Flexion: 4- (4)  External rotation: 4- (4)  Internal rotation: 4- (4)     Right Hip   Planes of Motion   Flexion: 4- (4)  External rotation: 4- (4)  Internal rotation: 4- (4)     Left Knee   Flexion: 4 (4)  Extension: 4 (4+)     Right Knee   Flexion: 4 (4)  Extension: 4 (4+)    See Exercise, Manual, and Modality Logs for complete treatment.     Patient Education: HEP review  Exercise rationale/ pain free exercise performance  Alternate exercise positions  Verbal/Tactile cues to ensure correct exercise performance/technique        Assessment/Plan 10 weeks   Patient has demonstrated great progress thus far with skilled physical therapy interventions. Subjectively, pain levels are much better controlled and she has seen about a 70% improvement since beginning PT. Her outcome measure score decreased significantly, demonstrating a significant decrease in perceived disability. Objectively, transfers are much more controlled and provokes less pain. Her gait pattern has improved, demonstrating a more upright posture and less of an antalgic gait. She continues to present with a decreased edwin and stride length but is progressing well with the rolling walker. Also, her strength measures has improved and will continue to improve. However, functionally she continues to have a difficult time with ADLs including transfers from sitting to standing after prolonged sitting, she has a difficult time with prolonged standing, ambulating, and doing dishes, or any house hold chores. Patient has met 2/4 STGs and is progressing well towards the others. Patient will greatly benefit from continued skilled therapeutic interventions.     Goals  Plan Goals: SHORT TERM GOALS: Time for Goal Achievement: 6 weeks  1.  Patient to be compliant and progression of HEP (MET)                             2.  Pain level < 5/10 at worst with mentioned activities to improve function (progressing)  3.  Increased thoracic, lumbar and hip mobility to allow for increased lumbar AROM with less pain. (MET)  4.  Increased lumbar AROM to by 25% in all planes to allow for increased ease with sit-stand transfers and functional activities (Progressing)     LONG TERM GOALS: Time for Goal Achievement: 12 weeks  1.  Pt. to score < 25 % on Back Index (Progressing)  2.  Pain level < 3/10 with all listed activities to return to normal. (Progressing)  3.  Lumbar AROM to WFL to allow for return to household & recreational activities w/o increased symptoms (Progressing)  4.  (B) LE and lower  abdominal strength to 4+/5 to allow for pushing, pulling and activities to occur without pain (driving, sitting, household  & recreational requirements) (Progressing)    Progress per Plan of Care and Progress strengthening /stabilization /functional activity      Karma Argueta PT  Physical Therapist  [place] [facility] license #: [id number]

## 2024-10-11 ENCOUNTER — TREATMENT (OUTPATIENT)
Dept: PHYSICAL THERAPY | Facility: CLINIC | Age: 74
End: 2024-10-11
Payer: MEDICARE

## 2024-10-11 DIAGNOSIS — Z98.1 STATUS POST LUMBAR SPINAL FUSION: ICD-10-CM

## 2024-10-11 DIAGNOSIS — M53.86 DECREASED RANGE OF MOTION OF LUMBAR SPINE: ICD-10-CM

## 2024-10-11 DIAGNOSIS — M54.50 CHRONIC LOW BACK PAIN WITHOUT SCIATICA, UNSPECIFIED BACK PAIN LATERALITY: Primary | ICD-10-CM

## 2024-10-11 DIAGNOSIS — G89.29 CHRONIC LOW BACK PAIN WITHOUT SCIATICA, UNSPECIFIED BACK PAIN LATERALITY: Primary | ICD-10-CM

## 2024-10-11 DIAGNOSIS — R29.898 DECREASED STRENGTH OF LOWER EXTREMITY: ICD-10-CM

## 2024-10-11 PROCEDURE — 97530 THERAPEUTIC ACTIVITIES: CPT

## 2024-10-11 PROCEDURE — 97112 NEUROMUSCULAR REEDUCATION: CPT

## 2024-10-11 NOTE — PROGRESS NOTES
Physical Therapy Daily Treatment Note  [facility] Physical Therapy [place]  [address], [address]  [place] [address]     Patient: Jemima Bell  : 1950  Referring practitioner: Nimo Mary APRN  Today's Date: 10/11/2024    VISIT#: 11    Visit Diagnoses:    ICD-10-CM ICD-9-CM   1. Chronic low back pain without sciatica, unspecified back pain laterality  M54.50 724.2    G89.29 338.29   2. Status post lumbar spinal fusion  Z98.1 V45.4   3. Decreased range of motion of lumbar spine  M53.86 724.9   4. Decreased strength of lower extremity  R29.898 729.89       Subjective   Jemima Bell reports: Patient reports that her back is sore and her inner thighs and posterior thighs are sore today. This weekend she is going to a  and is nervous about walking on the uneven surfaces.       Objective       See Exercise, Manual, and Modality Logs for complete treatment.     Patient Education: HEP review  Exercise rationale/ pain free exercise performance  Alternate exercise positions  Verbal/Tactile cues to ensure correct exercise performance/technique       Assessment/Plan  Patient continues to demonstrate good tolerance to continued therapeutic exercises and activities on this date with few rest breaks required throughout. Minimal lumbar pain was reported throughout, which was muscular related not where the fusions are. Continued to focus on functional standing exercises and added stairs on this date. Will continue to progress as tolerated.       Progress per Plan of Care and Progress strengthening /stabilization /functional activity          Timed:         Manual Therapy:    -     mins  46166;     Therapeutic Exercise:    -     mins  32112;     Neuromuscular Marian:    15    mins  25980;    Therapeutic Activity:     15     mins  45754;     Gait Training:           mins  22672;     Ultrasound:          mins  92061;    Ionto:                                   mins   93329  Self Care:                       -     mins  75620    Un-Timed:  Electrical Stimulation:         mins  86242 ( );  Dry Needling          mins self-pay  Traction          mins 87595  Re-Eval                               mins  73806  Group Therapy           ___ mins 90052    Timed Treatment:   30   mins   Total Treatment:     50   mins    Karma Argueta PT  Physical Therapist  [place] license #: [id number]

## 2024-10-15 ENCOUNTER — TREATMENT (OUTPATIENT)
Dept: PHYSICAL THERAPY | Facility: CLINIC | Age: 74
End: 2024-10-15
Payer: MEDICARE

## 2024-10-15 DIAGNOSIS — M54.50 CHRONIC LOW BACK PAIN WITHOUT SCIATICA, UNSPECIFIED BACK PAIN LATERALITY: Primary | ICD-10-CM

## 2024-10-15 DIAGNOSIS — R29.898 DECREASED STRENGTH OF LOWER EXTREMITY: ICD-10-CM

## 2024-10-15 DIAGNOSIS — Z98.1 STATUS POST LUMBAR SPINAL FUSION: ICD-10-CM

## 2024-10-15 DIAGNOSIS — M53.86 DECREASED RANGE OF MOTION OF LUMBAR SPINE: ICD-10-CM

## 2024-10-15 DIAGNOSIS — G89.29 CHRONIC LOW BACK PAIN WITHOUT SCIATICA, UNSPECIFIED BACK PAIN LATERALITY: Primary | ICD-10-CM

## 2024-10-15 PROCEDURE — 97530 THERAPEUTIC ACTIVITIES: CPT

## 2024-10-15 PROCEDURE — 97112 NEUROMUSCULAR REEDUCATION: CPT

## 2024-10-15 NOTE — PROGRESS NOTES
"Physical Therapy Daily Treatment Note  [facility] Physical Therapy [place]  [address], [address]  [place] [address]     Patient: Jemima Bell  : 1950  Referring practitioner: Nimo Mary APRN  Today's Date: 10/15/2024    VISIT#: 12    Visit Diagnoses:    ICD-10-CM ICD-9-CM   1. Chronic low back pain without sciatica, unspecified back pain laterality  M54.50 724.2    G89.29 338.29   2. Status post lumbar spinal fusion  Z98.1 V45.4   3. Decreased range of motion of lumbar spine  M53.86 724.9   4. Decreased strength of lower extremity  R29.898 729.89       Subjective   Jemima Bell reports:   Went to a  Saturday, did a lot of both sitting and standing.  States \"it messed me up for the rest of the weekend, I still feel pain in my backside today.\"  Did not perform any HEP d/t inc discomfort the past three days.       Objective   See Exercise, Manual, and Modality Logs for complete treatment.     Patient Education:   HEP review  Exercise rationale/ pain free exercise performance  Alternate exercise positions  Verbal/Tactile cues to ensure correct exercise performance/technique       Assessment/Plan  Tolerated continued progression of therapeutic exercise/therapeutic activity/neuromuscular re-ed and balance well today, able to perform all exercise from sitting and standing positions, noting occasional mild increase of central LBP during session, subsiding quickly with short sitting rest break.  No increased pain reported at conclusion of session.       Progress per Plan of Care and Progress strengthening /stabilization /functional activity, resume stair work next visit as thanh.           Timed:         Manual Therapy:    -     mins  53745;     Therapeutic Exercise:    -     mins  56613;     Neuromuscular Marian:    15    mins  44221;    Therapeutic Activity:     15     mins  38215;     Gait Training:           mins  79183;     Ultrasound:          mins  25962;  "   Ionto:                                   mins  77188  Self Care:                       -     mins  51426    Un-Timed:  Electrical Stimulation:         mins  10300 ( );  Dry Needling          mins self-pay  Traction          mins 20263  Re-Eval                               mins  92623  Group Therapy           ___ mins 01930    Timed Treatment:   30   mins   Total Treatment:     50   mins    ADITYA [place] License #[id number]  Physical Therapist Assistant

## 2024-10-18 ENCOUNTER — TREATMENT (OUTPATIENT)
Dept: PHYSICAL THERAPY | Facility: CLINIC | Age: 74
End: 2024-10-18
Payer: MEDICARE

## 2024-10-18 DIAGNOSIS — M54.50 CHRONIC LOW BACK PAIN WITHOUT SCIATICA, UNSPECIFIED BACK PAIN LATERALITY: Primary | ICD-10-CM

## 2024-10-18 DIAGNOSIS — G89.29 CHRONIC LOW BACK PAIN WITHOUT SCIATICA, UNSPECIFIED BACK PAIN LATERALITY: Primary | ICD-10-CM

## 2024-10-18 DIAGNOSIS — Z98.1 STATUS POST LUMBAR SPINAL FUSION: ICD-10-CM

## 2024-10-18 DIAGNOSIS — M53.86 DECREASED RANGE OF MOTION OF LUMBAR SPINE: ICD-10-CM

## 2024-10-18 PROCEDURE — 97530 THERAPEUTIC ACTIVITIES: CPT

## 2024-10-18 PROCEDURE — 97112 NEUROMUSCULAR REEDUCATION: CPT

## 2024-10-18 NOTE — PROGRESS NOTES
Physical Therapy Daily Treatment Note  [facility] Physical Therapy [place]  [address], [address]  [place] [id number]     Patient: Jemima Bell  : 1950  Referring practitioner: Nimo Mary APRN  Today's Date: 10/18/2024    VISIT#: 13    Visit Diagnoses:    ICD-10-CM ICD-9-CM   1. Chronic low back pain without sciatica, unspecified back pain laterality  M54.50 724.2    G89.29 338.29   2. Status post lumbar spinal fusion  Z98.1 V45.4   3. Decreased range of motion of lumbar spine  M53.86 724.9       Subjective   Jemima Bell reports: Patient reports that the back of her hips are pretty sore today.      Objective       See Exercise, Manual, and Modality Logs for complete treatment.     Patient Education: HEP review  Exercise rationale/ pain free exercise performance  Alternate exercise positions  Verbal/Tactile cues to ensure correct exercise performance/technique       Assessment/Plan  Patient demonstrates good tolerance to continued to therapeutic exercises on this date with no report of lumbar pain throughout or at the end of the session. She continues to report that the pain is localized to her posterior hip. No changes were made on this date. Will continue to progress as tolerated.       Progress per Plan of Care and Progress strengthening /stabilization /functional activity          Timed:         Manual Therapy:    -     mins  98750;     Therapeutic Exercise:    6     mins  48802;     Neuromuscular Marian:   9    mins  23927;    Therapeutic Activity:     10     mins  40304;     Gait Training:           mins  15913;     Ultrasound:          mins  86381;    Ionto:                                   mins  74630  Self Care:                       -     mins  25097    Un-Timed:  Electrical Stimulation:         mins  50294 ( );  Dry Needling          mins self-pay  Traction          mins 04012  Re-Eval                               mins  15238  Group Therapy            ___ mins 95231    Timed Treatment:   30   mins   Total Treatment:     35   mins    Karma Argueta PT  Physical Therapist  [place] license #: [id number]

## 2024-10-22 ENCOUNTER — TREATMENT (OUTPATIENT)
Dept: PHYSICAL THERAPY | Facility: CLINIC | Age: 74
End: 2024-10-22
Payer: MEDICARE

## 2024-10-22 DIAGNOSIS — Z98.1 STATUS POST LUMBAR SPINAL FUSION: ICD-10-CM

## 2024-10-22 DIAGNOSIS — G89.29 CHRONIC LOW BACK PAIN WITHOUT SCIATICA, UNSPECIFIED BACK PAIN LATERALITY: Primary | ICD-10-CM

## 2024-10-22 DIAGNOSIS — R29.898 DECREASED STRENGTH OF LOWER EXTREMITY: ICD-10-CM

## 2024-10-22 DIAGNOSIS — M53.86 DECREASED RANGE OF MOTION OF LUMBAR SPINE: ICD-10-CM

## 2024-10-22 DIAGNOSIS — M54.50 CHRONIC LOW BACK PAIN WITHOUT SCIATICA, UNSPECIFIED BACK PAIN LATERALITY: Primary | ICD-10-CM

## 2024-10-22 PROCEDURE — 97112 NEUROMUSCULAR REEDUCATION: CPT

## 2024-10-22 PROCEDURE — 97530 THERAPEUTIC ACTIVITIES: CPT

## 2024-10-22 NOTE — PROGRESS NOTES
Physical Therapy Daily Treatment Note  [facility] Physical Therapy [place]  [address], [address]  [place] [address]     Patient: Jemima Bell  : 1950  Referring practitioner: Nimo Mary APRN  Today's Date: 10/22/2024    VISIT#: 14    Visit Diagnoses:    ICD-10-CM ICD-9-CM   1. Chronic low back pain without sciatica, unspecified back pain laterality  M54.50 724.2    G89.29 338.29   2. Status post lumbar spinal fusion  Z98.1 V45.4   3. Decreased range of motion of lumbar spine  M53.86 724.9   4. Decreased strength of lower extremity  R29.898 729.89       Subjective   Jemima Bell reports: Patient reports that her back is really starting to feel better, she still has pain with transitional movements such as sitting to standing. She was able to walk upright to the bathroom this morning without her walker and has not had any pain medications for the last couple days.       Objective   Hip pain is recreated with hamstring stretch and LU    See Exercise, Manual, and Modality Logs for complete treatment.     Patient Education: HEP review  Exercise rationale/ pain free exercise performance  Alternate exercise positions  Verbal/Tactile cues to ensure correct exercise performance/technique       Assessment/Plan  Patient demonstrates good tolerance to continued and new therapeutic exercises and activities on this date with minimal lumbar pain reported throughout. Began some hamstring isometrics and continued with glute strengthening to decrease stress placed on the insertion of the hamstrings at the ischial tuberosity. She does report increased pain at the ischial tuberosity after completing the hamstring isometrics. Continued with functional activities on this date and will continue to progress as tolerated.       Progress per Plan of Care and Progress strengthening /stabilization /functional activity          Timed:         Manual Therapy:    -     mins  38388;      Therapeutic Exercise:    -     mins  09891;     Neuromuscular Marian:    18    mins  16361;    Therapeutic Activity:     12     mins  90342;     Gait Training:           mins  16266;     Ultrasound:          mins  90586;    Ionto:                                   mins  31075  Self Care:                       -     mins  55179    Un-Timed:  Electrical Stimulation:         mins  54429 ( );  Dry Needling          mins self-pay  Traction          mins 72655  Re-Eval                               mins  05424  Group Therapy           ___ mins 55486    Timed Treatment:   30   mins   Total Treatment:     56   mins    Karma Argueta PT  Physical Therapist  [place] license #: [id number]

## 2024-10-23 ENCOUNTER — OFFICE VISIT (OUTPATIENT)
Dept: PAIN MEDICINE | Facility: CLINIC | Age: 74
End: 2024-10-23
Payer: MEDICARE

## 2024-10-23 VITALS
OXYGEN SATURATION: 95 % | DIASTOLIC BLOOD PRESSURE: 80 MMHG | SYSTOLIC BLOOD PRESSURE: 122 MMHG | BODY MASS INDEX: 35.19 KG/M2 | WEIGHT: 191.2 LBS | HEART RATE: 56 BPM | TEMPERATURE: 96 F | HEIGHT: 62 IN

## 2024-10-23 DIAGNOSIS — M54.2 NECK PAIN: ICD-10-CM

## 2024-10-23 DIAGNOSIS — G89.29 CHRONIC BILATERAL LOW BACK PAIN WITH BILATERAL SCIATICA: ICD-10-CM

## 2024-10-23 DIAGNOSIS — Z98.1 S/P LUMBAR FUSION: ICD-10-CM

## 2024-10-23 DIAGNOSIS — M54.42 CHRONIC BILATERAL LOW BACK PAIN WITH BILATERAL SCIATICA: ICD-10-CM

## 2024-10-23 DIAGNOSIS — Z79.899 ENCOUNTER FOR LONG-TERM (CURRENT) USE OF HIGH-RISK MEDICATION: Primary | ICD-10-CM

## 2024-10-23 DIAGNOSIS — M54.41 CHRONIC BILATERAL LOW BACK PAIN WITH BILATERAL SCIATICA: ICD-10-CM

## 2024-10-23 DIAGNOSIS — M54.16 LUMBAR RADICULOPATHY: ICD-10-CM

## 2024-10-23 RX ORDER — HYDROCODONE BITARTRATE AND ACETAMINOPHEN 7.5; 325 MG/1; MG/1
1 TABLET ORAL 2 TIMES DAILY PRN
Qty: 60 TABLET | Refills: 0 | Status: SHIPPED | OUTPATIENT
Start: 2024-10-23

## 2024-10-23 RX ORDER — PREGABALIN 50 MG/1
50 CAPSULE ORAL 2 TIMES DAILY
Qty: 60 CAPSULE | Refills: 1 | Status: SHIPPED | OUTPATIENT
Start: 2024-10-23

## 2024-10-23 NOTE — PROGRESS NOTES
"CHIEF COMPLAINT    Back and neck pain. The patient states she feels the back and neck pain has improved.      Subjective   Jemima Bell is a 74 y.o. female  who presents for follow-up.  She has a history of back and neck pain.  Today her pain is 4/10VAS in severity. She states that her pain is not the excruciating pain that she was experiencing prior to her surgery, but she does have ongoing continuous pain at this time. She continues to utilize Norco 7.5/325 0-2/day (states she has not taken this for several days--She has noticed that she does better with PT if she takes this before a PT session), Lyrica 50 mg 2/day, and Robaxin 500 mg 3/day. She states that when she uses this regimen she has moments where she feels \"normal\" and is able to complete housework and various tasks that need to be done. She does note that she has generalized fatigue, she denies any other side effects including somnolence or constipation.      Status post L3-S1 fusion performed by Dr. Rice on 7/30/2024    Not a candidate for MRI d/t bladder stimulator.      Not a good candidate for NSAIDs d/t GI upset. Failed Gabapentin (100 mg was not helpful, 300 mg caused side effects)    Her neck pain remains stable at this time.      Procedures:  6/26/2024--SALLIE at C7/T1--75-80% ONGOING relief  3/13/2024-LESI at L4/5-80% relief to her back, only temporary relief to leg pain  11/30/2023-bilateral SI joint injections-90% relief for approximately 3 months  5/12/23-left SI Joint Injection-90% relief x 5.5 months  3/27/23-L5/S1 LESI-100% relief to back pain x 1 week, 80% relief to left leg pain    Back Pain  This is a chronic problem. The current episode started more than 1 year ago. The problem occurs constantly. The problem has been comes and goes since onset. The pain is present in the sacro-iliac and lumbar spine. The quality of the pain is described as aching and stabbing. The pain radiates to the right thigh, left thigh, left knee and " right knee (R>L). The pain is at a severity of 4/10. The pain is The same all the time. The symptoms are aggravated by standing (walking). Associated symptoms include weakness (bilateral leg). Pertinent negatives include no abdominal pain, chest pain, dysuria, fever, headaches or numbness. Risk factors include menopause. She has tried heat, ice and analgesics (PT previously) for the symptoms.   Neck Pain   This is a new problem. The current episode started in the past 7 days. The problem occurs constantly. The problem has been waxing and waning. The pain is associated with nothing. The pain is present in the left side. The quality of the pain is described as burning, shooting and stabbing. The pain is at a severity of 4/10. The symptoms are aggravated by position (movement). Associated symptoms include weakness (bilateral leg). Pertinent negatives include no chest pain, fever, headaches or numbness. She has tried heat, ice, NSAIDs, oral narcotics and muscle relaxants (TENS, Lyrica) for the symptoms.      PEG Assessment   What number best describes your pain on average in the past week?6  What number best describes how, during the past week, pain has interfered with your enjoyment of life?8  What number best describes how, during the past week, pain has interfered with your general activity?  8    The following portions of the patient's history were reviewed and updated as appropriate: allergies, current medications, past family history, past medical history, past social history, past surgical history, and problem list.    Review of Systems   Constitutional:  Negative for chills and fever.   Respiratory:  Positive for shortness of breath. Negative for cough.    Cardiovascular:  Negative for chest pain.   Gastrointestinal:  Negative for abdominal pain, constipation and diarrhea.   Genitourinary:  Negative for difficulty urinating and dysuria.   Musculoskeletal:  Positive for back pain, gait problem (ambulates with a  "walker) and neck pain.   Neurological:  Positive for weakness (bilateral leg). Negative for dizziness, light-headedness, numbness and headaches.   Psychiatric/Behavioral:  Negative for agitation.      --  The aforementioned information the Chief Complaint section and above subjective data including any HPI data, and also the Review of Systems data, has been personally reviewed and affirmed.  --    Vitals:    10/23/24 1332   BP: 122/80   BP Location: Left arm   Patient Position: Sitting   Pulse: 56   Temp: 96 °F (35.6 °C)   TempSrc: Temporal   SpO2: 95%   Weight: 86.7 kg (191 lb 3.2 oz)   Height: 157.5 cm (62\")   PainSc:   4   PainLoc: Back     Objective   Physical Exam  Vitals and nursing note reviewed.   Constitutional:       Appearance: Normal appearance. She is well-developed.   Eyes:      General: Lids are normal.   Cardiovascular:      Rate and Rhythm: Normal rate.   Pulmonary:      Effort: Pulmonary effort is normal.   Musculoskeletal:      Lumbar back: Tenderness present. Decreased range of motion.   Neurological:      Mental Status: She is alert and oriented to person, place, and time.      Motor: No weakness.      Gait: Gait abnormal (walker).   Psychiatric:         Attention and Perception: Attention normal.         Mood and Affect: Mood normal.         Speech: Speech normal.         Behavior: Behavior normal.         Judgment: Judgment normal.       Assessment & Plan   Diagnoses and all orders for this visit:    1. Encounter for long-term (current) use of high-risk medication (Primary)    2. S/P lumbar fusion  -     HYDROcodone-acetaminophen (NORCO) 7.5-325 MG per tablet; Take 1 tablet by mouth 2 (Two) Times a Day As Needed for Severe Pain.  Dispense: 60 tablet; Refill: 0  -     pregabalin (LYRICA) 50 MG capsule; Take 1 capsule by mouth 2 (Two) Times a Day.  Dispense: 60 capsule; Refill: 1  -     Urine Drug Screen Confirmation - Urine, Clean Catch; Future  -     POC Urine Drug Screen, Triage    3. Chronic " bilateral low back pain with bilateral sciatica  -     HYDROcodone-acetaminophen (NORCO) 7.5-325 MG per tablet; Take 1 tablet by mouth 2 (Two) Times a Day As Needed for Severe Pain.  Dispense: 60 tablet; Refill: 0  -     pregabalin (LYRICA) 50 MG capsule; Take 1 capsule by mouth 2 (Two) Times a Day.  Dispense: 60 capsule; Refill: 1  -     Urine Drug Screen Confirmation - Urine, Clean Catch; Future  -     POC Urine Drug Screen, Triage    4. Lumbar radiculopathy    5. Neck pain      Jemima Bell reports a pain score of 4.  Given her pain assessment as noted, treatment options were discussed and the following options were decided upon as a follow-up plan to address the patient's pain: prescription for non-opioid analgesics and prescription for opioid analgesics.    --- Routine UDS in office today as part of monitoring requirements for controlled substances.  The specimen was viewed and the immunoassay result reviewed and is NEG (states last dose of Norco was several days ago).  This specimen will be sent to [facility] laboratory for confirmation.     --- CSA updated 4/22/2024  --- Refill [place] 7.5/325. Reviewed that at this time it is OK to utilized this BID with the goal of increasing her activity level and fully participating in PT. Patient appears stable with current regimen. No adverse effects. Regarding continuation of opioids, there is no evidence of aberrant behavior or any red flags.  The patient continues with appropriate response to opioid therapy. ADL's remain intact by self.   --- Refill Lyrica. Change to 100 mg nightly. Reviewed that this may help with the restlessness she feels in her legs at night as well as help decrease her daytime fatigue.   --- Follow-up 2 months or sooner if needed.      SANJEEV REPORT  As part of the patient's treatment plan, I am prescribing controlled substances. The patient has been made aware of appropriate use of such medications, including potential risk of  somnolence, limited ability to drive and/or work safely, and the potential for dependence or overdose. It has also been made clear that these medications are for use by this patient only, without concomitant use of alcohol or other substances unless prescribed.     Patient has completed prescribing agreement detailing terms of continued prescribing of controlled substances, including monitoring SANJEEV reports, urine drug screening, and pill counts if necessary. The patient is aware that inappropriate use will results in cessation of prescribing such medications.    As the clinician, I personally reviewed the SANJEEV from 10/23/2024 while the patient was in the office today.    History and physical exam exhibit continued safe and appropriate use of controlled substances.    Dictated utilizing Dragon dictation.

## 2024-10-24 ENCOUNTER — TREATMENT (OUTPATIENT)
Dept: PHYSICAL THERAPY | Facility: CLINIC | Age: 74
End: 2024-10-24
Payer: MEDICARE

## 2024-10-24 DIAGNOSIS — M53.86 DECREASED RANGE OF MOTION OF LUMBAR SPINE: ICD-10-CM

## 2024-10-24 DIAGNOSIS — Z98.1 STATUS POST LUMBAR SPINAL FUSION: ICD-10-CM

## 2024-10-24 DIAGNOSIS — G89.29 CHRONIC LOW BACK PAIN WITHOUT SCIATICA, UNSPECIFIED BACK PAIN LATERALITY: Primary | ICD-10-CM

## 2024-10-24 DIAGNOSIS — R29.898 DECREASED STRENGTH OF LOWER EXTREMITY: ICD-10-CM

## 2024-10-24 DIAGNOSIS — M54.50 CHRONIC LOW BACK PAIN WITHOUT SCIATICA, UNSPECIFIED BACK PAIN LATERALITY: Primary | ICD-10-CM

## 2024-10-24 PROCEDURE — 97112 NEUROMUSCULAR REEDUCATION: CPT

## 2024-10-24 PROCEDURE — 97530 THERAPEUTIC ACTIVITIES: CPT

## 2024-10-25 ENCOUNTER — FLU SHOT (OUTPATIENT)
Dept: FAMILY MEDICINE CLINIC | Facility: CLINIC | Age: 74
End: 2024-10-25
Payer: MEDICARE

## 2024-10-25 DIAGNOSIS — Z23 IMMUNIZATION DUE: Primary | ICD-10-CM

## 2024-10-25 PROCEDURE — G0008 ADMIN INFLUENZA VIRUS VAC: HCPCS | Performed by: FAMILY MEDICINE

## 2024-10-25 PROCEDURE — 90662 IIV NO PRSV INCREASED AG IM: CPT | Performed by: FAMILY MEDICINE

## 2024-10-28 ENCOUNTER — TREATMENT (OUTPATIENT)
Dept: PHYSICAL THERAPY | Facility: CLINIC | Age: 74
End: 2024-10-28
Payer: MEDICARE

## 2024-10-28 DIAGNOSIS — R29.898 DECREASED STRENGTH OF LOWER EXTREMITY: ICD-10-CM

## 2024-10-28 DIAGNOSIS — Z98.1 STATUS POST LUMBAR SPINAL FUSION: ICD-10-CM

## 2024-10-28 DIAGNOSIS — G89.29 CHRONIC LOW BACK PAIN WITHOUT SCIATICA, UNSPECIFIED BACK PAIN LATERALITY: Primary | ICD-10-CM

## 2024-10-28 DIAGNOSIS — M54.50 CHRONIC LOW BACK PAIN WITHOUT SCIATICA, UNSPECIFIED BACK PAIN LATERALITY: Primary | ICD-10-CM

## 2024-10-28 DIAGNOSIS — M53.86 DECREASED RANGE OF MOTION OF LUMBAR SPINE: ICD-10-CM

## 2024-10-28 PROCEDURE — 97112 NEUROMUSCULAR REEDUCATION: CPT

## 2024-10-28 PROCEDURE — 97530 THERAPEUTIC ACTIVITIES: CPT

## 2024-10-28 NOTE — PROGRESS NOTES
Physical Therapy Daily Treatment Note  [facility] Physical Therapy [place]  [address], [address]  [place] [address]     Patient: Jemima Bell  : 1950  Referring practitioner: Nimo Mary APRN  Today's Date: 10/28/2024    VISIT#: 16    Visit Diagnoses:    ICD-10-CM ICD-9-CM   1. Chronic low back pain without sciatica, unspecified back pain laterality  M54.50 724.2    G89.29 338.29   2. Status post lumbar spinal fusion  Z98.1 V45.4   3. Decreased range of motion of lumbar spine  M53.86 724.9   4. Decreased strength of lower extremity  R29.898 729.89       Subjective   Jemima Bell reports: Patient reports that her back is doing better and she experiences pain less frequently, however she still feels pain daily. The hip pain is getting better as well but still feels pain in the back or the hip.      Objective       See Exercise, Manual, and Modality Logs for complete treatment.     Patient Education: HEP review  Exercise rationale/ pain free exercise performance  Alternate exercise positions  Verbal/Tactile cues to ensure correct exercise performance/technique       Assessment/Plan  Patient demonstrates good tolerance to continued therapeutic exercises and activities on this date, she does report minimal back pain and hip pain throughout. Continues to demonstrate good progress with less pain during transitional movements and functional activities. Verbal cues were provided for proper positioning and performance with exercises but is able to correct them with cues. Introduced gentle and partial lumbar flexion with cues for segmental movements to avoid hip hinge. Will continue to progress as tolerated.       Progress per Plan of Care and Progress strengthening /stabilization /functional activity          Timed:         Manual Therapy:    -     mins  70465;     Therapeutic Exercise:    5     mins  53983;     Neuromuscular Marian:    8    mins  92148;    Therapeutic  Activity:     17     mins  09323;     Gait Training:           mins  19746;     Ultrasound:          mins  98195;    Ionto:                                   mins  60727  Self Care:                       -     mins  73611    Un-Timed:  Electrical Stimulation:         mins  75806 ( );  Dry Needling          mins self-pay  Traction          mins 58598  Re-Eval                               mins  96847  Group Therapy           ___ mins 16053    Timed Treatment:   25   mins   Total Treatment:     60   mins    Karma Argueta PT  Physical Therapist  [place] license #: [id number]

## 2024-10-29 ENCOUNTER — HOSPITAL ENCOUNTER (OUTPATIENT)
Dept: CT IMAGING | Facility: HOSPITAL | Age: 74
Discharge: HOME | End: 2024-10-29
Admitting: NURSE PRACTITIONER
Payer: MEDICARE

## 2024-10-29 DIAGNOSIS — Z98.1 S/P LUMBAR FUSION: ICD-10-CM

## 2024-10-29 PROCEDURE — 72131 CT LUMBAR SPINE W/O DYE: CPT

## 2024-10-31 ENCOUNTER — TREATMENT (OUTPATIENT)
Dept: PHYSICAL THERAPY | Facility: CLINIC | Age: 74
End: 2024-10-31
Payer: MEDICARE

## 2024-10-31 DIAGNOSIS — M53.86 DECREASED RANGE OF MOTION OF LUMBAR SPINE: ICD-10-CM

## 2024-10-31 DIAGNOSIS — Z98.1 STATUS POST LUMBAR SPINAL FUSION: ICD-10-CM

## 2024-10-31 DIAGNOSIS — R29.898 DECREASED STRENGTH OF LOWER EXTREMITY: ICD-10-CM

## 2024-10-31 DIAGNOSIS — M54.50 CHRONIC LOW BACK PAIN WITHOUT SCIATICA, UNSPECIFIED BACK PAIN LATERALITY: Primary | ICD-10-CM

## 2024-10-31 DIAGNOSIS — G89.29 CHRONIC LOW BACK PAIN WITHOUT SCIATICA, UNSPECIFIED BACK PAIN LATERALITY: Primary | ICD-10-CM

## 2024-10-31 PROCEDURE — 97112 NEUROMUSCULAR REEDUCATION: CPT

## 2024-10-31 PROCEDURE — 97110 THERAPEUTIC EXERCISES: CPT

## 2024-10-31 NOTE — PROGRESS NOTES
Physical Therapy Daily Treatment Note  [facility] Physical Therapy [place]  [address], [address]  [place] [address]     Patient: Jemima Bell  : 1950  Referring practitioner: Nimo Mary APRN  Today's Date: 10/31/2024    VISIT#: 17    Visit Diagnoses:    ICD-10-CM ICD-9-CM   1. Chronic low back pain without sciatica, unspecified back pain laterality  M54.50 724.2    G89.29 338.29   2. Status post lumbar spinal fusion  Z98.1 V45.4   3. Decreased range of motion of lumbar spine  M53.86 724.9   4. Decreased strength of lower extremity  R29.898 729.89       Subjective   Jemima Bell reports: Patient reports that she is feeling kind of lazy today. Both back and hip pain are better but still there.      Objective       See Exercise, Manual, and Modality Logs for complete treatment.     Patient Education: HEP review  Exercise rationale/ pain free exercise performance  Alternate exercise positions  Verbal/Tactile cues to ensure correct exercise performance/technique       Assessment/Plan  Patient continues to demonstrate good tolerance to therapeutic exercises on this date with no report of increased lumbar or ischial tuberosity pain during or at the end of the session. She required more sitting rest breaks on this date due to overall fatigue. She demonstrates improved gait mechanics and ambulates within the clinic without a walker. Will continue to progress as tolerated.       Progress per Plan of Care and Progress strengthening /stabilization /functional activity          Timed:         Manual Therapy:    -     mins  50289;     Therapeutic Exercise:    20     mins  51889;     Neuromuscular Marian:    10    mins  10004;    Therapeutic Activity:     -     mins  58951;     Gait Training:           mins  34412;     Ultrasound:          mins  08597;    Ionto:                                   mins  64364  Self Care:                       -     mins   24193    Un-Timed:  Electrical Stimulation:         mins  39866 ( );  Dry Needling          mins self-pay  Traction          mins 04103  Re-Eval                               mins  46756  Group Therapy           ___ mins 84049    Timed Treatment:   30   mins   Total Treatment:     60   mins    Karma Argueta PT  Physical Therapist  [facility] [place] license #: [id number]

## 2024-11-05 ENCOUNTER — TREATMENT (OUTPATIENT)
Dept: PHYSICAL THERAPY | Facility: CLINIC | Age: 74
End: 2024-11-05
Payer: MEDICARE

## 2024-11-05 DIAGNOSIS — R29.898 DECREASED STRENGTH OF LOWER EXTREMITY: ICD-10-CM

## 2024-11-05 DIAGNOSIS — M54.50 CHRONIC LOW BACK PAIN WITHOUT SCIATICA, UNSPECIFIED BACK PAIN LATERALITY: Primary | ICD-10-CM

## 2024-11-05 DIAGNOSIS — G89.29 CHRONIC LOW BACK PAIN WITHOUT SCIATICA, UNSPECIFIED BACK PAIN LATERALITY: Primary | ICD-10-CM

## 2024-11-05 DIAGNOSIS — Z98.1 STATUS POST LUMBAR SPINAL FUSION: ICD-10-CM

## 2024-11-05 DIAGNOSIS — M53.86 DECREASED RANGE OF MOTION OF LUMBAR SPINE: ICD-10-CM

## 2024-11-05 PROCEDURE — 97530 THERAPEUTIC ACTIVITIES: CPT

## 2024-11-05 PROCEDURE — 97110 THERAPEUTIC EXERCISES: CPT

## 2024-11-05 PROCEDURE — 97112 NEUROMUSCULAR REEDUCATION: CPT

## 2024-11-05 NOTE — PROGRESS NOTES
"Physical Therapy Daily Treatment Note  [facility] Physical Therapy [place]  [address], [address]  [place] [id number]     Patient: Jemima Bell  : 1950  Referring practitioner: Nimo Mary APRN  Today's Date: 2024    VISIT#: 18    Visit Diagnoses:    ICD-10-CM ICD-9-CM   1. Chronic low back pain without sciatica, unspecified back pain laterality  M54.50 724.2    G89.29 338.29   2. Status post lumbar spinal fusion  Z98.1 V45.4   3. Decreased range of motion of lumbar spine  M53.86 724.9   4. Decreased strength of lower extremity  R29.898 729.89       Subjective   Jemima Bell reports:   Feeling very stiff today, can feel where the hardware is at times, feels like it is \"poking\" her sometimes.       Objective   See Exercise, Manual, and Modality Logs for complete treatment.     Patient Education:   HEP review  Exercise rationale/ pain free exercise performance  Alternate exercise positions  Verbal/Tactile cues to ensure correct exercise performance/technique       Assessment/Plan  Tolerated continued progression of therapeutic exercise/therapeutic activity/neuromuscular re-ed well today, working mostly from HL position as pt felt less discomfort HL vs seated today.       Progress per Plan of Care and Progress strengthening /stabilization /functional activity          Timed:         Manual Therapy:    -     mins  76651;     Therapeutic Exercise:    22     mins  29053;     Neuromuscular Marian:   10    mins  51542;    Therapeutic Activity:     12     mins  43398;     Gait Training:           mins  10964;     Ultrasound:          mins  16732;    Ionto:                                   mins  88753  Self Care:                       -     mins  61373    Un-Timed:  Electrical Stimulation:         mins  08033 ( );  Dry Needling          mins self-pay  Traction          mins 47303  Re-Eval                               mins  43808  Group Therapy           ___ " mins 23781    Timed Treatment:   44   mins   Total Treatment:     50   mins    Patrick Ring PTA  [place] License #[id number]  Physical Therapist Assistant

## 2024-11-08 ENCOUNTER — TREATMENT (OUTPATIENT)
Dept: PHYSICAL THERAPY | Facility: CLINIC | Age: 74
End: 2024-11-08
Payer: MEDICARE

## 2024-11-08 DIAGNOSIS — R29.898 DECREASED STRENGTH OF LOWER EXTREMITY: ICD-10-CM

## 2024-11-08 DIAGNOSIS — M54.50 CHRONIC LOW BACK PAIN WITHOUT SCIATICA, UNSPECIFIED BACK PAIN LATERALITY: Primary | ICD-10-CM

## 2024-11-08 DIAGNOSIS — G89.29 CHRONIC LOW BACK PAIN WITHOUT SCIATICA, UNSPECIFIED BACK PAIN LATERALITY: Primary | ICD-10-CM

## 2024-11-08 DIAGNOSIS — M53.86 DECREASED RANGE OF MOTION OF LUMBAR SPINE: ICD-10-CM

## 2024-11-08 DIAGNOSIS — Z98.1 STATUS POST LUMBAR SPINAL FUSION: ICD-10-CM

## 2024-11-08 PROCEDURE — 97530 THERAPEUTIC ACTIVITIES: CPT

## 2024-11-08 PROCEDURE — 97112 NEUROMUSCULAR REEDUCATION: CPT

## 2024-11-08 PROCEDURE — 97110 THERAPEUTIC EXERCISES: CPT

## 2024-11-08 NOTE — PROGRESS NOTES
Physical Therapy Daily Treatment Note  [facility] Physical Therapy [place]  [address], [address]  [place] [address]     Patient: Jemima Bell  : 1950  Referring practitioner: Nimo Mary APRN  Today's Date: 2024    VISIT#: 19    Visit Diagnoses:    ICD-10-CM ICD-9-CM   1. Chronic low back pain without sciatica, unspecified back pain laterality  M54.50 724.2    G89.29 338.29   2. Status post lumbar spinal fusion  Z98.1 V45.4   3. Decreased range of motion of lumbar spine  M53.86 724.9   4. Decreased strength of lower extremity  R29.898 729.89       Subjective   Jemima Bell reports:   Did some cleaning around the house earlier today, didn't feel like she was doing much at the time, but can really feel the stress in her hips, feels weak in her LEs right now.     Objective   See Exercise, Manual, and Modality Logs for complete treatment.     Patient Education:   HEP review  Exercise rationale/ pain free exercise performance  Alternate exercise positions  Verbal/Tactile cues to ensure correct exercise performance/technique     Assessment/Plan  Tolerated continued progression of therapeutic exercise/therapeutic activity/neuromuscular re-ed well today. Continues to find bridging challenging and unable to fully perform, but able to perform partial range with LEs on SB, and able to perform standing mule-kick extension with good tolerance.          Progress per Plan of Care and Progress strengthening /stabilization /functional activity          Timed:         Manual Therapy:    -     mins  31771;     Therapeutic Exercise:    15     mins  33953;     Neuromuscular Marian:   15    mins  32862;    Therapeutic Activity:     12     mins  97930;     Gait Training:           mins  71210;     Ultrasound:          mins  75486;    Ionto:                                   mins  94920  Self Care:                       -     mins  63370    Un-Timed:  Electrical Stimulation:          mins  44613 ( );  Dry Needling          mins self-pay  Traction          mins 10651  Re-Eval                               mins  12291  Group Therapy           ___ mins 29365    Timed Treatment:   42   mins   Total Treatment:     50   mins    ADITYA [place] License #[id number]  Physical Therapist Assistant

## 2024-11-14 ENCOUNTER — TREATMENT (OUTPATIENT)
Dept: PHYSICAL THERAPY | Facility: CLINIC | Age: 74
End: 2024-11-14
Payer: MEDICARE

## 2024-11-14 DIAGNOSIS — G89.29 CHRONIC LOW BACK PAIN WITHOUT SCIATICA, UNSPECIFIED BACK PAIN LATERALITY: Primary | ICD-10-CM

## 2024-11-14 DIAGNOSIS — M54.41 CHRONIC BILATERAL LOW BACK PAIN WITH BILATERAL SCIATICA: ICD-10-CM

## 2024-11-14 DIAGNOSIS — M53.86 DECREASED RANGE OF MOTION OF LUMBAR SPINE: ICD-10-CM

## 2024-11-14 DIAGNOSIS — Z98.1 STATUS POST LUMBAR SPINAL FUSION: ICD-10-CM

## 2024-11-14 DIAGNOSIS — Z98.1 S/P LUMBAR FUSION: ICD-10-CM

## 2024-11-14 DIAGNOSIS — M54.50 CHRONIC LOW BACK PAIN WITHOUT SCIATICA, UNSPECIFIED BACK PAIN LATERALITY: Primary | ICD-10-CM

## 2024-11-14 DIAGNOSIS — M54.42 CHRONIC BILATERAL LOW BACK PAIN WITH BILATERAL SCIATICA: ICD-10-CM

## 2024-11-14 DIAGNOSIS — G89.29 CHRONIC BILATERAL LOW BACK PAIN WITH BILATERAL SCIATICA: ICD-10-CM

## 2024-11-14 DIAGNOSIS — R29.898 DECREASED STRENGTH OF LOWER EXTREMITY: ICD-10-CM

## 2024-11-14 PROCEDURE — 97140 MANUAL THERAPY 1/> REGIONS: CPT

## 2024-11-14 PROCEDURE — 97110 THERAPEUTIC EXERCISES: CPT

## 2024-11-14 NOTE — PROGRESS NOTES
Physical Therapy Daily Treatment Note  [facility] Therapy [place]  [address], [address]  [place] [address]     Patient: Jemima Bell  : 1950  Referring practitioner: Nimo Mary APRN  Today's Date: 2024    VISIT#: 20    Visit Diagnoses:    ICD-10-CM ICD-9-CM   1. Chronic low back pain without sciatica, unspecified back pain laterality  M54.50 724.2    G89.29 338.29   2. Status post lumbar spinal fusion  Z98.1 V45.4   3. Decreased range of motion of lumbar spine  M53.86 724.9   4. Decreased strength of lower extremity  R29.898 729.89       Subjective   Jemima Bell reports: Patient reports that she had a rough night last night, she has been stiff In the back lately and had had some sharp pain down the side of her R hip. Posterior hip pain is a little better.       Objective       See Exercise, Manual, and Modality Logs for complete treatment.     Patient Education: HEP review  Exercise rationale/ pain free exercise performance  Alternate exercise positions  Verbal/Tactile cues to ensure correct exercise performance/technique       Assessment/Plan  Patient demonstrates good tolerance to continued therapeutic exercises on this date along with STM to B hips with decreasing pain noted. She was pretty sore at the beginning of the session so focused on lower level strengthening and mobility exercises. No increased in pain was noted throughout or at the end of the session. She was able to transfer from supine to sidelying and supine to sitting with minimal effort and pain. Will continue to progress as tolerated.      Progress per Plan of Care and Progress strengthening /stabilization /functional activity          Timed:         Manual Therapy:    10     mins  18096;     Therapeutic Exercise:    20     mins  31197;     Neuromuscular Marian:    -    mins  24181;    Therapeutic Activity:     -     mins  90286;     Gait Training:           mins  70970;      Ultrasound:          mins  27062;    Ionto:                                   mins  63959  Self Care:                       -     mins  95574    Un-Timed:  Electrical Stimulation:         mins  19092 ( );  Dry Needling          mins self-pay  Traction          mins 45416  Re-Eval                               mins  27373  Group Therapy           ___ mins 97105    Timed Treatment:   30   mins   Total Treatment:     50   mins    Karma Argueta PT  Physical Therapist  Moshe MONTERO license #: [id number]

## 2024-11-15 RX ORDER — PREGABALIN 50 MG/1
50 CAPSULE ORAL 2 TIMES DAILY
Qty: 60 CAPSULE | Refills: 1 | Status: SHIPPED | OUTPATIENT
Start: 2024-11-15

## 2024-11-15 NOTE — TELEPHONE ENCOUNTER
PATIENT IS CALLING WITH SOME INSURANCE QUESTIONS  AND WOULD LIKE A CALL BACK      PLEASE CONTACT PATIENT @[phone]   15-Nov-2024 06:21

## 2024-11-18 ENCOUNTER — TREATMENT (OUTPATIENT)
Dept: PHYSICAL THERAPY | Facility: CLINIC | Age: 74
End: 2024-11-18
Payer: MEDICARE

## 2024-11-18 DIAGNOSIS — Z98.1 STATUS POST LUMBAR SPINAL FUSION: ICD-10-CM

## 2024-11-18 DIAGNOSIS — M53.86 DECREASED RANGE OF MOTION OF LUMBAR SPINE: ICD-10-CM

## 2024-11-18 DIAGNOSIS — M54.50 CHRONIC LOW BACK PAIN WITHOUT SCIATICA, UNSPECIFIED BACK PAIN LATERALITY: Primary | ICD-10-CM

## 2024-11-18 DIAGNOSIS — R29.898 DECREASED STRENGTH OF LOWER EXTREMITY: ICD-10-CM

## 2024-11-18 DIAGNOSIS — G89.29 CHRONIC LOW BACK PAIN WITHOUT SCIATICA, UNSPECIFIED BACK PAIN LATERALITY: Primary | ICD-10-CM

## 2024-11-18 PROCEDURE — 97112 NEUROMUSCULAR REEDUCATION: CPT

## 2024-11-18 PROCEDURE — 97110 THERAPEUTIC EXERCISES: CPT

## 2024-11-18 NOTE — LETTER
Physical Therapy Daily Treatment and Progress Note  [facility] Physical Therapy [place]  [address], [address]  [place] [address]     Patient: Jemima Bell  : 1950  Referring practitioner: Nimo Mary APRN  Today's Date: 2024    VISIT#: 21    Visit Diagnoses:    ICD-10-CM ICD-9-CM   1. Chronic low back pain without sciatica, unspecified back pain laterality  M54.50 724.2    G89.29 338.29   2. Status post lumbar spinal fusion  Z98.1 V45.4   3. Decreased range of motion of lumbar spine  M53.86 724.9   4. Decreased strength of lower extremity  R29.898 729.89     Subjective Questionnaire: Back Index: 42/50 (84%- Bed Bound) - 28/50 (56%- severe disability)- 22/50 (44%- moderate)       Subjective Evaluation    Pain  Current pain ratin  At best pain ratin  At worst pain ratin (after doing dishes)         Jemima Bell reports: Patient reports that she is sore, most of the pain is straight across the lower back. She has slept a lot today because of some discomfort in her back. Patient has seen about a 75% improvement thus far. She still has a difficult time with standing for a long period of time like doing dishes, getting dressed, loading the , and other ADLs.   Objective     Strength/Myotome Testing      Left Hip   Planes of Motion   Flexion: 4- (4) (4+)  External rotation: 4- (4) (4+)  Internal rotation: 4- (4) (4+)     Right Hip   Planes of Motion   Flexion: 4- (4) (4+)  External rotation: 4- (4) (4+)  Internal rotation: 4- (4) (4+)     Left Knee   Flexion: 4 (4)  Extension: 4 (4+)     Right Knee   Flexion: 4 (4)  Extension: 4 (4+)     See Exercise, Manual, and Modality Logs for complete treatment.     Patient Education: HEP review  Exercise rationale/ pain free exercise performance  Alternate exercise positions  Verbal/Tactile cues to ensure correct exercise performance/technique       Assessment/Plan  Patient has demonstrated great  progress thus far with skilled physical therapy interventions. Subjectively, pain levels are much better controlled and she has seen about a 75% improvement since beginning PT. Her outcome measure score decreased significantly, demonstrating a significant decrease in perceived disability. Objectively, transfers are much more controlled and provokes almost no pain. Her gait pattern has improved, demonstrating a more upright posture and less of an antalgic gait, making it possible to ambulate short distances in the clinic without the walker. She continues to present with a decreased edwin and stride length but is progressing well with the rolling walker. Also, her strength measures have improved and will continue to improve. However, functionally she continues to have a difficult time with ADLs including prolonged sitting, she has a difficult time with prolonged standing, ambulating, and doing dishes, or any house hold chores. Patient has met 2/4 STGs and is progressing well towards the others. Patient will greatly benefit from continued skilled therapeutic interventions.      Goals  Plan Goals: SHORT TERM GOALS: Time for Goal Achievement: 6 weeks  1.  Patient to be compliant and progression of HEP (MET)                             2.  Pain level < 5/10 at worst with mentioned activities to improve function (progressing)  3.  Increased thoracic, lumbar and hip mobility to allow for increased lumbar AROM with less pain. (MET)  4.  Increased lumbar AROM to by 25% in all planes to allow for increased ease with sit-stand transfers and functional activities (Progressing)     LONG TERM GOALS: Time for Goal Achievement: 12 weeks  1.  Pt. to score < 25 % on Back Index (Progressing)  2.  Pain level < 3/10 with all listed activities to return to normal. (Progressing)  3.  Lumbar AROM to WFL to allow for return to household & recreational activities w/o increased symptoms (Progressing)  4.  (B) LE and lower abdominal strength  to 4+/5 to allow for pushing, pulling and activities to occur without pain (driving, sitting, household  & recreational requirements) (Progressing)         Progress per Plan of Care and Progress strengthening /stabilization /functional activity    Karma Argueta PT  Physical Therapist  [place] [facility] license #: [id number]

## 2024-11-18 NOTE — PROGRESS NOTES
Physical Therapy Daily Treatment and Progress Note  [facility] Physical Therapy [place]  [address], [address]  [place] [address]     Patient: Jemima Bell  : 1950  Referring practitioner: Nimo Mary APRN  Today's Date: 2024    VISIT#: 21    Visit Diagnoses:    ICD-10-CM ICD-9-CM   1. Chronic low back pain without sciatica, unspecified back pain laterality  M54.50 724.2    G89.29 338.29   2. Status post lumbar spinal fusion  Z98.1 V45.4   3. Decreased range of motion of lumbar spine  M53.86 724.9   4. Decreased strength of lower extremity  R29.898 729.89     Subjective Questionnaire: Back Index: 42/50 (84%- Bed Bound) - 28/50 (56%- severe disability)- 22/50 (44%- moderate)       Subjective Evaluation    Pain  Current pain ratin  At best pain ratin  At worst pain ratin (after doing dishes)         Jemima Bell reports: Patient reports that she is sore, most of the pain is straight across the lower back. She has slept a lot today because of some discomfort in her back. Patient has seen about a 75% improvement thus far. She still has a difficult time with standing for a long period of time like doing dishes, getting dressed, loading the , and other ADLs.   Objective     Strength/Myotome Testing      Left Hip   Planes of Motion   Flexion: 4- (4) (4+)  External rotation: 4- (4) (4+)  Internal rotation: 4- (4) (4+)     Right Hip   Planes of Motion   Flexion: 4- (4) (4+)  External rotation: 4- (4) (4+)  Internal rotation: 4- (4) (4+)     Left Knee   Flexion: 4 (4)  Extension: 4 (4+)     Right Knee   Flexion: 4 (4)  Extension: 4 (4+)     See Exercise, Manual, and Modality Logs for complete treatment.     Patient Education: HEP review  Exercise rationale/ pain free exercise performance  Alternate exercise positions  Verbal/Tactile cues to ensure correct exercise performance/technique       Assessment/Plan  Patient has demonstrated great  progress thus far with skilled physical therapy interventions. Subjectively, pain levels are much better controlled and she has seen about a 75% improvement since beginning PT. Her outcome measure score decreased significantly, demonstrating a significant decrease in perceived disability. Objectively, transfers are much more controlled and provokes almost no pain. Her gait pattern has improved, demonstrating a more upright posture and less of an antalgic gait, making it possible to ambulate short distances in the clinic without the walker. She continues to present with a decreased edwin and stride length but is progressing well with the rolling walker. Also, her strength measures have improved and will continue to improve. However, functionally she continues to have a difficult time with ADLs including prolonged sitting, she has a difficult time with prolonged standing, ambulating, and doing dishes, or any house hold chores. Patient has met 2/4 STGs and is progressing well towards the others. Patient will greatly benefit from continued skilled therapeutic interventions.      Goals  Plan Goals: SHORT TERM GOALS: Time for Goal Achievement: 6 weeks  1.  Patient to be compliant and progression of HEP (MET)                             2.  Pain level < 5/10 at worst with mentioned activities to improve function (progressing)  3.  Increased thoracic, lumbar and hip mobility to allow for increased lumbar AROM with less pain. (MET)  4.  Increased lumbar AROM to by 25% in all planes to allow for increased ease with sit-stand transfers and functional activities (Progressing)     LONG TERM GOALS: Time for Goal Achievement: 12 weeks  1.  Pt. to score < 25 % on Back Index (Progressing)  2.  Pain level < 3/10 with all listed activities to return to normal. (Progressing)  3.  Lumbar AROM to WFL to allow for return to household & recreational activities w/o increased symptoms (Progressing)  4.  (B) LE and lower abdominal strength  to 4+/5 to allow for pushing, pulling and activities to occur without pain (driving, sitting, household  & recreational requirements) (Progressing)         Progress per Plan of Care and Progress strengthening /stabilization /functional activity          Timed:         Manual Therapy:    8     mins  06033;     Therapeutic Exercise:    10     mins  89641;     Neuromuscular Marian:    11    mins  26203;    Therapeutic Activity:     -     mins  49425;     Gait Training:           mins  06411;     Ultrasound:          mins  60889;    Ionto:                                   mins  64174  Self Care:                       -     mins  39696    Un-Timed:  Electrical Stimulation:         mins  03290 ( );  Dry Needling          mins self-pay  Traction          mins 82489  Re-Eval                               mins  19716  Group Therapy           ___ mins 26196    Timed Treatment:   29   mins   Total Treatment:     49   mins    Karma Argueta PT  Physical Therapist  [facility] #: [id number]

## 2024-11-20 ENCOUNTER — TREATMENT (OUTPATIENT)
Dept: PHYSICAL THERAPY | Facility: CLINIC | Age: 74
End: 2024-11-20
Payer: MEDICARE

## 2024-11-20 DIAGNOSIS — M54.50 CHRONIC LOW BACK PAIN WITHOUT SCIATICA, UNSPECIFIED BACK PAIN LATERALITY: Primary | ICD-10-CM

## 2024-11-20 DIAGNOSIS — M53.86 DECREASED RANGE OF MOTION OF LUMBAR SPINE: ICD-10-CM

## 2024-11-20 DIAGNOSIS — G89.29 CHRONIC LOW BACK PAIN WITHOUT SCIATICA, UNSPECIFIED BACK PAIN LATERALITY: Primary | ICD-10-CM

## 2024-11-20 DIAGNOSIS — Z98.1 STATUS POST LUMBAR SPINAL FUSION: ICD-10-CM

## 2024-11-20 DIAGNOSIS — R29.898 DECREASED STRENGTH OF LOWER EXTREMITY: ICD-10-CM

## 2024-11-20 PROCEDURE — 97112 NEUROMUSCULAR REEDUCATION: CPT

## 2024-11-20 PROCEDURE — 97530 THERAPEUTIC ACTIVITIES: CPT

## 2024-11-20 NOTE — PROGRESS NOTES
Physical Therapy Daily Treatment Note  [facility] Physical Therapy [place]  [address], [address]  [place] [address]     Patient: Jemima Bell  : 1950  Referring practitioner: Nimo Mary APRN  Today's Date: 2024    VISIT#: 22    Visit Diagnoses:    ICD-10-CM ICD-9-CM   1. Chronic low back pain without sciatica, unspecified back pain laterality  M54.50 724.2    G89.29 338.29   2. Decreased range of motion of lumbar spine  M53.86 724.9   3. Decreased strength of lower extremity  R29.898 729.89   4. Status post lumbar spinal fusion  Z98.1 V45.4       Subjective   Jemima Bell reports:   I really had a bad time the last time I was here, I just felt stiff and weak.  I have felt better since then.  I walked quite a bit yesterday, I crashed later in the day, I did not sleep well last night but that is not uncommon.     Objective   See Exercise, Manual, and Modality Logs for complete treatment.     Patient Education:   HEP review  Exercise rationale/ pain free exercise performance  Alternate exercise positions  Verbal/Tactile cues to ensure correct exercise performance/technique       Assessment/Plan  Patient demonstrates good tolerance to continued therapeutic exercises on this date, able to tolerate increased time in WB positions.  Able to complete 6 minute walk test at 900 ft with RPE at 3/10, using st cane AD.      Progress per Plan of Care and Progress strengthening /stabilization /functional activity          Timed:         Manual Therapy:         mins  32108;     Therapeutic Exercise:    5     mins  96297;     Neuromuscular Marian:   10    mins  51642;    Therapeutic Activity:     15     mins  29940;     Gait Training:           mins  28132;     Ultrasound:          mins  60526;    Ionto:                                   mins  53852  Self Care:                       -     mins  82557    Un-Timed:  Electrical Stimulation:         mins  49448 (  );  Dry Needling          mins self-pay  Traction          mins 97859  Re-Eval                               mins  39155  Group Therapy           ___ mins 91496    Timed Treatment:   30   mins   Total Treatment:     45   mins    ADITYA [place] License #[id number]  Physical Therapist Assistant   Pt seen and examined at 11:40am, Agree with HS' note   c/o Skin lesion in pubic area, lesion with discharge, improving reports not draining any more  No fever, no chills, pain improving   was treated with vanco, last vanco level 24.1 on 3/1, abscess improving    #Sepsis likely secondary to skin abscess- s/p Vanco  BC- NGTD. Monitor Vanco level   Advise warm compresses     # Type II DM- BS within acceptable goal. On Lantus 20 units plus SSS  #ESRD on HD- continue HD,  Phoslo  # S/p Lisfranc fracture- s/p OR, continue dressing as per Podiatry   # Dispo - Pt will need Rehab as pt NWB on left and pt with weakness from CVA on right   Ensure hep panel, cxr and ekg are done in preparation for placement at rehab   DW SW-pt with no active insurance. Awaiting Medicaid. Plan discussed with HS.

## 2024-11-21 NOTE — PROGRESS NOTES
"Subjective   History of Present Illness: Jemima Bell is a 74 y.o. female is here today for surgical follow-up with a new Lumbar CT. Today patient reports her pain has improved some, she bilateral hip pain and pain across her low back.  Overall her lower extremity pain is significantly improved she does continue to have back pain.  She continues with physical therapy.  She says that she is seeing slow progress week to week.      Chief Complaint   Patient presents with    Follow-up          Previous treatment: PO PT, Lyrica, Norco, Robaxin, Lidocaine patch's    Previous neurosurgery:  7/30/2024- L3-S1 TLIF    Previous injections:   3/13/2024-LESI at L4/5-80% relief to her back, only temporary relief to leg pain  11/30/2023-bilateral SI joint injections-90% relief for approximately 3 months  5/12/23-left SI Joint Injection-90% relief x 5.5 months  3/27/23-L5/S1 LESI-100% relief to back pain x 1 week, 80% relief to left leg pain    The following portions of the patient's history were reviewed and updated as appropriate: allergies, current medications, past family history, past medical history, past social history, past surgical history, and problem list.    Review of Systems   Constitutional:  Positive for activity change.   Respiratory:  Negative for chest tightness and shortness of breath.    Cardiovascular:  Negative for chest pain.   Musculoskeletal:  Positive for arthralgias (Bilateral hip pain), back pain and myalgias.   Neurological:  Negative for numbness.       Objective      Pulse 75   Resp 18   Ht 157.5 cm (62\")   Wt 86.6 kg (191 lb)   LMP  (LMP Unknown)   SpO2 97%   BMI 34.93 kg/m²    Body mass index is 34.93 kg/m².  Vitals:    11/22/24 1312   PainSc:   6   PainLoc: Back         Neurological Exam        Assessment & Plan   Independent Review of Radiographic Studies:      I personally reviewed and interpreted the images from the following studies.    CT lumbar spine: Hardware intact and in " good positioning with evidence of developing fusion    Medical Decision Making:      Jemima Bell is a 74 y.o. female status post L3-S1 TLIF overall doing well at 3 months postop.  She can continue working with physical therapy.  She should continue to limit bending lifting and twisting.  She can follow-up with [facility] in 3 months.      Diagnoses and all orders for this visit:    1. S/P lumbar fusion (Primary)      No follow-ups on file.    This patient was examined wearing appropriate personal protective equipment.                      Dr. Michelet Rice IV    11/22/24  13:29 EST

## 2024-11-22 ENCOUNTER — OFFICE VISIT (OUTPATIENT)
Dept: NEUROSURGERY | Facility: CLINIC | Age: 74
End: 2024-11-22
Payer: MEDICARE

## 2024-11-22 VITALS
OXYGEN SATURATION: 97 % | RESPIRATION RATE: 18 BRPM | BODY MASS INDEX: 35.15 KG/M2 | HEIGHT: 62 IN | WEIGHT: 191 LBS | HEART RATE: 75 BPM

## 2024-11-22 DIAGNOSIS — Z98.1 S/P LUMBAR FUSION: Primary | ICD-10-CM

## 2024-11-26 ENCOUNTER — PATIENT MESSAGE (OUTPATIENT)
Dept: FAMILY MEDICINE CLINIC | Facility: CLINIC | Age: 74
End: 2024-11-26
Payer: MEDICARE

## 2024-11-26 DIAGNOSIS — K21.9 GASTROESOPHAGEAL REFLUX DISEASE, UNSPECIFIED WHETHER ESOPHAGITIS PRESENT: ICD-10-CM

## 2024-11-27 ENCOUNTER — TREATMENT (OUTPATIENT)
Dept: PHYSICAL THERAPY | Facility: CLINIC | Age: 74
End: 2024-11-27
Payer: MEDICARE

## 2024-11-27 DIAGNOSIS — G89.29 CHRONIC LOW BACK PAIN WITHOUT SCIATICA, UNSPECIFIED BACK PAIN LATERALITY: Primary | ICD-10-CM

## 2024-11-27 DIAGNOSIS — M54.50 CHRONIC LOW BACK PAIN WITHOUT SCIATICA, UNSPECIFIED BACK PAIN LATERALITY: Primary | ICD-10-CM

## 2024-11-27 DIAGNOSIS — Z98.1 STATUS POST LUMBAR SPINAL FUSION: ICD-10-CM

## 2024-11-27 DIAGNOSIS — M53.86 DECREASED RANGE OF MOTION OF LUMBAR SPINE: ICD-10-CM

## 2024-11-27 DIAGNOSIS — R29.898 DECREASED STRENGTH OF LOWER EXTREMITY: ICD-10-CM

## 2024-11-27 PROCEDURE — 97535 SELF CARE MNGMENT TRAINING: CPT

## 2024-11-27 PROCEDURE — 97112 NEUROMUSCULAR REEDUCATION: CPT

## 2024-11-27 PROCEDURE — 97530 THERAPEUTIC ACTIVITIES: CPT

## 2024-11-27 PROCEDURE — 97110 THERAPEUTIC EXERCISES: CPT

## 2024-11-27 RX ORDER — PANTOPRAZOLE SODIUM 40 MG/1
40 TABLET, DELAYED RELEASE ORAL 2 TIMES DAILY
Qty: 180 TABLET | Refills: 3 | Status: SHIPPED | OUTPATIENT
Start: 2024-11-27

## 2024-11-27 NOTE — PROGRESS NOTES
Physical Therapy Daily Treatment Note  [facility] Physical Therapy [place]  [address], [address]  [place] [address]     Patient: Jemima Bell  : 1950  Referring practitioner: Nimo Mary APRN  Today's Date: 2024    VISIT#: 23    Visit Diagnoses:    ICD-10-CM ICD-9-CM   1. Chronic low back pain without sciatica, unspecified back pain laterality  M54.50 724.2    G89.29 338.29   2. Decreased range of motion of lumbar spine  M53.86 724.9   3. Decreased strength of lower extremity  R29.898 729.89   4. Status post lumbar spinal fusion  Z98.1 V45.4       Subjective   Jemima Bell reports: that she had a follow-up on Friday and we reviewed the x-ray from last week. He was very pleased with how everything was healing. He said the bone was starting to heal around the hardware. I also had a UTI and started taking an anti-biotic and this has helped some of the back pain.       Objective       See Exercise, Manual, and Modality Logs for complete treatment.     Patient Education: HEP review  Exercise rationale/ pain free exercise performance  Alternate exercise positions  Verbal/Tactile cues to ensure correct exercise performance/technique       Assessment/Plan  Patient demonstrates good tolerance to continued therapeutic exercises and activities on this date. She reports that she does not have any pain during or at the end of the session, she does continue to have stiffness. Discussed that she might continue to have stiffness due to the specific surgery she underwent. She is able to complete transfers with no muscle guarding or cramping on this date. She also ambulates in the gym with out her walker and appears to be safe and with very few gait deviations. She does continue to demonstrate decreased gait speed and stride length. Discussed ambulating in the house as she feels safe without the walker when she has furniture nearby but to continue to being the walker  with her in the public for safety. Will continue to progress as tolerated.       Progress per Plan of Care and Progress strengthening /stabilization /functional activity          Timed:         Manual Therapy:    -     mins  69893;     Therapeutic Exercise:    15     mins  58583;     Neuromuscular Marian:    20    mins  02299;    Therapeutic Activity:     13     mins  55347;     Gait Training:           mins  12701;     Ultrasound:          mins  66637;    Ionto:                                   mins  18695  Self Care:                       8     mins  55249    Un-Timed:  Electrical Stimulation:         mins  37070 ( );  Dry Needling          mins self-pay  Traction          mins 06570  Re-Eval                               mins  29200  Group Therapy           ___ mins 60525    Timed Treatment:   56   mins   Total Treatment:     56   mins    Karma Argueta PT  Physical Therapist  [facility] [place] license #: [id number]

## 2024-12-04 ENCOUNTER — TREATMENT (OUTPATIENT)
Dept: PHYSICAL THERAPY | Facility: CLINIC | Age: 74
End: 2024-12-04
Payer: MEDICARE

## 2024-12-04 DIAGNOSIS — M53.86 DECREASED RANGE OF MOTION OF LUMBAR SPINE: ICD-10-CM

## 2024-12-04 DIAGNOSIS — M54.50 CHRONIC LOW BACK PAIN WITHOUT SCIATICA, UNSPECIFIED BACK PAIN LATERALITY: Primary | ICD-10-CM

## 2024-12-04 DIAGNOSIS — Z98.1 STATUS POST LUMBAR SPINAL FUSION: ICD-10-CM

## 2024-12-04 DIAGNOSIS — G89.29 CHRONIC LOW BACK PAIN WITHOUT SCIATICA, UNSPECIFIED BACK PAIN LATERALITY: Primary | ICD-10-CM

## 2024-12-04 DIAGNOSIS — R29.898 DECREASED STRENGTH OF LOWER EXTREMITY: ICD-10-CM

## 2024-12-04 PROCEDURE — 97112 NEUROMUSCULAR REEDUCATION: CPT

## 2024-12-04 PROCEDURE — 97110 THERAPEUTIC EXERCISES: CPT

## 2024-12-04 NOTE — PROGRESS NOTES
Physical Therapy Daily Treatment Note  [facility] [place]  [address], [address]  [place] [address]     Patient: Jemima Bell  : 1950  Referring practitioner: Nimo Mary APRN  Today's Date: 2024    VISIT#: 24    Visit Diagnoses:    ICD-10-CM ICD-9-CM   1. Chronic low back pain without sciatica, unspecified back pain laterality  M54.50 724.2    G89.29 338.29   2. Decreased range of motion of lumbar spine  M53.86 724.9   3. Decreased strength of lower extremity  R29.898 729.89   4. Status post lumbar spinal fusion  Z98.1 V45.4       Subjective   Jemima Bell reports:   Feels the (R) hip is most limiting right now, having persistent pain and tenderness with WB and with touching the side of the hip.  When I sit for a while my tailbone gets sore.       Objective   See Exercise, Manual, and Modality Logs for complete treatment.     Patient Education: HEP review  Exercise rationale/ pain free exercise performance  Alternate exercise positions  Verbal/Tactile cues to ensure correct exercise performance/technique       Assessment/Plan  Patient demonstrates good tolerance to continued therapeutic exercises and therapeutic activities today.  No significant lasting pain noted during or after today's session.   Continues to demonstrate decreased gait speed and stride length but amb in gym safely both with and without AD.   Reviewed conversation with PT from last visit RE: ambulating in the house as she feels safe without the walker when she has furniture nearby but to continue to being the walker with her in the public for safety.   Will continue to progress as tolerated.       Progress per Plan of Care and Progress strengthening /stabilization /functional activity          Timed:         Manual Therapy:    -     mins  05126;     Therapeutic Exercise:    15     mins  70800;     Neuromuscular Marian:   8    mins  43531;    Therapeutic Activity:           mins  35660;     Gait Training:           mins  81633;     Ultrasound:          mins  53131;    Ionto:                                   mins  88627  Self Care:                            mins  57097    Un-Timed:  Electrical Stimulation:         mins  72769 ( );  Dry Needling          mins self-pay  Traction          mins 80819  Re-Eval                               mins  46533  Group Therapy           ___ mins 93211    Timed Treatment:   23   mins   Total Treatment:     56   mins    ADITYA [place] License #[id number]  Physical Therapist Assistant

## 2024-12-06 ENCOUNTER — TREATMENT (OUTPATIENT)
Dept: PHYSICAL THERAPY | Facility: CLINIC | Age: 74
End: 2024-12-06
Payer: MEDICARE

## 2024-12-06 DIAGNOSIS — Z98.1 STATUS POST LUMBAR SPINAL FUSION: ICD-10-CM

## 2024-12-06 DIAGNOSIS — M54.50 CHRONIC LOW BACK PAIN WITHOUT SCIATICA, UNSPECIFIED BACK PAIN LATERALITY: Primary | ICD-10-CM

## 2024-12-06 DIAGNOSIS — M53.86 DECREASED RANGE OF MOTION OF LUMBAR SPINE: ICD-10-CM

## 2024-12-06 DIAGNOSIS — G89.29 CHRONIC LOW BACK PAIN WITHOUT SCIATICA, UNSPECIFIED BACK PAIN LATERALITY: Primary | ICD-10-CM

## 2024-12-06 DIAGNOSIS — R29.898 DECREASED STRENGTH OF LOWER EXTREMITY: ICD-10-CM

## 2024-12-06 PROCEDURE — 97110 THERAPEUTIC EXERCISES: CPT

## 2024-12-06 PROCEDURE — 97112 NEUROMUSCULAR REEDUCATION: CPT

## 2024-12-06 NOTE — PROGRESS NOTES
Physical Therapy Daily Treatment Note  [facility] [place]  [address], [address]  [place] [address]     Patient: eJmima Bell  : 1950  Referring practitioner: Nimo Mary APRN  Today's Date: 2024    VISIT#: 25    Visit Diagnoses:    ICD-10-CM ICD-9-CM   1. Chronic low back pain without sciatica, unspecified back pain laterality  M54.50 724.2    G89.29 338.29   2. Decreased range of motion of lumbar spine  M53.86 724.9   3. Status post lumbar spinal fusion  Z98.1 V45.4   4. Decreased strength of lower extremity  R29.898 729.89       Subjective   Jemima Bell reports:   Feeling less energetic than at last visit, more pain in (R) lumbar area today.     Objective   See Exercise, Manual, and Modality Logs for complete treatment.     Patient Education:   HEP review  Exercise rationale/ pain free exercise performance  Alternate exercise positions  Verbal/Tactile cues to ensure correct exercise performance/technique       Assessment/Plan  Continued progression through program with more supine/hooklying position exercise today.  Patient demonstrates good tolerance to continued therapeutic exercises/therapeutic activities today, noting some mild (R) sided LBP but no significant increase.     Will continue to progress as tolerated.       Progress per Plan of Care and Progress strengthening /stabilization /functional activity          Timed:         Manual Therapy:    -     mins  38487;     Therapeutic Exercise:    20     mins  63791;     Neuromuscular Marian:   10    mins  02315;    Therapeutic Activity:          mins  85515;     Gait Training:           mins  63526;     Ultrasound:          mins  15908;    Ionto:                                   mins  38077  Self Care:                            mins  88953    Un-Timed:  Electrical Stimulation:         mins  14502 ( );  Dry Needling          mins self-pay  Traction          mins  68957  Re-[facility]                               mins  79428  Group Therapy           ___ mins 91757    Timed Treatment:   30   mins   Total Treatment:     50   mins    ADITYA [place] License #[id number]  Physical Therapist Assistant

## 2024-12-11 ENCOUNTER — TREATMENT (OUTPATIENT)
Dept: PHYSICAL THERAPY | Facility: CLINIC | Age: 74
End: 2024-12-11
Payer: MEDICARE

## 2024-12-11 DIAGNOSIS — Z98.1 STATUS POST LUMBAR SPINAL FUSION: ICD-10-CM

## 2024-12-11 DIAGNOSIS — M54.50 CHRONIC LOW BACK PAIN WITHOUT SCIATICA, UNSPECIFIED BACK PAIN LATERALITY: Primary | ICD-10-CM

## 2024-12-11 DIAGNOSIS — G89.29 CHRONIC LOW BACK PAIN WITHOUT SCIATICA, UNSPECIFIED BACK PAIN LATERALITY: Primary | ICD-10-CM

## 2024-12-11 DIAGNOSIS — R29.898 DECREASED STRENGTH OF LOWER EXTREMITY: ICD-10-CM

## 2024-12-11 DIAGNOSIS — M53.86 DECREASED RANGE OF MOTION OF LUMBAR SPINE: ICD-10-CM

## 2024-12-11 PROCEDURE — 97110 THERAPEUTIC EXERCISES: CPT

## 2024-12-11 PROCEDURE — 97112 NEUROMUSCULAR REEDUCATION: CPT

## 2024-12-11 NOTE — PROGRESS NOTES
"Physical Therapy Daily Treatment Note  [facility] [place]  [address], [address]  [place] [id number]     Patient: Jemima Bell  : 1950  Referring practitioner: Nimo Mary APRN  Today's Date: 2024    VISIT#: 26    Visit Diagnoses:    ICD-10-CM ICD-9-CM   1. Chronic low back pain without sciatica, unspecified back pain laterality  M54.50 724.2    G89.29 338.29   2. Decreased range of motion of lumbar spine  M53.86 724.9   3. Status post lumbar spinal fusion  Z98.1 V45.4   4. Decreased strength of lower extremity  R29.898 729.89       Subjective   Jemima Bell reports:   Feeling \"less than 100%\" today, LEs feeling weak.      Objective   See Exercise, Manual, and Modality Logs for complete treatment.     Patient Education:   HEP review  Exercise rationale/ pain free exercise performance  Alternate exercise positions  Verbal/Tactile cues to ensure correct exercise performance/technique       Assessment/Plan  Continued progression through program with t-band resisted focus today.  Session limited by spouse not feeling well, pt considering taking him to ER.    Patient demonstrates good tolerance to t-band resisted exercise with focus on trunk stabilization throughout.      Will continue to progress as tolerated.       Progress per Plan of Care and Progress strengthening /stabilization /functional activity          Timed:         Manual Therapy:    -     mins  58007;     Therapeutic Exercise:    15     mins  21745;     Neuromuscular Marian:   10    mins  51384;    Therapeutic Activity:          mins  23676;     Gait Training:           mins  47900;     Ultrasound:          mins  94479;    Ionto:                                   mins  33914  Self Care:                            mins  86102    Un-Timed:  Electrical Stimulation:         mins  61602 ( );  Dry Needling          mins self-pay  Traction          mins 33013  Re-Eval               "                 mins  85581  Group Therapy           ___ mins 96568    Timed Treatment:   25   mins   Total Treatment:     45   mins    ADITYA [place] License #[id number]  Physical Therapist Assistant

## 2024-12-16 ENCOUNTER — PATIENT MESSAGE (OUTPATIENT)
Dept: FAMILY MEDICINE CLINIC | Facility: CLINIC | Age: 74
End: 2024-12-16
Payer: MEDICARE

## 2024-12-16 ENCOUNTER — OFFICE VISIT (OUTPATIENT)
Dept: PAIN MEDICINE | Facility: CLINIC | Age: 74
End: 2024-12-16
Payer: MEDICARE

## 2024-12-16 VITALS
OXYGEN SATURATION: 96 % | DIASTOLIC BLOOD PRESSURE: 82 MMHG | HEIGHT: 62 IN | HEART RATE: 69 BPM | RESPIRATION RATE: 18 BRPM | SYSTOLIC BLOOD PRESSURE: 139 MMHG | BODY MASS INDEX: 35.26 KG/M2 | WEIGHT: 191.6 LBS | TEMPERATURE: 99.1 F

## 2024-12-16 DIAGNOSIS — M54.42 CHRONIC BILATERAL LOW BACK PAIN WITH BILATERAL SCIATICA: Primary | ICD-10-CM

## 2024-12-16 DIAGNOSIS — Z98.1 S/P LUMBAR FUSION: ICD-10-CM

## 2024-12-16 DIAGNOSIS — M62.838 MUSCLE SPASM: ICD-10-CM

## 2024-12-16 DIAGNOSIS — M54.41 CHRONIC BILATERAL LOW BACK PAIN WITH BILATERAL SCIATICA: Primary | ICD-10-CM

## 2024-12-16 DIAGNOSIS — Z79.899 ENCOUNTER FOR LONG-TERM (CURRENT) USE OF HIGH-RISK MEDICATION: ICD-10-CM

## 2024-12-16 DIAGNOSIS — M54.12 CERVICAL RADICULOPATHY: ICD-10-CM

## 2024-12-16 DIAGNOSIS — G89.29 CHRONIC BILATERAL LOW BACK PAIN WITH BILATERAL SCIATICA: Primary | ICD-10-CM

## 2024-12-16 PROCEDURE — 1159F MED LIST DOCD IN RCRD: CPT | Performed by: NURSE PRACTITIONER

## 2024-12-16 PROCEDURE — 1160F RVW MEDS BY RX/DR IN RCRD: CPT | Performed by: NURSE PRACTITIONER

## 2024-12-16 PROCEDURE — 99214 OFFICE O/P EST MOD 30 MIN: CPT | Performed by: NURSE PRACTITIONER

## 2024-12-16 PROCEDURE — 1125F AMNT PAIN NOTED PAIN PRSNT: CPT | Performed by: NURSE PRACTITIONER

## 2024-12-16 RX ORDER — METHOCARBAMOL 500 MG/1
500 TABLET, FILM COATED ORAL 4 TIMES DAILY PRN
Qty: 120 TABLET | Refills: 0 | Status: SHIPPED | OUTPATIENT
Start: 2024-12-16

## 2024-12-16 RX ORDER — HYDROCODONE BITARTRATE AND ACETAMINOPHEN 5; 325 MG/1; MG/1
1 TABLET ORAL EVERY 8 HOURS PRN
Qty: 60 TABLET | Refills: 0 | Status: SHIPPED | OUTPATIENT
Start: 2024-12-16

## 2024-12-16 NOTE — PROGRESS NOTES
CHIEF COMPLAINT  Follow-up for back and neck pain.    Subjective   Jemima Bell is a 74 y.o. female  who presents for follow-up.  She has a history of back and neck pain.  Today her pain is 4/10VAS in severity. Her back and neck pain fluctuates in severity.     She continues to utilize Norco 7.5/325 0-3/day (she notes that some days she just utilizes Tylenol), Lyrica 50 mg 1/day (she has decreased from 100 mg a few days ago), and Robaxin 500 mg 0-3/day. This medication regimen decreases her pain by a significant amount. She denies any side effects including somnolence or constipation. She continues in PT and notes that she does take a hydrocodone before this. She states she would not be able to participate in PT without the hydrocodone.     She notes that she is having a lot of trouble sleeping and will go three days in a row without sleep. She also states that since her surgery she has had shortness of breath with exertion, she states this has not improved.     Status post L3-S1 fusion performed by Dr. Rice on 7/30/2024     Not a candidate for MRI d/t bladder stimulator. Not a good candidate for NSAIDs d/t GI upset. Failed Gabapentin (100 mg was not helpful, 300 mg caused side effects)     Her neck pain remains stable, she will have intermittent pain, but otherwise she is doing well with this.      Procedures:  6/26/2024--SALLIE at C7/T1--75-80% ONGOING relief  3/13/2024-LESI at L4/5-80% relief to her back, only temporary relief to leg pain  11/30/2023-bilateral SI joint injections-90% relief for approximately 3 months  5/12/23-left SI Joint Injection-90% relief x 5.5 months  3/27/23-L5/S1 LESI-100% relief to back pain x 1 week, 80% relief to left leg pain    Back Pain  This is a chronic problem. The current episode started more than 1 year ago. The problem occurs constantly. The problem is unchanged. The pain is present in the sacro-iliac and lumbar spine. The quality of the pain is described as aching  and stabbing. The pain radiates to the right thigh, left thigh, left knee and right knee (R>L). The pain is at a severity of 4/10. The pain is The same all the time. The symptoms are aggravated by standing (walking). Associated symptoms include weakness. Pertinent negatives include no abdominal pain, chest pain, dysuria, fever, headaches or numbness. Risk factors include menopause. She has tried heat, ice and analgesics (PT previously) for the symptoms.   Neck Pain   This is a new problem. The current episode started in the past 7 days. The problem occurs constantly. The problem has been unchanged. The pain is associated with nothing. The pain is present in the left side. The quality of the pain is described as burning, shooting and stabbing. The pain is at a severity of 4/10. The symptoms are aggravated by position (movement). Associated symptoms include weakness. Pertinent negatives include no chest pain, fever, headaches or numbness. She has tried heat, ice, NSAIDs, oral narcotics and muscle relaxants (TENS, Lyrica) for the symptoms.      PEG Assessment   What number best describes your pain on average in the past week?5  What number best describes how, during the past week, pain has interfered with your enjoyment of life?10  What number best describes how, during the past week, pain has interfered with your general activity?  8    The following portions of the patient's history were reviewed and updated as appropriate: allergies, current medications, past family history, past medical history, past social history, past surgical history, and problem list.    Review of Systems   Constitutional:  Negative for fever.   Cardiovascular:  Negative for chest pain.   Gastrointestinal:  Negative for abdominal pain, blood in stool and constipation.   Genitourinary:  Negative for difficulty urinating and dysuria.   Musculoskeletal:  Positive for back pain and neck pain.   Neurological:  Positive for weakness. Negative for  "numbness and headaches.   Psychiatric/Behavioral:  Positive for sleep disturbance. Negative for suicidal ideas. The patient is nervous/anxious.      --  The aforementioned information the Chief Complaint section and above subjective data including any HPI data, and also the Review of Systems data, has been personally reviewed and affirmed.  --    Vitals:    12/16/24 1419   BP: 139/82   Pulse: 69   Resp: 18   Temp: 99.1 °F (37.3 °C)   SpO2: 96%   Weight: 86.9 kg (191 lb 9.6 oz)   Height: 157.5 cm (62\")   PainSc:   4   PainLoc: Back     Objective   Physical Exam  Vitals and nursing note reviewed.   Constitutional:       Appearance: Normal appearance. She is well-developed.   Eyes:      General: Lids are normal.   Cardiovascular:      Rate and Rhythm: Normal rate.   Pulmonary:      Effort: Pulmonary effort is normal.   Musculoskeletal:      Cervical back: Tenderness and bony tenderness present. Decreased range of motion.      Lumbar back: Tenderness and bony tenderness present. Decreased range of motion.   Neurological:      Mental Status: She is alert and oriented to person, place, and time.   Psychiatric:         Attention and Perception: Attention normal.         Mood and Affect: Mood normal.         Speech: Speech normal.         Behavior: Behavior normal.         Judgment: Judgment normal.       Assessment & Plan   Diagnoses and all orders for this visit:    1. Chronic bilateral low back pain with bilateral sciatica (Primary)  -     HYDROcodone-acetaminophen (NORCO) 5-325 MG per tablet; Take 1 tablet by mouth Every 8 (Eight) Hours As Needed for Moderate Pain. 30 day supply  Dispense: 60 tablet; Refill: 0    2. S/P lumbar fusion  -     HYDROcodone-acetaminophen (NORCO) 5-325 MG per tablet; Take 1 tablet by mouth Every 8 (Eight) Hours As Needed for Moderate Pain. 30 day supply  Dispense: 60 tablet; Refill: 0  -     methocarbamol (ROBAXIN) 500 MG tablet; Take 1 tablet by mouth 4 (Four) Times a Day As Needed for " Muscle Spasms.  Dispense: 120 tablet; Refill: 0    3. Encounter for long-term (current) use of high-risk medication    4. Cervical radiculopathy  -     HYDROcodone-acetaminophen (NORCO) 5-325 MG per tablet; Take 1 tablet by mouth Every 8 (Eight) Hours As Needed for Moderate Pain. 30 day supply  Dispense: 60 tablet; Refill: 0    5. Muscle spasm  -     methocarbamol (ROBAXIN) 500 MG tablet; Take 1 tablet by mouth 4 (Four) Times a Day As Needed for Muscle Spasms.  Dispense: 120 tablet; Refill: 0      Jemima Bell reports a pain score of 4.  Given her pain assessment as noted, treatment options were discussed and the following options were decided upon as a follow-up plan to address the patient's pain: continuation of current treatment plan for pain.    --- The urine drug screen confirmation from 10/23/2024 has been reviewed and the result is appropriate based on patient history and SANJEEV report  --- CSA updated 4/22/2024  --- Will begin to slowly wean her opioid. Decrease Norco to 5/325 q8h PRN #60 for a 30 day supply. Patient appears stable with current regimen. No adverse effects. Regarding continuation of opioids, there is no evidence of aberrant behavior or any red flags.  The patient continues with appropriate response to opioid therapy. ADL's remain intact by self.   --- Refill Robaxin  --- Continue Lyrica 50 mg, discussed with Ms. Fonseca that she may want to consider increasing this back up to 100 mg nightly if she notices her pain worsening.   --- Follow up with PCP regarding insomnia and shortness of breath on exertion that has been ongoing. Reviewed that if she develops any chest pain, pressure, or worsening shortness of breath she needs to go to the ED. She states understanding.   --- Follow-up 1 month or sooner If needed.      SANJEEV REPORT  As part of the patient's treatment plan, I am prescribing controlled substances. The patient has been made aware of appropriate use of such medications,  including potential risk of somnolence, limited ability to drive and/or work safely, and the potential for dependence or overdose. It has also been made clear that these medications are for use by this patient only, without concomitant use of alcohol or other substances unless prescribed.     Patient has completed prescribing agreement detailing terms of continued prescribing of controlled substances, including monitoring SANJEEV reports, urine drug screening, and pill counts if necessary. The patient is aware that inappropriate use will results in cessation of prescribing such medications.    As the clinician, I personally reviewed the SANJEEV from 12/16/2024 while the patient was in the office today.    History and physical exam exhibit continued safe and appropriate use of controlled substances.    Dictated utilizing Dragon dictation.

## 2024-12-17 ENCOUNTER — OFFICE VISIT (OUTPATIENT)
Dept: FAMILY MEDICINE CLINIC | Facility: CLINIC | Age: 74
End: 2024-12-17
Payer: MEDICARE

## 2024-12-17 VITALS
OXYGEN SATURATION: 98 % | TEMPERATURE: 98.2 F | DIASTOLIC BLOOD PRESSURE: 54 MMHG | WEIGHT: 191.8 LBS | HEART RATE: 56 BPM | HEIGHT: 62 IN | SYSTOLIC BLOOD PRESSURE: 98 MMHG | BODY MASS INDEX: 35.3 KG/M2

## 2024-12-17 DIAGNOSIS — E66.3 OVERWEIGHT: ICD-10-CM

## 2024-12-17 DIAGNOSIS — E03.9 HYPOTHYROIDISM, UNSPECIFIED TYPE: ICD-10-CM

## 2024-12-17 DIAGNOSIS — R73.03 PREDIABETES: ICD-10-CM

## 2024-12-17 DIAGNOSIS — R53.83 OTHER FATIGUE: ICD-10-CM

## 2024-12-17 DIAGNOSIS — E53.8 VITAMIN B12 DEFICIENCY: ICD-10-CM

## 2024-12-17 DIAGNOSIS — G47.00 INSOMNIA, UNSPECIFIED TYPE: Primary | ICD-10-CM

## 2024-12-17 PROCEDURE — 99214 OFFICE O/P EST MOD 30 MIN: CPT | Performed by: FAMILY MEDICINE

## 2024-12-17 PROCEDURE — 1125F AMNT PAIN NOTED PAIN PRSNT: CPT | Performed by: FAMILY MEDICINE

## 2024-12-17 RX ORDER — DARIDOREXANT 25 MG/1
25 TABLET, FILM COATED ORAL NIGHTLY
Qty: 30 TABLET | Refills: 0 | Status: SHIPPED | OUTPATIENT
Start: 2024-12-17

## 2024-12-17 NOTE — PROGRESS NOTES
"Chief Complaint  Insomnia (Ongoing for awhile cannot sleep at all is wearing her out, lays and stares at the ceiling.)    Subjective        Jemima Bell presents to [facility]  History of Present Illness  History of Present Illness  The patient presents for an acute concern about her sleep.    She has been experiencing chronic insomnia, which she attributes to her medication regimen following a surgical procedure. She reports difficulty initiating sleep, often lying awake until approximately 5:00 AM. This issue has been persistent since her childhood. She does not consume alcohol and has not previously tried Belsomra.    She also reports significant fatigue, describing it as severe enough to impede her ability to walk across a room without experiencing breathlessness. This symptom has been present since August 2024. She has discontinued her vitamin B12 supplementation due to elevated levels.    She is currently on a regimen of pregabalin and hydrocodone, administered as needed. She is under the care of Dr. MARTINES, who has prescribed bisoprolol, a medication she believes may be contributing to her weakness. She reports no chest pain or palpitations.    SOCIAL HISTORY  She does not drink alcohol.    MEDICATIONS  Current: Pregabalin, hydrocodone, bisoprolol       Objective   Vital Signs:  BP 98/54   Pulse 56   Temp 98.2 °F (36.8 °C)   Ht 157.5 cm (62.01\")   Wt 87 kg (191 lb 12.8 oz)   SpO2 98%   BMI 35.07 kg/m²   Estimated body mass index is 35.07 kg/m² as calculated from the following:    Height as of this encounter: 157.5 cm (62.01\").    Weight as of this encounter: 87 kg (191 lb 12.8 oz).               Physical Exam   Physical Exam  The patient is alert and in no acute distress.  The lungs are clear.  There were a couple of irregular beats in the heart.       Result Review :          Results       EKG shows sinus rhythm but had too much artifact for us to charge for.  " Patient reassured.         Assessment and Plan     Diagnoses and all orders for this visit:    1. Insomnia, unspecified type (Primary)    2. Other fatigue  -     TSH  -     Vitamin B12  -     CBC & Differential  -     Comprehensive Metabolic Panel    3. Hypothyroidism, unspecified type  -     TSH    4. Prediabetes  -     TSH  -     Lipid Panel  -     CBC & Differential  -     Comprehensive Metabolic Panel  -     Hemoglobin A1c    5. Vitamin B12 deficiency  -     Vitamin B12    6. Overweight  -     Lipid Panel      Assessment & Plan  1. Insomnia.  Her insomnia is likely exacerbated by her current medication regimen, which includes sedative drugs such as pregabalin and hydrocodone. These medications, while effective for managing her anxiety, may be interfering with her sleep quality. A prescription for Quviviq 25 mg will be issued, with instructions to persist with the medication for a minimum of 10 to 14 days before assessing its effectiveness. If there is no improvement after this period, the dosage will be increased to 50 mg. The prescription will be sent to a specialty pharmacy in [facility], which will handle insurance coverage. If her insomnia persists despite the initiation of Quviviq, a referral to a psychiatrist or sleep specialist will be considered.    2. Fatigue.  Her fatigue may be a consequence of her insomnia and the use of pregabalin and hydrocodone. She was slightly anemic during her last lab workup. A comprehensive blood panel will be ordered to rule out any underlying conditions contributing to her fatigue. This will include checking her A1c and B12 levels. If any alarming results are found, she will be notified immediately.    3. Irregular heartbeat.  During the physical examination, a couple of irregular heartbeats were noted. She is currently on bisoprolol, which she believes may be contributing to her fatigue.  She will follow-up with her cardiologist.  EKG showed sinus  rhythm.    Follow-up  The patient is scheduled for a follow-up visit on 01/02/2025.            Follow Up     No follow-ups on file.  Patient was given instructions and counseling regarding her condition or for health maintenance advice. Please see specific information pulled into the AVS if appropriate.    Patient or patient representative verbalized consent for the use of Ambient Listening during the visit with  Meredith Lea Kehrer, MD for chart documentation. 12/17/2024  13:58 EST

## 2024-12-18 ENCOUNTER — TREATMENT (OUTPATIENT)
Dept: PHYSICAL THERAPY | Facility: CLINIC | Age: 74
End: 2024-12-18
Payer: MEDICARE

## 2024-12-18 DIAGNOSIS — R29.898 DECREASED STRENGTH OF LOWER EXTREMITY: ICD-10-CM

## 2024-12-18 DIAGNOSIS — M53.86 DECREASED RANGE OF MOTION OF LUMBAR SPINE: ICD-10-CM

## 2024-12-18 DIAGNOSIS — G89.29 CHRONIC LOW BACK PAIN WITHOUT SCIATICA, UNSPECIFIED BACK PAIN LATERALITY: Primary | ICD-10-CM

## 2024-12-18 DIAGNOSIS — Z98.1 STATUS POST LUMBAR SPINAL FUSION: ICD-10-CM

## 2024-12-18 DIAGNOSIS — M54.50 CHRONIC LOW BACK PAIN WITHOUT SCIATICA, UNSPECIFIED BACK PAIN LATERALITY: Primary | ICD-10-CM

## 2024-12-18 LAB
ALBUMIN SERPL-MCNC: 4.3 G/DL (ref 3.8–4.8)
ALP SERPL-CCNC: 83 IU/L (ref 44–121)
ALT SERPL-CCNC: 10 IU/L (ref 0–32)
AST SERPL-CCNC: 13 IU/L (ref 0–40)
BASOPHILS # BLD AUTO: 0.1 X10E3/UL (ref 0–0.2)
BASOPHILS NFR BLD AUTO: 1 %
BILIRUB SERPL-MCNC: 0.4 MG/DL (ref 0–1.2)
BUN SERPL-MCNC: 21 MG/DL (ref 8–27)
BUN/CREAT SERPL: 22 (ref 12–28)
CALCIUM SERPL-MCNC: 9.6 MG/DL (ref 8.7–10.3)
CHLORIDE SERPL-SCNC: 100 MMOL/L (ref 96–106)
CHOLEST SERPL-MCNC: 237 MG/DL (ref 100–199)
CO2 SERPL-SCNC: 25 MMOL/L (ref 20–29)
CREAT SERPL-MCNC: 0.95 MG/DL (ref 0.57–1)
EGFRCR SERPLBLD CKD-EPI 2021: 62 ML/MIN/1.73
EOSINOPHIL # BLD AUTO: 0.3 X10E3/UL (ref 0–0.4)
EOSINOPHIL NFR BLD AUTO: 4 %
ERYTHROCYTE [DISTWIDTH] IN BLOOD BY AUTOMATED COUNT: 13.3 % (ref 11.7–15.4)
GLOBULIN SER CALC-MCNC: 2.8 G/DL (ref 1.5–4.5)
GLUCOSE SERPL-MCNC: 107 MG/DL (ref 70–99)
HBA1C MFR BLD: 6.1 % (ref 4.8–5.6)
HCT VFR BLD AUTO: 40.4 % (ref 34–46.6)
HDLC SERPL-MCNC: 74 MG/DL
HGB BLD-MCNC: 13 G/DL (ref 11.1–15.9)
IMM GRANULOCYTES # BLD AUTO: 0 X10E3/UL (ref 0–0.1)
IMM GRANULOCYTES NFR BLD AUTO: 0 %
LDLC SERPL CALC-MCNC: 138 MG/DL (ref 0–99)
LYMPHOCYTES # BLD AUTO: 1.6 X10E3/UL (ref 0.7–3.1)
LYMPHOCYTES NFR BLD AUTO: 26 %
MCH RBC QN AUTO: 28.4 PG (ref 26.6–33)
MCHC RBC AUTO-ENTMCNC: 32.2 G/DL (ref 31.5–35.7)
MCV RBC AUTO: 88 FL (ref 79–97)
MONOCYTES # BLD AUTO: 0.6 X10E3/UL (ref 0.1–0.9)
MONOCYTES NFR BLD AUTO: 9 %
NEUTROPHILS # BLD AUTO: 3.7 X10E3/UL (ref 1.4–7)
NEUTROPHILS NFR BLD AUTO: 60 %
PLATELET # BLD AUTO: 332 X10E3/UL (ref 150–450)
POTASSIUM SERPL-SCNC: 4.4 MMOL/L (ref 3.5–5.2)
PROT SERPL-MCNC: 7.1 G/DL (ref 6–8.5)
RBC # BLD AUTO: 4.58 X10E6/UL (ref 3.77–5.28)
SODIUM SERPL-SCNC: 139 MMOL/L (ref 134–144)
TRIGL SERPL-MCNC: 143 MG/DL (ref 0–149)
TSH SERPL DL<=0.005 MIU/L-ACNC: 1.14 UIU/ML (ref 0.45–4.5)
VIT B12 SERPL-MCNC: 274 PG/ML (ref 232–1245)
VLDLC SERPL CALC-MCNC: 25 MG/DL (ref 5–40)
WBC # BLD AUTO: 6.3 X10E3/UL (ref 3.4–10.8)

## 2024-12-18 PROCEDURE — 97110 THERAPEUTIC EXERCISES: CPT

## 2024-12-18 PROCEDURE — 97112 NEUROMUSCULAR REEDUCATION: CPT

## 2024-12-18 NOTE — PROGRESS NOTES
Physical Therapy Daily Treatment Note  [facility] [place]  [address], [address]  [place] [address]     Patient: Jemima Bell  : 1950  Referring practitioner: Nimo Mary APRN  Today's Date: 2024    VISIT#: 27    Visit Diagnoses:    ICD-10-CM ICD-9-CM   1. Chronic low back pain without sciatica, unspecified back pain laterality  M54.50 724.2    G89.29 338.29   2. Decreased range of motion of lumbar spine  M53.86 724.9   3. Status post lumbar spinal fusion  Z98.1 V45.4   4. Decreased strength of lower extremity  R29.898 729.89       Subjective   Jemima Bell reports:   Feeling low energy today, has been feeling that way for a while.  Saw her GP yesterday, GP did some blood work to investigate.    Objective   See Exercise, Manual, and Modality Logs for complete treatment.     Patient Education:   HEP review  Exercise rationale/ pain free exercise performance  Alternate exercise positions  Verbal/Tactile cues to ensure correct exercise performance/technique     Assessment/Plan  Tolerated continued progression of therapeutic exercise/therapeutic activity/neuromuscular re-ed and balance well today at light to moderate intensity level.    Pt notes fatigue at conclusion of session but no distress.      Will continue to progress as tolerated.       Progress per Plan of Care and Progress strengthening /stabilization /functional activity          Timed:         Manual Therapy:    -     mins  99101;     Therapeutic Exercise:    15     mins  93849;     Neuromuscular Marian:   15    mins  23794;    Therapeutic Activity:          mins  13630;     Gait Training:           mins  54861;     Ultrasound:          mins  60579;    Ionto:                                   mins  27066  Self Care:                            mins  49890    Un-Timed:  Electrical Stimulation:         mins  02631 ( );  Dry Needling          mins self-pay  Traction           mins 13024  Re-Eval                               mins  20037  Group Therapy           ___ mins 98186    Timed Treatment:   30   mins   Total Treatment:     45   mins    ADITYA [place] License #[id number]  Physical Therapist Assistant

## 2024-12-20 ENCOUNTER — TREATMENT (OUTPATIENT)
Dept: PHYSICAL THERAPY | Facility: CLINIC | Age: 74
End: 2024-12-20
Payer: MEDICARE

## 2024-12-20 DIAGNOSIS — M54.50 CHRONIC LOW BACK PAIN WITHOUT SCIATICA, UNSPECIFIED BACK PAIN LATERALITY: Primary | ICD-10-CM

## 2024-12-20 DIAGNOSIS — G89.29 CHRONIC LOW BACK PAIN WITHOUT SCIATICA, UNSPECIFIED BACK PAIN LATERALITY: Primary | ICD-10-CM

## 2024-12-20 DIAGNOSIS — M53.86 DECREASED RANGE OF MOTION OF LUMBAR SPINE: ICD-10-CM

## 2024-12-20 DIAGNOSIS — R29.898 DECREASED STRENGTH OF LOWER EXTREMITY: ICD-10-CM

## 2024-12-20 DIAGNOSIS — Z98.1 STATUS POST LUMBAR SPINAL FUSION: ICD-10-CM

## 2024-12-20 NOTE — PROGRESS NOTES
Physical Therapy Daily Treatment Note and Re-Eval  [facility] [place]  [address], [address]  [place] [id number]     Patient: Jemima Bell  : 1950  Referring practitioner: Nimo Mary APRN  Today's Date: 2024    VISIT#: 28    Visit Diagnoses:    ICD-10-CM ICD-9-CM   1. Chronic low back pain without sciatica, unspecified back pain laterality  M54.50 724.2    G89.29 338.29   2. Decreased range of motion of lumbar spine  M53.86 724.9   3. Status post lumbar spinal fusion  Z98.1 V45.4   4. Decreased strength of lower extremity  R29.898 729.89      Back Index: 22/50 (44%- moderate) - 15/50 (30%)    Subjective Evaluation    Pain  Current pain ratin  At best pain ratin  At worst pain ratin         Jemima Bell reports: that she has seen about a 70-75% improvement and has had pain meds decreased in strength and sometimes just has to take them once a day. She continues to have a difficulty with daily activities including cooking, cleaning, making the bed, decorating, and doing anything after an hour.       Objective          Active Range of Motion     Additional Active Range of Motion Details  Lumbar flexion: about 50% of expected ROM, no increased lumbar pain  Lumbar extension: about 75% of expected ROM, no pain        Strength/Myotome Testing      Left Hip   Planes of Motion   Flexion: 4- (4) (4+)  External rotation: 4- (4) (5)  Internal rotation: 4- (4) (5)     Right Hip   Planes of Motion   Flexion: 4- (4) (5)  External rotation: 4- (4) (5)  Internal rotation: 4- (4) (5)     Left Knee   Flexion: 4 (4) (5)  Extension: 4 (4+) (5)     Right Knee   Flexion: 4 (4) (5)  Extension: 4 (4+) (5)       See Exercise, Manual, and Modality Logs for complete treatment.     Patient Education: HEP review  Exercise rationale/ pain free exercise performance  Alternate exercise positions  Verbal/Tactile cues to ensure correct exercise  performance/technique       Assessment & Plan       Plan  Frequency: 1x week  Duration in weeks: 6      Patient has demonstrated great progress thus far with skilled physical therapy interventions. Subjectively, pain levels are much better controlled and she has seen about a 75% improvement since beginning PT. Her outcome measure score decreased significantly, demonstrating a significant decrease in perceived disability. Objectively, transfers are much more controlled and provokes no pain. Her gait pattern has improved, demonstrating a more upright posture and less of an antalgic gait, making it possible to ambulate short distances in the clinic and around the house and community without the walker. She reports that she really uses the walker whenever she is fatigued. LE strength continues to improve and lumbar mobility has improved as well. She continues to present with a decreased edwin and stride length but is progressing well with and without the rolling walker. However, functionally she continues to have a difficult time with ADLs including prolonged sitting, she has a difficult time with prolonged standing, ambulating, and doing dishes, or any house hold chores that last more than an hour. Patient has met 3/4 STGs, 1/4 and is progressing well towards the others. Patient will benefit from continued skilled therapeutic interventions for another 6 weeks but decreasing the frequency to once a week to continue to improve overall endurance and strength.      Goals  Plan Goals: SHORT TERM GOALS: Time for Goal Achievement: 6 weeks  1.  Patient to be compliant and progression of HEP (MET)                             2.  Pain level < 5/10 at worst with mentioned activities to improve function (progressing)  3.  Increased thoracic, lumbar and hip mobility to allow for increased lumbar AROM with less pain. (MET)  4.  Increased lumbar AROM to by 25% in all planes to allow for increased ease with sit-stand transfers and  functional activities (MET)     LONG TERM GOALS: Time for Goal Achievement: 12 weeks  1.  Pt. to score < 25 % on Back Index (Progressing)  2.  Pain level < 3/10 with all listed activities to return to normal. (Progressing)  3.  Lumbar AROM to WFL to allow for return to household & recreational activities w/o increased symptoms (Progressing)  4.  (B) LE and lower abdominal strength to 4+/5 to allow for pushing, pulling and activities to occur without pain (driving, sitting, household  & recreational requirements) (MET)      Progress per Plan of Care and Progress strengthening /stabilization /functional activity          Timed:         Manual Therapy:    -     mins  07555;     Therapeutic Exercise:    -     mins  27303;     Neuromuscular Marian:    -    mins  01688;    Therapeutic Activity:     17     mins  23003;     Gait Training:           mins  96228;     Ultrasound:          mins  63930;    Ionto:                                   mins  55117  Self Care:                       8     mins  96755    Un-Timed:  Electrical Stimulation:         mins  06513 ( );  Dry Needling          mins self-pay  Traction          mins 13295  Re-Eval                         10      mins  78922  Group Therapy           ___ mins 94673    Timed Treatment:   25   mins   Total Treatment:     52   mins    Karma Argueta PT  Physical Therapist  [place] [facility] license #: [id number]

## 2024-12-20 NOTE — LETTER
Physical Therapy Daily Treatment Note and Re-Eval  [facility] [place]  [address], [address]  [place] [id number]     Patient: Jemima Bell  : 1950  Referring practitioner: Nimo Mary APRN  Today's Date: 2024    VISIT#: 28    Visit Diagnoses:    ICD-10-CM ICD-9-CM   1. Chronic low back pain without sciatica, unspecified back pain laterality  M54.50 724.2    G89.29 338.29   2. Decreased range of motion of lumbar spine  M53.86 724.9   3. Status post lumbar spinal fusion  Z98.1 V45.4   4. Decreased strength of lower extremity  R29.898 729.89      Back Index: 22/50 (44%- moderate) - 15/50 (30%)    Subjective Evaluation    Pain  Current pain ratin  At best pain ratin  At worst pain ratin         Jemima Bell reports: that she has seen about a 70-75% improvement and has had pain meds decreased in strength and sometimes just has to take them once a day. She continues to have a difficulty with daily activities including cooking, cleaning, making the bed, decorating, and doing anything after an hour.       Objective          Active Range of Motion     Additional Active Range of Motion Details  Lumbar flexion: about 50% of expected ROM, no increased lumbar pain  Lumbar extension: about 75% of expected ROM, no pain        Strength/Myotome Testing      Left Hip   Planes of Motion   Flexion: 4- (4) (4+)  External rotation: 4- (4) (5)  Internal rotation: 4- (4) (5)     Right Hip   Planes of Motion   Flexion: 4- (4) (5)  External rotation: 4- (4) (5)  Internal rotation: 4- (4) (5)     Left Knee   Flexion: 4 (4) (5)  Extension: 4 (4+) (5)     Right Knee   Flexion: 4 (4) (5)  Extension: 4 (4+) (5)       See Exercise, Manual, and Modality Logs for complete treatment.     Patient Education: HEP review  Exercise rationale/ pain free exercise performance  Alternate exercise positions  Verbal/Tactile cues to ensure correct exercise  performance/technique       Assessment & Plan       Plan  Frequency: 1x week  Duration in weeks: 6      Patient has demonstrated great progress thus far with skilled physical therapy interventions. Subjectively, pain levels are much better controlled and she has seen about a 75% improvement since beginning PT. Her outcome measure score decreased significantly, demonstrating a significant decrease in perceived disability. Objectively, transfers are much more controlled and provokes no pain. Her gait pattern has improved, demonstrating a more upright posture and less of an antalgic gait, making it possible to ambulate short distances in the clinic and around the house and community without the walker. She reports that she really uses the walker whenever she is fatigued. LE strength continues to improve and lumbar mobility has improved as well. She continues to present with a decreased edwin and stride length but is progressing well with and without the rolling walker. However, functionally she continues to have a difficult time with ADLs including prolonged sitting, she has a difficult time with prolonged standing, ambulating, and doing dishes, or any house hold chores that last more than an hour. Patient has met 3/4 STGs, 1/4 and is progressing well towards the others. Patient will benefit from continued skilled therapeutic interventions for another 6 weeks but decreasing the frequency to once a week to continue to improve overall endurance and strength.      Goals  Plan Goals: SHORT TERM GOALS: Time for Goal Achievement: 6 weeks  1.  Patient to be compliant and progression of HEP (MET)                             2.  Pain level < 5/10 at worst with mentioned activities to improve function (progressing)  3.  Increased thoracic, lumbar and hip mobility to allow for increased lumbar AROM with less pain. (MET)  4.  Increased lumbar AROM to by 25% in all planes to allow for increased ease with sit-stand transfers and  functional activities (MET)     LONG TERM GOALS: Time for Goal Achievement: 12 weeks  1.  Pt. to score < 25 % on Back Index (Progressing)  2.  Pain level < 3/10 with all listed activities to return to normal. (Progressing)  3.  Lumbar AROM to WFL to allow for return to household & recreational activities w/o increased symptoms (Progressing)  4.  (B) LE and lower abdominal strength to 4+/5 to allow for pushing, pulling and activities to occur without pain (driving, sitting, household  & recreational requirements) (MET)      Progress per Plan of Care and Progress strengthening /stabilization /functional activity      Karma Argueta PT  Physical Therapist  [place] [facility] license #: [id number]

## 2024-12-30 ENCOUNTER — TELEPHONE (OUTPATIENT)
Dept: PAIN MEDICINE | Facility: CLINIC | Age: 74
End: 2024-12-30
Payer: MEDICARE

## 2024-12-30 RX ORDER — LIDOCAINE 50 MG/G
1 PATCH TOPICAL EVERY 24 HOURS
Qty: 30 EACH | Refills: 0 | Status: SHIPPED | OUTPATIENT
Start: 2024-12-30

## 2024-12-30 NOTE — TELEPHONE ENCOUNTER
"Caller: Jemima Bell \"Kim\"    Relationship to patient: Self    Best call back number: [phone] (home)     Patient is needing: PATIENT WAS PREVIOUSLY RECEIVING LIDOCAINE PATCHES FROM ANOTHER PROVIDER BUT STATES THAT [facility] HAS TAKEN OVER THESE TYPES F SCRIPTS.   PATIENT IS WANTING MORE LIDOCAINE PATCHES SENT IN TO HER PHARMACY   [facility] [address]      "

## 2024-12-30 NOTE — TELEPHONE ENCOUNTER
[facility] needed a PA which I have done,pt has been informed that she will be notified once we get a response.

## 2025-01-02 ENCOUNTER — OFFICE VISIT (OUTPATIENT)
Dept: FAMILY MEDICINE CLINIC | Facility: CLINIC | Age: 75
End: 2025-01-02
Payer: MEDICARE

## 2025-01-02 ENCOUNTER — PRIOR AUTHORIZATION (OUTPATIENT)
Dept: PAIN MEDICINE | Facility: CLINIC | Age: 75
End: 2025-01-02
Payer: MEDICARE

## 2025-01-02 VITALS
OXYGEN SATURATION: 96 % | DIASTOLIC BLOOD PRESSURE: 70 MMHG | BODY MASS INDEX: 35.63 KG/M2 | SYSTOLIC BLOOD PRESSURE: 116 MMHG | HEART RATE: 50 BPM | HEIGHT: 62 IN | WEIGHT: 193.6 LBS

## 2025-01-02 DIAGNOSIS — E03.9 HYPOTHYROIDISM, UNSPECIFIED TYPE: ICD-10-CM

## 2025-01-02 DIAGNOSIS — K21.9 GASTROESOPHAGEAL REFLUX DISEASE, UNSPECIFIED WHETHER ESOPHAGITIS PRESENT: ICD-10-CM

## 2025-01-02 DIAGNOSIS — F41.8 DEPRESSION WITH ANXIETY: ICD-10-CM

## 2025-01-02 DIAGNOSIS — Z00.00 MEDICARE ANNUAL WELLNESS VISIT, SUBSEQUENT: Primary | ICD-10-CM

## 2025-01-02 DIAGNOSIS — E53.8 VITAMIN B12 DEFICIENCY: ICD-10-CM

## 2025-01-02 DIAGNOSIS — R73.03 PREDIABETES: ICD-10-CM

## 2025-01-02 PROCEDURE — 1125F AMNT PAIN NOTED PAIN PRSNT: CPT | Performed by: FAMILY MEDICINE

## 2025-01-02 PROCEDURE — 99214 OFFICE O/P EST MOD 30 MIN: CPT | Performed by: FAMILY MEDICINE

## 2025-01-02 PROCEDURE — 1170F FXNL STATUS ASSESSED: CPT | Performed by: FAMILY MEDICINE

## 2025-01-02 PROCEDURE — G0439 PPPS, SUBSEQ VISIT: HCPCS | Performed by: FAMILY MEDICINE

## 2025-01-02 PROCEDURE — 96372 THER/PROPH/DIAG INJ SC/IM: CPT | Performed by: FAMILY MEDICINE

## 2025-01-02 RX ORDER — CYANOCOBALAMIN 1000 UG/ML
1000 INJECTION, SOLUTION INTRAMUSCULAR; SUBCUTANEOUS
Status: SHIPPED | OUTPATIENT
Start: 2025-01-02

## 2025-01-02 RX ADMIN — CYANOCOBALAMIN 1000 MCG: 1000 INJECTION, SOLUTION INTRAMUSCULAR; SUBCUTANEOUS at 15:40

## 2025-01-02 NOTE — TELEPHONE ENCOUNTER
LIDOCAINE PATCH 5% APPROVED    Outcome  Approved on December 30, 2024 by [employer] NCPDP 2017  Your request has been approved  Authorization Expiration Date: 12/31/2024    PT INFORMED.

## 2025-01-02 NOTE — PROGRESS NOTES
The ABCs of the Annual Wellness Visit  Subsequent Medicare Wellness Visit    Subjective    Jemima Bell is a 74 y.o. female who presents for a Subsequent Medicare Wellness Visit.    The following portions of the patient's history were reviewed and   updated as appropriate: allergies, current medications, past family history, past medical history, past social history, past surgical history, and problem list.    Compared to one year ago, the patient feels her physical   health is the same.    Compared to one year ago, the patient feels her mental   health is the same.    Recent Hospitalizations:  This patient has had a [facility] admission record on file within the last 365 days.    Current Medical Providers:  Patient Care Team:  Kehrer, Meredith Lea, MD as PCP - General (Family Medicine)    Outpatient Medications Prior to Visit   Medication Sig Dispense Refill    albuterol sulfate  (90 Base) MCG/ACT inhaler Inhale 2 puffs Every 4 (Four) Hours As Needed for Shortness of Air or Wheezing.      bisoprolol-hydrochlorothiazide (Ziac) 5-6.25 MG per tablet Take 1 tablet by mouth Daily. 90 tablet 3    fluticasone (FLONASE) 50 MCG/ACT nasal spray 2 sprays into the nostril(s) as directed by provider Daily. 48 g 3    HYDROcodone-acetaminophen (NORCO) 5-325 MG per tablet Take 1 tablet by mouth Every 8 (Eight) Hours As Needed for Moderate Pain. 30 day supply 60 tablet 0    hydrOXYzine (ATARAX) 10 MG tablet Take 1 tablet by mouth Every 4 (Four) Hours As Needed for Anxiety (twitching). 120 tablet 1    levothyroxine (Synthroid) 100 MCG tablet Take 1 tablet by mouth Every Morning. 90 tablet 3    lidocaine (LIDODERM) 5 % Place 1 patch on the skin as directed by provider Daily. Remove & Discard patch within 12 hours or as directed by MD 30 each 0    methocarbamol (ROBAXIN) 500 MG tablet Take 1 tablet by mouth 4 (Four) Times a Day As Needed for Muscle Spasms. 120 tablet 0    ondansetron (Zofran) 4 MG tablet Take 1  tablet by mouth Every 8 (Eight) Hours As Needed for Nausea or Vomiting. 15 tablet 2    pantoprazole (PROTONIX) 40 MG EC tablet Take 1 tablet by mouth 2 (Two) Times a Day. 180 tablet 3    pregabalin (LYRICA) 50 MG capsule Take 1 capsule by mouth 2 (Two) Times a Day. 60 capsule 1    promethazine (PHENERGAN) 25 MG tablet Take 1 tablet by mouth Every 6 (Six) Hours As Needed for Nausea or Vomiting. 20 tablet 1    sertraline (ZOLOFT) 100 MG tablet TAKE 1 TABLET BY MOUTH DAILY 90 tablet 1    Daridorexant HCl (Quviviq) 25 MG tablet Take 25 mg by mouth Every Night. Indications: Trouble Sleeping (Patient not taking: Reported on 1/2/2025) 30 tablet 0    naloxone (NARCAN) 4 MG/0.1ML nasal spray Call 911. Don't prime. [place] in 1 nostril for overdose. Repeat in 2-3 minutes in other nostril if no or minimal breathing/responsiveness. (Patient not taking: Reported on 1/2/2025) 2 each 0     No facility-administered medications prior to visit.       Opioid medication/s are on active medication list.  and I have evaluated her active treatment plan and pain score trends (see table).  There were no vitals filed for this visit.  I have reviewed the chart for potential of high risk medication and harmful drug interactions in the elderly.          Aspirin is not on active medication list.  Aspirin use is not indicated based on review of current medical condition/s. Risk of harm outweighs potential benefits.  .    Patient Active Problem List   Diagnosis    Wheezing    Osteoarthritis    Acute bronchitis    Dyspnea    Hypothyroidism    Shortness of breath    Vitamin D deficiency    Depression with anxiety    Muscle cramp    Back pain    Vitamin B12 deficiency    UTI (urinary tract infection)    Abnormal EKG    Precordial pain    Anxiety    Borderline systolic HTN    Left upper quadrant abdominal pain    Diarrhea    Gastroesophageal reflux disease    History of Nissen fundoplication    Incontinence of feces with fecal urgency    Foraminal  "stenosis of lumbar region    Lumbar degenerative disc disease    Lumbar facet arthropathy    Lumbar radiculopathy    Sacroiliac joint dysfunction of both sides    Lumbar stenosis with neurogenic claudication    Cervical radiculopathy    Neck pain    Heart murmur    Preop cardiovascular exam     Advance Care Planning   Advance Care Planning     Advance Directive is not on file.  ACP discussion was held with the patient during this visit. Patient does not have an advance directive, information provided.     Objective    Vitals:    25 1416   BP: 116/70   Pulse: 50   SpO2: 96%   Weight: 87.8 kg (193 lb 9.6 oz)   Height: 157.5 cm (62.01\")     Estimated body mass index is 35.4 kg/m² as calculated from the following:    Height as of this encounter: 157.5 cm (62.01\").    Weight as of this encounter: 87.8 kg (193 lb 9.6 oz).           Does the patient have evidence of cognitive impairment? No    Lab Results   Component Value Date    CHLPL 237 (H) 2024    TRIG 143 2024    HDL 74 2024     (H) 2024    VLDL 25 2024    HGBA1C 6.1 (H) 2024        HEALTH RISK ASSESSMENT    Smoking Status:  Social History     Tobacco Use   Smoking Status Never   Smokeless Tobacco Never     Alcohol Consumption:  Social History     Substance and Sexual Activity   Alcohol Use Never     Fall Risk Screen:    STEADI Fall Risk Assessment was completed, and patient is at LOW risk for falls.Assessment completed on:2025    Depression Screenin/2/2025     2:13 PM   PHQ-2/PHQ-9 Depression Screening   Little interest or pleasure in doing things Not at all   Feeling down, depressed, or hopeless Several days   How difficult have these problems made it for you to do your work, take care of things at home, or get along with other people? Not difficult at all       Health Habits and Functional and Cognitive Screenin/2/2025     2:13 PM   Functional & Cognitive Status   Do you have difficulty " preparing food and eating? Yes   Do you have difficulty bathing yourself, getting dressed or grooming yourself? No   Do you have difficulty using the toilet? No   Do you have difficulty moving around from place to place? Yes   Do you have trouble with steps or getting out of a bed or a chair? Yes   Current Diet Well Balanced Diet   Dental Exam Up to date   Eye Exam Up to date   Exercise (times per week) 2 times per week   Current Exercises Include Weightlifting;Walking;Light Weights   Do you need help using the phone?  No   Are you deaf or do you have serious difficulty hearing?  No   Do you need help to go to places out of walking distance? Yes   Do you need help shopping? No   Do you need help preparing meals?  No   Do you need help with housework?  No   Do you need help with laundry? No   Do you need help taking your medications? No   Do you need help managing money? No   Do you ever drive or ride in a car without wearing a seat belt? No   Have you felt unusual stress, anger or loneliness in the last month? No   Who do you live with? Spouse   If you need help, do you have trouble finding someone available to you? No   Have you been bothered in the last four weeks by sexual problems? No   Do you have difficulty concentrating, remembering or making decisions? No       Age-appropriate Screening Schedule:  Refer to the list below for future screening recommendations based on patient's age, sex and/or medical conditions. Orders for these recommended tests are listed in the plan section. The patient has been provided with a written plan.    Health Maintenance   Topic Date Due    TDAP/TD VACCINES (1 - Tdap) Never done    ZOSTER VACCINE (1 of 2) Never done    COVID-19 Vaccine (4 - 2024-25 season) 09/01/2024    ANNUAL WELLNESS VISIT  12/05/2024    BMI FOLLOWUP  08/16/2025    LIPID PANEL  12/17/2025    DXA SCAN  04/17/2026    COLORECTAL CANCER SCREENING  12/23/2027    HEPATITIS C SCREENING  Completed    RSV Vaccine -  "Adults  Completed    INFLUENZA VACCINE  Completed    Pneumococcal Vaccine 65+  Completed    MAMMOGRAM  Discontinued                  CMS Preventative Services Quick Reference  Risk Factors Identified During Encounter  None Identified  The above risks/problems have been discussed with the patient.  Pertinent information has been shared with the patient in the After Visit Summary.  An After Visit Summary and PPPS were made available to the patient.    Follow Up:   Next Medicare Wellness visit to be scheduled in 1 year.       Additional E&M Note during same encounter follows:  Patient has multiple medical problems which are significant and separately identifiable that require additional work above and beyond the Medicare Wellness Visit.      Chief Complaint  Medicare Wellness-subsequent    Subjective        HPI  Jemima Bell is also being seen today for follow up.  History of Present Illness  Patient presents for Medicare wellness and follow-up on chronic medical issues of depression, hypothyroidism, B12 deficiency.  She also has hypertension.  She is doing well and compliant with her medication.  All her labs were checked last month but she did not come in for her B12 injections as recommended.  She denies any SI Licide or hopelessness.  She feels the sertraline really helps.  She is sleeping better at night since pain management adjusted her Lyrica.  She never did get the Cutivate covered by her insurance even through the specialty pharmacy.            Objective   Vital Signs:  /70   Pulse 50   Ht 157.5 cm (62.01\")   Wt 87.8 kg (193 lb 9.6 oz)   SpO2 96%   BMI 35.40 kg/m²     Physical Exam  Vitals and nursing note reviewed.   Constitutional:       General: She is not in acute distress.     Appearance: Normal appearance. She is well-developed.   HENT:      Head: Normocephalic and atraumatic.      Right Ear: Tympanic membrane, ear canal and external ear normal.      Left Ear: Tympanic membrane, ear " canal and external ear normal.      Nose: Nose normal.      Mouth/Throat:      Mouth: Mucous membranes are moist.      Pharynx: Oropharynx is clear. No oropharyngeal exudate or posterior oropharyngeal erythema.   Eyes:      Conjunctiva/sclera: Conjunctivae normal.      Pupils: Pupils are equal, round, and reactive to light.   Neck:      Thyroid: No thyromegaly.   Cardiovascular:      Rate and Rhythm: Normal rate and regular rhythm.      Heart sounds: No murmur heard.  Pulmonary:      Effort: Pulmonary effort is normal.      Breath sounds: Normal breath sounds. No wheezing.   Abdominal:      General: Abdomen is flat. Bowel sounds are normal. There is no distension.      Palpations: Abdomen is soft. There is no mass.      Tenderness: There is no abdominal tenderness.      Hernia: No hernia is present.   Musculoskeletal:         General: No swelling. Normal range of motion.      Cervical back: Normal range of motion and neck supple.      Right lower leg: No edema.      Left lower leg: No edema.   Lymphadenopathy:      Cervical: No cervical adenopathy.   Skin:     General: Skin is warm and dry.      Capillary Refill: Capillary refill takes less than 2 seconds.      Findings: No rash.   Neurological:      General: No focal deficit present.      Mental Status: She is alert and oriented to person, place, and time.      Cranial Nerves: No cranial nerve deficit.   Psychiatric:         Mood and Affect: Mood normal.         Behavior: Behavior normal.        Physical Exam                  Results     Hemoglobin A1c (12/17/2024 14:31)  TSH (12/17/2024 14:31)  Vitamin B12 (12/17/2024 14:31)  Lipid Panel (12/17/2024 14:31)  CBC & Differential (12/17/2024 14:31)  Comprehensive Metabolic Panel (12/17/2024 14:31)       Assessment and Plan   Diagnoses and all orders for this visit:    1. Medicare annual wellness visit, subsequent (Primary)    2. Hypothyroidism, unspecified type    3. Depression with anxiety    4. Prediabetes    5.  Gastroesophageal reflux disease, unspecified whether esophagitis present    6. Vitamin B12 deficiency  -     Vitamin B12; Future  -     cyanocobalamin injection 1,000 mcg      Assessment & Plan  Hypothyroidism-controlled, continue current medication  Depression with anxiety-doing well, continue sertraline  Prediabetes-work harder on diet, discussed strategies to avoid sugars, recheck in 3 months  GERD-stable  B12 deficiency-she did not start her injections as recommended last month, we will get that started today and recheck a B12 level in a few weeks              Follow Up   Return in about 3 months (around 4/2/2025) for Recheck.  Patient was given instructions and counseling regarding her condition or for health maintenance advice. Please see specific information pulled into the AVS if appropriate.     Patient or patient representative verbalized consent for the use of Ambient Listening during the visit with  Meredith Lea Kehrer, MD for chart documentation. 1/2/2025  14:15 EST

## 2025-01-14 ENCOUNTER — CLINICAL SUPPORT (OUTPATIENT)
Dept: FAMILY MEDICINE CLINIC | Facility: CLINIC | Age: 75
End: 2025-01-14
Payer: MEDICARE

## 2025-01-14 DIAGNOSIS — E53.8 VITAMIN B12 DEFICIENCY: Primary | ICD-10-CM

## 2025-01-14 PROCEDURE — 96372 THER/PROPH/DIAG INJ SC/IM: CPT | Performed by: FAMILY MEDICINE

## 2025-01-14 RX ADMIN — CYANOCOBALAMIN 1000 MCG: 1000 INJECTION, SOLUTION INTRAMUSCULAR; SUBCUTANEOUS at 10:49

## 2025-01-15 ENCOUNTER — OFFICE VISIT (OUTPATIENT)
Dept: PAIN MEDICINE | Facility: CLINIC | Age: 75
End: 2025-01-15
Payer: MEDICARE

## 2025-01-15 VITALS
HEIGHT: 62 IN | BODY MASS INDEX: 35.26 KG/M2 | OXYGEN SATURATION: 97 % | TEMPERATURE: 96 F | SYSTOLIC BLOOD PRESSURE: 102 MMHG | WEIGHT: 191.6 LBS | HEART RATE: 75 BPM | DIASTOLIC BLOOD PRESSURE: 68 MMHG

## 2025-01-15 DIAGNOSIS — M54.41 CHRONIC BILATERAL LOW BACK PAIN WITH BILATERAL SCIATICA: ICD-10-CM

## 2025-01-15 DIAGNOSIS — Z98.1 S/P LUMBAR FUSION: ICD-10-CM

## 2025-01-15 DIAGNOSIS — M54.12 CERVICAL RADICULOPATHY: ICD-10-CM

## 2025-01-15 DIAGNOSIS — Z79.899 ENCOUNTER FOR LONG-TERM (CURRENT) USE OF HIGH-RISK MEDICATION: Primary | ICD-10-CM

## 2025-01-15 DIAGNOSIS — G89.29 CHRONIC BILATERAL LOW BACK PAIN WITH BILATERAL SCIATICA: ICD-10-CM

## 2025-01-15 DIAGNOSIS — M62.838 MUSCLE SPASM: ICD-10-CM

## 2025-01-15 DIAGNOSIS — M54.42 CHRONIC BILATERAL LOW BACK PAIN WITH BILATERAL SCIATICA: ICD-10-CM

## 2025-01-15 RX ORDER — METHOCARBAMOL 500 MG/1
500 TABLET, FILM COATED ORAL 3 TIMES DAILY PRN
Qty: 90 TABLET | Refills: 1 | Status: SHIPPED | OUTPATIENT
Start: 2025-01-15

## 2025-01-15 RX ORDER — HYDROCODONE BITARTRATE AND ACETAMINOPHEN 5; 325 MG/1; MG/1
1 TABLET ORAL EVERY 8 HOURS PRN
Qty: 60 TABLET | Refills: 0 | Status: SHIPPED | OUTPATIENT
Start: 2025-01-15

## 2025-01-15 RX ORDER — PREGABALIN 50 MG/1
50 CAPSULE ORAL 2 TIMES DAILY
Qty: 60 CAPSULE | Refills: 1 | Status: SHIPPED | OUTPATIENT
Start: 2025-01-15
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
## 2025-01-22 ENCOUNTER — CLINICAL SUPPORT (OUTPATIENT)
Dept: FAMILY MEDICINE CLINIC | Facility: CLINIC | Age: 75
End: 2025-01-22
Payer: MEDICARE

## 2025-01-22 DIAGNOSIS — E53.8 VITAMIN B12 DEFICIENCY: Primary | ICD-10-CM

## 2025-01-22 PROCEDURE — 96372 THER/PROPH/DIAG INJ SC/IM: CPT | Performed by: FAMILY MEDICINE

## 2025-01-22 RX ADMIN — CYANOCOBALAMIN 1000 MCG: 1000 INJECTION, SOLUTION INTRAMUSCULAR; SUBCUTANEOUS at 14:21

## 2025-01-27 ENCOUNTER — TREATMENT (OUTPATIENT)
Dept: PHYSICAL THERAPY | Facility: CLINIC | Age: 75
End: 2025-01-27
Payer: MEDICARE

## 2025-01-27 DIAGNOSIS — M53.86 DECREASED ROM OF LUMBAR SPINE: ICD-10-CM

## 2025-01-27 DIAGNOSIS — R29.898 DECREASED STRENGTH OF LOWER EXTREMITY: Primary | ICD-10-CM

## 2025-01-27 DIAGNOSIS — M25.651 DECREASED RANGE OF MOTION OF BOTH HIPS: ICD-10-CM

## 2025-01-27 DIAGNOSIS — Z98.890 BACK PAIN WITH HISTORY OF SPINAL SURGERY: ICD-10-CM

## 2025-01-27 DIAGNOSIS — M25.652 DECREASED RANGE OF MOTION OF BOTH HIPS: ICD-10-CM

## 2025-01-27 DIAGNOSIS — M54.9 BACK PAIN WITH HISTORY OF SPINAL SURGERY: ICD-10-CM

## 2025-01-27 PROCEDURE — 97110 THERAPEUTIC EXERCISES: CPT

## 2025-01-27 PROCEDURE — 97112 NEUROMUSCULAR REEDUCATION: CPT

## 2025-01-27 PROCEDURE — 97530 THERAPEUTIC ACTIVITIES: CPT

## 2025-01-27 NOTE — PROGRESS NOTES
Physical Therapy Daily Treatment and Progress Note  [facility] [place]  [address], [address]  [place] [address]     Patient: Jemima Bell  : 1950  Referring practitioner: Nimo Mary APRN  Today's Date: 2025    VISIT#: 29    Visit Diagnoses:    ICD-10-CM ICD-9-CM   1. Decreased strength of lower extremity  R29.898 729.89   2. Back pain with history of spinal surgery  M54.9 724.5    Z98.890    3. Decreased range of motion of both hips  M25.651 719.55    M25.652    4. Decreased ROM of lumbar spine  M53.86 724.9         Back Index: 22/50 (44%- moderate) - 16/50 (32%)   Subjective Evaluation    Pain  Current pain ratin (8 in both hips)  At best pain ratin  At worst pain ratin         Jemima Bell reports: patient reports that she has seen about an 80-85% improvement thus far. She continues to have a difficult time with endurance with walking and activities around the house. She has progressively decreased the walker use and the last couple days has not needed it at all. She is able to put dishes in the  and she wasn't able to do this before. She recently started B12 shots.       Objective   Strength/Myotome Testing      Left Hip   Planes of Motion   Flexion: 4- (4) (4+)  External rotation: 4- (4) (4+)  Internal rotation: 4- (4) (4+)     Right Hip   Planes of Motion   Flexion: 4- (4) (5)  External rotation: 4- (4) (4+)  Internal rotation: 4- (4) (4+)     Left Knee   Flexion: 4 (4) (5)  Extension: 4 (4+) (4)     Right Knee   Flexion: 4 (4) (5)  Extension: 4 (4+) (5)    See Exercise, Manual, and Modality Logs for complete treatment.     Patient Education: HEP review  Exercise rationale/ pain free exercise performance  Alternate exercise positions  Verbal/Tactile cues to ensure correct exercise performance/technique       Assessment/Plan  Patient has demonstrated great progress thus far with skilled physical therapy  interventions. Subjectively, pain levels are much better controlled (she has more hip pain than lumbar) and she has seen about a 80-85% improvement since beginning PT. Her outcome measure score decreased significantly, demonstrating a significant decrease in perceived disability. Objectively, transfers are much more controlled and provokes no pain. Her gait pattern has improved, demonstrating a more upright posture and less of an antalgic gait, making it possible to ambulate short distances in the clinic and around the house and community without the walker. She reports that she really uses the walker whenever she is fatigued, but recently has been progressing from this. LE strength continues to improve and lumbar mobility has improved as well. She continues to present with a decreased edwin and stride length but is progressing well with and without the rolling walker. However, functionally she continues to have a difficult time with ADLs including prolonged sitting, she has a difficult time with prolonged standing, ambulating, and doing dishes, or any house hold chores that last more than an hour. Patient has met 3/4 STGs, 1/4 and is progressing well towards the others. Patient will benefit from continued skilled therapeutic interventions for another 6 weeks but decreasing the frequency to once a week to continue to improve overall endurance and strength.     Today reviewed HEP and reprinted HEP and discussed the importance of being consistent.     Progress per Plan of Care and Progress strengthening /stabilization /functional activity          Timed:         Manual Therapy:    -     mins  94620;     Therapeutic Exercise:    15     mins  46792;     Neuromuscular Marian:    15    mins  91407;    Therapeutic Activity:     10     mins  56206;     Gait Training:           mins  71762;     Ultrasound:          mins  87120;    Ionto:                                   mins  39791  Self Care:                       5      mins  28008    Un-Timed:  Electrical Stimulation:         mins  44877 ( );  Dry Needling          mins self-pay  Traction          mins 89134  Re-Eval                               mins  01856  Group Therapy           ___ mins 48670    Timed Treatment:   45   mins   Total Treatment:     54   mins    Karma Argueta PT  Physical Therapist  [facility] [place] license #: [id number]

## 2025-01-30 ENCOUNTER — CLINICAL SUPPORT (OUTPATIENT)
Dept: FAMILY MEDICINE CLINIC | Facility: CLINIC | Age: 75
End: 2025-01-30
Payer: MEDICARE

## 2025-01-30 DIAGNOSIS — E53.8 VITAMIN B12 DEFICIENCY: Primary | ICD-10-CM

## 2025-01-30 PROCEDURE — 96372 THER/PROPH/DIAG INJ SC/IM: CPT | Performed by: FAMILY MEDICINE

## 2025-01-30 RX ADMIN — CYANOCOBALAMIN 1000 MCG: 1000 INJECTION, SOLUTION INTRAMUSCULAR; SUBCUTANEOUS at 13:50

## 2025-02-03 ENCOUNTER — TREATMENT (OUTPATIENT)
Dept: PHYSICAL THERAPY | Facility: CLINIC | Age: 75
End: 2025-02-03
Payer: MEDICARE

## 2025-02-03 DIAGNOSIS — M53.86 DECREASED ROM OF LUMBAR SPINE: ICD-10-CM

## 2025-02-03 DIAGNOSIS — Z98.890 BACK PAIN WITH HISTORY OF SPINAL SURGERY: ICD-10-CM

## 2025-02-03 DIAGNOSIS — M54.9 BACK PAIN WITH HISTORY OF SPINAL SURGERY: ICD-10-CM

## 2025-02-03 DIAGNOSIS — M25.651 DECREASED RANGE OF MOTION OF BOTH HIPS: ICD-10-CM

## 2025-02-03 DIAGNOSIS — M25.652 DECREASED RANGE OF MOTION OF BOTH HIPS: ICD-10-CM

## 2025-02-03 DIAGNOSIS — R29.898 DECREASED STRENGTH OF LOWER EXTREMITY: Primary | ICD-10-CM

## 2025-02-03 PROCEDURE — 97530 THERAPEUTIC ACTIVITIES: CPT

## 2025-02-03 PROCEDURE — 97112 NEUROMUSCULAR REEDUCATION: CPT

## 2025-02-03 NOTE — PROGRESS NOTES
Physical Therapy Daily Treatment Note  [facility] Physical Therapy [place]  [address], [address]  [place] [address]     Patient: Jemima Bell  : 1950  Referring practitioner: Nimo Mary APRN  Today's Date: 2/3/2025    VISIT#: 30    Visit Diagnoses:    ICD-10-CM ICD-9-CM   1. Decreased strength of lower extremity  R29.898 729.89   2. Back pain with history of spinal surgery  M54.9 724.5    Z98.890    3. Decreased range of motion of both hips  M25.651 719.55    M25.652    4. Decreased ROM of lumbar spine  M53.86 724.9       Subjective   Jemima Bell reports: that she just wants to be able to wash her dishes without having to sit down and rest in between. Endurance continues to be a challenge and her legs just feel weak. She also reports some tail bone pain with sitting.       Objective       See Exercise, Manual, and Modality Logs for complete treatment.     Patient Education: HEP review  Exercise rationale/ pain free exercise performance  Alternate exercise positions  Verbal/Tactile cues to ensure correct exercise performance/technique       Assessment/Plan  Patient demonstrates good tolerance to continued therapeutic exercises and activities on this date with LE fatigue reported during and at the end of the session. She reports being able to be more consistent with HEP. Progressed to standing exercises with core engagement along with LE strengthening for cocontraction. Verbal cues were provided throughout for proper technique. She tolerates standing functional activities well, will continue to progress as tolerated.      Progress per Plan of Care and Progress strengthening /stabilization /functional activity          Timed:         Manual Therapy:    -     mins  48231;     Therapeutic Exercise:    8     mins  31038;     Neuromuscular Marian:    8    mins  24559;    Therapeutic Activity:     14     mins  26337;     Gait Training:           mins  13354;      Ultrasound:          mins  86896;    Ionto:                                   mins  48746  Self Care:                       -     mins  96779    Un-Timed:  Electrical Stimulation:         mins  90207 ( );  Dry Needling          mins self-pay  Traction          mins 77307  Re-Eval                               mins  55306  Group Therapy           ___ mins 95947    Timed Treatment:   30   mins   Total Treatment:     48   mins    Karma Argueta PT  Physical Therapist  [facility] license #: [id number]

## 2025-02-04 DIAGNOSIS — E03.9 HYPOTHYROIDISM, UNSPECIFIED TYPE: ICD-10-CM

## 2025-02-04 NOTE — TELEPHONE ENCOUNTER
Rx Refill Note  Requested Prescriptions     Pending Prescriptions Disp Refills    levothyroxine (SYNTHROID, LEVOTHROID) 100 MCG tablet [Pharmacy Med Name: LEVOTHYROXINE 100 MCG TABLET] 90 tablet 3     Sig: TAKE ONE TABLET BY MOUTH EVERY MORNING      Last office visit with prescribing clinician: 1/2/2025   Last telemedicine visit with prescribing clinician: Visit date not found   Next office visit with prescribing clinician: Visit date not found                         Would you like a call back once the refill request has been completed: [] Yes [] No    If the office needs to give you a call back, can they leave a voicemail: [] Yes [] No    Rachel Graf MA  02/04/25, 16:49 EST

## 2025-02-05 RX ORDER — LEVOTHYROXINE SODIUM 100 UG/1
100 TABLET ORAL EVERY MORNING
Qty: 90 TABLET | Refills: 3 | Status: SHIPPED | OUTPATIENT
Start: 2025-02-05

## 2025-02-10 ENCOUNTER — TREATMENT (OUTPATIENT)
Dept: PHYSICAL THERAPY | Facility: CLINIC | Age: 75
End: 2025-02-10
Payer: MEDICARE

## 2025-02-10 DIAGNOSIS — M25.651 DECREASED RANGE OF MOTION OF BOTH HIPS: ICD-10-CM

## 2025-02-10 DIAGNOSIS — R29.898 DECREASED STRENGTH OF LOWER EXTREMITY: Primary | ICD-10-CM

## 2025-02-10 DIAGNOSIS — Z98.890 BACK PAIN WITH HISTORY OF SPINAL SURGERY: ICD-10-CM

## 2025-02-10 DIAGNOSIS — M25.652 DECREASED RANGE OF MOTION OF BOTH HIPS: ICD-10-CM

## 2025-02-10 DIAGNOSIS — M54.9 BACK PAIN WITH HISTORY OF SPINAL SURGERY: ICD-10-CM

## 2025-02-10 DIAGNOSIS — M53.86 DECREASED ROM OF LUMBAR SPINE: ICD-10-CM

## 2025-02-10 PROCEDURE — 97112 NEUROMUSCULAR REEDUCATION: CPT

## 2025-02-10 PROCEDURE — 97530 THERAPEUTIC ACTIVITIES: CPT

## 2025-02-10 NOTE — PROGRESS NOTES
Physical Therapy Daily Treatment Note  [facility] [place]  [address], [address]  [place] [address]     Patient: Jemima Bell  : 1950  Referring practitioner: Nimo Mary APRN  Today's Date: 2/10/2025    VISIT#: 31    Visit Diagnoses:    ICD-10-CM ICD-9-CM   1. Decreased strength of lower extremity  R29.898 729.89   2. Back pain with history of spinal surgery  M54.9 724.5    Z98.890    3. Decreased range of motion of both hips  M25.651 719.55    M25.652    4. Decreased ROM of lumbar spine  M53.86 724.9       Subjective   Jemima Bell reports: that she was in pain last week after the last visit and the entire week was rough, she would like to try to take it a little easier today. After the visit it was hard to sit, lay on her sides, and walk. She had to take some pain medication and couldn't do the exercises.      Objective       See Exercise, Manual, and Modality Logs for complete treatment.     Patient Education: HEP review  Exercise rationale/ pain free exercise performance  Alternate exercise positions  Verbal/Tactile cues to ensure correct exercise performance/technique       Assessment/Plan  Patient demonstrates good tolerance to continued exercises on this date, modified exercise program to 2x10 and prescribed more seated rest breaks throughout today. Explained DOMs and how to avoid this by resting more throughout the workout. Will continue to progress as tolerated.       Progress per Plan of Care and Progress strengthening /stabilization /functional activity          Timed:         Manual Therapy:    -     mins  17372;     Therapeutic Exercise:    -     mins  45561;     Neuromuscular Marian:    15    mins  84384;    Therapeutic Activity:     15     mins  86450;     Gait Training:           mins  39050;     Ultrasound:          mins  67004;    Ionto:                                   mins  99964  Self Care:                       -     mins   59275    Un-Timed:  Electrical Stimulation:         mins  81931 ( );  Dry Needling          mins self-pay  Traction          mins 61999  Re-Eval                               mins  09097  Group Therapy           ___ mins 42035    Timed Treatment:   30   mins   Total Treatment:     53   mins    Karma Argueta PT  Physical Therapist  [facility] license #: [id number]

## 2025-02-17 ENCOUNTER — TREATMENT (OUTPATIENT)
Dept: PHYSICAL THERAPY | Facility: CLINIC | Age: 75
End: 2025-02-17
Payer: MEDICARE

## 2025-02-17 DIAGNOSIS — Z98.890 BACK PAIN WITH HISTORY OF SPINAL SURGERY: ICD-10-CM

## 2025-02-17 DIAGNOSIS — R29.898 DECREASED STRENGTH OF LOWER EXTREMITY: Primary | ICD-10-CM

## 2025-02-17 DIAGNOSIS — M25.651 DECREASED RANGE OF MOTION OF BOTH HIPS: ICD-10-CM

## 2025-02-17 DIAGNOSIS — M53.86 DECREASED ROM OF LUMBAR SPINE: ICD-10-CM

## 2025-02-17 DIAGNOSIS — M25.652 DECREASED RANGE OF MOTION OF BOTH HIPS: ICD-10-CM

## 2025-02-17 DIAGNOSIS — M54.9 BACK PAIN WITH HISTORY OF SPINAL SURGERY: ICD-10-CM

## 2025-02-17 PROCEDURE — 97535 SELF CARE MNGMENT TRAINING: CPT

## 2025-02-17 PROCEDURE — 97110 THERAPEUTIC EXERCISES: CPT

## 2025-02-17 NOTE — PROGRESS NOTES
Physical Therapy Daily Treatment Note  [facility] [place]  [address], [address]  [place] [address]     Patient: Jemima Bell  : 1950  Referring practitioner: Nimo Mary APRN  Today's Date: 2025    VISIT#: 32    Visit Diagnoses:    ICD-10-CM ICD-9-CM   1. Decreased strength of lower extremity  R29.898 729.89   2. Back pain with history of spinal surgery  M54.9 724.5    Z98.890    3. Decreased range of motion of both hips  M25.651 719.55    M25.652    4. Decreased ROM of lumbar spine  M53.86 724.9       Subjective   Jemima Bell reports: that her back is feeling pretty good but her hamstring continues to give her a lot of problems especially at the end of the day. At night she also experiences some pain in the back.      Objective       See Exercise, Manual, and Modality Logs for complete treatment.     Patient Education: HEP review  Exercise rationale/ pain free exercise performance  Alternate exercise positions  Verbal/Tactile cues to ensure correct exercise performance/technique       Assessment/Plan  Patient demonstrates good tolerance to continued therapeutic exercises on this date with report of hamstring and gluteal insertional pain. Deferred most of the standing functional activities and focused more on LE strength in a lower neurological position to decrease stress. Focused on hamstring and glute strength along with surrounding musculature. Will continue to progress as tolerated, however discussed with patient talking to PCP or surgeon for further treatment methods in conjunction with exercises.       Progress per Plan of Care and Progress strengthening /stabilization /functional activity          Timed:         Manual Therapy:    -     mins  91411;     Therapeutic Exercise:    15     mins  51242;     Neuromuscular Marian:    7    mins  00425;    Therapeutic Activity:     -     mins  82689;     Gait Training:           mins  25786;      Ultrasound:          mins  10115;    Ionto:                                   mins  87069  Self Care:                       8     mins  35821    Un-Timed:  Electrical Stimulation:         mins  10864 ( );  Dry Needling          mins self-pay  Traction          mins 15387  Re-Eval                               mins  33304  Group Therapy           ___ mins 14597    Timed Treatment:   30   mins   Total Treatment:     50   mins    Karma Argueta PT  Physical Therapist  [facility] license #: [id number]

## 2025-02-24 ENCOUNTER — TREATMENT (OUTPATIENT)
Dept: PHYSICAL THERAPY | Facility: CLINIC | Age: 75
End: 2025-02-24
Payer: MEDICARE

## 2025-02-24 ENCOUNTER — OFFICE VISIT (OUTPATIENT)
Dept: ORTHOPEDIC SURGERY | Facility: CLINIC | Age: 75
End: 2025-02-24
Payer: MEDICARE

## 2025-02-24 VITALS — WEIGHT: 193.2 LBS | BODY MASS INDEX: 35.55 KG/M2 | TEMPERATURE: 97.5 F | HEIGHT: 62 IN

## 2025-02-24 DIAGNOSIS — M54.9 BACK PAIN WITH HISTORY OF SPINAL SURGERY: ICD-10-CM

## 2025-02-24 DIAGNOSIS — Z98.1 S/P LUMBAR FUSION: Primary | ICD-10-CM

## 2025-02-24 DIAGNOSIS — Z98.890 BACK PAIN WITH HISTORY OF SPINAL SURGERY: ICD-10-CM

## 2025-02-24 DIAGNOSIS — M25.651 DECREASED RANGE OF MOTION OF BOTH HIPS: ICD-10-CM

## 2025-02-24 DIAGNOSIS — R29.898 DECREASED STRENGTH OF LOWER EXTREMITY: Primary | ICD-10-CM

## 2025-02-24 DIAGNOSIS — M53.86 DECREASED ROM OF LUMBAR SPINE: ICD-10-CM

## 2025-02-24 DIAGNOSIS — M51.360 DEGENERATION OF INTERVERTEBRAL DISC OF LUMBAR REGION WITH DISCOGENIC BACK PAIN: ICD-10-CM

## 2025-02-24 DIAGNOSIS — M25.652 DECREASED RANGE OF MOTION OF BOTH HIPS: ICD-10-CM

## 2025-02-24 PROCEDURE — 97530 THERAPEUTIC ACTIVITIES: CPT

## 2025-02-24 PROCEDURE — 97110 THERAPEUTIC EXERCISES: CPT

## 2025-02-24 RX ORDER — METHYLPREDNISOLONE 4 MG/1
TABLET ORAL
Qty: 21 TABLET | Refills: 0 | Status: SHIPPED | OUTPATIENT
Start: 2025-02-24

## 2025-02-24 NOTE — PROGRESS NOTES
Physical Therapy Daily Treatment Note  [facility] [place]  [address], [address]  [place] [address]     Patient: Jemima Bell  : 1950  Referring practitioner: Nimo Mary APRN  Today's Date: 2025    VISIT#: 33    Visit Diagnoses:    ICD-10-CM ICD-9-CM   1. Decreased strength of lower extremity  R29.898 729.89   2. Back pain with history of spinal surgery  M54.9 724.5    Z98.890    3. Decreased range of motion of both hips  M25.651 719.55    M25.652    4. Decreased ROM of lumbar spine  M53.86 724.9       Subjective   Jemima Bell reports: that she had x-ray of her tailbone and they said there was an increase the curvature of the tailbone, no fractures. The last two visits after we modified exercise program she felt a little better.       Objective       See Exercise, Manual, and Modality Logs for complete treatment.     Patient Education: HEP review  Exercise rationale/ pain free exercise performance  Alternate exercise positions  Verbal/Tactile cues to ensure correct exercise performance/technique       Assessment/Plan  Patient demonstrates good tolerance to continued and new therapeutic exercises on this date. Continued with soft tissue mobilization to the posterior hip musculature, with good tolerance. Suggested ordering one for home use as well. Discussed taking a break from OPT as we have hit a plateau. Discussed continuing with HEP and working on figuring out a happy medium to be able to work on strength without fully aggravating her right LE. Answered any questions she had at the time and provided her with my contact information for any further questions.       Progress per Plan of Care and Progress strengthening /stabilization /functional activity          Timed:         Manual Therapy:    -     mins  29002;     Therapeutic Exercise:    10     mins  31600;     Neuromuscular Marian:    -    mins  69330;    Therapeutic Activity:     10      mins  21608;     Gait Training:           mins  35796;     Ultrasound:          mins  63746;    Ionto:                                   mins  72652  Self Care:                       6     mins  41030    Un-Timed:  Electrical Stimulation:         mins  46938 ( );  Dry Needling          mins self-pay  Traction          mins 38270  Re-Eval                               mins  67542  Group Therapy           ___ mins 46821    Timed Treatment:   26   mins   Total Treatment:     55   mins    Karma Argueta PT  Physical Therapist  [facility] [place] license #: [id number]

## 2025-02-24 NOTE — PROGRESS NOTES
Patient Name: Jemima Bell   YOB: 1950  Referring Primary Care Physician: Kehrer, Meredith Lea, MD      Chief Complaint:    Chief Complaint   Patient presents with    Lumbar Spine - Pain, Follow-up            HPI:  Jemima Bell is a 75 y.o. female who presents to [facility] for 6 month postop visit following L3-S1 TLIF.  She is doing well overall.  She is actually still in physical therapy going once weekly.  She says she has made progress with therapy and symptoms have slowly improved, although she is still having bilateral buttock pain which she describes as a tightness worse when she ambulates.  Also complaining of tailbone pain.  She says she has had the symptoms since surgery.    PFSH:  See attached    ROS: As per HPI, otherwise negative    Objective:      75 y.o. female  Body mass index is 35.34 kg/m²., 87.6 kg (193 lb 3.2 oz), @HT@  Vitals:    02/24/25 1304   Temp: 97.5 °F (36.4 °C)     Pain Score    02/24/25 1304   PainSc: 2   Comment: most days gets to 10/10   PainLoc: Back            Spine Musculoskeletal Exam    Gait    Gait is normal.    Inspection    Coronal balance: no imbalance    Sagittal balance: no imbalance    Palpation    Thoracolumbar    Right      Muscle tone: normal    Left      Muscle tone: normal    Strength    Thoracolumbar    Thoracolumbar motor exam is normal.       Sensory    Thoracolumbar    Thoracolumbar sensation is normal.        IMAGING:     Indication: pain related symptoms,  Views: Lateral sacrum  Findings: No obvious sacral or coccygeal fracture, interbody devices and posterior hardware L3-S1 appear intact, bladder stimulator noted  Comparison views: Stable from 7/30/2024      Official radiology interpretation will follow and be available in the patient medical records. Patient will be notified of any pertinent discrepancies.    Addendum 02/25/2025:  Lower lumbar fusion hardware is similar to the  previous  examination. The sacrum is satisfactory in appearance, as before. Soft  tissues within normal limits. Presacral stimulator in position as  before.  Assessment:           There are no diagnoses linked to this encounter.         Plan:  We do lengthy discussion regarding the recovery phase.  It is not uncommon to still have back pain.  The tailbone or coccygeal pain is a bit abnormal, but only aggravating with prolonged sitting.  Advised her to try a donut style pillow and ice 3-4 times daily.  Will also try Medrol Dosepak to see if we can reduce the inflammation and offer symptomatic relief.  Common side effects discussed, she has tolerated steroids in the past.  Also advised her to follow-up with her urologist.  She says she feels like the bladder stimulator battery is likely going out..  Pre and postop imaging, and stimulator is in the same position.  She was encouraged to ease back into more normal activity but reminded to utilize proper body mechanics such as minimal bending and twisting at the waist and no lifting greater than 25 to 30 pounds.  Will have her follow-up in 3 months to make sure she is progressing as expected.  If still having the coccydynia and buttock pain, may consider more advanced imaging, however she cannot have MRI due to the bladder stimulator and 3-month CT was satisfactory as is the imaging in office today.  Also she is still in pain management so could consider coccygeal injection if the steroids do not offer relief.    Return in about 3 months (around 5/24/2025).    EMR Dragon/Transcription Disclaimer:   Much of this encounter note is an electronic transcription/translation of spoken language to printed text. The electronic translation of spoken language may permit erroneous, or at times, nonsensical words or phrases to be inadvertently transcribed; Although I have reviewed the note for such errors, some may still exist.  Red flags have been discussed at this or previous visits  to include but not limited weakness in extremities, worsening pain that does not respond to conservative treatment and bowel or bladder dysfunction. These are reasons to present to ER and patient has been informed.    The diagnosis(es), natural history, pathophysiology and treatment for diagnosis(es) were discussed. Opportunity given and questions answered. Biomechanics of pertinent body areas discussed.    EXERCISES:  Advice on benefits of, and types of regular/moderate exercise pertaining to diagnosis.  Continue HEP. For back or neck pain, recommend pilates and or yoga as tolerated. Generally it is best to start any new exercise under the guidance of a  or therapist.   MEDICATIONS:  When prescribe, the risks, benefits, warnings,side effects and alternatives of medications discussed. Advised against long term use of narcotics.   PAIN CONTROL:  Cold, heat, OTC lidocaine patches and/or ointment as needed. Avoid direct skin contact with ice. Ice 15-20 minutes 3-4 times daily as needed. For SI joint pain, recommend ice bath in water about 50 degrees for 5 consecutive days, add ice slowly to help with adjustment and may cover with warm towel or robe to help with cold tolerance. If using lidocaine, do not apply heat in conjunction as this can cause a burn.   MEDICAL RECORDS reviewed from other provider(s) for past and current medical history pertinent to this visit..

## 2025-03-02 DIAGNOSIS — F41.8 DEPRESSION WITH ANXIETY: ICD-10-CM

## 2025-03-03 RX ORDER — SERTRALINE HYDROCHLORIDE 100 MG/1
100 TABLET, FILM COATED ORAL DAILY
Qty: 90 TABLET | Refills: 1 | Status: SHIPPED | OUTPATIENT
Start: 2025-03-03

## 2025-03-05 ENCOUNTER — OFFICE VISIT (OUTPATIENT)
Dept: ORTHOPEDIC SURGERY | Facility: CLINIC | Age: 75
End: 2025-03-05
Payer: MEDICARE

## 2025-03-05 ENCOUNTER — PATIENT MESSAGE (OUTPATIENT)
Dept: FAMILY MEDICINE CLINIC | Facility: CLINIC | Age: 75
End: 2025-03-05
Payer: MEDICARE

## 2025-03-05 VITALS — HEIGHT: 62 IN | BODY MASS INDEX: 35.85 KG/M2 | TEMPERATURE: 96.8 F | WEIGHT: 194.8 LBS

## 2025-03-05 DIAGNOSIS — M25.551 RIGHT HIP PAIN: Primary | ICD-10-CM

## 2025-03-05 DIAGNOSIS — M46.1 SACROILIITIS: ICD-10-CM

## 2025-03-05 PROCEDURE — 1160F RVW MEDS BY RX/DR IN RCRD: CPT | Performed by: NURSE PRACTITIONER

## 2025-03-05 PROCEDURE — 99213 OFFICE O/P EST LOW 20 MIN: CPT | Performed by: NURSE PRACTITIONER

## 2025-03-05 PROCEDURE — 1159F MED LIST DOCD IN RCRD: CPT | Performed by: NURSE PRACTITIONER

## 2025-03-05 NOTE — PROGRESS NOTES
Patient Name: Jemima Bell   YOB: 1950  Referring Primary Care Physician: Kehrer, Meredith Lea, MD      Chief Complaint:    Chief Complaint   Patient presents with    Lumbar Spine - Follow-up, Pain        HPI:  Jemima Bell is a 75 y.o. female who presents to [facility] for evaluation of acute right lateral hip pain.  She was recently seen in the office for 6-month follow-up of L3-S1 TLIF.  At that point she was complaining of bilateral buttock pain and tailbone pain but overall doing well from surgery.  She had notify the office on 03/04/2025 complaining of acute right lateral hip pain so she is here today to evaluate that.  She reports today the pain is actually improving.  She also reports a history of bursectomy but cannot recall if was on the right or left.    PFSH:  See attached    ROS: As per HPI, otherwise negative    Objective:      75 y.o. female  Body mass index is 35.63 kg/m²., 88.4 kg (194 lb 12.8 oz), @HT@  Vitals:    03/05/25 1335   Temp: 96.8 °F (36 °C)     Pain Score    03/05/25 1335   PainSc: 2    PainLoc: Back            Spine Musculoskeletal Exam    Gait    Gait is normal.    Palpation    Thoracolumbar    Tenderness: present      Greater trochanter: right    Strength    Thoracolumbar    Thoracolumbar motor exam is normal.       Sensory    Thoracolumbar    Thoracolumbar sensation is normal.        IMAGING:     Indication: pain related symptoms,  Views: AP hip  Findings: Minimal degenerative change, no obvious fracture, lumbar hardware and stimulator device noted   Comparison views: None      Official radiology interpretation will follow and be available in the patient medical records. Patient will be notified of any pertinent discrepancies.    Addendum 03/07/2025:  Diffuse osteopenia. No evidence of acute fracture or dislocation. Mild  right hip joint DJD. The pubic symphysis is unremarkable. Mild right SI  joint DJD. Incompletely  evaluated lumbosacral spinal fusion hardware.  Neurostimulator lead overlying the right sacral ala. Phleboliths in the  pelvis.  Assessment:           Diagnoses and all orders for this visit:    1. Right hip pain (Primary)    2. Sacroiliitis             Plan:  Hip pain is better, was lateral hip but also some right buttock. Steroid helped from last week. She is following with urology in 3 weeks and considering having the stimulator removed or replaced.  She reports history of bursectomy, cannot recall if it is right or left.  Since she is better, we will leave well enough alone.  If it flares back up she will notify the office and we will have her come back for GTB injection and evaluation of hip pain.     Return if symptoms worsen or fail to improve.    EMR Dragon/Transcription Disclaimer:   Much of this encounter note is an electronic transcription/translation of spoken language to printed text. The electronic translation of spoken language may permit erroneous, or at times, nonsensical words or phrases to be inadvertently transcribed; Although I have reviewed the note for such errors, some may still exist.  Red flags have been discussed at this or previous visits to include but not limited weakness in extremities, worsening pain that does not respond to conservative treatment and bowel or bladder dysfunction. These are reasons to present to ER and patient has been informed.    The diagnosis(es), natural history, pathophysiology and treatment for diagnosis(es) were discussed. Opportunity given and questions answered. Biomechanics of pertinent body areas discussed.    EXERCISES:  Advice on benefits of, and types of regular/moderate exercise pertaining to diagnosis.  Continue HEP. For back or neck pain, recommend pilates and or yoga as tolerated. Generally it is best to start any new exercise under the guidance of a  or therapist.   MEDICATIONS:  When prescribe, the risks, benefits, warnings,side effects and  alternatives of medications discussed. Advised against long term use of narcotics.   PAIN CONTROL:  Cold, heat, OTC lidocaine patches and/or ointment as needed. Avoid direct skin contact with ice. Ice 15-20 minutes 3-4 times daily as needed. For SI joint pain, recommend ice bath in water about 50 degrees for 5 consecutive days, add ice slowly to help with adjustment and may cover with warm towel or robe to help with cold tolerance. If using lidocaine, do not apply heat in conjunction as this can cause a burn.   MEDICAL RECORDS reviewed from other provider(s) for past and current medical history pertinent to this visit..

## 2025-03-14 ENCOUNTER — OFFICE VISIT (OUTPATIENT)
Dept: PAIN MEDICINE | Facility: CLINIC | Age: 75
End: 2025-03-14
Payer: MEDICARE

## 2025-03-14 ENCOUNTER — PREP FOR SURGERY (OUTPATIENT)
Dept: SURGERY | Facility: SURGERY CENTER | Age: 75
End: 2025-03-14
Payer: MEDICARE

## 2025-03-14 VITALS
TEMPERATURE: 97.5 F | DIASTOLIC BLOOD PRESSURE: 79 MMHG | BODY MASS INDEX: 36.18 KG/M2 | SYSTOLIC BLOOD PRESSURE: 115 MMHG | WEIGHT: 196.6 LBS | OXYGEN SATURATION: 98 % | HEIGHT: 62 IN | HEART RATE: 61 BPM

## 2025-03-14 DIAGNOSIS — M47.812 ARTHROPATHY OF CERVICAL FACET JOINT: ICD-10-CM

## 2025-03-14 DIAGNOSIS — M70.61 GREATER TROCHANTERIC BURSITIS OF RIGHT HIP: Primary | ICD-10-CM

## 2025-03-14 DIAGNOSIS — M54.42 CHRONIC BILATERAL LOW BACK PAIN WITH BILATERAL SCIATICA: ICD-10-CM

## 2025-03-14 DIAGNOSIS — M47.812 ARTHROPATHY OF CERVICAL FACET JOINT: Primary | ICD-10-CM

## 2025-03-14 DIAGNOSIS — M54.41 CHRONIC BILATERAL LOW BACK PAIN WITH BILATERAL SCIATICA: ICD-10-CM

## 2025-03-14 DIAGNOSIS — G89.29 CHRONIC BILATERAL LOW BACK PAIN WITH BILATERAL SCIATICA: ICD-10-CM

## 2025-03-14 DIAGNOSIS — Z79.899 ENCOUNTER FOR LONG-TERM (CURRENT) USE OF HIGH-RISK MEDICATION: ICD-10-CM

## 2025-03-14 DIAGNOSIS — Z98.1 S/P LUMBAR FUSION: ICD-10-CM

## 2025-03-14 PROCEDURE — 1160F RVW MEDS BY RX/DR IN RCRD: CPT | Performed by: NURSE PRACTITIONER

## 2025-03-14 PROCEDURE — 1159F MED LIST DOCD IN RCRD: CPT | Performed by: NURSE PRACTITIONER

## 2025-03-14 PROCEDURE — 1125F AMNT PAIN NOTED PAIN PRSNT: CPT | Performed by: NURSE PRACTITIONER

## 2025-03-14 PROCEDURE — 99214 OFFICE O/P EST MOD 30 MIN: CPT | Performed by: NURSE PRACTITIONER

## 2025-03-14 RX ORDER — HYDROCODONE BITARTRATE AND ACETAMINOPHEN 5; 325 MG/1; MG/1
1 TABLET ORAL EVERY 8 HOURS PRN
Qty: 60 TABLET | Refills: 0 | Status: ON HOLD | OUTPATIENT
Start: 2025-03-14

## 2025-03-14 RX ORDER — PREGABALIN 50 MG/1
50 CAPSULE ORAL 2 TIMES DAILY
Qty: 60 CAPSULE | Refills: 1 | Status: ON HOLD | OUTPATIENT
Start: 2025-03-14

## 2025-03-14 NOTE — PROGRESS NOTES
"CHIEF COMPLAINT  F/U back and neck pain- patient states that her pain has remained the same since her last visit.     Subjective   Jemima Bell is a 75 y.o. female  who presents for follow-up.  She has a history of back and neck pain.  Today her pain is 2/10VAS in severity. She states that her pain back pain is improving. She does note that by the evening her back pain increases. She is also having increase right greater trochanteric bursa pain. She states that his is worsened with walking. She is alternating ice and heat.     She also notes that her left neck pain is increasing. She denies any radicular complaints in her upper extremities at this time.     She continues to utilize Norco 5/325 1-2/day, Lyrica 100 mg nightly, and Robaxin 500 mg 2/day (rarely utilizes 3/day). This medication regimen decreases her pain by a significant amount. This regimen allows her to increase  her activity level. She denies any side effects including somnolence or constipation.     She does have a bladder stimulator. She does have urgency but no incontinence. She has chronic diarrhea which can contribute to bowel incontinence. She states if her diarrhea is controlled she does not have incontinence of bowel. She denies any saddle anesthesia.     Status post L3-S1 fusion performed by Dr. Rice on 7/30/2024     Not a candidate for MRI d/t bladder stimulator. Not a good candidate for NSAIDs d/t GI upset. Failed Gabapentin (100 mg was not helpful, 300 mg caused side effects)     Her neck pain remains stable, she states \"I will feel it every now and again\". She declines need for a SALLIE at this time.      Procedures:  6/26/2024--SALLIE at C7/T1--75-80% ONGOING relief  3/13/2024-LESI at L4/5-80% relief to her back, only temporary relief to leg pain  11/30/2023-bilateral SI joint injections-90% relief for approximately 3 months  5/12/23-left SI Joint Injection-90% relief x 5.5 months  3/27/23-L5/S1 LESI-100% relief to back pain x 1 " week, 80% relief to left leg pain    Back Pain  This is a chronic problem. The current episode started more than 1 year ago. The problem occurs constantly. The problem has been improved since onset. The pain is present in the sacro-iliac and lumbar spine. The quality of the pain is described as aching and stabbing. The pain does not radiate. The pain is at a severity of 2/10. The pain is The same all the time. The symptoms are aggravated by standing (walking). Associated symptoms include weakness. Pertinent negatives include no abdominal pain, chest pain, dysuria, fever, headaches or numbness. Risk factors include menopause. She has tried heat, ice and analgesics (PT previously) for the symptoms.   Neck Pain   This is a new problem. The current episode started in the past 7 days. The problem occurs constantly. Progression since onset: improved since last office visit. The pain is associated with nothing. The pain is present in the left side. The quality of the pain is described as burning, shooting and stabbing. The pain is at a severity of 2/10. The symptoms are aggravated by position (movement). Associated symptoms include weakness. Pertinent negatives include no chest pain, fever, headaches or numbness. She has tried heat, ice, NSAIDs, oral narcotics and muscle relaxants (TENS, Lyrica) for the symptoms.      PEG Assessment   What number best describes your pain on average in the past week?4  What number best describes how, during the past week, pain has interfered with your enjoyment of life?4  What number best describes how, during the past week, pain has interfered with your general activity?  4    Review of Pertinent Medical Data ---    Office visit from 3/5/2025 with MACHO Clement reviewed.  Patient seen for 6-month L3-L4 TLIF follow-up.  On 3/4/2025 she was having acute right lateral hip pain.  Hip pain is better.  With lateral hip but also some right buttock pain.  Steroid from last week helped.  She  "has a bladder stimulator and is following up with urology in 3 weeks and considering having the stimulator removed or replaced.  History of bursectomy but cannot recall if it is right or left.  If flares back up she will notify the office and we will have her come in for a GT bursa injection and evaluation of hip pain.    The following portions of the patient's history were reviewed and updated as appropriate: allergies, current medications, past family history, past medical history, past social history, past surgical history, and problem list.    Review of Systems   Constitutional:  Positive for fatigue. Negative for activity change, chills and fever.   HENT:  Negative for congestion.    Eyes:  Negative for visual disturbance.   Respiratory:  Negative for chest tightness and shortness of breath.    Cardiovascular:  Negative for chest pain.   Gastrointestinal:  Negative for abdominal pain, constipation and diarrhea.   Genitourinary:  Negative for difficulty urinating, dyspareunia and dysuria.   Musculoskeletal:  Positive for back pain and neck pain.   Neurological:  Positive for weakness. Negative for dizziness, light-headedness, numbness and headaches.   Psychiatric/Behavioral:  Positive for sleep disturbance. Negative for agitation, self-injury and suicidal ideas. The patient is nervous/anxious.      --  The aforementioned information the Chief Complaint section and above subjective data including any HPI data, and also the Review of Systems data, has been personally reviewed and affirmed.  --    Vitals:    03/14/25 1246   BP: 115/79   Pulse: 61   Temp: 97.5 °F (36.4 °C)   SpO2: 98%   Weight: 89.2 kg (196 lb 9.6 oz)   Height: 157.5 cm (62\")   PainSc: 2    PainLoc: Hip  Comment: right     Objective   Physical Exam  Vitals and nursing note reviewed.   Constitutional:       Appearance: Normal appearance. She is well-developed.   Eyes:      General: Lids are normal.   Cardiovascular:      Rate and Rhythm: Normal rate. "   Pulmonary:      Effort: Pulmonary effort is normal.   Musculoskeletal:      Cervical back: Tenderness and bony tenderness present. Decreased range of motion.      Lumbar back: No tenderness. Decreased range of motion.      Right hip: Tenderness (over GT bursa) present.      Comments:   +Cervical facet loading left   Neurological:      Mental Status: She is alert and oriented to person, place, and time.   Psychiatric:         Attention and Perception: Attention normal.         Mood and Affect: Mood normal.         Speech: Speech normal.         Behavior: Behavior normal.         Judgment: Judgment normal.       Assessment & Plan   Diagnoses and all orders for this visit:    1. Greater trochanteric bursitis of right hip (Primary)  -     Ibuprofen 3 %, Gabapentin 10 %, Baclofen 2 %, lidocaine 4 %, Ketamine HCl 4 %; Apply 1-2 g topically to the appropriate area as directed 3 (Three) to 4 (Four) times daily.  Dispense: 90 g; Refill: 0    2. S/P lumbar fusion  -     HYDROcodone-acetaminophen (NORCO) 5-325 MG per tablet; Take 1 tablet by mouth Every 8 (Eight) Hours As Needed for Moderate Pain. 30 day supply  Dispense: 60 tablet; Refill: 0  -     pregabalin (LYRICA) 50 MG capsule; Take 1 capsule by mouth 2 (Two) Times a Day.  Dispense: 60 capsule; Refill: 1    3. Chronic bilateral low back pain with bilateral sciatica  -     HYDROcodone-acetaminophen (NORCO) 5-325 MG per tablet; Take 1 tablet by mouth Every 8 (Eight) Hours As Needed for Moderate Pain. 30 day supply  Dispense: 60 tablet; Refill: 0  -     pregabalin (LYRICA) 50 MG capsule; Take 1 capsule by mouth 2 (Two) Times a Day.  Dispense: 60 capsule; Refill: 1    4. Arthropathy of cervical facet joint    5. Encounter for long-term (current) use of high-risk medication      Jemima Bell reports a pain score of 2.  Given her pain assessment as noted, treatment options were discussed and the following options were decided upon as a follow-up plan to address the  patient's pain: prescription for opioid analgesics and injections.    Diagnostic Facet Joint Procedure:   MBB     The first diagnostic facet joint procedure is considered medically reasonable and necessary for the diagnosis and treatment of chronic pain for this patient due to the patient meeting all of the following criteria:    - 1. Moderate to severe chronic neck or low back pain, predominantly axial, that causes functional deficit measured on pain or disability scale.  - 2. Pain present for minimum of 3 months with documented failure to respond to noninvasive conservative management (as tolerated)  - 3. Absence of untreated radiculopathy or neurogenic claudication (except for radiculopathy caused by facet joint synovial cyst)  - 4. There is no non-facet pathology per clinical assessment or radiology studies that could explain the source of the patient’s pain, including but not limited to fracture, tumor, infection, or significant deformity.    --- Left C4-C6 MBB   Reviewed the procedure at length with the patient.  Included in the review was expectations, complications, risk and benefits.The procedure was described in detail and the risks, benefits and alternatives were discussed with the patient (including but not limited to: bleeding, infection, nerve damage, worsening of pain, inability to perform injection, paralysis, seizures, coma, no pain relief and death) who agreed to proceed.  Discussed the potential for sedation if warranted/wanted.  The procedure will plan to be performed at [facility] with fluoroscopic guidance(unless ultrasound is indicated) and could potentially have steroids and contrast dye used. Questions were answered and in a way the patient could understand.  Patient verbalized understanding and wishes to proceed.  This intervention will be ordered.  Discussed with patient that all procedures are part of a multimodal plan of care and include either formal PT or a home  exercise program.  Patient has no evidence of coagulopathy or current infection.    --- I recommend following Nimo Mary plan of care and follow back up with orthopedics for evaluation of her right hip bursa.   --- Recommend Ice to right hip.   --- Trial compounded pain cream to right lateral hip  --- The urine drug screen confirmation from 10/23/2024 has been reviewed and the result is appropriate based on patient history and SANJEEV report  --- CSA and consent to treat with controlled substances signed on 3/14/2025  --- Refill Leopold 5/325. Patient appears stable with current regimen. No adverse effects. Regarding continuation of opioids, there is no evidence of aberrant behavior or any red flags.  The patient continues with appropriate response to opioid therapy. ADL's remain intact by self.   --- Follow-up after procedure     SANJEEV REPORT  As part of the patient's treatment plan, I am prescribing controlled substances. The patient has been made aware of appropriate use of such medications, including potential risk of somnolence, limited ability to drive and/or work safely, and the potential for dependence or overdose. It has also been made clear that these medications are for use by this patient only, without concomitant use of alcohol or other substances unless prescribed.     Patient has completed prescribing agreement detailing terms of continued prescribing of controlled substances, including monitoring SANJEEV reports, urine drug screening, and pill counts if necessary. The patient is aware that inappropriate use will results in cessation of prescribing such medications.    As the clinician, I personally reviewed the SANJEEV from 3/14/2025 while the patient was in the office today.    History and physical exam exhibit continued safe and appropriate use of controlled substances.    Dictated utilizing Dragon dictation.

## 2025-03-17 ENCOUNTER — TRANSCRIBE ORDERS (OUTPATIENT)
Dept: SURGERY | Facility: SURGERY CENTER | Age: 75
End: 2025-03-17
Payer: MEDICARE

## 2025-03-17 ENCOUNTER — HOSPITAL ENCOUNTER (OUTPATIENT)
Facility: HOSPITAL | Age: 75
Setting detail: OBSERVATION
Discharge: HOME | End: 2025-03-19
Attending: EMERGENCY MEDICINE | Admitting: EMERGENCY MEDICINE
Payer: MEDICARE

## 2025-03-17 ENCOUNTER — APPOINTMENT (OUTPATIENT)
Dept: GENERAL RADIOLOGY | Facility: HOSPITAL | Age: 75
End: 2025-03-17
Payer: MEDICARE

## 2025-03-17 ENCOUNTER — APPOINTMENT (OUTPATIENT)
Dept: CT IMAGING | Facility: HOSPITAL | Age: 75
End: 2025-03-17
Payer: MEDICARE

## 2025-03-17 DIAGNOSIS — R73.9 HYPERGLYCEMIA: ICD-10-CM

## 2025-03-17 DIAGNOSIS — R93.89 ABNORMAL CT OF THE CHEST: ICD-10-CM

## 2025-03-17 DIAGNOSIS — R29.90 STROKE-LIKE SYMPTOMS: Primary | ICD-10-CM

## 2025-03-17 DIAGNOSIS — Z41.9 SURGERY, ELECTIVE: Primary | ICD-10-CM

## 2025-03-17 DIAGNOSIS — I10 HYPERTENSION, UNSPECIFIED TYPE: ICD-10-CM

## 2025-03-17 DIAGNOSIS — R93.89 ABNORMAL COMPUTED TOMOGRAPHY ANGIOGRAPHY (CTA) OF NECK: ICD-10-CM

## 2025-03-17 DIAGNOSIS — R42 VERTIGO: ICD-10-CM

## 2025-03-17 LAB
ABO GROUP BLD: NORMAL
ALBUMIN SERPL-MCNC: 4.3 G/DL (ref 3.5–5.2)
ALBUMIN/GLOB SERPL: 1.3 G/DL
ALP SERPL-CCNC: 82 U/L (ref 39–117)
ALT SERPL W P-5'-P-CCNC: 14 U/L (ref 1–33)
ANION GAP SERPL CALCULATED.3IONS-SCNC: 11.5 MMOL/L (ref 5–15)
AST SERPL-CCNC: 19 U/L (ref 1–32)
B PARAPERT DNA SPEC QL NAA+PROBE: NOT DETECTED
B PERT DNA SPEC QL NAA+PROBE: NOT DETECTED
BASOPHILS # BLD AUTO: 0.05 10*3/MM3 (ref 0–0.2)
BASOPHILS NFR BLD AUTO: 0.7 % (ref 0–1.5)
BILIRUB SERPL-MCNC: 0.3 MG/DL (ref 0–1.2)
BLD GP AB SCN SERPL QL: NEGATIVE
BUN SERPL-MCNC: 20 MG/DL (ref 8–23)
BUN/CREAT SERPL: 20.2 (ref 7–25)
C PNEUM DNA NPH QL NAA+NON-PROBE: NOT DETECTED
CALCIUM SPEC-SCNC: 9.5 MG/DL (ref 8.6–10.5)
CHLORIDE SERPL-SCNC: 102 MMOL/L (ref 98–107)
CO2 SERPL-SCNC: 27.5 MMOL/L (ref 22–29)
CREAT SERPL-MCNC: 0.99 MG/DL (ref 0.57–1)
DEPRECATED RDW RBC AUTO: 43.7 FL (ref 37–54)
EGFRCR SERPLBLD CKD-EPI 2021: 59.2 ML/MIN/1.73
EOSINOPHIL # BLD AUTO: 0.18 10*3/MM3 (ref 0–0.4)
EOSINOPHIL NFR BLD AUTO: 2.6 % (ref 0.3–6.2)
ERYTHROCYTE [DISTWIDTH] IN BLOOD BY AUTOMATED COUNT: 12.8 % (ref 12.3–15.4)
FLUAV SUBTYP SPEC NAA+PROBE: NOT DETECTED
FLUBV RNA ISLT QL NAA+PROBE: NOT DETECTED
GEN 5 1HR TROPONIN T REFLEX: 9 NG/L
GLOBULIN UR ELPH-MCNC: 3.3 GM/DL
GLUCOSE SERPL-MCNC: 164 MG/DL (ref 65–99)
HADV DNA SPEC NAA+PROBE: NOT DETECTED
HCOV 229E RNA SPEC QL NAA+PROBE: NOT DETECTED
HCOV HKU1 RNA SPEC QL NAA+PROBE: NOT DETECTED
HCOV NL63 RNA SPEC QL NAA+PROBE: NOT DETECTED
HCOV OC43 RNA SPEC QL NAA+PROBE: NOT DETECTED
HCT VFR BLD AUTO: 41.9 % (ref 34–46.6)
HGB BLD-MCNC: 13.9 G/DL (ref 12–15.9)
HMPV RNA NPH QL NAA+NON-PROBE: NOT DETECTED
HOLD SPECIMEN: NORMAL
HPIV1 RNA ISLT QL NAA+PROBE: NOT DETECTED
HPIV2 RNA SPEC QL NAA+PROBE: NOT DETECTED
HPIV3 RNA NPH QL NAA+PROBE: NOT DETECTED
HPIV4 P GENE NPH QL NAA+PROBE: NOT DETECTED
IMM GRANULOCYTES # BLD AUTO: 0.02 10*3/MM3 (ref 0–0.05)
IMM GRANULOCYTES NFR BLD AUTO: 0.3 % (ref 0–0.5)
INR PPP: 0.99 (ref 0.9–1.1)
LYMPHOCYTES # BLD AUTO: 1.13 10*3/MM3 (ref 0.7–3.1)
LYMPHOCYTES NFR BLD AUTO: 16.2 % (ref 19.6–45.3)
M PNEUMO IGG SER IA-ACNC: NOT DETECTED
MAGNESIUM SERPL-MCNC: 2.1 MG/DL (ref 1.6–2.4)
MCH RBC QN AUTO: 30.5 PG (ref 26.6–33)
MCHC RBC AUTO-ENTMCNC: 33.2 G/DL (ref 31.5–35.7)
MCV RBC AUTO: 91.9 FL (ref 79–97)
MONOCYTES # BLD AUTO: 0.39 10*3/MM3 (ref 0.1–0.9)
MONOCYTES NFR BLD AUTO: 5.6 % (ref 5–12)
NEUTROPHILS NFR BLD AUTO: 5.2 10*3/MM3 (ref 1.7–7)
NEUTROPHILS NFR BLD AUTO: 74.6 % (ref 42.7–76)
NRBC BLD AUTO-RTO: 0 /100 WBC (ref 0–0.2)
NT-PROBNP SERPL-MCNC: 247 PG/ML (ref 0–1800)
PLATELET # BLD AUTO: 272 10*3/MM3 (ref 140–450)
PMV BLD AUTO: 10.6 FL (ref 6–12)
POTASSIUM SERPL-SCNC: 4.1 MMOL/L (ref 3.5–5.2)
PROCALCITONIN SERPL-MCNC: 0.03 NG/ML (ref 0–0.25)
PROT SERPL-MCNC: 7.6 G/DL (ref 6–8.5)
PROTHROMBIN TIME: 13 SECONDS (ref 11.7–14.2)
QT INTERVAL: 486 MS
QTC INTERVAL: 417 MS
RBC # BLD AUTO: 4.56 10*6/MM3 (ref 3.77–5.28)
RH BLD: POSITIVE
RHINOVIRUS RNA SPEC NAA+PROBE: NOT DETECTED
RSV RNA NPH QL NAA+NON-PROBE: NOT DETECTED
SARS-COV-2 RNA NPH QL NAA+NON-PROBE: NOT DETECTED
SODIUM SERPL-SCNC: 141 MMOL/L (ref 136–145)
T&S EXPIRATION DATE: NORMAL
TROPONIN T NUMERIC DELTA: 2 NG/L
TROPONIN T SERPL HS-MCNC: 7 NG/L
TSH SERPL DL<=0.05 MIU/L-ACNC: 0.92 UIU/ML (ref 0.27–4.2)
WBC NRBC COR # BLD AUTO: 6.97 10*3/MM3 (ref 3.4–10.8)
WHOLE BLOOD HOLD COAG: NORMAL
WHOLE BLOOD HOLD SPECIMEN: NORMAL

## 2025-03-17 PROCEDURE — 71250 CT THORAX DX C-: CPT

## 2025-03-17 PROCEDURE — 83880 ASSAY OF NATRIURETIC PEPTIDE: CPT | Performed by: EMERGENCY MEDICINE

## 2025-03-17 PROCEDURE — 80053 COMPREHEN METABOLIC PANEL: CPT | Performed by: EMERGENCY MEDICINE

## 2025-03-17 PROCEDURE — 99285 EMERGENCY DEPT VISIT HI MDM: CPT

## 2025-03-17 PROCEDURE — 25010000002 ONDANSETRON PER 1 MG: Performed by: EMERGENCY MEDICINE

## 2025-03-17 PROCEDURE — G0378 HOSPITAL OBSERVATION PER HR: HCPCS

## 2025-03-17 PROCEDURE — 96374 THER/PROPH/DIAG INJ IV PUSH: CPT

## 2025-03-17 PROCEDURE — 70496 CT ANGIOGRAPHY HEAD: CPT

## 2025-03-17 PROCEDURE — 86900 BLOOD TYPING SEROLOGIC ABO: CPT | Performed by: EMERGENCY MEDICINE

## 2025-03-17 PROCEDURE — 71045 X-RAY EXAM CHEST 1 VIEW: CPT

## 2025-03-17 PROCEDURE — 93005 ELECTROCARDIOGRAM TRACING: CPT

## 2025-03-17 PROCEDURE — 93005 ELECTROCARDIOGRAM TRACING: CPT | Performed by: EMERGENCY MEDICINE

## 2025-03-17 PROCEDURE — 93010 ELECTROCARDIOGRAM REPORT: CPT | Performed by: INTERNAL MEDICINE

## 2025-03-17 PROCEDURE — 99291 CRITICAL CARE FIRST HOUR: CPT

## 2025-03-17 PROCEDURE — 70498 CT ANGIOGRAPHY NECK: CPT

## 2025-03-17 PROCEDURE — 99204 OFFICE O/P NEW MOD 45 MIN: CPT

## 2025-03-17 PROCEDURE — 25510000001 IOPAMIDOL PER 1 ML: Performed by: EMERGENCY MEDICINE

## 2025-03-17 PROCEDURE — 86850 RBC ANTIBODY SCREEN: CPT | Performed by: EMERGENCY MEDICINE

## 2025-03-17 PROCEDURE — 36415 COLL VENOUS BLD VENIPUNCTURE: CPT

## 2025-03-17 PROCEDURE — 84443 ASSAY THYROID STIM HORMONE: CPT | Performed by: EMERGENCY MEDICINE

## 2025-03-17 PROCEDURE — 86901 BLOOD TYPING SEROLOGIC RH(D): CPT | Performed by: EMERGENCY MEDICINE

## 2025-03-17 PROCEDURE — 84484 ASSAY OF TROPONIN QUANT: CPT | Performed by: EMERGENCY MEDICINE

## 2025-03-17 PROCEDURE — 96376 TX/PRO/DX INJ SAME DRUG ADON: CPT

## 2025-03-17 PROCEDURE — 83735 ASSAY OF MAGNESIUM: CPT | Performed by: EMERGENCY MEDICINE

## 2025-03-17 PROCEDURE — 0202U NFCT DS 22 TRGT SARS-COV-2: CPT | Performed by: EMERGENCY MEDICINE

## 2025-03-17 PROCEDURE — 85025 COMPLETE CBC W/AUTO DIFF WBC: CPT

## 2025-03-17 PROCEDURE — 84145 PROCALCITONIN (PCT): CPT | Performed by: EMERGENCY MEDICINE

## 2025-03-17 PROCEDURE — 85610 PROTHROMBIN TIME: CPT | Performed by: EMERGENCY MEDICINE

## 2025-03-17 RX ORDER — ONDANSETRON 2 MG/ML
4 INJECTION INTRAMUSCULAR; INTRAVENOUS EVERY 6 HOURS PRN
Status: DISCONTINUED | OUTPATIENT
Start: 2025-03-17 | End: 2025-03-19 | Stop reason: HOSPADM

## 2025-03-17 RX ORDER — NITROGLYCERIN 0.4 MG/1
0.4 TABLET SUBLINGUAL
Status: DISCONTINUED | OUTPATIENT
Start: 2025-03-17 | End: 2025-03-19 | Stop reason: HOSPADM

## 2025-03-17 RX ORDER — POLYETHYLENE GLYCOL 3350 17 G/17G
17 POWDER, FOR SOLUTION ORAL DAILY PRN
Status: DISCONTINUED | OUTPATIENT
Start: 2025-03-17 | End: 2025-03-19 | Stop reason: HOSPADM

## 2025-03-17 RX ORDER — HYDROCHLOROTHIAZIDE 12.5 MG/1
6.25 TABLET ORAL DAILY
Status: DISCONTINUED | OUTPATIENT
Start: 2025-03-17 | End: 2025-03-18

## 2025-03-17 RX ORDER — SCOPOLAMINE 1 MG/3D
1 PATCH, EXTENDED RELEASE TRANSDERMAL
Status: DISCONTINUED | OUTPATIENT
Start: 2025-03-17 | End: 2025-03-19 | Stop reason: HOSPADM

## 2025-03-17 RX ORDER — SODIUM CHLORIDE 0.9 % (FLUSH) 0.9 %
10 SYRINGE (ML) INJECTION AS NEEDED
Status: DISCONTINUED | OUTPATIENT
Start: 2025-03-17 | End: 2025-03-19 | Stop reason: HOSPADM

## 2025-03-17 RX ORDER — LIDOCAINE 4 G/G
1 PATCH TOPICAL EVERY 24 HOURS
Status: DISCONTINUED | OUTPATIENT
Start: 2025-03-17 | End: 2025-03-19 | Stop reason: HOSPADM

## 2025-03-17 RX ORDER — PREGABALIN 50 MG/1
50 CAPSULE ORAL 2 TIMES DAILY
Status: DISCONTINUED | OUTPATIENT
Start: 2025-03-17 | End: 2025-03-19 | Stop reason: HOSPADM

## 2025-03-17 RX ORDER — ONDANSETRON 2 MG/ML
8 INJECTION INTRAMUSCULAR; INTRAVENOUS ONCE
Status: COMPLETED | OUTPATIENT
Start: 2025-03-17 | End: 2025-03-17

## 2025-03-17 RX ORDER — IOPAMIDOL 755 MG/ML
95 INJECTION, SOLUTION INTRAVASCULAR
Status: COMPLETED | OUTPATIENT
Start: 2025-03-17 | End: 2025-03-17

## 2025-03-17 RX ORDER — SODIUM CHLORIDE 9 MG/ML
40 INJECTION, SOLUTION INTRAVENOUS AS NEEDED
Status: DISCONTINUED | OUTPATIENT
Start: 2025-03-17 | End: 2025-03-19 | Stop reason: HOSPADM

## 2025-03-17 RX ORDER — MECLIZINE HYDROCHLORIDE 25 MG/1
25 TABLET ORAL ONCE
Status: COMPLETED | OUTPATIENT
Start: 2025-03-17 | End: 2025-03-17

## 2025-03-17 RX ORDER — BISOPROLOL FUMARATE 5 MG/1
5 TABLET, FILM COATED ORAL DAILY
Status: DISCONTINUED | OUTPATIENT
Start: 2025-03-17 | End: 2025-03-18

## 2025-03-17 RX ORDER — ONDANSETRON 4 MG/1
4 TABLET, ORALLY DISINTEGRATING ORAL EVERY 6 HOURS PRN
Status: DISCONTINUED | OUTPATIENT
Start: 2025-03-17 | End: 2025-03-19 | Stop reason: HOSPADM

## 2025-03-17 RX ORDER — MECLIZINE HYDROCHLORIDE 25 MG/1
25 TABLET ORAL 3 TIMES DAILY PRN
Status: DISCONTINUED | OUTPATIENT
Start: 2025-03-17 | End: 2025-03-19 | Stop reason: HOSPADM

## 2025-03-17 RX ORDER — ACETAMINOPHEN 160 MG/5ML
650 SOLUTION ORAL EVERY 4 HOURS PRN
Status: DISCONTINUED | OUTPATIENT
Start: 2025-03-17 | End: 2025-03-19 | Stop reason: HOSPADM

## 2025-03-17 RX ORDER — LEVOTHYROXINE SODIUM 50 UG/1
100 TABLET ORAL
Status: DISCONTINUED | OUTPATIENT
Start: 2025-03-18 | End: 2025-03-19 | Stop reason: HOSPADM

## 2025-03-17 RX ORDER — SODIUM CHLORIDE 0.9 % (FLUSH) 0.9 %
10 SYRINGE (ML) INJECTION EVERY 12 HOURS SCHEDULED
Status: DISCONTINUED | OUTPATIENT
Start: 2025-03-17 | End: 2025-03-19 | Stop reason: HOSPADM

## 2025-03-17 RX ORDER — ACETAMINOPHEN 650 MG/1
650 SUPPOSITORY RECTAL EVERY 4 HOURS PRN
Status: DISCONTINUED | OUTPATIENT
Start: 2025-03-17 | End: 2025-03-19 | Stop reason: HOSPADM

## 2025-03-17 RX ORDER — ACETAMINOPHEN 325 MG/1
650 TABLET ORAL EVERY 4 HOURS PRN
Status: DISCONTINUED | OUTPATIENT
Start: 2025-03-17 | End: 2025-03-19 | Stop reason: HOSPADM

## 2025-03-17 RX ORDER — BISACODYL 5 MG/1
5 TABLET, DELAYED RELEASE ORAL DAILY PRN
Status: DISCONTINUED | OUTPATIENT
Start: 2025-03-17 | End: 2025-03-19 | Stop reason: HOSPADM

## 2025-03-17 RX ORDER — BISACODYL 10 MG
10 SUPPOSITORY, RECTAL RECTAL DAILY PRN
Status: DISCONTINUED | OUTPATIENT
Start: 2025-03-17 | End: 2025-03-19 | Stop reason: HOSPADM

## 2025-03-17 RX ORDER — ASPIRIN 325 MG
325 TABLET ORAL ONCE
Status: COMPLETED | OUTPATIENT
Start: 2025-03-17 | End: 2025-03-17

## 2025-03-17 RX ORDER — PANTOPRAZOLE SODIUM 40 MG/1
40 TABLET, DELAYED RELEASE ORAL 2 TIMES DAILY
Status: DISCONTINUED | OUTPATIENT
Start: 2025-03-17 | End: 2025-03-19 | Stop reason: HOSPADM

## 2025-03-17 RX ORDER — AMOXICILLIN 250 MG
2 CAPSULE ORAL 2 TIMES DAILY PRN
Status: DISCONTINUED | OUTPATIENT
Start: 2025-03-17 | End: 2025-03-19 | Stop reason: HOSPADM

## 2025-03-17 RX ORDER — LANOLIN ALCOHOL/MO/W.PET/CERES
1000 CREAM (GRAM) TOPICAL DAILY
COMMUNITY

## 2025-03-17 RX ORDER — HYDROXYZINE HYDROCHLORIDE 10 MG/1
10 TABLET, FILM COATED ORAL EVERY 4 HOURS PRN
Status: DISCONTINUED | OUTPATIENT
Start: 2025-03-17 | End: 2025-03-19 | Stop reason: HOSPADM

## 2025-03-17 RX ADMIN — SERTRALINE HYDROCHLORIDE 100 MG: 50 TABLET, FILM COATED ORAL at 20:32

## 2025-03-17 RX ADMIN — ONDANSETRON 8 MG: 2 INJECTION, SOLUTION INTRAMUSCULAR; INTRAVENOUS at 11:28

## 2025-03-17 RX ADMIN — SCOPOLAMINE 1 PATCH: 1.5 PATCH, EXTENDED RELEASE TRANSDERMAL at 20:54

## 2025-03-17 RX ADMIN — Medication 10 ML: at 20:00

## 2025-03-17 RX ADMIN — MECLIZINE HYDROCHLORIDE 25 MG: 25 TABLET ORAL at 11:26

## 2025-03-17 RX ADMIN — IOPAMIDOL 95 ML: 755 INJECTION, SOLUTION INTRAVENOUS at 12:26

## 2025-03-17 RX ADMIN — MECLIZINE HYDROCHLORIDE 25 MG: 25 TABLET ORAL at 20:33

## 2025-03-17 RX ADMIN — ONDANSETRON 4 MG: 2 INJECTION, SOLUTION INTRAMUSCULAR; INTRAVENOUS at 20:56

## 2025-03-17 RX ADMIN — PANTOPRAZOLE SODIUM 40 MG: 40 TABLET, DELAYED RELEASE ORAL at 20:32

## 2025-03-17 RX ADMIN — ACETAMINOPHEN 650 MG: 325 TABLET, FILM COATED ORAL at 20:32

## 2025-03-17 RX ADMIN — ASPIRIN 325 MG ORAL TABLET 325 MG: 325 PILL ORAL at 11:26

## 2025-03-17 RX ADMIN — PREGABALIN 50 MG: 50 CAPSULE ORAL at 20:33

## 2025-03-17 NOTE — H&P
[facility]   HISTORY AND PHYSICAL    Patient Name: Jemima Bell  : 1950  MRN: [id number]  Primary Care Physician:  Kehrer, Meredith Lea, MD  Date of admission: 3/17/2025    Subjective   Subjective     Chief Complaint:   Chief Complaint   Patient presents with    Slow Heart Rate    Heartburn         HPI:    Jemima Bell is a 75 y.o. female, with a past medical history including, but not limited to, degenerative disc disease, anxiety, depression, hypertension, hyperlipidemia, hypothyroidism, migraine headaches, and pulmonary embolism, submitted to the observation unit with a complaint of dizziness.  Patient states that she woke up this morning and had room spinning dizziness confusion about nausea.  She states the dizziness was so bad that she had to use her walker to ambulate.  The dizziness is worse with movement or rapid eye movement.  She states she went to pain management to have a epidural/ablation with pain management this morning but was sent to the ER for evaluation of her dizziness.  She denies any numbness, tingling, paresthesias of her extremities or face and denies any difficulty with vision and/or speech.  Neurology has been consulted to see the patient.  Vestibular therapy has been consulted to see the patient in the AM.  MRI brain, MRA neck are pending as the patient has a bladder stimulator and does not have the remote with her at the hospital.  Review of Systems   All systems were reviewed and negative except for: What was mentioned above in the HPI.    Personal History     Past Medical History:   Diagnosis Date    Acromioclavicular separation     Acute bronchitis     Acute sinusitis     Allergic 2022    Shrimp    Allergic rhinitis     Anemia Childhood    Anxiety     Arthritis     Arthritis of back     Arthropathy of cervical facet joint 3/14/2025    Asthma     Back pain     BMI 34.0-34.9,adult     Bronchitis, chronic     Bursitis of hip     Cataract      Cervical disc disorder     Cervicalgia     Chills     Cholelithiasis 2004?    Remival    Chronic diarrhea     Chronic pain disorder     Cluster headache     Colon polyp 12/2022    Deep vein thrombosis (DVT) of lower extremity     Depression     Dermatitis     Dislocation, shoulder     Dyspnea     Dysuria     Esophageal reflux     Extremity pain     Fatigue     Gastric ulcer     GERD (gastroesophageal reflux disease)     GI (gastrointestinal bleed) 2004?    Headache     Headache, tension-type     Heart murmur 1973    Hernia     Hiatal hernia 2007    Hip arthrosis     Hypertension     Hypothyroidism     Irritable bowel syndrome     Low back strain     Lumbago     Lumbar stenosis with neurogenic claudication 05/01/2024    Lumbosacral disc disease     Migraine     Nausea     Neuritis     Osteoarthritis     Osteoarthritis of knee     Periarthritis of shoulder     Plantar fasciitis     Pneumonia 89y    Pulled muscle     Pulmonary embolism     Pyelonephritis     Rheumatic fever     Sore throat     Torticollis     Trapezius strain     Urinary incontinence     Urinary pain     Urinary tract infection     UTI symptoms     Vitamin B1 deficiency     Vitamin B12 deficiency     Vitamin D deficiency     Weakness     Wheezing        Past Surgical History:   Procedure Laterality Date    ABDOMINAL SURGERY      ADENOIDECTOMY      APPENDECTOMY      BACK SURGERY  6/30/24    CATARACT EXTRACTION      CATARACT EXTRACTION      bilateral eyes    CATARACT EXTRACTION      CERVICAL EPIDURAL N/A 06/26/2024    Procedure: cervical epidural steroid injection at C7/T1 44357;  Surgeon: Shey Aranda MD;  Location: [facility] MAIN OR;  Service: Pain Management;  Laterality: N/A;    CHOLECYSTECTOMY  2004?    COLONOSCOPY      COLONOSCOPY N/A 12/23/2022    Procedure: COLONOSCOPY to cecum and TI:  with biopsies, cold snare polyp,;  Surgeon: Reji Aguilar MD;  Location: [facility] ENDOSCOPY;  Service: Gastroenterology;  Laterality: N/A;  PREOP/ HX COLON  POLYPS  POSTOP/  diverticulosis, polyp, hemorrhoids    ENDOSCOPY N/A 12/23/2022    Procedure: ESOPHAGOGASTRODUODENOSCOPY with biopsies;  Surgeon: Reji Aguilar MD;  Location: [facility] ENDOSCOPY;  Service: Gastroenterology;  Laterality: N/A;  PREOP/ HX GASTRIC ULCER, DYSPEPSIA, UPPER ABDOMINAL PAIN  POSTOP/:  HH, gastritis, gastric polyps    EPIDURAL BLOCK      GALLBLADDER SURGERY      HEMORRHOIDECTOMY  1974 & 77?    HERNIA REPAIR  2008?    HYSTERECTOMY      INGUINAL HERNIA REPAIR      INTERSTIM PLACEMENT      for Bladder incontinence    JOINT REPLACEMENT      Knee    KNEE SURGERY      LAPAROSCOPIC COLON RESECTION  2005    LAPAROTOMY OOPHERECTOMY      LUMBAR DIRECT LATERAL INTERBODY FUSION N/A 07/30/2024    Procedure: lumbar 3 to sacral 1 transforaminal lumbar interbody fusion, lumbar 3 to sacral 1 fusion with neuro robot;  Surgeon: Michelet Rice IV, MD;  Location: [facility];  Service: Robotics - Neuro;  Laterality: N/A;    LUMBAR EPIDURAL INJECTION N/A 03/27/2023    Procedure: Lumbar epidural steroid injection at L5-S1 41044;  Surgeon: Shey Aranda MD;  Location: [facility] MAIN OR;  Service: Pain Management;  Laterality: N/A;    LUMBAR EPIDURAL INJECTION N/A 03/13/2024    Procedure: lumbar epidural steroid injection at L4/5 14873;  Surgeon: Shey Aranda MD;  Location[facility];  Service: Pain Management;  Laterality: N/A;    LUMBAR FUSION N/A 07/30/2024    Procedure: LUMBAR TRANSFORAMINAL INTERBODY FUSION WITH NEURO ROBOT;  Surgeon: Michelet Rice IV, MD;  Location: [facility] MAIN OR;  Service: Robotics - Neuro;  Laterality: N/A;    NISSEN FUNDOPLICATION LAPAROSCOPIC      x2    ORTHOPEDIC SURGERY      Knee replacement    SACROILIAC JOINT INJECTION Left 05/12/2023    Procedure: Left SACROILIAC INJECTION 53631;  Surgeon: Shey Aranda MD;  Location: [facility] MAIN OR;  Service: Pain Management;  Laterality: Left;    SACROILIAC JOINT INJECTION Left 07/12/2023    Procedure: SACROILIAC INJECTION LEFT;  Surgeon: Manoj  "Shey NUNO MD;  Location: [facility] MAIN OR;  Service: Pain Management;  Laterality: Left;    SPINAL FUSION  [id number]    SPINE SURGERY  [id number]    TONSILLECTOMY      TONSILLECTOMY  1954?    TOTAL KNEE ARTHROPLASTY Left     TUBAL ABDOMINAL LIGATION      UPPER GASTROINTESTINAL ENDOSCOPY         Family History: family history includes Alcohol abuse in her maternal grandfather; Arthritis in her father and mother; Asthma in her maternal grandmother and paternal grandfather; Depression in her maternal grandfather and mother; Dislocations in her mother; Heart attack in her maternal uncle, mother, and paternal uncle; Heart disease in an other family member; Heart failure in her mother and paternal uncle; Hyperlipidemia in her mother; Hypertension in her father and mother; Irritable bowel syndrome in her mother; Stroke in her father. Otherwise pertinent FHx was reviewed and not pertinent to current issue.    Social History:  reports that she has never smoked. She has never used smokeless tobacco. She reports that she does not drink alcohol and does not use drugs.    Home Medications:  HYDROcodone-acetaminophen, (Ibuprofen 3 %, Gabapentin 10 %, Baclofen 2 %, lidocaine 4 %, Ketamine HCl 4 %), albuterol sulfate HFA, bisoprolol-hydrochlorothiazide, fluticasone, hydrOXYzine, levothyroxine, lidocaine, methocarbamol, ondansetron, pantoprazole, pregabalin, promethazine, sertraline, and vitamin B-12    Allergies:  Allergies   Allergen Reactions    Salicylates GI Intolerance     History of gi bleed      Colestipol GI Intolerance    Biaxin [Clarithromycin]     Adhesive Tape Rash     Can use plastic tape, paper tape causes rash    Augmentin [Amoxicillin-Pot Clavulanate] Diarrhea    Prevacid [Lansoprazole] Itching and Swelling     Eyes only    Sulfa Antibiotics GI Intolerance    Sulfamethoxazole-Trimethoprim Nausea And Vomiting and GI Intolerance     \"makes me sick as a dog\"         Objective   Objective     Vitals:   Temp:  [97.3 °F (36.3 " °C)-97.9 °F (36.6 °C)] 97.9 °F (36.6 °C)  Heart Rate:  [42-56] 47  Resp:  [15-18] 18  BP: ()/(52-67) 98/52  Physical Exam   Constitutional: Awake, alert   Eyes: PERRLA   HENT: NCAT, mucous membranes moist   Neck: Supple   Respiratory: Clear to auscultation bilaterally, nonlabored respirations    Cardiovascular: regular rate, palpable pedal pulses bilaterally   Gastrointestinal: Positive bowel sounds, soft, nontender, nondistended   Musculoskeletal: No bilateral ankle edema   Psychiatric: Appropriate affect, cooperative   Neurologic: Oriented x 3, speech clear   Skin: No rashes       Result Review    Result Review:  I have personally reviewed the results from the time of this admission to 3/17/2025 19:45 EDT and agree with these findings:  [x]  Laboratory list / accordion  []  Microbiology  [x]  Radiology  [x]  EKG/Telemetry   []  Cardiology/Vascular   []  Pathology  [x]  Old records  []  Other:    Lab work in the emergency department shows high-sensitivity troponin of 7, 9, delta of 2, proBNP 247.0, glucose 164, all other lab work is at baseline for the patient.  EKG shows sinus bradycardia, rate 44 chest x-ray shows ill-defined bilateral perihilar and bibasilar opacities which could represent atelectasis and/or scarring with prominent epicardial fat.  CT head without contrast shows no evidence of acute infarction or intracranial hemorrhage.  CTA head and neck shows right posterior inferior cerebral artery is not opacified.  This may be normal variant as a left posterior inferior cerebral artery crosses midline and there is a right anterior inferior cerebral artery/posterior inferior cerebral artery complex  No evidence of carotid stenosis.  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      Assessment & Plan   Assessment / Plan     Brief Patient Summary:  Jemima Bell is a 75 y.o. female who was admitted to the observation unit for further evaluation and treatment of of her dizziness.    Active  Hospital Problems:  Active Hospital Problems    Diagnosis     **Vertigo      Plan:     Dizziness   -Neurochecks every 4 hours   -Vital signs every 4 hours   -Cardiac monitoring   -MRI-brain without, MRA neck-pending patient has bladder stimulator and does not have the remote with her at the hospital  -CT Head -no evidence of acute infarction or intracranial hemorrhage.  -CTA Head/neck -no evidence of carotid stenosis.  There is severe stenosis involving the P2/P3 junction  -PT-vestibular therapy-to eval and treat  -Neuro consult     Chronic neck and back pain  -Continue home dose Lyrica, lidocaine patch    -Monitor blood pressure  -Continue home blood pressure agents    Hypothyroidism  -Continue home dose levothyroxine  -TSH 0.916    VTE Prophylaxis:  Mechanical VTE prophylaxis orders are present.        CODE STATUS:    Code Status (Patient has no pulse and is not breathing): CPR (Attempt to Resuscitate)  Medical Interventions (Patient has pulse or is breathing): Full Support  Level Of Support Discussed With: Patient    Admission Status:  I believe this patient meets observation status.    76 minutes have been spent by [facility] providers in the care of this patient while under observation status.      Appropriate PPE worn during patient encounter.  Hand hygeine performed before and after seeing the patient.      Electronically signed by MACHO Nolasco, 03/17/25, 7:45 PM EDT.

## 2025-03-17 NOTE — CONSULTS
Neurology Consult Note    Consult Date: 3/17/2025    Referring MD: Sanju Rosado MD    Reason for Consult I have been asked to see the patient in neurological consultation to render advice and opinion regarding vertigo.     Jemima Bell is a 75 y.o. female with past medical history of anxiety, cervical disc disorder, chronic pain disorder, DVT, PE, depression, hypothyroidism, hypertension, lumbar stenosis with neuro claudication status post lumbar spinal fusion, migraine presents to the ED with a chief complaint of dizziness.  Patient was at [facility] for scheduled outpatient ablation of cervical spine when she was noted to be bradycardic.  Patient reports she got very nauseous and began gagging at the time.  She denied any emesis.  She is continued to be lightheaded since.  She reports that she has been having vertigo since 3/16 at 2 PM that has been intermittent and worse with position change or leaning her head forward.  She describes the vertigo as room spinning with associated nausea and was the worst it has been this morning.  She admits to associated incoordination and difficulty with balance.  She also has had tinnitus and hearing loss bilaterally left greater than right that began today.  She also complains of fullness in her head that is worse when she leans her head forward or lays on her left side.  Patient states vertigo is much better since receiving meclizine and Zofran.  Patient also admits to associated chest pain/pressure which she thought was provoked by the dry heaving and is not been present since.  Denies any associated palpitations or shortness of breath.  She denies associated double vision, unilateral weakness/numbness, facial droop.  Patient denies any recent syncope, fall or trauma.  Patient denies prior history of MI or CVA and not on anticoagulation.  Blood pressure on arrival was 146/61 mmHg and pulse was 45 bpm.    Past Medical/Surgical Hx:  Past Medical  History:   Diagnosis Date    Acromioclavicular separation     Acute bronchitis     Acute sinusitis     Allergic 11/2022    Shrimp    Allergic rhinitis     Anemia Childhood    Anxiety     Arthritis     Arthritis of back     Arthropathy of cervical facet joint 3/14/2025    Asthma     Back pain     BMI 34.0-34.9,adult     Bronchitis, chronic 2017-19    Bursitis of hip     Cataract     Cervical disc disorder     Cervicalgia     Chills     Cholelithiasis 2004?    Remival    Chronic diarrhea     Chronic pain disorder     Cluster headache     Colon polyp 12/2022    Deep vein thrombosis (DVT) of lower extremity     Depression     Dermatitis     Dislocation, shoulder     Dyspnea     Dysuria     Esophageal reflux     Extremity pain     Fatigue     Gastric ulcer     GERD (gastroesophageal reflux disease)     GI (gastrointestinal bleed) 2004?    Headache     Headache, tension-type     Heart murmur 1973    Hernia     Hiatal hernia 2007    Hip arthrosis     Hypertension     Hypothyroidism     Irritable bowel syndrome     Low back strain     Lumbago     Lumbar stenosis with neurogenic claudication 05/01/2024    Lumbosacral disc disease     Migraine     Nausea     Neuritis     Osteoarthritis     Osteoarthritis of knee     Periarthritis of shoulder     Plantar fasciitis     Pneumonia 89y    Pulled muscle     Pulmonary embolism     Pyelonephritis     Rheumatic fever     Sore throat     Torticollis     Trapezius strain     Urinary incontinence     Urinary pain     Urinary tract infection     UTI symptoms     Vitamin B1 deficiency     Vitamin B12 deficiency     Vitamin D deficiency     Weakness     Wheezing      Past Surgical History:   Procedure Laterality Date    ABDOMINAL SURGERY      ADENOIDECTOMY      APPENDECTOMY      BACK SURGERY  6/30/24    CATARACT EXTRACTION      CATARACT EXTRACTION      bilateral eyes    CATARACT EXTRACTION      CERVICAL EPIDURAL N/A 06/26/2024    Procedure: cervical epidural steroid injection at C7/T1  90304;  Surgeon: Shey Aranda MD;  Location: [facility];  Service: Pain Management;  Laterality: N/A;    CHOLECYSTECTOMY  2004?    COLONOSCOPY      COLONOSCOPY N/A 12/23/2022    Procedure: COLONOSCOPY to cecum and TI:  with biopsies, cold snare polyp,;  Surgeon: Reji Aguilar MD;  Location: [facility] ENDOSCOPY;  Service: Gastroenterology;  Laterality: N/A;  PREOP/ HX COLON POLYPS  POSTOP/  diverticulosis, polyp, hemorrhoids    ENDOSCOPY N/A 12/23/2022    Procedure: ESOPHAGOGASTRODUODENOSCOPY with biopsies;  Surgeon: Reji Aguilar MD;  Location: [facility] ENDOSCOPY;  Service: Gastroenterology;  Laterality: N/A;  PREOP/ HX GASTRIC ULCER, DYSPEPSIA, UPPER ABDOMINAL PAIN  POSTOP/:  HH, gastritis, gastric polyps    EPIDURAL BLOCK      GALLBLADDER SURGERY      HEMORRHOIDECTOMY  1974 & 77?    HERNIA REPAIR  2008?    HYSTERECTOMY      INGUINAL HERNIA REPAIR      INTERSTIM PLACEMENT      for Bladder incontinence    JOINT REPLACEMENT      Knee    KNEE SURGERY      LAPAROSCOPIC COLON RESECTION  2005    LAPAROTOMY OOPHERECTOMY      LUMBAR DIRECT LATERAL INTERBODY FUSION N/A 07/30/2024    Procedure: lumbar 3 to sacral 1 transforaminal lumbar interbody fusion, lumbar 3 to sacral 1 fusion with neuro robot;  Surgeon: Michelet Rice IV, MD;  Location: [facility] MAIN OR;  Service: Robotics - Neuro;  Laterality: N/A;    LUMBAR EPIDURAL INJECTION N/A 03/27/2023    Procedure: Lumbar epidural steroid injection at L5-S1 55278;  Surgeon: Shey Aranda MD;  Location: [facility];  Service: Pain Management;  Laterality: N/A;    LUMBAR EPIDURAL INJECTION N/A 03/13/2024    Procedure: lumbar epidural steroid injection at L4/5 18849;  Surgeon: Shey Aranda MD;  Location: [facility];  Service: Pain Management;  Laterality: N/A;    LUMBAR FUSION N/A 07/30/2024    Procedure: LUMBAR TRANSFORAMINAL INTERBODY FUSION WITH NEURO ROBOT;  Surgeon: Michelet Rice IV, MD;  Location: [facility] MAIN OR;  Service: Robotics - Neuro;  Laterality: N/A;    NISSEN  FUNDOPLICATION LAPAROSCOPIC      x2    ORTHOPEDIC SURGERY      Knee replacement    SACROILIAC JOINT INJECTION Left 05/12/2023    Procedure: Left SACROILIAC INJECTION 15365;  Surgeon: Shey Aranda MD;  Location: [facility];  Service: Pain Management;  Laterality: Left;    SACROILIAC JOINT INJECTION Left 07/12/2023    Procedure: SACROILIAC INJECTION LEFT;  Surgeon: Shey Aranda MD;  Location: [facility];  Service: Pain Management;  Laterality: Left;    SPINAL FUSION  [id number]    SPINE SURGERY  046384    TONSILLECTOMY      TONSILLECTOMY  1954?    TOTAL KNEE ARTHROPLASTY Left     TUBAL ABDOMINAL LIGATION      UPPER GASTROINTESTINAL ENDOSCOPY         Medications On Admission  Facility-Administered Medications Prior to Admission   Medication Dose Route Frequency Provider Last Rate Last Admin    cyanocobalamin injection 1,000 mcg  1,000 mcg Intramuscular Q28 Days Kehrer, Meredith Lea, MD   1,000 mcg at 01/30/25 1350     Medications Prior to Admission   Medication Sig Dispense Refill Last Dose/Taking    albuterol sulfate  (90 Base) MCG/ACT inhaler Inhale 2 puffs Every 4 (Four) Hours As Needed for Shortness of Air or Wheezing.       bisoprolol-hydrochlorothiazide (Ziac) 5-6.25 MG per tablet Take 1 tablet by mouth Daily. 90 tablet 3     fluticasone (FLONASE) 50 MCG/ACT nasal spray 2 sprays into the nostril(s) as directed by provider Daily. 48 g 3     HYDROcodone-acetaminophen (NORCO) 5-325 MG per tablet Take 1 tablet by mouth Every 8 (Eight) Hours As Needed for Moderate Pain. 30 day supply 60 tablet 0     hydrOXYzine (ATARAX) 10 MG tablet Take 1 tablet by mouth Every 4 (Four) Hours As Needed for Anxiety (twitching). 120 tablet 1     Ibuprofen 3 %, Gabapentin 10 %, Baclofen 2 %, lidocaine 4 %, Ketamine HCl 4 % Apply 1-2 g topically to the appropriate area as directed 3 (Three) to 4 (Four) times daily. 90 g 0     levothyroxine (SYNTHROID, LEVOTHROID) 100 MCG tablet TAKE ONE TABLET BY MOUTH EVERY MORNING 90  "tablet 3     lidocaine (LIDODERM) 5 % Place 1 patch on the skin as directed by provider Daily. Remove & Discard patch within 12 hours or as directed by MD 30 each 0     methocarbamol (ROBAXIN) 500 MG tablet Take 1 tablet by mouth 3 (Three) Times a Day As Needed for Muscle Spasms. 90 tablet 1     ondansetron (Zofran) 4 MG tablet Take 1 tablet by mouth Every 8 (Eight) Hours As Needed for Nausea or Vomiting. 15 tablet 2     pantoprazole (PROTONIX) 40 MG EC tablet Take 1 tablet by mouth 2 (Two) Times a Day. 180 tablet 3     pregabalin (LYRICA) 50 MG capsule Take 1 capsule by mouth 2 (Two) Times a Day. 60 capsule 1     promethazine (PHENERGAN) 25 MG tablet Take 1 tablet by mouth Every 6 (Six) Hours As Needed for Nausea or Vomiting. 20 tablet 1     sertraline (ZOLOFT) 100 MG tablet TAKE 1 TABLET BY MOUTH DAILY 90 tablet 1        Allergies:  Allergies   Allergen Reactions    Salicylates GI Intolerance     History of gi bleed      Colestipol GI Intolerance    Biaxin [Clarithromycin]     Adhesive Tape Rash     Can use plastic tape, paper tape causes rash    Augmentin [Amoxicillin-Pot Clavulanate] Diarrhea    Prevacid [Lansoprazole] Itching and Swelling     Eyes only    Sulfa Antibiotics GI Intolerance    Sulfamethoxazole-Trimethoprim Nausea And Vomiting and GI Intolerance     \"makes me sick as a dog\"         Social Hx:  Social History     Socioeconomic History    Marital status:    Tobacco Use    Smoking status: Never    Smokeless tobacco: Never   Vaping Use    Vaping status: Never Used   Substance and Sexual Activity    Alcohol use: Never    Drug use: Never    Sexual activity: Yes     Partners: Male     Birth control/protection: None, Hysterectomy       Family Hx:  Family History   Problem Relation Age of Onset    Arthritis Mother     Depression Mother     Hyperlipidemia Mother     Hypertension Mother     Irritable bowel syndrome Mother     Dislocations Mother     Heart attack Mother     Heart failure Mother     " "Hypertension Father     Arthritis Father     Stroke Father     Alcohol abuse Maternal Grandfather     Depression Maternal Grandfather     Heart disease Other         IN FEMALES BEFORE AGE 65    Asthma Maternal Grandmother     Asthma Paternal Grandfather     Heart attack Maternal Uncle     Heart attack Paternal Uncle     Heart failure Paternal Uncle          Exam    /54 (BP Location: Right arm, Patient Position: Lying)   Pulse (!) 47   Temp 97.9 °F (36.6 °C) (Oral)   Resp 18   Ht 157.5 cm (62\")   Wt 86.2 kg (190 lb)   LMP  (LMP Unknown)   SpO2 96%   BMI 34.75 kg/m²   gen: NAD, vitals reviewed  MS: oriented x3, recent/remote memory intact, normal attention/concentration, language intact, no neglect, normal fund of knowledge  CN: visual acuity grossly normal, visual fields full, PERRL, EOMI, facial sensation equal, no facial droop, hearing symmetric, palate elevates symmetrically, shoulder shrug equal, tongue midline, positive test of skew on the right, positive head impulse test to the right  Motor: 5/5 throughout upper and lower extremities, normal tone  Sensation: intact to cold temperature and light touch throughout  Coordination: no dysmetria with finger to nose bilaterally  Finger to thumb tap: Normal bilaterally.  Gait: Cautious and slowed gait but no ataxia, normal station  Oilton-Hallpike maneuver: Negative bilaterally    DATA:    Lab Results   Component Value Date    GLUCOSE 164 (H) 03/17/2025    CALCIUM 9.5 03/17/2025     03/17/2025    K 4.1 03/17/2025    CO2 27.5 03/17/2025     03/17/2025    BUN 20 03/17/2025    CREATININE 0.99 03/17/2025    EGFRIFAFRI 69 01/24/2022    EGFRIFNONA 60 01/24/2022    BCR 20.2 03/17/2025    ANIONGAP 11.5 03/17/2025     Lab Results   Component Value Date    WBC 6.97 03/17/2025    HGB 13.9 03/17/2025    HCT 41.9 03/17/2025    MCV 91.9 03/17/2025     03/17/2025     Lab Results   Component Value Date     (H) 12/17/2024     (H) 12/05/2023 "     (H) 03/03/2021     Lab Results   Component Value Date    HGBA1C 6.1 (H) 12/17/2024     Lab Results   Component Value Date    INR 0.99 03/17/2025    INR 0.94 07/18/2024    PROTIME 13.0 03/17/2025    PROTIME 12.8 07/18/2024       Lab review:   CMP and CBC reviewed  TSH 0.916  Procalcitonin 0.03    Imaging review:   CT head without contrast: The brain ventricles are symmetrical. There is no evidence of  hemorrhage, hydrocephalus or of acute infarction.     A CT angiogram of the neck and head was then performed. Multiplanar as  well as three-dimensional reconstructions were generated.     The great vessels are arranged in a bovine configuration. There is 0%  stenosis of the internal carotid arteries using NASCET criteria. The  distal aspects of the internal carotid arteries and the proximal aspects  of the anterior and middle cerebral arteries appear unremarkable.     Both vertebral arteries were opacified. Evaluation of the right  vertebral artery proximally is limited by beam hardening artifact from  dense venous contrast adjacent to the right vertebral artery. The mid  cervical segments and distal aspects of the vertebral arteries are of  relatively uniform caliber. The posterior inferior cerebral artery on  the right was not opacified. Instead, there appears to the a right  anterior inferior cerebellar artery/posterior inferior cerebral artery  complex. The left posterior inferior cerebellar artery crosses midline.  The left vertebral artery beyond the posterior inferior cerebral artery  is markedly hypoplastic. The basilar artery is somewhat diminutive in  caliber. Fetal origins of the posterior cerebral arteries are  appreciated bilaterally. A severe stenosis involving the left P2/P3  junction is appreciated.     Sagittal reconstructions demonstrates moderate to severe loss of disc  height from C3-C6 and severe loss of disc height at C6-7. The facets are  fused to the left at C3-4.     IMPRESSION:  1.   There is no evidence of acute infarction or of intracranial  hemorrhage. The right posterior inferior cerebral artery is not  opacified. This may be a normal variant as the left posterior inferior  cerebral artery crosses midline and there is a right anterior inferior  cerebral artery/posterior inferior cerebellar artery complex. Further  evaluation could be performed with MRI examination of the brain to  assess for acute infarction.  2.  There is no evidence of carotid stenosis. There is a severe stenosis  involving the left P2/P3 junction.  3.  There is an isolated posterior circulation. Fetal origins of the  posterior cerebral arteries are appreciated bilaterally. The P1 segments  are hypoplastic. The left vertebral artery beyond the posterior inferior  cerebellar artery is markedly hypoplastic. Unfortunately, evaluation of  the proximal aspect of the right vertebral artery is significantly  hampered by beam hardening artifact from dense adjacent venous  opacification. Underlying stenosis cannot be excluded. Further  evaluation with a MRA of the neck with and without contrast is  recommended.    Chest XR:  FINDINGS:  Lordotic positioning. Overlying artifacts. Ill-defined bilateral  perihilar and bibasilar opacities. No focal lung consolidation. No  pneumothorax. Cardiomediastinal contours within normal limits given  technique. No acute osseous abnormality is identified.  IMPRESSION:  Ill-defined bilateral perihilar and bibasilar opacities, which could  represent atelectasis and/or scarring with prominent epicardial fat.    CT chest without contrast:  FINDINGS: There are suspected mild coronary arterial calcifications. No  endotracheal or central endobronchial lesions evident. No evidence  mediastinal or hilar or axillary marine enlargement.  There is mild wall thickening the distal esophagus. A small hiatal  hernia is present and there appears to been previous Nissen  fundoplication with dense material surrounding  the region of the  gastroesophageal junction similar to previous CT 12/02/2022.  There is mild dependent lower lobe atelectasis there is also patchy  groundglass opacity in both lower lobes such as demonstrated the right  lower lobe on axial image 77 and this may be due to atelectasis or mild  infiltrate. No perihilar edema or pleural effusion.  Imaging to the upper abdomen demonstrates cholecystectomy clips.  IMPRESSION:  1. Mild patchy groundglass opacities in both lower lobes, greatest in  the right lower lobe. This may be due to mild groundglass infiltrate.  Atelectasis or scarring is also in the differential diagnosis there is  dependent lower lobe atelectasis.  2. Previous cholecystectomy and Nissen fundoplication. Thickening of the  distal esophageal wall potentially related to esophagitis though  nonspecific. Small hiatal hernia.    Diagnoses:  Vertigo  Bradycardia  HTN  History of DVT and PE  Anxiety and depression  Cervical disc disorder  Lumbar spinal stenosis with claudication s/p lumbar fusion    Comment: Patient etiology of symptoms but are concerning for peripheral vertigo considering vertigo got worse with her head leaning forward as well as nausea and typically provoked by position change.  However, patient never experienced vertigo prior and has questional findings on CTA head and neck so we will obtain MRI brain without contrast and MRA neck with and without contrast to further evaluate.  Will hold off on antiplatelets for now with history of GI bleed until results of MRI.  Continue statin.    PLAN:   MRI brain without contrast  MRA neck with and without contast  -Pt was not a candidate for tnk given due to patient being outside of time window.   -Admit to observation unit  -Hold off on antiplatelets as patient has history of GI bleed.   -Statin  -Maintain permissive HTN for 24-48hrs, do not treat unless BP > 220/120 if no contraindication  -Perform bedside swallow test  -Telemetry to monitor for  arrhythmia  -VTE prophylaxis  -Neuro checks, if change in exam call neuro oncall  -PT/OT when appropriate   -Vestibular PT to see and evaluate tomorrow  -Hemoglobin A1c, lipid panel    Recommendations discussed with Dr. Root who is in agreement with plan.

## 2025-03-17 NOTE — ED PROVIDER NOTES
EMERGENCY DEPARTMENT ENCOUNTER  Room Number:  22/22  Date of encounter:  3/17/2025  PCP: Kehrer, Meredith Lea, MD  Patient Care Team:  Kehrer, Meredith Lea, MD as PCP - General (Family Medicine)     HPI:  Context: Jemima Bell is a 75 y.o. female who presents to the ED c/o chief complaint of dizziness.  Patient reports that she was at [facility] for a scheduled outpatient ablation for her cervical spine when she was noted to be bradycardic.  Patient reports that she was nauseous and gagging at that time.  Patient denied any emesis.  Patient reports that she continues to be lightheaded.  Patient reports that she has been having vertigo, vertigo began yesterday at approximately 2 PM, vertigo has been constant since, worsened with movements, does not resolve.  Patient denies any double vision, does report difficulty balance, difficulty with coordination.  Patient reports ringing or ears as well as hearing loss, affecting bilateral ears, left greater than right.  Patient denies any history of vertigo in the past.  Patient denies any syncope, no fall or trauma.  Patient denies any history of prior MI or CVA, not on anticoagulation, patient does have history of hypertension, no history of hyperlipidemia or diabetes.    MEDICAL HISTORY REVIEW  Reviewed in EPIC    PAST MEDICAL HISTORY  Active Ambulatory Problems     Diagnosis Date Noted    Wheezing 09/16/2019    Osteoarthritis 09/16/2019    Acute bronchitis 09/16/2019    Dyspnea 10/07/2019    Hypothyroidism 10/07/2019    Shortness of breath 11/19/2019    Vitamin D deficiency 01/07/2020    Depression with anxiety 01/07/2020    Muscle cramp 01/07/2020    Back pain 01/17/2020    Vitamin B12 deficiency 05/04/2020    UTI (urinary tract infection) 10/30/2020    Abnormal EKG 02/17/2021    Precordial pain 02/17/2021    Anxiety 02/17/2021    Borderline systolic HTN 02/17/2021    Left upper quadrant abdominal pain 10/25/2022    Diarrhea 10/25/2022    Gastroesophageal  reflux disease 10/25/2022    History of Nissen fundoplication 10/25/2022    Incontinence of feces with fecal urgency 10/25/2022    Foraminal stenosis of lumbar region 03/24/2023    Lumbar degenerative disc disease 03/24/2023    Lumbar facet arthropathy 03/24/2023    Lumbar radiculopathy 03/24/2023    Sacroiliac joint dysfunction of both sides 04/27/2023    Lumbar stenosis with neurogenic claudication 05/01/2024    Cervical radiculopathy 05/15/2024    Neck pain 05/15/2024    Heart murmur 05/21/2024    Preop cardiovascular exam 05/21/2024    Arthropathy of cervical facet joint 03/14/2025     Resolved Ambulatory Problems     Diagnosis Date Noted    Morbid obesity with BMI of 40.0-44.9, adult 02/17/2021     Past Medical History:   Diagnosis Date    Acromioclavicular separation     Acute sinusitis     Allergic 11/2022    Allergic rhinitis     Anemia Childhood    Arthritis     Arthritis of back     Asthma     BMI 34.0-34.9,adult     Bronchitis, chronic 2017-19    Bursitis of hip     Cataract     Cervical disc disorder     Cervicalgia     Chills     Cholelithiasis 2004?    Chronic diarrhea     Chronic pain disorder     Cluster headache     Colon polyp 12/2022    Deep vein thrombosis (DVT) of lower extremity     Depression     Dermatitis     Dislocation, shoulder     Dysuria     Esophageal reflux     Extremity pain     Fatigue     Gastric ulcer     GERD (gastroesophageal reflux disease)     GI (gastrointestinal bleed) 2004?    Headache     Headache, tension-type     Hernia     Hiatal hernia 2007    Hip arthrosis     Hypertension     Irritable bowel syndrome     Low back strain     Lumbago     Lumbosacral disc disease     Migraine     Nausea     Neuritis     Osteoarthritis of knee     Periarthritis of shoulder     Plantar fasciitis     Pneumonia 89y    Pulled muscle     Pulmonary embolism     Pyelonephritis     Rheumatic fever     Sore throat     Torticollis     Trapezius strain     Urinary incontinence     Urinary pain      Urinary tract infection     UTI symptoms     Vitamin B1 deficiency     Weakness        PAST SURGICAL HISTORY  Past Surgical History:   Procedure Laterality Date    ABDOMINAL SURGERY      ADENOIDECTOMY      APPENDECTOMY      BACK SURGERY  6/30/24    CATARACT EXTRACTION      CATARACT EXTRACTION      bilateral eyes    CATARACT EXTRACTION      CERVICAL EPIDURAL N/A 06/26/2024    Procedure: cervical epidural steroid injection at C7/T1 05318;  Surgeon: Shey Aranda MD;  Location: [facility] MAIN OR;  Service: Pain Management;  Laterality: N/A;    CHOLECYSTECTOMY  2004?    COLONOSCOPY      COLONOSCOPY N/A 12/23/2022    Procedure: COLONOSCOPY to cecum and TI:  with biopsies, cold snare polyp,;  Surgeon: Reji Aguilar MD;  [facility];  Service: Gastroenterology;  Laterality: N/A;  PREOP/ HX COLON POLYPS  POSTOP/  diverticulosis, polyp, hemorrhoids    ENDOSCOPY N/A 12/23/2022    Procedure: ESOPHAGOGASTRODUODENOSCOPY with biopsies;  Surgeon: Reji Aguilar MD;  Location:  [facility];  Service: Gastroenterology;  Laterality: N/A;  PREOP/ HX GASTRIC ULCER, DYSPEPSIA, UPPER ABDOMINAL PAIN  POSTOP/:  HH, gastritis, gastric polyps    EPIDURAL BLOCK      GALLBLADDER SURGERY      HEMORRHOIDECTOMY  1974 & 77?    HERNIA REPAIR  2008?    HYSTERECTOMY      INGUINAL HERNIA REPAIR      INTERSTIM PLACEMENT      for Bladder incontinence    JOINT REPLACEMENT      Knee    KNEE SURGERY      LAPAROSCOPIC COLON RESECTION  2005    LAPAROTOMY OOPHERECTOMY      LUMBAR DIRECT LATERAL INTERBODY FUSION N/A 07/30/2024    Procedure: lumbar 3 to sacral 1 transforaminal lumbar interbody fusion, lumbar 3 to sacral 1 fusion with neuro robot;  Surgeon: Michelet Rice IV, MD;  Location: [facility] MAIN OR;  Service: Robotics - Neuro;  Laterality: N/A;    LUMBAR EPIDURAL INJECTION N/A 03/27/2023    Procedure: Lumbar epidural steroid injection at L5-S1 96821;  Surgeon: Shey Aranda MD;  Location: [facility] MAIN OR;  Service: Pain Management;   Laterality: N/A;    LUMBAR EPIDURAL INJECTION N/A 03/13/2024    Procedure: lumbar epidural steroid injection at L4/5 53363;  Surgeon: Shey Aranda MD;  Location: [facility] MAIN OR;  Service: Pain Management;  Laterality: N/A;    LUMBAR FUSION N/A 07/30/2024    Procedure: LUMBAR TRANSFORAMINAL INTERBODY FUSION WITH NEURO ROBOT;  Surgeon: Michelet Rice IV, MD;  Location: [facility] MAIN OR;  Service: Robotics - Neuro;  Laterality: N/A;    NISSEN FUNDOPLICATION LAPAROSCOPIC      x2    ORTHOPEDIC SURGERY      Knee replacement    SACROILIAC JOINT INJECTION Left 05/12/2023    Procedure: Left SACROILIAC INJECTION 04985;  Surgeon: Shey Aranda MD;  Location: [facility] MAIN OR;  Service: Pain Management;  Laterality: Left;    SACROILIAC JOINT INJECTION Left 07/12/2023    Procedure: SACROILIAC INJECTION LEFT;  Surgeon: Shey Aranda MD;  Location: [facility] MAIN OR;  Service: Pain Management;  Laterality: Left;    SPINAL FUSION  [id number]    SPINE SURGERY  [id number]    TONSILLECTOMY      TONSILLECTOMY  1954?    TOTAL KNEE ARTHROPLASTY Left     TUBAL ABDOMINAL LIGATION      UPPER GASTROINTESTINAL ENDOSCOPY         FAMILY HISTORY  Family History   Problem Relation Age of Onset    Arthritis Mother     Depression Mother     Hyperlipidemia Mother     Hypertension Mother     Irritable bowel syndrome Mother     Dislocations Mother     Heart attack Mother     Heart failure Mother     Hypertension Father     Arthritis Father     Stroke Father     Alcohol abuse Maternal Grandfather     Depression Maternal Grandfather     Heart disease Other         IN FEMALES BEFORE AGE 65    Asthma Maternal Grandmother     Asthma Paternal Grandfather     Heart attack Maternal Uncle     Heart attack Paternal Uncle     Heart failure Paternal Uncle        SOCIAL HISTORY  Social History     Socioeconomic History    Marital status:    Tobacco Use    Smoking status: Never    Smokeless tobacco: Never   Vaping Use    Vaping status: Never Used   Substance and Sexual  Activity    Alcohol use: Never    Drug use: Never    Sexual activity: Yes     Partners: Male     Birth control/protection: None, Hysterectomy       ALLERGIES  Salicylates, Colestipol, Biaxin [clarithromycin], Adhesive tape, Augmentin [amoxicillin-pot clavulanate], Prevacid [lansoprazole], Sulfa antibiotics, and Sulfamethoxazole-trimethoprim    The patient's allergies have been reviewed    REVIEW OF SYSTEMS  All systems reviewed and negative except for those discussed in HPI.     PHYSICAL EXAM  I have reviewed the triage vital signs and nursing notes.  ED Triage Vitals   Temp Heart Rate Resp BP SpO2   03/17/25 0937 03/17/25 0937 03/17/25 0937 03/17/25 0942 03/17/25 0937   97.3 °F (36.3 °C) (!) 45 15 146/61 98 %      Temp src Heart Rate Source Patient Position BP Location FiO2 (%)   -- -- -- -- --              General: No acute distress.  HENT: NCAT, PERRL, Nares patent.  Eyes: no scleral icterus.  Neck: trachea midline, no ROM limitations.  CV: regular rhythm, regular rate.  Respiratory: normal effort, CTAB.  Abdomen: soft, nondistended, NTTP, no rebound tenderness, no guarding or rigidity.  Musculoskeletal: no deformity.  Neuro: Alert and oriented x3, face symmetric, no facial droop, eyebrows raise equally bilaterally, tongue midline, no dysarthria, no aphasia, extraocular motion intact, no nystagmus, visual acuity grossly normal, cranial nerves II through XII intact, moves all extremities well, 5 out of 5 strength in all extremities, sensation intact light touch all extremities, no ataxia, no tremor.  Unable to ambulate secondary to vertigo.  Skin: warm, dry.    NIHSS:  0-->Alert: keenly responsive  0-->Answers both questions correctly  0-->Performs both tasks correctly  0=normal  0=No visual loss  0=Normal symmetric movement  0-->No drift: limb holds 90 (or 45) degrees for full 10 secs  0-->No drift: limb holds 90 (or 45) degrees for full 10 secs  0-->No drift: limb holds 90 (or 45) degrees for full 10  secs  0-->No drift: limb holds 90 (or 45) degrees for full 10 secs  0=Absent  0=Normal; no sensory loss  0=No aphasia, normal  0=Normal  0=No abnormality  Total score: 0      LAB RESULTS  Recent Results (from the past 24 hours)   ECG 12 Lead ED Triage Standing Order; Chest Pain    Collection Time: 03/17/25  9:40 AM   Result Value Ref Range    QT Interval 486 ms    QTC Interval 417 ms   Comprehensive Metabolic Panel    Collection Time: 03/17/25  9:55 AM    Specimen: Blood   Result Value Ref Range    Glucose 164 (H) 65 - 99 mg/dL    BUN 20 8 - 23 mg/dL    Creatinine 0.99 0.57 - 1.00 mg/dL    Sodium 141 136 - 145 mmol/L    Potassium 4.1 3.5 - 5.2 mmol/L    Chloride 102 98 - 107 mmol/L    CO2 27.5 22.0 - 29.0 mmol/L    Calcium 9.5 8.6 - 10.5 mg/dL    Total Protein 7.6 6.0 - 8.5 g/dL    Albumin 4.3 3.5 - 5.2 g/dL    ALT (SGPT) 14 1 - 33 U/L    AST (SGOT) 19 1 - 32 U/L    Alkaline Phosphatase 82 39 - 117 U/L    Total Bilirubin 0.3 0.0 - 1.2 mg/dL    Globulin 3.3 gm/dL    A/G Ratio 1.3 g/dL    BUN/Creatinine Ratio 20.2 7.0 - 25.0    Anion Gap 11.5 5.0 - 15.0 mmol/L    eGFR 59.2 (L) >60.0 mL/min/1.73   High Sensitivity Troponin T    Collection Time: 03/17/25  9:55 AM    Specimen: Blood   Result Value Ref Range    HS Troponin T 7 <14 ng/L   Green Top (Gel)    Collection Time: 03/17/25  9:55 AM   Result Value Ref Range    Extra Tube Hold for add-ons.    Lavender Top    Collection Time: 03/17/25  9:55 AM   Result Value Ref Range    Extra Tube hold for add-on    Gold Top - SST    Collection Time: 03/17/25  9:55 AM   Result Value Ref Range    Extra Tube Hold for add-ons.    Light Blue Top    Collection Time: 03/17/25  9:55 AM   Result Value Ref Range    Extra Tube Hold for add-ons.    CBC Auto Differential    Collection Time: 03/17/25  9:55 AM    Specimen: Blood   Result Value Ref Range    WBC 6.97 3.40 - 10.80 10*3/mm3    RBC 4.56 3.77 - 5.28 10*6/mm3    Hemoglobin 13.9 12.0 - 15.9 g/dL    Hematocrit 41.9 34.0 - 46.6 %    MCV  91.9 79.0 - 97.0 fL    MCH 30.5 26.6 - 33.0 pg    MCHC 33.2 31.5 - 35.7 g/dL    RDW 12.8 12.3 - 15.4 %    RDW-SD 43.7 37.0 - 54.0 fl    MPV 10.6 6.0 - 12.0 fL    Platelets 272 140 - 450 10*3/mm3    Neutrophil % 74.6 42.7 - 76.0 %    Lymphocyte % 16.2 (L) 19.6 - 45.3 %    Monocyte % 5.6 5.0 - 12.0 %    Eosinophil % 2.6 0.3 - 6.2 %    Basophil % 0.7 0.0 - 1.5 %    Immature Grans % 0.3 0.0 - 0.5 %    Neutrophils, Absolute 5.20 1.70 - 7.00 10*3/mm3    Lymphocytes, Absolute 1.13 0.70 - 3.10 10*3/mm3    Monocytes, Absolute 0.39 0.10 - 0.90 10*3/mm3    Eosinophils, Absolute 0.18 0.00 - 0.40 10*3/mm3    Basophils, Absolute 0.05 0.00 - 0.20 10*3/mm3    Immature Grans, Absolute 0.02 0.00 - 0.05 10*3/mm3    nRBC 0.0 0.0 - 0.2 /100 WBC   BNP    Collection Time: 03/17/25  9:55 AM    Specimen: Blood   Result Value Ref Range    proBNP 247.0 0.0 - 1,800.0 pg/mL   Magnesium    Collection Time: 03/17/25  9:55 AM    Specimen: Blood   Result Value Ref Range    Magnesium 2.1 1.6 - 2.4 mg/dL   TSH Rfx On Abnormal To Free T4    Collection Time: 03/17/25  9:55 AM    Specimen: Blood   Result Value Ref Range    TSH 0.916 0.270 - 4.200 uIU/mL   Protime-INR    Collection Time: 03/17/25  9:55 AM    Specimen: Blood   Result Value Ref Range    Protime 13.0 11.7 - 14.2 Seconds    INR 0.99 0.90 - 1.10   Procalcitonin    Collection Time: 03/17/25  9:55 AM    Specimen: Blood   Result Value Ref Range    Procalcitonin 0.03 0.00 - 0.25 ng/mL   High Sensitivity Troponin T 1Hr    Collection Time: 03/17/25 11:21 AM    Specimen: Blood   Result Value Ref Range    HS Troponin T 9 <14 ng/L    Troponin T Numeric Delta 2 Abnormal if >/=3 ng/L   Type & Screen    Collection Time: 03/17/25 11:21 AM    Specimen: Blood   Result Value Ref Range    ABO Type A     RH type Positive     Antibody Screen Negative     T&S Expiration Date 3/20/2025 11:59:59 PM    Green Top (Gel)    Collection Time: 03/17/25 11:21 AM   Result Value Ref Range    Extra Tube Hold for add-ons.         I ordered the above labs and reviewed the results.    RADIOLOGY  CT Chest Without Contrast Diagnostic  Result Date: 3/17/2025  CT CHEST WO CONTRAST DIAGNOSTIC-  HISTORY: 75 years of age, Female. Abnormal chest x-ray  TECHNIQUE:  CT chest includes axial imaging from the thoracic inlet to the upper abdomen without IV contrast. Data reconstructed in coronal and sagittal planes. Radiation dose reduction techniques were utilized, including automated exposure control and exposure modulation based on body size.  COMPARISON: AP chest 03/17/2025  FINDINGS: There are suspected mild coronary arterial calcifications. No endotracheal or central endobronchial lesions evident. No evidence mediastinal or hilar or axillary marine enlargement.  There is mild wall thickening the distal esophagus. A small hiatal hernia is present and there appears to been previous Nissen fundoplication with dense material surrounding the region of the gastroesophageal junction similar to previous CT 12/02/2022.  There is mild dependent lower lobe atelectasis there is also patchy groundglass opacity in both lower lobes such as demonstrated the right lower lobe on axial image 77 and this may be due to atelectasis or mild infiltrate. No perihilar edema or pleural effusion.  Imaging to the upper abdomen demonstrates cholecystectomy clips.      1. Mild patchy groundglass opacities in both lower lobes, greatest in the right lower lobe. This may be due to mild groundglass infiltrate. Atelectasis or scarring is also in the differential diagnosis there is dependent lower lobe atelectasis. 2. Previous cholecystectomy and Nissen fundoplication. Thickening of the distal esophageal wall potentially related to esophagitis though nonspecific. Small hiatal hernia.  Radiation dose reduction techniques were utilized, including automated exposure control and exposure modulation based on body size.       CT Angiogram Neck  CT Angiogram Neck, CT Angiogram Head  Result  Date: 3/17/2025  CT ANGIOGRAM NECK AND HEAD WITH CONTRAST  HISTORY: Vertigo.  COMPARISON: No prior CT angiogram of the neck or head is available for comparison.  FINDINGS: Initially, a noncontrasted CT examination of the brain was performed. The brain ventricles are symmetrical. There is no evidence of hemorrhage, hydrocephalus or of acute infarction.  A CT angiogram of the neck and head was then performed. Multiplanar as well as three-dimensional reconstructions were generated.  The great vessels are arranged in a bovine configuration. There is 0% stenosis of the internal carotid arteries using NASCET criteria. The distal aspects of the internal carotid arteries and the proximal aspects of the anterior and middle cerebral arteries appear unremarkable.  Both vertebral arteries were opacified. Evaluation of the right vertebral artery proximally is limited by beam hardening artifact from dense venous contrast adjacent to the right vertebral artery. The mid cervical segments and distal aspects of the vertebral arteries are of relatively uniform caliber. The posterior inferior cerebral artery on the right was not opacified. Instead, there appears to the a right anterior inferior cerebellar artery/posterior inferior cerebral artery complex. The left posterior inferior cerebellar artery crosses midline. The left vertebral artery beyond the posterior inferior cerebral artery is markedly hypoplastic. The basilar artery is somewhat diminutive in caliber. Fetal origins of the posterior cerebral arteries are appreciated bilaterally. A severe stenosis involving the left P2/P3 junction is appreciated.  Sagittal reconstructions demonstrates moderate to severe loss of disc height from C3-C6 and severe loss of disc height at C6-7. The facets are fused to the left at C3-4.      1.  There is no evidence of acute infarction or of intracranial hemorrhage. The right posterior inferior cerebral artery is not opacified. This may be a  normal variant as the left posterior inferior cerebral artery crosses midline and there is a right anterior inferior cerebral artery/posterior inferior cerebellar artery complex. Further evaluation could be performed with MRI examination of the brain to assess for acute infarction. 2.  There is no evidence of carotid stenosis. 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 3.  There is an isolated posterior circulation. Fetal origins of the posterior cerebral arteries are appreciated bilaterally. The P1 segments are hypoplastic. The left vertebral artery beyond the posterior inferior cerebellar artery is markedly hypoplastic. Unfortunately, evaluation of the proximal aspect of the right vertebral artery is significantly hampered by beam hardening artifact from dense adjacent venous opacification. Underlying stenosis cannot be excluded. Further evaluation with a MRA of the neck with and without contrast is recommended. 4.  NOTE: This is a preliminary report. The 3-dimensional reconstructions are not yet available for review.     Radiation dose reduction techniques were utilized, including automated exposure control and exposure modulation based on body size.       XR Chest 1 View  Result Date: 3/17/2025  XR CHEST 1 VW-  DATE OF EXAM: 3/17/2025 9:50 AM  INDICATION: Chest Pain Triage Protocol.  COMPARISON: CT thoracic myelogram 4/18/2024. CT abdomen pelvis 12/2/2022.  TECHNIQUE: A single portable AP view of the chest was obtained.  FINDINGS: Lordotic positioning. Overlying artifacts. Ill-defined bilateral perihilar and bibasilar opacities. No focal lung consolidation. No pneumothorax. Cardiomediastinal contours within normal limits given technique. No acute osseous abnormality is identified.      Ill-defined bilateral perihilar and bibasilar opacities, which could represent atelectasis and/or scarring with prominent epicardial fat.  This report was finalized on 3/17/2025 10:08 AM by Jordy Freeman MD on  Workstation: [id number]        I ordered the above noted radiological studies. I reviewed the images and results. I agree with the radiologist interpretation.    PROCEDURES  Procedures    MEDICATIONS GIVEN IN ER  Medications   sodium chloride 0.9 % flush 10 mL (has no administration in time range)   sodium chloride 0.9 % flush 10 mL (has no administration in time range)   aspirin tablet 325 mg (325 mg Oral Given 3/17/25 1126)   ondansetron (ZOFRAN) injection 8 mg (8 mg Intravenous Given 3/17/25 1128)   meclizine (ANTIVERT) tablet 25 mg (25 mg Oral Given 3/17/25 1126)   iopamidol (ISOVUE-370) 76 % injection 95 mL (95 mL Intravenous Given by Other 3/17/25 1226)       PROGRESS, DATA ANALYSIS, CONSULTS, AND MEDICAL DECISION MAKING  A complete history and physical exam have been performed.  All available laboratory and imaging results have been reviewed by myself prior to disposition.    MDM    After the initial H&P, I discussed pertinent information from history and physical exam with patient/family.  Discussed differential diagnosis.  Discussed plan for ED evaluation/workup/treatment.  All questions answered.  Patient/family is agreeable with plan.  ED Course as of 03/17/25 1623   Mon Mar 17, 2025   1001 My differential diagnosis for chest pain includes but is not limited to:  Muscle strain, costochondritis, myositis, pleurisy, rib fracture, intercostal neuritis, herpes zoster, tumor, myocardial infarction, coronary syndrome, unstable angina, angina, aortic dissection, mitral valve prolapse, pericarditis, palpitations, pulmonary embolus, pneumonia, pneumothorax, lung cancer, GERD, esophagitis, esophageal spasm     [JG]   1047 EKG independently viewed and contemporaneously interpreted by ED physician. Time: 9:40 AM.  Rate 44.  Interpretation: Sinus bradycardia, normal axis, abnormal R wave progression, no acute ST changes. [JG]   1114 Patient complaining of vertigo with disequilibrium ringing or ears and hearing  loss, grossly unsteady gait, concern for possible posterior CVA, patient reports symptom onset at approximately 2 PM yesterday.  Patient is not a TNK candidate, no signs of large vessel occlusion, no indication for team deactivation.  Obtaining stroke workup including CT angiograms head and neck imaging, plan for admission for stroke neurology consult and for completion of stroke workup including MRI imaging.  Discussed this with patient at presentation, she is agreeable with plan, no questions or concerns. [JG]   1118 I reviewed chest x-ray in PACS, pneumonia pneumothorax per my read. [JG]   1121 Nursing staff informs me that while patient was sleeping her sats went down into the 80s, increased when awake.  Chest x-ray shows ill-defined bilateral perihilar and bibasilar opacities.  Will obtain CT chest for further evaluation. [JG]   1616 I reviewed CT angiogram head in PACS, no intracranial hemorrhage per my read. [JG]   1618 CT chest shows mild patchy groundglass opacity in bilateral lower lungs, likely atelectasis for scarring.  Patient afebrile, no leukocytosis or left shift, procalcitonin negative, bacterial infection not suspected.  Will check RPP. [JG]   1618 Patient reassessed.  Discussed ED findings, differential diagnosis, and the need for admission for evaluation/treatment.  They are agreeable to admission and all questions were answered.     [JG]   1622 Phone call with Randa, JOSEF with [facility].  Discussed the patient, relevant history, exam, diagnostics, ED findings/progress, and concerns. They agree to admit the patient to telemetry observation. Care assumed by the admitting physician at this time.     [JG]      ED Course User Index  [JG] Parish Wood MD       AS OF 16:23 EDT VITALS:    BP - 116/54  HR - (!) 44  TEMP - 97.3 °F (36.3 °C)  O2 SATS - 94%    DIAGNOSIS  Final diagnoses:   Stroke-like symptoms   Hypertension, unspecified type   Abnormal computed tomography angiography (CTA) of neck    Hyperglycemia   Abnormal CT of the chest     Critical care:  Total critical care time of 40 minutes is exclusive of any other billable procedures and includes time spent with direct patient care and observation, retrospective chart review, management of acute condition, and consultation with other physicians.      DISPOSITION  ADMISSION    Discussed treatment plan and reason for admission with pt/family and admitting physician.  Pt/family voiced understanding of the plan for admission for further testing/treatment as needed.        Parish Wood MD  03/17/25 7556

## 2025-03-17 NOTE — ED NOTES
Nursing report ED to floor  Jemima Bell  75 y.o.  female    HPI :  HPI  Stated Reason for Visit: bradycardia and heartburn    Chief Complaint  Chief Complaint   Patient presents with    Slow Heart Rate    Heartburn       Admitting doctor:   Sanju Rosado MD    Admitting diagnosis:   The primary encounter diagnosis was Stroke-like symptoms. Diagnoses of Hypertension, unspecified type, Abnormal computed tomography angiography (CTA) of neck, Hyperglycemia, and Abnormal CT of the chest were also pertinent to this visit.    Code status:   Current Code Status       Date Active Code Status Order ID Comments User Context       3/17/2025 1625 CPR (Attempt to Resuscitate) [id number]  Randa Rosado PA-C ED        Question Answer    Code Status (Patient has no pulse and is not breathing) CPR (Attempt to Resuscitate)    Medical Interventions (Patient has pulse or is breathing) Full Support    Level Of Support Discussed With Patient                    Allergies:   Salicylates, Colestipol, Biaxin [clarithromycin], Adhesive tape, Augmentin [amoxicillin-pot clavulanate], Prevacid [lansoprazole], Sulfa antibiotics, and Sulfamethoxazole-trimethoprim    Isolation:   No active isolations    Intake and Output  No intake or output data in the 24 hours ending 03/17/25 1625    Weight:       03/17/25  0937   Weight: 89.2 kg (196 lb 10.4 oz)       Most recent vitals:   Vitals:    03/17/25 1251 03/17/25 1344 03/17/25 1458 03/17/25 1551   BP: 134/67   116/54   Pulse: (!) 48 (!) 44 56 (!) 44   Resp: 16      Temp:       SpO2: 93% 93% 91% 94%   Weight:       Height:           Active LDAs/IV Access:   Lines, Drains & Airways       Active LDAs       Name Placement date Placement time Site Days    Peripheral IV 03/17/25 0956 Right Antecubital 03/17/25  0956  Antecubital  less than 1                    Labs (abnormal labs have a star):   Labs Reviewed   COMPREHENSIVE METABOLIC PANEL - Abnormal; Notable for the following components:        Result Value    Glucose 164 (*)     eGFR 59.2 (*)     All other components within normal limits    Narrative:     GFR Categories in Chronic Kidney Disease (CKD)      GFR Category          GFR (mL/min/1.73)    Interpretation  G1                     90 or greater         Normal or high (1)  G2                      60-89                Mild decrease (1)  G3a                   45-59                Mild to moderate decrease  G3b                   30-44                Moderate to severe decrease  G4                    15-29                Severe decrease  G5                    14 or less           Kidney failure          (1)In the absence of evidence of kidney disease, neither GFR category G1 or G2 fulfill the criteria for CKD.    eGFR calculation 2021 CKD-EPI creatinine equation, which does not include race as a factor   CBC WITH AUTO DIFFERENTIAL - Abnormal; Notable for the following components:    Lymphocyte % 16.2 (*)     All other components within normal limits   TROPONIN - Normal    Narrative:     High Sensitive Troponin T Reference Range:  <14.0 ng/L- Negative Female for AMI  <22.0 ng/L- Negative Male for AMI  >=14 - Abnormal Female indicating possible myocardial injury.  >=22 - Abnormal Male indicating possible myocardial injury.   Clinicians would have to utilize clinical acumen, EKG, Troponin, and serial changes to determine if it is an Acute Myocardial Infarction or myocardial injury due to an underlying chronic condition.        BNP (IN-HOUSE) - Normal    Narrative:     This assay is used as an aid in the diagnosis of individuals suspected of having heart failure. It can be used as an aid in the diagnosis of acute decompensated heart failure (ADHF) in patients presenting with signs and symptoms of ADHF to the emergency department (ED). In addition, NT-proBNP of <300 pg/mL indicates ADHF is not likely.    Age Range Result Interpretation  NT-proBNP Concentration (pg/mL:      <50             Positive        "     >450                   Gray                 300-450                    Negative             <300    50-75           Positive            >900                  Gray                300-900                  Negative            <300      >75             Positive            >1800                  Gray                300-1800                  Negative            <300   MAGNESIUM - Normal   TSH RFX ON ABNORMAL TO FREE T4 - Normal   HIGH SENSITIVITIY TROPONIN T 1HR - Normal    Narrative:     High Sensitive Troponin T Reference Range:  <14.0 ng/L- Negative Female for AMI  <22.0 ng/L- Negative Male for AMI  >=14 - Abnormal Female indicating possible myocardial injury.  >=22 - Abnormal Male indicating possible myocardial injury.   Clinicians would have to utilize clinical acumen, EKG, Troponin, and serial changes to determine if it is an Acute Myocardial Infarction or myocardial injury due to an underlying chronic condition.        PROTIME-INR - Normal   PROCALCITONIN - Normal    Narrative:     As a Marker for Sepsis (Non-Neonates):    1. <0.5 ng/mL represents a low risk of severe sepsis and/or septic shock.  2. >2 ng/mL represents a high risk of severe sepsis and/or septic shock.    As a Marker for Lower Respiratory Tract Infections that require antibiotic therapy:    PCT on Admission    Antibiotic Therapy       6-12 Hrs later    >0.5                Strongly Recommended  >0.25 - <0.5        Recommended   0.1 - 0.25          Discouraged              Remeasure/reassess PCT  <0.1                Strongly Discouraged     Remeasure/reassess PCT    As 28 day mortality risk marker: \"Change in Procalcitonin Result\" (>80% or <=80%) if Day 0 (or Day 1) and Day 4 values are available. Refer to http://www.[facility]-pct-calculator.com    Change in PCT <=80%  A decrease of PCT levels below or equal to 80% defines a positive change in PCT test result representing a higher risk for 28-day all-cause mortality of patients diagnosed with severe " sepsis for septic shock.    Change in PCT >80%  A decrease of PCT levels of more than 80% defines a negative change in PCT result representing a lower risk for 28-day all-cause mortality of patients diagnosed with severe sepsis or septic shock.      RESPIRATORY PANEL PCR W/ COVID-19 (SARS-COV-2), NP SWAB IN UTM/VTP, 2 HR TAT   RAINBOW DRAW    Narrative:     The following orders were created for panel order [place] Draw.  Procedure                               Abnormality         Status                     ---------                               -----------         ------                     Green Top (Gel)[[id number]]                                  Final result               Lavender Top[[id number]]                                     Final result               Gold Top - SST[[id number]]                                   Final result               Light Blue Top[[id number]]                                   Final result                 Please view results for these tests on the individual orders.   RAINBOW DRAW    Narrative:     The following orders were created for panel order [place] [facility].  Procedure                               Abnormality         Status                     ---------                               -----------         ------                     Green Top (Gel)[[id number]]                                  Final result               Lavender Top[[id number]]                                                                Gold Top - SST[[id number]]                                                              Light Blue Top[[id number]]                                                                Please view results for these tests on the individual orders.   POCT GLUCOSE FINGERSTICK   TYPE AND SCREEN   CBC AND DIFFERENTIAL    Narrative:     The following orders were created for panel order CBC & Differential.  Procedure                               Abnormality         Status                     ---------                                -----------         ------                     CBC Auto Differential[[id number]]        Abnormal            Final result                 Please view results for these tests on the individual orders.   GREEN TOP   LAVENDER TOP   GOLD TOP - SST   LIGHT BLUE TOP   GREEN TOP   LAVENDER TOP   GOLD TOP - SST   LIGHT BLUE TOP       EKG:   ECG 12 Lead ED Triage Standing Order; Chest Pain   Final Result   HEART RATE=44  bpm   RR Rtanlxhm=1393  ms   PA Yhwcadje=722  ms   P Horizontal Axis=67  deg   P Front Axis=43  deg   QRSD Vtzfiiam=499  ms   QT Aqslxlir=082  ms   BCxU=345  ms   QRS Axis=-36  deg   T Wave Axis=-7  deg   - ABNORMAL ECG -   poor quality due to artifact   Sinus bradycardia   Left axis deviation   Electronically Signed By: Madina Garcia ([facility]) 2025-03-17 11:25:28   Date and Time of Study:2025-03-17 09:40:04          Meds given in ED:   Medications   sodium chloride 0.9 % flush 10 mL (has no administration in time range)   sodium chloride 0.9 % flush 10 mL (has no administration in time range)   sodium chloride 0.9 % flush 10 mL (has no administration in time range)   sodium chloride 0.9 % flush 10 mL (has no administration in time range)   sodium chloride 0.9 % infusion 40 mL (has no administration in time range)   ondansetron ODT (ZOFRAN-ODT) disintegrating tablet 4 mg (has no administration in time range)     Or   ondansetron (ZOFRAN) injection 4 mg (has no administration in time range)   nitroglycerin (NITROSTAT) SL tablet 0.4 mg (has no administration in time range)   Potassium Replacement - Follow Nurse / BPA Driven Protocol (has no administration in time range)   Magnesium Standard Dose Replacement - Follow Nurse / BPA Driven Protocol (has no administration in time range)   Phosphorus Replacement - Follow Nurse / BPA Driven Protocol (has no administration in time range)   Calcium Replacement - Follow Nurse / BPA Driven Protocol (has no administration in time range)    acetaminophen (TYLENOL) tablet 650 mg (has no administration in time range)     Or   acetaminophen (TYLENOL) 160 MG/5ML oral solution 650 mg (has no administration in time range)     Or   acetaminophen (TYLENOL) suppository 650 mg (has no administration in time range)   sennosides-docusate (PERICOLACE) 8.6-50 MG per tablet 2 tablet (has no administration in time range)     And   polyethylene glycol (MIRALAX) packet 17 g (has no administration in time range)     And   bisacodyl (DULCOLAX) EC tablet 5 mg (has no administration in time range)     And   bisacodyl (DULCOLAX) suppository 10 mg (has no administration in time range)   aspirin tablet 325 mg (325 mg Oral Given 3/17/25 1126)   ondansetron (ZOFRAN) injection 8 mg (8 mg Intravenous Given 3/17/25 1128)   meclizine (ANTIVERT) tablet 25 mg (25 mg Oral Given 3/17/25 1126)   iopamidol (ISOVUE-370) 76 % injection 95 mL (95 mL Intravenous Given by Other 3/17/25 1226)       Imaging results:  CT Chest Without Contrast Diagnostic  Result Date: 3/17/2025  1. Mild patchy groundglass opacities in both lower lobes, greatest in the right lower lobe. This may be due to mild groundglass infiltrate. Atelectasis or scarring is also in the differential diagnosis there is dependent lower lobe atelectasis. 2. Previous cholecystectomy and Nissen fundoplication. Thickening of the distal esophageal wall potentially related to esophagitis though nonspecific. Small hiatal hernia.  Radiation dose reduction techniques were utilized, including automated exposure control and exposure modulation based on body size.       CT Angiogram Neck  Result Date: 3/17/2025  1.  There is no evidence of acute infarction or of intracranial hemorrhage. The right posterior inferior cerebral artery is not opacified. This may be a normal variant as the left posterior inferior cerebral artery crosses midline and there is a right anterior inferior cerebral artery/posterior inferior cerebellar artery complex.  Further evaluation could be performed with MRI examination of the brain to assess for acute infarction. 2.  There is no evidence of carotid stenosis. 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 3.  There is an isolated posterior circulation. Fetal origins of the posterior cerebral arteries are appreciated bilaterally. The P1 segments are hypoplastic. The left vertebral artery beyond the posterior inferior cerebellar artery is markedly hypoplastic. Unfortunately, evaluation of the proximal aspect of the right vertebral artery is significantly hampered by beam hardening artifact from dense adjacent venous opacification. Underlying stenosis cannot be excluded. Further evaluation with a MRA of the neck with and without contrast is recommended. 4.  NOTE: This is a preliminary report. The 3-dimensional reconstructions are not yet available for review.     Radiation dose reduction techniques were utilized, including automated exposure control and exposure modulation based on body size.       CT Angiogram Head  Result Date: 3/17/2025  1.  There is no evidence of acute infarction or of intracranial hemorrhage. The right posterior inferior cerebral artery is not opacified. This may be a normal variant as the left posterior inferior cerebral artery crosses midline and there is a right anterior inferior cerebral artery/posterior inferior cerebellar artery complex. Further evaluation could be performed with MRI examination of the brain to assess for acute infarction. 2.  There is no evidence of carotid stenosis. 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 3.  There is an isolated posterior circulation. Fetal origins of the posterior cerebral arteries are appreciated bilaterally. The P1 segments are hypoplastic. The left vertebral artery beyond the posterior inferior cerebellar artery is markedly hypoplastic. Unfortunately, evaluation of the proximal aspect of the right vertebral artery is  significantly hampered by beam hardening artifact from dense adjacent venous opacification. Underlying stenosis cannot be excluded. Further evaluation with a MRA of the neck with and without contrast is recommended. 4.  NOTE: This is a preliminary report. The 3-dimensional reconstructions are not yet available for review.     Radiation dose reduction techniques were utilized, including automated exposure control and exposure modulation based on body size.       XR Chest 1 View  Result Date: 3/17/2025  Ill-defined bilateral perihilar and bibasilar opacities, which could represent atelectasis and/or scarring with prominent epicardial fat.  This report was finalized on 3/17/2025 10:08 AM by Jordy Freeman MD on Workstation: [id number]        Ambulatory status:   - assist    Social issues:   Social History     Socioeconomic History    Marital status:    Tobacco Use    Smoking status: Never    Smokeless tobacco: Never   Vaping Use    Vaping status: Never Used   Substance and Sexual Activity    Alcohol use: Never    Drug use: Never    Sexual activity: Yes     Partners: Male     Birth control/protection: None, Hysterectomy       Peripheral Neurovascular  Peripheral Neurovascular (Adult)  Peripheral Neurovascular WDL: WDL    Neuro Cognitive  Neuro Cognitive (Adult)  Cognitive/Neuro/Behavioral WDL: WDL  Additional Documentation: NIH Stroke Scale (group)  [place] Coma Scale  Best Eye Response: 4-->(E4) spontaneous  Best Motor Response: 6-->(M6) obeys commands  Best Verbal Response: 5-->(V5) oriented  [place] Coma Scale Score: 15  NIH Stroke Scale  1a. Level of Consciousness: 0-->Alert, keenly responsive  1b. LOC Questions: 0-->Answers both questions correctly  1c. LOC Commands: 0-->Performs both tasks correctly  2. Best Gaze: 0-->Normal  3. Visual: 0-->No visual loss  4. Facial Palsy: 0-->Normal symmetrical movements  5a. Motor Arm, Left: 0-->No drift, limb holds 90 (or 45) degrees for full 10 secs  5b. Motor Arm,  Right: 0-->No drift, limb holds 90 (or 45) degrees for full 10 secs  6a. Motor Leg, Left: 0-->No drift, leg holds 30 degree position for full 5 secs  6b. Motor Leg, Right: 0-->No drift, leg holds 30 degree position for full 5 secs  7. Limb Ataxia: 0-->Absent  8. Sensory: 0-->Normal, no sensory loss  9. Best Language: 0-->No aphasia, normal  10. Dysarthria: 0-->Normal  11. Extinction and Inattention (formerly Neglect): 0-->No abnormality  Total (NIH Stroke Scale): 0    Learning  Learning Assessment  Learning Readiness and Ability: no barriers identified    Respiratory  Respiratory WDL  Respiratory WDL: .WDL except, rhythm/pattern  Rhythm/Pattern, Respiratory: shortness of breath    Abdominal Pain       Pain Assessments  Pain (Adult)  (0-10) Pain Rating: Rest: 0  (0-10) Pain Rating: Activity: 0  Response to Pain Interventions: nonverbal indicators absent/decreased    NIH Stroke Scale  NIH Stroke Scale  1a. Level of Consciousness: 0-->Alert, keenly responsive  1b. LOC Questions: 0-->Answers both questions correctly  1c. LOC Commands: 0-->Performs both tasks correctly  2. Best Gaze: 0-->Normal  3. Visual: 0-->No visual loss  4. Facial Palsy: 0-->Normal symmetrical movements  5a. Motor Arm, Left: 0-->No drift, limb holds 90 (or 45) degrees for full 10 secs  5b. Motor Arm, Right: 0-->No drift, limb holds 90 (or 45) degrees for full 10 secs  6a. Motor Leg, Left: 0-->No drift, leg holds 30 degree position for full 5 secs  6b. Motor Leg, Right: 0-->No drift, leg holds 30 degree position for full 5 secs  7. Limb Ataxia: 0-->Absent  8. Sensory: 0-->Normal, no sensory loss  9. Best Language: 0-->No aphasia, normal  10. Dysarthria: 0-->Normal  11. Extinction and Inattention (formerly Neglect): 0-->No abnormality  Total (NIH Stroke Scale): 0    Mitali RAYRAY Fall  03/17/25 16:25 EDT

## 2025-03-17 NOTE — ED TRIAGE NOTES
Pt reports left sided chest discomfort, weakness, started this am, thought related to pending ablation scheduled today for neck

## 2025-03-18 ENCOUNTER — APPOINTMENT (OUTPATIENT)
Dept: CT IMAGING | Facility: HOSPITAL | Age: 75
End: 2025-03-18
Payer: MEDICARE

## 2025-03-18 ENCOUNTER — APPOINTMENT (OUTPATIENT)
Dept: CARDIOLOGY | Facility: HOSPITAL | Age: 75
End: 2025-03-18
Payer: MEDICARE

## 2025-03-18 LAB
ALBUMIN SERPL-MCNC: 3.2 G/DL (ref 3.5–5.2)
ALBUMIN/GLOB SERPL: 0.9 G/DL
ALP SERPL-CCNC: 67 U/L (ref 39–117)
ALT SERPL W P-5'-P-CCNC: 11 U/L (ref 1–33)
ANION GAP SERPL CALCULATED.3IONS-SCNC: 8.8 MMOL/L (ref 5–15)
AORTIC ARCH: 1.8 CM
AORTIC DIMENSIONLESS INDEX: 0.56 (DI)
ASCENDING AORTA: 2.8 CM
AST SERPL-CCNC: 15 U/L (ref 1–32)
AV MEAN PRESS GRAD SYS DOP V1V2: 7 MMHG
AV VMAX SYS DOP: 190 CM/SEC
BASOPHILS # BLD AUTO: 0.05 10*3/MM3 (ref 0–0.2)
BASOPHILS NFR BLD AUTO: 0.8 % (ref 0–1.5)
BH CV ECHO MEAS - ACS: 1.35 CM
BH CV ECHO MEAS - AO MAX PG: 14.4 MMHG
BH CV ECHO MEAS - AO ROOT DIAM: 2.5 CM
BH CV ECHO MEAS - AO V2 VTI: 45.2 CM
BH CV ECHO MEAS - AVA(I,D): 1.71 CM2
BH CV ECHO MEAS - EDV(CUBED): 85.2 ML
BH CV ECHO MEAS - EDV(MOD-SP2): 80 ML
BH CV ECHO MEAS - EDV(MOD-SP4): 82 ML
BH CV ECHO MEAS - EF(MOD-SP2): 63.7 %
BH CV ECHO MEAS - EF(MOD-SP4): 63.4 %
BH CV ECHO MEAS - ESV(CUBED): 23.7 ML
BH CV ECHO MEAS - ESV(MOD-SP2): 29 ML
BH CV ECHO MEAS - ESV(MOD-SP4): 30 ML
BH CV ECHO MEAS - FS: 34.7 %
BH CV ECHO MEAS - IVS/LVPW: 1.33 CM
BH CV ECHO MEAS - IVSD: 1.2 CM
BH CV ECHO MEAS - LAT PEAK E' VEL: 9.2 CM/SEC
BH CV ECHO MEAS - LV MASS(C)D: 158.2 GRAMS
BH CV ECHO MEAS - LV MAX PG: 4.6 MMHG
BH CV ECHO MEAS - LV MEAN PG: 2 MMHG
BH CV ECHO MEAS - LV V1 MAX: 107 CM/SEC
BH CV ECHO MEAS - LV V1 VTI: 25.1 CM
BH CV ECHO MEAS - LVIDD: 4.4 CM
BH CV ECHO MEAS - LVIDS: 2.9 CM
BH CV ECHO MEAS - LVOT AREA: 3.1 CM2
BH CV ECHO MEAS - LVOT DIAM: 1.98 CM
BH CV ECHO MEAS - LVPWD: 0.9 CM
BH CV ECHO MEAS - MED PEAK E' VEL: 6.9 CM/SEC
BH CV ECHO MEAS - MV A DUR: 0.12 SEC
BH CV ECHO MEAS - MV A MAX VEL: 68.1 CM/SEC
BH CV ECHO MEAS - MV DEC SLOPE: 562.5 CM/SEC2
BH CV ECHO MEAS - MV DEC TIME: 0.22 SEC
BH CV ECHO MEAS - MV E MAX VEL: 83.6 CM/SEC
BH CV ECHO MEAS - MV E/A: 1.23
BH CV ECHO MEAS - MV MAX PG: 4.2 MMHG
BH CV ECHO MEAS - MV MEAN PG: 1.34 MMHG
BH CV ECHO MEAS - MV P1/2T: 52.9 MSEC
BH CV ECHO MEAS - MV V2 VTI: 35.6 CM
BH CV ECHO MEAS - MVA(P1/2T): 4.2 CM2
BH CV ECHO MEAS - MVA(VTI): 2.17 CM2
BH CV ECHO MEAS - PA ACC TIME: 0.08 SEC
BH CV ECHO MEAS - PA V2 MAX: 112.7 CM/SEC
BH CV ECHO MEAS - PI END-D VEL: 94.7 CM/SEC
BH CV ECHO MEAS - PULM A REVS DUR: 0.1 SEC
BH CV ECHO MEAS - PULM A REVS VEL: 27.1 CM/SEC
BH CV ECHO MEAS - PULM DIAS VEL: 37.4 CM/SEC
BH CV ECHO MEAS - PULM S/D: 0.75
BH CV ECHO MEAS - PULM SYS VEL: 28.1 CM/SEC
BH CV ECHO MEAS - RAP SYSTOLE: 3 MMHG
BH CV ECHO MEAS - RV MAX PG: 1.3 MMHG
BH CV ECHO MEAS - RV V1 MAX: 57 CM/SEC
BH CV ECHO MEAS - RV V1 VTI: 13.5 CM
BH CV ECHO MEAS - RVSP: 30 MMHG
BH CV ECHO MEAS - SUP REN AO DIAM: 1.8 CM
BH CV ECHO MEAS - SV(LVOT): 77.2 ML
BH CV ECHO MEAS - SV(MOD-SP2): 51 ML
BH CV ECHO MEAS - SV(MOD-SP4): 52 ML
BH CV ECHO MEAS - TAPSE (>1.6): 1.74 CM
BH CV ECHO MEAS - TR MAX PG: 26.7 MMHG
BH CV ECHO MEAS - TR MAX VEL: 258.2 CM/SEC
BH CV ECHO MEASUREMENTS AVERAGE E/E' RATIO: 10.39
BH CV XLRA - RV BASE: 3.9 CM
BH CV XLRA - RV LENGTH: 7.4 CM
BH CV XLRA - RV MID: 2.8 CM
BH CV XLRA - TDI S': 11.4 CM/SEC
BH CV XLRA MEAS LEFT DIST CCA EDV: 14 CM/SEC
BH CV XLRA MEAS LEFT DIST CCA PSV: 110.7 CM/SEC
BH CV XLRA MEAS LEFT DIST ICA EDV: 24.5 CM/SEC
BH CV XLRA MEAS LEFT DIST ICA PSV: 87.6 CM/SEC
BH CV XLRA MEAS LEFT ICA/CCA RATIO: -0.95
BH CV XLRA MEAS LEFT MID ICA EDV: -31.5 CM/SEC
BH CV XLRA MEAS LEFT MID ICA PSV: -105.1 CM/SEC
BH CV XLRA MEAS LEFT PROX CCA EDV: 20.3 CM/SEC
BH CV XLRA MEAS LEFT PROX CCA PSV: 96 CM/SEC
BH CV XLRA MEAS LEFT PROX ECA EDV: -8.4 CM/SEC
BH CV XLRA MEAS LEFT PROX ECA PSV: -84.1 CM/SEC
BH CV XLRA MEAS LEFT PROX ICA EDV: -14 CM/SEC
BH CV XLRA MEAS LEFT PROX ICA PSV: -80.6 CM/SEC
BH CV XLRA MEAS LEFT PROX SCLA PSV: 139.5 CM/SEC
BH CV XLRA MEAS LEFT VERTEBRAL A EDV: 14.2 CM/SEC
BH CV XLRA MEAS LEFT VERTEBRAL A PSV: 50.7 CM/SEC
BH CV XLRA MEAS RIGHT DIST CCA EDV: 15.7 CM/SEC
BH CV XLRA MEAS RIGHT DIST CCA PSV: 81.1 CM/SEC
BH CV XLRA MEAS RIGHT DIST ICA EDV: 18.2 CM/SEC
BH CV XLRA MEAS RIGHT DIST ICA PSV: 67.3 CM/SEC
BH CV XLRA MEAS RIGHT ICA/CCA RATIO: -1.11
BH CV XLRA MEAS RIGHT MID ICA EDV: -23.6 CM/SEC
BH CV XLRA MEAS RIGHT MID ICA PSV: -90.4 CM/SEC
BH CV XLRA MEAS RIGHT PROX CCA EDV: 18.2 CM/SEC
BH CV XLRA MEAS RIGHT PROX CCA PSV: 90.9 CM/SEC
BH CV XLRA MEAS RIGHT PROX ECA EDV: -6.9 CM/SEC
BH CV XLRA MEAS RIGHT PROX ECA PSV: -79.6 CM/SEC
BH CV XLRA MEAS RIGHT PROX ICA EDV: -26.5 CM/SEC
BH CV XLRA MEAS RIGHT PROX ICA PSV: -85 CM/SEC
BH CV XLRA MEAS RIGHT PROX SCLA PSV: 121.9 CM/SEC
BH CV XLRA MEAS RIGHT VERTEBRAL A EDV: -12.3 CM/SEC
BH CV XLRA MEAS RIGHT VERTEBRAL A PSV: -51.6 CM/SEC
BILIRUB SERPL-MCNC: 0.3 MG/DL (ref 0–1.2)
BUN SERPL-MCNC: 18 MG/DL (ref 8–23)
BUN/CREAT SERPL: 18 (ref 7–25)
CALCIUM SPEC-SCNC: 9.1 MG/DL (ref 8.6–10.5)
CHLORIDE SERPL-SCNC: 106 MMOL/L (ref 98–107)
CO2 SERPL-SCNC: 26.2 MMOL/L (ref 22–29)
CREAT SERPL-MCNC: 1 MG/DL (ref 0.57–1)
DEPRECATED RDW RBC AUTO: 42.2 FL (ref 37–54)
EGFRCR SERPLBLD CKD-EPI 2021: 58.5 ML/MIN/1.73
EOSINOPHIL # BLD AUTO: 0.25 10*3/MM3 (ref 0–0.4)
EOSINOPHIL NFR BLD AUTO: 3.9 % (ref 0.3–6.2)
ERYTHROCYTE [DISTWIDTH] IN BLOOD BY AUTOMATED COUNT: 12.4 % (ref 12.3–15.4)
GLOBULIN UR ELPH-MCNC: 3.4 GM/DL
GLUCOSE SERPL-MCNC: 95 MG/DL (ref 65–99)
HCT VFR BLD AUTO: 37.3 % (ref 34–46.6)
HGB BLD-MCNC: 12.3 G/DL (ref 12–15.9)
IMM GRANULOCYTES # BLD AUTO: 0.02 10*3/MM3 (ref 0–0.05)
IMM GRANULOCYTES NFR BLD AUTO: 0.3 % (ref 0–0.5)
LEFT ATRIUM VOLUME INDEX: 26.4 ML/M2
LV EF BIPLANE MOD: 63.9 %
LYMPHOCYTES # BLD AUTO: 1.88 10*3/MM3 (ref 0.7–3.1)
LYMPHOCYTES NFR BLD AUTO: 29.7 % (ref 19.6–45.3)
MCH RBC QN AUTO: 30.4 PG (ref 26.6–33)
MCHC RBC AUTO-ENTMCNC: 33 G/DL (ref 31.5–35.7)
MCV RBC AUTO: 92.3 FL (ref 79–97)
MONOCYTES # BLD AUTO: 0.52 10*3/MM3 (ref 0.1–0.9)
MONOCYTES NFR BLD AUTO: 8.2 % (ref 5–12)
NEUTROPHILS NFR BLD AUTO: 3.62 10*3/MM3 (ref 1.7–7)
NEUTROPHILS NFR BLD AUTO: 57.1 % (ref 42.7–76)
NRBC BLD AUTO-RTO: 0 /100 WBC (ref 0–0.2)
PLATELET # BLD AUTO: 247 10*3/MM3 (ref 140–450)
PMV BLD AUTO: 10.9 FL (ref 6–12)
POTASSIUM SERPL-SCNC: 3.9 MMOL/L (ref 3.5–5.2)
PROT SERPL-MCNC: 6.6 G/DL (ref 6–8.5)
RBC # BLD AUTO: 4.04 10*6/MM3 (ref 3.77–5.28)
SINUS: 2.7 CM
SODIUM SERPL-SCNC: 141 MMOL/L (ref 136–145)
STJ: 2.22 CM
WBC NRBC COR # BLD AUTO: 6.34 10*3/MM3 (ref 3.4–10.8)
WHOLE BLOOD HOLD SPECIMEN: NORMAL

## 2025-03-18 PROCEDURE — 93880 EXTRACRANIAL BILAT STUDY: CPT | Performed by: SURGERY

## 2025-03-18 PROCEDURE — G0378 HOSPITAL OBSERVATION PER HR: HCPCS

## 2025-03-18 PROCEDURE — 99214 OFFICE O/P EST MOD 30 MIN: CPT | Performed by: NURSE PRACTITIONER

## 2025-03-18 PROCEDURE — 99213 OFFICE O/P EST LOW 20 MIN: CPT | Performed by: INTERNAL MEDICINE

## 2025-03-18 PROCEDURE — 93306 TTE W/DOPPLER COMPLETE: CPT | Performed by: INTERNAL MEDICINE

## 2025-03-18 PROCEDURE — 93306 TTE W/DOPPLER COMPLETE: CPT

## 2025-03-18 PROCEDURE — 85025 COMPLETE CBC W/AUTO DIFF WBC: CPT | Performed by: EMERGENCY MEDICINE

## 2025-03-18 PROCEDURE — 80053 COMPREHEN METABOLIC PANEL: CPT | Performed by: EMERGENCY MEDICINE

## 2025-03-18 PROCEDURE — 70450 CT HEAD/BRAIN W/O DYE: CPT

## 2025-03-18 PROCEDURE — 93880 EXTRACRANIAL BILAT STUDY: CPT

## 2025-03-18 PROCEDURE — 97162 PT EVAL MOD COMPLEX 30 MIN: CPT

## 2025-03-18 PROCEDURE — 95992 CANALITH REPOSITIONING PROC: CPT

## 2025-03-18 PROCEDURE — 97110 THERAPEUTIC EXERCISES: CPT

## 2025-03-18 RX ORDER — ASPIRIN 81 MG/1
81 TABLET, CHEWABLE ORAL DAILY
Status: DISCONTINUED | OUTPATIENT
Start: 2025-03-18 | End: 2025-03-18

## 2025-03-18 RX ORDER — ATORVASTATIN CALCIUM 20 MG/1
40 TABLET, FILM COATED ORAL NIGHTLY
Status: DISCONTINUED | OUTPATIENT
Start: 2025-03-18 | End: 2025-03-18

## 2025-03-18 RX ORDER — METOPROLOL SUCCINATE 25 MG/1
12.5 TABLET, EXTENDED RELEASE ORAL
Status: DISCONTINUED | OUTPATIENT
Start: 2025-03-18 | End: 2025-03-19 | Stop reason: HOSPADM

## 2025-03-18 RX ORDER — ASPIRIN 81 MG/1
81 TABLET ORAL DAILY
Status: DISCONTINUED | OUTPATIENT
Start: 2025-03-18 | End: 2025-03-19 | Stop reason: HOSPADM

## 2025-03-18 RX ADMIN — MECLIZINE HYDROCHLORIDE 25 MG: 25 TABLET ORAL at 21:24

## 2025-03-18 RX ADMIN — ASPIRIN 81 MG: 81 TABLET, COATED ORAL at 17:37

## 2025-03-18 RX ADMIN — ACETAMINOPHEN 650 MG: 325 TABLET, FILM COATED ORAL at 21:24

## 2025-03-18 RX ADMIN — SERTRALINE HYDROCHLORIDE 100 MG: 50 TABLET, FILM COATED ORAL at 21:24

## 2025-03-18 RX ADMIN — PANTOPRAZOLE SODIUM 40 MG: 40 TABLET, DELAYED RELEASE ORAL at 10:22

## 2025-03-18 RX ADMIN — Medication 10 ML: at 21:25

## 2025-03-18 RX ADMIN — PREGABALIN 50 MG: 50 CAPSULE ORAL at 21:24

## 2025-03-18 RX ADMIN — LEVOTHYROXINE SODIUM 100 MCG: 50 TABLET ORAL at 05:35

## 2025-03-18 RX ADMIN — Medication 10 ML: at 10:24

## 2025-03-18 RX ADMIN — METOPROLOL SUCCINATE 12.5 MG: 25 TABLET, EXTENDED RELEASE ORAL at 17:37

## 2025-03-18 RX ADMIN — PANTOPRAZOLE SODIUM 40 MG: 40 TABLET, DELAYED RELEASE ORAL at 21:24

## 2025-03-18 NOTE — PROGRESS NOTES
MD ATTESTATION NOTE    The JOSEF and I have discussed this patient's history, physical exam, and treatment plan.  I have reviewed the documentation and personally had a face to face interaction with the patient. I affirm the documentation and agree with the treatment and plan.  The attached note describes my personal findings.      I provided a substantive portion of the care of the patient.  I personally performed the physical exam in its entirety, and below are my findings.       Brief HPI: This patient is a 75-year-old female admitted to the observation unit for further management and treatment of dizziness.  She reports that the dizziness was more of a spinning type sensation made worse with head movement and body position changes.  At the time, it was associated with nausea.  She reports now that she is feeling significant improvement in symptoms with only minimal dizziness remaining.      PHYSICAL EXAM  ED Triage Vitals   Temp Heart Rate Resp BP SpO2   03/17/25 0937 03/17/25 0937 03/17/25 0937 03/17/25 0942 03/17/25 0937   97.3 °F (36.3 °C) (!) 45 15 146/61 98 %      Temp src Heart Rate Source Patient Position BP Location FiO2 (%)   03/17/25 1701 03/17/25 1701 03/17/25 1701 03/17/25 1701 --   Oral Monitor Lying Right arm          GENERAL: Resting comfortably and in no acute distress, nontoxic in appearance  HENT: nares patent  EYES: no scleral icterus  CV: regular rhythm, normal rate, no M/R/G  RESPIRATORY: normal effort, lungs clear bilaterally  ABDOMEN: soft, nontender, no rebound or guarding  MUSCULOSKELETAL: no deformity, no edema  NEURO: alert, moves all extremities, follows commands  PSYCH:  calm, cooperative  SKIN: warm, dry    Vital signs and nursing notes reviewed.        Plan: The patient is currently feeling much better however she did have a abnormal CTA neck that was recommended an MRA of the neck.  We will obtain that MRI of the neck as well as an MRI of the brain for further evaluation.  We will  also ask neurology and vestibular PT to see in consultation with further planning to follow.

## 2025-03-18 NOTE — CONSULTS
[facility]  Cardiology Consult Note    Patient Identification:  Name: Jemima Bell  Age: 75 y.o.  Sex: female  :  1950  MRN: [id number]             Requesting Physician:  JAKE Pace    Reason for Consultation / Chief Complaint: Bradycardia      History of Present Illness:     Jemima Bell is a 75-year-old female who has a history of pression, hypertension, hyperlipidemia, DDD, follows pain management for chronic pain, anxiety, hypothyroidism, ambulates with walker, pulmonary embolism, and migraine headaches who presented to [facility] with dizziness and was noted to be bradycardic.  She woke up this a.m. she had some confusion with nausea and dizziness.  Will do an ecg,troponin, Orthostatics, and echo.            Past Medical History:  Past Medical History:   Diagnosis Date    Acromioclavicular separation     Acute bronchitis     Acute sinusitis     Allergic 2022    Shrimp    Allergic rhinitis     Anemia Childhood    Anxiety     Arthritis     Arthritis of back     Arthropathy of cervical facet joint 3/14/2025    Asthma     Back pain     BMI 34.0-34.9,adult     Bronchitis, chronic     Bursitis of hip     Cataract     Cervical disc disorder     Cervicalgia     Chills     Cholelithiasis 2004?    Remival    Chronic diarrhea     Chronic pain disorder     Cluster headache     Colon polyp 2022    Deep vein thrombosis (DVT) of lower extremity     Depression     Dermatitis     Dislocation, shoulder     Dyspnea     Dysuria     Esophageal reflux     Extremity pain     Fatigue     Gastric ulcer     GERD (gastroesophageal reflux disease)     GI (gastrointestinal bleed) 2004?    Headache     Headache, tension-type     Heart murmur 1973    Hernia     Hiatal hernia     Hip arthrosis     Hypertension     Hypothyroidism     Irritable bowel syndrome     Low back strain     Lumbago     Lumbar stenosis with neurogenic claudication 2024    Lumbosacral  disc disease     Migraine     Nausea     Neuritis     Osteoarthritis     Osteoarthritis of knee     Periarthritis of shoulder     Plantar fasciitis     Pneumonia 89y    Pulled muscle     Pulmonary embolism     Pyelonephritis     Rheumatic fever     Sore throat     Torticollis     Trapezius strain     Urinary incontinence     Urinary pain     Urinary tract infection     UTI symptoms     Vitamin B1 deficiency     Vitamin B12 deficiency     Vitamin D deficiency     Weakness     Wheezing      Past Surgical History:  Past Surgical History:   Procedure Laterality Date    ABDOMINAL SURGERY      ADENOIDECTOMY      APPENDECTOMY      BACK SURGERY  6/30/24    CATARACT EXTRACTION      CATARACT EXTRACTION      bilateral eyes    CATARACT EXTRACTION      CERVICAL EPIDURAL N/A 06/26/2024    Procedure: cervical epidural steroid injection at C7/T1 18227;  Surgeon: Shey Aranda MD;  Location: [facility] [place];  Service: Pain Management;  Laterality: N/A;    CHOLECYSTECTOMY  2004?    COLONOSCOPY      COLONOSCOPY N/A 12/23/2022    Procedure: COLONOSCOPY to cecum and TI:  with biopsies, cold snare polyp,;  Surgeon: Reji Aguilar MD;  Location: [facility] ENDOSCOPY;  Service: Gastroenterology;  Laterality: N/A;  PREOP/ HX COLON POLYPS  POSTOP/  diverticulosis, polyp, hemorrhoids    ENDOSCOPY N/A 12/23/2022    Procedure: ESOPHAGOGASTRODUODENOSCOPY with biopsies;  Surgeon: Reji Aguilar MD;  Location: [facility] ENDOSCOPY;  Service: Gastroenterology;  Laterality: N/A;  PREOP/ HX GASTRIC ULCER, DYSPEPSIA, UPPER ABDOMINAL PAIN  POSTOP/:  HH, gastritis, gastric polyps    EPIDURAL BLOCK      GALLBLADDER SURGERY      HEMORRHOIDECTOMY  1974 & 77?    HERNIA REPAIR  2008?    HYSTERECTOMY      INGUINAL HERNIA REPAIR      INTERSTIM PLACEMENT      for Bladder incontinence    JOINT REPLACEMENT      Knee    KNEE SURGERY      LAPAROSCOPIC COLON RESECTION  2005    LAPAROTOMY OOPHERECTOMY      LUMBAR DIRECT LATERAL INTERBODY FUSION N/A 07/30/2024    Procedure:  lumbar 3 to sacral 1 transforaminal lumbar interbody fusion, lumbar 3 to sacral 1 fusion with neuro robot;  Surgeon: Michelet Rice IV, MD;  Location: [facility] MAIN OR;  Service: Robotics - Neuro;  Laterality: N/A;    LUMBAR EPIDURAL INJECTION N/A 03/27/2023    Procedure: Lumbar epidural steroid injection at L5-S1 20477;  Surgeon: Shey Aranda MD;  Location: [facility] MAIN OR;  Service: Pain Management;  Laterality: N/A;    LUMBAR EPIDURAL INJECTION N/A 03/13/2024    Procedure: lumbar epidural steroid injection at L4/5 82425;  Surgeon: Shey Aranda MD;  Location: [facility] MAIN OR;  Service: Pain Management;  Laterality: N/A;    LUMBAR FUSION N/A 07/30/2024    Procedure: LUMBAR TRANSFORAMINAL INTERBODY FUSION WITH NEURO ROBOT;  Surgeon: Michelet Rice IV, MD;  Location: [facility] MAIN OR;  Service: Robotics - Neuro;  Laterality: N/A;    NISSEN FUNDOPLICATION LAPAROSCOPIC      x2    ORTHOPEDIC SURGERY      Knee replacement    SACROILIAC JOINT INJECTION Left 05/12/2023    Procedure: Left SACROILIAC INJECTION 01189;  Surgeon: Shey Aranda MD;  Location: [facility] MAIN OR;  Service: Pain Management;  Laterality: Left;    SACROILIAC JOINT INJECTION Left 07/12/2023    Procedure: SACROILIAC INJECTION LEFT;  Surgeon: Shey Aranda MD;  Location: [facility] MAIN OR;  Service: Pain Management;  Laterality: Left;    SPINAL FUSION  [id number]    SPINE SURGERY  [id number]    TONSILLECTOMY      TONSILLECTOMY  1954?    TOTAL KNEE ARTHROPLASTY Left     TUBAL ABDOMINAL LIGATION      UPPER GASTROINTESTINAL ENDOSCOPY        Allergies:  Allergies   Allergen Reactions    Salicylates GI Intolerance     History of gi bleed      Colestipol GI Intolerance    Biaxin [Clarithromycin]     Adhesive Tape Rash     Can use plastic tape, paper tape causes rash    Augmentin [Amoxicillin-Pot Clavulanate] Diarrhea    Prevacid [Lansoprazole] Itching and Swelling     Eyes only    Sulfa Antibiotics GI Intolerance    Sulfamethoxazole-Trimethoprim Nausea And Vomiting and GI  "Intolerance     \"makes me sick as a dog\"       Home Meds:  Facility-Administered Medications Prior to Admission   Medication Dose Route Frequency Provider Last Rate Last Admin    cyanocobalamin injection 1,000 mcg  1,000 mcg Intramuscular Q28 Days Kehrer, Meredith Lea, MD   1,000 mcg at 01/30/25 1350     Medications Prior to Admission   Medication Sig Dispense Refill Last Dose/Taking    albuterol sulfate  (90 Base) MCG/ACT inhaler Inhale 2 puffs Every 4 (Four) Hours As Needed for Shortness of Air or Wheezing.   Past Month    bisoprolol-hydrochlorothiazide (Ziac) 5-6.25 MG per tablet Take 1 tablet by mouth Daily. (Patient taking differently: Take 1 tablet by mouth Every Night.) 90 tablet 3 3/16/2025    fluticasone (FLONASE) 50 MCG/ACT nasal spray 2 sprays into the nostril(s) as directed by provider Daily. 48 g 3 3/16/2025    HYDROcodone-acetaminophen (NORCO) 5-325 MG per tablet Take 1 tablet by mouth Every 8 (Eight) Hours As Needed for Moderate Pain. 30 day supply 60 tablet 0 Past Week    levothyroxine (SYNTHROID, LEVOTHROID) 100 MCG tablet TAKE ONE TABLET BY MOUTH EVERY MORNING 90 tablet 3 3/16/2025    methocarbamol (ROBAXIN) 500 MG tablet Take 1 tablet by mouth 3 (Three) Times a Day As Needed for Muscle Spasms. 90 tablet 1 3/16/2025    ondansetron (Zofran) 4 MG tablet Take 1 tablet by mouth Every 8 (Eight) Hours As Needed for Nausea or Vomiting. 15 tablet 2 Past Week    pantoprazole (PROTONIX) 40 MG EC tablet Take 1 tablet by mouth 2 (Two) Times a Day. 180 tablet 3 3/16/2025    pregabalin (LYRICA) 50 MG capsule Take 1 capsule by mouth 2 (Two) Times a Day. 60 capsule 1 3/16/2025    sertraline (ZOLOFT) 100 MG tablet TAKE 1 TABLET BY MOUTH DAILY (Patient taking differently: Take 1 tablet by mouth Every Night.) 90 tablet 1 3/16/2025    vitamin B-12 (CYANOCOBALAMIN) 1000 MCG tablet Take 1 tablet by mouth Daily.   3/16/2025    hydrOXYzine (ATARAX) 10 MG tablet Take 1 tablet by mouth Every 4 (Four) Hours As Needed " for Anxiety (twitching). 120 tablet 1 More than a month    Ibuprofen 3 %, Gabapentin 10 %, Baclofen 2 %, lidocaine 4 %, Ketamine HCl 4 % Apply 1-2 g topically to the appropriate area as directed 3 (Three) to 4 (Four) times daily. 90 g 0 More than a month    lidocaine (LIDODERM) 5 % Place 1 patch on the skin as directed by provider Daily. Remove & Discard patch within 12 hours or as directed by MD 30 each 0 More than a month    promethazine (PHENERGAN) 25 MG tablet Take 1 tablet by mouth Every 6 (Six) Hours As Needed for Nausea or Vomiting. 20 tablet 1 More than a month     Current Meds:   [unfilled]  Social History:   Social History     Tobacco Use    Smoking status: Never    Smokeless tobacco: Never   Substance Use Topics    Alcohol use: Never      Family History:  Family History   Problem Relation Age of Onset    Arthritis Mother     Depression Mother     Hyperlipidemia Mother     Hypertension Mother     Irritable bowel syndrome Mother     Dislocations Mother     Heart attack Mother     Heart failure Mother     Hypertension Father     Arthritis Father     Stroke Father     Alcohol abuse Maternal Grandfather     Depression Maternal Grandfather     Heart disease Other         IN FEMALES BEFORE AGE 65    Asthma Maternal Grandmother     Asthma Paternal Grandfather     Heart attack Maternal Uncle     Heart attack Paternal Uncle     Heart failure Paternal Uncle         Review of Systems    Review of Systems  Constitutional: No wt loss, fever   Gastrointestinal: No nausea , abdominal pain  Behavioral/Psych: No insomnia or anxiety   Cardiovascular ----positive for dizziness. All other systems reviewed and are negative          Constitutional:  Temp:  [97.3 °F (36.3 °C)-98.1 °F (36.7 °C)] 98.1 °F (36.7 °C)  Heart Rate:  [42-56] 46  Resp:  [15-18] 18  BP: ()/(43-67) 107/43    Physical Exam   General:  Appears in no acute distress  Eyes: PERTL,  HEENT:  No JVD. Thyroid not visibly enlarged. No mucosal pallor or  cyanosis  Respiratory: Respirations regular and unlabored at rest. BBS with good air entry in all fields. No crackles, rubs or wheezes auscultated  Cardiovascular: S1S2 Regular rate and rhythm. No murmur, rub or gallop auscultated. No carotid bruits. DP/PT pulses    . No pretibial pitting edema  Gastrointestinal: Abdomen soft, flat, non tender. Bowel sounds present. No hepatosplenomegaly. No ascites  Musculoskeletal: CARRASCO x4. No abnormal movements  Extremities: No digital clubbing or cyanosis  Skin: Color pink. Skin warm and dry to touch. No rashes  No xanthoma  Neuro: AAO x3 CN II-XII grossly intact              Cardiographics  ECG:     Telemetry:    Echocardiogram:     Imaging  Chest X-ray:     Lab Review   Results from last 7 days   Lab Units 03/17/25  1121 03/17/25  0955   HSTROP T ng/L 9 7     Results from last 7 days   Lab Units 03/17/25  0955   MAGNESIUM mg/dL 2.1     Results from last 7 days   Lab Units 03/18/25  0316   SODIUM mmol/L 141   POTASSIUM mmol/L 3.9   BUN mg/dL 18   CREATININE mg/dL 1.00   CALCIUM mg/dL 9.1     @[id number]@  Results from last 7 days   Lab Units 03/18/25  0316 03/17/25  0955   WBC 10*3/mm3 6.34 6.97   HEMOGLOBIN g/dL 12.3 13.9   HEMATOCRIT % 37.3 41.9   PLATELETS 10*3/mm3 247 272     Results from last 7 days   Lab Units 03/17/25  0955   INR  0.99         Assessment:  Dizziness  Sinus bradycardia  Hypertension    Recommendations / Plan:   75-year-old female with a history of hypertension hyperlipidemia chronic pain anxiety hypothyroidism had an episode of lightheadedness and dizziness patient    Will discontinue bisoprolol and hydrochlorothiazide and start the low-dose Toprol-XL 12.51 once a day    No other cardiac workup is needed at this time we will follow-up as an outpatient    Cardiac enzymes are normal      Tom Littlejohn MD  3/18/2025, 09:12 EDT      EMR Dragon/Transcription:   Dictated utilizing Dragon dictation

## 2025-03-18 NOTE — PROGRESS NOTES
This is a 75-year-old female who went to pain management to get a cervical nerve block or ablation for chronic pain.  They noticed that she was bradycardic she also states that she felt sick.  She was referred to the emergency department.  She was seen in the emergency department and then was admitted to the observation for further evaluation because of bradycardia.  She feels sick in the sense that she feels nausea and almost a lightheadedness or dizziness.  She states that when she stands she almost feels that she is drunk.  She denies any vision change or speech change.  Denies any weakness or numbness in her arms or legs.  Denies ever having any chest pain currently or prior to arrival here.  Denies any shortness of breath.  She denies any fevers or chills.  Denies any headache or any new neck pain.  She does suffer from chronic neck pain.  She was admitted for cardiology evaluation for the bradycardia as well as neurologic evaluation.  She had a CT angiogram of the head and neck in the emergency department in which further evaluation to assess the vertebral arteries was recommended.  On my exam she is an elderly female.  She is anxious.  She is in no acute cardiovascular respiratory distress  Reviewing her vitals her heart rate pretty consistently is in the mid 40s.  Is a sinus bradycardia on the EKG  Blood pressures in the low 100s.  Normal oxygen saturation and she is afebrile  Cardiovascular exam she is bradycardic.  Do not appreciate any murmur or rub.  Respiratory exam she is clear to auscultation no respiratory distress  Abdomen is obese soft and nontender  Extremities she has intact distal pulses that are equal strong and symmetric  Neurologic exam.  Cranial nerves II through XII are grossly intact.  She has no focal motor or sensory changes.    I reviewed the EKG that was done March 17 at 940.  Showed a rate of 44 it is sinus rhythm you can see spikes from her bladder stimulator.  There is mild  intravelar conduction delay I do not appreciate any acute injury pattern.  Some nonspecific T wave changes.  QT looks normal.    Assessment plan #1 dizziness: Potentially could be a contributing factors as she could have some signs of some benign positional vertigo.  Further evaluation is recommended to further evaluate the vertebral arteries with MRA.  MRI of the brain and MRA of the neck has been ordered.  We are awaiting the patient's daughter to come and have the device to turn off the bladder stimulator so can get an MRI and MRA.  She was not willing to talk with the daughter yesterday.  Daughter currently is a teacher and is in school now.  The plan is for her to come after school today.  As well as contributing factor she is bradycardic.  Her heart rate is a little soft.  She is on Ziac.  This could be a contributing factor to her symptoms.  Cardiology has been consulted.  Will ashley check orthostatics on her as well.  Will have PT see and evaluate her as well as neurology.  I clinically do not suspect that this is a stroke.  Informed patient of the treatment plan and the need to be able to get the MRI and the need to be able to turn off her bladder stimulator.  All questions answered at this time.

## 2025-03-18 NOTE — PLAN OF CARE
Goal Outcome Evaluation:  Plan of Care Reviewed With: patient           Outcome Evaluation: Pt 74 yo female presented to ED w dizziness, pt described dizziness as feeling wavy and spinning. Pt also reports some sinus congestion and L ear fullness. Pt PMH anxiety, cervical disc disorder, chronic pain disorder, DVT, PE, depression, hypothyroidism, hypertension, lumbar stenosis with neuro claudication status post lumbar spinal fusion, migraine. Pt was scheduled outpatient ablation of cervical spine when she was noted to be bradycardic. PT vestbular orders received, On PT exam pt reports intermitent dizziness. Pt denies hearing loss, does endorse L ear fullness and sinus congestion. Screened for BPPV, L angelica hallpike was symptom provoking and horizontal nystagmus observed , transitioned into Epley 1 cycle (gentle modified postion using pillows due to pending Carotid US  for Questionable vertebral stenosis on CTA neck). Pt was also noted to be orthostatic on eval. Pt ambulated short distance bedside CGA mild unsteadiness observed. Pt may benefit from skilled PT acutely to address functional deficits, anticipate home, OP PT pending medical course.    Anticipated Discharge Disposition (PT): home with assist, home with outpatient therapy services                107/43   51     supine  79/59    64      standing 1 min  93/60   65       standing 3 min

## 2025-03-18 NOTE — CASE MANAGEMENT/SOCIAL WORK
Discharge Planning Assessment  [facility]     Patient Name: Jemima Bell  MRN: [id number]  Today's Date: 3/18/2025    Admit Date: 3/17/2025    Plan: Home with spouse   Discharge Needs Assessment       Row Name 03/18/25 1119       Living Environment    People in Home spouse    Current Living Arrangements home    Potentially Unsafe Housing Conditions none    Primary Care Provided by self    Provides Primary Care For no one    Family Caregiver if Needed spouse    Quality of Family Relationships involved;helpful;supportive    Able to Return to Prior Arrangements yes       Resource/Environmental Concerns    Resource/Environmental Concerns none    Transportation Concerns none       Transition Planning    Patient/Family Anticipates Transition to home with family    Patient/Family Anticipated Services at Transition none    Transportation Anticipated family or friend will provide       Discharge Needs Assessment    Readmission Within the Last 30 Days no previous admission in last 30 days    Equipment Currently Used at Home shower chair;walker, rolling;cane, straight    Concerns to be Addressed denies needs/concerns at this time;no discharge needs identified    Anticipated Changes Related to Illness none    Equipment Needed After Discharge none                   Discharge Plan       Row Name 03/18/25 1120       Plan    Plan Home with spouse    Plan Comments CCP met with patient at bedside. CCP role explained and discharge planning discussed. Face sheet verified and CHOUDHURY noted. Patient's PCP is Dr. Kehrer. Patient lives with her spouse, with no interior/exterior steps. Patient is independent with her ADLs and does not use DME. Patient has access to a shower chair, walker and cane. Patient has not used home health or been to SNF. Patient plans to return home at d/c. Patient does not feel like she needs HH at this time. CCP will follow for any needs to arise. Chantelle RAMESH                  Selected Continued Care -  Episodes Includes continued care and service providers with selected services from the active episodes listed below             Demographic Summary       Row Name 03/18/25 1118       General Information    Admission Type observation    Arrived From emergency department    Required Notices Provided Observation Status Notice    Referral Source admission list    Reason for Consult discharge planning    Preferred Language English                   Functional Status       Row Name 03/18/25 1118       Functional Status    Usual Activity Tolerance good    Current Activity Tolerance good       Functional Status, IADL    Medications assistive equipment    Meal Preparation assistive equipment    Housekeeping assistive equipment    Laundry assistive equipment    Shopping assistive equipment       Mental Status    General Appearance WDL WDL       Mental Status Summary    Recent Changes in Mental Status/Cognitive Functioning no changes                   Psychosocial    No documentation.                  Abuse/Neglect    No documentation.                  Legal    No documentation.                  Substance Abuse    No documentation.                  Patient Forms    No documentation.                     DOMITILA Ch

## 2025-03-18 NOTE — PROGRESS NOTES
DOS: 3/18/2025  NAME: Jemima Bell   : 1950  PCP: Kehrer, Meredith Lea, MD    Chief Complaint   Patient presents with    Slow Heart Rate    Heartburn         Subjective: Pt seen in follow up, however the problem is new to me.  Patient lying in bed, drowsy, but states that symptoms are little better.  She feels drowsy from the medication she has received.  She feels that has improved her symptoms.  She still feels left ear fullness.  No family at bedside.    Objective:  Vital signs:   Vitals:    25 1907 25 0305 25 0805 25 1147   BP: 98/52 102/60 107/43 102/56   BP Location: Left arm Left arm Right arm Right arm   Patient Position: Lying Lying Lying Lying   Pulse: (!) 47 (!) 44 (!) 46 (!) 48   Resp:    Temp: 97.9 °F (36.6 °C) 97.9 °F (36.6 °C) 98.1 °F (36.7 °C) 98.1 °F (36.7 °C)   TempSrc: Oral Oral Oral Oral   SpO2: 95% 94% 94% 94%   Weight:       Height:           Current Facility-Administered Medications:     acetaminophen (TYLENOL) tablet 650 mg, 650 mg, Oral, Q4H PRN, 650 mg at 25 **OR** acetaminophen (TYLENOL) 160 MG/5ML oral solution 650 mg, 650 mg, Oral, Q4H PRN **OR** acetaminophen (TYLENOL) suppository 650 mg, 650 mg, Rectal, Q4H PRN, Sanju Rosado MD    sennosides-docusate (PERICOLACE) 8.6-50 MG per tablet 2 tablet, 2 tablet, Oral, BID PRN **AND** polyethylene glycol (MIRALAX) packet 17 g, 17 g, Oral, Daily PRN **AND** bisacodyl (DULCOLAX) EC tablet 5 mg, 5 mg, Oral, Daily PRN **AND** bisacodyl (DULCOLAX) suppository 10 mg, 10 mg, Rectal, Daily PRN, Sanju Rosado MD    bisoprolol (ZEBeta) tablet 5 mg, 5 mg, Oral, Daily **AND** hydroCHLOROthiazide tablet 6.25 mg, 6.25 mg, Oral, Daily, Marii Frank APRN    Calcium Replacement - Follow Nurse / BPA Driven Protocol, , Not Applicable, PRN, Sanju Rosado MD    hydrOXYzine (ATARAX) tablet 10 mg, 10 mg, Oral, Q4H PRN, Marii Frank APRN    levothyroxine (SYNTHROID, LEVOTHROID)  tablet 100 mcg, 100 mcg, Oral, Q AM, Marii Frank APRN, 100 mcg at 03/18/25 0535    Lidocaine 4 % 1 patch, 1 patch, Transdermal, Q24H, Marii Frank APRN    Magnesium Standard Dose Replacement - Follow Nurse / BPA Driven Protocol, , Not Applicable, TYRA, Sanju Rosado MD    meclizine (ANTIVERT) tablet 25 mg, 25 mg, Oral, TID PRN, Marii Frank APRN, 25 mg at 03/17/25 2033    nitroglycerin (NITROSTAT) SL tablet 0.4 mg, 0.4 mg, Sublingual, Q5 Min PRN, Sanju Rosado MD    ondansetron ODT (ZOFRAN-ODT) disintegrating tablet 4 mg, 4 mg, Oral, Q6H PRN **OR** ondansetron (ZOFRAN) injection 4 mg, 4 mg, Intravenous, Q6H PRN, Sanju Rosado MD, 4 mg at 03/17/25 2056    pantoprazole (PROTONIX) EC tablet 40 mg, 40 mg, Oral, BID, Marii Frank APRN, 40 mg at 03/18/25 1022    Phosphorus Replacement - Follow Nurse / BPA Driven Protocol, , Not Applicable, PRAlonzo LOPEZ Brett Allen, MD    Potassium Replacement - Follow Nurse / BPA Driven Protocol, , Not Applicable, Alonzo MARY Brett Allen, MD    pregabalin (LYRICA) capsule 50 mg, 50 mg, Oral, BID, Marii Frank APRN, 50 mg at 03/17/25 2033    scopolamine patch 1 mg/72 hr, 1 patch, Transdermal, Q72H, Marii Frank APRN, 1 patch at 03/17/25 2054    sertraline (ZOLOFT) tablet 100 mg, 100 mg, Oral, Nightly, Marii Frank APRN, 100 mg at 03/17/25 2032    sodium chloride 0.9 % flush 10 mL, 10 mL, Intravenous, PRN, Parish Wood MD    sodium chloride 0.9 % flush 10 mL, 10 mL, Intravenous, PRN, Parish Wood MD    sodium chloride 0.9 % flush 10 mL, 10 mL, Intravenous, Q12H, Sanju Rosado MD, 10 mL at 03/18/25 1024    sodium chloride 0.9 % flush 10 mL, 10 mL, Intravenous, PRN, Sanju Rosado MD    sodium chloride 0.9 % infusion 40 mL, 40 mL, Intravenous, PRN, Sanju Rosado MD    PRN meds    acetaminophen **OR** acetaminophen **OR** acetaminophen    senna-docusate sodium **AND** polyethylene glycol **AND** bisacodyl **AND**  bisacodyl    Calcium Replacement - Follow Nurse / BPA Driven Protocol    hydrOXYzine    Magnesium Standard Dose Replacement - Follow Nurse / BPA Driven Protocol    meclizine    nitroglycerin    ondansetron ODT **OR** ondansetron    Phosphorus Replacement - Follow Nurse / BPA Driven Protocol    Potassium Replacement - Follow Nurse / BPA Driven Protocol    sodium chloride    sodium chloride    sodium chloride    sodium chloride    No current facility-administered medications on file prior to encounter.     Current Outpatient Medications on File Prior to Encounter   Medication Sig    albuterol sulfate  (90 Base) MCG/ACT inhaler Inhale 2 puffs Every 4 (Four) Hours As Needed for Shortness of Air or Wheezing.    bisoprolol-hydrochlorothiazide (Ziac) 5-6.25 MG per tablet Take 1 tablet by mouth Daily. (Patient taking differently: Take 1 tablet by mouth Every Night.)    fluticasone (FLONASE) 50 MCG/ACT nasal spray 2 sprays into the nostril(s) as directed by provider Daily.    HYDROcodone-acetaminophen (NORCO) 5-325 MG per tablet Take 1 tablet by mouth Every 8 (Eight) Hours As Needed for Moderate Pain. 30 day supply    levothyroxine (SYNTHROID, LEVOTHROID) 100 MCG tablet TAKE ONE TABLET BY MOUTH EVERY MORNING    methocarbamol (ROBAXIN) 500 MG tablet Take 1 tablet by mouth 3 (Three) Times a Day As Needed for Muscle Spasms.    ondansetron (Zofran) 4 MG tablet Take 1 tablet by mouth Every 8 (Eight) Hours As Needed for Nausea or Vomiting.    pantoprazole (PROTONIX) 40 MG EC tablet Take 1 tablet by mouth 2 (Two) Times a Day.    pregabalin (LYRICA) 50 MG capsule Take 1 capsule by mouth 2 (Two) Times a Day.    sertraline (ZOLOFT) 100 MG tablet TAKE 1 TABLET BY MOUTH DAILY (Patient taking differently: Take 1 tablet by mouth Every Night.)    vitamin B-12 (CYANOCOBALAMIN) 1000 MCG tablet Take 1 tablet by mouth Daily.    hydrOXYzine (ATARAX) 10 MG tablet Take 1 tablet by mouth Every 4 (Four) Hours As Needed for Anxiety  "(twitching).    Ibuprofen 3 %, Gabapentin 10 %, Baclofen 2 %, lidocaine 4 %, Ketamine HCl 4 % Apply 1-2 g topically to the appropriate area as directed 3 (Three) to 4 (Four) times daily.    lidocaine (LIDODERM) 5 % Place 1 patch on the skin as directed by provider Daily. Remove & Discard patch within 12 hours or as directed by MD    promethazine (PHENERGAN) 25 MG tablet Take 1 tablet by mouth Every 6 (Six) Hours As Needed for Nausea or Vomiting.       General appearance: Elderly female, NAD, alert and cooperative, well groomed  HEENT: Normocephalic, atraumatic, left ear fullness     Neurological:   MS: oriented x3, drowsy, language intact, no neglect  CN: visual fields full, EOMI, facial sensation equal, no facial droop, shoulder shrug equal, tongue midline  Motor: 5/5 in all 4 ext.  Sensory: light touch sensation intact in all 4 ext.  Coordination: Normal finger to nose test  Gait and station: deferred     Laboratory results:  Lab Results   Component Value Date    TSH 0.916 03/17/2025     Lab Results   Component Value Date    ZEIPFNQP80 617 01/30/2025     Lab Results   Component Value Date    HGBA1C 6.1 (H) 12/17/2024     Lab Results   Component Value Date    GLUCOSE 95 03/18/2025    BUN 18 03/18/2025    CREATININE 1.00 03/18/2025    EGFRIFNONA 60 01/24/2022    EGFRIFAFRI 69 01/24/2022    BCR 18.0 03/18/2025    K 3.9 03/18/2025    CO2 26.2 03/18/2025    CALCIUM 9.1 03/18/2025    ALBUMIN 3.2 (L) 03/18/2025    AST 15 03/18/2025    ALT 11 03/18/2025     Lab Results   Component Value Date    WBC 6.34 03/18/2025    HGB 12.3 03/18/2025    HCT 37.3 03/18/2025    MCV 92.3 03/18/2025     03/18/2025     Brief Urine Lab Results  (Last result in the past 365 days)        Color   Clarity   Blood   Leuk Est   Nitrite   Protein   CREAT   Urine HCG        07/22/24 1521 Yellow   Clear   Negative   Trace   Negative   Negative                 No results found for: \"ACANTHNAEG\", \"AFBCX\", \"BPERTUSSISCX\", \"BLOODCX\"  No results " "found for: \"BCIDPCR\", \"CXREFLEX\", \"CSFCX\", \"CULTURETIS\"  No results found for: \"CULTURES\", \"HSVCX\", \"URCX\"  No results found for: \"EYECULTURE\", \"GCCX\", \"HSVCULTURE\", \"LABHSV\"  No results found for: \"LEGIONELLA\", \"MRSACX\", \"MUMPSCX\", \"MYCOPLASCX\"  No results found for: \"NOCARDIACX\", \"STOOLCX\"  No results found for: \"THROATCX\", \"UNSTIMCULT\", \"URINECX\", \"CULTURE\", \"VZVCULTUR\"  No results found for: \"VIRALCULTU\", \"WOUNDCX\"  Pain Management Panel  More data may exist         Latest Ref Rng & Units 10/23/2024 4/22/2024   Pain Management Panel   Amphetamine, Urine Qual Negative Negative  Negative    Barbiturates Screen, Urine Negative Negative  Negative    Benzodiazepine Screen, Urine Negative Negative  Negative    Cocaine Screen, Urine Negative Negative  Negative    Methadone Screen , Urine Negative Negative  Negative        Review and interpretation of imaging:  CT Chest Without Contrast Diagnostic  Result Date: 3/17/2025  1. Mild patchy groundglass opacities in both lower lobes, greatest in the right lower lobe. This may be due to mild groundglass infiltrate. Atelectasis or scarring is also in the differential diagnosis there is dependent lower lobe atelectasis. 2. Previous cholecystectomy and Nissen fundoplication. Thickening of the distal esophageal wall potentially related to esophagitis though nonspecific. Small hiatal hernia.  Radiation dose reduction techniques were utilized, including automated exposure control and exposure modulation based on body size.   This report was finalized on 3/17/2025 4:55 PM by Tavo Delgado M.D on Workstation: [id number]      CT Angiogram Neck  Result Date: 3/17/2025  1.  There is no evidence of acute infarction or of intracranial hemorrhage. The right posterior inferior cerebral artery is not opacified. This may be a normal variant as the left posterior inferior cerebral artery crosses midline and there is a right anterior inferior cerebral artery/posterior inferior cerebellar " artery complex. Further evaluation could be performed with MRI examination of the brain to assess for acute infarction. 2.  There is no evidence of carotid stenosis. 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 3.  There is an isolated posterior circulation. Fetal origins of the posterior cerebral arteries are appreciated bilaterally. The P1 segments are hypoplastic. The left vertebral artery beyond the posterior inferior cerebellar artery is markedly hypoplastic. Unfortunately, evaluation of the proximal aspect of the right vertebral artery is significantly hampered by beam hardening artifact from dense adjacent venous opacification. Underlying stenosis cannot be excluded. Further evaluation with a MRA of the neck with and without contrast is recommended. 4.  NOTE: This is a preliminary report. The 3-dimensional reconstructions are not yet available for review.     Radiation dose reduction techniques were utilized, including automated exposure control and exposure modulation based on body size.       CT Angiogram Head  Result Date: 3/17/2025  1.  There is no evidence of acute infarction or of intracranial hemorrhage. The right posterior inferior cerebral artery is not opacified. This may be a normal variant as the left posterior inferior cerebral artery crosses midline and there is a right anterior inferior cerebral artery/posterior inferior cerebellar artery complex. Further evaluation could be performed with MRI examination of the brain to assess for acute infarction. 2.  There is no evidence of carotid stenosis. 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 3.  There is an isolated posterior circulation. Fetal origins of the posterior cerebral arteries are appreciated bilaterally. The P1 segments are hypoplastic. The left vertebral artery beyond the posterior inferior cerebellar artery is markedly hypoplastic. Unfortunately, evaluation of the proximal aspect of the right  vertebral artery is significantly hampered by beam hardening artifact from dense adjacent venous opacification. Underlying stenosis cannot be excluded. Further evaluation with a MRA of the neck with and without contrast is recommended. 4.  NOTE: This is a preliminary report. The 3-dimensional reconstructions are not yet available for review.     Radiation dose reduction techniques were utilized, including automated exposure control and exposure modulation based on body size.       XR Chest 1 View  Result Date: 3/17/2025  Ill-defined bilateral perihilar and bibasilar opacities, which could represent atelectasis and/or scarring with prominent epicardial fat.  This report was finalized on 3/17/2025 10:08 AM by Jordy Freeman MD on Workstation: [id number]      Impression/Assessment:  This is a 75-year-old female with past medical history of pulmonary embolism and DVT, hypothyroidism, hypertension, migraine headache who presented to the hospital on 3/17/2025 with several complaints including left-sided chest discomfort, nausea, dizziness, lightheadedness, and difficulty with her balance.  She had also reported tinnitus and hearing loss affecting both ears but worse on the left.  Daughter had also reported that her heart rate was 45.  She has a history of chronic bradycardia.  Her HR on arrival was 45.  It has reached as low as 42 since admission.  Her BP on arrival was 146/61.    She describes today the dizziness is being both lightheaded and a spinning sensation.  It worsens with any position change or if she leans her head forward.  She did have associated nausea and dry heaving.  She states she has been feeling a fullness of her left ear and this also worsens if she tilts her head forward.  She has noticed some reduced hearing of both ears but worse on the left.  She underwent a CTA of her head and neck that per radiology read revealed severe stenosis involving the left P2/P3, fetal origins of both PCAs, her LVA  appeared markedly hypoplastic beyond the PICA. I reviewed with Dr. Faustin, she has bilateral fetal PCAs and the left vertebral appeared more open.     Diagnosis:  Acute vertigo, suspect BPPV on the left  Orthostatic hypotension   Left PCA stenosis  Chronic bradycardia   Anxiety and depression    Plan:  - I looked in her ears with otoscope and she had fluid in both ears. She reports worsening fullness pressure of her left ear and feelings of sinus congestion. She had a negative HINTS exam but PT was able to do angelica levine pike and she was positive on the left and was transitioned into Epley maneuver. She also had positive orthostatics (see PT documentation for measurements). I suspect her symptoms of dizziness and lightheadedness are multifactorial from BPPV and orthostatic hypotension. Maybe even component from her bradycardia.  - I am suspicious that some of her orthostasis may be from polypharmacy as she is currently taking Lyrica, Robaxin, and Norco.  - Pt does not have bladder stimulator remote with her. She states she has no one to retrieve this from her home, if unable to get MRI given low suspicion for stroke we will repeat CTH today and check a carotid U/S.   - Encourage adequate hydration daily with water, thigh-high compression stockings when ambulating or ABD binder if compression stockings not effective.  She should avoid quick head turns and bending over trying squatting if possible.  - Cardiology seeing her for bradycardia.   - If repeat CTH stable, no further inpatient workup planned from our standpoint.   Likely needs vestibular therapy as an outpatient.     Case discussed with patient and Dr. Faustin who also examined the patient today, and he agrees with plan above.     MACHO Prasad

## 2025-03-18 NOTE — THERAPY EVALUATION
Acute Care - Physical Therapy Initial Evaluation  [facility]     Patient Name: Jemima Bell  : 1950  MRN: [id number]  Today's Date: 3/18/2025   Onset of Illness/Injury or Date of Surgery: 25  Visit Dx:     ICD-10-CM ICD-9-CM   1. Stroke-like symptoms  R29.90 781.99   2. Hypertension, unspecified type  I10 401.9   3. Abnormal computed tomography angiography (CTA) of neck  R93.89 793.99   4. Hyperglycemia  R73.9 790.29   5. Abnormal CT of the chest  R93.89 793.2     Patient Active Problem List   Diagnosis    Wheezing    Osteoarthritis    Acute bronchitis    Dyspnea    Hypothyroidism    Shortness of breath    Vitamin D deficiency    Depression with anxiety    Muscle cramp    Back pain    Vitamin B12 deficiency    UTI (urinary tract infection)    Abnormal EKG    Precordial pain    Anxiety    Borderline systolic HTN    Left upper quadrant abdominal pain    Diarrhea    Gastroesophageal reflux disease    History of Nissen fundoplication    Incontinence of feces with fecal urgency    Foraminal stenosis of lumbar region    Lumbar degenerative disc disease    Lumbar facet arthropathy    Lumbar radiculopathy    Sacroiliac joint dysfunction of both sides    Lumbar stenosis with neurogenic claudication    Cervical radiculopathy    Neck pain    Heart murmur    Preop cardiovascular exam    Arthropathy of cervical facet joint    Vertigo     Past Medical History:   Diagnosis Date    Acromioclavicular separation     Acute bronchitis     Acute sinusitis     Allergic 2022    Shrimp    Allergic rhinitis     Anemia Childhood    Anxiety     Arthritis     Arthritis of back     Arthropathy of cervical facet joint 3/14/2025    Asthma     Back pain     BMI 34.0-34.9,adult     Bronchitis, chronic     Bursitis of hip     Cataract     Cervical disc disorder     Cervicalgia     Chills     Cholelithiasis ?    Remival    Chronic diarrhea     Chronic pain disorder     Cluster headache     Colon polyp 2022     Deep vein thrombosis (DVT) of lower extremity     Depression     Dermatitis     Dislocation, shoulder     Dyspnea     Dysuria     Esophageal reflux     Extremity pain     Fatigue     Gastric ulcer     GERD (gastroesophageal reflux disease)     GI (gastrointestinal bleed) 2004?    Headache     Headache, tension-type     Heart murmur 1973    Hernia     Hiatal hernia 2007    Hip arthrosis     Hypertension     Hypothyroidism     Irritable bowel syndrome     Low back strain     Lumbago     Lumbar stenosis with neurogenic claudication 05/01/2024    Lumbosacral disc disease     Migraine     Nausea     Neuritis     Osteoarthritis     Osteoarthritis of knee     Periarthritis of shoulder     Plantar fasciitis     Pneumonia 89y    Pulled muscle     Pulmonary embolism     Pyelonephritis     Rheumatic fever     Sore throat     Torticollis     Trapezius strain     Urinary incontinence     Urinary pain     Urinary tract infection     UTI symptoms     Vitamin B1 deficiency     Vitamin B12 deficiency     Vitamin D deficiency     Weakness     Wheezing      Past Surgical History:   Procedure Laterality Date    ABDOMINAL SURGERY      ADENOIDECTOMY      APPENDECTOMY      BACK SURGERY  6/30/24    CATARACT EXTRACTION      CATARACT EXTRACTION      bilateral eyes    CATARACT EXTRACTION      CERVICAL EPIDURAL N/A 06/26/2024    Procedure: cervical epidural steroid injection at C7/T1 10219;  Surgeon: Shey Aranda MD;  [place] [facility]  Service: Pain Management;  Laterality: N/A;    CHOLECYSTECTOMY  2004?    COLONOSCOPY      COLONOSCOPY N/A 12/23/2022    Procedure: COLONOSCOPY to cecum and TI:  with biopsies, cold snare polyp,;  Surgeon: Reji Aguilar MD;  Location: [facility] ENDOSCOPY;  Service: Gastroenterology;  Laterality: N/A;  PREOP/ HX COLON POLYPS  POSTOP/  diverticulosis, polyp, hemorrhoids    ENDOSCOPY N/A 12/23/2022    Procedure: ESOPHAGOGASTRODUODENOSCOPY with biopsies;  Surgeon: Reji Aguilar MD;  Location: [facility]  ENDOSCOPY;  Service: Gastroenterology;  Laterality: N/A;  PREOP/ HX GASTRIC ULCER, DYSPEPSIA, UPPER ABDOMINAL PAIN  POSTOP/:  HH, gastritis, gastric polyps    EPIDURAL BLOCK      GALLBLADDER SURGERY      HEMORRHOIDECTOMY  1974 & 77?    HERNIA REPAIR  2008?    HYSTERECTOMY      INGUINAL HERNIA REPAIR      INTERSTIM PLACEMENT      for Bladder incontinence    JOINT REPLACEMENT      Knee    KNEE SURGERY      LAPAROSCOPIC COLON RESECTION  2005    LAPAROTOMY OOPHERECTOMY      LUMBAR DIRECT LATERAL INTERBODY FUSION N/A 07/30/2024    Procedure: lumbar 3 to sacral 1 transforaminal lumbar interbody fusion, lumbar 3 to sacral 1 fusion with neuro robot;  Surgeon: Michelet Rice IV, MD;  Location: [facility] MAIN OR;  Service: Robotics - Neuro;  Laterality: N/A;    LUMBAR EPIDURAL INJECTION N/A 03/27/2023    Procedure: Lumbar epidural steroid injection at L5-S1 30608;  Surgeon: Shey Aranda MD;  Location: [facility];  Service: Pain Management;  Laterality: N/A;    LUMBAR EPIDURAL INJECTION N/A 03/13/2024    Procedure: lumbar epidural steroid injection at L4/5 16170;  Surgeon: Shey Aranda MD;  Location: [facility];  Service: Pain Management;  Laterality: N/A;    LUMBAR FUSION N/A 07/30/2024    Procedure: LUMBAR TRANSFORAMINAL INTERBODY FUSION WITH NEURO ROBOT;  Surgeon: Michelet Rice IV, MD;  Location: [facility] MAIN OR;  Service: Robotics - Neuro;  Laterality: N/A;    NISSEN FUNDOPLICATION LAPAROSCOPIC      x2    ORTHOPEDIC SURGERY      Knee replacement    SACROILIAC JOINT INJECTION Left 05/12/2023    Procedure: Left SACROILIAC INJECTION 32910;  Surgeon: Shey Aranda MD;  Location: [facility];  Service: Pain Management;  Laterality: Left;    SACROILIAC JOINT INJECTION Left 07/12/2023    Procedure: SACROILIAC INJECTION LEFT;  Surgeon: Shey Aranda MD;  Location: [facility];  Service: Pain Management;  Laterality: Left;    SPINAL FUSION  838249    SPINE SURGERY  237996    TONSILLECTOMY      TONSILLECTOMY  1954?     TOTAL KNEE ARTHROPLASTY Left     TUBAL ABDOMINAL LIGATION      UPPER GASTROINTESTINAL ENDOSCOPY       PT Assessment (Last 12 Hours)       PT Evaluation and Treatment       Row Name 03/18/25 1300          Physical Therapy Time and Intention    Subjective Information no complaints  -     Document Type evaluation  -     Mode of Treatment individual therapy;physical therapy  -     Patient Effort good  -     Symptoms Noted During/After Treatment none  -       Row Name 03/18/25 1300          General Information    Patient Profile Reviewed yes  -     Onset of Illness/Injury or Date of Surgery 03/17/25  -     Patient Observations alert;cooperative;agree to therapy  -     Prior Level of Function independent:  -     Equipment Currently Used at Home none  -     Pertinent History of Current Functional Problem dizziness  -     Existing Precautions/Restrictions fall  -     Barriers to Rehab none identified  -       Row Name 03/18/25 1300          Living Environment    Current Living Arrangements home  -     People in Home spouse  -Critical access hospital Name 03/18/25 1300          Pain    Pretreatment Pain Rating 0/10 - no pain  -     Posttreatment Pain Rating 0/10 - no pain  -       Row Name 03/18/25 1300          Cognition    Affect/Mental Status (Cognition) WFL  -Critical access hospital Name 03/18/25 1300          Range of Motion Comprehensive    General Range of Motion bilateral lower extremity ROM WFL  -       Row Name 03/18/25 1300          Strength Comprehensive (MMT)    General Manual Muscle Testing (MMT) Assessment no strength deficits identified  -       Row Name 03/18/25 1300          Bed Mobility    Bed Mobility supine-sit;sit-supine  -     Supine-Sit [place] (Bed Mobility) modified independence  -     Sit-Supine [place] (Bed Mobility) modified independence  -       Row Name 03/18/25 1300          Transfers    Transfers stand-sit transfer;sit-stand transfer  -       Row Name 03/18/25  1300          Sit-Stand Transfer    Sit-Stand [place] (Transfers) independent  -       Row Name 03/18/25 1300          Stand-Sit Transfer    Stand-Sit [place] (Transfers) independent  -       Row Name 03/18/25 1300          Gait/Stairs (Locomotion)    [place] Level (Gait) contact guard  -     Distance in Feet (Gait) 30  -LH     Pattern (Gait) step-through  -     Comment, (Gait/Stairs) in room, mild unsteadiness observed  -[facility] Name 03/18/25 1300          Vestibular Evaluation/Treatment    Vestibular Evaluation Performed yes  -[facility] Name 03/18/25 1300          Vestibular Symptoms/Subjective Complaints    Vestibular Symptoms/Complaints nausea;vomiting;dizziness  -[facility] Name 03/18/25 1300          Auditory Screen    Aural Fullness left  -     Hearing Loss none  -[facility] Name 03/18/25 1300          Oculomotor Exam    Ocular Motility impaired  -     Nystagmus, Gaze-Evoked positive  L  -[facility] Name 03/18/25 1300          Positional Testing    Madelia Hallpike, Left positive  gentle 2 pillow method due to pending carotid US  -     Nystagmus Direction, Madelia Hallpike Left left torsion  -     Nystagmus Duration, [place] Hallpike Left less than 60 seconds  -     Nystagmus Latency, [place]equ[place] Hallpike Left absent  -     Madelia Hallpike, Right negative  -       Row Name 03/18/25 1300          Plan of Care Review    Plan of Care Reviewed With patient  -     Outcome Evaluation Pt 76 yo female presented to ED w dizziness, pt described dizziness as feeling wavy and spinning. Pt also reports some sinus congestion and L ear fullness. Pt PMH anxiety, cervical disc disorder, chronic pain disorder, DVT, PE, depression, hypothyroidism, hypertension, lumbar stenosis with neuro claudication status post lumbar spinal fusion, migraine. Pt was scheduled outpatient ablation of cervical spine when she was noted to be bradycardic. PT vestbular orders received, On PT exam pt reports intermitent  dizziness. Pt denies hearing loss, does endorse L ear fullness and sinus congestion. Screened for BPPV, L angelica hallpike was symptom provoking and horizontal nystagmus observed , transitioned into Epley 1 cycle (gentle modified postion using pillows due to pending Carotid US  for Questionable vertebral stenosis on CTA neck). Pt was also noted to be orthostatic on eval. Pt ambulated short distance bedside CGA mild unsteadiness observed. Pt may benefit from skilled PT acutely to address functional deficits, anticipate home, OP PT pending medical course.  -       Row Name 03/18/25 1300          Positioning and Restraints    Pre-Treatment Position in bed  -     Post Treatment Position bed  -     In Bed notified nsg;fowlers;call light within reach;encouraged to call for assist;exit alarm on  -[facility] Name 03/18/25 1300          Therapy Assessment/Plan (PT)    Rehab Potential (PT) good  -     Criteria for Skilled Interventions Met (PT) yes  -     Therapy Frequency (PT) 6 times/wk  -       Row Name 03/18/25 1300          PT Evaluation Complexity    History, PT Evaluation Complexity 1-2 personal factors and/or comorbidities  -     Examination of Body Systems (PT Eval Complexity) total of 3 or more elements  -     Clinical Presentation (PT Evaluation Complexity) evolving  -     Clinical Decision Making (PT Evaluation Complexity) moderate complexity  -     Overall Complexity (PT Evaluation Complexity) moderate complexity  -[facility] Name 03/18/25 1300          Physical Therapy Goals    Gait Training Goal Selection (PT) gait training, PT goal 1  -[facility] Name 03/18/25 1300          Gait Training Goal 1 (PT)    Activity/Assistive Device (Gait Training Goal 1, PT) gait (walking locomotion)  -     [place] Level (Gait Training Goal 1, PT) independent  -     Distance (Gait Training Goal 1, PT) 150  -     Time Frame (Gait Training Goal 1, PT) 1 week  -               User Key  (r) =  Recorded By, (t) = Taken By, (c) = Cosigned By      Initials Name Provider Type     Adelita Carlson, PT Physical Therapist                    Physical Therapy Education       Title: PT OT SLP Therapies (Done)       Topic: Physical Therapy (Done)       Point: Mobility training (Done)       Learning Progress Summary            Patient Acceptance, E,H, VU,NR by  at 3/18/2025 1355    Comment: post epley instructions, vertigo handout                      Point: Home exercise program (Done)       Learning Progress Summary            Patient Acceptance, E,H, VU,NR by  at 3/18/2025 1355    Comment: post epley instructions, vertigo handout                      Point: Body mechanics (Done)       Learning Progress Summary            Patient Acceptance, E,H, VU,NR by  at 3/18/2025 1355    Comment: post epley instructions, vertigo handout                      Point: Precautions (Done)       Learning Progress Summary            Patient Acceptance, E,H, VU,NR by  at 3/18/2025 1355    Comment: post epley instructions, vertigo handout                                      User Key       Initials Effective Dates Name Provider Type Discipline     06/16/21 -  Adelita Carlson, PT Physical Therapist PT                  PT Recommendation and Plan  Anticipated Discharge Disposition (PT): home with assist, home with outpatient therapy services  Planned Therapy Interventions (PT): balance training, bed mobility training, gait training, home exercise program, ROM (range of motion), stair training, strengthening, stretching, transfer training  Therapy Frequency (PT): 6 times/wk  Plan of Care Reviewed With: patient  Outcome Evaluation: Pt 76 yo female presented to ED w dizziness, pt described dizziness as feeling wavy and spinning. Pt also reports some sinus congestion and L ear fullness. Pt PMH anxiety, cervical disc disorder, chronic pain disorder, DVT, PE, depression, hypothyroidism, hypertension, lumbar stenosis with neuro  claudication status post lumbar spinal fusion, migraine. Pt was scheduled outpatient ablation of cervical spine when she was noted to be bradycardic. PT vestbular orders received, On PT exam pt reports intermitent dizziness. Pt denies hearing loss, does endorse L ear fullness and sinus congestion. Screened for BPPV, L angelica hallpike was symptom provoking and horizontal nystagmus observed , transitioned into Epley 1 cycle (gentle modified postion using pillows due to pending Carotid US  for Questionable vertebral stenosis on CTA neck). Pt was also noted to be orthostatic on eval. Pt ambulated short distance bedside CGA mild unsteadiness observed. Pt may benefit from skilled PT acutely to address functional deficits, anticipate home, OP PT pending medical course.   Outcome Measures       Row Name 03/18/25 1300             How much help from another person do you currently need...    Turning from your back to your side while in flat bed without using bedrails? 4  -LH      Moving from lying on back to sitting on the side of a flat bed without bedrails? 4  -LH      Moving to and from a bed to a chair (including a wheelchair)? 3  -LH      Standing up from a chair using your arms (e.g., wheelchair, bedside chair)? 3  -LH      Climbing 3-5 steps with a railing? 3  -LH      To walk in hospital room? 3  -      AM-PAC 6 Clicks Score (PT) 20  -         Functional Assessment    Outcome Measure Options AM-PAC 6 Clicks Basic Mobility (PT)  -                User Key  (r) = Recorded By, (t) = Taken By, (c) = Cosigned By      Initials Name Provider Type     Adelita Carlson, PT Physical Therapist                     Time Calculation:    PT Charges       Row Name 03/18/25 1356             Time Calculation    Start Time 1010  -      Stop Time 1051  -      Time Calculation (min) 41 min  -      PT Received On 03/18/25  -      PT - Next Appointment 03/19/25  -      PT Goal Re-Cert Due Date 03/25/25  -         Time  Calculation- PT    Total Timed Code Minutes- PT 30 minute(s)  -                User Key  (r) = Recorded By, (t) = Taken By, (c) = Cosigned By      Initials Name Provider Type     Adelita Carlson, PT Physical Therapist                  Therapy Charges for Today       Code Description Service Date Service Provider Modifiers Qty    91965919581 HC PT EVAL MOD COMPLEXITY 3 3/18/2025 Adelita Carlson, PT GP 1    53600503984 HC PT CANALITH REPOSITIONING PER DAY 3/18/2025 Adelita Carlson, PT GP 1    [id number]  PT THER PROC EA 15 MIN 3/18/2025 Adelita Carlson, PT GP 1            PT G-Codes  Outcome Measure Options: AM-PAC 6 Clicks Basic Mobility (PT)  AM-PAC 6 Clicks Score (PT): 20    Adelita Carlson, PT  3/18/2025

## 2025-03-18 NOTE — PLAN OF CARE
Goal Outcome Evaluation:              Outcome Evaluation: Pt. 75 yr old AOX4 and able to verbalized needs. Pt to stay for consults tomorrow and PT  following. Pt does not have any other questions at this time.

## 2025-03-18 NOTE — PROGRESS NOTES
ED OBSERVATION PROGRESS/DISCHARGE SUMMARY    Date of Admission: 3/17/2025   LOS: 0 days   PCP: Kehrer, Meredith Lea, MD    Final Diagnosis Vertigo      Subjective     Hospital Outcome: Jemima Bell is a 75 y.o. female, with a past medical history including, but not limited to, degenerative disc disease, anxiety, depression, hypertension, hyperlipidemia, hypothyroidism, migraine headaches, and pulmonary embolism, submitted to the observation unit with a complaint of dizziness.  Patient states that she woke up this morning and had room spinning dizziness confusion about nausea.  She states the dizziness was so bad that she had to use her walker to ambulate.  The dizziness is worse with movement or rapid eye movement.  She states she went to pain management to have a epidural/ablation with pain management this morning but was sent to the ER for evaluation of her dizziness.  She denies any numbness, tingling, paresthesias of her extremities or face and denies any difficulty with vision and/or speech.  Neurology has been consulted to see the patient.  Vestibular therapy has been consulted to see the patient in the AM.  MRI brain, MRA neck are pending as the patient has a bladder stimulator and does not have the remote with her at the hospital.    3/18/2025: Neurology saw and evaluated the patient and concerned that this is peripheral in nature, low suspicion for stroke; they recommend getting a repeat CT head and ultrasounds of carotids for further evaluation since MRI brain and MRAs have been unable to be obtained secondary to her bladder stimulator and lack of remote.  Repeat CT head negative.  Physical therapy saw and evaluated the patient and left Tequila-Hallpike with positive and transitions and Epley performed; outpatient PT vestibular consult placed.  Orthostatics were also taken during PTs evaluation and patient was positive.  Neurology suspected that the patient's vertigo was multifactorial from BPPV and  orthostatic hypotension.  Neuro does recommend that the patient start taking aspirin 81 mg for stroke risk from her chronic migraines.  If CT head was stable neurology okay with patient discharging home and no further workup from their neuro point.  Carotid duplex ultrasound shows normal flow of carotids bilaterally and antegrade flow of right and left vertebral arteries.  Cardiology saw and evaluated the patient and recommended to discontinue the bisoprolol and hydrochlorothiazide and they are starting her on a low-dose metoprolol 12.5 once a day.  Echo obtained and showed normal EF of 61 to 65% and normal diastolic function.  Will monitor with medication change and reevaluate in the morning.    ROS:  General: no fevers, chills  Respiratory: no cough, dyspnea  Cardiovascular: no chest pain, palpitations  Abdomen: No abdominal pain, nausea, vomiting, or diarrhea  Neurologic: No focal weakness    Objective   Physical Exam:  I have reviewed the vital signs.  Temp:  [97.3 °F (36.3 °C)-98.1 °F (36.7 °C)] 98.1 °F (36.7 °C)  Heart Rate:  [42-56] 46  Resp:  [15-18] 18  BP: ()/(43-67) 107/43  General Appearance:    Alert, cooperative, no distress  Head:    Normocephalic, atraumatic, normal hearing  Eyes:    Sclerae anicteric, EOMI  Neck:   Supple,, normal hearing  Lungs: Clear to auscultation bilaterally, respirations unlabored on room air  Heart: Regular rate and rhythm, S1 and S2 normal, no murmur  Abdomen:  Soft, nontender, bowel sounds active, nondistended  Extremities: No clubbing, cyanosis, or edema to lower extremities  Pulses:  2+ and symmetric in distal lower extremities  Skin: No rashes   Neurologic: Oriented x3, Normal strength to extremities    Results Review:    I have reviewed the labs, radiology results and diagnostic studies.    Results from last 7 days   Lab Units 03/18/25  0316   WBC 10*3/mm3 6.34   HEMOGLOBIN g/dL 12.3   HEMATOCRIT % 37.3   PLATELETS 10*3/mm3 247     Results from last 7 days   Lab  Units 03/18/25  0316 03/17/25  0955   SODIUM mmol/L 141 141   POTASSIUM mmol/L 3.9 4.1   CHLORIDE mmol/L 106 102   CO2 mmol/L 26.2 27.5   BUN mg/dL 18 20   CREATININE mg/dL 1.00 0.99   CALCIUM mg/dL 9.1 9.5   BILIRUBIN mg/dL 0.3 0.3   ALK PHOS U/L 67 82   ALT (SGPT) U/L 11 14   AST (SGOT) U/L 15 19   GLUCOSE mg/dL 95 164*     Imaging Results (Last 24 Hours)       Procedure Component Value Units Date/Time    CT Chest Without Contrast Diagnostic [[id number]] Collected: 03/17/25 1357     Updated: 03/17/25 1658    Narrative:      CT CHEST WO CONTRAST DIAGNOSTIC-     HISTORY: 75 years of age, Female. Abnormal chest x-ray     TECHNIQUE:  CT chest includes axial imaging from the thoracic inlet to  the upper abdomen without IV contrast. Data reconstructed in coronal and  sagittal planes. Radiation dose reduction techniques were utilized,  including automated exposure control and exposure modulation based on  body size.     COMPARISON: AP chest 03/17/2025     FINDINGS: There are suspected mild coronary arterial calcifications. No  endotracheal or central endobronchial lesions evident. No evidence  mediastinal or hilar or axillary marine enlargement.     There is mild wall thickening the distal esophagus. A small hiatal  hernia is present and there appears to been previous Nissen  fundoplication with dense material surrounding the region of the  gastroesophageal junction similar to previous CT 12/02/2022.     There is mild dependent lower lobe atelectasis there is also patchy  groundglass opacity in both lower lobes such as demonstrated the right  lower lobe on axial image 77 and this may be due to atelectasis or mild  infiltrate. No perihilar edema or pleural effusion.     Imaging to the upper abdomen demonstrates cholecystectomy clips.       Impression:      1. Mild patchy groundglass opacities in both lower lobes, greatest in  the right lower lobe. This may be due to mild groundglass infiltrate.  Atelectasis or scarring is  also in the differential diagnosis there is  dependent lower lobe atelectasis.  2. Previous cholecystectomy and Nissen fundoplication. Thickening of the  distal esophageal wall potentially related to esophagitis though  nonspecific. Small hiatal hernia.     Radiation dose reduction techniques were utilized, including automated  exposure control and exposure modulation based on body size.        This report was finalized on 3/17/2025 4:55 PM by Tavo Delgado M.D  on Workstation: [id number]       CT Angiogram Neck [[id number]] Collected: 03/17/25 1312     Updated: 03/17/25 1312    Narrative:      CT ANGIOGRAM NECK AND HEAD WITH CONTRAST     HISTORY: Vertigo.     COMPARISON: No prior CT angiogram of the neck or head is available for  comparison.     FINDINGS: Initially, a noncontrasted CT examination of the brain was  performed. The brain ventricles are symmetrical. There is no evidence of  hemorrhage, hydrocephalus or of acute infarction.     A CT angiogram of the neck and head was then performed. Multiplanar as  well as three-dimensional reconstructions were generated.     The great vessels are arranged in a bovine configuration. There is 0%  stenosis of the internal carotid arteries using NASCET criteria. The  distal aspects of the internal carotid arteries and the proximal aspects  of the anterior and middle cerebral arteries appear unremarkable.     Both vertebral arteries were opacified. Evaluation of the right  vertebral artery proximally is limited by beam hardening artifact from  dense venous contrast adjacent to the right vertebral artery. The mid  cervical segments and distal aspects of the vertebral arteries are of  relatively uniform caliber. The posterior inferior cerebral artery on  the right was not opacified. Instead, there appears to the a right  anterior inferior cerebellar artery/posterior inferior cerebral artery  complex. The left posterior inferior cerebellar artery crosses midline.  The  left vertebral artery beyond the posterior inferior cerebral artery  is markedly hypoplastic. The basilar artery is somewhat diminutive in  caliber. Fetal origins of the posterior cerebral arteries are  appreciated bilaterally. A severe stenosis involving the left P2/P3  junction is appreciated.     Sagittal reconstructions demonstrates moderate to severe loss of disc  height from C3-C6 and severe loss of disc height at C6-7. The facets are  fused to the left at C3-4.       Impression:      1.  There is no evidence of acute infarction or of intracranial  hemorrhage. The right posterior inferior cerebral artery is not  opacified. This may be a normal variant as the left posterior inferior  cerebral artery crosses midline and there is a right anterior inferior  cerebral artery/posterior inferior cerebellar artery complex. Further  evaluation could be performed with MRI examination of the brain to  assess for acute infarction.  2.  There is no evidence of carotid stenosis. There is a severe stenosis  involving the left P2/P3 junction.  3.  There is an isolated posterior circulation. Fetal origins of the  posterior cerebral arteries are appreciated bilaterally. The P1 segments  are hypoplastic. The left vertebral artery beyond the posterior inferior  cerebellar artery is markedly hypoplastic. Unfortunately, evaluation of  the proximal aspect of the right vertebral artery is significantly  hampered by beam hardening artifact from dense adjacent venous  opacification. Underlying stenosis cannot be excluded. Further  evaluation with a MRA of the neck with and without contrast is  recommended.  4.  NOTE: This is a preliminary report. The 3-dimensional  reconstructions are not yet available for review.              Radiation dose reduction techniques were utilized, including automated  exposure control and exposure modulation based on body size.          CT Angiogram Head [[id number]] Collected: 03/17/25 1312     Updated:  03/17/25 1312    Narrative:      CT ANGIOGRAM NECK AND HEAD WITH CONTRAST     HISTORY: Vertigo.     COMPARISON: No prior CT angiogram of the neck or head is available for  comparison.     FINDINGS: Initially, a noncontrasted CT examination of the brain was  performed. The brain ventricles are symmetrical. There is no evidence of  hemorrhage, hydrocephalus or of acute infarction.     A CT angiogram of the neck and head was then performed. Multiplanar as  well as three-dimensional reconstructions were generated.     The great vessels are arranged in a bovine configuration. There is 0%  stenosis of the internal carotid arteries using NASCET criteria. The  distal aspects of the internal carotid arteries and the proximal aspects  of the anterior and middle cerebral arteries appear unremarkable.     Both vertebral arteries were opacified. Evaluation of the right  vertebral artery proximally is limited by beam hardening artifact from  dense venous contrast adjacent to the right vertebral artery. The mid  cervical segments and distal aspects of the vertebral arteries are of  relatively uniform caliber. The posterior inferior cerebral artery on  the right was not opacified. Instead, there appears to the a right  anterior inferior cerebellar artery/posterior inferior cerebral artery  complex. The left posterior inferior cerebellar artery crosses midline.  The left vertebral artery beyond the posterior inferior cerebral artery  is markedly hypoplastic. The basilar artery is somewhat diminutive in  caliber. Fetal origins of the posterior cerebral arteries are  appreciated bilaterally. A severe stenosis involving the left P2/P3  junction is appreciated.     Sagittal reconstructions demonstrates moderate to severe loss of disc  height from C3-C6 and severe loss of disc height at C6-7. The facets are  fused to the left at C3-4.       Impression:      1.  There is no evidence of acute infarction or of intracranial  hemorrhage. The  right posterior inferior cerebral artery is not  opacified. This may be a normal variant as the left posterior inferior  cerebral artery crosses midline and there is a right anterior inferior  cerebral artery/posterior inferior cerebellar artery complex. Further  evaluation could be performed with MRI examination of the brain to  assess for acute infarction.  2.  There is no evidence of carotid stenosis. There is a severe stenosis  involving the left P2/P3 junction.  3.  There is an isolated posterior circulation. Fetal origins of the  posterior cerebral arteries are appreciated bilaterally. The P1 segments  are hypoplastic. The left vertebral artery beyond the posterior inferior  cerebellar artery is markedly hypoplastic. Unfortunately, evaluation of  the proximal aspect of the right vertebral artery is significantly  hampered by beam hardening artifact from dense adjacent venous  opacification. Underlying stenosis cannot be excluded. Further  evaluation with a MRA of the neck with and without contrast is  recommended.  4.  NOTE: This is a preliminary report. The 3-dimensional  reconstructions are not yet available for review.              Radiation dose reduction techniques were utilized, including automated  exposure control and exposure modulation based on body size.          XR Chest 1 View [[id number]] Collected: 03/17/25 1004     Updated: 03/17/25 1011    Narrative:      XR CHEST 1 VW-     DATE OF EXAM: 3/17/2025 9:50 AM     INDICATION: Chest Pain Triage Protocol.     COMPARISON: CT thoracic myelogram 4/18/2024. CT abdomen pelvis  12/2/2022.     TECHNIQUE: A single portable AP view of the chest was obtained.     FINDINGS:  Lordotic positioning. Overlying artifacts. Ill-defined bilateral  perihilar and bibasilar opacities. No focal lung consolidation. No  pneumothorax. Cardiomediastinal contours within normal limits given  technique. No acute osseous abnormality is identified.       Impression:      Ill-defined  bilateral perihilar and bibasilar opacities, which could  represent atelectasis and/or scarring with prominent epicardial fat.     This report was finalized on 3/17/2025 10:08 AM by Jordy Freeman MD on  Workstation: [id number]               I have reviewed the medications.     Discharge Medications        ASK your doctor about these medications        Instructions Start Date   albuterol sulfate  (90 Base) MCG/ACT inhaler  Commonly known as: PROVENTIL HFA;VENTOLIN HFA;PROAIR HFA   2 puffs, Every 4 Hours PRN      bisoprolol-hydrochlorothiazide 5-6.25 MG per tablet  Commonly known as: Ziac   1 tablet, Oral, Daily      fluticasone 50 MCG/ACT nasal spray  Commonly known as: FLONASE   2 sprays, Nasal, Daily      HYDROcodone-acetaminophen 5-325 MG per tablet  Commonly known as: NORCO   1 tablet, Oral, Every 8 Hours PRN, 30 day supply      hydrOXYzine 10 MG tablet  Commonly known as: ATARAX   10 mg, Oral, Every 4 Hours PRN      Ibuprofen 3 %, Gabapentin 10 %, Baclofen 2 %, lidocaine 4 %, Ketamine HCl 4 %   1-2 g, Topical, 3 to 4 Times Daily      levothyroxine 100 MCG tablet  Commonly known as: SYNTHROID, LEVOTHROID   100 mcg, Oral, Every Morning      lidocaine 5 %  Commonly known as: LIDODERM   1 patch, Transdermal, Every 24 Hours, Remove & Discard patch within 12 hours or as directed by MD      methocarbamol 500 MG tablet  Commonly known as: ROBAXIN   500 mg, Oral, 3 Times Daily PRN      ondansetron 4 MG tablet  Commonly known as: Zofran   4 mg, Oral, Every 8 Hours PRN      pantoprazole 40 MG EC tablet  Commonly known as: PROTONIX   40 mg, Oral, 2 Times Daily      pregabalin 50 MG capsule  Commonly known as: LYRICA   50 mg, Oral, 2 Times Daily      promethazine 25 MG tablet  Commonly known as: PHENERGAN   25 mg, Oral, Every 6 Hours PRN      sertraline 100 MG tablet  Commonly known as: ZOLOFT   100 mg, Oral, Daily      vitamin B-12 1000 MCG tablet  Commonly known as: CYANOCOBALAMIN   1,000 mcg, Daily               ---------------------------------------------------------------------------------------------  Assessment & Plan   Assessment/Problem List    Vertigo      Plan:    Dizziness   -Neurochecks every 4 hours   -Vital signs every 4 hours   -Cardiac monitoring   -CT Head -no evidence of acute infarction or intracranial hemorrhage.  -CTA Head/neck -no evidence of carotid stenosis.  There is severe stenosis involving the P2/P3 junction  -Neurology saw and evaluated the patient and concerned that this is peripheral in nature, low suspicion for stroke; they recommend getting a repeat CT head and ultrasounds of carotids for further evaluation since MRI brain and MRAs have been unable to be obtained secondary to her bladder stimulator and lack of remote.    -Repeat CT head negative.   -Physical therapy saw and evaluated the patient and left Tequila-Hallpike with positive and transitions and Epley performed; outpatient PT vestibular consult placed.  Orthostatics were also taken during PTs evaluation and patient was positive.    -Neurology suspected that the patient's vertigo was multifactorial from BPPV and orthostatic hypotension.  -Neuro does recommend that the patient start taking aspirin 81 mg for stroke risk from her chronic migraines.    -Carotid duplex ultrasound shows normal flow of carotids bilaterally and antegrade flow of right and left vertebral arteries.    -Cardiology saw and evaluated the patient and recommended to discontinue the bisoprolol and hydrochlorothiazide and they are starting her on a low-dose metoprolol 12.5 once a day.    -Echo obtained and showed normal EF of 61 to 65% and normal diastolic function.    -Will monitor with medication change and reevaluate in the morning.     Chronic neck and back pain  -Continue home dose Lyrica, lidocaine patch     -Monitor blood pressure  -Continue home blood pressure agents     Hypothyroidism  -Continue home dose levothyroxine  -TSH 0.916    Disposition: Dependent on  hospital course    Follow-up after Discharge: Pending on hospital course    This note will serve as a progress note    Randa Rosado PA-C 03/18/25 08:17 EDT    I have worn appropriate PPE during this patient encounter, sanitized my hands both with entering and exiting patient's room.      57 minutes has been spent by [facility] providers in the care of this patient while under observation status

## 2025-03-18 NOTE — PLAN OF CARE
Goal Outcome Evaluation:     Medicated for nausea with Zofran. Scopolamine patch applied and PRN meclizine administered for vertigo. MRI/MRA still pending.

## 2025-03-19 ENCOUNTER — READMISSION MANAGEMENT (OUTPATIENT)
Dept: CALL CENTER | Facility: HOSPITAL | Age: 75
End: 2025-03-19
Payer: MEDICARE

## 2025-03-19 ENCOUNTER — PATIENT MESSAGE (OUTPATIENT)
Dept: FAMILY MEDICINE CLINIC | Facility: CLINIC | Age: 75
End: 2025-03-19
Payer: MEDICARE

## 2025-03-19 VITALS
DIASTOLIC BLOOD PRESSURE: 62 MMHG | OXYGEN SATURATION: 95 % | BODY MASS INDEX: 34.96 KG/M2 | RESPIRATION RATE: 16 BRPM | WEIGHT: 190 LBS | HEART RATE: 48 BPM | SYSTOLIC BLOOD PRESSURE: 114 MMHG | TEMPERATURE: 98 F | HEIGHT: 62 IN

## 2025-03-19 PROBLEM — R42 VERTIGO: Status: RESOLVED | Noted: 2025-03-17 | Resolved: 2025-03-19

## 2025-03-19 LAB — TROPONIN T SERPL HS-MCNC: 9 NG/L

## 2025-03-19 PROCEDURE — 93005 ELECTROCARDIOGRAM TRACING: CPT | Performed by: NURSE PRACTITIONER

## 2025-03-19 PROCEDURE — 97530 THERAPEUTIC ACTIVITIES: CPT

## 2025-03-19 PROCEDURE — 84484 ASSAY OF TROPONIN QUANT: CPT | Performed by: NURSE PRACTITIONER

## 2025-03-19 PROCEDURE — 97110 THERAPEUTIC EXERCISES: CPT

## 2025-03-19 PROCEDURE — G0378 HOSPITAL OBSERVATION PER HR: HCPCS

## 2025-03-19 PROCEDURE — 99214 OFFICE O/P EST MOD 30 MIN: CPT | Performed by: INTERNAL MEDICINE

## 2025-03-19 RX ORDER — METOPROLOL SUCCINATE 25 MG/1
12.5 TABLET, EXTENDED RELEASE ORAL
Qty: 30 TABLET | Refills: 1 | Status: SHIPPED | OUTPATIENT
Start: 2025-03-20

## 2025-03-19 RX ORDER — MECLIZINE HYDROCHLORIDE 25 MG/1
25 TABLET ORAL 3 TIMES DAILY PRN
Qty: 20 TABLET | Refills: 0 | Status: SHIPPED | OUTPATIENT
Start: 2025-03-19

## 2025-03-19 RX ADMIN — PREGABALIN 50 MG: 50 CAPSULE ORAL at 10:48

## 2025-03-19 RX ADMIN — Medication 10 ML: at 10:47

## 2025-03-19 RX ADMIN — ASPIRIN 81 MG: 81 TABLET, COATED ORAL at 10:48

## 2025-03-19 RX ADMIN — PANTOPRAZOLE SODIUM 40 MG: 40 TABLET, DELAYED RELEASE ORAL at 10:48

## 2025-03-19 RX ADMIN — LEVOTHYROXINE SODIUM 100 MCG: 50 TABLET ORAL at 06:01

## 2025-03-19 NOTE — DISCHARGE INSTRUCTIONS
Return to the emergency department for symptoms recurrence or exacerbation  Monitor your heart rate and blood pressure daily and write it down  Wear compression stocking  Maintain healthy fluids intake  Referral has been placed for vestibular PT

## 2025-03-19 NOTE — DISCHARGE SUMMARY
ED OBSERVATION PROGRESS/DISCHARGE SUMMARY    Date of Admission: 3/17/2025   LOS: 0 days   PCP: Kehrer, Meredith Lea, MD        Hospital Outcome:   Jemima Bell is a 75 y.o. female, with a past medical history including, but not limited to, degenerative disc disease, anxiety, depression, hypertension, hyperlipidemia, hypothyroidism, migraine headaches, and pulmonary embolism, submitted to the observation unit with a complaint of dizziness.  Patient states that she woke up this morning and had room spinning dizziness confusion about nausea.  She states the dizziness was so bad that she had to use her walker to ambulate.  The dizziness is worse with movement or rapid eye movement.  She states she went to pain management to have a epidural/ablation with pain management this morning but was sent to the ER for evaluation of her dizziness.  She denies any numbness, tingling, paresthesias of her extremities or face and denies any difficulty with vision and/or speech.  Neurology has been consulted to see the patient.  Vestibular therapy has been consulted to see the patient in the AM.  MRI brain, MRA neck are pending as the patient has a bladder stimulator and does not have the remote with her at the hospital.     3/18/2025: Neurology saw and evaluated the patient and concerned that this is peripheral in nature, low suspicion for stroke; they recommend getting a repeat CT head and ultrasounds of carotids for further evaluation since MRI brain and MRAs have been unable to be obtained secondary to her bladder stimulator and lack of remote.  Repeat CT head negative.  Physical therapy saw and evaluated the patient and left [place]-Hallpike with positive and transitions and Epley performed; outpatient PT vestibular consult placed.  Orthostatics were also taken during PTs evaluation and patient was positive.  Neurology suspected that the patient's vertigo was multifactorial from BPPV and orthostatic hypotension.  Neuro does  recommend that the patient start taking aspirin 81 mg for stroke risk from her chronic migraines.  If CT head was stable neurology okay with patient discharging home and no further workup from their neuro point.  Carotid duplex ultrasound shows normal flow of carotids bilaterally and antegrade flow of right and left vertebral arteries.  Cardiology saw and evaluated the patient and recommended to discontinue the bisoprolol and hydrochlorothiazide and they are starting her on a low-dose metoprolol 12.5 once a day.  Echo obtained and showed normal EF of 61 to 65% and normal diastolic function.  Will monitor with medication change and reevaluate in the morning.     3/19/2025: No acute events overnight and patient is having episodes of bradycardia with a rate in the mid 40s and low 50s however at times heart rate goes up in the 60s.  Ambulated patient around the unit and her heart rate was in the 70s and remained asymptomatic.  Orthostatic BP negative and currently patient denies headache, lightheadedness, chest pain or shortness of breath.  Patient will be discharged home and to follow-up with her PCP outpatient.      ROS:  General: no fevers, chills  Respiratory: no cough, dyspnea  Cardiovascular: no chest pain, palpitations  Abdomen: No abdominal pain, nausea, vomiting, or diarrhea  Neurologic: No focal weakness    Objective   Physical Exam:  I have reviewed the vital signs.  Temp:  [98.1 °F (36.7 °C)-99.7 °F (37.6 °C)] 98.4 °F (36.9 °C)  Heart Rate:  [46-71] 49  Resp:  [16-18] 18  BP: ()/(43-76) 98/55  General Appearance:    Alert, cooperative, no distress  Head:    Normocephalic, atraumatic  Eyes:    Sclerae anicteric  Neck:   Supple, no mass  Lungs: Clear to auscultation bilaterally, respirations unlabored  Heart: Regular rate and rhythm, S1 and S2 normal, no murmur, rub or gallop  Abdomen:  Soft, nontender, bowel sounds active, nondistended  Extremities: No clubbing, cyanosis, or edema to lower  extremities  Pulses:  2+ and symmetric in distal lower extremities  Skin: No rashes   Neurologic: Oriented x3, Normal strength to extremities    Results Review:    I have reviewed the labs, radiology results and diagnostic studies.    Results from last 7 days   Lab Units 03/18/25  0316   WBC 10*3/mm3 6.34   HEMOGLOBIN g/dL 12.3   HEMATOCRIT % 37.3   PLATELETS 10*3/mm3 247     Results from last 7 days   Lab Units 03/18/25  0316 03/17/25  0955   SODIUM mmol/L 141 141   POTASSIUM mmol/L 3.9 4.1   CHLORIDE mmol/L 106 102   CO2 mmol/L 26.2 27.5   BUN mg/dL 18 20   CREATININE mg/dL 1.00 0.99   CALCIUM mg/dL 9.1 9.5   BILIRUBIN mg/dL 0.3 0.3   ALK PHOS U/L 67 82   ALT (SGPT) U/L 11 14   AST (SGOT) U/L 15 19   GLUCOSE mg/dL 95 164*     Imaging Results (Last 24 Hours)       Procedure Component Value Units Date/Time    CT Angiogram Neck [[id number]] Collected: 03/17/25 1312     Updated: 03/18/25 1451    Narrative:      CT ANGIOGRAM NECK AND HEAD WITH CONTRAST     HISTORY: Vertigo.     COMPARISON: No prior CT angiogram of the neck or head is available for  comparison.     FINDINGS: Initially, a noncontrasted CT examination of the brain was  performed. The brain ventricles are symmetrical. There is no evidence of  hemorrhage, hydrocephalus or of acute infarction.     A CT angiogram of the neck and head was then performed. Multiplanar as  well as three-dimensional reconstructions were generated.     The great vessels are arranged in a bovine configuration. There is 0%  stenosis of the internal carotid arteries using NASCET criteria. The  distal aspects of the internal carotid arteries and the proximal aspects  of the anterior and middle cerebral arteries appear unremarkable.     Both vertebral arteries were opacified. Evaluation of the right  vertebral artery proximally is limited by beam hardening artifact from  dense venous contrast adjacent to the right vertebral artery. The mid  cervical segments and distal aspects of the  vertebral arteries are of  relatively uniform caliber. The posterior inferior cerebral artery on  the right was not opacified. Instead, there appears to the a right  anterior inferior cerebellar artery/posterior inferior cerebral artery  complex. The left posterior inferior cerebellar artery crosses midline.  The left vertebral artery beyond the posterior inferior cerebral artery  is markedly hypoplastic. The basilar artery is somewhat diminutive in  caliber. Fetal origins of the posterior cerebral arteries are  appreciated bilaterally. A severe stenosis involving the left P2/P3  junction is appreciated.     Sagittal reconstructions demonstrates moderate to severe loss of disc  height from C3-C6 and severe loss of disc height at C6-7. The facets are  fused to the left at C3-4.       Impression:      1.  There is no evidence of acute infarction or of intracranial  hemorrhage. The right posterior inferior cerebral artery is not  opacified. This may be a normal variant as the left posterior inferior  cerebral artery crosses midline and there is a right anterior inferior  cerebral artery/posterior inferior cerebellar artery complex. Further  evaluation could be performed with MRI examination of the brain to  assess for acute infarction.  2.  There is no evidence of carotid stenosis. There is a severe stenosis  involving the left P2/P3 junction.  3.  There is an isolated posterior circulation. Fetal origins of the  posterior cerebral arteries are appreciated bilaterally. The P1 segments  are hypoplastic. The left vertebral artery beyond the posterior inferior  cerebellar artery is markedly hypoplastic. Unfortunately, evaluation of  the proximal aspect of the right vertebral artery is significantly  hampered by beam hardening artifact from dense adjacent venous  opacification. Underlying stenosis cannot be excluded. Further  evaluation with a MRA of the neck with and without contrast is  recommended.               Radiation dose reduction techniques were utilized, including automated  exposure control and exposure modulation based on body size.        This report was finalized on 3/18/2025 2:48 PM by Dr. Tu Aragon M.D  on Workstation: [id number]       CT Angiogram Head [[id number]] Collected: 03/17/25 1312     Updated: 03/18/25 1451    Narrative:      CT ANGIOGRAM NECK AND HEAD WITH CONTRAST     HISTORY: Vertigo.     COMPARISON: No prior CT angiogram of the neck or head is available for  comparison.     FINDINGS: Initially, a noncontrasted CT examination of the brain was  performed. The brain ventricles are symmetrical. There is no evidence of  hemorrhage, hydrocephalus or of acute infarction.     A CT angiogram of the neck and head was then performed. Multiplanar as  well as three-dimensional reconstructions were generated.     The great vessels are arranged in a bovine configuration. There is 0%  stenosis of the internal carotid arteries using NASCET criteria. The  distal aspects of the internal carotid arteries and the proximal aspects  of the anterior and middle cerebral arteries appear unremarkable.     Both vertebral arteries were opacified. Evaluation of the right  vertebral artery proximally is limited by beam hardening artifact from  dense venous contrast adjacent to the right vertebral artery. The mid  cervical segments and distal aspects of the vertebral arteries are of  relatively uniform caliber. The posterior inferior cerebral artery on  the right was not opacified. Instead, there appears to the a right  anterior inferior cerebellar artery/posterior inferior cerebral artery  complex. The left posterior inferior cerebellar artery crosses midline.  The left vertebral artery beyond the posterior inferior cerebral artery  is markedly hypoplastic. The basilar artery is somewhat diminutive in  caliber. Fetal origins of the posterior cerebral arteries are  appreciated bilaterally. A severe stenosis involving  the left P2/P3  junction is appreciated.     Sagittal reconstructions demonstrates moderate to severe loss of disc  height from C3-C6 and severe loss of disc height at C6-7. The facets are  fused to the left at C3-4.       Impression:      1.  There is no evidence of acute infarction or of intracranial  hemorrhage. The right posterior inferior cerebral artery is not  opacified. This may be a normal variant as the left posterior inferior  cerebral artery crosses midline and there is a right anterior inferior  cerebral artery/posterior inferior cerebellar artery complex. Further  evaluation could be performed with MRI examination of the brain to  assess for acute infarction.  2.  There is no evidence of carotid stenosis. There is a severe stenosis  involving the left P2/P3 junction.  3.  There is an isolated posterior circulation. Fetal origins of the  posterior cerebral arteries are appreciated bilaterally. The P1 segments  are hypoplastic. The left vertebral artery beyond the posterior inferior  cerebellar artery is markedly hypoplastic. Unfortunately, evaluation of  the proximal aspect of the right vertebral artery is significantly  hampered by beam hardening artifact from dense adjacent venous  opacification. Underlying stenosis cannot be excluded. Further  evaluation with a MRA of the neck with and without contrast is  recommended.              Radiation dose reduction techniques were utilized, including automated  exposure control and exposure modulation based on body size.        This report was finalized on 3/18/2025 2:48 PM by Dr. Tu Aragon M.D  on Workstation: [id number]       CT Head Without Contrast [[id number]] Collected: 03/18/25 1243     Updated: 03/18/25 1247    Narrative:      CT HEAD WO CONTRAST-     HISTORY:  stroke r/o 2/2 patient unable to get MRI; R29.90-Unspecified  symptoms and signs involving the nervous system; I10-Essential (primary)  hypertension; R93.89-Abnormal findings on  diagnostic imaging of other  specified body structures; R73.9-Hyperglycemia, unspecified;  R93.89-Abnormal findings on diagnostic imaging of other specified body  structures     COMPARISON: CT head 3/17/2025     FINDINGS: The brain and ventricles are symmetrical. There is no evidence  of intracranial hemorrhage, hydrocephalus or acute infarction. Mild  vascular calcification and mild small vessel ischemic disease is noted.  Further evaluation could be performed with MRI examination of the brain.                 Radiation dose reduction techniques were utilized, including automated  exposure modulation based on body size.     This report was finalized on 3/18/2025 12:44 PM by Dr. Tu rAagon M.D on Workstation: [id number]               I have reviewed the medications.     Discharge Medications        ASK your doctor about these medications        Instructions Start Date   albuterol sulfate  (90 Base) MCG/ACT inhaler  Commonly known as: PROVENTIL HFA;VENTOLIN HFA;PROAIR HFA   2 puffs, Every 4 Hours PRN      bisoprolol-hydrochlorothiazide 5-6.25 MG per tablet  Commonly known as: Ziac   1 tablet, Oral, Daily      fluticasone 50 MCG/ACT nasal spray  Commonly known as: FLONASE   2 sprays, Nasal, Daily      HYDROcodone-acetaminophen 5-325 MG per tablet  Commonly known as: NORCO   1 tablet, Oral, Every 8 Hours PRN, 30 day supply      hydrOXYzine 10 MG tablet  Commonly known as: ATARAX   10 mg, Oral, Every 4 Hours PRN      Ibuprofen 3 %, Gabapentin 10 %, Baclofen 2 %, lidocaine 4 %, Ketamine HCl 4 %   1-2 g, Topical, 3 to 4 Times Daily      levothyroxine 100 MCG tablet  Commonly known as: SYNTHROID, LEVOTHROID   100 mcg, Oral, Every Morning      lidocaine 5 %  Commonly known as: LIDODERM   1 patch, Transdermal, Every 24 Hours, Remove & Discard patch within 12 hours or as directed by MD      methocarbamol 500 MG tablet  Commonly known as: ROBAXIN   500 mg, Oral, 3 Times Daily PRN      ondansetron 4 MG  tablet  Commonly known as: Zofran   4 mg, Oral, Every 8 Hours PRN      pantoprazole 40 MG EC tablet  Commonly known as: PROTONIX   40 mg, Oral, 2 Times Daily      pregabalin 50 MG capsule  Commonly known as: LYRICA   50 mg, Oral, 2 Times Daily      promethazine 25 MG tablet  Commonly known as: PHENERGAN   25 mg, Oral, Every 6 Hours PRN      sertraline 100 MG tablet  Commonly known as: ZOLOFT   100 mg, Oral, Daily      vitamin B-12 1000 MCG tablet  Commonly known as: CYANOCOBALAMIN   1,000 mcg, Daily              ---------------------------------------------------------------------------------------------  Assessment & Plan   Assessment/Problem List    Vertigo      Plan:  Dizziness   -Neurochecks every 4 hours   -Vital signs every 4 hours   -Cardiac monitoring   -CT Head -no evidence of acute infarction or intracranial hemorrhage.  -CTA Head/neck -no evidence of carotid stenosis.  There is severe stenosis involving the P2/P3 junction  -Neurology saw and evaluated the patient and concerned that this is peripheral in nature, low suspicion for stroke; they recommend getting a repeat CT head and ultrasounds of carotids for further evaluation since MRI brain and MRAs have been unable to be obtained secondary to her bladder stimulator and lack of remote.    -Repeat CT head negative.   -Physical therapy saw and evaluated the patient and left [place]-Hallpike with positive and transitions and Epley performed; outpatient PT vestibular consult placed.  Orthostatics were also taken during PTs evaluation and patient was positive.    -Neurology suspected that the patient's vertigo was multifactorial from BPPV and orthostatic hypotension.  -Neuro does recommend that the patient start taking aspirin 81 mg for stroke risk from her chronic migraines.    -Carotid duplex ultrasound shows normal flow of carotids bilaterally and antegrade flow of right and left vertebral arteries.    -Cardiology saw and evaluated the patient and recommended  to discontinue the bisoprolol and hydrochlorothiazide and they are starting her on a low-dose metoprolol 12.5 once a day.    -Echo obtained and showed normal EF of 61 to 65% and normal diastolic function.    -Will monitor with medication change and reevaluate in the morning.     Chronic neck and back pain  -Continue home dose Lyrica, lidocaine patch     -Monitor blood pressure  -Continue home blood pressure agents     Hypothyroidism  -Continue home dose levothyroxine  -TSH 0.916    Disposition: Home    Follow-up after Discharge: PCP    This note will serve as a discharge summary    Marii Frank, MACHO 03/19/25 05:10 EDT    I have worn appropriate PPE during this patient encounter, sanitized my hands both with entering and exiting patient's room.      30 minutes has been spent by [facility] providers in the care of this patient while under observation status

## 2025-03-19 NOTE — CASE MANAGEMENT/SOCIAL WORK
Case Management Discharge Note      Final Note: Home         Selected Continued Care - Discharged on 3/19/2025 Admission date: 3/17/2025 - Discharge disposition: Home or Self Care      Destination    No services have been selected for the patient.                Durable Medical Equipment    No services have been selected for the patient.                Dialysis/Infusion    No services have been selected for the patient.                Home Medical Care    No services have been selected for the patient.                Therapy    No services have been selected for the patient.                Community Resources    No services have been selected for the patient.                Community & DME    No services have been selected for the patient.                    Selected Continued Care - Episodes Includes continued care and service providers with selected services from the active episodes listed below               Final Discharge Disposition Code: 01 - home or self-care

## 2025-03-19 NOTE — PROGRESS NOTES
[facility]  Cardiology Progress Note    Patient Identification:  Name: Jemima Bell  Age: 75 y.o.  Sex: female  :  1950  MRN: [id number]                 Follow Up / Chief Complaint: Sinus bradycardia    Interval History:       Subjective:  No chest pains or tightness in the chest    Objective:    Past Medical History:  Past Medical History:   Diagnosis Date    Acromioclavicular separation     Acute bronchitis     Acute sinusitis     Allergic 2022    Shrimp    Allergic rhinitis     Anemia Childhood    Anxiety     Arthritis     Arthritis of back     Arthropathy of cervical facet joint 3/14/2025    Asthma     Back pain     BMI 34.0-34.9,adult     Bronchitis, chronic     Bursitis of hip     Cataract     Cervical disc disorder     Cervicalgia     Chills     Cholelithiasis ?    Remival    Chronic diarrhea     Chronic pain disorder     Cluster headache     Colon polyp 2022    Deep vein thrombosis (DVT) of lower extremity     Depression     Dermatitis     Dislocation, shoulder     Dyspnea     Dysuria     Esophageal reflux     Extremity pain     Fatigue     Gastric ulcer     GERD (gastroesophageal reflux disease)     GI (gastrointestinal bleed) ?    Headache     Headache, tension-type     Heart murmur 1973    Hernia     Hiatal hernia     Hip arthrosis     Hypertension     Hypothyroidism     Irritable bowel syndrome     Low back strain     Lumbago     Lumbar stenosis with neurogenic claudication 2024    Lumbosacral disc disease     Migraine     Nausea     Neuritis     Osteoarthritis     Osteoarthritis of knee     Periarthritis of shoulder     Plantar fasciitis     Pneumonia 89y    Pulled muscle     Pulmonary embolism     Pyelonephritis     Rheumatic fever     Sore throat     Torticollis     Trapezius strain     Urinary incontinence     Urinary pain     Urinary tract infection     UTI symptoms     Vitamin B1 deficiency     Vitamin B12 deficiency     Vitamin D  deficiency     Weakness     Wheezing      Past Surgical History:  Past Surgical History:   Procedure Laterality Date    ABDOMINAL SURGERY      ADENOIDECTOMY      APPENDECTOMY      BACK SURGERY  6/30/24    CATARACT EXTRACTION      CATARACT EXTRACTION      bilateral eyes    CATARACT EXTRACTION      CERVICAL EPIDURAL N/A 06/26/2024    Procedure: cervical epidural steroid injection at C7/T1 90121;  Surgeon: Shey Aranda MD;  Location: [facility] MAIN OR;  Service: Pain Management;  Laterality: N/A;    CHOLECYSTECTOMY  2004?    COLONOSCOPY      COLONOSCOPY N/A 12/23/2022    Procedure: COLONOSCOPY to cecum and TI:  with biopsies, cold snare polyp,;  Surgeon: Reji Aguilar MD;  Location: [facility] ENDOSCOPY;  Service: Gastroenterology;  Laterality: N/A;  PREOP/ HX COLON POLYPS  POSTOP/  diverticulosis, polyp, hemorrhoids    ENDOSCOPY N/A 12/23/2022    Procedure: ESOPHAGOGASTRODUODENOSCOPY with biopsies;  Surgeon: Reji Aguilar MD;  [facility];  Service: Gastroenterology;  Laterality: N/A;  PREOP/ HX GASTRIC ULCER, DYSPEPSIA, UPPER ABDOMINAL PAIN  POSTOP/:  HH, gastritis, gastric polyps    EPIDURAL BLOCK      GALLBLADDER SURGERY      HEMORRHOIDECTOMY  1974 & 77?    HERNIA REPAIR  2008?    HYSTERECTOMY      INGUINAL HERNIA REPAIR      INTERSTIM PLACEMENT      for Bladder incontinence    JOINT REPLACEMENT      Knee    KNEE SURGERY      LAPAROSCOPIC COLON RESECTION  2005    LAPAROTOMY OOPHERECTOMY      LUMBAR DIRECT LATERAL INTERBODY FUSION N/A 07/30/2024    Procedure: lumbar 3 to sacral 1 transforaminal lumbar interbody fusion, lumbar 3 to sacral 1 fusion with neuro robot;  Surgeon: Michelet Rice IV, MD;  Location: [facility] MAIN OR;  Service: Robotics - Neuro;  Laterality: N/A;    LUMBAR EPIDURAL INJECTION N/A 03/27/2023    Procedure: Lumbar epidural steroid injection at L5-S1 58044;  Surgeon: Shey Aranda MD;  Location: [facility] MAIN OR;  Service: Pain Management;  Laterality: N/A;    LUMBAR EPIDURAL INJECTION N/A  03/13/2024    Procedure: lumbar epidural steroid injection at L4/5 85389;  Surgeon: Shey Aranda MD;  Location: [facility] MAIN OR;  Service: Pain Management;  Laterality: N/A;    LUMBAR FUSION N/A 07/30/2024    Procedure: LUMBAR TRANSFORAMINAL INTERBODY FUSION WITH NEURO ROBOT;  Surgeon: Michelet Rice IV, MD;  Location: [facility] MAIN OR;  Service: Robotics - Neuro;  Laterality: N/A;    NISSEN FUNDOPLICATION LAPAROSCOPIC      x2    ORTHOPEDIC SURGERY      Knee replacement    SACROILIAC JOINT INJECTION Left 05/12/2023    Procedure: Left SACROILIAC INJECTION 42611;  Surgeon: Shey Aranda MD;  Location: [facility] MAIN OR;  Service: Pain Management;  Laterality: Left;    SACROILIAC JOINT INJECTION Left 07/12/2023    Procedure: SACROILIAC INJECTION LEFT;  Surgeon: Shey Aranda MD;  Location: [facility] MAIN OR;  Service: Pain Management;  Laterality: Left;    SPINAL FUSION  625265    SPINE SURGERY  554617    TONSILLECTOMY      TONSILLECTOMY  1954?    TOTAL KNEE ARTHROPLASTY Left     TUBAL ABDOMINAL LIGATION      UPPER GASTROINTESTINAL ENDOSCOPY          Social History:   Social History     Tobacco Use    Smoking status: Never    Smokeless tobacco: Never   Substance Use Topics    Alcohol use: Never      Family History:  Family History   Problem Relation Age of Onset    Arthritis Mother     Depression Mother     Hyperlipidemia Mother     Hypertension Mother     Irritable bowel syndrome Mother     Dislocations Mother     Heart attack Mother     Heart failure Mother     Hypertension Father     Arthritis Father     Stroke Father     Alcohol abuse Maternal Grandfather     Depression Maternal Grandfather     Heart disease Other         IN FEMALES BEFORE AGE 65    Asthma Maternal Grandmother     Asthma Paternal Grandfather     Heart attack Maternal Uncle     Heart attack Paternal Uncle     Heart failure Paternal Uncle           Allergies:  Allergies   Allergen Reactions    Salicylates GI Intolerance     History of gi bleed       "Colestipol GI Intolerance    Biaxin [Clarithromycin]     Adhesive Tape Rash     Can use plastic tape, paper tape causes rash    Augmentin [Amoxicillin-Pot Clavulanate] Diarrhea    Prevacid [Lansoprazole] Itching and Swelling     Eyes only    Sulfa Antibiotics GI Intolerance    Sulfamethoxazole-Trimethoprim Nausea And Vomiting and GI Intolerance     \"makes me sick as a dog\"       Scheduled Meds:  aspirin, 81 mg, Daily  levothyroxine, 100 mcg, Q AM  Lidocaine, 1 patch, Q24H  metoprolol succinate XL, 12.5 mg, Q24H  pantoprazole, 40 mg, BID  pregabalin, 50 mg, BID  Scopolamine, 1 patch, Q72H  sertraline, 100 mg, Nightly  sodium chloride, 10 mL, Q12H            INTAKE AND OUTPUT:  No intake or output data in the 24 hours ending 03/19/25 1210    Review of Systems:   GI:  Cardiac: No chest  Pulmonary:    Constitutional:  Temp:  [98 °F (36.7 °C)-99.7 °F (37.6 °C)] 98 °F (36.7 °C)  Heart Rate:  [46-71] 48  Resp:  [16-18] 16  BP: ()/(55-76) 114/62    Physical Exam:  General:  Appears in no acute distress  Eyes: PERTL,  HEENT:  No JVD. Thyroid not visibly enlarged. No mucosal pallor or cyanosis  Respiratory: Respirations regular and unlabored at rest. BBS with good air entry in all fields. No crackles, rubs or wheezes auscultated  Cardiovascular: S1S2 Regular rate and rhythm. No murmur, rub or gallop. No carotid bruits. DP/PT pulses     . No pretibial pitting edema  Gastrointestinal: Abdomen soft, flat, non tender. Bowel sounds present. No hepatosplenomegaly. No ascites  Musculoskeletal: CARRASCO x4. No abnormal movements  Extremities: No digital clubbing or cyanosis  Skin: Color pink. Skin warm and dry to touch. No rashes    Neuro: AAO x3 CN II-XII grossly intact  Psych: Mood and affect normal, pleasant and cooperative          Cardiographics  Telemetry:     ECG:     Echocardiogram:     Lab Review   Results from last 7 days   Lab Units 03/19/25  0335 03/17/25  1121 03/17/25  0955   HSTROP T ng/L 9 9 7     Results from last 7 " "days   Lab Units 03/17/25  0955   MAGNESIUM mg/dL 2.1     Results from last 7 days   Lab Units 03/18/25  0316   SODIUM mmol/L 141   POTASSIUM mmol/L 3.9   BUN mg/dL 18   CREATININE mg/dL 1.00   CALCIUM mg/dL 9.1     @[id number]@  Results from last 7 days   Lab Units 03/18/25  0316 03/17/25  0955   WBC 10*3/mm3 6.34 6.97   HEMOGLOBIN g/dL 12.3 13.9   HEMATOCRIT % 37.3 41.9   PLATELETS 10*3/mm3 247 272     Results from last 7 days   Lab Units 03/17/25  0955   INR  0.99         Assessment:  Sinus bradycardia      Plan:    Ziac was switched over to metoprolol XL 12.5 bradycardia is better continues to have the episodes of bradycardia while laying down patient can be discharged and be followed up as an outpatient      )3/19/2025  MD MARIAA Reich/Transcription:   \"Dictated utilizing Dragon dictation\".     "

## 2025-03-19 NOTE — PLAN OF CARE
Goal Outcome Evaluation:  Plan of Care Reviewed With: patient        Progress: improving  Outcome Evaluation: Pt denies dizziness today, her orthostatics were negative (see below). Pt safely ambulated 80ft SBA no AD, no LOB. pt reports feeling better, anticipate home today. consider OP PT in future as needed. HEP and education provided.    Anticipated Discharge Disposition (PT): home with home health, home with outpatient therapy services                108/62  47    supine  97/61  63      standing 1 min  106*69  60    standing 3 min

## 2025-03-19 NOTE — THERAPY TREATMENT NOTE
Acute Care - Physical Therapy Treatment Note  [facility]     Patient Name: Jemima Bell  : 1950  MRN: [id number]  Today's Date: 3/19/2025   Onset of Illness/Injury or Date of Surgery: 25  Visit Dx:     ICD-10-CM ICD-9-CM   1. Stroke-like symptoms  R29.90 781.99   2. Hypertension, unspecified type  I10 401.9   3. Abnormal computed tomography angiography (CTA) of neck  R93.89 793.99   4. Hyperglycemia  R73.9 790.29   5. Abnormal CT of the chest  R93.89 793.2   6. Vertigo  R42 780.4     Patient Active Problem List   Diagnosis    Wheezing    Osteoarthritis    Acute bronchitis    Dyspnea    Hypothyroidism    Shortness of breath    Vitamin D deficiency    Depression with anxiety    Muscle cramp    Back pain    Vitamin B12 deficiency    UTI (urinary tract infection)    Abnormal EKG    Precordial pain    Anxiety    Borderline systolic HTN    Left upper quadrant abdominal pain    Diarrhea    Gastroesophageal reflux disease    History of Nissen fundoplication    Incontinence of feces with fecal urgency    Foraminal stenosis of lumbar region    Lumbar degenerative disc disease    Lumbar facet arthropathy    Lumbar radiculopathy    Sacroiliac joint dysfunction of both sides    Lumbar stenosis with neurogenic claudication    Cervical radiculopathy    Neck pain    Heart murmur    Preop cardiovascular exam    Arthropathy of cervical facet joint     Past Medical History:   Diagnosis Date    Acromioclavicular separation     Acute bronchitis     Acute sinusitis     Allergic 2022    Shrimp    Allergic rhinitis     Anemia Childhood    Anxiety     Arthritis     Arthritis of back     Arthropathy of cervical facet joint 3/14/2025    Asthma     Back pain     BMI 34.0-34.9,adult     Bronchitis, chronic     Bursitis of hip     Cataract     Cervical disc disorder     Cervicalgia     Chills     Cholelithiasis ?    Remival    Chronic diarrhea     Chronic pain disorder     Cluster headache     Colon polyp  12/2022    Deep vein thrombosis (DVT) of lower extremity     Depression     Dermatitis     Dislocation, shoulder     Dyspnea     Dysuria     Esophageal reflux     Extremity pain     Fatigue     Gastric ulcer     GERD (gastroesophageal reflux disease)     GI (gastrointestinal bleed) 2004?    Headache     Headache, tension-type     Heart murmur 1973    Hernia     Hiatal hernia 2007    Hip arthrosis     Hypertension     Hypothyroidism     Irritable bowel syndrome     Low back strain     Lumbago     Lumbar stenosis with neurogenic claudication 05/01/2024    Lumbosacral disc disease     Migraine     Nausea     Neuritis     Osteoarthritis     Osteoarthritis of knee     Periarthritis of shoulder     Plantar fasciitis     Pneumonia 89y    Pulled muscle     Pulmonary embolism     Pyelonephritis     Rheumatic fever     Sore throat     Torticollis     Trapezius strain     Urinary incontinence     Urinary pain     Urinary tract infection     UTI symptoms     Vitamin B1 deficiency     Vitamin B12 deficiency     Vitamin D deficiency     Weakness     Wheezing      Past Surgical History:   Procedure Laterality Date    ABDOMINAL SURGERY      ADENOIDECTOMY      APPENDECTOMY      BACK SURGERY  6/30/24    CATARACT EXTRACTION      CATARACT EXTRACTION      bilateral eyes    CATARACT EXTRACTION      CERVICAL EPIDURAL N/A 06/26/2024    Procedure: cervical epidural steroid injection at C7/T1 13971;  Surgeon: Shey Aranda MD;  Location: [facility] [place] OR;  Service: Pain Management;  Laterality: N/A;    CHOLECYSTECTOMY  2004?    COLONOSCOPY      COLONOSCOPY N/A 12/23/2022    Procedure: COLONOSCOPY to cecum and TI:  with biopsies, cold snare polyp,;  Surgeon: Reji Aguilar MD;  Location: [facility] ENDOSCOPY;  Service: Gastroenterology;  Laterality: N/A;  PREOP/ HX COLON POLYPS  POSTOP/  diverticulosis, polyp, hemorrhoids    ENDOSCOPY N/A 12/23/2022    Procedure: ESOPHAGOGASTRODUODENOSCOPY with biopsies;  Surgeon: Reji Aguilar MD;  Location:   [facility] ENDOSCOPY;  Service: Gastroenterology;  Laterality: N/A;  PREOP/ HX GASTRIC ULCER, DYSPEPSIA, UPPER ABDOMINAL PAIN  POSTOP/:  HH, gastritis, gastric polyps    EPIDURAL BLOCK      GALLBLADDER SURGERY      HEMORRHOIDECTOMY  1974 & 77?    HERNIA REPAIR  2008?    HYSTERECTOMY      INGUINAL HERNIA REPAIR      INTERSTIM PLACEMENT      for Bladder incontinence    JOINT REPLACEMENT      Knee    KNEE SURGERY      LAPAROSCOPIC COLON RESECTION  2005    LAPAROTOMY OOPHERECTOMY      LUMBAR DIRECT LATERAL INTERBODY FUSION N/A 07/30/2024    Procedure: lumbar 3 to sacral 1 transforaminal lumbar interbody fusion, lumbar 3 to sacral 1 fusion with neuro robot;  Surgeon: Michelet Rice IV, MD;  Location: [facility] MAIN OR;  Service: Robotics - Neuro;  Laterality: N/A;    LUMBAR EPIDURAL INJECTION N/A 03/27/2023    Procedure: Lumbar epidural steroid injection at L5-S1 81677;  Surgeon: Shey Aranda MD;  Location: [facility] MAIN OR;  Service: Pain Management;  Laterality: N/A;    LUMBAR EPIDURAL INJECTION N/A 03/13/2024    Procedure: lumbar epidural steroid injection at L4/5 02341;  Surgeon: Shey Aranda MD;  Location: [facility] MAIN OR;  Service: Pain Management;  Laterality: N/A;    LUMBAR FUSION N/A 07/30/2024    Procedure: LUMBAR TRANSFORAMINAL INTERBODY FUSION WITH NEURO ROBOT;  Surgeon: Michelet Rice IV, MD;  Location: [facility] MAIN OR;  Service: Robotics - Neuro;  Laterality: N/A;    NISSEN FUNDOPLICATION LAPAROSCOPIC      x2    ORTHOPEDIC SURGERY      Knee replacement    SACROILIAC JOINT INJECTION Left 05/12/2023    Procedure: Left SACROILIAC INJECTION 45213;  Surgeon: Shey Aranda MD;  Location: [facility] MAIN OR;  Service: Pain Management;  Laterality: Left;    SACROILIAC JOINT INJECTION Left 07/12/2023    Procedure: SACROILIAC INJECTION LEFT;  Surgeon: Shey Aranda MD;  Location: [facility]IN OR;  Service: Pain Management;  Laterality: Left;    SPINAL FUSION  090148    SPINE SURGERY  097800    TONSILLECTOMY      TONSILLECTOMY   1954?    TOTAL KNEE ARTHROPLASTY Left     TUBAL ABDOMINAL LIGATION      UPPER GASTROINTESTINAL ENDOSCOPY       PT Assessment (Last 12 Hours)       PT Evaluation and Treatment       [facility] Name 03/19/25 1100          Physical Therapy Time and Intention    Subjective Information no complaints  -     Document Type therapy note (daily note)  -     Mode of Treatment individual therapy;physical therapy  -     Patient Effort excellent  -     Symptoms Noted During/After Treatment none  -       Row Name 03/19/25 1100          General Information    Patient Observations alert;cooperative;agree to therapy  -     Existing Precautions/Restrictions fall  -     Barriers to Rehab none identified  -[facility] Name 03/19/25 1100          Living Environment    Current Living Arrangements home  -[facility] Name 03/19/25 1100          Pain    Pretreatment Pain Rating 0/10 - no pain  -     Posttreatment Pain Rating 0/10 - no pain  -[facility] Name 03/19/25 1100          Cognition    Affect/Mental Status (Cognition) WFL  -[facility] Name 03/19/25 1100          Bed Mobility    Supine-Sit [place] (Bed Mobility) modified independence  -     Sit-Supine [place] (Bed Mobility) modified independence  -     Assistive Device (Bed Mobility) bed rails  -[facility] Name 03/19/25 1100          Sit-Stand Transfer    Sit-Stand [place] (Transfers) independent  -[facility] Name 03/19/25 1100          Stand-Sit Transfer    Stand-Sit [place] (Transfers) independent  -[facility] Name 03/19/25 1100          Gait/Stairs (Locomotion)    Pierce Level (Gait) independent  -     Distance in Feet (Gait) 80  -     Pattern (Gait) step-through  -[facility] Name 03/19/25 1100          Balance    Balance Assessment sitting static balance;standing static balance  -     Static Sitting Balance independent  -     Position, Sitting Balance unsupported;sitting edge of bed  -     Static Standing Balance  independent  -       Row Name 03/19/25 1100          Plan of Care Review    Plan of Care Reviewed With patient  -     Progress improving  -     Outcome Evaluation Pt denies dizziness today, her orthostatics were negative (see below). Pt safely ambulated 80ft SBA no AD, no LOB. pt reports feeling better, anticipate home today. consider OP PT in future as needed. HEP and education provided.  -       Row Name 03/19/25 1100          Positioning and Restraints    Pre-Treatment Position in bed  -     Post Treatment Position bed  -     In Bed sitting EOB;call light within reach;encouraged to call for assist  -               User Key  (r) = Recorded By, (t) = Taken By, (c) = Cosigned By      Initials Name Provider Type     Adelita Carlson, PT Physical Therapist                    Physical Therapy Education       Title: PT OT SLP Therapies (Done)       Topic: Physical Therapy (Done)       Point: Mobility training (Done)       Learning Progress Summary            Patient Acceptance, E,H, VU,DU by  at 3/19/2025 1152    Acceptance, E,H, VU,NR by  at 3/18/2025 1355    Comment: post epley instructions, vertigo handout                      Point: Home exercise program (Done)       Learning Progress Summary            Patient Acceptance, E,H, VU,DU by  at 3/19/2025 1152    Acceptance, E,H, VU,NR by  at 3/18/2025 1355    Comment: post epley instructions, vertigo handout                      Point: Body mechanics (Done)       Learning Progress Summary            Patient Acceptance, E,H, VU,DU by  at 3/19/2025 1152    Acceptance, E,H, VU,NR by  at 3/18/2025 1355    Comment: post epley instructions, vertigo handout                      Point: Precautions (Done)       Learning Progress Summary            Patient Acceptance, E,H, VU,DU by  at 3/19/2025 1152    Acceptance, E,H, VU,NR by  at 3/18/2025 1355    Comment: post epley instructions, vertigo handout                                      User Key        Initials Effective Dates Name Provider Type Discipline     06/16/21 -  Adelita Carlson, PT Physical Therapist PT                  PT Recommendation and Plan  Anticipated Discharge Disposition (PT): home with home health, home with outpatient therapy services  Planned Therapy Interventions (PT): balance training, bed mobility training, gait training, home exercise program, ROM (range of motion), stair training, strengthening, stretching, transfer training  Therapy Frequency (PT): 6 times/wk  Plan of Care Reviewed With: patient  Progress: improving  Outcome Evaluation: Pt denies dizziness today, her orthostatics were negative (see below). Pt safely ambulated 80ft SBA no AD, no LOB. pt reports feeling better, anticipate home today. consider OP PT in future as needed. HEP and education provided.   Outcome Measures       Row Name 03/19/25 1100 03/18/25 1300          How much help from another person do you currently need...    Turning from your back to your side while in flat bed without using bedrails? 4  - 4  -LH     Moving from lying on back to sitting on the side of a flat bed without bedrails? 4  - 4  -LH     Moving to and from a bed to a chair (including a wheelchair)? 4  - 3  -LH     Standing up from a chair using your arms (e.g., wheelchair, bedside chair)? 4  - 3  -LH     Climbing 3-5 steps with a railing? 4  - 3  -LH     To walk in hospital room? 4  - 3  -LH     AM-PAC 6 Clicks Score (PT) 24  - 20  -        Functional Assessment    Outcome Measure Options AM-PAC 6 Clicks Basic Mobility (PT)  - AM-PAC 6 Clicks Basic Mobility (PT)  -               User Key  (r) = Recorded By, (t) = Taken By, (c) = Cosigned By      Initials Name Provider Type     Adelita Carlson, PT Physical Therapist                     Time Calculation:    PT Charges       Row Name 03/19/25 1153             Time Calculation    Start Time 1010  -      Stop Time 1035  -      Time Calculation (min) 25 min  -      PT  Received On 03/19/25  -                User Key  (r) = Recorded By, (t) = Taken By, (c) = Cosigned By      Initials Name Provider Type     Adelita Carlson, PT Physical Therapist                  Therapy Charges for Today       Code Description Service Date Service Provider Modifiers Qty    74643979990 HC PT EVAL MOD COMPLEXITY 3 3/18/2025 Adelita Carlson, PT GP 1    15662111185 HC PT CANALITH REPOSITIONING PER DAY 3/18/2025 Adelita Carlson, PT GP 1    97005908659 HC PT THER PROC EA 15 MIN 3/18/2025 Adelita Carlson, PT GP 1    17448177823 HC PT THER PROC EA 15 MIN 3/19/2025 Adelita Carlson, PT GP 1    95566441173 HC PT THERAPEUTIC ACT EA 15 MIN 3/19/2025 Adelita Carlson, PT GP 1            PT G-Codes  Outcome Measure Options: AM-PAC 6 Clicks Basic Mobility (PT)  AM-PAC 6 Clicks Score (PT): 24    Adelita Carlson, PT  3/19/2025

## 2025-03-19 NOTE — CASE MANAGEMENT/SOCIAL WORK
Discharge Planning Assessment  [facility]     Patient Name: Jemima Bell  MRN: [id number]  Today's Date: 3/19/2025    Admit Date: 3/17/2025    Plan: Home with spouse   Discharge Needs Assessment    No documentation.                  Discharge Plan       Row Name 03/19/25 1211       Plan    Plan Comments Received call from patient's primary RN informing that patient does not have ride home from the hospital. Requesting Lyft ride. Lyft will be arranged for patient per her request. KARI Cho RN                  Selected Continued Care - Episodes Includes continued care and service providers with selected services from the active episodes listed below          Expected Discharge Date and Time       Expected Discharge Date Expected Discharge Time    Mar 19, 2025            Demographic Summary    No documentation.                  Functional Status    No documentation.                  Psychosocial    No documentation.                  Abuse/Neglect    No documentation.                  Legal    No documentation.                  Substance Abuse    No documentation.                  Patient Forms    No documentation.                     Miguel Lara, RN

## 2025-03-19 NOTE — PLAN OF CARE
Goal Outcome Evaluation:              Outcome Evaluation: Pt. 75 yr old AOX4 and able to verbalized needs. IV removed. Discharged summary provided and educations completed. Medications delivered to bed per [facility]. Pt instructed to follow up with PCP. Pt is leaving with a PT therapy referral.  Pt gathered all her belongings and she did not have any other questions at the time of discharged. Transportation was provided per [facility].

## 2025-03-19 NOTE — PROGRESS NOTES
RADHA SCHWARTZ Attestation Note     I supervised care provided by the midlevel provider. We have discussed this patient's history, physical exam, and treatment plan. I have reviewed the midlevel provider's note and I agree with the midlevel provider's findings and plan of care.   SHARED VISIT: This visit was performed by BOTH a physician and an APC. The substantive portion of the medical decision making was performed by this attesting physician who made or approved the management plan and takes responsibility for patient management. All studies in the APC note (if performed) were independently interpreted by me.   I have personally had a face to face encounter with the patient.   My personal findings are documented below:      Subjective  Pt is a 75 y.o. female admitted from Emergency Department for evaluation and treatment of dizziness worsened with standing or changing head motion.  Symptoms are markedly improved but not fully back to baseline at this point.    Physical Exam  GENERAL: Alert and in NAD, Vitals reviewed-blood pressure 108/62, pulse 49  HENT: nares patent  EYES: no scleral icterus  CV: regular rhythm, regular rate-no murmur, slightly bradycardic  RESPIRATORY: normal effort, clear to auscultation bilaterally-O2 sats mid 90s room air  ABDOMEN: soft  MUSCULOSKELETAL: no deformity  NEURO: Strength sensation and coordination are grossly intact.  Speech and mentation are unremarkable   gait stable without significant ataxia.  Cerebellar testing including finger-nose, rapid altering movements and heel-to-shin within normal limits  SKIN: warm, dry    Assessment/Plan  I discussed tx and evaluation of this patient with MACHO Sneed.  Dizziness likely related to peripheral vertigo and significantly improved after Epley maneuver.  There may also be a component of orthostatic hypotension causing her dizziness.  Appreciate medication changes suggested by Dr. Littlejohn.  Patient doing better and should be able to  be discharged later today.

## 2025-03-19 NOTE — OUTREACH NOTE
Prep Survey      Flowsheet Row Responses   [facility] facility patient discharged from? [place]   Is LACE score < 7 ? Yes   Eligibility [facility]   Date of Admission 03/17/25   Date of Discharge 03/19/25   Discharge Disposition Home or Self Care   Discharge diagnosis Vertigo   Does the patient have one of the following disease processes/diagnoses(primary or secondary)? Other   Does the patient have Home health ordered? No   Is there a DME ordered? No   Prep survey completed? Yes            ALAINA STONE - Registered Nurse

## 2025-03-19 NOTE — PLAN OF CARE
Goal Outcome Evaluation:      PRN Tylenol administered x1. PRN meclizine x1 for dizziness/vertigo. Orthostatic BPs negative this shift.

## 2025-03-20 ENCOUNTER — TRANSITIONAL CARE MANAGEMENT TELEPHONE ENCOUNTER (OUTPATIENT)
Dept: CALL CENTER | Facility: HOSPITAL | Age: 75
End: 2025-03-20
Payer: MEDICARE

## 2025-03-20 ENCOUNTER — HOSPITAL ENCOUNTER (OUTPATIENT)
Dept: PHYSICAL THERAPY | Facility: HOSPITAL | Age: 75
Setting detail: THERAPIES SERIES
Discharge: HOME | End: 2025-03-20
Payer: MEDICARE

## 2025-03-20 DIAGNOSIS — R42 VERTIGO: Primary | ICD-10-CM

## 2025-03-20 DIAGNOSIS — H81.10 BENIGN PAROXYSMAL POSITIONAL VERTIGO, UNSPECIFIED LATERALITY: ICD-10-CM

## 2025-03-20 PROCEDURE — 97162 PT EVAL MOD COMPLEX 30 MIN: CPT

## 2025-03-20 NOTE — THERAPY EVALUATION
Outpatient Physical Therapy Vestibular Initial Evaluation  [facility]     Patient Name: Jemima Bell  : 1950  MRN: [id number]  Today's Date: 3/20/2025      Visit Date: 2025    Patient Active Problem List   Diagnosis    Wheezing    Osteoarthritis    Acute bronchitis    Dyspnea    Hypothyroidism    Shortness of breath    Vitamin D deficiency    Depression with anxiety    Muscle cramp    Back pain    Vitamin B12 deficiency    UTI (urinary tract infection)    Abnormal EKG    Precordial pain    Anxiety    Borderline systolic HTN    Left upper quadrant abdominal pain    Diarrhea    Gastroesophageal reflux disease    History of Nissen fundoplication    Incontinence of feces with fecal urgency    Foraminal stenosis of lumbar region    Lumbar degenerative disc disease    Lumbar facet arthropathy    Lumbar radiculopathy    Sacroiliac joint dysfunction of both sides    Lumbar stenosis with neurogenic claudication    Cervical radiculopathy    Neck pain    Heart murmur    Preop cardiovascular exam    Arthropathy of cervical facet joint        Past Medical History:   Diagnosis Date    Acromioclavicular separation     Acute bronchitis     Acute sinusitis     Allergic 2022    Shrimp    Allergic rhinitis     Anemia Childhood    Anxiety     Arthritis     Arthritis of back     Arthropathy of cervical facet joint 3/14/2025    Asthma     Back pain     BMI 34.0-34.9,adult     Bronchitis, chronic     Bursitis of hip     Cataract     Cervical disc disorder     Cervicalgia     Chills     Cholelithiasis 2004?    Remival    Chronic diarrhea     Chronic pain disorder     Cluster headache     Colon polyp 2022    Deep vein thrombosis (DVT) of lower extremity     Depression     Dermatitis     Dislocation, shoulder     Dyspnea     Dysuria     Esophageal reflux     Extremity pain     Fatigue     Gastric ulcer     GERD (gastroesophageal reflux disease)     GI (gastrointestinal bleed) 2004?    Headache      Headache, tension-type     Heart murmur 1973    Hernia     Hiatal hernia 2007    Hip arthrosis     Hypertension     Hypothyroidism     Irritable bowel syndrome     Low back strain     Lumbago     Lumbar stenosis with neurogenic claudication 05/01/2024    Lumbosacral disc disease     Migraine     Nausea     Neuritis     Osteoarthritis     Osteoarthritis of knee     Periarthritis of shoulder     Plantar fasciitis     Pneumonia 89y    Pulled muscle     Pulmonary embolism     Pyelonephritis     Rheumatic fever     Sore throat     Torticollis     Trapezius strain     Urinary incontinence     Urinary pain     Urinary tract infection     UTI symptoms     Vitamin B1 deficiency     Vitamin B12 deficiency     Vitamin D deficiency     Weakness     Wheezing         Past Surgical History:   Procedure Laterality Date    ABDOMINAL SURGERY      ADENOIDECTOMY      APPENDECTOMY      BACK SURGERY  6/30/24    CATARACT EXTRACTION      CATARACT EXTRACTION      bilateral eyes    CATARACT EXTRACTION      CERVICAL EPIDURAL N/A 06/26/2024    Procedure: cervical epidural steroid injection at C7/T1 45505;  Surgeon: Shey Aranda MD;  Location[place] [facility] [place];  Service: Pain Management;  Laterality: N/A;    CHOLECYSTECTOMY  2004?    COLONOSCOPY      COLONOSCOPY N/A 12/23/2022    Procedure: COLONOSCOPY to cecum and TI:  with biopsies, cold snare polyp,;  Surgeon: Reji Aguilar MD;  Location: [facility] ENDOSCOPY;  Service: Gastroenterology;  Laterality: N/A;  PREOP/ HX COLON POLYPS  POSTOP/  diverticulosis, polyp, hemorrhoids    ENDOSCOPY N/A 12/23/2022    Procedure: ESOPHAGOGASTRODUODENOSCOPY with biopsies;  Surgeon: Reji Aguilar MD;  Location: [facility] ENDOSCOPY;  Service: Gastroenterology;  Laterality: N/A;  PREOP/ HX GASTRIC ULCER, DYSPEPSIA, UPPER ABDOMINAL PAIN  POSTOP/:  HH, gastritis, gastric polyps    EPIDURAL BLOCK      GALLBLADDER SURGERY      HEMORRHOIDECTOMY  1974 & 77?    HERNIA REPAIR  2008?    HYSTERECTOMY      INGUINAL HERNIA  REPAIR      INTERSTIM PLACEMENT      for Bladder incontinence    JOINT REPLACEMENT      Knee    KNEE SURGERY      LAPAROSCOPIC COLON RESECTION  2005    LAPAROTOMY OOPHERECTOMY      LUMBAR DIRECT LATERAL INTERBODY FUSION N/A 07/30/2024    Procedure: lumbar 3 to sacral 1 transforaminal lumbar interbody fusion, lumbar 3 to sacral 1 fusion with neuro robot;  Surgeon: Michelet Rice IV, MD;  Location: [facility] MAIN OR;  Service: Robotics - Neuro;  Laterality: N/A;    LUMBAR EPIDURAL INJECTION N/A 03/27/2023    Procedure: Lumbar epidural steroid injection at L5-S1 68295;  Surgeon: hSey Aranda MD;  Location: [facility] MAIN OR;  Service: Pain Management;  Laterality: N/A;    LUMBAR EPIDURAL INJECTION N/A 03/13/2024    Procedure: lumbar epidural steroid injection at L4/5 83172;  Surgeon: Shey Aranda MD;  Location: [facility] MAIN OR;  Service: Pain Management;  Laterality: N/A;    LUMBAR FUSION N/A 07/30/2024    Procedure: LUMBAR TRANSFORAMINAL INTERBODY FUSION WITH NEURO ROBOT;  Surgeon: Michelet Rice IV, MD;  Location: [facility] MAIN OR;  Service: Robotics - Neuro;  Laterality: N/A;    NISSEN FUNDOPLICATION LAPAROSCOPIC      x2    ORTHOPEDIC SURGERY      Knee replacement    SACROILIAC JOINT INJECTION Left 05/12/2023    Procedure: Left SACROILIAC INJECTION 45536;  Surgeon: Shey Aranda MD;  Location: [facility] MAIN OR;  Service: Pain Management;  Laterality: Left;    SACROILIAC JOINT INJECTION Left 07/12/2023    Procedure: SACROILIAC INJECTION LEFT;  Surgeon: Shey Aranda MD;  Location: [facility] MAIN [facility];  Service: Pain Management;  Laterality: Left;    SPINAL FUSION  323882    SPINE SURGERY  537828    TONSILLECTOMY      TONSILLECTOMY  1954?    TOTAL KNEE ARTHROPLASTY Left     TUBAL ABDOMINAL LIGATION      UPPER GASTROINTESTINAL ENDOSCOPY           Visit Dx:     ICD-10-CM ICD-9-CM   1. Vertigo  R42 780.4   2. Benign paroxysmal positional vertigo, unspecified laterality  H81.10 386.11        Patient History       Row Name 03/20/25  1400             History    Chief Complaint Dizziness  -MP      Date Current Problem(s) Began 03/17/25  -MP      Brief Description of Current Complaint Pt. Presents to clinic for vestibular evaluation. Onset of symptoms 3/17/2025 prompting her to go to ED for work up related to dizziness, nausea, fullness in B ears. She had been scheduled for ablation c-spine when symptoms developed and was found to be bradycardic. She was in ED until 3/19 and discharged home. She continues to feel pressure/fullness in B ears, tinnitus, and lightheaded and dizzy. She was treated by PT for L PC BPPV in ED but following maneuver she was gone for X-Ray and CT so uncertain if that affected the outcome, she did temporarily feel improvement. She was prescribed meclizine, has not taken any today. She is unable to determine any provoking factors, may feel increase symptoms if standing too quickly or rolling in bed to R. She did have (+) orthostatics in ED. She has had intermittent headaches, onset of tinnitus with symptoms, denies any recent infections/illness.  -MP      Previous treatment for THIS PROBLEM Medication  -MP      Patient/Caregiver Goals Relief from dizziness;Know what to do to help the symptoms  -MP      Occupation/sports/leisure activities history lumbar surgery 7/20024 impacting mobility since - fusion  -MP      What clinical tests have you had for this problem? CT scan;X-ray  -MP      Results of Clinical Tests see epic  -MP         Pain     Pain at Present 0  -MP      Pain at Best 0  -MP      Pain at Worst 0  -MP      Difficulties with ADL's? no  -MP         Fall Risk Assessment    Any falls in the past year: No  -MP         Services    Prior Rehab/Home Health Experiences No  -MP         Daily Activities    Primary Language English  -MP      Are you able to read Yes  -MP      Are you able to write Yes  -MP      How does patient learn best? Listening;Reading;Demonstration  -MP      Does patient have problems with the  following? None  -MP      Barriers to learning None  -MP      Pt Participated in POC and Goals Yes  -MP                User Key  (r) = Recorded By, (t) = Taken By, (c) = Cosigned By      Initials Name Provider Type    MP Georgiana Beaulieu PT Physical Therapist                     Vestibular Eval       Row Name 03/20/25 1400             Occulomotor Exam Fixation Present    Occular ROM Normal  -MP      Spontaneous Nystagmus Absent  -MP      Gaze-induced Nystagmus Absent  -MP      Smooth Pursuit Normal  -MP      Saccades Intact  -MP         Positional Testing    Positional Testing With infrared goggles  -MP      Vertebrobasilar Artery Screen - Right Negative  -MP      Vertebrobasilar Artery Screen - Left Negative  -MP      [place]-Hallpike Right No nystagmus  -MP      [place]-Hallpike Left Upbeat, left rotatory nystagmus  -MP      [place]-Hallpike Left Onset Time  3sec  -MP      [place]-Hallpike Left Duration Time  7sec  -MP      Horizontal Roll Test Right No nystagmus  -MP      Horizontal Roll Test Left No nystagmus  -MP                User Key  (r) = Recorded By, (t) = Taken By, (c) = Cosigned By      Initials Name Provider Type    MP Georgiana Beaulieu, PT Physical Therapist                                        OP Exercises       Row Name 03/20/25 1500             Subjective Pain    Able to rate subjective pain? yes  -MP      Pre-Treatment Pain Level 0  -MP      Post-Treatment Pain Level 0  -MP         Exercise 1    Exercise Name 1 Epley/CRM  -MP      Cueing 1 Verbal  -MP      Sets 1 1  -MP      Reps 1 1  -MP      Time 1 L PC  -MP      Additional Comments (+)  -MP                User Key  (r) = Recorded By, (t) = Taken By, (c) = Cosigned By      Initials Name Provider Type    MP Georgiana Beaulieu, PT Physical Therapist                                 PT OP Goals       Row Name 03/20/25 1500          PT Short Term Goals    STG Date to Achieve 04/19/25  -MP     STG 1 Pt. will be (-) for BPPV in L posterior canal to reduce symptoms of  dizziness.  -MP     STG 1 Progress New  -MP        Long Term Goals    LTG Date to Achieve 05/19/25  -MP     LTG 1 Pt. will remain (-) for BPPV in all canals to maintain improved QOL.  -MP     LTG 1 Progress New  -MP        Time Calculation    PT Goal Re-Cert Due Date 06/18/25  -MP               User Key  (r) = Recorded By, (t) = Taken By, (c) = Cosigned By      Initials Name Provider Type    Georgiana Champion, PT Physical Therapist                     PT Assessment/Plan       Row Name 03/20/25 1423          PT Assessment    Functional Limitations Impaired locomotion;Decreased safety during functional activities;Limitation in home management  -MP     Impairments Balance  -MP     Assessment Comments Jemima Bell is a 75 y.o. year-old female referred to physical therapy for vertigo. She presents with a evolving clinical presentation. She has comorbidities of cervical disc disorder, chronic pain disorder, DVT, PE, depression, hypothyroidism, hypertension, lumbar stenosis with neuro claudication status post lumbar spinal fusion, migraine.  and personal factors of anxiety that may affect her progress in the plan of care. Self scored disability measure of DHI was a 40/100 indicating severe handicap. She demonstrated s/s consistent with L PC BPPV, treated with Epley/CRM with (+) retest. In regards to tinnitus pt. Has appointment with ENT and discussed if symptoms persist to address with audiologist. Signs and symptoms are consistent with referring diagnosis. She is appropriate for skilled therapy services at this time to address deficits and improve ease with ADLs.  -MP     Please refer to paper survey for additional self-reported information No  -MP     Rehab Potential Good  -MP     Patient/caregiver participated in establishment of treatment plan and goals Yes  -MP     Patient would benefit from skilled therapy intervention Yes  -MP        PT Plan    PT Frequency 1x/week  -MP     Predicted Duration of Therapy  Intervention (PT) 6 visits  -MP     Planned CPT's? PT RE-EVAL: 95382;PT THER PROC EA 15 MIN: 56230;PT THER ACT EA 15 MIN: 22523;PT MANUAL THERAPY EA 15 MIN: 57168;PT NEUROMUSC RE-EDUCATION EA 15 MIN: 47591;PT GAIT TRAINING EA 15 MIN: 03672;PT SELF CARE/HOME MGMT/TRAIN EA 15: 05434;PT CANALITH REPOSITIONIN;PT EVAL MOD COMPLELITY: 54917  -MP     PT Plan Comments vestibular - reassess BPPV  -MP               User Key  (r) = Recorded By, (t) = Taken By, (c) = Cosigned By      Initials Name Provider Type    MP Georgiana Beaulieu, PT Physical Therapist                     Outcome Measure Options: Dizziness Handicap Inventory  Dizziness Handicap Inventory Score: 40         Time Calculation:   Start Time: 1430  Stop Time: 1510  Time Calculation (min): 40 min  Untimed Charges  PT Eval/Re-eval Minutes: 40  Total Minutes  Untimed Charges Total Minutes: 40   Total Minutes: 40   Therapy Charges for Today       Code Description Service Date Service Provider Modifiers Qty    97965918110 HC PT EVAL MOD COMPLEXITY 4 3/20/2025 Georgiana Beaulieu, PT GP 1            PT G-Codes  Outcome Measure Options: Dizziness Handicap Inventory         Georgiana Beaulieu PT  3/20/2025

## 2025-03-20 NOTE — OUTREACH NOTE
Call Center TCM Note      Flowsheet Row Responses   [facility] patient discharged from? [place]   Does the patient have one of the following disease processes/diagnoses(primary or secondary)? Other   TCM attempt successful? Yes   Call start time 1139   Call end time 1148   Discharge diagnosis Vertigo   Meds reviewed with patient/caregiver? Yes   Is the patient having any side effects they believe may be caused by any medication additions or changes? No   Does the patient have all medications ordered at discharge? Yes   Is the patient taking all medications as directed (includes completed medication regime)? Yes   Does the patient have an appointment with their PCP within 7-14 days of discharge? No   Nursing Interventions Patient declined scheduling/rescheduling appointment at this time   Has home health visited the patient within 72 hours of discharge? N/A   Home health comments referral for outpatient PT for vestibular PT   Psychosocial issues? No   Did the patient receive a copy of their discharge instructions? Yes   Nursing interventions Reviewed instructions with patient   What is the patient's perception of their health status since discharge? Same  [has some hearing loss in her left ear]   Is the patient/caregiver able to teach back the hierarchy of who to call/visit for symptoms/problems? PCP, Specialist, Home health nurse, Urgent Care, ED, 911 Yes   TCM call completed? Yes   Call end time 1148   Would this patient benefit from a Referral to Amb Social Work? No   Is the patient interested in additional calls from an ambulatory ? No            Georgiana Mckinney RN    3/20/2025, 11:48 EDT

## 2025-03-24 LAB
QT INTERVAL: 468 MS
QTC INTERVAL: 416 MS

## 2025-03-25 ENCOUNTER — HOSPITAL ENCOUNTER (OUTPATIENT)
Dept: PHYSICAL THERAPY | Facility: HOSPITAL | Age: 75
Setting detail: THERAPIES SERIES
Discharge: HOME | End: 2025-03-25
Payer: MEDICARE

## 2025-03-25 ENCOUNTER — PATIENT OUTREACH (OUTPATIENT)
Dept: CASE MANAGEMENT | Facility: OTHER | Age: 75
End: 2025-03-25
Payer: MEDICARE

## 2025-03-25 DIAGNOSIS — H81.10 BENIGN PAROXYSMAL POSITIONAL VERTIGO, UNSPECIFIED LATERALITY: ICD-10-CM

## 2025-03-25 DIAGNOSIS — R42 VERTIGO: Primary | ICD-10-CM

## 2025-03-25 PROCEDURE — 97530 THERAPEUTIC ACTIVITIES: CPT

## 2025-03-25 NOTE — OUTREACH NOTE
"AMBULATORY CASE MANAGEMENT NOTE    Names and Relationships of Patient/Support Persons: Contact: Jemima Bell \"Kim\"; Relationship: Self -     Patient Outreach      REASON FOR FOLLOW UP:  Recent admission.     INTRODUCTION: Introduced self and role of ACM.        Patient Report:  Doing well. Has not had further episodes of vertigo.    Review of Instructions:  Continue PT.    Next Steps Follow ups:  Denies needs at this time.         Latrice WATSON  Ambulatory Case Management    3/25/2025, 10:19 EDT  "

## 2025-03-25 NOTE — THERAPY TREATMENT NOTE
Outpatient Physical Therapy Vestibular Treatment Note  [facility]     Patient Name: Jemima Bell  : 1950  MRN: [id number]  Today's Date: 3/25/2025      Visit Date: 2025    Visit Dx:     ICD-10-CM ICD-9-CM   1. Vertigo  R42 780.4   2. Benign paroxysmal positional vertigo, unspecified laterality  H81.10 386.11       Patient Active Problem List   Diagnosis    Wheezing    Osteoarthritis    Acute bronchitis    Dyspnea    Hypothyroidism    Shortness of breath    Vitamin D deficiency    Depression with anxiety    Muscle cramp    Back pain    Vitamin B12 deficiency    UTI (urinary tract infection)    Abnormal EKG    Precordial pain    Anxiety    Borderline systolic HTN    Left upper quadrant abdominal pain    Diarrhea    Gastroesophageal reflux disease    History of Nissen fundoplication    Incontinence of feces with fecal urgency    Foraminal stenosis of lumbar region    Lumbar degenerative disc disease    Lumbar facet arthropathy    Lumbar radiculopathy    Sacroiliac joint dysfunction of both sides    Lumbar stenosis with neurogenic claudication    Cervical radiculopathy    Neck pain    Heart murmur    Preop cardiovascular exam    Arthropathy of cervical facet joint        Vestibular Eval       Row Name 25 1500             Positional Testing    Positional Testing With infrared goggles  -MP      Vertebrobasilar Artery Screen - Right Negative  -MP      Vertebrobasilar Artery Screen - Left Negative  -MP      [place]-Hallpike Left No nystagmus  -MP                User Key  (r) = Recorded By, (t) = Taken By, (c) = Cosigned By      Initials Name Provider Type    Georgiana Champion, PT Physical Therapist                                 PT Assessment/Plan       Row Name 25 1500          PT Assessment    Assessment Comments Kim returns for first follow up from initial evaluation for vertigo. Reports improvement in dizziness, (-) L Tequila Hallpike and symptoms unprovoked. Continues with tinnitus  and fullness, advised pt. to discuss with MD at ENT appointment.  -MP        PT Plan    PT Plan Comments return if symptoms regress  -MP               User Key  (r) = Recorded By, (t) = Taken By, (c) = Cosigned By      Initials Name Provider Type    Georgiana Champion, PT Physical Therapist                        OP Exercises       Row Name 03/25/25 1500             Subjective    Subjective Comments Doing better, seldom do I get dizzy. I feel pressure in my L > R ear and the ringing is still there.  -MP         Subjective Pain    Able to rate subjective pain? yes  -MP      Pre-Treatment Pain Level 0  -MP      Post-Treatment Pain Level 0  -MP         Total Minutes    84814 - PT Therapeutic Activity Minutes 10  -MP         Exercise 1    Exercise Name 1 reassessed BPPV  -MP      Cueing 1 Verbal  -MP      Sets 1 --  -MP      Time 1 --  -MP      Additional Comments (-)  -MP         Exercise 2    Exercise Name 2 education: see tab  -MP                User Key  (r) = Recorded By, (t) = Taken By, (c) = Cosigned By      Initials Name Provider Type    Georgiana Champion, PT Physical Therapist                                 PT OP Goals       Row Name 03/25/25 1500          PT Short Term Goals    STG Date to Achieve 04/19/25  -MP     STG 1 Pt. will be (-) for BPPV in L posterior canal to reduce symptoms of dizziness.  -MP     STG 1 Progress Ongoing  -MP        Long Term Goals    LTG Date to Achieve 05/19/25  -MP     LTG 1 Pt. will remain (-) for BPPV in all canals to maintain improved QOL.  -MP     LTG 1 Progress Ongoing  -MP               User Key  (r) = Recorded By, (t) = Taken By, (c) = Cosigned By      Initials Name Provider Type    Georgiana Champion, PT Physical Therapist                    Therapy Education  Education Details: educated on role of PT in treatment of vestibular conditions including BPPV; advised pt. to f/u with ENT re: tinnitus and fullness at appointment; anatomy of vestibular system; referral and  treatment  Given: HEP, Symptoms/condition management  Program: Reinforced  How Provided: Verbal, Demonstration  Provided to: Patient  Level of Understanding: Verbalized, Demonstrated              Time Calculation:   Start Time: 1515  Stop Time: 1530  Time Calculation (min): 15 min  Total Timed Code Minutes- PT: 10 minute(s)  Timed Charges  95142 - PT Therapeutic Activity Minutes: 10  Total Minutes  Timed Charges Total Minutes: 10   Total Minutes: 10   Therapy Charges for Today       Code Description Service Date Service Provider Modifiers Qty    [id number]  PT THERAPEUTIC ACT EA 15 MIN 3/25/2025 Georgiana Beaulieu, PT GP 1                     Georgiana Beaulieu, PT  3/25/2025

## 2025-04-01 DIAGNOSIS — Z98.1 S/P LUMBAR FUSION: ICD-10-CM

## 2025-04-01 DIAGNOSIS — M54.42 CHRONIC BILATERAL LOW BACK PAIN WITH BILATERAL SCIATICA: ICD-10-CM

## 2025-04-01 DIAGNOSIS — G89.29 CHRONIC BILATERAL LOW BACK PAIN WITH BILATERAL SCIATICA: ICD-10-CM

## 2025-04-01 DIAGNOSIS — M54.41 CHRONIC BILATERAL LOW BACK PAIN WITH BILATERAL SCIATICA: ICD-10-CM

## 2025-04-02 RX ORDER — PREGABALIN 50 MG/1
50 CAPSULE ORAL 2 TIMES DAILY
Qty: 60 CAPSULE | Refills: 1 | OUTPATIENT
Start: 2025-04-02

## 2025-04-02 RX ORDER — PREGABALIN 50 MG/1
50 CAPSULE ORAL 2 TIMES DAILY
Qty: 60 CAPSULE | Refills: 1 | Status: SHIPPED | OUTPATIENT
Start: 2025-04-02

## 2025-04-02 NOTE — TELEPHONE ENCOUNTER
Please let her know that she should have a refill available at [facility]. If she does not then call us back. Thanks

## 2025-04-06 ENCOUNTER — PATIENT MESSAGE (OUTPATIENT)
Dept: FAMILY MEDICINE CLINIC | Facility: CLINIC | Age: 75
End: 2025-04-06
Payer: MEDICARE

## 2025-04-06 DIAGNOSIS — R11.0 NAUSEA: ICD-10-CM

## 2025-04-07 ENCOUNTER — OFFICE VISIT (OUTPATIENT)
Dept: ORTHOPEDIC SURGERY | Facility: CLINIC | Age: 75
End: 2025-04-07
Payer: MEDICARE

## 2025-04-07 VITALS — BODY MASS INDEX: 37.04 KG/M2 | HEIGHT: 62 IN | TEMPERATURE: 98 F | WEIGHT: 201.3 LBS

## 2025-04-07 DIAGNOSIS — M70.61 TROCHANTERIC BURSITIS OF RIGHT HIP: Primary | ICD-10-CM

## 2025-04-07 PROCEDURE — 99214 OFFICE O/P EST MOD 30 MIN: CPT | Performed by: ORTHOPAEDIC SURGERY

## 2025-04-07 PROCEDURE — 1159F MED LIST DOCD IN RCRD: CPT | Performed by: ORTHOPAEDIC SURGERY

## 2025-04-07 PROCEDURE — 20610 DRAIN/INJ JOINT/BURSA W/O US: CPT | Performed by: ORTHOPAEDIC SURGERY

## 2025-04-07 PROCEDURE — 1160F RVW MEDS BY RX/DR IN RCRD: CPT | Performed by: ORTHOPAEDIC SURGERY

## 2025-04-07 RX ORDER — METHYLPREDNISOLONE ACETATE 80 MG/ML
80 INJECTION, SUSPENSION INTRA-ARTICULAR; INTRALESIONAL; INTRAMUSCULAR; SOFT TISSUE
Status: COMPLETED | OUTPATIENT
Start: 2025-04-07 | End: 2025-04-07

## 2025-04-07 RX ORDER — ONDANSETRON 4 MG/1
4 TABLET, FILM COATED ORAL EVERY 8 HOURS PRN
Qty: 15 TABLET | Refills: 2 | Status: SHIPPED | OUTPATIENT
Start: 2025-04-07

## 2025-04-07 RX ORDER — LIDOCAINE HYDROCHLORIDE 10 MG/ML
2 INJECTION, SOLUTION EPIDURAL; INFILTRATION; INTRACAUDAL; PERINEURAL
Status: COMPLETED | OUTPATIENT
Start: 2025-04-07 | End: 2025-04-07

## 2025-04-07 RX ADMIN — METHYLPREDNISOLONE ACETATE 80 MG: 80 INJECTION, SUSPENSION INTRA-ARTICULAR; INTRALESIONAL; INTRAMUSCULAR; SOFT TISSUE at 14:43

## 2025-04-07 RX ADMIN — LIDOCAINE HYDROCHLORIDE 2 ML: 10 INJECTION, SOLUTION EPIDURAL; INFILTRATION; INTRACAUDAL; PERINEURAL at 14:43

## 2025-04-07 NOTE — PROGRESS NOTES
"General Exam    Patient: Jemima Bell    YOB: 1950    Medical Record Number: [id number]    Chief Complaints:    Right hip pain    History of Present Illness:     75 y.o. female patient who presents for evaluation of right hip pain.  Patient states she has had issues for some time now does report a back surgery done in July of this past year she was having symptoms before then about her hip does not feel that it is any better if in fact it is some worsening symptoms its laterally based and overall growing discomfort.  Increased symptoms with activity and stairs hardly on that side.    Denies any numbness or tingling.  Denies any fevers, cough or shortness of breath.    Allergies:   Allergies   Allergen Reactions    Salicylates GI Intolerance     History of gi bleed      Colestipol GI Intolerance    Biaxin [Clarithromycin]     Adhesive Tape Rash     Can use plastic tape, paper tape causes rash    Augmentin [Amoxicillin-Pot Clavulanate] Diarrhea    Prevacid [Lansoprazole] Itching and Swelling     Eyes only    Sulfa Antibiotics GI Intolerance    Sulfamethoxazole-Trimethoprim Nausea And Vomiting and GI Intolerance     \"makes me sick as a dog\"         Home Medications:      Current Outpatient Medications:     albuterol sulfate  (90 Base) MCG/ACT inhaler, Inhale 2 puffs Every 4 (Four) Hours As Needed for Shortness of Air or Wheezing., Disp: , Rfl:     fluticasone (FLONASE) 50 MCG/ACT nasal spray, 2 sprays into the nostril(s) as directed by provider Daily., Disp: 48 g, Rfl: 3    HYDROcodone-acetaminophen (NORCO) 5-325 MG per tablet, Take 1 tablet by mouth Every 8 (Eight) Hours As Needed for Moderate Pain. 30 day supply, Disp: 60 tablet, Rfl: 0    hydrOXYzine (ATARAX) 10 MG tablet, Take 1 tablet by mouth Every 4 (Four) Hours As Needed for Anxiety (twitching)., Disp: 120 tablet, Rfl: 1    Ibuprofen 3 %, Gabapentin 10 %, Baclofen 2 %, lidocaine 4 %, Ketamine HCl 4 %, Apply 1-2 g topically to the " appropriate area as directed 3 (Three) to 4 (Four) times daily., Disp: 90 g, Rfl: 0    levothyroxine (SYNTHROID, LEVOTHROID) 100 MCG tablet, TAKE ONE TABLET BY MOUTH EVERY MORNING, Disp: 90 tablet, Rfl: 3    lidocaine (LIDODERM) 5 %, Place 1 patch on the skin as directed by provider Daily. Remove & Discard patch within 12 hours or as directed by MD, Disp: 30 each, Rfl: 0    meclizine (ANTIVERT) 25 MG tablet, Take 1 tablet by mouth 3 (Three) Times a Day As Needed for Dizziness., Disp: 20 tablet, Rfl: 0    methocarbamol (ROBAXIN) 500 MG tablet, Take 1 tablet by mouth 3 (Three) Times a Day As Needed for Muscle Spasms., Disp: 90 tablet, Rfl: 1    metoprolol succinate XL (TOPROL-XL) 25 MG 24 hr tablet, Take 0.5 tablets by mouth Daily., Disp: 30 tablet, Rfl: 1    ondansetron (Zofran) 4 MG tablet, Take 1 tablet by mouth Every 8 (Eight) Hours As Needed for Nausea or Vomiting., Disp: 15 tablet, Rfl: 2    pantoprazole (PROTONIX) 40 MG EC tablet, Take 1 tablet by mouth 2 (Two) Times a Day., Disp: 180 tablet, Rfl: 3    pregabalin (LYRICA) 50 MG capsule, Take 1 capsule by mouth 2 (Two) Times a Day., Disp: 60 capsule, Rfl: 1    promethazine (PHENERGAN) 25 MG tablet, Take 1 tablet by mouth Every 6 (Six) Hours As Needed for Nausea or Vomiting., Disp: 20 tablet, Rfl: 1    sertraline (ZOLOFT) 100 MG tablet, TAKE 1 TABLET BY MOUTH DAILY (Patient taking differently: Take 1 tablet by mouth Every Night.), Disp: 90 tablet, Rfl: 1    vitamin B-12 (CYANOCOBALAMIN) 1000 MCG tablet, Take 1 tablet by mouth Daily., Disp: , Rfl:     Current Facility-Administered Medications:     cyanocobalamin injection 1,000 mcg, 1,000 mcg, Intramuscular, Q28 Days, Kehrer, Meredith Lea, MD, 1,000 mcg at 01/30/25 1350    Past Medical History:   Diagnosis Date    Acromioclavicular separation     Acute bronchitis     Acute sinusitis     Allergic 11/2022    Shrimp    Allergic rhinitis     Anemia Childhood    Anxiety     Arthritis     Arthritis of back      Arthropathy of cervical facet joint 3/14/2025    Asthma     Back pain     BMI 34.0-34.9,adult     Bronchitis, chronic 2017-19    Bursitis of hip     Cataract     Cervical disc disorder     Cervicalgia     Chills     Cholelithiasis 2004?    Remival    Chronic diarrhea     Chronic pain disorder     Cluster headache     Colon polyp 12/2022    Deep vein thrombosis (DVT) of lower extremity     Depression     Dermatitis     Dislocation, shoulder     Dyspnea     Dysuria     Esophageal reflux     Extremity pain     Fatigue     Gastric ulcer     GERD (gastroesophageal reflux disease)     GI (gastrointestinal bleed) 2004?    Headache     Headache, tension-type     Heart murmur 1973    Hernia     Hiatal hernia 2007    Hip arthrosis     Hypertension     Hypothyroidism     Irritable bowel syndrome     Low back strain     Lumbago     Lumbar stenosis with neurogenic claudication 05/01/2024    Lumbosacral disc disease     Migraine     Nausea     Neuritis     Osteoarthritis     Osteoarthritis of knee     Periarthritis of shoulder     Plantar fasciitis     Pneumonia 89y    Pulled muscle     Pulmonary embolism     Pyelonephritis     Rheumatic fever     Sore throat     Torticollis     Trapezius strain     Urinary incontinence     Urinary pain     Urinary tract infection     UTI symptoms     Vitamin B1 deficiency     Vitamin B12 deficiency     Vitamin D deficiency     Weakness     Wheezing        Past Surgical History:   Procedure Laterality Date    ABDOMINAL SURGERY      ADENOIDECTOMY      APPENDECTOMY      BACK SURGERY  6/30/24    CATARACT EXTRACTION      CATARACT EXTRACTION      bilateral eyes    CATARACT EXTRACTION      CERVICAL EPIDURAL N/A 06/26/2024    Procedure: cervical epidural steroid injection at C7/T1 73457;  Surgeon: Shey Aranda MD;  Location: [facility] MAIN OR;  Service: Pain Management;  Laterality: N/A;    CHOLECYSTECTOMY  2004?    COLONOSCOPY      COLONOSCOPY N/A 12/23/2022    Procedure: COLONOSCOPY to cecum and TI:   with biopsies, cold snare polyp,;  Surgeon: Reji Aguilar MD;  Location: [facility] ENDOSCOPY;  Service: Gastroenterology;  Laterality: N/A;  PREOP/ HX COLON POLYPS  POSTOP/  diverticulosis, polyp, hemorrhoids    ENDOSCOPY N/A 12/23/2022    Procedure: ESOPHAGOGASTRODUODENOSCOPY with biopsies;  Surgeon: Reji Aguilar MD;  Location: [facility] ENDOSCOPY;  Service: Gastroenterology;  Laterality: N/A;  PREOP/ HX GASTRIC ULCER, DYSPEPSIA, UPPER ABDOMINAL PAIN  POSTOP/:  HH, gastritis, gastric polyps    EPIDURAL BLOCK      GALLBLADDER SURGERY      HEMORRHOIDECTOMY  1974 & 77?    HERNIA REPAIR  2008?    HYSTERECTOMY      INGUINAL HERNIA REPAIR      INTERSTIM PLACEMENT      for Bladder incontinence    JOINT REPLACEMENT      Knee    KNEE SURGERY      LAPAROSCOPIC COLON RESECTION  2005    LAPAROTOMY OOPHERECTOMY      LUMBAR DIRECT LATERAL INTERBODY FUSION N/A 07/30/2024    Procedure: lumbar 3 to sacral 1 transforaminal lumbar interbody fusion, lumbar 3 to sacral 1 fusion with neuro robot;  Surgeon: Michelet Rice IV, MD;  Location: [facility] OR;  Service: Robotics - Neuro;  Laterality: N/A;    LUMBAR EPIDURAL INJECTION N/A 03/27/2023    Procedure: Lumbar epidural steroid injection at L5-S1 55098;  Surgeon: Shey Aranda MD;  Location: [facility] [place] MAIN OR;  Service: Pain Management;  Laterality: N/A;    LUMBAR EPIDURAL INJECTION N/A 03/13/2024    Procedure: lumbar epidural steroid injection at L4/5 92261;  Surgeon: Shey Aranda MD;  Location: [facility] MAIN OR;  Service: Pain Management;  Laterality: N/A;    LUMBAR FUSION N/A 07/30/2024    Procedure: LUMBAR TRANSFORAMINAL INTERBODY FUSION WITH NEURO ROBOT;  Surgeon: Michelet Rice IV, MD;  Location: [facility] MAIN OR;  Service: Robotics - Neuro;  Laterality: N/A;    NISSEN FUNDOPLICATION LAPAROSCOPIC      x2    ORTHOPEDIC SURGERY      Knee replacement    SACROILIAC JOINT INJECTION Left 05/12/2023    Procedure: Left SACROILIAC INJECTION 78419;  Surgeon: Shey Aranda MD;  Location: [facility] [place] MAIN  "OR;  Service: Pain Management;  Laterality: Left;    SACROILIAC JOINT INJECTION Left 07/12/2023    Procedure: SACROILIAC INJECTION LEFT;  Surgeon: Shey Aranda MD;  Location: [facility] [place] MAIN OR;  Service: Pain Management;  Laterality: Left;    SPINAL FUSION  311502    SPINE SURGERY  [id number]    TONSILLECTOMY      TONSILLECTOMY  1954?    TOTAL KNEE ARTHROPLASTY Left     TUBAL ABDOMINAL LIGATION      UPPER GASTROINTESTINAL ENDOSCOPY         Social History     Occupational History    Not on file   Tobacco Use    Smoking status: Never    Smokeless tobacco: Never   Vaping Use    Vaping status: Never Used   Substance and Sexual Activity    Alcohol use: Never    Drug use: Never    Sexual activity: Yes     Partners: Male     Birth control/protection: None, Hysterectomy      Social History     Social History Narrative    Not on file       Family History   Problem Relation Age of Onset    Arthritis Mother     Depression Mother     Hyperlipidemia Mother     Hypertension Mother     Irritable bowel syndrome Mother     Dislocations Mother     Heart attack Mother     Heart failure Mother     Hypertension Father     Arthritis Father     Stroke Father     Alcohol abuse Maternal Grandfather     Depression Maternal Grandfather     Heart disease Other         IN FEMALES BEFORE AGE 65    Asthma Maternal Grandmother     Asthma Paternal Grandfather     Heart attack Maternal Uncle     Heart attack Paternal Uncle     Heart failure Paternal Uncle        Review of Systems:      Constitutional: Denies fever, shaking or chills         All other pertinent positives and negatives as noted above in HPI.    Physical Exam: 75 y.o. female    Vitals:    04/07/25 1414   Temp: 98 °F (36.7 °C)   TempSrc: Temporal   Weight: 91.3 kg (201 lb 4.8 oz)   Height: 157.5 cm (62\")       General:  Patient is awake and alert.  Appears in no acute distress or discomfort.      Musculoskeletal/Extremities:    Right hip examined gentle range of motion tolerated negative " straight leg raise and just filled.  Positive tenderness of the greater trochanter.         Radiology:       Previous imaging taken by another provider was personally reviewed and independently interpreted and discussed with the patient    Imaging shows mild degenerative change about the hip joint slight decreased joint space some early degenerative changes consisting of subchondral cyst femoral head and acetabulum.  Evidence of previous spinal surgery with instrumentation noted.     No imaging for comparison.    Assessment: Right hip bursitis      Plan:      I discussed the findings with the patient knee is more soft tissue in nature as noted above.  We discussed conservative treatment setting of rest, ice, anti-inflammatory medication when taken tolerate and be mindful of any over the counter medications with GI issues make sure to take with food.  Therapy has been ordered.  Plan to proceed with steroid injection.  This can take 6 to 8 weeks to resolve even when treated appropriately and can return thus importance of physical therapy.  Patient understands the plan.           We will plan for follow up as above.    All questions were answered.  Patient understands and agrees with the plan.    Large Joint Arthrocentesis: R greater trochanteric bursa  Date/Time: 4/7/2025 2:43 PM  Consent given by: patient  Site marked: site marked  Timeout: Immediately prior to procedure a time out was called to verify the correct patient, procedure, equipment, support staff and site/side marked as required   Supporting Documentation  Indications: pain   Procedure Details  Location: hip - R greater trochanteric bursa  Preparation: Patient was prepped and draped in the usual sterile fashion  Needle size: 22 G  Approach: anterolateral  Medications administered: 2 mL lidocaine PF 1% 1 %; 80 mg methylPREDNISolone acetate 80 MG/ML  Patient tolerance: patient tolerated the procedure well with no immediate complications         Kumar  MD Adelaida    04/07/2025    CC to Kehrer, Meredith Lea, MD

## 2025-04-09 ENCOUNTER — OFFICE VISIT (OUTPATIENT)
Dept: CARDIOLOGY | Facility: CLINIC | Age: 75
End: 2025-04-09
Payer: MEDICARE

## 2025-04-09 ENCOUNTER — APPOINTMENT (OUTPATIENT)
Dept: GENERAL RADIOLOGY | Facility: SURGERY CENTER | Age: 75
End: 2025-04-09
Payer: MEDICARE

## 2025-04-09 VITALS
WEIGHT: 197 LBS | BODY MASS INDEX: 36.25 KG/M2 | SYSTOLIC BLOOD PRESSURE: 144 MMHG | DIASTOLIC BLOOD PRESSURE: 78 MMHG | HEIGHT: 62 IN

## 2025-04-09 DIAGNOSIS — I10 PRIMARY HYPERTENSION: Primary | ICD-10-CM

## 2025-04-09 DIAGNOSIS — R42 DIZZINESS: ICD-10-CM

## 2025-04-09 DIAGNOSIS — Z09 HOSPITAL DISCHARGE FOLLOW-UP: ICD-10-CM

## 2025-04-09 NOTE — PROGRESS NOTES
Subjective:        Jemima Bell is a 75 y.o. female who here for follow up    No chief complaint on file.      HPI    Jemima Bell is a 75-year-old female who is here today for hospital follow-up for dizziness/bradycardia.  During her hospitalization her Ziac was switched to metoprolol XL 12.5 and her dizziness did improve.  Other diagnoses to include arthritis, bladder stimulator, anxiety, hypertension, hypothyroidism, GERD, heart murmur, and osteoarthritis.    EKG today shows sinus rhythm with PAC and significant artifact due to bladder stimulator.    3/18/25 echo: EF 61 to 65%, normal LV function.    6/2024 stress test indicated a normal study.      The following portions of the patient's history were reviewed and updated as appropriate: allergies, current medications, past family history, past medical history, past social history, past surgical history and problem list.    Past Medical History:   Diagnosis Date    Acromioclavicular separation     Acute bronchitis     Acute sinusitis     Allergic 11/2022    Shrimp    Allergic rhinitis     Anemia Childhood    Anxiety     Arthritis     Arthritis of back     Arthropathy of cervical facet joint 3/14/2025    Asthma     Back pain     BMI 34.0-34.9,adult     Bronchitis, chronic 2017-19    Bursitis of hip     Cataract     Cervical disc disorder     Cervicalgia     Chills     Cholelithiasis 2004?    Remival    Chronic diarrhea     Chronic pain disorder     Cluster headache     Colon polyp 12/2022    Deep vein thrombosis (DVT) of lower extremity     Depression     Dermatitis     Dislocation, shoulder     Dyspnea     Dysuria     Esophageal reflux     Extremity pain     Fatigue     Gastric ulcer     GERD (gastroesophageal reflux disease)     GI (gastrointestinal bleed) 2004?    Headache     Headache, tension-type     Heart murmur 1973    Hernia     Hiatal hernia 2007    Hip arthrosis     Hypertension     Hypothyroidism     Irritable bowel syndrome      Low back strain     Lumbago     Lumbar stenosis with neurogenic claudication 05/01/2024    Lumbosacral disc disease     Migraine     Nausea     Neuritis     Osteoarthritis     Osteoarthritis of knee     Periarthritis of shoulder     Plantar fasciitis     Pneumonia 89y    Pulled muscle     Pulmonary embolism     Pyelonephritis     Rheumatic fever     Sore throat     Torticollis     Trapezius strain     Urinary incontinence     Urinary pain     Urinary tract infection     UTI symptoms     Vitamin B1 deficiency     Vitamin B12 deficiency     Vitamin D deficiency     Weakness     Wheezing          reports that she has never smoked. She has never used smokeless tobacco. She reports that she does not drink alcohol and does not use drugs.     Family History   Problem Relation Age of Onset    Arthritis Mother     Depression Mother     Hyperlipidemia Mother     Hypertension Mother     Irritable bowel syndrome Mother     Dislocations Mother     Heart attack Mother     Heart failure Mother     Hypertension Father     Arthritis Father     Stroke Father     Alcohol abuse Maternal Grandfather     Depression Maternal Grandfather     Heart disease Other         IN FEMALES BEFORE AGE 65    Asthma Maternal Grandmother     Asthma Paternal Grandfather     Heart attack Maternal Uncle     Heart attack Paternal Uncle     Heart failure Paternal Uncle             Objective:           Vitals and nursing note reviewed.   Constitutional:       Appearance: Well-developed.   HENT:      Head: Normocephalic.      Right Ear: External ear normal.      Left Ear: External ear normal.   Neck:      Vascular: No JVD.   Pulmonary:      Effort: Pulmonary effort is normal. No respiratory distress.      Breath sounds: Normal breath sounds. No stridor. No rales.   Cardiovascular:      Normal rate. Regular rhythm.      No gallop.    Pulses:     Intact distal pulses.   Edema:     Peripheral edema absent.   Abdominal:      General: Bowel sounds are normal.  There is no distension.      Palpations: Abdomen is soft.      Tenderness: There is no abdominal tenderness. There is no guarding.   Musculoskeletal: Normal range of motion.         General: No tenderness.      Cervical back: Normal range of motion. Skin:     General: Skin is warm.   Neurological:      Mental Status: Alert and oriented to person, place, and time.      Deep Tendon Reflexes: Reflexes are normal and symmetric.   Psychiatric:         Judgment: Judgment normal.         Procedures     3/18/25       Left ventricular ejection fraction appears to be 61 - 65%.    Left ventricular diastolic function was normal.    Estimated right ventricular systolic pressure from tricuspid regurgitation is normal (<35 mmHg).      6/2024      Interpretation Summary         Findings consistent with a normal ECG stress test.    Myocardial perfusion imaging indicates a normal myocardial perfusion study with no evidence of ischemia. Impressions are consistent with a low risk study.    Left ventricular ejection fraction is normal (Calculated EF = 60%).     Asymptomatic for chest pain. ECG is negative for ischemia.   Ectopy: none  B/P is appropriate. Baseline 110/68, Peak 114/56, Recovery: 116//63  Pharmacologic study due to inability to tolerate increasing speed and grade of treadmill due to orthopedic, mobility  issues and   Beta-blocker therapy.   Participated in Low Level exercise and tolerance is poor.     Supervised by:  Izzy PRITCHARD.        Current Outpatient Medications:     albuterol sulfate  (90 Base) MCG/ACT inhaler, Inhale 2 puffs Every 4 (Four) Hours As Needed for Shortness of Air or Wheezing., Disp: , Rfl:     fluticasone (FLONASE) 50 MCG/ACT nasal spray, 2 sprays into the nostril(s) as directed by provider Daily., Disp: 48 g, Rfl: 3    HYDROcodone-acetaminophen (NORCO) 5-325 MG per tablet, Take 1 tablet by mouth Every 8 (Eight) Hours As Needed for Moderate Pain. 30 day supply, Disp: 60 tablet,  Rfl: 0    hydrOXYzine (ATARAX) 10 MG tablet, Take 1 tablet by mouth Every 4 (Four) Hours As Needed for Anxiety (twitching)., Disp: 120 tablet, Rfl: 1    Ibuprofen 3 %, Gabapentin 10 %, Baclofen 2 %, lidocaine 4 %, Ketamine HCl 4 %, Apply 1-2 g topically to the appropriate area as directed 3 (Three) to 4 (Four) times daily., Disp: 90 g, Rfl: 0    levothyroxine (SYNTHROID, LEVOTHROID) 100 MCG tablet, TAKE ONE TABLET BY MOUTH EVERY MORNING, Disp: 90 tablet, Rfl: 3    lidocaine (LIDODERM) 5 %, Place 1 patch on the skin as directed by provider Daily. Remove & Discard patch within 12 hours or as directed by MD, Disp: 30 each, Rfl: 0    meclizine (ANTIVERT) 25 MG tablet, Take 1 tablet by mouth 3 (Three) Times a Day As Needed for Dizziness., Disp: 20 tablet, Rfl: 0    methocarbamol (ROBAXIN) 500 MG tablet, Take 1 tablet by mouth 3 (Three) Times a Day As Needed for Muscle Spasms., Disp: 90 tablet, Rfl: 1    metoprolol succinate XL (TOPROL-XL) 25 MG 24 hr tablet, Take 0.5 tablets by mouth Daily., Disp: 30 tablet, Rfl: 1    ondansetron (Zofran) 4 MG tablet, Take 1 tablet by mouth Every 8 (Eight) Hours As Needed for Nausea or Vomiting., Disp: 15 tablet, Rfl: 2    pantoprazole (PROTONIX) 40 MG EC tablet, Take 1 tablet by mouth 2 (Two) Times a Day., Disp: 180 tablet, Rfl: 3    pregabalin (LYRICA) 50 MG capsule, Take 1 capsule by mouth 2 (Two) Times a Day., Disp: 60 capsule, Rfl: 1    promethazine (PHENERGAN) 25 MG tablet, Take 1 tablet by mouth Every 6 (Six) Hours As Needed for Nausea or Vomiting., Disp: 20 tablet, Rfl: 1    sertraline (ZOLOFT) 100 MG tablet, TAKE 1 TABLET BY MOUTH DAILY (Patient taking differently: Take 1 tablet by mouth Every Night.), Disp: 90 tablet, Rfl: 1    vitamin B-12 (CYANOCOBALAMIN) 1000 MCG tablet, Take 1 tablet by mouth Daily., Disp: , Rfl:     Current Facility-Administered Medications:     cyanocobalamin injection 1,000 mcg, 1,000 mcg, Intramuscular, Q28 Days, Kehrer, Meredith Lea, MD, 1,000 mcg at  01/30/25 1350     Assessment:        Patient Active Problem List   Diagnosis    Wheezing    Osteoarthritis    Acute bronchitis    Dyspnea    Hypothyroidism    Shortness of breath    Vitamin D deficiency    Depression with anxiety    Muscle cramp    Back pain    Vitamin B12 deficiency    UTI (urinary tract infection)    Abnormal EKG    Precordial pain    Anxiety    Borderline systolic HTN    Left upper quadrant abdominal pain    Diarrhea    Gastroesophageal reflux disease    History of Nissen fundoplication    Incontinence of feces with fecal urgency    Foraminal stenosis of lumbar region    Lumbar degenerative disc disease    Lumbar facet arthropathy    Lumbar radiculopathy    Sacroiliac joint dysfunction of both sides    Lumbar stenosis with neurogenic claudication    Cervical radiculopathy    Neck pain    Heart murmur    Preop cardiovascular exam    Arthropathy of cervical facet joint               Plan:   1.  Hospital follow-up for dizziness/bradycardia.  She states her medication is doing ok. Some fatigue but over all doing ok. Continue metoprolol XL.  Blood pressure has been stable.     2.  Hypertension: A little elevated. She wants to watch her sodium.     Educated patient on exercising for at least 30 minutes a day for 2 to 3 days a week. Importance of controlling hypertension and blood pressure checkup on the regular basis has been explained. Hypertension as a silent killer has been discussed. Risk reduction of the weight and regular exercises to control the hypertension has been explained.               No diagnosis found.    There are no diagnoses linked to this encounter.    COUNSELING: isabel Pipereling was given to patient for the following topics: diagnostic results, risk factor reductions, impressions, risks and benefits of treatment options and importance of treatment compliance .       SMOKING COUNSELING: denies    She will monitor sodium and blood pressure and follow up in  2 months.    Sincerely,   MACHO Sumner  [facility]  04/09/25  13:43 EDT    EMR Dragon/Transcription disclaimer: Dictated utilizing Dragon Dictation

## 2025-04-09 NOTE — PATIENT INSTRUCTIONS
-Monitor blood pressure 1 hour and a half after taking blood pressure medications and  write the reading down along with heart rate.  Please bring these readings with you on your follow up. If dizzy, then take your blood pressure. If sys <100 or >140.    -Monitor sodium

## 2025-04-15 RX ORDER — METOPROLOL SUCCINATE 25 MG/1
12.5 TABLET, EXTENDED RELEASE ORAL
Qty: 30 TABLET | Refills: 5 | Status: SHIPPED | OUTPATIENT
Start: 2025-04-15

## 2025-04-23 ENCOUNTER — TELEPHONE (OUTPATIENT)
Dept: ORTHOPEDIC SURGERY | Facility: CLINIC | Age: 75
End: 2025-04-23
Payer: MEDICARE

## 2025-04-23 NOTE — TELEPHONE ENCOUNTER
"Hub staff attempted to follow warm transfer process and was unsuccessful     Caller: Jemima Bell \"Kim\"    Relationship to patient: Self    Best call back number: [phone]    Patient is needing: PT RETURNING CALL TO [facility] TO SCHEDULE APPT WITH DR GAO. UNSURE IF APPT IS FOR INJECTION OR CONSULTATION. PLEASE CONTACT PT TO DISCUSS.     "

## 2025-04-25 DIAGNOSIS — M54.41 CHRONIC BILATERAL LOW BACK PAIN WITH BILATERAL SCIATICA: ICD-10-CM

## 2025-04-25 DIAGNOSIS — Z98.1 S/P LUMBAR FUSION: ICD-10-CM

## 2025-04-25 DIAGNOSIS — M54.42 CHRONIC BILATERAL LOW BACK PAIN WITH BILATERAL SCIATICA: ICD-10-CM

## 2025-04-25 DIAGNOSIS — G89.29 CHRONIC BILATERAL LOW BACK PAIN WITH BILATERAL SCIATICA: ICD-10-CM

## 2025-04-25 RX ORDER — HYDROCODONE BITARTRATE AND ACETAMINOPHEN 5; 325 MG/1; MG/1
1 TABLET ORAL EVERY 8 HOURS PRN
Qty: 60 TABLET | Refills: 0 | Status: SHIPPED | OUTPATIENT
Start: 2025-04-25

## 2025-04-25 NOTE — TELEPHONE ENCOUNTER
Medication Refill Request    Date of phone call: 25    Medication being requested: Norco 5-325 mg si tab po q 8 hrs prn  Qty: 60    Date of last visit: 3/14/25    Date of last refill:     SANJEEV up to date?:     Next Follow up?: 25    Any new pertinent information? (i.e, new medication allergies, new use of medications, change in patient's health or condition, non-compliance or inconsistency with prescribing agreement?):

## 2025-05-14 ENCOUNTER — OFFICE VISIT (OUTPATIENT)
Age: 75
End: 2025-05-14
Payer: MEDICARE

## 2025-05-14 VITALS — WEIGHT: 199.2 LBS | RESPIRATION RATE: 20 BRPM | TEMPERATURE: 98 F | HEIGHT: 62 IN | BODY MASS INDEX: 36.66 KG/M2

## 2025-05-14 DIAGNOSIS — M16.11 PRIMARY OSTEOARTHRITIS OF RIGHT HIP: Primary | ICD-10-CM

## 2025-05-14 PROCEDURE — 20611 DRAIN/INJ JOINT/BURSA W/US: CPT | Performed by: STUDENT IN AN ORGANIZED HEALTH CARE EDUCATION/TRAINING PROGRAM

## 2025-05-14 PROCEDURE — 1160F RVW MEDS BY RX/DR IN RCRD: CPT | Performed by: STUDENT IN AN ORGANIZED HEALTH CARE EDUCATION/TRAINING PROGRAM

## 2025-05-14 PROCEDURE — 1159F MED LIST DOCD IN RCRD: CPT | Performed by: STUDENT IN AN ORGANIZED HEALTH CARE EDUCATION/TRAINING PROGRAM

## 2025-05-14 RX ADMIN — METHYLPREDNISOLONE ACETATE 80 MG: 40 INJECTION, SUSPENSION INTRA-ARTICULAR; INTRALESIONAL; INTRAMUSCULAR; SOFT TISSUE at 15:32

## 2025-05-14 RX ADMIN — LIDOCAINE HYDROCHLORIDE 2 ML: 10 INJECTION, SOLUTION EPIDURAL; INFILTRATION; INTRACAUDAL; PERINEURAL at 15:32

## 2025-05-14 NOTE — PROGRESS NOTES
"Chief Complaint   Patient presents with    Right Hip - Pain, Initial Evaluation     Pt being seen for right hip pain.       History of Present Illness  Jemima is a 75 y.o. year old female patient of Dr. Son with previously diagnosed osteoarthritis here today for intra-articular cortisone injection. No previous hip injections. No new injuries or complaints.    The following data was reviewed by: Mckenzie Patel DO on 05/14/2025:  Data reviewed : Ortho note from 4/7/25, XR images from 3/5/25     Temp 98 °F (36.7 °C) (Temporal)   Resp 20   Ht 157.5 cm (62\")   Wt 90.4 kg (199 lb 3.2 oz)   LMP  (LMP Unknown)   BMI 36.43 kg/m²        Physical Exam  Vital signs reviewed.   General: Well developed, well nourished; No acute distress.  Eyes: conjunctiva clear; pupils equally round and reactive  ENT: external ears and nose atraumatic; hearing normal  CV: extremity warm and well perfused  Resp: normal respiratory effort, no use of accessory muscles  Skin: normal color and pigmentation; no rashes or wounds; normal turgor  Psych: alert and oriented; mood and affect appropriate; recent and remote memory intact    - Large Joint Arthrocentesis: R hip joint on 5/14/2025 3:32 PM  Indications: pain  Details: 22 G (3.5\") needle, ultrasound-guided anterior approach  Medications: 2 mL lidocaine PF 1% 1 %; 80 mg methylPREDNISolone acetate 40 MG/ML  Outcome: tolerated well, no immediate complications  Procedure, treatment alternatives, risks and benefits explained, specific risks discussed. Consent was given by the patient. Immediately prior to procedure a time out was called to verify the correct patient, procedure, equipment, support staff and site/side marked as required. Patient was prepped and draped in the usual sterile fashion.           Assessment and Plan  Diagnoses and all orders for this visit:    1. Primary osteoarthritis of right hip (Primary)  -     - Large Joint Arthrocentesis: R hip joint    The procedure was " well tolerated. She has been educated on the signs and symptoms of local reaction and infection and should call the office if she experiences any of these. She should take it easy for the next 24 to 48 hours and ice as needed. After that, she may return to normal activity. It was recommended to continue low impact activity. She may continue with previously recommended supportive measures. Will follow-up with Dr. Son as needed.   All of her questions were answered and she is agreeable with the plan.    Dictated utilizing Dragon dictation.

## 2025-05-20 ENCOUNTER — OFFICE VISIT (OUTPATIENT)
Dept: PAIN MEDICINE | Facility: CLINIC | Age: 75
End: 2025-05-20
Payer: MEDICARE

## 2025-05-20 VITALS
WEIGHT: 198.6 LBS | HEART RATE: 59 BPM | RESPIRATION RATE: 18 BRPM | HEIGHT: 62 IN | SYSTOLIC BLOOD PRESSURE: 126 MMHG | TEMPERATURE: 98.7 F | BODY MASS INDEX: 36.55 KG/M2 | OXYGEN SATURATION: 94 % | DIASTOLIC BLOOD PRESSURE: 68 MMHG

## 2025-05-20 DIAGNOSIS — G89.29 CHRONIC BILATERAL LOW BACK PAIN WITH BILATERAL SCIATICA: ICD-10-CM

## 2025-05-20 DIAGNOSIS — M47.812 ARTHROPATHY OF CERVICAL FACET JOINT: ICD-10-CM

## 2025-05-20 DIAGNOSIS — Z79.899 ENCOUNTER FOR LONG-TERM (CURRENT) USE OF HIGH-RISK MEDICATION: ICD-10-CM

## 2025-05-20 DIAGNOSIS — M70.61 GREATER TROCHANTERIC BURSITIS OF RIGHT HIP: ICD-10-CM

## 2025-05-20 DIAGNOSIS — M54.42 CHRONIC BILATERAL LOW BACK PAIN WITH BILATERAL SCIATICA: ICD-10-CM

## 2025-05-20 DIAGNOSIS — Z98.1 S/P LUMBAR FUSION: Primary | ICD-10-CM

## 2025-05-20 DIAGNOSIS — M54.41 CHRONIC BILATERAL LOW BACK PAIN WITH BILATERAL SCIATICA: ICD-10-CM

## 2025-05-20 LAB
POC AMPHETAMINES: NEGATIVE
POC BARBITURATES: NEGATIVE
POC BENZODIAZEPHINES: NEGATIVE
POC BUPRENORPHINE: NEGATIVE
POC COCAINE: NEGATIVE
POC METHADONE: NEGATIVE
POC METHAMPHETAMINE SCREEN URINE: NEGATIVE
POC OPIATES: POSITIVE
POC OXYCODONE: NEGATIVE
POC PHENCYCLIDINE: NEGATIVE
POC PROPOXYPHENE: NEGATIVE
POC THC: NEGATIVE

## 2025-05-20 RX ORDER — HYDROCODONE BITARTRATE AND ACETAMINOPHEN 5; 325 MG/1; MG/1
1 TABLET ORAL EVERY 8 HOURS PRN
Qty: 60 TABLET | Refills: 0 | Status: SHIPPED | OUTPATIENT
Start: 2025-05-20

## 2025-05-20 NOTE — PROGRESS NOTES
"CHIEF COMPLAINT  Follow-up for back,neck and hip pain. UDS positive opiates    Subjective   Jemima Bell is a 75 y.o. female  who presents for follow-up.  She has a history of back, neck, and hip pain. She was scheduled for cervical MBBs. When she presented for her injections she was found to be lightheaded, nauseous, and bradycardic. She was sent to the ED and was admitted. Stroke and MI were ruled out and she was diagnosed with vertigo. Her blood pressure medications were also changed.     Today her pain is 3-4/10VAS in severity. She states that her back pain is stable, her neck pain remains increased on the left side. She denies any radicular pain. She does have ongoing right hip pain and is working with orthopedics regarding this. She continues to utilize Norco 5/325 1-2/day, Lyrica 100 mg nightly, and Robaxin 500 mg 1-2/day. This medication regimen decreases her pain by a significant amount. This regimen allows her to cook dinner and do the dishes. She states \"If I take the medicine I'm almost normal\". She denies any side effects including somnolence or constipation.    She does have a bladder stimulator.  She denies any B/B incontinence. She denies any saddle anesthesia.      Status post L3-S1 fusion performed by Dr. Rice on 7/30/2024     Not a candidate for MRI d/t bladder stimulator. Not a good candidate for NSAIDs d/t GI upset. Failed Gabapentin (100 mg was not helpful, 300 mg caused side effects)     Procedures:  6/26/2024--SALLIE at C7/T1--75-80% ONGOING relief  3/13/2024-LESI at L4/5-80% relief to her back, only temporary relief to leg pain  11/30/2023-bilateral SI joint injections-90% relief for approximately 3 months  5/12/23-left SI Joint Injection-90% relief x 5.5 months  3/27/23-L5/S1 LESI-100% relief to back pain x 1 week, 80% relief to left leg pain    Back Pain  This is a chronic problem. The current episode started more than 1 year ago. The problem occurs constantly. The problem has " been improved since onset. The pain is present in the sacro-iliac and lumbar spine. The quality of the pain is described as aching and stabbing. The pain does not radiate. The pain is at a severity of 4/10. The pain is The same all the time. The symptoms are aggravated by standing (walking). Pertinent negatives include no abdominal pain, chest pain, dysuria, fever, headaches, numbness or weakness. Risk factors include menopause. She has tried heat, ice and analgesics (PT previously) for the symptoms.   Neck Pain   This is a new problem. The current episode started in the past 7 days. The problem occurs constantly. Progression since onset: improved since last office visit. The pain is associated with nothing. The pain is present in the left side. The quality of the pain is described as shooting, stabbing and burning. The pain is at a severity of 3/10. The symptoms are aggravated by position (movement). Pertinent negatives include no chest pain, fever, headaches, numbness or weakness. She has tried heat, ice, NSAIDs, oral narcotics and muscle relaxants (TENS, Lyrica) for the symptoms.      PEG Assessment   What number best describes your pain on average in the past week?4  What number best describes how, during the past week, pain has interfered with your enjoyment of life?2  What number best describes how, during the past week, pain has interfered with your general activity?  4    Review of Pertinent Medical Data ---    Evaluated by Dr. Son on 4/7/2025 for right hip bursitis.  Right greater trochanteric bursa injection performed.  Conservative treatment discussed including rest, ice, anti-inflammatory medication.  Discussed importance of PT.    The following portions of the patient's history were reviewed and updated as appropriate: allergies, current medications, past family history, past medical history, past social history, past surgical history, and problem list.    Review of Systems   Constitutional:   "Negative for fever.   Cardiovascular:  Negative for chest pain.   Gastrointestinal:  Negative for abdominal pain, constipation and diarrhea.   Genitourinary:  Negative for difficulty urinating and dysuria.   Musculoskeletal:  Positive for arthralgias (right hip) and neck pain. Negative for back pain.   Neurological:  Negative for weakness, numbness and headaches.   Psychiatric/Behavioral:  Positive for sleep disturbance. Negative for suicidal ideas. The patient is nervous/anxious.      --  The aforementioned information the Chief Complaint section and above subjective data including any HPI data, and also the Review of Systems data, has been personally reviewed and affirmed.  --    Vitals:    05/20/25 1514   BP: 126/68   Pulse: 59   Resp: 18   Temp: 98.7 °F (37.1 °C)   SpO2: 94%   Weight: 90.1 kg (198 lb 9.6 oz)   Height: 157.5 cm (62\")   PainSc: 4   Comment: neck pain 3/10   PainLoc: Hip     Objective   Physical Exam  Vitals and nursing note reviewed.   Constitutional:       Appearance: Normal appearance. She is well-developed.   Eyes:      General: Lids are normal.   Cardiovascular:      Rate and Rhythm: Normal rate.   Pulmonary:      Effort: Pulmonary effort is normal.   Musculoskeletal:      Cervical back: Tenderness and bony tenderness present. Decreased range of motion.      Lumbar back: Tenderness and bony tenderness present.      Right hip: Tenderness (lateral) present.      Comments:   +Cervical facet loading   Neurological:      Mental Status: She is alert and oriented to person, place, and time.      Comments:                                           Motor Function:                                   Right                        Left                           Deltoid:                        5                             5  Biceps:                         5                             5  Triceps:                        5                             5      Wrist extension:           5                        "      5  Wrist flexion:                5                             5  Interossei:                    5                             5  Iliopsoas:                     5                             5      Quadriceps:                 5                             5  Hamstrings:                 5                             5  Anterior Tibial:             5                             5   Gastroc/Soleus:           5                             5      Psychiatric:         Attention and Perception: Attention normal.         Mood and Affect: Mood normal.         Speech: Speech normal.         Behavior: Behavior normal.         Judgment: Judgment normal.       Assessment & Plan   Diagnoses and all orders for this visit:    1. S/P lumbar fusion (Primary)  -     Urine Drug Screen Confirmation - Urine, Clean Catch; Future  -     POC Urine Drug Screen, Triage    2. Chronic bilateral low back pain with bilateral sciatica  -     Urine Drug Screen Confirmation - Urine, Clean Catch; Future  -     POC Urine Drug Screen, Triage    3. Greater trochanteric bursitis of right hip  -     Urine Drug Screen Confirmation - Urine, Clean Catch; Future  -     POC Urine Drug Screen, Triage    4. Arthropathy of cervical facet joint  -     Urine Drug Screen Confirmation - Urine, Clean Catch; Future  -     POC Urine Drug Screen, Triage    5. Encounter for long-term (current) use of high-risk medication  -     Urine Drug Screen Confirmation - Urine, Clean Catch; Future  -     POC Urine Drug Screen, Triage      Jemima Bell reports a pain score of 4.  Given her pain assessment as noted, treatment options were discussed and the following options were decided upon as a follow-up plan to address the patient's pain: prescription for opioid analgesics.    Diagnostic Facet Joint Procedure:   MBB     The first diagnostic facet joint procedure is considered medically reasonable and necessary for the diagnosis and treatment of chronic pain for  this patient due to the patient meeting all of the following criteria:    - 1. Moderate to severe chronic neck or low back pain, predominantly axial, that causes functional deficit measured on pain or disability scale.  - 2. Pain present for minimum of 3 months with documented failure to respond to noninvasive conservative management (as tolerated)  - 3. Absence of untreated radiculopathy or neurogenic claudication (except for radiculopathy caused by facet joint synovial cyst)  - 4. There is no non-facet pathology per clinical assessment or radiology studies that could explain the source of the patient’s pain, including but not limited to fracture, tumor, infection, or significant deformity.    --- Left C4-C6 MBB  Reviewed the procedure at length with the patient.  Included in the review was expectations, complications, risk and benefits.The procedure was described in detail and the risks, benefits and alternatives were discussed with the patient (including but not limited to: bleeding, infection, nerve damage, worsening of pain, inability to perform injection, paralysis, seizures, coma, no pain relief and death) who agreed to proceed.  Discussed the potential for sedation if warranted/wanted.  The procedure will plan to be performed at [facility] with fluoroscopic guidance(unless ultrasound is indicated) and could potentially have steroids and contrast dye used. Questions were answered and in a way the patient could understand.  Patient verbalized understanding and wishes to proceed.  This intervention will be ordered.  Discussed with patient that all procedures are part of a multimodal plan of care and include either formal PT or a home exercise program.  Patient has no evidence of coagulopathy or current infection.    --- Routine UDS in office today as part of monitoring requirements for controlled substances.  The specimen was viewed and the immunoassay result reviewed and is +OPI.  This  specimen will be sent to [facility] laboratory for confirmation.     --- CSA and consent to treat with controlled substances signed on 3/14/2025  --- Opioid Risk--Low  --- Refill Norco 5/325. Fill date 5/25/2025 applied. Patient appears stable with current regimen. No adverse effects. Regarding continuation of opioids, there is no evidence of aberrant behavior or any red flags.  The patient continues with appropriate response to opioid therapy. ADL's remain intact by self.   --- Continue Lyrica and Robaxin. No refill needed.   --- Follow-up after procedure.      SANJEEV REPORT  As part of the patient's treatment plan, I am prescribing controlled substances. The patient has been made aware of appropriate use of such medications, including potential risk of somnolence, limited ability to drive and/or work safely, and the potential for dependence or overdose. It has also been made clear that these medications are for use by this patient only, without concomitant use of alcohol or other substances unless prescribed.     Patient has completed prescribing agreement detailing terms of continued prescribing of controlled substances, including monitoring SANJEEV reports, urine drug screening, and pill counts if necessary. The patient is aware that inappropriate use will results in cessation of prescribing such medications.    As the clinician, I personally reviewed the SANJEEV from 5/20/2025 while the patient was in the office today.    History and physical exam exhibit continued safe and appropriate use of controlled substances.    Dictated utilizing Dragon dictation.

## 2025-05-21 ENCOUNTER — TELEPHONE (OUTPATIENT)
Dept: PAIN MEDICINE | Facility: CLINIC | Age: 75
End: 2025-05-21

## 2025-05-21 NOTE — TELEPHONE ENCOUNTER
Caller:  NORTH   Relationship to Patient: SELF  Phone Number: [phone] (home)   Reason for Call: CALLING TO SCHEDULE  HER NECK INJECTIONS

## 2025-05-21 NOTE — TELEPHONE ENCOUNTER
Caller:  DARIN   Relationship to Patient: DAUGHTER   Phone Number: [phone]  Reason for Call: CALLING TO SCHEDULE  MOMS INJECTION

## 2025-05-22 NOTE — TELEPHONE ENCOUNTER
Caller:  DARIN   Relationship to Patient: DAUGHTER   Phone Number: [phone]  Reason for Call: CALLING TO SCHEDULE  MOMS INJECTION AND WOULD LIKE A CALL BACK TO SCHEDULE AS SOON AS POSSIBLE

## 2025-05-30 ENCOUNTER — OFFICE VISIT (OUTPATIENT)
Dept: NEUROSURGERY | Age: 75
End: 2025-05-30
Payer: MEDICARE

## 2025-05-30 VITALS — BODY MASS INDEX: 36.44 KG/M2 | HEIGHT: 62 IN | WEIGHT: 198 LBS | OXYGEN SATURATION: 96 % | HEART RATE: 63 BPM

## 2025-05-30 DIAGNOSIS — Z98.1 S/P LUMBAR FUSION: Primary | ICD-10-CM

## 2025-05-30 NOTE — PROGRESS NOTES
"Subjective   History of Present Illness: Jemima Bell is a 75 y.o. female is here today for follow-up. Today patient reports pain in her posterior thigh.  Overall her back pain and radicular symptoms are much better compared to preop.  She is now about 10 months postop.  She has completed physical therapy.  The pain that she has in her posterior thigh she believes is muscular and improves with muscle relaxers.  She has tried physical therapy for this without much relief.    Chief Complaint   Patient presents with    Follow-up     Sx 7/3/24          Previous treatment:   Rest and Post-op PT    Previous neurosurgery:  Surgery 7/30/24    Previous injections:     The following portions of the patient's history were reviewed and updated as appropriate: allergies, current medications, past family history, past medical history, past social history, past surgical history, and problem list.    Review of Systems   Constitutional:  Positive for activity change.   HENT: Negative.     Eyes: Negative.    Respiratory: Negative.     Cardiovascular: Negative.    Gastrointestinal: Negative.    Endocrine: Negative.    Genitourinary: Negative.    Musculoskeletal:  Positive for arthralgias, back pain (bilateral lower) and myalgias (right thigh).   Skin: Negative.    Allergic/Immunologic: Negative.    Neurological: Negative.    Hematological: Negative.    Psychiatric/Behavioral:  Positive for sleep disturbance.        Objective      Pulse 63   Ht 157.5 cm (62\")   Wt 89.8 kg (198 lb)   LMP  (LMP Unknown)   SpO2 96%   BMI 36.21 kg/m²    Body mass index is 36.21 kg/m².  Vitals:    05/30/25 1320   PainSc: 4   Comment: right leg         Neurological Exam        Assessment & Plan   Independent Review of Radiographic Studies:      I personally reviewed and interpreted the images from the following studies.    Notably    Medical Decision Making:      Jemima Bell is a 75 y.o. female status post L3-S1 TLIF doing " well at 10 months postop.  Patient has no limitations from my perspective.  I believe her ongoing posterior thigh pain is muscular and she can continue to work with pain management.  She can follow-up with me as needed in the future      Diagnoses and all orders for this visit:    1. S/P lumbar fusion (Primary)      No follow-ups on file.    This patient was examined wearing appropriate personal protective equipment.                      Dr. Michelet Rice IV    05/30/25  13:30 EDT

## 2025-06-09 ENCOUNTER — OFFICE VISIT (OUTPATIENT)
Dept: ORTHOPEDIC SURGERY | Facility: CLINIC | Age: 75
End: 2025-06-09
Payer: MEDICARE

## 2025-06-09 VITALS — TEMPERATURE: 97.3 F | WEIGHT: 200.3 LBS | BODY MASS INDEX: 36.86 KG/M2 | HEIGHT: 62 IN

## 2025-06-09 DIAGNOSIS — M70.61 TROCHANTERIC BURSITIS OF RIGHT HIP: ICD-10-CM

## 2025-06-09 DIAGNOSIS — M16.11 PRIMARY OSTEOARTHRITIS OF RIGHT HIP: ICD-10-CM

## 2025-06-09 DIAGNOSIS — M70.71 ISCHIAL BURSITIS OF RIGHT SIDE: Primary | ICD-10-CM

## 2025-06-09 PROCEDURE — 99213 OFFICE O/P EST LOW 20 MIN: CPT | Performed by: ORTHOPAEDIC SURGERY

## 2025-06-09 PROCEDURE — 1160F RVW MEDS BY RX/DR IN RCRD: CPT | Performed by: ORTHOPAEDIC SURGERY

## 2025-06-09 PROCEDURE — 1159F MED LIST DOCD IN RCRD: CPT | Performed by: ORTHOPAEDIC SURGERY

## 2025-06-09 RX ORDER — LIDOCAINE HYDROCHLORIDE 10 MG/ML
2 INJECTION, SOLUTION EPIDURAL; INFILTRATION; INTRACAUDAL; PERINEURAL
Status: COMPLETED | OUTPATIENT
Start: 2025-05-14 | End: 2025-05-14

## 2025-06-09 RX ORDER — METHYLPREDNISOLONE ACETATE 40 MG/ML
80 INJECTION, SUSPENSION INTRA-ARTICULAR; INTRALESIONAL; INTRAMUSCULAR; SOFT TISSUE
Status: COMPLETED | OUTPATIENT
Start: 2025-05-14 | End: 2025-05-14

## 2025-06-09 NOTE — PROGRESS NOTES
"Patient: Jemima Bell  YOB: 1950 75 y.o. female  Medical Record Number: [id number]    Chief Complaint:   Chief Complaint   Patient presents with    Right Hip - Pain, Follow-up       History of Present Illness:Jemima Bell is a 75 y.o. female who presents for follow-up of right hip pain.  Patient admits seen before diagnosed bursitis and arthritis states the bursal injection was helpful some she did some therapy she did get a recent hip joint ejection which was helpful some.  She notes some more discomfort posteriorly it is more pulling does not radiate down the back of the legs worse with sitting specially hard surfaces when she gets up and starts feeling better.    Allergies:   Allergies   Allergen Reactions    Salicylates GI Intolerance     History of gi bleed      Colestipol GI Intolerance    Biaxin [Clarithromycin]     Adhesive Tape Rash     Can use plastic tape, paper tape causes rash    Augmentin [Amoxicillin-Pot Clavulanate] Diarrhea    Prevacid [Lansoprazole] Itching and Swelling     Eyes only    Sulfa Antibiotics GI Intolerance    Sulfamethoxazole-Trimethoprim Nausea And Vomiting and GI Intolerance     \"makes me sick as a dog\"         Medications:   Current Outpatient Medications   Medication Sig Dispense Refill    albuterol sulfate  (90 Base) MCG/ACT inhaler Inhale 2 puffs Every 4 (Four) Hours As Needed for Shortness of Air or Wheezing.      fluticasone (FLONASE) 50 MCG/ACT nasal spray 2 sprays into the nostril(s) as directed by provider Daily. 48 g 3    HYDROcodone-acetaminophen (NORCO) 5-325 MG per tablet Take 1 tablet by mouth Every 8 (Eight) Hours As Needed for Moderate Pain. 30 day supply 60 tablet 0    hydrOXYzine (ATARAX) 10 MG tablet Take 1 tablet by mouth Every 4 (Four) Hours As Needed for Anxiety (twitching). 120 tablet 1    Ibuprofen 3 %, Gabapentin 10 %, Baclofen 2 %, lidocaine 4 %, Ketamine HCl 4 % Apply 1-2 g topically to the appropriate area as " "directed 3 (Three) to 4 (Four) times daily. 90 g 0    levothyroxine (SYNTHROID, LEVOTHROID) 100 MCG tablet TAKE ONE TABLET BY MOUTH EVERY MORNING 90 tablet 3    lidocaine (LIDODERM) 5 % Place 1 patch on the skin as directed by provider Daily. Remove & Discard patch within 12 hours or as directed by MD 30 each 0    meclizine (ANTIVERT) 25 MG tablet Take 1 tablet by mouth 3 (Three) Times a Day As Needed for Dizziness. 20 tablet 0    methocarbamol (ROBAXIN) 500 MG tablet Take 1 tablet by mouth 3 (Three) Times a Day As Needed for Muscle Spasms. 90 tablet 1    metoprolol succinate XL (TOPROL-XL) 25 MG 24 hr tablet Take 0.5 tablets by mouth Daily. 30 tablet 5    ondansetron (Zofran) 4 MG tablet Take 1 tablet by mouth Every 8 (Eight) Hours As Needed for Nausea or Vomiting. 15 tablet 2    pantoprazole (PROTONIX) 40 MG EC tablet Take 1 tablet by mouth 2 (Two) Times a Day. 180 tablet 3    pregabalin (LYRICA) 50 MG capsule Take 1 capsule by mouth 2 (Two) Times a Day. 60 capsule 1    promethazine (PHENERGAN) 25 MG tablet Take 1 tablet by mouth Every 6 (Six) Hours As Needed for Nausea or Vomiting. 20 tablet 1    sertraline (ZOLOFT) 100 MG tablet TAKE 1 TABLET BY MOUTH DAILY (Patient taking differently: Take 1 tablet by mouth Every Night.) 90 tablet 1    vitamin B-12 (CYANOCOBALAMIN) 1000 MCG tablet Take 1 tablet by mouth Daily.       No current facility-administered medications for this visit.         The following portions of the patient's history were reviewed and updated as appropriate: allergies, current medications, past family history, past medical history, past social history, past surgical history and problem list.    Review of Systems:   A 14-point review of systems was performed. All systems negative except pertinent positives/negative listed in HPI above    Physical Exam:   Vitals:    06/09/25 1048   Temp: 97.3 °F (36.3 °C)   TempSrc: Temporal   Weight: 90.9 kg (200 lb 4.8 oz)   Height: 157.5 cm (62.01\")   PainSc: 8  "   PainLoc: Hip       General: A and O x 3, ASA, NAD    SCLERAE:    Normal    DENTITION:   Normal  Right lower extremity [place] still some tenderness laterally gentle hip range of motion tolerated slightly decreased.  Some direct tenderness over the ischium/along the ischial bursal region.        Assessment/Plan:  Right ischial bursitis, trochanteric bursitis, arthritis    I discussed the findings with the patient she has multiple soft tissue about the hip that seem to be started up there is been some response to previous injections as noted above.  Most of her complaints now are specifically posterior as noted above.  This time plan some conservative treatment consisting of rest, ice, modification and formal physical therapy would like her to follow-up with our sports colleague Dr. Patel if still symptomatic posteriorly to consider possible ischial bursal injection.  If needed may need to focus more evaluation to hip joint if warranted.  Patient or stands agrees to this plan all questions answered.      Kumar Son MD  6/9/2025     Disclaimer: Please note that areas of this note were completed with computer voice recognition software.  Quite often unanticipated grammatical, syntax, homophones, and other interpretive errors are inadvertently transcribed by the computer software. Please excuse any errors that have escaped final proofreading.

## 2025-06-11 ENCOUNTER — OFFICE VISIT (OUTPATIENT)
Dept: CARDIOLOGY | Facility: CLINIC | Age: 75
End: 2025-06-11
Payer: MEDICARE

## 2025-06-11 VITALS
HEART RATE: 67 BPM | HEIGHT: 62 IN | DIASTOLIC BLOOD PRESSURE: 79 MMHG | BODY MASS INDEX: 36.25 KG/M2 | WEIGHT: 197 LBS | SYSTOLIC BLOOD PRESSURE: 130 MMHG

## 2025-06-11 DIAGNOSIS — R06.02 SHORTNESS OF BREATH: Primary | ICD-10-CM

## 2025-06-11 DIAGNOSIS — I10 PRIMARY HYPERTENSION: ICD-10-CM

## 2025-06-11 PROCEDURE — 1160F RVW MEDS BY RX/DR IN RCRD: CPT | Performed by: NURSE PRACTITIONER

## 2025-06-11 PROCEDURE — 1159F MED LIST DOCD IN RCRD: CPT | Performed by: NURSE PRACTITIONER

## 2025-06-11 PROCEDURE — 99214 OFFICE O/P EST MOD 30 MIN: CPT | Performed by: NURSE PRACTITIONER

## 2025-06-11 PROCEDURE — 93000 ELECTROCARDIOGRAM COMPLETE: CPT | Performed by: NURSE PRACTITIONER

## 2025-06-11 NOTE — PROGRESS NOTES
Subjective:        Jemima Bell is a 75 y.o. female who here for follow up    No chief complaint on file.      HPI    Jemima Bell is a 75-year-old female who is here today for blood pressure recheck.  She has a history to include dizziness/bradycardia, arthritis, bladder stimulator, anxiety, hypertension, hypothyroidism, GERD, heart murmur, and osteoarthritis.    In April she was hospitalized for dizziness/bradycardia.  During her hospitalization her Ziac was switched to metoprolol XL 12.5 mg daily.  Her dizziness improved with the modification.    Test remain relevant.  EKG 4/9/25 shows sinus rhythm with PAC and significant artifact due to bladder stimulator.    3/18/25 echo: EF 61 to 65%, normal LV function.    6/2024 stress test indicated a normal study.      The following portions of the patient's history were reviewed and updated as appropriate: allergies, current medications, past family history, past medical history, past social history, past surgical history and problem list.    Past Medical History:   Diagnosis Date    Acromioclavicular separation     Acute bronchitis     Acute sinusitis     Allergic 11/2022    Shrimp    Allergic rhinitis     Anemia Childhood    Anxiety     Arthritis     Arthritis of back     Arthritis of neck     Arthropathy of cervical facet joint 03/14/2025    Asthma     Back pain     BMI 34.0-34.9,adult     Bronchitis, chronic 2017-19    Bursitis of hip     Cataract     Cervical disc disorder     Cervicalgia     Chills     Cholelithiasis 2004?    Remival    Chronic diarrhea     Chronic pain disorder     Cluster headache     Colon polyp 12/2022    Deep vein thrombosis (DVT) of lower extremity     Depression     Dermatitis     Dislocation, shoulder     Dyspnea     Dysuria     Esophageal reflux     Extremity pain     Fatigue     Frozen shoulder     Gastric ulcer     GERD (gastroesophageal reflux disease)     GI (gastrointestinal bleed) 2004?    Headache     Headache,  tension-type     Heart murmur 1973    Hernia     Hiatal hernia 2007    Hip arthrosis     Hypertension     Hypothyroidism     Irritable bowel syndrome     Low back strain     Lumbago     Lumbar stenosis with neurogenic claudication 05/01/2024    Lumbosacral disc disease     Migraine     Nausea     Neck strain     Neuritis     Osteoarthritis     Osteoarthritis of knee     Periarthritis of shoulder     Plantar fasciitis     Pneumonia 89y    Pulled muscle     Pulmonary embolism     Pyelonephritis     Rheumatic fever     Sore throat     Torticollis     Trapezius strain     Urinary incontinence     Urinary pain     Urinary tract infection     UTI symptoms     Vertigo 03/2025    Vitamin B1 deficiency     Vitamin B12 deficiency     Vitamin D deficiency     Weakness     Wheezing          reports that she has never smoked. She has never used smokeless tobacco. She reports that she does not drink alcohol and does not use drugs.     Family History   Problem Relation Age of Onset    Arthritis Mother     Depression Mother     Hyperlipidemia Mother     Hypertension Mother     Irritable bowel syndrome Mother     Dislocations Mother     Heart attack Mother     Heart failure Mother     Hypertension Father     Arthritis Father     Stroke Father     Alcohol abuse Maternal Grandfather     Depression Maternal Grandfather     Heart disease Other         IN FEMALES BEFORE AGE 65    Asthma Maternal Grandmother     Asthma Paternal Grandfather     Heart attack Maternal Uncle     Heart attack Paternal Uncle     Heart failure Paternal Uncle             Objective:           Vitals and nursing note reviewed.   Constitutional:       Appearance: Well-developed.   HENT:      Head: Normocephalic.      Right Ear: External ear normal.      Left Ear: External ear normal.   Neck:      Vascular: No JVD.   Pulmonary:      Effort: Pulmonary effort is normal. No respiratory distress.      Breath sounds: Normal breath sounds. No stridor. No rales.    Cardiovascular:      Normal rate. Regular rhythm.      No gallop.    Pulses:     Intact distal pulses.   Edema:     Peripheral edema absent.   Abdominal:      General: Bowel sounds are normal. There is no distension.      Palpations: Abdomen is soft.      Tenderness: There is no abdominal tenderness. There is no guarding.   Musculoskeletal: Normal range of motion.         General: No tenderness.      Cervical back: Normal range of motion. Skin:     General: Skin is warm.   Neurological:      Mental Status: Alert and oriented to person, place, and time.      Deep Tendon Reflexes: Reflexes are normal and symmetric.   Psychiatric:         Judgment: Judgment normal.           ECG 12 Lead    Date/Time: 6/11/2025 2:13 PM  Performed by: Izzy Benz APRN    Authorized by: Izzy Benz APRN  Comparison: compared with previous ECG from 4/9/2025  Similar to previous ECG  Rhythm: sinus rhythm  Rate: normal    Clinical impression: non-specific ECG           3/18/25       Left ventricular ejection fraction appears to be 61 - 65%.    Left ventricular diastolic function was normal.    Estimated right ventricular systolic pressure from tricuspid regurgitation is normal (<35 mmHg).      6/2024      Interpretation Summary         Findings consistent with a normal ECG stress test.    Myocardial perfusion imaging indicates a normal myocardial perfusion study with no evidence of ischemia. Impressions are consistent with a low risk study.    Left ventricular ejection fraction is normal (Calculated EF = 60%).     Asymptomatic for chest pain. ECG is negative for ischemia.   Ectopy: none  B/P is appropriate. Baseline 110/68, Peak 114/56, Recovery: 116//63  Pharmacologic study due to inability to tolerate increasing speed and grade of treadmill due to orthopedic, mobility  issues and   Beta-blocker therapy.   Participated in Low Level exercise and tolerance is poor.     Supervised by:  Izzy PRITCHARD.         Current Outpatient Medications:     albuterol sulfate  (90 Base) MCG/ACT inhaler, Inhale 2 puffs Every 4 (Four) Hours As Needed for Shortness of Air or Wheezing., Disp: , Rfl:     fluticasone (FLONASE) 50 MCG/ACT nasal spray, 2 sprays into the nostril(s) as directed by provider Daily., Disp: 48 g, Rfl: 3    HYDROcodone-acetaminophen (NORCO) 5-325 MG per tablet, Take 1 tablet by mouth Every 8 (Eight) Hours As Needed for Moderate Pain. 30 day supply, Disp: 60 tablet, Rfl: 0    hydrOXYzine (ATARAX) 10 MG tablet, Take 1 tablet by mouth Every 4 (Four) Hours As Needed for Anxiety (twitching)., Disp: 120 tablet, Rfl: 1    Ibuprofen 3 %, Gabapentin 10 %, Baclofen 2 %, lidocaine 4 %, Ketamine HCl 4 %, Apply 1-2 g topically to the appropriate area as directed 3 (Three) to 4 (Four) times daily., Disp: 90 g, Rfl: 0    levothyroxine (SYNTHROID, LEVOTHROID) 100 MCG tablet, TAKE ONE TABLET BY MOUTH EVERY MORNING, Disp: 90 tablet, Rfl: 3    lidocaine (LIDODERM) 5 %, Place 1 patch on the skin as directed by provider Daily. Remove & Discard patch within 12 hours or as directed by MD, Disp: 30 each, Rfl: 0    meclizine (ANTIVERT) 25 MG tablet, Take 1 tablet by mouth 3 (Three) Times a Day As Needed for Dizziness., Disp: 20 tablet, Rfl: 0    methocarbamol (ROBAXIN) 500 MG tablet, Take 1 tablet by mouth 3 (Three) Times a Day As Needed for Muscle Spasms., Disp: 90 tablet, Rfl: 1    metoprolol succinate XL (TOPROL-XL) 25 MG 24 hr tablet, Take 0.5 tablets by mouth Daily., Disp: 30 tablet, Rfl: 5    ondansetron (Zofran) 4 MG tablet, Take 1 tablet by mouth Every 8 (Eight) Hours As Needed for Nausea or Vomiting., Disp: 15 tablet, Rfl: 2    pantoprazole (PROTONIX) 40 MG EC tablet, Take 1 tablet by mouth 2 (Two) Times a Day., Disp: 180 tablet, Rfl: 3    pregabalin (LYRICA) 50 MG capsule, Take 1 capsule by mouth 2 (Two) Times a Day., Disp: 60 capsule, Rfl: 1    promethazine (PHENERGAN) 25 MG tablet, Take 1 tablet by mouth Every 6  (Six) Hours As Needed for Nausea or Vomiting., Disp: 20 tablet, Rfl: 1    sertraline (ZOLOFT) 100 MG tablet, TAKE 1 TABLET BY MOUTH DAILY (Patient taking differently: Take 1 tablet by mouth Every Night.), Disp: 90 tablet, Rfl: 1    vitamin B-12 (CYANOCOBALAMIN) 1000 MCG tablet, Take 1 tablet by mouth Daily., Disp: , Rfl:      Assessment:        Patient Active Problem List   Diagnosis    Wheezing    Osteoarthritis    Acute bronchitis    Dyspnea    Hypothyroidism    Shortness of breath    Vitamin D deficiency    Depression with anxiety    Muscle cramp    Back pain    Vitamin B12 deficiency    UTI (urinary tract infection)    Abnormal EKG    Precordial pain    Anxiety    Borderline systolic HTN    Left upper quadrant abdominal pain    Diarrhea    Gastroesophageal reflux disease    History of Nissen fundoplication    Incontinence of feces with fecal urgency    Foraminal stenosis of lumbar region    Lumbar degenerative disc disease    Lumbar facet arthropathy    Lumbar radiculopathy    Sacroiliac joint dysfunction of both sides    Lumbar stenosis with neurogenic claudication    Cervical radiculopathy    Neck pain    Heart murmur    Preop cardiovascular exam    Arthropathy of cervical facet joint               Plan:       Assessment/Plan:  1. hypertension  a.  Blood pressure was slightly elevated on her previous visit and she wanted to monitor her sodium intake.  Her blood pressure has improved.      b.  Continue metoprolol succinate XL 12.5 mg daily.  2.  Dizziness/bradycardia   a.  Improved    3. Increase shob worse with exertion: recent echo with normal ef and LV function. I will have her do a stress test.      It was explained to the patient that stress testing carries 85% specificity/sensitivity, and does not rule out future cardiac event.  Risks of the procedure were explained to the patient including shortness of breath, induction of myocardial infarction, and dizziness.  Patient is agreeable to proceeding with  stress testing. '          No diagnosis found.    There are no diagnoses linked to this encounter.    COUNSELING: isabel Pipereling was given to patient for the following topics: diagnostic results, risk factor reductions, impressions, risks and benefits of treatment options and importance of treatment compliance .       SMOKING COUNSELING: Denies    Worse shob with exertion for approximately 2 months. She will need a lexiscan due to back issues.     Sincerely,   MACHO Sumner  [facility]  06/11/25  11:40 EDT    EMR Dragon/Transcription disclaimer: Dictated utilizing Dragon Dictation

## 2025-06-13 ENCOUNTER — TELEPHONE (OUTPATIENT)
Dept: PAIN MEDICINE | Facility: CLINIC | Age: 75
End: 2025-06-13

## 2025-06-13 NOTE — TELEPHONE ENCOUNTER
"  Caller: Jemima Bell \"Kim\"    Relationship: Self    Best call back number: [phone]\    What was the call regarding: PT AWARE SHE HAS ABLATION SCHEDULED WITH DR BUSTAMANTE ON JUNE 25. HOWEVER, PT STATES SHE IS SUPPOSED TO HAVE A SERIES OF ABLATIONS SCHEDULED AND NOT JUST ONE APPT. PLEASE CONTACT PT TO DISCUSS.     "

## 2025-06-14 DIAGNOSIS — M62.838 MUSCLE SPASM: ICD-10-CM

## 2025-06-14 DIAGNOSIS — Z98.1 S/P LUMBAR FUSION: ICD-10-CM

## 2025-06-14 DIAGNOSIS — M54.42 CHRONIC BILATERAL LOW BACK PAIN WITH BILATERAL SCIATICA: ICD-10-CM

## 2025-06-14 DIAGNOSIS — G89.29 CHRONIC BILATERAL LOW BACK PAIN WITH BILATERAL SCIATICA: ICD-10-CM

## 2025-06-14 DIAGNOSIS — M54.41 CHRONIC BILATERAL LOW BACK PAIN WITH BILATERAL SCIATICA: ICD-10-CM

## 2025-06-16 ENCOUNTER — HOSPITAL ENCOUNTER (OUTPATIENT)
Dept: CARDIOLOGY | Facility: HOSPITAL | Age: 75
Discharge: HOME | End: 2025-06-16
Payer: MEDICARE

## 2025-06-16 LAB
BH CV REST NUCLEAR ISOTOPE DOSE: 10.9 MCI
BH CV STRESS BP STAGE 1: NORMAL
BH CV STRESS COMMENTS STAGE 1: NORMAL
BH CV STRESS DOSE REGADENOSON STAGE 1: 0.4
BH CV STRESS DURATION MIN STAGE 1: 0
BH CV STRESS DURATION SEC STAGE 1: 10
BH CV STRESS HR STAGE 1: 71
BH CV STRESS NUCLEAR ISOTOPE DOSE: 32.9 MCI
BH CV STRESS PROTOCOL 1: NORMAL
BH CV STRESS RECOVERY BP: NORMAL MMHG
BH CV STRESS RECOVERY HR: 79 BPM
BH CV STRESS STAGE 1: 1
MAXIMAL PREDICTED HEART RATE: 144 BPM
PERCENT MAX PREDICTED HR: 54.86 %
SPECT HRT GATED+EF W RNC IV: 67 %
STRESS BASELINE BP: NORMAL MMHG
STRESS BASELINE HR: 50 BPM
STRESS O2 SAT REST: 97 %
STRESS PERCENT HR: 65 %
STRESS POST ESTIMATED WORKLOAD: 1 METS
STRESS POST EXERCISE DUR SEC: 10 SEC
STRESS POST PEAK BP: NORMAL MMHG
STRESS POST PEAK HR: 79 BPM
STRESS TARGET HR: 122 BPM

## 2025-06-16 PROCEDURE — 78452 HT MUSCLE IMAGE SPECT MULT: CPT | Performed by: INTERNAL MEDICINE

## 2025-06-16 PROCEDURE — 25010000002 REGADENOSON 0.4 MG/5ML SOLUTION: Performed by: INTERNAL MEDICINE

## 2025-06-16 PROCEDURE — A9500 TC99M SESTAMIBI: HCPCS | Performed by: INTERNAL MEDICINE

## 2025-06-16 PROCEDURE — 93018 CV STRESS TEST I&R ONLY: CPT | Performed by: INTERNAL MEDICINE

## 2025-06-16 PROCEDURE — 78452 HT MUSCLE IMAGE SPECT MULT: CPT

## 2025-06-16 PROCEDURE — 34310000005 TECHNETIUM SESTAMIBI: Performed by: INTERNAL MEDICINE

## 2025-06-16 PROCEDURE — 93016 CV STRESS TEST SUPVJ ONLY: CPT | Performed by: INTERNAL MEDICINE

## 2025-06-16 PROCEDURE — 93017 CV STRESS TEST TRACING ONLY: CPT

## 2025-06-16 RX ORDER — REGADENOSON 0.08 MG/ML
0.4 INJECTION, SOLUTION INTRAVENOUS
Status: COMPLETED | OUTPATIENT
Start: 2025-06-16 | End: 2025-06-16

## 2025-06-16 RX ADMIN — TECHNETIUM TC 99M SESTAMIBI 1 DOSE: 1 INJECTION INTRAVENOUS at 07:20

## 2025-06-16 RX ADMIN — REGADENOSON 0.4 MG: 0.08 INJECTION, SOLUTION INTRAVENOUS at 09:03

## 2025-06-16 RX ADMIN — TECHNETIUM TC 99M SESTAMIBI 1 DOSE: 1 INJECTION INTRAVENOUS at 09:03

## 2025-06-17 ENCOUNTER — PREP FOR SURGERY (OUTPATIENT)
Dept: SURGERY | Facility: SURGERY CENTER | Age: 75
End: 2025-06-17
Payer: MEDICARE

## 2025-06-17 ENCOUNTER — TRANSCRIBE ORDERS (OUTPATIENT)
Dept: SURGERY | Facility: SURGERY CENTER | Age: 75
End: 2025-06-17
Payer: MEDICARE

## 2025-06-17 ENCOUNTER — OFFICE VISIT (OUTPATIENT)
Dept: FAMILY MEDICINE CLINIC | Facility: CLINIC | Age: 75
End: 2025-06-17
Payer: MEDICARE

## 2025-06-17 VITALS
OXYGEN SATURATION: 100 % | HEART RATE: 92 BPM | BODY MASS INDEX: 36.95 KG/M2 | TEMPERATURE: 98.3 F | DIASTOLIC BLOOD PRESSURE: 76 MMHG | WEIGHT: 200.8 LBS | HEIGHT: 62 IN | SYSTOLIC BLOOD PRESSURE: 140 MMHG

## 2025-06-17 DIAGNOSIS — M47.812 ARTHROPATHY OF CERVICAL FACET JOINT: Primary | ICD-10-CM

## 2025-06-17 DIAGNOSIS — F41.8 DEPRESSION WITH ANXIETY: ICD-10-CM

## 2025-06-17 DIAGNOSIS — E53.8 VITAMIN B12 DEFICIENCY: ICD-10-CM

## 2025-06-17 DIAGNOSIS — Z41.9 SURGERY, ELECTIVE: Primary | ICD-10-CM

## 2025-06-17 DIAGNOSIS — E03.9 HYPOTHYROIDISM, UNSPECIFIED TYPE: Primary | ICD-10-CM

## 2025-06-17 DIAGNOSIS — R73.03 PREDIABETES: ICD-10-CM

## 2025-06-17 DIAGNOSIS — R11.0 NAUSEA: ICD-10-CM

## 2025-06-17 DIAGNOSIS — H93.13 TINNITUS OF BOTH EARS: ICD-10-CM

## 2025-06-17 PROCEDURE — 99214 OFFICE O/P EST MOD 30 MIN: CPT | Performed by: FAMILY MEDICINE

## 2025-06-17 PROCEDURE — 1125F AMNT PAIN NOTED PAIN PRSNT: CPT | Performed by: FAMILY MEDICINE

## 2025-06-17 RX ORDER — ONDANSETRON 4 MG/1
4 TABLET, FILM COATED ORAL EVERY 8 HOURS PRN
Qty: 15 TABLET | Refills: 2 | Status: SHIPPED | OUTPATIENT
Start: 2025-06-17

## 2025-06-17 RX ORDER — METHOCARBAMOL 500 MG/1
500 TABLET, FILM COATED ORAL 3 TIMES DAILY PRN
Qty: 90 TABLET | Refills: 1 | Status: SHIPPED | OUTPATIENT
Start: 2025-06-17

## 2025-06-17 RX ORDER — PREGABALIN 50 MG/1
50 CAPSULE ORAL 2 TIMES DAILY
Qty: 60 CAPSULE | Refills: 1 | Status: SHIPPED | OUTPATIENT
Start: 2025-06-17

## 2025-06-17 RX ORDER — SERTRALINE HYDROCHLORIDE 100 MG/1
50 TABLET, FILM COATED ORAL DAILY
Start: 2025-06-17

## 2025-06-17 NOTE — PROGRESS NOTES
Chief Complaint  Med Refill, Hypothyroidism, Anxiety, and Pain    Subjective        Jemima Bell presents to [facility]  Hypothyroidism  Symptoms include diaphoresis and fatigue. Patient reports no visual change.   Anxiety    Symptoms: nausea      Symptoms: no chest pain    Pain  Symptoms include joint pain, change in stool, diaphoresis, fatigue, myalgias, nausea and neck pain.    Pertinent negative symptoms include no abdominal pain, no anorexia, no chest pain, no chills, no congestion, no cough, no fever, no headaches, no joint swelling, no numbness, no rash, no sore throat, no swollen glands, no dysuria, no vertigo, no visual change, no vomiting and no weakness.     Presumed labs.  Still fatigued  Symptoms are: recurrent.   Onset was at an unknown time.   Symptoms occur: daily.  Symptoms include: joint pain, change in stool, diaphoresis, fatigue, myalgias, nausea and neck pain.   Pertinent negative symptoms include no abdominal pain, no anorexia, no chest pain, no chills, no congestion, no cough, no fever, no headaches, no joint swelling, no numbness, no rash, no sore throat, no swollen glands, no dysuria, no vertigo, no visual change, no vomiting and no weakness.   Treatment and/or Medications comments include: As prescribex     History of Present Illness  The patient is a 75-year-old female who presents for follow-up on multiple chronic medical problems.    The primary reason for this visit is to check her thyroid levels. She has been adhering to her medication regimen.    In December 2024, she was diagnosed with prediabetes. Despite having a modest appetite, she has been experiencing weight gain and currently weighs 200 pounds. She reports severe diarrhea following the consumption of vegetables, even when cooked, and has been managing this issue for several years. She also takes iron supplements.    She suspects a recurrence of low B12 levels and takes vitamin B12  "and D3 supplements daily.    She occasionally experiences depressive episodes and attributes some of her weight gain to her antidepressant medication. She reports that the medication slows her down, making walking more strenuous and causing shortness of breath. She has a lower dose of sertraline at home.    Yesterday, she underwent a stress test and had an echocardiogram during a recent hospital visit. She has not felt well since an episode of vertigo, which has resolved, but she continues to experience tinnitus. She has consulted an ENT specialist and received ear injections. She plans to return to the ENT specialist if she decides to get hearing aids. She reports feeling lonely due to her 's lack of conversation but finds social interaction outside the home beneficial. She takes anti-nausea medication as needed for diarrhea.    PAST SURGICAL HISTORY:  She had back surgery, though the exact date is not mentioned. She also mentioned needing a battery change for her [employer] device, which requires cardiology clearance.       Objective   Vital Signs:  /76   Pulse 92   Temp 98.3 °F (36.8 °C)   Ht 157.5 cm (62\")   Wt 91.1 kg (200 lb 12.8 oz)   SpO2 100%   BMI 36.73 kg/m²   Estimated body mass index is 36.73 kg/m² as calculated from the following:    Height as of this encounter: 157.5 cm (62\").    Weight as of this encounter: 91.1 kg (200 lb 12.8 oz).               Physical Exam   Physical Exam  General: Alert, in no acute distress  Eyes: Pupils equal  Mouth/Throat: Oral exam performed, mucous membranes slightly dry  Respiratory: Clear to auscultation  Cardiovascular: Regular rate and rhythm  Extremities: No swelling noted       Result Review :          Results  Diagnostic Testing   - Stress test: Normal              Assessment and Plan     Diagnoses and all orders for this visit:    1. Hypothyroidism, unspecified type (Primary)  -     TSH    2. Prediabetes  -     TSH  -     CBC & Differential  -     " Comprehensive Metabolic Panel  -     Hemoglobin A1c    3. Vitamin B12 deficiency  -     Vitamin B12    4. Depression with anxiety  -     sertraline (ZOLOFT) 100 MG tablet; Take 0.5 tablets by mouth Daily.    5. Tinnitus of both ears    6. Nausea  -     ondansetron (Zofran) 4 MG tablet; Take 1 tablet by mouth Every 8 (Eight) Hours As Needed for Nausea or Vomiting.  Dispense: 15 tablet; Refill: 2      Assessment & Plan  1. Prediabetes.  - Weight has remained stable.  - Advised to maintain a diet rich in proteins and low in carbohydrates, focusing on whole grains.  - Comprehensive set of laboratory tests will be conducted today.    2. Depression.  - Informed that while Zoloft can make weight loss more challenging, it does not directly cause weight gain.  - Dosage of sertraline will be reduced to 50 mg daily, equivalent to half a pill.  - Mood will be reassessed in 3 months following the reduction in medication.    3. Tinnitus.  - Continues to experience ringing in her ears despite previous treatments, including injections from an ENT specialist.  - Informed about the increased risk of dementia associated with impaired hearing and vision as she ages.  - Will consider hearing aids if necessary.    4. Vitamin B12 deficiency.  - Takes a B12 vitamin every morning.  - B12 levels will be checked today to ensure they are within the normal range.    5. Nausea.  - Takes anti-nausea medication as needed for diarrhea.    Follow-up  - Follow up in 3 months.            Follow Up     Return in about 3 months (around 9/17/2025) for Recheck mood.  Patient was given instructions and counseling regarding her condition or for health maintenance advice. Please see specific information pulled into the AVS if appropriate.    Patient or patient representative verbalized consent for the use of Ambient Listening during the visit with  Meredith Lea Kehrer, MD for chart documentation. 6/17/2025  14:12 EDT

## 2025-06-18 LAB
ALBUMIN SERPL-MCNC: 4.2 G/DL (ref 3.8–4.8)
ALP SERPL-CCNC: 75 IU/L (ref 44–121)
ALT SERPL-CCNC: 13 IU/L (ref 0–32)
AST SERPL-CCNC: 14 IU/L (ref 0–40)
BASOPHILS # BLD AUTO: 0.1 X10E3/UL (ref 0–0.2)
BASOPHILS NFR BLD AUTO: 1 %
BILIRUB SERPL-MCNC: 0.4 MG/DL (ref 0–1.2)
BUN SERPL-MCNC: 21 MG/DL (ref 8–27)
BUN/CREAT SERPL: 22 (ref 12–28)
CALCIUM SERPL-MCNC: 9.6 MG/DL (ref 8.7–10.3)
CHLORIDE SERPL-SCNC: 105 MMOL/L (ref 96–106)
CO2 SERPL-SCNC: 21 MMOL/L (ref 20–29)
CREAT SERPL-MCNC: 0.96 MG/DL (ref 0.57–1)
EGFRCR SERPLBLD CKD-EPI 2021: 61 ML/MIN/1.73
EOSINOPHIL # BLD AUTO: 0.3 X10E3/UL (ref 0–0.4)
EOSINOPHIL NFR BLD AUTO: 4 %
ERYTHROCYTE [DISTWIDTH] IN BLOOD BY AUTOMATED COUNT: 12.6 % (ref 11.7–15.4)
GLOBULIN SER CALC-MCNC: 2.7 G/DL (ref 1.5–4.5)
GLUCOSE SERPL-MCNC: 138 MG/DL (ref 70–99)
HBA1C MFR BLD: 6 % (ref 4.8–5.6)
HCT VFR BLD AUTO: 41.5 % (ref 34–46.6)
HGB BLD-MCNC: 13 G/DL (ref 11.1–15.9)
IMM GRANULOCYTES # BLD AUTO: 0 X10E3/UL (ref 0–0.1)
IMM GRANULOCYTES NFR BLD AUTO: 0 %
LYMPHOCYTES # BLD AUTO: 1.5 X10E3/UL (ref 0.7–3.1)
LYMPHOCYTES NFR BLD AUTO: 20 %
MCH RBC QN AUTO: 30.5 PG (ref 26.6–33)
MCHC RBC AUTO-ENTMCNC: 31.3 G/DL (ref 31.5–35.7)
MCV RBC AUTO: 97 FL (ref 79–97)
MONOCYTES # BLD AUTO: 0.5 X10E3/UL (ref 0.1–0.9)
MONOCYTES NFR BLD AUTO: 7 %
NEUTROPHILS # BLD AUTO: 5.2 X10E3/UL (ref 1.4–7)
NEUTROPHILS NFR BLD AUTO: 68 %
PLATELET # BLD AUTO: 270 X10E3/UL (ref 150–450)
POTASSIUM SERPL-SCNC: 4 MMOL/L (ref 3.5–5.2)
PROT SERPL-MCNC: 6.9 G/DL (ref 6–8.5)
RBC # BLD AUTO: 4.26 X10E6/UL (ref 3.77–5.28)
SODIUM SERPL-SCNC: 144 MMOL/L (ref 134–144)
TSH SERPL DL<=0.005 MIU/L-ACNC: 0.41 UIU/ML (ref 0.45–4.5)
VIT B12 SERPL-MCNC: 1044 PG/ML (ref 232–1245)
WBC # BLD AUTO: 7.6 X10E3/UL (ref 3.4–10.8)

## 2025-06-20 ENCOUNTER — HOSPITAL ENCOUNTER (OUTPATIENT)
Dept: CARDIOLOGY | Facility: HOSPITAL | Age: 75
Discharge: HOME | End: 2025-06-20
Payer: MEDICARE

## 2025-06-20 ENCOUNTER — OFFICE VISIT (OUTPATIENT)
Dept: CARDIOLOGY | Facility: CLINIC | Age: 75
End: 2025-06-20
Payer: MEDICARE

## 2025-06-20 VITALS
SYSTOLIC BLOOD PRESSURE: 112 MMHG | BODY MASS INDEX: 36.8 KG/M2 | WEIGHT: 200 LBS | HEART RATE: 63 BPM | HEIGHT: 62 IN | DIASTOLIC BLOOD PRESSURE: 73 MMHG

## 2025-06-20 DIAGNOSIS — R06.02 SHORTNESS OF BREATH: Primary | ICD-10-CM

## 2025-06-20 DIAGNOSIS — I10 PRIMARY HYPERTENSION: ICD-10-CM

## 2025-06-20 DIAGNOSIS — R07.9 CHEST PAIN, UNSPECIFIED TYPE: ICD-10-CM

## 2025-06-20 DIAGNOSIS — Z01.810 PREOP CARDIOVASCULAR EXAM: ICD-10-CM

## 2025-06-20 DIAGNOSIS — R06.02 SHORTNESS OF BREATH: ICD-10-CM

## 2025-06-20 LAB — D DIMER PPP FEU-MCNC: 2.3 MCGFEU/ML (ref 0–0.76)

## 2025-06-20 PROCEDURE — 85379 FIBRIN DEGRADATION QUANT: CPT | Performed by: NURSE PRACTITIONER

## 2025-06-20 PROCEDURE — 1160F RVW MEDS BY RX/DR IN RCRD: CPT | Performed by: NURSE PRACTITIONER

## 2025-06-20 PROCEDURE — 99214 OFFICE O/P EST MOD 30 MIN: CPT | Performed by: NURSE PRACTITIONER

## 2025-06-20 PROCEDURE — 1159F MED LIST DOCD IN RCRD: CPT | Performed by: NURSE PRACTITIONER

## 2025-06-20 NOTE — PROGRESS NOTES
Subjective:        Jemima Bell is a 75 y.o. female who here for follow up    No chief complaint on file.      HPI    Jemima Bell is a 75-year-old female who history to include dizziness/bradycardia, arthritis, bladder stimulator, anxiety, hypertension, hypothyroidism, GERD, history of PE, heart murmur, and osteoarthritis.  She was last seen for blood pressure check and was stated her shortness of breath had not improved.  She is here today for test results.    In April she was hospitalized for dizziness/bradycardia.  During her hospitalization her Ziac was switched to metoprolol XL 12.5 mg daily.  Her dizziness improved with the modification.    Recent stress test on 6/16/2025 indicated a normal Myocard perfusion study with no evidence of ischemia.      Test remain relevant.  EKG 4/9/25 shows sinus rhythm with PAC and significant artifact due to bladder stimulator.    3/18/25 echo: EF 61 to 65%, normal LV function.    6/2024 stress test indicated a normal study.      The following portions of the patient's history were reviewed and updated as appropriate: allergies, current medications, past family history, past medical history, past social history, past surgical history and problem list.    Past Medical History:   Diagnosis Date    Acromioclavicular separation     Acute bronchitis     Acute sinusitis     Allergic 11/2022    Shrimp    Allergic rhinitis     Anemia Childhood    Anxiety     Arthritis     Arthritis of back     Arthritis of neck     Arthropathy of cervical facet joint 03/14/2025    Asthma     Back pain     BMI 34.0-34.9,adult     Bronchitis, chronic 2017-19    Bursitis of hip     Cataract     Cervical disc disorder     Cervicalgia     Chills     Cholelithiasis 2004?    Remival    Chronic diarrhea     Chronic kidney disease     Chronic pain disorder     Cluster headache     Colon polyp 12/2022    Deep vein thrombosis (DVT) of lower extremity     Depression     Dermatitis      Dislocation, shoulder     Dyspnea     Dysuria     Esophageal reflux     Extremity pain     Fatigue     Frozen shoulder     Gastric ulcer     GERD (gastroesophageal reflux disease)     GI (gastrointestinal bleed) 2004?    Headache     Headache, tension-type     Heart murmur 1973    Hernia     Hiatal hernia 2007    Hip arthrosis     Hypertension     Hypothyroidism     Irritable bowel syndrome     Low back strain     Lumbago     Lumbar stenosis with neurogenic claudication 05/01/2024    Lumbosacral disc disease     Migraine     Nausea     Neck strain     Neuritis     Osteoarthritis     Osteoarthritis of knee     Periarthritis of shoulder     Plantar fasciitis     Pneumonia 89y    Pulled muscle     Pulmonary embolism     Pyelonephritis     Rheumatic fever     Sore throat     Torticollis     Trapezius strain     Urinary incontinence     Urinary pain     Urinary tract infection     UTI symptoms     Vertigo 03/2025    Vitamin B1 deficiency     Vitamin B12 deficiency     Vitamin D deficiency     Weakness     Wheezing          reports that she has never smoked. She has never used smokeless tobacco. She reports that she does not drink alcohol and does not use drugs.     Family History   Problem Relation Age of Onset    Arthritis Mother     Depression Mother     Hyperlipidemia Mother     Hypertension Mother     Irritable bowel syndrome Mother     Dislocations Mother     Heart attack Mother     Heart failure Mother     Hypertension Father     Arthritis Father     Stroke Father     Alcohol abuse Maternal Grandfather     Depression Maternal Grandfather     Heart disease Other         IN FEMALES BEFORE AGE 65    Asthma Maternal Grandmother     Asthma Paternal Grandfather     Heart attack Maternal Uncle     Heart attack Paternal Uncle     Heart failure Paternal Uncle             Objective:          Vitals and nursing note reviewed.   Constitutional:       Appearance: Well-developed.   HENT:      Head: Normocephalic.      Right  Ear: External ear normal.      Left Ear: External ear normal.   Neck:      Vascular: No JVD.   Pulmonary:      Effort: Pulmonary effort is normal. No respiratory distress.      Breath sounds: Normal breath sounds. No stridor. No rales.   Cardiovascular:      Normal rate. Regular rhythm.      No gallop.    Pulses:     Intact distal pulses.   Edema:     Peripheral edema absent.   Abdominal:      General: Bowel sounds are normal. There is no distension.      Palpations: Abdomen is soft.      Tenderness: There is no abdominal tenderness. There is no guarding.   Musculoskeletal: Normal range of motion.         General: No tenderness.      Cervical back: Normal range of motion. Skin:     General: Skin is warm.   Neurological:      Mental Status: Alert and oriented to person, place, and time.      Deep Tendon Reflexes: Reflexes are normal and symmetric.   Psychiatric:         Judgment: Judgment normal.         Procedures     6/2025    [facility] PACS Images     Show images for Stress Test With Myocardial Perfusion One Day   Interpretation Summary         Findings consistent with a normal ECG stress test.    Myocardial perfusion imaging indicates a normal myocardial perfusion study with no evidence of ischemia. Impressions are consistent with a low risk study.    Left ventricular ejection fraction is normal (Calculated EF = 67%).    Compared to the prior study from 6/12/2024     Asymptomatic for chest pain. ECG is negative for ischemia. Patient has bladder stimulator   Ectopy: None  B/P is appropriate.   Supervised by: Dr. Littlejohn  Pharmacologic study due to inability to tolerate increasing speed and grade of treadmill due to orthopedic, mobility  issues back surgery.       3/18/25       Left ventricular ejection fraction appears to be 61 - 65%.    Left ventricular diastolic function was normal.    Estimated right ventricular systolic pressure from tricuspid regurgitation is normal (<35 mmHg).      6/2024      Interpretation  Summary         Findings consistent with a normal ECG stress test.    Myocardial perfusion imaging indicates a normal myocardial perfusion study with no evidence of ischemia. Impressions are consistent with a low risk study.    Left ventricular ejection fraction is normal (Calculated EF = 60%).     Asymptomatic for chest pain. ECG is negative for ischemia.   Ectopy: none  B/P is appropriate. Baseline 110/68, Peak 114/56, Recovery: 116//63  Pharmacologic study due to inability to tolerate increasing speed and grade of treadmill due to orthopedic, mobility  issues and   Beta-blocker therapy.   Participated in Low Level exercise and tolerance is poor.     Supervised by:  Izzy PRITCHARD.        Current Outpatient Medications:     albuterol sulfate  (90 Base) MCG/ACT inhaler, Inhale 2 puffs Every 4 (Four) Hours As Needed for Shortness of Air or Wheezing., Disp: , Rfl:     fluticasone (FLONASE) 50 MCG/ACT nasal spray, 2 sprays into the nostril(s) as directed by provider Daily., Disp: 48 g, Rfl: 3    HYDROcodone-acetaminophen (NORCO) 5-325 MG per tablet, Take 1 tablet by mouth Every 8 (Eight) Hours As Needed for Moderate Pain. 30 day supply, Disp: 60 tablet, Rfl: 0    hydrOXYzine (ATARAX) 10 MG tablet, Take 1 tablet by mouth Every 4 (Four) Hours As Needed for Anxiety (twitching)., Disp: 120 tablet, Rfl: 1    Ibuprofen 3 %, Gabapentin 10 %, Baclofen 2 %, lidocaine 4 %, Ketamine HCl 4 %, Apply 1-2 g topically to the appropriate area as directed 3 (Three) to 4 (Four) times daily., Disp: 90 g, Rfl: 0    levothyroxine (SYNTHROID, LEVOTHROID) 100 MCG tablet, TAKE ONE TABLET BY MOUTH EVERY MORNING, Disp: 90 tablet, Rfl: 3    lidocaine (LIDODERM) 5 %, Place 1 patch on the skin as directed by provider Daily. Remove & Discard patch within 12 hours or as directed by MD, Disp: 30 each, Rfl: 0    methocarbamol (ROBAXIN) 500 MG tablet, Take 1 tablet by mouth 3 (Three) Times a Day As Needed for Muscle Spasms., Disp:  90 tablet, Rfl: 1    metoprolol succinate XL (TOPROL-XL) 25 MG 24 hr tablet, Take 0.5 tablets by mouth Daily., Disp: 30 tablet, Rfl: 5    ondansetron (Zofran) 4 MG tablet, Take 1 tablet by mouth Every 8 (Eight) Hours As Needed for Nausea or Vomiting., Disp: 15 tablet, Rfl: 2    pantoprazole (PROTONIX) 40 MG EC tablet, Take 1 tablet by mouth 2 (Two) Times a Day., Disp: 180 tablet, Rfl: 3    pregabalin (LYRICA) 50 MG capsule, Take 1 capsule by mouth 2 (Two) Times a Day., Disp: 60 capsule, Rfl: 1    promethazine (PHENERGAN) 25 MG tablet, Take 1 tablet by mouth Every 6 (Six) Hours As Needed for Nausea or Vomiting., Disp: 20 tablet, Rfl: 1    sertraline (ZOLOFT) 100 MG tablet, Take 0.5 tablets by mouth Daily., Disp: , Rfl:     vitamin B-12 (CYANOCOBALAMIN) 1000 MCG tablet, Take 1 tablet by mouth Daily., Disp: , Rfl:     vitamin D3 125 MCG (5000 UT) capsule capsule, Take 1 capsule by mouth Daily., Disp: , Rfl:      Assessment:        Patient Active Problem List   Diagnosis    Wheezing    Osteoarthritis    Acute bronchitis    Dyspnea    Hypothyroidism    Shortness of breath    Vitamin D deficiency    Depression with anxiety    Muscle cramp    Back pain    Vitamin B12 deficiency    UTI (urinary tract infection)    Abnormal EKG    Precordial pain    Anxiety    Borderline systolic HTN    Left upper quadrant abdominal pain    Diarrhea    Gastroesophageal reflux disease    History of Nissen fundoplication    Incontinence of feces with fecal urgency    Foraminal stenosis of lumbar region    Lumbar degenerative disc disease    Lumbar facet arthropathy    Lumbar radiculopathy    Sacroiliac joint dysfunction of both sides    Lumbar stenosis with neurogenic claudication    Cervical radiculopathy    Neck pain    Heart murmur    Preop cardiovascular exam    Arthropathy of cervical facet joint               Plan:       Assessment/Plan:  1.  Test results due to shortness of breath   a.  Stress  test was a negative study.    b.  Do  d-dimer due to history of PE. IF ok then clear for surgery.         It was explained to the patient that stress testing carries 85% specificity/sensitivity, and does not rule out future cardiac event.      2. hypertension  a.  Blood pressure today stable.       b.  Continue metoprolol succinate XL 12.5 mg daily.    Educated patient on exercising for at least 30 minutes a day for 2 to 3 days a week. Importance of controlling hypertension and blood pressure checkup on the regular basis has been explained. Hypertension as a silent killer has been discussed. Risk reduction of the weight and regular exercises to control the hypertension has been explained.    3.  Dizziness/bradycardia   a.  improved.            No diagnosis found.    There are no diagnoses linked to this encounter.    COUNSELING: isabel Pipereling was given to patient for the following topics: diagnostic results, risk factor reductions, impressions, risks and benefits of treatment options and importance of treatment compliance .       SMOKING COUNSELING: Denies    -D-dimer for shortness of breath if neg then clear for bladder surgery.     -Follow up in two months.     Addendum: elevated ddimer. She went to ER with no PE noted on CT. Due to increase shortness of breath she will have a planned right and left heart cath on Friday.     Procedure, risks and options of cardiac cath explained to pt INCLUDING BUT NOT LIMITED TO MI, STROKE, DEATH, INFECTION HAEMORRHAGE, . Pt understands well and agrees with no further questions.      Sincerely,   MACHO Sumner  [facility]  06/20/25  13:56 EDT    EMR Dragon/Transcription disclaimer: Dictated utilizing Dragon Dictation

## 2025-06-20 NOTE — H&P (VIEW-ONLY)
Subjective:        Jemima Bell is a 75 y.o. female who here for follow up    No chief complaint on file.      HPI    Jemima eBll is a 75-year-old female who history to include dizziness/bradycardia, arthritis, bladder stimulator, anxiety, hypertension, hypothyroidism, GERD, history of PE, heart murmur, and osteoarthritis.  She was last seen for blood pressure check and was stated her shortness of breath had not improved.  She is here today for test results.    In April she was hospitalized for dizziness/bradycardia.  During her hospitalization her Ziac was switched to metoprolol XL 12.5 mg daily.  Her dizziness improved with the modification.    Recent stress test on 6/16/2025 indicated a normal Myocard perfusion study with no evidence of ischemia.      Test remain relevant.  EKG 4/9/25 shows sinus rhythm with PAC and significant artifact due to bladder stimulator.    3/18/25 echo: EF 61 to 65%, normal LV function.    6/2024 stress test indicated a normal study.      The following portions of the patient's history were reviewed and updated as appropriate: allergies, current medications, past family history, past medical history, past social history, past surgical history and problem list.    Past Medical History:   Diagnosis Date    Acromioclavicular separation     Acute bronchitis     Acute sinusitis     Allergic 11/2022    Shrimp    Allergic rhinitis     Anemia Childhood    Anxiety     Arthritis     Arthritis of back     Arthritis of neck     Arthropathy of cervical facet joint 03/14/2025    Asthma     Back pain     BMI 34.0-34.9,adult     Bronchitis, chronic 2017-19    Bursitis of hip     Cataract     Cervical disc disorder     Cervicalgia     Chills     Cholelithiasis 2004?    Remival    Chronic diarrhea     Chronic kidney disease     Chronic pain disorder     Cluster headache     Colon polyp 12/2022    Deep vein thrombosis (DVT) of lower extremity     Depression     Dermatitis      Dislocation, shoulder     Dyspnea     Dysuria     Esophageal reflux     Extremity pain     Fatigue     Frozen shoulder     Gastric ulcer     GERD (gastroesophageal reflux disease)     GI (gastrointestinal bleed) 2004?    Headache     Headache, tension-type     Heart murmur 1973    Hernia     Hiatal hernia 2007    Hip arthrosis     Hypertension     Hypothyroidism     Irritable bowel syndrome     Low back strain     Lumbago     Lumbar stenosis with neurogenic claudication 05/01/2024    Lumbosacral disc disease     Migraine     Nausea     Neck strain     Neuritis     Osteoarthritis     Osteoarthritis of knee     Periarthritis of shoulder     Plantar fasciitis     Pneumonia 89y    Pulled muscle     Pulmonary embolism     Pyelonephritis     Rheumatic fever     Sore throat     Torticollis     Trapezius strain     Urinary incontinence     Urinary pain     Urinary tract infection     UTI symptoms     Vertigo 03/2025    Vitamin B1 deficiency     Vitamin B12 deficiency     Vitamin D deficiency     Weakness     Wheezing          reports that she has never smoked. She has never used smokeless tobacco. She reports that she does not drink alcohol and does not use drugs.     Family History   Problem Relation Age of Onset    Arthritis Mother     Depression Mother     Hyperlipidemia Mother     Hypertension Mother     Irritable bowel syndrome Mother     Dislocations Mother     Heart attack Mother     Heart failure Mother     Hypertension Father     Arthritis Father     Stroke Father     Alcohol abuse Maternal Grandfather     Depression Maternal Grandfather     Heart disease Other         IN FEMALES BEFORE AGE 65    Asthma Maternal Grandmother     Asthma Paternal Grandfather     Heart attack Maternal Uncle     Heart attack Paternal Uncle     Heart failure Paternal Uncle             Objective:          Vitals and nursing note reviewed.   Constitutional:       Appearance: Well-developed.   HENT:      Head: Normocephalic.      Right  Ear: External ear normal.      Left Ear: External ear normal.   Neck:      Vascular: No JVD.   Pulmonary:      Effort: Pulmonary effort is normal. No respiratory distress.      Breath sounds: Normal breath sounds. No stridor. No rales.   Cardiovascular:      Normal rate. Regular rhythm.      No gallop.    Pulses:     Intact distal pulses.   Edema:     Peripheral edema absent.   Abdominal:      General: Bowel sounds are normal. There is no distension.      Palpations: Abdomen is soft.      Tenderness: There is no abdominal tenderness. There is no guarding.   Musculoskeletal: Normal range of motion.         General: No tenderness.      Cervical back: Normal range of motion. Skin:     General: Skin is warm.   Neurological:      Mental Status: Alert and oriented to person, place, and time.      Deep Tendon Reflexes: Reflexes are normal and symmetric.   Psychiatric:         Judgment: Judgment normal.         Procedures     6/2025    [facility] [place] [facility] PACS Images     Show images for Stress Test With Myocardial Perfusion One Day   Interpretation Summary         Findings consistent with a normal ECG stress test.    Myocardial perfusion imaging indicates a normal myocardial perfusion study with no evidence of ischemia. Impressions are consistent with a low risk study.    Left ventricular ejection fraction is normal (Calculated EF = 67%).    Compared to the prior study from 6/12/2024     Asymptomatic for chest pain. ECG is negative for ischemia. Patient has bladder stimulator   Ectopy: None  B/P is appropriate.   Supervised by: Dr. Littlejohn  Pharmacologic study due to inability to tolerate increasing speed and grade of treadmill due to orthopedic, mobility  issues back surgery.       3/18/25       Left ventricular ejection fraction appears to be 61 - 65%.    Left ventricular diastolic function was normal.    Estimated right ventricular systolic pressure from tricuspid regurgitation is normal (<35 mmHg).      6/2024      Interpretation  Summary         Findings consistent with a normal ECG stress test.    Myocardial perfusion imaging indicates a normal myocardial perfusion study with no evidence of ischemia. Impressions are consistent with a low risk study.    Left ventricular ejection fraction is normal (Calculated EF = 60%).     Asymptomatic for chest pain. ECG is negative for ischemia.   Ectopy: none  B/P is appropriate. Baseline 110/68, Peak 114/56, Recovery: 116//63  Pharmacologic study due to inability to tolerate increasing speed and grade of treadmill due to orthopedic, mobility  issues and   Beta-blocker therapy.   Participated in Low Level exercise and tolerance is poor.     Supervised by:  Izzy PRITCHARD.        Current Outpatient Medications:     albuterol sulfate  (90 Base) MCG/ACT inhaler, Inhale 2 puffs Every 4 (Four) Hours As Needed for Shortness of Air or Wheezing., Disp: , Rfl:     fluticasone (FLONASE) 50 MCG/ACT nasal spray, 2 sprays into the nostril(s) as directed by provider Daily., Disp: 48 g, Rfl: 3    HYDROcodone-acetaminophen (NORCO) 5-325 MG per tablet, Take 1 tablet by mouth Every 8 (Eight) Hours As Needed for Moderate Pain. 30 day supply, Disp: 60 tablet, Rfl: 0    hydrOXYzine (ATARAX) 10 MG tablet, Take 1 tablet by mouth Every 4 (Four) Hours As Needed for Anxiety (twitching)., Disp: 120 tablet, Rfl: 1    Ibuprofen 3 %, Gabapentin 10 %, Baclofen 2 %, lidocaine 4 %, Ketamine HCl 4 %, Apply 1-2 g topically to the appropriate area as directed 3 (Three) to 4 (Four) times daily., Disp: 90 g, Rfl: 0    levothyroxine (SYNTHROID, LEVOTHROID) 100 MCG tablet, TAKE ONE TABLET BY MOUTH EVERY MORNING, Disp: 90 tablet, Rfl: 3    lidocaine (LIDODERM) 5 %, Place 1 patch on the skin as directed by provider Daily. Remove & Discard patch within 12 hours or as directed by MD, Disp: 30 each, Rfl: 0    methocarbamol (ROBAXIN) 500 MG tablet, Take 1 tablet by mouth 3 (Three) Times a Day As Needed for Muscle Spasms., Disp:  90 tablet, Rfl: 1    metoprolol succinate XL (TOPROL-XL) 25 MG 24 hr tablet, Take 0.5 tablets by mouth Daily., Disp: 30 tablet, Rfl: 5    ondansetron (Zofran) 4 MG tablet, Take 1 tablet by mouth Every 8 (Eight) Hours As Needed for Nausea or Vomiting., Disp: 15 tablet, Rfl: 2    pantoprazole (PROTONIX) 40 MG EC tablet, Take 1 tablet by mouth 2 (Two) Times a Day., Disp: 180 tablet, Rfl: 3    pregabalin (LYRICA) 50 MG capsule, Take 1 capsule by mouth 2 (Two) Times a Day., Disp: 60 capsule, Rfl: 1    promethazine (PHENERGAN) 25 MG tablet, Take 1 tablet by mouth Every 6 (Six) Hours As Needed for Nausea or Vomiting., Disp: 20 tablet, Rfl: 1    sertraline (ZOLOFT) 100 MG tablet, Take 0.5 tablets by mouth Daily., Disp: , Rfl:     vitamin B-12 (CYANOCOBALAMIN) 1000 MCG tablet, Take 1 tablet by mouth Daily., Disp: , Rfl:     vitamin D3 125 MCG (5000 UT) capsule capsule, Take 1 capsule by mouth Daily., Disp: , Rfl:      Assessment:        Patient Active Problem List   Diagnosis    Wheezing    Osteoarthritis    Acute bronchitis    Dyspnea    Hypothyroidism    Shortness of breath    Vitamin D deficiency    Depression with anxiety    Muscle cramp    Back pain    Vitamin B12 deficiency    UTI (urinary tract infection)    Abnormal EKG    Precordial pain    Anxiety    Borderline systolic HTN    Left upper quadrant abdominal pain    Diarrhea    Gastroesophageal reflux disease    History of Nissen fundoplication    Incontinence of feces with fecal urgency    Foraminal stenosis of lumbar region    Lumbar degenerative disc disease    Lumbar facet arthropathy    Lumbar radiculopathy    Sacroiliac joint dysfunction of both sides    Lumbar stenosis with neurogenic claudication    Cervical radiculopathy    Neck pain    Heart murmur    Preop cardiovascular exam    Arthropathy of cervical facet joint               Plan:       Assessment/Plan:  1.  Test results due to shortness of breath   a.  Stress  test was a negative study.    b.  Do  d-dimer due to history of PE. IF ok then clear for surgery.         It was explained to the patient that stress testing carries 85% specificity/sensitivity, and does not rule out future cardiac event.      2. hypertension  a.  Blood pressure today stable.       b.  Continue metoprolol succinate XL 12.5 mg daily.    Educated patient on exercising for at least 30 minutes a day for 2 to 3 days a week. Importance of controlling hypertension and blood pressure checkup on the regular basis has been explained. Hypertension as a silent killer has been discussed. Risk reduction of the weight and regular exercises to control the hypertension has been explained.    3.  Dizziness/bradycardia   a.  improved.            No diagnosis found.    There are no diagnoses linked to this encounter.    COUNSELING: isabel Pipereling was given to patient for the following topics: diagnostic results, risk factor reductions, impressions, risks and benefits of treatment options and importance of treatment compliance .       SMOKING COUNSELING: Denies    -D-dimer for shortness of breath if neg then clear for bladder surgery.     -Follow up in two months.     Addendum: elevated ddimer. She went to ER with no PE noted on CT. Due to increase shortness of breath she will have a planned right and left heart cath on Friday.     Procedure, risks and options of cardiac cath explained to pt INCLUDING BUT NOT LIMITED TO MI, STROKE, DEATH, INFECTION HAEMORRHAGE, . Pt understands well and agrees with no further questions.      Sincerely,   MACHO Sumner  [facility]  06/20/25  13:56 EDT    EMR Dragon/Transcription disclaimer: Dictated utilizing Dragon Dictation

## 2025-06-23 ENCOUNTER — APPOINTMENT (OUTPATIENT)
Dept: GENERAL RADIOLOGY | Facility: HOSPITAL | Age: 75
End: 2025-06-23
Payer: MEDICARE

## 2025-06-23 ENCOUNTER — HOSPITAL ENCOUNTER (EMERGENCY)
Facility: HOSPITAL | Age: 75
Discharge: HOME | End: 2025-06-23
Attending: EMERGENCY MEDICINE | Admitting: EMERGENCY MEDICINE
Payer: MEDICARE

## 2025-06-23 ENCOUNTER — APPOINTMENT (OUTPATIENT)
Dept: CT IMAGING | Facility: HOSPITAL | Age: 75
End: 2025-06-23
Payer: MEDICARE

## 2025-06-23 VITALS
OXYGEN SATURATION: 100 % | RESPIRATION RATE: 17 BRPM | HEART RATE: 62 BPM | TEMPERATURE: 98.2 F | DIASTOLIC BLOOD PRESSURE: 66 MMHG | SYSTOLIC BLOOD PRESSURE: 125 MMHG

## 2025-06-23 DIAGNOSIS — R79.89 POSITIVE D DIMER: ICD-10-CM

## 2025-06-23 DIAGNOSIS — R06.09 DYSPNEA ON EXERTION: Primary | ICD-10-CM

## 2025-06-23 LAB
ALBUMIN SERPL-MCNC: 4 G/DL (ref 3.5–5.2)
ALBUMIN/GLOB SERPL: 1.3 G/DL
ALP SERPL-CCNC: 79 U/L (ref 39–117)
ALT SERPL W P-5'-P-CCNC: 10 U/L (ref 1–33)
ANION GAP SERPL CALCULATED.3IONS-SCNC: 11.3 MMOL/L (ref 5–15)
AST SERPL-CCNC: 15 U/L (ref 1–32)
BASOPHILS # BLD AUTO: 0.06 10*3/MM3 (ref 0–0.2)
BASOPHILS NFR BLD AUTO: 1 % (ref 0–1.5)
BILIRUB SERPL-MCNC: 0.3 MG/DL (ref 0–1.2)
BUN SERPL-MCNC: 24 MG/DL (ref 8–23)
BUN/CREAT SERPL: 25.5 (ref 7–25)
CALCIUM SPEC-SCNC: 9 MG/DL (ref 8.6–10.5)
CHLORIDE SERPL-SCNC: 107 MMOL/L (ref 98–107)
CO2 SERPL-SCNC: 22.7 MMOL/L (ref 22–29)
CREAT SERPL-MCNC: 0.94 MG/DL (ref 0.57–1)
DEPRECATED RDW RBC AUTO: 42.6 FL (ref 37–54)
EGFRCR SERPLBLD CKD-EPI 2021: 63 ML/MIN/1.73
EOSINOPHIL # BLD AUTO: 0.36 10*3/MM3 (ref 0–0.4)
EOSINOPHIL NFR BLD AUTO: 6 % (ref 0.3–6.2)
ERYTHROCYTE [DISTWIDTH] IN BLOOD BY AUTOMATED COUNT: 12.6 % (ref 12.3–15.4)
GEN 5 1HR TROPONIN T REFLEX: 10 NG/L
GLOBULIN UR ELPH-MCNC: 3.2 GM/DL
GLUCOSE SERPL-MCNC: 110 MG/DL (ref 65–99)
HCT VFR BLD AUTO: 36.3 % (ref 34–46.6)
HGB BLD-MCNC: 12.3 G/DL (ref 12–15.9)
HOLD SPECIMEN: NORMAL
HOLD SPECIMEN: NORMAL
IMM GRANULOCYTES # BLD AUTO: 0.01 10*3/MM3 (ref 0–0.05)
IMM GRANULOCYTES NFR BLD AUTO: 0.2 % (ref 0–0.5)
LYMPHOCYTES # BLD AUTO: 1.47 10*3/MM3 (ref 0.7–3.1)
LYMPHOCYTES NFR BLD AUTO: 24.7 % (ref 19.6–45.3)
MCH RBC QN AUTO: 31.5 PG (ref 26.6–33)
MCHC RBC AUTO-ENTMCNC: 33.9 G/DL (ref 31.5–35.7)
MCV RBC AUTO: 92.8 FL (ref 79–97)
MONOCYTES # BLD AUTO: 0.52 10*3/MM3 (ref 0.1–0.9)
MONOCYTES NFR BLD AUTO: 8.7 % (ref 5–12)
NEUTROPHILS NFR BLD AUTO: 3.54 10*3/MM3 (ref 1.7–7)
NEUTROPHILS NFR BLD AUTO: 59.4 % (ref 42.7–76)
NRBC BLD AUTO-RTO: 0 /100 WBC (ref 0–0.2)
NT-PROBNP SERPL-MCNC: 206 PG/ML (ref 0–1800)
PLATELET # BLD AUTO: 272 10*3/MM3 (ref 140–450)
PMV BLD AUTO: 10.9 FL (ref 6–12)
POTASSIUM SERPL-SCNC: 4.2 MMOL/L (ref 3.5–5.2)
PROT SERPL-MCNC: 7.2 G/DL (ref 6–8.5)
QT INTERVAL: 412 MS
QTC INTERVAL: 434 MS
RBC # BLD AUTO: 3.91 10*6/MM3 (ref 3.77–5.28)
SODIUM SERPL-SCNC: 141 MMOL/L (ref 136–145)
TROPONIN T NUMERIC DELTA: -2 NG/L
TROPONIN T SERPL HS-MCNC: 12 NG/L
WBC NRBC COR # BLD AUTO: 5.96 10*3/MM3 (ref 3.4–10.8)
WHOLE BLOOD HOLD COAG: NORMAL
WHOLE BLOOD HOLD SPECIMEN: NORMAL

## 2025-06-23 PROCEDURE — 93005 ELECTROCARDIOGRAM TRACING: CPT

## 2025-06-23 PROCEDURE — 99285 EMERGENCY DEPT VISIT HI MDM: CPT

## 2025-06-23 PROCEDURE — 71045 X-RAY EXAM CHEST 1 VIEW: CPT

## 2025-06-23 PROCEDURE — 71275 CT ANGIOGRAPHY CHEST: CPT

## 2025-06-23 PROCEDURE — 36415 COLL VENOUS BLD VENIPUNCTURE: CPT

## 2025-06-23 PROCEDURE — 96360 HYDRATION IV INFUSION INIT: CPT

## 2025-06-23 PROCEDURE — 85025 COMPLETE CBC W/AUTO DIFF WBC: CPT | Performed by: EMERGENCY MEDICINE

## 2025-06-23 PROCEDURE — 83880 ASSAY OF NATRIURETIC PEPTIDE: CPT | Performed by: EMERGENCY MEDICINE

## 2025-06-23 PROCEDURE — 93010 ELECTROCARDIOGRAM REPORT: CPT | Performed by: INTERNAL MEDICINE

## 2025-06-23 PROCEDURE — 84484 ASSAY OF TROPONIN QUANT: CPT | Performed by: EMERGENCY MEDICINE

## 2025-06-23 PROCEDURE — 80053 COMPREHEN METABOLIC PANEL: CPT | Performed by: EMERGENCY MEDICINE

## 2025-06-23 PROCEDURE — 25510000001 IOPAMIDOL PER 1 ML: Performed by: EMERGENCY MEDICINE

## 2025-06-23 PROCEDURE — 93005 ELECTROCARDIOGRAM TRACING: CPT | Performed by: EMERGENCY MEDICINE

## 2025-06-23 PROCEDURE — 25810000003 LACTATED RINGERS SOLUTION: Performed by: EMERGENCY MEDICINE

## 2025-06-23 RX ORDER — IOPAMIDOL 755 MG/ML
100 INJECTION, SOLUTION INTRAVASCULAR
Status: COMPLETED | OUTPATIENT
Start: 2025-06-23 | End: 2025-06-23

## 2025-06-23 RX ORDER — SODIUM CHLORIDE 0.9 % (FLUSH) 0.9 %
10 SYRINGE (ML) INJECTION AS NEEDED
Status: DISCONTINUED | OUTPATIENT
Start: 2025-06-23 | End: 2025-06-23 | Stop reason: HOSPADM

## 2025-06-23 RX ADMIN — IOPAMIDOL 95 ML: 755 INJECTION, SOLUTION INTRAVENOUS at 13:43

## 2025-06-23 RX ADMIN — SODIUM CHLORIDE, POTASSIUM CHLORIDE, SODIUM LACTATE AND CALCIUM CHLORIDE 500 ML: 600; 310; 30; 20 INJECTION, SOLUTION INTRAVENOUS at 13:24

## 2025-06-23 NOTE — ED PROVIDER NOTES
EMERGENCY DEPARTMENT ENCOUNTER  Room Number:  31/31  PCP: Kehrer, Meredith Lea, MD  Independent Historians: Historian: Patient  Date of encounter:  6/23/2025  Patient Care Team:  Kehrer, Meredith Lea, MD as PCP - General (Family Medicine)     HPI:  Chief Complaint: had concerns including Shortness of Breath and Abnormal Lab.     A complete HPI/ROS/PMH/PSH/SH/FH are unobtainable due to: EM_Unobtainable History : None    Chronic or social conditions impacting patient care (Social Determinants of Health): None    Context: Jemima Bell is a 75 y.o. female with a medical history of hypertension, GERD who presents to the ED c/o acute abnormal lab work.  Patient was trying to be cleared by cardiology for replacement of a [employer] device.  She spoke to them about her dyspnea on exertion.  After normal stress test, a D-dimer was checked which was elevated.  She was referred here for further workup.  Patient states she has had shortness of breath that is worse with exertion over the past few weeks.  She denies any lower extremity swelling.  She does have a distant history of DVT after travel.  She is no longer on any anticoagulants.  She denies pleuritic chest pain.  No cough or fever.    Review of prior external notes (non-ED) -and- Review of prior external test results outside of this encounter: Outpatient D-dimer was 2.3    PAST MEDICAL HISTORY  Active Ambulatory Problems     Diagnosis Date Noted    Wheezing 09/16/2019    Osteoarthritis 09/16/2019    Acute bronchitis 09/16/2019    Dyspnea 10/07/2019    Hypothyroidism 10/07/2019    Shortness of breath 11/19/2019    Vitamin D deficiency 01/07/2020    Depression with anxiety 01/07/2020    Muscle cramp 01/07/2020    Back pain 01/17/2020    Vitamin B12 deficiency 05/04/2020    UTI (urinary tract infection) 10/30/2020    Abnormal EKG 02/17/2021    Precordial pain 02/17/2021    Anxiety 02/17/2021    Borderline systolic HTN 02/17/2021    Left upper quadrant  abdominal pain 10/25/2022    Diarrhea 10/25/2022    Gastroesophageal reflux disease 10/25/2022    History of Nissen fundoplication 10/25/2022    Incontinence of feces with fecal urgency 10/25/2022    Foraminal stenosis of lumbar region 03/24/2023    Lumbar degenerative disc disease 03/24/2023    Lumbar facet arthropathy 03/24/2023    Lumbar radiculopathy 03/24/2023    Sacroiliac joint dysfunction of both sides 04/27/2023    Lumbar stenosis with neurogenic claudication 05/01/2024    Cervical radiculopathy 05/15/2024    Neck pain 05/15/2024    Heart murmur 05/21/2024    Preop cardiovascular exam 05/21/2024    Arthropathy of cervical facet joint 03/14/2025     Resolved Ambulatory Problems     Diagnosis Date Noted    Morbid obesity with BMI of 40.0-44.9, adult 02/17/2021    Vertigo 03/17/2025     Past Medical History:   Diagnosis Date    Acromioclavicular separation     Acute sinusitis     Allergic 11/2022    Allergic rhinitis     Anemia Childhood    Arthritis     Arthritis of back     Arthritis of neck     Asthma     BMI 34.0-34.9,adult     Bronchitis, chronic 2017-19    Bursitis of hip     Cataract     Cervical disc disorder     Cervicalgia     Chills     Cholelithiasis 2004?    Chronic diarrhea     Chronic kidney disease     Chronic pain disorder     Cluster headache     Colon polyp 12/2022    Deep vein thrombosis (DVT) of lower extremity     Depression     Dermatitis     Dislocation, shoulder     Dysuria     Esophageal reflux     Extremity pain     Fatigue     Frozen shoulder     Gastric ulcer     GERD (gastroesophageal reflux disease)     GI (gastrointestinal bleed) 2004?    Headache     Headache, tension-type     Hernia     Hiatal hernia 2007    Hip arthrosis     Hypertension     Irritable bowel syndrome     Low back strain     Lumbago     Lumbosacral disc disease     Migraine     Nausea     Neck strain     Neuritis     Osteoarthritis of knee     Periarthritis of shoulder     Plantar fasciitis     Pneumonia 89y     Pulled muscle     Pulmonary embolism     Pyelonephritis     Rheumatic fever     Sore throat     Torticollis     Trapezius strain     Urinary incontinence     Urinary pain     Urinary tract infection     UTI symptoms     Vitamin B1 deficiency     Weakness        PAST SURGICAL HISTORY  Past Surgical History:   Procedure Laterality Date    ABDOMINAL SURGERY      ADENOIDECTOMY      APPENDECTOMY      BACK SURGERY  6/30/24    CATARACT EXTRACTION      CATARACT EXTRACTION      bilateral eyes    CATARACT EXTRACTION      CERVICAL EPIDURAL N/A 06/26/2024    Procedure: cervical epidural steroid injection at C7/T1 37437;  Surgeon: Shey Aranda MD;  Location: [facility] MAIN OR;  Service: Pain Management;  Laterality: N/A;    CHOLECYSTECTOMY  2004?    COLONOSCOPY      COLONOSCOPY N/A 12/23/2022    Procedure: COLONOSCOPY to cecum and TI:  with biopsies, cold snare polyp,;  Surgeon: Reji Aguilar MD;  [facility] END[facility];  Service: Gastroenterology;  Laterality: N/A;  PREOP/ HX COLON POLYPS  POSTOP/  diverticulosis, polyp, hemorrhoids    ENDOSCOPY N/A 12/23/2022    Procedure: ESOPHAGOGASTRODUODENOSCOPY with biopsies;  Surgeon: Reji Aguilar MD;  [facility];  Service: Gastroenterology;  Laterality: N/A;  PREOP/ HX GASTRIC ULCER, DYSPEPSIA, UPPER ABDOMINAL PAIN  POSTOP/:  HH, gastritis, gastric polyps    EPIDURAL BLOCK      GALLBLADDER SURGERY      HEMORRHOIDECTOMY  1974 & 77?    HERNIA REPAIR  2008?    HYSTERECTOMY      INGUINAL HERNIA REPAIR      INTERSTIM PLACEMENT      for Bladder incontinence    JOINT REPLACEMENT      Knee    KNEE SURGERY      LAPAROSCOPIC COLON RESECTION  2005    LAPAROTOMY OOPHERECTOMY      LUMBAR DIRECT LATERAL INTERBODY FUSION N/A 07/30/2024    Procedure: lumbar 3 to sacral 1 transforaminal lumbar interbody fusion, lumbar 3 to sacral 1 fusion with neuro robot;  Surgeon: Michelet Rice IV, MD;  Location: [facility] MAIN OR;  Service: Robotics - Neuro;  Laterality: N/A;    LUMBAR EPIDURAL  INJECTION N/A 03/27/2023    Procedure: Lumbar epidural steroid injection at L5-S1 07288;  Surgeon: Shey Aranda MD;  Location: [facility] MA[facility];  Service: Pain Management;  Laterality: N/A;    LUMBAR EPIDURAL INJECTION N/A 03/13/2024    Procedure: lumbar epidural steroid injection at L4/5 67363;  Surgeon: Shey Aranda MD;  Location: [facility] MAIN [facility];  Service: Pain Management;  Laterality: N/A;    LUMBAR FUSION N/A 07/30/2024    Procedure: LUMBAR TRANSFORAMINAL INTERBODY FUSION WITH NEURO ROBOT;  Surgeon: Michelet Rice IV, MD;  Location: [facility] MAIN OR;  Service: Robotics - Neuro;  Laterality: N/A;    NISSEN FUNDOPLICATION LAPAROSCOPIC      x2    ORTHOPEDIC SURGERY      Knee replacement    SACROILIAC JOINT INJECTION Left 05/12/2023    Procedure: Left SACROILIAC INJECTION 33169;  Surgeon: Shey Aranda MD;  Location: [facility];  Service: Pain Management;  Laterality: Left;    SACROILIAC JOINT INJECTION Left 07/12/2023    Procedure: SACROILIAC INJECTION LEFT;  Surgeon: Shey Aranda MD;  Location: [facility];  Service: Pain Management;  Laterality: Left;    SPINAL FUSION  [id number]    SPINE SURGERY  316396    TONSILLECTOMY      TONSILLECTOMY  1954?    TOTAL KNEE ARTHROPLASTY Left     TUBAL ABDOMINAL LIGATION      UPPER GASTROINTESTINAL ENDOSCOPY         FAMILY HISTORY  Family History   Problem Relation Age of Onset    Arthritis Mother     Depression Mother     Hyperlipidemia Mother     Hypertension Mother     Irritable bowel syndrome Mother     Dislocations Mother     Heart attack Mother     Heart failure Mother     Hypertension Father     Arthritis Father     Stroke Father     Alcohol abuse Maternal Grandfather     Depression Maternal Grandfather     Heart disease Other         IN FEMALES BEFORE AGE 65    Asthma Maternal Grandmother     Asthma Paternal Grandfather     Heart attack Maternal Uncle     Heart attack Paternal Uncle     Heart failure Paternal Uncle        SOCIAL HISTORY  Social History      Socioeconomic History    Marital status:    Tobacco Use    Smoking status: Never     Passive exposure: Never    Smokeless tobacco: Never   Vaping Use    Vaping status: Never Used   Substance and Sexual Activity    Alcohol use: Never    Drug use: Never    Sexual activity: Yes     Partners: Male     Birth control/protection: None, Hysterectomy       ALLERGIES  Salicylates, Colestipol, Biaxin [clarithromycin], Adhesive tape, Augmentin [amoxicillin-pot clavulanate], Prevacid [lansoprazole], Sulfa antibiotics, and Sulfamethoxazole-trimethoprim    REVIEW OF SYSTEMS  Review of Systems  Included in HPI  All systems reviewed and negative except for those discussed in HPI.    PHYSICAL EXAM    I have reviewed the triage vital signs and nursing notes.    ED Triage Vitals   Temp Heart Rate Resp BP SpO2   06/23/25 1128 06/23/25 1128 06/23/25 1128 06/23/25 1140 06/23/25 1128   98.2 °F (36.8 °C) 75 16 122/78 97 %      Temp src Heart Rate Source Patient Position BP Location FiO2 (%)   -- -- 06/23/25 1140 06/23/25 1140 --     Sitting Left arm        Physical Exam  Constitutional:       General: She is not in acute distress.     Appearance: Normal appearance. She is not ill-appearing or toxic-appearing.   HENT:      Head: Normocephalic and atraumatic.      Nose: Nose normal.      Mouth/Throat:      Mouth: Mucous membranes are moist.      Pharynx: Oropharynx is clear.   Eyes:      Extraocular Movements: Extraocular movements intact.      Conjunctiva/sclera: Conjunctivae normal.      Pupils: Pupils are equal, round, and reactive to light.   Cardiovascular:      Rate and Rhythm: Normal rate and regular rhythm.      Pulses: Normal pulses.      Heart sounds: Normal heart sounds. No murmur heard.     No friction rub. No gallop.   Pulmonary:      Effort: Pulmonary effort is normal. No respiratory distress.      Breath sounds: No stridor. Wheezing present. No rhonchi or rales.   Abdominal:      General: Abdomen is flat. There is no  distension.      Palpations: Abdomen is soft.      Tenderness: There is no abdominal tenderness. There is no guarding or rebound.   Musculoskeletal:      Cervical back: Normal range of motion and neck supple.      Right lower leg: No edema.      Left lower leg: No edema.   Skin:     General: Skin is warm and dry.   Neurological:      General: No focal deficit present.      Mental Status: She is alert and oriented to person, place, and time. Mental status is at baseline.   Psychiatric:         Mood and Affect: Mood normal.         Behavior: Behavior normal.         Thought Content: Thought content normal.         Judgment: Judgment normal.         LAB RESULTS  Recent Results (from the past 24 hours)   ECG 12 Lead ED Triage Standing Order; SOA    Collection Time: 06/23/25 11:31 AM   Result Value Ref Range    QT Interval 412 ms    QTC Interval 434 ms   Comprehensive Metabolic Panel    Collection Time: 06/23/25 11:39 AM    Specimen: Arm, Left; Blood   Result Value Ref Range    Glucose 110 (H) 65 - 99 mg/dL    BUN 24.0 (H) 8.0 - 23.0 mg/dL    Creatinine 0.94 0.57 - 1.00 mg/dL    Sodium 141 136 - 145 mmol/L    Potassium 4.2 3.5 - 5.2 mmol/L    Chloride 107 98 - 107 mmol/L    CO2 22.7 22.0 - 29.0 mmol/L    Calcium 9.0 8.6 - 10.5 mg/dL    Total Protein 7.2 6.0 - 8.5 g/dL    Albumin 4.0 3.5 - 5.2 g/dL    ALT (SGPT) 10 1 - 33 U/L    AST (SGOT) 15 1 - 32 U/L    Alkaline Phosphatase 79 39 - 117 U/L    Total Bilirubin 0.3 0.0 - 1.2 mg/dL    Globulin 3.2 gm/dL    A/G Ratio 1.3 g/dL    BUN/Creatinine Ratio 25.5 (H) 7.0 - 25.0    Anion Gap 11.3 5.0 - 15.0 mmol/L    eGFR 63.0 >60.0 mL/min/1.73   BNP    Collection Time: 06/23/25 11:39 AM    Specimen: Arm, Left; Blood   Result Value Ref Range    proBNP 206.0 0.0 - 1,800.0 pg/mL   High Sensitivity Troponin T    Collection Time: 06/23/25 11:39 AM    Specimen: Arm, Left; Blood   Result Value Ref Range    HS Troponin T 12 <14 ng/L   Green Top (Gel)    Collection Time: 06/23/25 11:39 AM    Result Value Ref Range    Extra Tube Hold for add-ons.    Lavender Top    Collection Time: 06/23/25 11:39 AM   Result Value Ref Range    Extra Tube hold for add-on    Gold Top - SST    Collection Time: 06/23/25 11:39 AM   Result Value Ref Range    Extra Tube Hold for add-ons.    Light Blue Top    Collection Time: 06/23/25 11:39 AM   Result Value Ref Range    Extra Tube Hold for add-ons.    CBC Auto Differential    Collection Time: 06/23/25 11:39 AM    Specimen: Arm, Left; Blood   Result Value Ref Range    WBC 5.96 3.40 - 10.80 10*3/mm3    RBC 3.91 3.77 - 5.28 10*6/mm3    Hemoglobin 12.3 12.0 - 15.9 g/dL    Hematocrit 36.3 34.0 - 46.6 %    MCV 92.8 79.0 - 97.0 fL    MCH 31.5 26.6 - 33.0 pg    MCHC 33.9 31.5 - 35.7 g/dL    RDW 12.6 12.3 - 15.4 %    RDW-SD 42.6 37.0 - 54.0 fl    MPV 10.9 6.0 - 12.0 fL    Platelets 272 140 - 450 10*3/mm3    Neutrophil % 59.4 42.7 - 76.0 %    Lymphocyte % 24.7 19.6 - 45.3 %    Monocyte % 8.7 5.0 - 12.0 %    Eosinophil % 6.0 0.3 - 6.2 %    Basophil % 1.0 0.0 - 1.5 %    Immature Grans % 0.2 0.0 - 0.5 %    Neutrophils, Absolute 3.54 1.70 - 7.00 10*3/mm3    Lymphocytes, Absolute 1.47 0.70 - 3.10 10*3/mm3    Monocytes, Absolute 0.52 0.10 - 0.90 10*3/mm3    Eosinophils, Absolute 0.36 0.00 - 0.40 10*3/mm3    Basophils, Absolute 0.06 0.00 - 0.20 10*3/mm3    Immature Grans, Absolute 0.01 0.00 - 0.05 10*3/mm3    nRBC 0.0 0.0 - 0.2 /100 WBC   High Sensitivity Troponin T 1Hr    Collection Time: 06/23/25  1:02 PM    Specimen: Arm, Left; Blood   Result Value Ref Range    HS Troponin T 10 <14 ng/L    Troponin T Numeric Delta -2 Abnormal if >/=3 ng/L       RADIOLOGY  CT Angiogram Chest Pulmonary Embolism  Result Date: 6/23/2025  CT ANGIOGRAM OF THE CHEST WITH CONTRAST INCLUDING RECONSTRUCTION IMAGES 06/23/2025  HISTORY: Shortness of breath. Possible pulmonary embolus.  Following the intravenous contrast injection, CT angiography was performed through the chest. Sagittal, coronal and 3D reconstruction  images were reviewed.  The pulmonary arterial system is well-opacified with no evidence of pulmonary embolus. A small amount of fluid is seen in pericardial recesses. No pathologically enlarged hilar or mediastinal lymph nodes are seen. There is some aortic and minimal coronary calcification.  There is a punctate micronodule in the subpleural right upper lobe on image 37, also present on the 8 mm images from the 03/17/2025 study and likely benign. Tiny subpleural micronodule in the left lower lobe on image 104 is also likely benign.  Upper abdomen is unremarkable except for evidence of previous cholecystectomy.      No evidence of pulmonary embolus.  Radiation dose reduction techniques were utilized, including automated exposure control and exposure modulation based on body size.        XR Chest 1 View  Result Date: 6/23/2025  XR CHEST 1 VW-  HISTORY: Female who is 75 years-old, short of breath  TECHNIQUE: Frontal view of the chest  COMPARISON: 3/17/2025.  FINDINGS: The heart is enlarged. Aorta is tortuous. Pulmonary vasculature is unremarkable. No focal pulmonary consolidation, pleural effusion, or pneumothorax. No acute osseous process.      No focal pulmonary consolidation. Cardiomegaly. Tortuous aorta. Follow-up as clinically indicated.  This report was finalized on 6/23/2025 12:22 PM by Dr. Edd Quijano M.D on Workstation: [id number]        MEDICATIONS GIVEN IN ER  Medications   sodium chloride 0.9 % flush 10 mL (has no administration in time range)   lactated ringers bolus 500 mL (500 mL Intravenous New Bag 6/23/25 1324)   iopamidol (ISOVUE-370) 76 % injection 100 mL (95 mL Intravenous Given 6/23/25 1343)       ORDERS PLACED DURING THIS VISIT:  Orders Placed This Encounter   Procedures    XR Chest 1 View    CT Angiogram Chest Pulmonary Embolism    [place] Draw    Comprehensive Metabolic Panel    BNP    High Sensitivity Troponin T    CBC Auto Differential    High Sensitivity Troponin T 1Hr    NPO Diet NPO  Type: Strict NPO    Undress & Gown    Continuous Pulse Oximetry    Vital Signs    Oxygen Therapy- Nasal Cannula; Titrate 1-6 LPM Per SpO2; 90 - 95%    ECG 12 Lead ED Triage Standing Order; SOA    Insert Peripheral IV    CBC & Differential    Green Top (Gel)    Lavender Top    Gold Top - SST    Light Blue Top       OUTPATIENT MEDICATION MANAGEMENT:  Current Facility-Administered Medications Ordered in Epic   Medication Dose Route Frequency Provider Last Rate Last Admin    sodium chloride 0.9 % flush 10 mL  10 mL Intravenous PRN Anam Oakes MD         Current Outpatient Medications Ordered in Epic   Medication Sig Dispense Refill    albuterol sulfate  (90 Base) MCG/ACT inhaler Inhale 2 puffs Every 4 (Four) Hours As Needed for Shortness of Air or Wheezing.      fluticasone (FLONASE) 50 MCG/ACT nasal spray 2 sprays into the nostril(s) as directed by provider Daily. 48 g 3    HYDROcodone-acetaminophen (NORCO) 5-325 MG per tablet Take 1 tablet by mouth Every 8 (Eight) Hours As Needed for Moderate Pain. 30 day supply 60 tablet 0    hydrOXYzine (ATARAX) 10 MG tablet Take 1 tablet by mouth Every 4 (Four) Hours As Needed for Anxiety (twitching). 120 tablet 1    Ibuprofen 3 %, Gabapentin 10 %, Baclofen 2 %, lidocaine 4 %, Ketamine HCl 4 % Apply 1-2 g topically to the appropriate area as directed 3 (Three) to 4 (Four) times daily. 90 g 0    levothyroxine (SYNTHROID, LEVOTHROID) 100 MCG tablet TAKE ONE TABLET BY MOUTH EVERY MORNING 90 tablet 3    lidocaine (LIDODERM) 5 % Place 1 patch on the skin as directed by provider Daily. Remove & Discard patch within 12 hours or as directed by MD 30 each 0    methocarbamol (ROBAXIN) 500 MG tablet Take 1 tablet by mouth 3 (Three) Times a Day As Needed for Muscle Spasms. 90 tablet 1    metoprolol succinate XL (TOPROL-XL) 25 MG 24 hr tablet Take 0.5 tablets by mouth Daily. 30 tablet 5    ondansetron (Zofran) 4 MG tablet Take 1 tablet by mouth Every 8 (Eight) Hours As Needed for  Nausea or Vomiting. 15 tablet 2    pantoprazole (PROTONIX) 40 MG EC tablet Take 1 tablet by mouth 2 (Two) Times a Day. 180 tablet 3    pregabalin (LYRICA) 50 MG capsule Take 1 capsule by mouth 2 (Two) Times a Day. 60 capsule 1    promethazine (PHENERGAN) 25 MG tablet Take 1 tablet by mouth Every 6 (Six) Hours As Needed for Nausea or Vomiting. 20 tablet 1    sertraline (ZOLOFT) 100 MG tablet Take 0.5 tablets by mouth Daily.      vitamin B-12 (CYANOCOBALAMIN) 1000 MCG tablet Take 1 tablet by mouth Daily.      vitamin D3 125 MCG (5000 UT) capsule capsule Take 1 capsule by mouth Daily.         PROCEDURES  Procedures    PROGRESS, DATA ANALYSIS, CONSULTS, AND MEDICAL DECISION MAKING  All labs have been independently interpreted by me.  All radiology studies have been reviewed by me. All EKG's have been independently viewed and interpreted by me.  Discussion below represents my analysis of pertinent findings related to patient's condition, differential diagnosis, treatment plan and final disposition.    Differential diagnosis includes but is not limited to PE, arrhythmia, ACS, pneumonia, wheezing.    Clinical Scores:                   ED Course as of 06/23/25 1508   Mon Jun 23, 2025   1145 EKG interpreted by myself  Time: 1131  Rate: 66  Rhythm: NSR  No ST elevation or depression  Normal intervals  Normal morphology [AB]   1252 Offered the patient a breathing treatment for her mild wheezing, but she declined this. [AB]   1407 WBC: 5.96 [AB]   1407 Hemoglobin: 12.3 [AB]   1407 HS Troponin T: 12 [AB]   1407 CT Angiogram Chest Pulmonary Embolism  My independent interpretation of the imaging study is no PE [AB]   1507 Updated patient on negative CTA.  Workup here is otherwise benign.  Discharged with cardiology follow-up for further workup for patient's exertional dyspnea. [AB]      ED Course User Index  [AB] Anam Oakes MD             AS OF 15:08 EDT VITALS:    BP - 122/55  HR - 55  TEMP - 98.2 °F (36.8 °C)  O2 SATS -  95%    COMPLEXITY OF CARE  Admission was considered but after careful review of the patient's presentation, physical examination, diagnostic results, and response to treatment the patient may be safely discharged with outpatient follow-up.      DIAGNOSIS  Final diagnoses:   Dyspnea on exertion   Positive D dimer         DISPOSITION  ED Disposition       ED Disposition   Discharge    Condition   Stable    Comment   --                Please note that portions of this document were completed with a voice recognition program.    Note Disclaimer: At [facility], we believe that sharing information builds trust and better relationships. You are receiving this note because you recently visited [facility]. It is possible you will see health information before a provider has talked with you about it. This kind of information can be easy to misunderstand. To help you fully understand what it means for your health, we urge you to discuss this note with your provider.         Anam Oakes MD  06/23/25 8940

## 2025-06-24 ENCOUNTER — LAB (OUTPATIENT)
Dept: LAB | Facility: HOSPITAL | Age: 75
End: 2025-06-24
Payer: MEDICARE

## 2025-06-24 DIAGNOSIS — I10 PRIMARY HYPERTENSION: ICD-10-CM

## 2025-06-24 DIAGNOSIS — R07.9 CHEST PAIN, UNSPECIFIED TYPE: ICD-10-CM

## 2025-06-24 DIAGNOSIS — R06.02 SHORTNESS OF BREATH: ICD-10-CM

## 2025-06-24 LAB
ANION GAP SERPL CALCULATED.3IONS-SCNC: 10.2 MMOL/L (ref 5–15)
APTT PPP: 24.5 SECONDS (ref 22.7–35.4)
BASOPHILS # BLD AUTO: 0.07 10*3/MM3 (ref 0–0.2)
BASOPHILS NFR BLD AUTO: 1.4 % (ref 0–1.5)
BUN SERPL-MCNC: 18 MG/DL (ref 8–23)
BUN/CREAT SERPL: 18.6 (ref 7–25)
CALCIUM SPEC-SCNC: 9.5 MG/DL (ref 8.6–10.5)
CHLORIDE SERPL-SCNC: 109 MMOL/L (ref 98–107)
CO2 SERPL-SCNC: 23.8 MMOL/L (ref 22–29)
CREAT SERPL-MCNC: 0.97 MG/DL (ref 0.57–1)
DEPRECATED RDW RBC AUTO: 40.6 FL (ref 37–54)
EGFRCR SERPLBLD CKD-EPI 2021: 60.7 ML/MIN/1.73
EOSINOPHIL # BLD AUTO: 0.29 10*3/MM3 (ref 0–0.4)
EOSINOPHIL NFR BLD AUTO: 5.9 % (ref 0.3–6.2)
ERYTHROCYTE [DISTWIDTH] IN BLOOD BY AUTOMATED COUNT: 12.3 % (ref 12.3–15.4)
GLUCOSE SERPL-MCNC: 105 MG/DL (ref 65–99)
HCT VFR BLD AUTO: 38.8 % (ref 34–46.6)
HGB BLD-MCNC: 13 G/DL (ref 12–15.9)
IMM GRANULOCYTES # BLD AUTO: 0.01 10*3/MM3 (ref 0–0.05)
IMM GRANULOCYTES NFR BLD AUTO: 0.2 % (ref 0–0.5)
INR PPP: 1.05 (ref 0.9–1.1)
LYMPHOCYTES # BLD AUTO: 1.32 10*3/MM3 (ref 0.7–3.1)
LYMPHOCYTES NFR BLD AUTO: 26.8 % (ref 19.6–45.3)
MCH RBC QN AUTO: 30.7 PG (ref 26.6–33)
MCHC RBC AUTO-ENTMCNC: 33.5 G/DL (ref 31.5–35.7)
MCV RBC AUTO: 91.7 FL (ref 79–97)
MONOCYTES # BLD AUTO: 0.43 10*3/MM3 (ref 0.1–0.9)
MONOCYTES NFR BLD AUTO: 8.7 % (ref 5–12)
NEUTROPHILS NFR BLD AUTO: 2.81 10*3/MM3 (ref 1.7–7)
NEUTROPHILS NFR BLD AUTO: 57 % (ref 42.7–76)
NRBC BLD AUTO-RTO: 0 /100 WBC (ref 0–0.2)
PLATELET # BLD AUTO: 296 10*3/MM3 (ref 140–450)
PMV BLD AUTO: 11.1 FL (ref 6–12)
POTASSIUM SERPL-SCNC: 4.5 MMOL/L (ref 3.5–5.2)
PROTHROMBIN TIME: 13.6 SECONDS (ref 11.7–14.2)
RBC # BLD AUTO: 4.23 10*6/MM3 (ref 3.77–5.28)
SODIUM SERPL-SCNC: 143 MMOL/L (ref 136–145)
WBC NRBC COR # BLD AUTO: 4.93 10*3/MM3 (ref 3.4–10.8)

## 2025-06-24 PROCEDURE — 36415 COLL VENOUS BLD VENIPUNCTURE: CPT

## 2025-06-24 PROCEDURE — 85610 PROTHROMBIN TIME: CPT

## 2025-06-24 PROCEDURE — 85730 THROMBOPLASTIN TIME PARTIAL: CPT

## 2025-06-24 PROCEDURE — 80048 BASIC METABOLIC PNL TOTAL CA: CPT

## 2025-06-24 PROCEDURE — 85025 COMPLETE CBC W/AUTO DIFF WBC: CPT

## 2025-06-24 NOTE — H&P
[facility]   HISTORY AND PHYSICAL    Patient Name: Jemima Bell  : 1950  MRN: [id number]  Primary Care Physician:  Kehrer, Meredith Lea, MD  Date of admission: 2025    Subjective   Subjective     Chief Complaint: Chronic neck pain    History of Present Illness  Chronic, left-sided axial neck pain that is failed to improve with conservative therapies.      Review of Systems   Constitutional:  Negative for chills and fever.   Respiratory:  Negative for cough and shortness of breath.    Musculoskeletal:  Positive for neck pain.        Personal History     Past Medical History:   Diagnosis Date    Acromioclavicular separation     Acute bronchitis     Acute sinusitis     Allergic 2022    Shrimp    Allergic rhinitis     Anemia Childhood    Anxiety     Arthritis     Arthritis of back     Arthritis of neck     Arthropathy of cervical facet joint 2025    Asthma     Back pain     BMI 34.0-34.9,adult     Bronchitis, chronic     Bursitis of hip     Cataract     Cervical disc disorder     Cervicalgia     Chills     Cholelithiasis 2004?    Remival    Chronic diarrhea     Chronic kidney disease     Chronic pain disorder     Cluster headache     Colon polyp 2022    Deep vein thrombosis (DVT) of lower extremity     Depression     Dermatitis     Dislocation, shoulder     Dyspnea     Dysuria     Esophageal reflux     Extremity pain     Fatigue     Frozen shoulder     Gastric ulcer     GERD (gastroesophageal reflux disease)     GI (gastrointestinal bleed) 2004?    Headache     Headache, tension-type     Heart murmur 1973    Hernia     Hiatal hernia 2007    Hip arthrosis     Hypertension     Hypothyroidism     Irritable bowel syndrome     Low back strain     Lumbago     Lumbar stenosis with neurogenic claudication 2024    Lumbosacral disc disease     Migraine     Nausea     Neck strain     Neuritis     Osteoarthritis     Osteoarthritis of knee     Periarthritis of shoulder      Plantar fasciitis     Pneumonia 89y    Pulled muscle     Pulmonary embolism     Pyelonephritis     Rheumatic fever     Sore throat     Torticollis     Trapezius strain     Urinary incontinence     Urinary pain     Urinary tract infection     UTI symptoms     Vertigo 03/2025    Vitamin B1 deficiency     Vitamin B12 deficiency     Vitamin D deficiency     Weakness     Wheezing        Past Surgical History:   Procedure Laterality Date    ABDOMINAL SURGERY      ADENOIDECTOMY      APPENDECTOMY      BACK SURGERY  6/30/24    CATARACT EXTRACTION      CATARACT EXTRACTION      bilateral eyes    CATARACT EXTRACTION      CERVICAL EPIDURAL N/A 06/26/2024    Procedure: cervical epidural steroid injection at C7/T1 68083;  Surgeon: Shey Aranda MD;  Location: [facility] MAIN OR;  Service: Pain Management;  Laterality: N/A;    CHOLECYSTECTOMY  2004?    COLONOSCOPY      COLONOSCOPY N/A 12/23/2022    Procedure: COLONOSCOPY to cecum and TI:  with biopsies, cold snare polyp,;  Surgeon: Reji Aguilar MD;  Location: [facility] ENDOSCOPY;  Service: Gastroenterology;  Laterality: N/A;  PREOP/ HX COLON POLYPS  POSTOP/  diverticulosis, polyp, hemorrhoids    ENDOSCOPY N/A 12/23/2022    Procedure: ESOPHAGOGASTRODUODENOSCOPY with biopsies;  Surgeon: Reji Aguilar MD;  Location: [facility] ENDOSCOPY;  Service: Gastroenterology;  Laterality: N/A;  PREOP/ HX GASTRIC ULCER, DYSPEPSIA, UPPER ABDOMINAL PAIN  POSTOP/:  HH, gastritis, gastric polyps    EPIDURAL BLOCK      GALLBLADDER SURGERY      HEMORRHOIDECTOMY  1974 & 77?    HERNIA REPAIR  2008?    HYSTERECTOMY      INGUINAL HERNIA REPAIR      INTERSTIM PLACEMENT      for Bladder incontinence    JOINT REPLACEMENT      Knee    KNEE SURGERY      LAPAROSCOPIC COLON RESECTION  2005    LAPAROTOMY OOPHERECTOMY      LUMBAR DIRECT LATERAL INTERBODY FUSION N/A 07/30/2024    Procedure: lumbar 3 to sacral 1 transforaminal lumbar interbody fusion, lumbar 3 to sacral 1 fusion with neuro robot;  Surgeon: Michelet Rice IV,  MD;  Location: [facility] MAIN OR;  Service: Robotics - Neuro;  Laterality: N/A;    LUMBAR EPIDURAL INJECTION N/A 03/27/2023    Procedure: Lumbar epidural steroid injection at L5-S1 66543;  Surgeon: Shey Aranda MD;  Location: [facility] MAIN OR;  Service: Pain Management;  Laterality: N/A;    LUMBAR EPIDURAL INJECTION N/A 03/13/2024    Procedure: lumbar epidural steroid injection at L4/5 89632;  Surgeon: Shey Aranda MD;  Location: [facility] MAIN OR;  Service: Pain Management;  Laterality: N/A;    LUMBAR FUSION N/A 07/30/2024    Procedure: LUMBAR TRANSFORAMINAL INTERBODY FUSION WITH NEURO ROBOT;  Surgeon: Michelet Rice IV, MD;  Location: [facility] MAIN OR;  Service: Robotics - Neuro;  Laterality: N/A;    NISSEN FUNDOPLICATION LAPAROSCOPIC      x2    ORTHOPEDIC SURGERY      Knee replacement    SACROILIAC JOINT INJECTION Left 05/12/2023    Procedure: Left SACROILIAC INJECTION 41787;  Surgeon: Shey Aranda MD;  Location: [facility] MA[facility];  Service: Pain Management;  Laterality: Left;    SACROILIAC JOINT INJECTION Left 07/12/2023    Procedure: SACROILIAC INJECTION LEFT;  Surgeon: Shey Aranda MD;  Location: [facility]IN [facility];  Service: Pain Management;  Laterality: Left;    SPINAL FUSION  [id number]    SPINE SURGERY  624735    TONSILLECTOMY      TONSILLECTOMY  1954?    TOTAL KNEE ARTHROPLASTY Left     TUBAL ABDOMINAL LIGATION      UPPER GASTROINTESTINAL ENDOSCOPY         Family History: family history includes Alcohol abuse in her maternal grandfather; Arthritis in her father and mother; Asthma in her maternal grandmother and paternal grandfather; Depression in her maternal grandfather and mother; Dislocations in her mother; Heart attack in her maternal uncle, mother, and paternal uncle; Heart disease in an other family member; Heart failure in her mother and paternal uncle; Hyperlipidemia in her mother; Hypertension in her father and mother; Irritable bowel syndrome in her mother; Stroke in her father. Otherwise pertinent FHx was  "reviewed and not pertinent to current issue.    Social History:  reports that she has never smoked. She has never been exposed to tobacco smoke. She has never used smokeless tobacco. She reports that she does not drink alcohol and does not use drugs.    Home Medications:  HYDROcodone-acetaminophen, (Ibuprofen 3 %, Gabapentin 10 %, Baclofen 2 %, lidocaine 4 %, Ketamine HCl 4 %), albuterol sulfate HFA, fluticasone, hydrOXYzine, levothyroxine, lidocaine, methocarbamol, metoprolol succinate XL, ondansetron, pantoprazole, pregabalin, promethazine, sertraline, vitamin B-12, and vitamin D3    Allergies:  Allergies   Allergen Reactions    Salicylates GI Intolerance     History of gi bleed      Colestipol GI Intolerance    Biaxin [Clarithromycin]     Adhesive Tape Rash     Can use plastic tape, paper tape causes rash    Augmentin [Amoxicillin-Pot Clavulanate] Diarrhea    Prevacid [Lansoprazole] Itching and Swelling     Eyes only    Sulfa Antibiotics GI Intolerance    Sulfamethoxazole-Trimethoprim Nausea And Vomiting and GI Intolerance     \"makes me sick as a dog\"         Objective    Objective     Vitals:   Temp:  [97.7 °F (36.5 °C)] 97.7 °F (36.5 °C)  Heart Rate:  [56] 56  BP: (131)/(64) 131/64    Physical Exam  Constitutional:       General: She is not in acute distress.  Pulmonary:      Effort: Pulmonary effort is normal. No respiratory distress.   Skin:     General: Skin is warm and dry.   Neurological:      Mental Status: She is alert.   Psychiatric:         Mood and Affect: Mood normal.         Thought Content: Thought content normal.         Result Review    Result Review:  I have personally reviewed the results from the time of this admission to 6/25/2025 10:40 EDT and agree with these findings:  []  Laboratory list / accordion  []  Microbiology  []  Radiology  []  EKG/Telemetry   []  Cardiology/Vascular   []  Pathology  []  Old records  []  Other:  Most notable findings include: No new      Assessment & Plan "   Assessment / Plan     Brief Patient Summary:  Jemima Bell is a 75 y.o. female who has chronic axial neck pain on the left side.    Active Hospital Problems:  Active Hospital Problems    Diagnosis     **Arthropathy of cervical facet joint      Plan: Left C4-C6 medial branch blocks      VTE Prophylaxis:  No VTE prophylaxis order currently exists.    Shey Aranda MD

## 2025-06-24 NOTE — DISCHARGE INSTRUCTIONS
[facility] - Post-procedure Instructions          --  While there are no absolute restrictions, it is recommended that you do not perform strenuous activity today. In the morning, you may resume your level of activity as before your block.    --  If you have a band-aid at your injection site, please remove it later today. Observe the area for any redness, swelling, pus-like drainage, or a temperature over 101°. If any of these symptoms occur, please call your doctor at [phone]. If after office hours, leave a message and the on-call provider will return your call.    --  Ice may be applied to your injection site. It is recommended you avoid direct heat (heating pad; hot tub) for 1-2 days.    --  Call [facility] at [phone] if you experience persistent headache, persistent bleeding from the injection site, or severe pain not relieved by heat or oral medication.    --  Do not make important decisions today.    --  Due to the effects of the block and/or the I.V. Sedation, DO NOT drive or operate hazardous machinery for 12 hours.  Local anesthetics may cause numbness after procedure and precautions must be taken with regards to operating equipment as well as with walking, even if ambulating with assistance of another person or with an assistive device.    --  Do not drink alcohol for 12 hours.    -- You may return to work tomorrow, or as directed by your referring doctor.    --  Occasionally you may notice a slight increase in your pain after the procedure. This should start to improve within the next 24-48 hours. Radiofrequency ablation procedure pain may last 3-4 weeks.    --  It may take as long as 3-4 days before you notice a gradual improvement in your pain and/or other symptoms.    -- You may continue to take your prescribed pain medication as needed.    --  Some normal possible side effects of steroid use could include fluid retention, increased blood sugar, dull headache,  increased sweating, increased appetite, mood swings and flushing.    --  Diabetics are recommended to watch their blood glucose level closely for 24-48 hours after the injection.    --  Must stay in PACU for 20 min upon arrival and prove no leg weakness before being discharged.    --  IN THE EVENT OF A LIFE THREATENING EMERGENCY, (CHEST PAIN, BREATHING DIFFICULTIES, PARALYSIS…) YOU SHOULD GO TO YOUR NEAREST EMERGENCY ROOM.    --  You should be contacted by our office within 2-3 days to schedule follow up or next appointment date.  If not contacted within 7 days, please call the office at [phone]     If you have even 1 hour of relief, these injections are considered successful.

## 2025-06-25 ENCOUNTER — HOSPITAL ENCOUNTER (OUTPATIENT)
Facility: SURGERY CENTER | Age: 75
Setting detail: HOSPITAL OUTPATIENT SURGERY
Discharge: HOME | End: 2025-06-25
Attending: ANESTHESIOLOGY | Admitting: ANESTHESIOLOGY
Payer: MEDICARE

## 2025-06-25 ENCOUNTER — HOSPITAL ENCOUNTER (OUTPATIENT)
Dept: GENERAL RADIOLOGY | Facility: SURGERY CENTER | Age: 75
Setting detail: HOSPITAL OUTPATIENT SURGERY
End: 2025-06-25
Payer: MEDICARE

## 2025-06-25 VITALS
TEMPERATURE: 98.1 F | HEART RATE: 57 BPM | WEIGHT: 200 LBS | RESPIRATION RATE: 16 BRPM | BODY MASS INDEX: 36.8 KG/M2 | OXYGEN SATURATION: 96 % | SYSTOLIC BLOOD PRESSURE: 138 MMHG | DIASTOLIC BLOOD PRESSURE: 67 MMHG | HEIGHT: 62 IN

## 2025-06-25 DIAGNOSIS — Z41.9 SURGERY, ELECTIVE: ICD-10-CM

## 2025-06-25 PROCEDURE — 76000 FLUOROSCOPY <1 HR PHYS/QHP: CPT

## 2025-06-25 PROCEDURE — 64491 INJ PARAVERT F JNT C/T 2 LEV: CPT | Performed by: ANESTHESIOLOGY

## 2025-06-25 PROCEDURE — 64490 INJ PARAVERT F JNT C/T 1 LEV: CPT | Performed by: ANESTHESIOLOGY

## 2025-06-25 PROCEDURE — 25010000002 BUPIVACAINE (PF) 0.25 % SOLUTION 10 ML VIAL: Performed by: ANESTHESIOLOGY

## 2025-06-25 PROCEDURE — 77002 NEEDLE LOCALIZATION BY XRAY: CPT

## 2025-06-25 PROCEDURE — 25010000002 LIDOCAINE PF 1% 1 % SOLUTION 5 ML VIAL: Performed by: ANESTHESIOLOGY

## 2025-06-25 NOTE — OP NOTE
Cervical Medial Branch Blockade at Left C4, C5, and C6  [facility]      PREOPERATIVE DIAGNOSIS:  Cervical spondylosis without myelopathy   POSTOPERATIVE DIAGNOSIS:  Same as preoperative diagnosis    PROCEDURE:    Cervical Facet Nerve (medial branch) Blocks, with Fluoroscopy:      LEVELS: left  C4, C5, and C6  First level 68591 -LT  Second level 60439 -LT    PRE-PROCEDURE DISCUSSION WITH PATIENT:    Risks and complications were discussed with the patient prior to starting the procedure and informed consent was obtained.  We discussed various topics, including but not limited to bleeding, infection, injury, nerve injury, paralysis, coma, death, postprocedural soreness or painful flare, worsening of clinical picture, lack of pain relief.  We discussed the diagnostic nature of medial branch blockades.      SURGEON:  Shey Aranda MD    REASON FOR PROCEDURE:    The patient complains of pain that seems to have a significant axial component    SEDATION:  No sedation was used for this procedure  ANESTHETIC:   Marcaine 0.25%  STEROID:  None  TOTAL VOLUME OF SOLUTION:  3 mL      DESCRIPTON OF PROCEDURE:  After obtaining informed consent, the patient was placed in the prone position. IV access was not obtained.  Heart rate, blood pressure, and pulse oximeter were monitored and all sedation was administered by an RN under my direct guidance.  The cervical area was prepped with Chloraprep and draped with sterile barrier.     AP fluoroscopic image was used to visualize the waists of the articular pillars on the left side at C4, C5, and C6.  A 25-gauge needle was used to localize the skin with 1% lidocaine.  A 22-gauge spinal needle was then advanced percutaneously through the skin tracts under fluoroscopic guidance in a coaxial view to contact periosteum at each of these target sites.  Lateral fluoroscopy was then used to advance the needle tips to the midpoint of the articular pillars at each level.  All  needle tips were radiographically confirmed to be posterior to the neural foramen. After negative aspiration of each needle, a volume of 1 mL of injectate was administered at each site.      Needles were removed intact from all levels.  Vitals were stable throughout.       ESTIMATED BLOOD LOSS:  minimal  SPECIMENS:  None    COMPLICATIONS:    No complications were noted.    PRE-PROCEDURE PAIN SCORE: 2/10 at rest, 7/10 with activity  POST-PROCEDURE PAIN SCORE 0/10    TOLERANCE & DISCHARGE CONDITION:    The patient tolerated the procedure well.  The patient was transported to the recovery area without difficulties.  The patient was discharged to home under the care of family in stable and satisfactory condition.       PLAN OF CARE:  The patient was given our standard instruction sheet.  We discussed that Cervical Medial Branch Blockade is a diagnostic procedure in consideration for radiofrequency ablation if two diagnostic procedures proved to be positive for significant benefit.  An alternative plan could be therapeutic cervical medial branch blocks on an as-needed basis.  The patient is asked to keep an account of their pain postoperatively today.  The patient will Return to clinic 1-2 wks.  The patient will resume all medications as per the medication reconciliation sheet.

## 2025-06-27 ENCOUNTER — HOSPITAL ENCOUNTER (OUTPATIENT)
Facility: HOSPITAL | Age: 75
Setting detail: HOSPITAL OUTPATIENT SURGERY
Discharge: HOME | End: 2025-06-27
Attending: INTERNAL MEDICINE | Admitting: INTERNAL MEDICINE
Payer: MEDICARE

## 2025-06-27 VITALS
BODY MASS INDEX: 36.62 KG/M2 | OXYGEN SATURATION: 94 % | HEIGHT: 62 IN | SYSTOLIC BLOOD PRESSURE: 104 MMHG | DIASTOLIC BLOOD PRESSURE: 58 MMHG | RESPIRATION RATE: 18 BRPM | HEART RATE: 64 BPM | TEMPERATURE: 97.8 F | WEIGHT: 199 LBS

## 2025-06-27 DIAGNOSIS — Z95.5 S/P DRUG ELUTING CORONARY STENT PLACEMENT: ICD-10-CM

## 2025-06-27 DIAGNOSIS — R06.02 SHORTNESS OF BREATH: ICD-10-CM

## 2025-06-27 DIAGNOSIS — R07.9 CHEST PAIN, UNSPECIFIED TYPE: ICD-10-CM

## 2025-06-27 DIAGNOSIS — I20.0 UNSTABLE ANGINA PECTORIS: Primary | ICD-10-CM

## 2025-06-27 DIAGNOSIS — I10 PRIMARY HYPERTENSION: ICD-10-CM

## 2025-06-27 LAB
ACT BLD: 279 SECONDS (ref 82–152)
ACT BLD: 285 SECONDS (ref 82–152)
HCO3 BLDA-SCNC: 26.2 MMOL/L (ref 21–28)
HCO3 BLDA-SCNC: 27.9 MMOL/L (ref 21–28)
HCT VFR BLDA CALC: 33 % (ref 38–51)
HCT VFR BLDA CALC: 33 % (ref 38–51)
HGB BLDA-MCNC: 11.2 G/DL (ref 12–17)
HGB BLDA-MCNC: 11.2 G/DL (ref 12–17)
PCO2 BLDA: 52.1 MM HG (ref 35–45)
PCO2 BLDV: 55.2 MMHG (ref 41–51)
PH BLDA: 7.31 PH UNITS (ref 7.35–7.45)
PH BLDA: 7.31 [PH] (ref 7.31–7.41)
PO2 BLDA: 94 MMHG (ref 80–105)
PO2 BLDV: 41 MM HG (ref 35–42)
POTASSIUM BLDA-SCNC: 4.2 MMOL/L (ref 3.5–4.9)
POTASSIUM BLDA-SCNC: 4.2 MMOL/L (ref 3.5–4.9)
SAO2 % BLDA: 96 % (ref 95–98)
SAO2 % BLDCOA: 70 % (ref 45–75)

## 2025-06-27 PROCEDURE — 25010000002 FENTANYL CITRATE (PF) 50 MCG/ML SOLUTION: Performed by: INTERNAL MEDICINE

## 2025-06-27 PROCEDURE — C1887 CATHETER, GUIDING: HCPCS | Performed by: INTERNAL MEDICINE

## 2025-06-27 PROCEDURE — C1725 CATH, TRANSLUMIN NON-LASER: HCPCS | Performed by: INTERNAL MEDICINE

## 2025-06-27 PROCEDURE — 82810 BLOOD GASES O2 SAT ONLY: CPT

## 2025-06-27 PROCEDURE — C9600 PERC DRUG-EL COR STENT SING: HCPCS | Performed by: INTERNAL MEDICINE

## 2025-06-27 PROCEDURE — C1894 INTRO/SHEATH, NON-LASER: HCPCS | Performed by: INTERNAL MEDICINE

## 2025-06-27 PROCEDURE — C1753 CATH, INTRAVAS ULTRASOUND: HCPCS | Performed by: INTERNAL MEDICINE

## 2025-06-27 PROCEDURE — 85014 HEMATOCRIT: CPT

## 2025-06-27 PROCEDURE — 25510000001 IOPAMIDOL PER 1 ML: Performed by: INTERNAL MEDICINE

## 2025-06-27 PROCEDURE — 25810000003 SODIUM CHLORIDE 0.9 % SOLUTION: Performed by: INTERNAL MEDICINE

## 2025-06-27 PROCEDURE — 85347 COAGULATION TIME ACTIVATED: CPT

## 2025-06-27 PROCEDURE — 25010000002 MIDAZOLAM PER 1 MG: Performed by: INTERNAL MEDICINE

## 2025-06-27 PROCEDURE — 85018 HEMOGLOBIN: CPT

## 2025-06-27 PROCEDURE — 25010000002 LIDOCAINE 2% SOLUTION: Performed by: INTERNAL MEDICINE

## 2025-06-27 PROCEDURE — 93460 R&L HRT ART/VENTRICLE ANGIO: CPT | Performed by: INTERNAL MEDICINE

## 2025-06-27 PROCEDURE — 82803 BLOOD GASES ANY COMBINATION: CPT

## 2025-06-27 PROCEDURE — 25010000002 HEPARIN (PORCINE) PER 1000 UNITS: Performed by: INTERNAL MEDICINE

## 2025-06-27 PROCEDURE — C1769 GUIDE WIRE: HCPCS | Performed by: INTERNAL MEDICINE

## 2025-06-27 PROCEDURE — 92978 ENDOLUMINL IVUS OCT C 1ST: CPT | Performed by: INTERNAL MEDICINE

## 2025-06-27 PROCEDURE — C1874 STENT, COATED/COV W/DEL SYS: HCPCS | Performed by: INTERNAL MEDICINE

## 2025-06-27 DEVICE — XIENCE SKYPOINT™ EVEROLIMUS ELUTING CORONARY STENT SYSTEM 3.50 MM X 18 MM / RAPID-EXCHANGE
Type: IMPLANTABLE DEVICE | Site: CORONARY | Status: FUNCTIONAL
Brand: XIENCE SKYPOINT™

## 2025-06-27 RX ORDER — ACETAMINOPHEN 325 MG/1
650 TABLET ORAL EVERY 4 HOURS PRN
Status: DISCONTINUED | OUTPATIENT
Start: 2025-06-27 | End: 2025-06-27 | Stop reason: HOSPADM

## 2025-06-27 RX ORDER — CLOPIDOGREL BISULFATE 75 MG/1
75 TABLET ORAL DAILY
Status: DISCONTINUED | OUTPATIENT
Start: 2025-06-28 | End: 2025-06-27 | Stop reason: HOSPADM

## 2025-06-27 RX ORDER — HEPARIN SODIUM 1000 [USP'U]/ML
INJECTION, SOLUTION INTRAVENOUS; SUBCUTANEOUS
Status: DISCONTINUED | OUTPATIENT
Start: 2025-06-27 | End: 2025-06-27 | Stop reason: HOSPADM

## 2025-06-27 RX ORDER — SODIUM CHLORIDE 0.9 % (FLUSH) 0.9 %
10 SYRINGE (ML) INJECTION AS NEEDED
Status: DISCONTINUED | OUTPATIENT
Start: 2025-06-27 | End: 2025-06-27 | Stop reason: HOSPADM

## 2025-06-27 RX ORDER — CLOPIDOGREL BISULFATE 75 MG/1
TABLET ORAL
Status: DISCONTINUED | OUTPATIENT
Start: 2025-06-27 | End: 2025-06-27 | Stop reason: HOSPADM

## 2025-06-27 RX ORDER — ATORVASTATIN CALCIUM 40 MG/1
40 TABLET, FILM COATED ORAL DAILY
Qty: 30 TABLET | Refills: 11 | Status: SHIPPED | OUTPATIENT
Start: 2025-06-27

## 2025-06-27 RX ORDER — VERAPAMIL HYDROCHLORIDE 2.5 MG/ML
INJECTION INTRAVENOUS
Status: DISCONTINUED | OUTPATIENT
Start: 2025-06-27 | End: 2025-06-27 | Stop reason: HOSPADM

## 2025-06-27 RX ORDER — ATORVASTATIN CALCIUM 20 MG/1
40 TABLET, FILM COATED ORAL NIGHTLY
Status: DISCONTINUED | OUTPATIENT
Start: 2025-06-27 | End: 2025-06-27 | Stop reason: HOSPADM

## 2025-06-27 RX ORDER — ASPIRIN 81 MG/1
81 TABLET ORAL DAILY
Status: DISCONTINUED | OUTPATIENT
Start: 2025-06-28 | End: 2025-06-27 | Stop reason: HOSPADM

## 2025-06-27 RX ORDER — NITROGLYCERIN 0.4 MG/1
0.4 TABLET SUBLINGUAL
Status: DISCONTINUED | OUTPATIENT
Start: 2025-06-27 | End: 2025-06-27 | Stop reason: HOSPADM

## 2025-06-27 RX ORDER — SODIUM CHLORIDE 9 MG/ML
75 INJECTION, SOLUTION INTRAVENOUS CONTINUOUS
Status: ACTIVE | OUTPATIENT
Start: 2025-06-27 | End: 2025-06-27

## 2025-06-27 RX ORDER — ASPIRIN 81 MG/1
81 TABLET ORAL DAILY
Qty: 90 TABLET | Refills: 1 | Status: SHIPPED | OUTPATIENT
Start: 2025-06-27

## 2025-06-27 RX ORDER — IOPAMIDOL 755 MG/ML
INJECTION, SOLUTION INTRAVASCULAR
Status: DISCONTINUED | OUTPATIENT
Start: 2025-06-27 | End: 2025-06-27 | Stop reason: HOSPADM

## 2025-06-27 RX ORDER — CLOPIDOGREL BISULFATE 75 MG/1
75 TABLET ORAL DAILY
Qty: 90 TABLET | Refills: 3 | Status: SHIPPED | OUTPATIENT
Start: 2025-06-27

## 2025-06-27 RX ORDER — MIDAZOLAM HYDROCHLORIDE 1 MG/ML
INJECTION, SOLUTION INTRAMUSCULAR; INTRAVENOUS
Status: DISCONTINUED | OUTPATIENT
Start: 2025-06-27 | End: 2025-06-27 | Stop reason: HOSPADM

## 2025-06-27 RX ORDER — FENTANYL CITRATE 50 UG/ML
INJECTION, SOLUTION INTRAMUSCULAR; INTRAVENOUS
Status: DISCONTINUED | OUTPATIENT
Start: 2025-06-27 | End: 2025-06-27 | Stop reason: HOSPADM

## 2025-06-27 RX ORDER — ASPIRIN 325 MG
TABLET ORAL
Status: DISCONTINUED | OUTPATIENT
Start: 2025-06-27 | End: 2025-06-27 | Stop reason: HOSPADM

## 2025-06-27 RX ORDER — HYDROCODONE BITARTRATE AND ACETAMINOPHEN 5; 325 MG/1; MG/1
2 TABLET ORAL EVERY 6 HOURS PRN
Refills: 0 | Status: DISCONTINUED | OUTPATIENT
Start: 2025-06-27 | End: 2025-06-27 | Stop reason: HOSPADM

## 2025-06-27 RX ORDER — LIDOCAINE HYDROCHLORIDE 20 MG/ML
INJECTION, SOLUTION INFILTRATION; PERINEURAL
Status: DISCONTINUED | OUTPATIENT
Start: 2025-06-27 | End: 2025-06-27 | Stop reason: HOSPADM

## 2025-06-27 RX ADMIN — HYDROCODONE BITARTRATE AND ACETAMINOPHEN 2 TABLET: 5; 325 TABLET ORAL at 14:06

## 2025-06-27 RX ADMIN — SODIUM CHLORIDE 75 ML/HR: 9 INJECTION, SOLUTION INTRAVENOUS at 10:21

## 2025-06-27 NOTE — Clinical Note
Hemostasis started on the right brachial vein. Manual pressure applied to vessel. Manual pressure was held by AMM. Manual pressure was held for 5 min. Hemostasis achieved successfully.

## 2025-06-27 NOTE — Clinical Note
Hemostasis started on the right ulnar artery. R-Band was used in achieving hemostasis. Radial compression device applied to vessel. Hemostasis achieved successfully. Closure device additional comment: Comfort Band

## 2025-06-27 NOTE — Clinical Note
First balloon inflation max pressure = 15 kai. First balloon inflation duration = 10 seconds. 2nd inflation was done at 12:24 EDT.

## 2025-06-27 NOTE — Clinical Note
Prepped: right groin, right brachial and Right Wrist. Prepped with: ChloraPrep. The site was clipped.

## 2025-06-27 NOTE — DISCHARGE INSTRUCTIONS
[facility]  [address] [place] [address]    Coronary Angioplasty with or without  Stent (Radial Approach) After Care    Refer to this sheet in the next few weeks. These instructions provide you with information on caring for yourself after your procedure. Your health care provider may also give you more specific instructions. Your treatment has been planned according to current medical practices, but problems sometimes occur. Call your health care provider if you have any problems or questions after your procedure.       Home Care Instructions:  You may shower the day after the procedure. Remove the bandage (dressing) and gently wash the site with plain soap and water. Gently pat the site dry. You may apply a band aid daily for 2 days if desired.    Do not apply powder or lotion to the site.  Do not submerge the affected site in water for 3 to 5 days or until the site is completely healed.   Do not flex or bend at the wrist with affected arm for 24 hours.  Do not lift, push or pull anything over 5 pounds for 5 days after your procedure or as directed by your physician.  For a reference, a gallon of milk weighs 8 pounds.    Do not operate machinery or power tools for 24 hours.  Inspect the site at least twice daily. You may notice some bruising at the site and it may be tender for 1 to 2 weeks.     Increase your fluid intake for the next 2 days.    Keep arm elevated for 24 hours.  For the remainder of the day, keep your arm in the “Pledge of Allegiance” position when up and about.    Limit your activity for the next 48 hours and avoid strenuous activity as directed by your physician.   Cardiac Rehab may or may not be ordered.  Please consult with your physician  You may drive 24 hours after the procedure unless otherwise instructed by your caregiver.  A responsible adult should be with you for the first 24 hours after you arrive home.   Do not make any important legal decisions or sign legal  papers for 24 hours. Do not drink alcohol for 24 hours.    Take medicines only as directed by your health care provider.  Blood thinners may be prescribed after your procedure to improve blood flow through the stent.    Metformin or any medications containing Metformin should not be taken for 48 hours after your procedure.    Eat a heart-healthy diet. This should include plenty of fresh fruits and vegetables. Meat should be lean cuts. Avoid the following types of food:    Food that is high in salt.    Canned or highly processed food.    Food that is high in saturated fat or sugar.    Fried food.    Make any other lifestyle changes recommended by your health care provider. This may include:    Not using any tobacco products including cigarettes, chewing tobacco, or electronic cigarettes.   Managing your weight.    Getting regular exercise.    Managing your blood pressure.    Limiting your alcohol intake.    Managing other health problems, such as diabetes.    If you need an MRI after your heart stent was placed, be sure to tell the health care provider who orders the MRI that you have a heart stent.    Keep all follow-up visits as directed by your health care provider.      Call Your Doctor If:    You have unusual pain at the radial/ulnar (wrist) site.  You have redness, warmth, swelling, or pain at the radial/ulnar (wrist) site.  You have drainage (other than a small amount of blood on the dressing).  `You have chills or a fever > 101.  Your arm becomes pale or dark, cool, tingly, or numb.  You develop chest pain, shortness of breath, feel faint or pass out.    You have heavy bleeding from the site.  If you do, hold pressure on the site for 20 minutes.  If the bleeding stops, apply a fresh bandage and call your cardiologist.  However, if you continue to have bleeding, maintain pressure and call 911.    You have any symptoms of a stroke.  Remember BE FAST  B-balance. Sudden trouble walking or loss of  balance.  E-eyes.  Sudden changes in how you see or a sudden onset of a very bad headache.   F-face. Sudden weakness or loss of feeling of the face or facial droop on one side.   A-arms Sudden weakness or numbness in one arm. One arm drifts down if they are both held out in front of you. This happens suddenly and usually on one side of the body.   S-speech.  Sudden trouble speaking, slurred speech or trouble understanding what people are saying.   T-time  Time to call emergency services.  Write down the symptoms and the time they started.

## 2025-06-27 NOTE — Clinical Note
First balloon inflation max pressure = 12 kai. First balloon inflation duration = 10 seconds. Second inflation of balloon - Max pressure = 12 kai. 2nd Inflation of balloon - Duration = 10 seconds.

## 2025-06-27 NOTE — Clinical Note
First balloon inflation max pressure = 10 kai. First balloon inflation duration = 10 seconds. 2nd inflation was done at 12:18 EDT.

## 2025-06-27 NOTE — Clinical Note
A 6 fr sheath was successfully inserted into the right ulnar artery and right ulnar artery. Sheath insertion not delayed.

## 2025-07-02 ENCOUNTER — OFFICE VISIT (OUTPATIENT)
Age: 75
End: 2025-07-02
Payer: MEDICARE

## 2025-07-02 ENCOUNTER — OFFICE VISIT (OUTPATIENT)
Dept: PAIN MEDICINE | Facility: CLINIC | Age: 75
End: 2025-07-02
Payer: MEDICARE

## 2025-07-02 VITALS
DIASTOLIC BLOOD PRESSURE: 74 MMHG | WEIGHT: 198.4 LBS | HEART RATE: 62 BPM | SYSTOLIC BLOOD PRESSURE: 133 MMHG | TEMPERATURE: 98 F | OXYGEN SATURATION: 99 % | BODY MASS INDEX: 36.51 KG/M2 | HEIGHT: 62 IN

## 2025-07-02 VITALS — WEIGHT: 199.4 LBS | BODY MASS INDEX: 36.7 KG/M2 | TEMPERATURE: 97.8 F | RESPIRATION RATE: 18 BRPM | HEIGHT: 62 IN

## 2025-07-02 DIAGNOSIS — G89.29 CHRONIC BILATERAL LOW BACK PAIN WITH BILATERAL SCIATICA: ICD-10-CM

## 2025-07-02 DIAGNOSIS — Z79.899 ENCOUNTER FOR LONG-TERM (CURRENT) USE OF HIGH-RISK MEDICATION: ICD-10-CM

## 2025-07-02 DIAGNOSIS — Z98.1 S/P LUMBAR FUSION: ICD-10-CM

## 2025-07-02 DIAGNOSIS — M47.812 ARTHROPATHY OF CERVICAL FACET JOINT: Primary | ICD-10-CM

## 2025-07-02 DIAGNOSIS — M25.551 GREATER TROCHANTERIC PAIN SYNDROME OF RIGHT LOWER EXTREMITY: ICD-10-CM

## 2025-07-02 DIAGNOSIS — M54.42 CHRONIC BILATERAL LOW BACK PAIN WITH BILATERAL SCIATICA: ICD-10-CM

## 2025-07-02 DIAGNOSIS — M54.41 CHRONIC BILATERAL LOW BACK PAIN WITH BILATERAL SCIATICA: ICD-10-CM

## 2025-07-02 DIAGNOSIS — M16.11 PRIMARY OSTEOARTHRITIS OF RIGHT HIP: Primary | ICD-10-CM

## 2025-07-02 PROCEDURE — 3078F DIAST BP <80 MM HG: CPT | Performed by: NURSE PRACTITIONER

## 2025-07-02 PROCEDURE — 1125F AMNT PAIN NOTED PAIN PRSNT: CPT | Performed by: NURSE PRACTITIONER

## 2025-07-02 PROCEDURE — 3075F SYST BP GE 130 - 139MM HG: CPT | Performed by: NURSE PRACTITIONER

## 2025-07-02 PROCEDURE — 1159F MED LIST DOCD IN RCRD: CPT | Performed by: NURSE PRACTITIONER

## 2025-07-02 PROCEDURE — 99214 OFFICE O/P EST MOD 30 MIN: CPT | Performed by: NURSE PRACTITIONER

## 2025-07-02 PROCEDURE — 1160F RVW MEDS BY RX/DR IN RCRD: CPT | Performed by: NURSE PRACTITIONER

## 2025-07-02 RX ORDER — HYDROCODONE BITARTRATE AND ACETAMINOPHEN 5; 325 MG/1; MG/1
1 TABLET ORAL EVERY 8 HOURS PRN
Qty: 60 TABLET | Refills: 0 | Status: SHIPPED | OUTPATIENT
Start: 2025-07-02

## 2025-07-02 RX ADMIN — LIDOCAINE HYDROCHLORIDE 2 ML: 10 INJECTION, SOLUTION EPIDURAL; INFILTRATION; INTRACAUDAL; PERINEURAL at 11:55

## 2025-07-02 RX ADMIN — METHYLPREDNISOLONE ACETATE 80 MG: 80 INJECTION, SUSPENSION INTRA-ARTICULAR; INTRALESIONAL; INTRAMUSCULAR; SOFT TISSUE at 11:55

## 2025-07-02 NOTE — PROGRESS NOTES
CHIEF COMPLAINT  F/u neck pain- Left C4-C6 medial branch blockade- patient states that she had 90% improvement for 7 hours.        Subjective   Jemima Bell is a 75 y.o. female  who presents to the office for follow-up of procedure.  She completed a Left C4-C6 MBB   on  6/25/2025 performed by Dr. Aranda for management of neck pain. Patient reports 90% relief from the procedure x 7 hours     Today her pain is 3/10VAS in severity.  Unfortunately since her procedure on 6/25/2025 she underwent a cardiac cath on 6/27/2025 performed buy Dr. Littlejohn with the placement of a stent. She is now maintained on Aspirin and Plavix. We will need to cancel her upcoming cervical MBB.     She continues to utilize Norco 5/325 2/day, Lyrica 100 mg nightly, and Robaxin 500 mg 1-2/day PRN. This regimen decreases her pain by 75% and allows her to maintain her functional ADLs including completing her housework. She denies any side effects including somnolence or constipation. ADLs by self.     Hx of a bladder stimulator.  She denies any new B/B incontinence. She denies any saddle anesthesia.      Status post L3-S1 fusion performed by Dr. Rice on 7/30/2024     Not a candidate for MRI d/t bladder stimulator. Not a good candidate for NSAIDs d/t GI upset. Failed Gabapentin (100 mg was not helpful, 300 mg caused side effects)     Procedures:  6/26/2024--SALLIE at C7/T1--75-80% ONGOING relief  3/13/2024-LESI at L4/5-80% relief to her back, only temporary relief to leg pain  11/30/2023-bilateral SI joint injections-90% relief for approximately 3 months  5/12/23-left SI Joint Injection-90% relief x 5.5 months  3/27/23-L5/S1 LESI-100% relief to back pain x 1 week, 80% relief to left leg pain    Back Pain  This is a chronic problem. The current episode started more than 1 year ago. The problem occurs constantly. The problem has been improved since onset. The pain is present in the sacro-iliac and lumbar spine. The quality of the pain  is described as aching and stabbing. The pain does not radiate. The pain is at a severity of 3/10. The pain is The same all the time. The symptoms are aggravated by standing (walking). Pertinent negatives include no abdominal pain, chest pain, dysuria, fever, headaches, numbness or weakness. Risk factors include menopause. She has tried heat, ice and analgesics (PT previously) for the symptoms.   Neck Pain   This is a new problem. The current episode started in the past 7 days. The problem occurs constantly. Progression since onset: improved since last office visit. The pain is associated with nothing. The pain is present in the left side. The quality of the pain is described as shooting, stabbing and burning. The pain is at a severity of 3/10. The symptoms are aggravated by position (movement). Pertinent negatives include no chest pain, fever, headaches, numbness or weakness. She has tried heat, ice, NSAIDs, oral narcotics and muscle relaxants (TENS, Lyrica) for the symptoms.      PEG Assessment   What number best describes your pain on average in the past week?5  What number best describes how, during the past week, pain has interfered with your enjoyment of life?2  What number best describes how, during the past week, pain has interfered with your general activity?  2    The following portions of the patient's history were reviewed and updated as appropriate: allergies, current medications, past family history, past medical history, past social history, past surgical history, and problem list.    Review of Systems   Constitutional:  Positive for activity change (decreased) and fatigue. Negative for chills and fever.   HENT:  Negative for congestion.    Eyes:  Negative for visual disturbance.   Respiratory:  Negative for chest tightness and shortness of breath.    Cardiovascular:  Negative for chest pain.   Gastrointestinal:  Negative for abdominal pain, constipation and diarrhea.   Genitourinary:  Negative for  "difficulty urinating, dyspareunia and dysuria.   Musculoskeletal:  Positive for back pain and neck pain.   Neurological:  Negative for dizziness, weakness, light-headedness, numbness and headaches.   Psychiatric/Behavioral:  Positive for sleep disturbance. Negative for agitation, self-injury and suicidal ideas. The patient is not nervous/anxious.        --  The aforementioned information the Chief Complaint section and above subjective data including any HPI data, and also the Review of Systems data, has been personally reviewed and affirmed.  --    Vitals:    07/02/25 1244   BP: 133/74   Pulse: 62   Temp: 98 °F (36.7 °C)   SpO2: 99%   Weight: 90 kg (198 lb 6.4 oz)   Height: 157.5 cm (62\")   PainSc: 3    PainLoc: Neck     Objective   Physical Exam  Vitals and nursing note reviewed.   Constitutional:       Appearance: Normal appearance. She is well-developed.   Eyes:      General: Lids are normal.   Pulmonary:      Effort: Pulmonary effort is normal.   Musculoskeletal:      Cervical back: Tenderness present. Decreased range of motion.      Lumbar back: Tenderness and bony tenderness present. Decreased range of motion.   Neurological:      Mental Status: She is alert and oriented to person, place, and time.   Psychiatric:         Attention and Perception: Attention normal.         Mood and Affect: Mood normal.         Speech: Speech normal.         Behavior: Behavior normal.         Judgment: Judgment normal.       Assessment & Plan   Diagnoses and all orders for this visit:    1. Arthropathy of cervical facet joint (Primary)    2. S/P lumbar fusion  -     HYDROcodone-acetaminophen (NORCO) 5-325 MG per tablet; Take 1 tablet by mouth Every 8 (Eight) Hours As Needed for Moderate Pain. 30 day supply  Dispense: 60 tablet; Refill: 0    3. Chronic bilateral low back pain with bilateral sciatica  -     HYDROcodone-acetaminophen (NORCO) 5-325 MG per tablet; Take 1 tablet by mouth Every 8 (Eight) Hours As Needed for Moderate " Pain. 30 day supply  Dispense: 60 tablet; Refill: 0    4. Encounter for long-term (current) use of high-risk medication      Jemima Bell reports a pain score of 3.  Given her pain assessment as noted, treatment options were discussed and the following options were decided upon as a follow-up plan to address the patient's pain: continuation of current treatment plan for pain.    --- Cancel upcoming cervical MBB due to inability to hold Plavix at this time.  --- The urine drug screen confirmation from 5/20/2025 has been reviewed and the result is appropriate based on patient history and SANJEEV report  --- CSA and consent to treat with controlled substances signed on 3/14/2025  --- Opioid Risk--Low  --- Refill [place] 5/325. Patient appears stable with current regimen. No adverse effects. Regarding continuation of opioids, there is no evidence of aberrant behavior or any red flags.  The patient continues with appropriate response to opioid therapy. ADL's remain intact by self.   --- Continue Lyrica and Robaxin  --- Follow-up 2 months or sooner if needed.      SANJEEV REPORT  As part of the patient's treatment plan, I am prescribing controlled substances. The patient has been made aware of appropriate use of such medications, including potential risk of somnolence, limited ability to drive and/or work safely, and the potential for dependence or overdose. It has also been made clear that these medications are for use by this patient only, without concomitant use of alcohol or other substances unless prescribed.     Patient has completed prescribing agreement detailing terms of continued prescribing of controlled substances, including monitoring SANJEEV reports, urine drug screening, and pill counts if necessary. The patient is aware that inappropriate use will results in cessation of prescribing such medications.    As the clinician, I personally reviewed the SANJEEV from 7/2/2025 while the patient was in the  office today.    History and physical exam exhibit continued safe and appropriate use of controlled substances.    Dictated utilizing Dragon dictation.

## 2025-07-02 NOTE — PROGRESS NOTES
"Chief Complaint   Patient presents with    Right Hip - Pain, Follow-up     Follow up for right hip.       History of Present Illness  Jemima is a 75 y.o. year old female patient of Dr. Son with previously diagnosed osteoarthritis here today for follow-up of right hip pain. Was last for intra-articular cortisone injection on 5/14/25. Had previously undergone lateral hip injection performed by Dr. Son on 4/7/25.   History of Present Illness  She reports a slight improvement in her condition, but it is not significant. She received an injection from Dr. Mckeon approximately 3 months ago, which provided some relief of lateral hip pain, but the pain has since returned. She also experiences occasional groin pain, however the recent joint injection helped with that pain. She has been managing the pain with medication as needed. She reports no recent injuries or falls affecting her hip and does not experience any back pain. She was scheduled to start physical therapy for her hip but was unable to due to recent heart issues.    She was having episodes of dyspnea recently. Was finally taken for cardiac cath and noted to have a 90% blockage. Is still recovering from procedure but feels her breathing has significantly improved. She is now on atorvastatin and clopidogrel. She will see her cardiologist soon and thinks the plan is to start cardiac rehab.        Temp 97.8 °F (36.6 °C) (Temporal)   Resp 18   Ht 157.5 cm (62\")   Wt 90.4 kg (199 lb 6.4 oz)   LMP  (LMP Unknown)   BMI 36.47 kg/m²        Physical Exam  MSK Exam:  The right hip and pelvis are without obvious signs of acute bony deformity, obliquity, swelling, erythema, ecchymosis or instability. There is lateral tenderness at the greater trochanter and gluteus medius which she states reproduces her pain. Active and passive range of motion are limited and painful. Straight leg raise test is negative. LU and FADIR are positive for mild inguinal pain, " "different than current complaint.        - Large Joint Arthrocentesis: R greater trochanteric bursa on 7/2/2025 11:55 AM  Indications: pain  Details: 22 G (3.5\") needle, ultrasound-guided lateral approach  Medications: 2 mL lidocaine PF 1% 1 %; 80 mg methylPREDNISolone acetate 80 MG/ML  Outcome: tolerated well, no immediate complications  Procedure, treatment alternatives, risks and benefits explained, specific risks discussed. Consent was given by the patient. Immediately prior to procedure a time out was called to verify the correct patient, procedure, equipment, support staff and site/side marked as required. Patient was prepped and draped in the usual sterile fashion.           Assessment and Plan  Diagnoses and all orders for this visit:    1. Primary osteoarthritis of right hip (Primary)    2. Greater trochanteric pain syndrome of right lower extremity  -     - Large Joint Arthrocentesis    Jemima is a 75 y.o. year old female patient of Dr. Son with previously diagnosed osteoarthritis here today for follow-up of right hip pain. Was last for intra-articular cortisone injection on 5/14/25. Had previously undergone lateral hip injection performed by Dr. Son on 4/7/25.    Assessment & Plan  1. Hip pain:  The hip pain is likely due to a combination of muscle irritation and arthritis. Previous injections provided some relief as expected based on location, but her lateral hip pain has returned. The injection administered by Dr. Mckeon helped with pain out to the side, while the injection into the hip joint helped with groin pain. No new injuries or falls have occurred. Physical therapy could be beneficial in the long term, but it should be postponed until cardiac clearance is obtained.    The main risk associated with lateral hip steroid injections is potential damage to the soft tissues if done too frequently. Decision was made to proceed. The procedure was well tolerated. She has been educated on the " signs and symptoms of local reaction and infection and should call the office if she experiences any of these. She should take it easy for the next 24 to 48 hours and ice as needed. After that, she may return to normal activity. It was recommended to continue low impact activity. She may continue with previously recommended supportive measures.     If cardiac clearance is obtained, dedicated hip therapy will be recommended to strengthen the muscles and support the joint.  Will follow-up as needed.      All of her questions were answered and she is agreeable with the plan.    Dictated utilizing Dragon dictation.  Patient or patient representative verbalized consent for the use of Ambient Listening during the visit with  Mckenzie Patel DO for chart documentation. 7/2/2025  11:32 EDT

## 2025-07-06 RX ORDER — LIDOCAINE HYDROCHLORIDE 10 MG/ML
2 INJECTION, SOLUTION EPIDURAL; INFILTRATION; INTRACAUDAL; PERINEURAL
Status: COMPLETED | OUTPATIENT
Start: 2025-07-02 | End: 2025-07-02

## 2025-07-06 RX ORDER — METHYLPREDNISOLONE ACETATE 80 MG/ML
80 INJECTION, SUSPENSION INTRA-ARTICULAR; INTRALESIONAL; INTRAMUSCULAR; SOFT TISSUE
Status: COMPLETED | OUTPATIENT
Start: 2025-07-02 | End: 2025-07-02

## 2025-07-14 NOTE — PROGRESS NOTES
Subjective:        Jemima Bell is a 75 y.o. female who here for follow up    No chief complaint on file.      HPI    Jemima Bell is a 75 year old female who is here for hospital follow up status post cardiac catheterization with stent placement to proximal LAD on 6/27/25, full report as below. She has a history of GERD, hypothyroidism, history of PE, heart murmur, hypertension, bladder stimulator, dizziness/bradycardia, and osteoarthritis.       6/27/25 normal left main. Ef  approximately 50 %, LAD proximal plaque rupture 90 % stenosis reduced to 0 % with 3.5/18 xience stent, minimum irregularity of the rest of the LAD diagonal and  branches. Circumflex is nondominant and normal. RCA is dominant and normal. Intravascular ultrasound showed well expanded well opposed stent with no edge dissection.     4/2025 hospitalized with dizziness and bradycardia with decrease of metoprolol XL and she had improvement with her symptoms.     6/16/25 stress test was a normal study.    3/18/25 echo with ef 61-65 %, and normal LV function.    The following portions of the patient's history were reviewed and updated as appropriate: allergies, current medications, past family history, past medical history, past social history, past surgical history and problem list.    Past Medical History:   Diagnosis Date    Acromioclavicular separation     Acute bronchitis     Acute sinusitis     Allergic 11/2022    Shrimp    Allergic rhinitis     Anemia Childhood    Anxiety     Arthritis     Arthritis of back     Arthritis of neck     Arthropathy of cervical facet joint 03/14/2025    Asthma     Back pain     BMI 34.0-34.9,adult     Bronchitis, chronic 2017-19    Bursitis of hip     Cataract     Cervical disc disorder     Cervicalgia     Chills     Cholelithiasis 2004?    Remival    Chronic diarrhea     Chronic kidney disease     Chronic pain disorder     Cluster headache     Colon polyp 12/2022    Deep vein  thrombosis (DVT) of lower extremity     Depression     Dermatitis     Dislocation, shoulder     Dyspnea     Dysuria     Esophageal reflux     Extremity pain     Fatigue     Frozen shoulder     Gastric ulcer     GERD (gastroesophageal reflux disease)     GI (gastrointestinal bleed) 2004?    Headache     Headache, tension-type     Heart murmur 1973    Hernia     Hiatal hernia 2007    Hip arthrosis     Hypertension     Hypothyroidism     Irritable bowel syndrome     Low back strain     Lumbago     Lumbar stenosis with neurogenic claudication 05/01/2024    Lumbosacral disc disease     Migraine     Nausea     Neck strain     Neuritis     Osteoarthritis     Osteoarthritis of knee     Periarthritis of shoulder     Plantar fasciitis     Pneumonia 89y    Pulled muscle     Pulmonary embolism     Pyelonephritis     Rheumatic fever     Sore throat     Torticollis     Trapezius strain     Urinary incontinence     Urinary pain     Urinary tract infection     UTI symptoms     Vertigo 03/2025    Vitamin B1 deficiency     Vitamin B12 deficiency     Vitamin D deficiency     Weakness     Wheezing          reports that she has never smoked. She has never been exposed to tobacco smoke. She has never used smokeless tobacco. She reports that she does not drink alcohol and does not use drugs.     Family History   Problem Relation Age of Onset    Arthritis Mother     Depression Mother     Hyperlipidemia Mother     Hypertension Mother     Irritable bowel syndrome Mother     Dislocations Mother     Heart attack Mother     Heart failure Mother     Hypertension Father     Arthritis Father     Stroke Father     Anemia Father     Alcohol abuse Maternal Grandfather     Depression Maternal Grandfather     Heart disease Other         IN FEMALES BEFORE AGE 65    Asthma Maternal Grandmother     Asthma Paternal Grandfather     Heart attack Maternal Uncle     Heart attack Paternal Uncle     Heart failure Paternal Uncle             Objective:            Vitals and nursing note reviewed.   Constitutional:       Appearance: Well-developed.   HENT:      Head: Normocephalic.      Right Ear: External ear normal.      Left Ear: External ear normal.   Neck:      Vascular: No JVD.   Pulmonary:      Effort: Pulmonary effort is normal. No respiratory distress.      Breath sounds: Normal breath sounds. No stridor. No rales.   Cardiovascular:      Normal rate. Regular rhythm.      No gallop.       Comments: Right radial site with some bruising.  Pulses:     Intact distal pulses.   Edema:     Peripheral edema absent.   Abdominal:      General: Bowel sounds are normal. There is no distension.      Palpations: Abdomen is soft.      Tenderness: There is no abdominal tenderness. There is no guarding.   Musculoskeletal: Normal range of motion.         General: No tenderness.      Cervical back: Normal range of motion. Skin:     General: Skin is warm.   Neurological:      Mental Status: Alert and oriented to person, place, and time.      Deep Tendon Reflexes: Reflexes are normal and symmetric.   Psychiatric:         Judgment: Judgment normal.         Procedures         6/27/25   HEMODYNAMIC / ANGIOGRAPHIC DATA:     Left ventricular end diastolic pressure was 10 mmHg.  Left ventriculography revealed an EF around 50%.   The left main is normal left main.  The left anterior descending artery is left anterior descending proximal plaque rupture 90% stenosis reduced to 0% with 3.5/18 Xience stent, minimum irregularity of the rest of the LAD diagonal and  branches.  The left circumflex is nondominant and normal.  The right coronary artery is dominant and normal.  Successful stenting of the proximal left anterior descending coronary artery, with reduction of stenosis from 80-90% to 0%.    KARLI FLOW PRE...2.... POST...3...    TYPE OF LESION.......B......      Intravascular ultrasound shows well-expanded well opposed stent with no edge dissection     Right heart pressures PA  pressures of 35/10 suggestive of mild pulmonary hypertension, wedge of 10 RV pressures of 35/2      RECOMMENDATIONS: Post-procedure care will focus on prevention of any ischemic events and congestive complications. Aggressive risk factor modification will be carried out.  Importance of taking dual antiplatelets for one year has been explained, risk of stent thrombosis leading to the acute MI, which carries high morbidity and mortality has been explained    Discontinuation or interruptions of these medications should be under the strict guidance of appropriate health professional     6/16/25 stress test    Interpretation Summary         Findings consistent with a normal ECG stress test.    Myocardial perfusion imaging indicates a normal myocardial perfusion study with no evidence of ischemia. Impressions are consistent with a low risk study.    Left ventricular ejection fraction is normal (Calculated EF = 67%).    Compared to the prior study from 6/12/2024     Asymptomatic for chest pain. ECG is negative for ischemia. Patient has bladder stimulator   Ectopy: None  B/P is appropriate.   Supervised by: Dr. Littlejohn  Pharmacologic study due to inability to tolerate increasing speed and grade of treadmill due to orthopedic, mobility  issues back surgery.     3/18/25 carotid u/s    Interpretation Summary         Right internal carotid artery demonstrates normal flow without evidence of hemodynamically significant stenosis.    Antegrade right vertebral flow.    Left internal carotid artery demonstrates normal flow without evidence of hemodynamically significant stenosis.    Antegrade left vertebral flow.         3/18/25 echo      Interpretation Summary         Left ventricular ejection fraction appears to be 61 - 65%.    Left ventricular diastolic function was normal.    Estimated right ventricular systolic pressure from tricuspid regurgitation is normal (<35 mmHg).         Current Outpatient Medications:     albuterol  sulfate  (90 Base) MCG/ACT inhaler, Inhale 2 puffs Every 4 (Four) Hours As Needed for Shortness of Air or Wheezing., Disp: , Rfl:     aspirin 81 MG EC tablet, Take 1 tablet by mouth Daily., Disp: 90 tablet, Rfl: 1    atorvastatin (LIPITOR) 40 MG tablet, Take 1 tablet by mouth Daily., Disp: 30 tablet, Rfl: 11    clopidogrel (PLAVIX) 75 MG tablet, Take 1 tablet by mouth Daily., Disp: 90 tablet, Rfl: 3    fluticasone (FLONASE) 50 MCG/ACT nasal spray, 2 sprays into the nostril(s) as directed by provider Daily., Disp: 48 g, Rfl: 3    HYDROcodone-acetaminophen (NORCO) 5-325 MG per tablet, Take 1 tablet by mouth Every 8 (Eight) Hours As Needed for Moderate Pain. 30 day supply, Disp: 60 tablet, Rfl: 0    hydrOXYzine (ATARAX) 10 MG tablet, Take 1 tablet by mouth Every 4 (Four) Hours As Needed for Anxiety (twitching)., Disp: 120 tablet, Rfl: 1    Ibuprofen 3 %, Gabapentin 10 %, Baclofen 2 %, lidocaine 4 %, Ketamine HCl 4 %, Apply 1-2 g topically to the appropriate area as directed 3 (Three) to 4 (Four) times daily., Disp: 90 g, Rfl: 0    levothyroxine (SYNTHROID, LEVOTHROID) 100 MCG tablet, TAKE ONE TABLET BY MOUTH EVERY MORNING, Disp: 90 tablet, Rfl: 3    lidocaine (LIDODERM) 5 %, Place 1 patch on the skin as directed by provider Daily. Remove & Discard patch within 12 hours or as directed by MD, Disp: 30 each, Rfl: 0    methocarbamol (ROBAXIN) 500 MG tablet, Take 1 tablet by mouth 3 (Three) Times a Day As Needed for Muscle Spasms., Disp: 90 tablet, Rfl: 1    metoprolol succinate XL (TOPROL-XL) 25 MG 24 hr tablet, Take 0.5 tablets by mouth Daily., Disp: 30 tablet, Rfl: 5    ondansetron (Zofran) 4 MG tablet, Take 1 tablet by mouth Every 8 (Eight) Hours As Needed for Nausea or Vomiting., Disp: 15 tablet, Rfl: 2    pantoprazole (PROTONIX) 40 MG EC tablet, Take 1 tablet by mouth 2 (Two) Times a Day., Disp: 180 tablet, Rfl: 3    pregabalin (LYRICA) 50 MG capsule, Take 1 capsule by mouth 2 (Two) Times a Day., Disp: 60  capsule, Rfl: 1    promethazine (PHENERGAN) 25 MG tablet, Take 1 tablet by mouth Every 6 (Six) Hours As Needed for Nausea or Vomiting., Disp: 20 tablet, Rfl: 1    sertraline (ZOLOFT) 100 MG tablet, Take 0.5 tablets by mouth Daily., Disp: , Rfl:     vitamin B-12 (CYANOCOBALAMIN) 1000 MCG tablet, Take 1 tablet by mouth Daily., Disp: , Rfl:     vitamin D3 125 MCG (5000 UT) capsule capsule, Take 1 capsule by mouth Daily., Disp: , Rfl:      Assessment:        Patient Active Problem List   Diagnosis    Wheezing    Osteoarthritis    Acute bronchitis    Dyspnea    Hypothyroidism    Shortness of breath    Vitamin D deficiency    Depression with anxiety    Muscle cramp    Back pain    Vitamin B12 deficiency    UTI (urinary tract infection)    Abnormal EKG    Precordial pain    Anxiety    Borderline systolic HTN    Left upper quadrant abdominal pain    Diarrhea    Gastroesophageal reflux disease    History of Nissen fundoplication    Incontinence of feces with fecal urgency    Foraminal stenosis of lumbar region    Lumbar degenerative disc disease    Lumbar facet arthropathy    Lumbar radiculopathy    Sacroiliac joint dysfunction of both sides    Lumbar stenosis with neurogenic claudication    Cervical radiculopathy    Neck pain    Heart murmur    Preop cardiovascular exam    Arthropathy of cervical facet joint    Primary hypertension    Chest pain               Plan:     Assessment/Plan:  1.  Hospital follow up for cad s/p cardiac cath on 6/27/25 with stent to proximal LAD  a. No chest pain and cardiac cath site shows no s/s of infection or hematoma.   b.  Recent labs showed creatinine, ast/alt wnl.    c.  Continue metoprolol succinate 12.5 mg daily, crestor 20 mg daily, aspirin 81 mg daily, and plavix 75 mg daily.     The importance of taking dual antiplatelets for one year  has been explained, risk of stent thrombosis leading to the acute MI, which carries high morbidity and mortality has been explained. Discontinuation or  interruptions of these medications should be under the strict guidance of appropriate health professional.    Risk reduction for the coronary artery disease, controlling the blood pressure, blood sugar management, cholesterol management, exercise, stress management, and proper compliance with medications and follow-up has been discussed      2.   hypertension   a.  Today blood pressure is and HR is stable.     b. Continue metoprolol succinate XL 12.5 mg daily.       Educated patient on exercising for at least 30 minutes a day for 2 to 3 days a week. Importance of controlling hypertension and blood pressure checkup on the regular basis has been explained. Hypertension as a silent killer has been discussed. Risk reduction of the weight and regular exercises to control the hypertension has been explained.    3.  hyperlipidemia  a.  Ast/alt wnl. Lipid panel on 12/17/24 showed , HDL 74, , and triglycerides 143.    b.  switch atorvastatin to Crestor due severe muscle aches.        Risk of the hyperlipidemia, importance of the treatment has been explained. Pros and cons of the statins has been explained. Regular blood workup as well as side effects including the liver failure, myelopathy death has been explained.           No diagnosis found.    There are no diagnoses linked to this encounter.    COUNSELING: isabel Spiveyounseling was given to patient for the following topics: diagnostic results, risk factor reductions, impressions, risks and benefits of treatment options and importance of treatment compliance .       SMOKING COUNSELING: denies     -switch atorvastatin to Crestor due severe muscle aches. Refill BB and plavix.  -functional stress test for cardiac rehab. If ok then will follow up in 6 months, unless she needs to be seen sooner.    Sincerely,   MACHO Sumner  [facility]  07/16/25  08:56 EDT    EMR Dragon/Transcription disclaimer: Dictated utilizing  V[employer] Dictation

## 2025-07-15 ENCOUNTER — OFFICE VISIT (OUTPATIENT)
Dept: CARDIAC REHAB | Facility: HOSPITAL | Age: 75
End: 2025-07-15
Payer: MEDICARE

## 2025-07-15 DIAGNOSIS — Z95.5 STATUS POST INSERTION OF DRUG-ELUTING STENT INTO LEFT ANTERIOR DESCENDING (LAD) ARTERY: Primary | ICD-10-CM

## 2025-07-15 PROCEDURE — 93798 PHYS/QHP OP CAR RHAB W/ECG: CPT

## 2025-07-16 ENCOUNTER — TREATMENT (OUTPATIENT)
Dept: CARDIAC REHAB | Facility: HOSPITAL | Age: 75
End: 2025-07-16
Payer: MEDICARE

## 2025-07-16 ENCOUNTER — OFFICE VISIT (OUTPATIENT)
Dept: CARDIOLOGY | Facility: CLINIC | Age: 75
End: 2025-07-16
Payer: MEDICARE

## 2025-07-16 VITALS
HEART RATE: 54 BPM | BODY MASS INDEX: 36.4 KG/M2 | SYSTOLIC BLOOD PRESSURE: 128 MMHG | WEIGHT: 197.8 LBS | DIASTOLIC BLOOD PRESSURE: 73 MMHG | HEIGHT: 62 IN

## 2025-07-16 DIAGNOSIS — Z95.5 STATUS POST INSERTION OF DRUG-ELUTING STENT INTO LEFT ANTERIOR DESCENDING (LAD) ARTERY: Primary | ICD-10-CM

## 2025-07-16 DIAGNOSIS — Z95.5 S/P DRUG ELUTING CORONARY STENT PLACEMENT: ICD-10-CM

## 2025-07-16 DIAGNOSIS — I25.10 CORONARY ARTERY DISEASE INVOLVING NATIVE CORONARY ARTERY OF NATIVE HEART WITHOUT ANGINA PECTORIS: Primary | ICD-10-CM

## 2025-07-16 DIAGNOSIS — Z98.890 STATUS POST CARDIAC CATHETERIZATION: ICD-10-CM

## 2025-07-16 DIAGNOSIS — I10 PRIMARY HYPERTENSION: ICD-10-CM

## 2025-07-16 PROCEDURE — 93798 PHYS/QHP OP CAR RHAB W/ECG: CPT

## 2025-07-16 RX ORDER — CLOPIDOGREL BISULFATE 75 MG/1
75 TABLET ORAL DAILY
Qty: 90 TABLET | Refills: 3 | Status: SHIPPED | OUTPATIENT
Start: 2025-07-16

## 2025-07-16 RX ORDER — ROSUVASTATIN CALCIUM 20 MG/1
20 TABLET, COATED ORAL DAILY
Qty: 30 TABLET | Refills: 3 | Status: SHIPPED | OUTPATIENT
Start: 2025-07-16

## 2025-07-16 RX ORDER — METOPROLOL SUCCINATE 25 MG/1
12.5 TABLET, EXTENDED RELEASE ORAL
Qty: 45 TABLET | Refills: 3 | Status: SHIPPED | OUTPATIENT
Start: 2025-07-16

## 2025-07-18 ENCOUNTER — TREATMENT (OUTPATIENT)
Dept: CARDIAC REHAB | Facility: HOSPITAL | Age: 75
End: 2025-07-18
Payer: MEDICARE

## 2025-07-18 DIAGNOSIS — Z95.5 STATUS POST INSERTION OF DRUG-ELUTING STENT INTO LEFT ANTERIOR DESCENDING (LAD) ARTERY: Primary | ICD-10-CM

## 2025-07-18 PROCEDURE — 93798 PHYS/QHP OP CAR RHAB W/ECG: CPT

## 2025-07-21 ENCOUNTER — TREATMENT (OUTPATIENT)
Dept: CARDIAC REHAB | Facility: HOSPITAL | Age: 75
End: 2025-07-21
Payer: MEDICARE

## 2025-07-21 DIAGNOSIS — Z95.5 STATUS POST INSERTION OF DRUG-ELUTING STENT INTO LEFT ANTERIOR DESCENDING (LAD) ARTERY: Primary | ICD-10-CM

## 2025-07-21 PROCEDURE — 93798 PHYS/QHP OP CAR RHAB W/ECG: CPT

## 2025-07-23 ENCOUNTER — TREATMENT (OUTPATIENT)
Dept: CARDIAC REHAB | Facility: HOSPITAL | Age: 75
End: 2025-07-23
Payer: MEDICARE

## 2025-07-23 DIAGNOSIS — Z95.5 STATUS POST INSERTION OF DRUG-ELUTING STENT INTO LEFT ANTERIOR DESCENDING (LAD) ARTERY: Primary | ICD-10-CM

## 2025-07-23 PROCEDURE — 93798 PHYS/QHP OP CAR RHAB W/ECG: CPT

## 2025-07-25 ENCOUNTER — TREATMENT (OUTPATIENT)
Dept: CARDIAC REHAB | Facility: HOSPITAL | Age: 75
End: 2025-07-25
Payer: MEDICARE

## 2025-07-25 ENCOUNTER — HOSPITAL ENCOUNTER (OUTPATIENT)
Dept: CARDIOLOGY | Facility: HOSPITAL | Age: 75
Discharge: HOME | End: 2025-07-25
Payer: MEDICARE

## 2025-07-25 DIAGNOSIS — Z95.5 S/P DRUG ELUTING CORONARY STENT PLACEMENT: ICD-10-CM

## 2025-07-25 DIAGNOSIS — I25.10 CORONARY ARTERY DISEASE INVOLVING NATIVE CORONARY ARTERY OF NATIVE HEART WITHOUT ANGINA PECTORIS: ICD-10-CM

## 2025-07-25 DIAGNOSIS — Z98.890 STATUS POST CARDIAC CATHETERIZATION: ICD-10-CM

## 2025-07-25 DIAGNOSIS — Z95.5 STATUS POST INSERTION OF DRUG-ELUTING STENT INTO LEFT ANTERIOR DESCENDING (LAD) ARTERY: Primary | ICD-10-CM

## 2025-07-25 LAB
BH CV STRESS BP STAGE 1: NORMAL
BH CV STRESS BP STAGE 2: NORMAL
BH CV STRESS DURATION MIN STAGE 1: 3
BH CV STRESS DURATION MIN STAGE 2: 3
BH CV STRESS DURATION SEC STAGE 1: 0
BH CV STRESS DURATION SEC STAGE 2: 0
BH CV STRESS GRADE STAGE 1: 0
BH CV STRESS GRADE STAGE 2: 5
BH CV STRESS HR STAGE 1: 156
BH CV STRESS HR STAGE 2: 124
BH CV STRESS METS STAGE 1: 2.3
BH CV STRESS METS STAGE 2: 3.5
BH CV STRESS PROTOCOL 1: NORMAL
BH CV STRESS RECOVERY BP: 62 MMHG
BH CV STRESS RECOVERY HR: 2 BPM
BH CV STRESS SPEED STAGE 1: 1.7
BH CV STRESS SPEED STAGE 2: 1.7
BH CV STRESS STAGE 1: 1
BH CV STRESS STAGE 2: 2
MAXIMAL PREDICTED HEART RATE: 144 BPM
PERCENT MAX PREDICTED HR: 109.72 %
STRESS BASELINE BP: NORMAL MMHG
STRESS BASELINE HR: 62 BPM
STRESS O2 SAT REST: 95 %
STRESS PERCENT HR: 129 %
STRESS POST ESTIMATED WORKLOAD: 3.5 METS
STRESS POST EXERCISE DUR MIN: 6 MIN
STRESS POST PEAK BP: NORMAL MMHG
STRESS POST PEAK HR: 158 BPM
STRESS TARGET HR: 122 BPM

## 2025-07-25 PROCEDURE — 93798 PHYS/QHP OP CAR RHAB W/ECG: CPT

## 2025-07-25 PROCEDURE — 93017 CV STRESS TEST TRACING ONLY: CPT

## 2025-07-28 ENCOUNTER — TREATMENT (OUTPATIENT)
Dept: CARDIAC REHAB | Facility: HOSPITAL | Age: 75
End: 2025-07-28
Payer: MEDICARE

## 2025-07-28 DIAGNOSIS — Z95.5 STATUS POST INSERTION OF DRUG-ELUTING STENT INTO LEFT ANTERIOR DESCENDING (LAD) ARTERY: Primary | ICD-10-CM

## 2025-07-28 PROCEDURE — 93798 PHYS/QHP OP CAR RHAB W/ECG: CPT

## 2025-07-30 ENCOUNTER — TREATMENT (OUTPATIENT)
Dept: CARDIAC REHAB | Facility: HOSPITAL | Age: 75
End: 2025-07-30
Payer: MEDICARE

## 2025-07-30 DIAGNOSIS — Z95.5 STATUS POST INSERTION OF DRUG-ELUTING STENT INTO LEFT ANTERIOR DESCENDING (LAD) ARTERY: Primary | ICD-10-CM

## 2025-07-30 PROCEDURE — 93798 PHYS/QHP OP CAR RHAB W/ECG: CPT

## 2025-08-01 ENCOUNTER — TREATMENT (OUTPATIENT)
Dept: CARDIAC REHAB | Facility: HOSPITAL | Age: 75
End: 2025-08-01
Payer: MEDICARE

## 2025-08-01 DIAGNOSIS — Z95.5 STATUS POST INSERTION OF DRUG-ELUTING STENT INTO LEFT ANTERIOR DESCENDING (LAD) ARTERY: Primary | ICD-10-CM

## 2025-08-01 PROCEDURE — 93798 PHYS/QHP OP CAR RHAB W/ECG: CPT

## 2025-08-04 ENCOUNTER — TREATMENT (OUTPATIENT)
Dept: CARDIAC REHAB | Facility: HOSPITAL | Age: 75
End: 2025-08-04
Payer: MEDICARE

## 2025-08-04 DIAGNOSIS — Z95.5 STATUS POST INSERTION OF DRUG-ELUTING STENT INTO LEFT ANTERIOR DESCENDING (LAD) ARTERY: Primary | ICD-10-CM

## 2025-08-04 PROCEDURE — 93798 PHYS/QHP OP CAR RHAB W/ECG: CPT

## 2025-08-06 ENCOUNTER — TREATMENT (OUTPATIENT)
Dept: CARDIAC REHAB | Facility: HOSPITAL | Age: 75
End: 2025-08-06
Payer: MEDICARE

## 2025-08-06 DIAGNOSIS — Z95.5 STATUS POST INSERTION OF DRUG-ELUTING STENT INTO LEFT ANTERIOR DESCENDING (LAD) ARTERY: Primary | ICD-10-CM

## 2025-08-06 PROCEDURE — 93798 PHYS/QHP OP CAR RHAB W/ECG: CPT

## 2025-08-08 ENCOUNTER — TREATMENT (OUTPATIENT)
Dept: CARDIAC REHAB | Facility: HOSPITAL | Age: 75
End: 2025-08-08
Payer: MEDICARE

## 2025-08-08 DIAGNOSIS — Z95.5 STATUS POST INSERTION OF DRUG-ELUTING STENT INTO LEFT ANTERIOR DESCENDING (LAD) ARTERY: Primary | ICD-10-CM

## 2025-08-08 PROCEDURE — 93798 PHYS/QHP OP CAR RHAB W/ECG: CPT

## 2025-08-11 ENCOUNTER — TREATMENT (OUTPATIENT)
Dept: CARDIAC REHAB | Facility: HOSPITAL | Age: 75
End: 2025-08-11
Payer: MEDICARE

## 2025-08-11 DIAGNOSIS — Z95.5 STATUS POST INSERTION OF DRUG-ELUTING STENT INTO LEFT ANTERIOR DESCENDING (LAD) ARTERY: Primary | ICD-10-CM

## 2025-08-11 PROCEDURE — 93798 PHYS/QHP OP CAR RHAB W/ECG: CPT

## 2025-08-13 ENCOUNTER — PATIENT MESSAGE (OUTPATIENT)
Dept: FAMILY MEDICINE CLINIC | Facility: CLINIC | Age: 75
End: 2025-08-13
Payer: MEDICARE

## 2025-08-13 ENCOUNTER — TREATMENT (OUTPATIENT)
Dept: CARDIAC REHAB | Facility: HOSPITAL | Age: 75
End: 2025-08-13
Payer: MEDICARE

## 2025-08-13 DIAGNOSIS — Z98.1 S/P LUMBAR FUSION: ICD-10-CM

## 2025-08-13 DIAGNOSIS — M54.41 CHRONIC BILATERAL LOW BACK PAIN WITH BILATERAL SCIATICA: ICD-10-CM

## 2025-08-13 DIAGNOSIS — G89.29 CHRONIC BILATERAL LOW BACK PAIN WITH BILATERAL SCIATICA: ICD-10-CM

## 2025-08-13 DIAGNOSIS — M54.42 CHRONIC BILATERAL LOW BACK PAIN WITH BILATERAL SCIATICA: ICD-10-CM

## 2025-08-13 DIAGNOSIS — Z95.5 STATUS POST INSERTION OF DRUG-ELUTING STENT INTO LEFT ANTERIOR DESCENDING (LAD) ARTERY: Primary | ICD-10-CM

## 2025-08-13 PROCEDURE — 93798 PHYS/QHP OP CAR RHAB W/ECG: CPT

## 2025-08-13 RX ORDER — HYDROCODONE BITARTRATE AND ACETAMINOPHEN 5; 325 MG/1; MG/1
1 TABLET ORAL EVERY 8 HOURS PRN
Qty: 60 TABLET | Refills: 0 | Status: SHIPPED | OUTPATIENT
Start: 2025-08-13

## 2025-08-13 RX ORDER — PREGABALIN 50 MG/1
50 CAPSULE ORAL 2 TIMES DAILY
Qty: 60 CAPSULE | Refills: 1 | Status: SHIPPED | OUTPATIENT
Start: 2025-08-13

## 2025-08-14 ENCOUNTER — PATIENT MESSAGE (OUTPATIENT)
Dept: FAMILY MEDICINE CLINIC | Facility: CLINIC | Age: 75
End: 2025-08-14
Payer: MEDICARE

## 2025-08-14 ENCOUNTER — OFFICE VISIT (OUTPATIENT)
Dept: CARDIOLOGY | Facility: CLINIC | Age: 75
End: 2025-08-14
Payer: MEDICARE

## 2025-08-14 VITALS
HEIGHT: 62 IN | DIASTOLIC BLOOD PRESSURE: 76 MMHG | SYSTOLIC BLOOD PRESSURE: 112 MMHG | HEART RATE: 67 BPM | BODY MASS INDEX: 36.07 KG/M2 | WEIGHT: 196 LBS

## 2025-08-14 DIAGNOSIS — R06.02 SHORTNESS OF BREATH: ICD-10-CM

## 2025-08-14 DIAGNOSIS — I10 PRIMARY HYPERTENSION: Primary | ICD-10-CM

## 2025-08-14 PROCEDURE — 3078F DIAST BP <80 MM HG: CPT | Performed by: INTERNAL MEDICINE

## 2025-08-14 PROCEDURE — 99213 OFFICE O/P EST LOW 20 MIN: CPT | Performed by: INTERNAL MEDICINE

## 2025-08-14 PROCEDURE — 3074F SYST BP LT 130 MM HG: CPT | Performed by: INTERNAL MEDICINE

## 2025-08-15 ENCOUNTER — TREATMENT (OUTPATIENT)
Dept: CARDIAC REHAB | Facility: HOSPITAL | Age: 75
End: 2025-08-15
Payer: MEDICARE

## 2025-08-15 DIAGNOSIS — Z95.5 STATUS POST INSERTION OF DRUG-ELUTING STENT INTO LEFT ANTERIOR DESCENDING (LAD) ARTERY: Primary | ICD-10-CM

## 2025-08-15 PROCEDURE — 93798 PHYS/QHP OP CAR RHAB W/ECG: CPT

## 2025-08-18 ENCOUNTER — TREATMENT (OUTPATIENT)
Dept: CARDIAC REHAB | Facility: HOSPITAL | Age: 75
End: 2025-08-18
Payer: MEDICARE

## 2025-08-18 DIAGNOSIS — Z95.5 STATUS POST INSERTION OF DRUG-ELUTING STENT INTO LEFT ANTERIOR DESCENDING (LAD) ARTERY: Primary | ICD-10-CM

## 2025-08-18 PROCEDURE — 93798 PHYS/QHP OP CAR RHAB W/ECG: CPT

## 2025-08-18 PROCEDURE — 93797 PHYS/QHP OP CAR RHAB WO ECG: CPT

## 2025-08-20 ENCOUNTER — TREATMENT (OUTPATIENT)
Dept: CARDIAC REHAB | Facility: HOSPITAL | Age: 75
End: 2025-08-20
Payer: MEDICARE

## 2025-08-20 DIAGNOSIS — Z95.5 STATUS POST INSERTION OF DRUG-ELUTING STENT INTO LEFT ANTERIOR DESCENDING (LAD) ARTERY: Primary | ICD-10-CM

## 2025-08-20 PROCEDURE — 93798 PHYS/QHP OP CAR RHAB W/ECG: CPT

## 2025-08-22 ENCOUNTER — TREATMENT (OUTPATIENT)
Dept: CARDIAC REHAB | Facility: HOSPITAL | Age: 75
End: 2025-08-22
Payer: MEDICARE

## 2025-08-22 DIAGNOSIS — Z95.5 STATUS POST INSERTION OF DRUG-ELUTING STENT INTO LEFT ANTERIOR DESCENDING (LAD) ARTERY: Primary | ICD-10-CM

## 2025-08-22 PROCEDURE — 93798 PHYS/QHP OP CAR RHAB W/ECG: CPT

## 2025-08-24 ENCOUNTER — PATIENT MESSAGE (OUTPATIENT)
Dept: FAMILY MEDICINE CLINIC | Facility: CLINIC | Age: 75
End: 2025-08-24
Payer: MEDICARE

## 2025-08-25 ENCOUNTER — OFFICE VISIT (OUTPATIENT)
Dept: FAMILY MEDICINE CLINIC | Facility: CLINIC | Age: 75
End: 2025-08-25
Payer: MEDICARE

## 2025-08-25 VITALS
BODY MASS INDEX: 36.51 KG/M2 | SYSTOLIC BLOOD PRESSURE: 122 MMHG | HEIGHT: 62 IN | TEMPERATURE: 98.3 F | DIASTOLIC BLOOD PRESSURE: 70 MMHG | WEIGHT: 198.4 LBS | HEART RATE: 64 BPM | OXYGEN SATURATION: 98 %

## 2025-08-25 DIAGNOSIS — N10 ACUTE PYELONEPHRITIS: Primary | ICD-10-CM

## 2025-08-25 DIAGNOSIS — R53.83 OTHER FATIGUE: ICD-10-CM

## 2025-08-25 DIAGNOSIS — R73.03 PREDIABETES: ICD-10-CM

## 2025-08-25 DIAGNOSIS — E03.9 HYPOTHYROIDISM, UNSPECIFIED TYPE: ICD-10-CM

## 2025-08-25 DIAGNOSIS — E53.8 VITAMIN B12 DEFICIENCY: ICD-10-CM

## 2025-08-25 DIAGNOSIS — E55.9 VITAMIN D DEFICIENCY: ICD-10-CM

## 2025-08-25 LAB
BILIRUB BLD-MCNC: ABNORMAL MG/DL
CLARITY, POC: CLEAR
COLOR UR: YELLOW
EXPIRATION DATE: ABNORMAL
GLUCOSE UR STRIP-MCNC: NEGATIVE MG/DL
KETONES UR QL: NEGATIVE
LEUKOCYTE EST, POC: ABNORMAL
Lab: ABNORMAL
NITRITE UR-MCNC: POSITIVE MG/ML
PH UR: 6 [PH] (ref 5–8)
PROT UR STRIP-MCNC: ABNORMAL MG/DL
RBC # UR STRIP: NEGATIVE /UL
SP GR UR: 1.02 (ref 1–1.03)
UROBILINOGEN UR QL: NORMAL

## 2025-08-25 PROCEDURE — 1125F AMNT PAIN NOTED PAIN PRSNT: CPT | Performed by: FAMILY MEDICINE

## 2025-08-25 PROCEDURE — 81003 URINALYSIS AUTO W/O SCOPE: CPT | Performed by: FAMILY MEDICINE

## 2025-08-25 PROCEDURE — 99214 OFFICE O/P EST MOD 30 MIN: CPT | Performed by: FAMILY MEDICINE

## 2025-08-25 PROCEDURE — 96372 THER/PROPH/DIAG INJ SC/IM: CPT | Performed by: FAMILY MEDICINE

## 2025-08-25 PROCEDURE — 3074F SYST BP LT 130 MM HG: CPT | Performed by: FAMILY MEDICINE

## 2025-08-25 PROCEDURE — 3078F DIAST BP <80 MM HG: CPT | Performed by: FAMILY MEDICINE

## 2025-08-25 RX ORDER — CEFDINIR 300 MG/1
300 CAPSULE ORAL 2 TIMES DAILY
Qty: 14 CAPSULE | Refills: 0 | Status: SHIPPED | OUTPATIENT
Start: 2025-08-25 | End: 2025-09-01

## 2025-08-25 RX ORDER — CYANOCOBALAMIN 1000 UG/ML
1000 INJECTION, SOLUTION INTRAMUSCULAR; SUBCUTANEOUS
Status: SHIPPED | OUTPATIENT
Start: 2025-08-25

## 2025-08-25 RX ADMIN — CYANOCOBALAMIN 1000 MCG: 1000 INJECTION, SOLUTION INTRAMUSCULAR; SUBCUTANEOUS at 16:02

## 2025-08-26 LAB
25(OH)D3+25(OH)D2 SERPL-MCNC: 37 NG/ML (ref 30–100)
ALBUMIN SERPL-MCNC: 4.5 G/DL (ref 3.8–4.8)
ALP SERPL-CCNC: 73 IU/L (ref 44–121)
ALT SERPL-CCNC: 17 IU/L (ref 0–32)
AST SERPL-CCNC: 18 IU/L (ref 0–40)
BASOPHILS # BLD AUTO: 0.1 X10E3/UL (ref 0–0.2)
BASOPHILS NFR BLD AUTO: 1 %
BILIRUB SERPL-MCNC: 0.3 MG/DL (ref 0–1.2)
BUN SERPL-MCNC: 19 MG/DL (ref 8–27)
BUN/CREAT SERPL: 21 (ref 12–28)
CALCIUM SERPL-MCNC: 9.4 MG/DL (ref 8.7–10.3)
CHLORIDE SERPL-SCNC: 107 MMOL/L (ref 96–106)
CO2 SERPL-SCNC: 20 MMOL/L (ref 20–29)
CREAT SERPL-MCNC: 0.92 MG/DL (ref 0.57–1)
EGFRCR SERPLBLD CKD-EPI 2021: 65 ML/MIN/1.73
EOSINOPHIL # BLD AUTO: 0.5 X10E3/UL (ref 0–0.4)
EOSINOPHIL NFR BLD AUTO: 8 %
ERYTHROCYTE [DISTWIDTH] IN BLOOD BY AUTOMATED COUNT: 12.1 % (ref 11.7–15.4)
GLOBULIN SER CALC-MCNC: 2.7 G/DL (ref 1.5–4.5)
GLUCOSE SERPL-MCNC: ABNORMAL MG/DL
HCT VFR BLD AUTO: 40.2 % (ref 34–46.6)
HGB BLD-MCNC: 12.8 G/DL (ref 11.1–15.9)
IMM GRANULOCYTES # BLD AUTO: 0 X10E3/UL (ref 0–0.1)
IMM GRANULOCYTES NFR BLD AUTO: 0 %
LYMPHOCYTES # BLD AUTO: 1.3 X10E3/UL (ref 0.7–3.1)
LYMPHOCYTES NFR BLD AUTO: 22 %
MCH RBC QN AUTO: 30.3 PG (ref 26.6–33)
MCHC RBC AUTO-ENTMCNC: 31.8 G/DL (ref 31.5–35.7)
MCV RBC AUTO: 95 FL (ref 79–97)
MONOCYTES # BLD AUTO: 0.5 X10E3/UL (ref 0.1–0.9)
MONOCYTES NFR BLD AUTO: 9 %
NEUTROPHILS # BLD AUTO: 3.7 X10E3/UL (ref 1.4–7)
NEUTROPHILS NFR BLD AUTO: 60 %
PLATELET # BLD AUTO: 282 X10E3/UL (ref 150–450)
POTASSIUM SERPL-SCNC: ABNORMAL MMOL/L
PROT SERPL-MCNC: 7.2 G/DL (ref 6–8.5)
RBC # BLD AUTO: 4.23 X10E6/UL (ref 3.77–5.28)
SODIUM SERPL-SCNC: 142 MMOL/L (ref 134–144)
TSH SERPL DL<=0.005 MIU/L-ACNC: 0.07 UIU/ML (ref 0.45–4.5)
VIT B12 SERPL-MCNC: 485 PG/ML (ref 232–1245)
WBC # BLD AUTO: 6.1 X10E3/UL (ref 3.4–10.8)

## 2025-08-28 ENCOUNTER — OFFICE VISIT (OUTPATIENT)
Dept: SPORTS MEDICINE | Facility: CLINIC | Age: 75
End: 2025-08-28
Payer: MEDICARE

## 2025-08-28 VITALS
HEIGHT: 62 IN | RESPIRATION RATE: 16 BRPM | SYSTOLIC BLOOD PRESSURE: 118 MMHG | BODY MASS INDEX: 36.44 KG/M2 | OXYGEN SATURATION: 95 % | HEART RATE: 60 BPM | DIASTOLIC BLOOD PRESSURE: 70 MMHG | WEIGHT: 198 LBS

## 2025-08-28 DIAGNOSIS — Z98.1 STATUS POST LUMBAR SPINAL FUSION: ICD-10-CM

## 2025-08-28 DIAGNOSIS — M16.11 PRIMARY OSTEOARTHRITIS OF RIGHT HIP: ICD-10-CM

## 2025-08-28 DIAGNOSIS — Z76.89 ENCOUNTER TO ESTABLISH CARE WITH NEW DOCTOR: Primary | ICD-10-CM

## 2025-08-28 RX ORDER — TRIAMCINOLONE ACETONIDE 40 MG/ML
80 INJECTION, SUSPENSION INTRA-ARTICULAR; INTRAMUSCULAR
Status: COMPLETED | OUTPATIENT
Start: 2025-08-28 | End: 2025-08-28

## 2025-08-28 RX ADMIN — TRIAMCINOLONE ACETONIDE 80 MG: 40 INJECTION, SUSPENSION INTRA-ARTICULAR; INTRAMUSCULAR at 11:05

## 2025-08-29 ENCOUNTER — TREATMENT (OUTPATIENT)
Dept: CARDIAC REHAB | Facility: HOSPITAL | Age: 75
End: 2025-08-29
Payer: MEDICARE

## 2025-08-29 DIAGNOSIS — Z95.5 STATUS POST INSERTION OF DRUG-ELUTING STENT INTO LEFT ANTERIOR DESCENDING (LAD) ARTERY: Primary | ICD-10-CM

## 2025-08-29 PROCEDURE — 93798 PHYS/QHP OP CAR RHAB W/ECG: CPT

## (undated) DEVICE — DRP ARM EXCELSIUS/GPS DISP

## (undated) DEVICE — GLV SURG BIOGEL LTX PF 8

## (undated) DEVICE — CATH DIAG IMPULSE FR4 5F 100CM

## (undated) DEVICE — UNDERGLV SURG BIOGEL INDICATOR LF PF 7.5

## (undated) DEVICE — DGW .035 FC J3MM 260CM TEF: Brand: EMERALD

## (undated) DEVICE — SOLUTION,WATER,IRRIGATION,1000ML,STERILE: Brand: MEDLINE

## (undated) DEVICE — PROB SCRW STIM PEDCL DISP

## (undated) DEVICE — SUT VIC 2/0 CT1 CR8 18IN J839D

## (undated) DEVICE — PK BASIC SPINE 50

## (undated) DEVICE — DRP MICROSCP SURG SMARTDRAPE VISIONGUARD

## (undated) DEVICE — EPIDURAL TRAY: Brand: MEDLINE INDUSTRIES, INC.

## (undated) DEVICE — GLV SURG TRIUMPH PF LTX 7 STRL

## (undated) DEVICE — CANN O2 ETCO2 FITS ALL CONN CO2 SMPL A/ 7IN DISP LF

## (undated) DEVICE — DRP C/ARMOR

## (undated) DEVICE — FRCP BX RADJAW4 NDL 2.8 240CM LG OG BX40

## (undated) DEVICE — GLIDESHEATH SLENDER STAINLESS STEEL KIT: Brand: GLIDESHEATH SLENDER

## (undated) DEVICE — DEV INDEFLATOR P/N 580289

## (undated) DEVICE — NDL SPINE 22G 31/2IN BLK

## (undated) DEVICE — LOU PACE DEFIB: Brand: MEDLINE INDUSTRIES, INC.

## (undated) DEVICE — NC TREK NEO™ CORONARY DILATATION CATHETER 3.50 MM X 15 MM / RAPID-EXCHANGE: Brand: NC TREK NEO™

## (undated) DEVICE — GLIDESHEATH BASIC HYDROPHILIC COATED INTRODUCER SHEATH: Brand: GLIDESHEATH

## (undated) DEVICE — DRSNG WND BORDR/ADHS NONADHR/GZ LF 4X4IN STRL

## (undated) DEVICE — MSK PROC CURAPLEX O2 2/ADAPT 7FT

## (undated) DEVICE — BIPOLAR SEALER 23-112-1 AQM 6.0: Brand: AQUAMANTYS™

## (undated) DEVICE — GLV SURG TRIUMPH PF LTX 7.5 STRL

## (undated) DEVICE — LN SMPL CO2 SHTRM SD STREAM W/M LUER

## (undated) DEVICE — SHEET, DRAPE, SPLIT, STERILE: Brand: MEDLINE

## (undated) DEVICE — SMOKE EVACUATION TUBING WITH 7/8 IN TO 1/4 IN REDUCER: Brand: BUFFALO FILTER

## (undated) DEVICE — KT SURG TURNOVER 050

## (undated) DEVICE — TREK CORONARY DILATATION CATHETER 3.0 MM X 12 MM / RAPID-EXCHANGE: Brand: TREK

## (undated) DEVICE — KT ORCA ORCAPOD DISP STRL

## (undated) DEVICE — ELECTRD BLD EZ CLN MOD 6.5IN

## (undated) DEVICE — CORONARY IMAGING CATHETER: Brand: OPTICROSS™ 6 HD

## (undated) DEVICE — ADAPT CLN BIOGUARD AIR/H2O DISP

## (undated) DEVICE — GLV SURG SIGNATURE ESSENTIAL PF LTX SZ7.5

## (undated) DEVICE — Device: Brand: PORTEX

## (undated) DEVICE — ELECTRD BLD EZ CLN MOD 4IN

## (undated) DEVICE — TUBING, SUCTION, 1/4" X 12', STRAIGHT: Brand: MEDLINE

## (undated) DEVICE — KT MANIFLD CARDIAC

## (undated) DEVICE — CATH DIAG IMPULSE FL3.5 5F 100CM

## (undated) DEVICE — PK CATH CARD 40

## (undated) DEVICE — SPONGE,NEURO,1"X1",XR,STRL,LF,10/PK: Brand: MEDLINE

## (undated) DEVICE — FEMORAL ENTRY ANGIOGRAPHY SHIELD-YELLOW: Brand: RADPAD

## (undated) DEVICE — DECANTER: Brand: UNBRANDED

## (undated) DEVICE — COVADERM: Brand: DEROYAL

## (undated) DEVICE — NDL,TUOHY EPID,18GX3.5",PLASTIC STYLET: Brand: MEDLINE

## (undated) DEVICE — SENSR O2 OXIMAX FNGR A/ 18IN NONSTR

## (undated) DEVICE — 6.0MM PRECISION ROUND

## (undated) DEVICE — ADHS SKIN PREMIERPRO EXOFIN TOPICAL HI/VISC .5ML

## (undated) DEVICE — SNAR POLYP CAPTIVATOR RND STFF 2.4 240CM 10MM 1P/U

## (undated) DEVICE — Device

## (undated) DEVICE — SPHR MARKR STEALTH STATION

## (undated) DEVICE — SOL IRRIG NACL 1000ML

## (undated) DEVICE — ANTIBACTERIAL UNDYED BRAIDED (POLYGLACTIN 910), SYNTHETIC ABSORBABLE SUTURE: Brand: COATED VICRYL

## (undated) DEVICE — STERILE PACKAGE WITH CANNULA: Brand: LITE BIO DELIVERY SYSTEM

## (undated) DEVICE — 1.6MM K-WIRE, 500MM, BLUNT TIP
Type: IMPLANTABLE DEVICE | Site: SPINE LUMBAR | Status: NON-FUNCTIONAL
Removed: 2024-07-30

## (undated) DEVICE — BALN PRESS WEDGE 5F 110CM

## (undated) DEVICE — KNIF BAYO ANNULOTOMY

## (undated) DEVICE — EXOFIN PRECISION PEN HIGH VISCOSITY TOPICAL SKIN ADHESIVE: Brand: EXOFIN PRECISION PEN, 1G

## (undated) DEVICE — INTENDED FOR TISSUE SEPARATION, AND OTHER PROCEDURES THAT REQUIRE A SHARP SURGICAL BLADE TO PUNCTURE OR CUT.: Brand: BARD-PARKER ® STAINLESS STEEL BLADES

## (undated) DEVICE — THE SINGLE USE ETRAP – POLYP TRAP IS USED FOR SUCTION RETRIEVAL OF ENDOSCOPICALLY REMOVED POLYPS.: Brand: ETRAP

## (undated) DEVICE — NDL EPID TUOHY 18G 31/2IN

## (undated) DEVICE — C-ARM: Brand: UNBRANDED

## (undated) DEVICE — GUIDE CATHETER: Brand: MACH1™

## (undated) DEVICE — UNDERGLV SURG BIOGEL/PI PF SYNTH SURG SZ8.5 BLU 50/BX

## (undated) DEVICE — GW HITORQUE/BAL MID/WT J W/HCOAT .014 3X190CM

## (undated) DEVICE — CUFF SCD HEMOFORCE SEQ CALF STD MD

## (undated) DEVICE — SPONGE,LAP,12"X12",XR,ST,5/PK,40PK/CS: Brand: MEDLINE

## (undated) DEVICE — SLV SCD CALF HEMOFORCE DVT THERP REPROC MD

## (undated) DEVICE — TUBING, SUCTION, 1/4" X 10', STRAIGHT: Brand: MEDLINE

## (undated) DEVICE — PRT MIN/ACC MARS PEEK STR 22X60MM 1P/U

## (undated) DEVICE — BITEBLOCK OMNI BLOC

## (undated) DEVICE — DRP MONTR EXCELSIUS/GPS DISP